# Patient Record
Sex: FEMALE | Race: WHITE | Employment: OTHER | URBAN - METROPOLITAN AREA
[De-identification: names, ages, dates, MRNs, and addresses within clinical notes are randomized per-mention and may not be internally consistent; named-entity substitution may affect disease eponyms.]

---

## 2017-01-30 ENCOUNTER — HOSPITAL ENCOUNTER (OUTPATIENT)
Dept: LAB | Age: 57
Discharge: HOME | End: 2017-01-30
Attending: FAMILY MEDICINE | Admitting: FAMILY MEDICINE

## 2017-01-30 LAB
ALBUMIN SERPL-MCNC: 4.1 GM/DL (ref 3.4–5)
ALP BLD-CCNC: 208 IU/L (ref 40–128)
ALT SERPL-CCNC: 20 U/L (ref 10–40)
ANION GAP SERPL CALCULATED.3IONS-SCNC: 12 MMOL/L (ref 4–16)
AST SERPL-CCNC: 14 IU/L (ref 15–37)
BILIRUB SERPL-MCNC: 0.5 MG/DL (ref 0–1)
BUN BLDV-MCNC: 27 MG/DL (ref 6–23)
CALCIUM SERPL-MCNC: 9.3 MG/DL (ref 8.3–10.6)
CHLORIDE BLD-SCNC: 94 MMOL/L (ref 99–110)
CHOLESTEROL: 156 MG/DL
CO2: 25 MMOL/L (ref 21–32)
CREAT SERPL-MCNC: 1.1 MG/DL (ref 0.6–1.1)
CREATININE URINE: 34.7 MG/DL (ref 28–217)
ERYTHROCYTE SEDIMENTATION RATE: 48 MM/HR (ref 0–30)
ESTIMATED AVERAGE GLUCOSE: 309 MG/DL
GFR AFRICAN AMERICAN: >60 ML/MIN/1.73M2
GFR NON-AFRICAN AMERICAN: 51 ML/MIN/1.73M2
GLUCOSE FASTING: 453 MG/DL (ref 70–99)
HBA1C MFR BLD: 12.4 % (ref 4.2–6.3)
HDLC SERPL-MCNC: 26 MG/DL
LDL CHOLESTEROL CALCULATED: ABNORMAL MG/DL
MICROALBUMIN/CREAT 24H UR: 2.2 MG/DL
MICROALBUMIN/CREAT UR-RTO: 63.4 MG/G CREAT (ref 0–30)
POTASSIUM SERPL-SCNC: 5.4 MMOL/L (ref 3.5–5.1)
SODIUM BLD-SCNC: 131 MMOL/L (ref 135–145)
TOTAL PROTEIN: 5.9 GM/DL (ref 6.4–8.2)
TRIGL SERPL-MCNC: 543 MG/DL

## 2017-05-12 ENCOUNTER — HOSPITAL ENCOUNTER (OUTPATIENT)
Dept: GENERAL RADIOLOGY | Age: 57
Discharge: HOME | End: 2017-05-12
Attending: FAMILY MEDICINE | Admitting: FAMILY MEDICINE

## 2017-05-12 DIAGNOSIS — M25.511 ACUTE PAIN OF RIGHT SHOULDER: ICD-10-CM

## 2017-05-12 DIAGNOSIS — M25.512 ACUTE PAIN OF LEFT SHOULDER: ICD-10-CM

## 2017-05-12 DIAGNOSIS — M79.601 RIGHT ARM PAIN: ICD-10-CM

## 2017-05-12 DIAGNOSIS — M79.602 LEFT ARM PAIN: ICD-10-CM

## 2017-08-28 ENCOUNTER — HOSPITAL ENCOUNTER (OUTPATIENT)
Dept: GENERAL RADIOLOGY | Age: 57
Discharge: HOME | End: 2017-08-28
Attending: FAMILY MEDICINE | Admitting: FAMILY MEDICINE

## 2017-08-28 DIAGNOSIS — M79.671 RIGHT FOOT PAIN: ICD-10-CM

## 2018-01-08 PROBLEM — F41.1 GENERALIZED ANXIETY DISORDER: Chronic | Status: ACTIVE | Noted: 2018-01-08

## 2018-04-05 PROBLEM — R11.2 NAUSEA & VOMITING: Status: ACTIVE | Noted: 2018-04-05

## 2018-04-05 PROBLEM — R50.9 FEVER: Chronic | Status: ACTIVE | Noted: 2018-04-05

## 2018-09-19 ENCOUNTER — HOSPITAL ENCOUNTER (INPATIENT)
Dept: REHABILITATION | Age: 58
LOS: 8 days | Discharge: HOME HEALTH | DRG: 948 | End: 2018-09-27
Attending: PHYSICAL MEDICINE & REHABILITATION | Admitting: PHYSICAL MEDICINE & REHABILITATION
Payer: MEDICARE

## 2018-09-19 DIAGNOSIS — R07.89 MUSCULOSKELETAL CHEST PAIN: Primary | ICD-10-CM

## 2018-09-19 DIAGNOSIS — M25.511 ACUTE PAIN OF RIGHT SHOULDER: ICD-10-CM

## 2018-09-19 PROBLEM — R53.81 DEBILITY: Status: ACTIVE | Noted: 2018-09-19

## 2018-09-19 LAB
GLUCOSE BLD-MCNC: 172 MG/DL (ref 70–99)
GLUCOSE BLD-MCNC: 183 MG/DL (ref 70–99)

## 2018-09-19 PROCEDURE — 99223 1ST HOSP IP/OBS HIGH 75: CPT | Performed by: PHYSICAL MEDICINE & REHABILITATION

## 2018-09-19 PROCEDURE — 9990 CHARGE CONVERSION

## 2018-09-19 RX ORDER — DOCUSATE SODIUM 100 MG/1
100 CAPSULE, LIQUID FILLED ORAL 2 TIMES DAILY
Status: DISCONTINUED | OUTPATIENT
Start: 2018-09-19 | End: 2018-09-27 | Stop reason: HOSPADM

## 2018-09-19 RX ORDER — DEXTROSE MONOHYDRATE 50 MG/ML
100 INJECTION, SOLUTION INTRAVENOUS PRN
Status: DISCONTINUED | OUTPATIENT
Start: 2018-09-19 | End: 2018-09-27 | Stop reason: HOSPADM

## 2018-09-19 RX ORDER — DULOXETIN HYDROCHLORIDE 30 MG/1
60 CAPSULE, DELAYED RELEASE ORAL DAILY
Status: DISCONTINUED | OUTPATIENT
Start: 2018-09-20 | End: 2018-09-27 | Stop reason: HOSPADM

## 2018-09-19 RX ORDER — BUPROPION HYDROCHLORIDE 100 MG/1
100 TABLET ORAL 2 TIMES DAILY
Status: DISCONTINUED | OUTPATIENT
Start: 2018-09-19 | End: 2018-09-27 | Stop reason: HOSPADM

## 2018-09-19 RX ORDER — DEXTROSE MONOHYDRATE 25 G/50ML
12.5 INJECTION, SOLUTION INTRAVENOUS PRN
Status: DISCONTINUED | OUTPATIENT
Start: 2018-09-19 | End: 2018-09-27 | Stop reason: HOSPADM

## 2018-09-19 RX ORDER — TRAMADOL HYDROCHLORIDE 50 MG/1
50 TABLET ORAL EVERY 6 HOURS PRN
Status: DISCONTINUED | OUTPATIENT
Start: 2018-09-19 | End: 2018-09-27 | Stop reason: HOSPADM

## 2018-09-19 RX ORDER — GLIPIZIDE 5 MG/1
5 TABLET ORAL
Status: DISCONTINUED | OUTPATIENT
Start: 2018-09-19 | End: 2018-09-27 | Stop reason: HOSPADM

## 2018-09-19 RX ORDER — INSULIN GLARGINE 100 [IU]/ML
30 INJECTION, SOLUTION SUBCUTANEOUS NIGHTLY
Status: DISCONTINUED | OUTPATIENT
Start: 2018-09-19 | End: 2018-09-27 | Stop reason: HOSPADM

## 2018-09-19 RX ORDER — HYDRALAZINE HYDROCHLORIDE 25 MG/1
25 TABLET, FILM COATED ORAL EVERY 4 HOURS PRN
Status: DISCONTINUED | OUTPATIENT
Start: 2018-09-19 | End: 2018-09-19

## 2018-09-19 RX ORDER — GABAPENTIN 300 MG/1
300 CAPSULE ORAL 3 TIMES DAILY
Status: DISCONTINUED | OUTPATIENT
Start: 2018-09-19 | End: 2018-09-27 | Stop reason: HOSPADM

## 2018-09-19 RX ORDER — TORSEMIDE 20 MG/1
10 TABLET ORAL DAILY
Status: DISCONTINUED | OUTPATIENT
Start: 2018-09-20 | End: 2018-09-27 | Stop reason: HOSPADM

## 2018-09-19 RX ORDER — ACETAMINOPHEN 325 MG/1
650 TABLET ORAL EVERY 4 HOURS PRN
Status: DISCONTINUED | OUTPATIENT
Start: 2018-09-19 | End: 2018-09-27 | Stop reason: HOSPADM

## 2018-09-19 RX ORDER — ALBUTEROL SULFATE 90 UG/1
2 AEROSOL, METERED RESPIRATORY (INHALATION) EVERY 4 HOURS PRN
Status: DISCONTINUED | OUTPATIENT
Start: 2018-09-19 | End: 2018-09-27 | Stop reason: HOSPADM

## 2018-09-19 RX ORDER — CLOPIDOGREL BISULFATE 75 MG/1
75 TABLET ORAL DAILY
Status: DISCONTINUED | OUTPATIENT
Start: 2018-09-20 | End: 2018-09-27 | Stop reason: HOSPADM

## 2018-09-19 RX ORDER — SODIUM CHLORIDE 0.9 % (FLUSH) 0.9 %
10 SYRINGE (ML) INJECTION 2 TIMES DAILY
Status: DISCONTINUED | OUTPATIENT
Start: 2018-09-19 | End: 2018-09-20

## 2018-09-19 RX ORDER — CIPROFLOXACIN 500 MG/1
500 TABLET, FILM COATED ORAL EVERY 12 HOURS SCHEDULED
Status: COMPLETED | OUTPATIENT
Start: 2018-09-19 | End: 2018-09-22

## 2018-09-19 RX ORDER — ATORVASTATIN CALCIUM 40 MG/1
40 TABLET, FILM COATED ORAL NIGHTLY
Status: DISCONTINUED | OUTPATIENT
Start: 2018-09-19 | End: 2018-09-27 | Stop reason: HOSPADM

## 2018-09-19 RX ORDER — NICOTINE POLACRILEX 4 MG
15 LOZENGE BUCCAL PRN
Status: DISCONTINUED | OUTPATIENT
Start: 2018-09-19 | End: 2018-09-19

## 2018-09-19 RX ORDER — NICOTINE POLACRILEX 4 MG
15 LOZENGE BUCCAL PRN
Status: DISCONTINUED | OUTPATIENT
Start: 2018-09-19 | End: 2018-09-27 | Stop reason: HOSPADM

## 2018-09-19 RX ORDER — ASPIRIN 81 MG/1
81 TABLET, CHEWABLE ORAL DAILY
Status: DISCONTINUED | OUTPATIENT
Start: 2018-09-20 | End: 2018-09-27 | Stop reason: HOSPADM

## 2018-09-19 RX ORDER — PANTOPRAZOLE SODIUM 40 MG/1
40 TABLET, DELAYED RELEASE ORAL
Status: DISCONTINUED | OUTPATIENT
Start: 2018-09-20 | End: 2018-09-27 | Stop reason: HOSPADM

## 2018-09-19 RX ADMIN — CIPROFLOXACIN 500 MG: 500 TABLET, FILM COATED ORAL at 20:25

## 2018-09-19 RX ADMIN — DOCUSATE SODIUM 100 MG: 100 CAPSULE, LIQUID FILLED ORAL at 20:25

## 2018-09-19 RX ADMIN — INSULIN LISPRO 30 UNITS: 100 INJECTION, SOLUTION INTRAVENOUS; SUBCUTANEOUS at 17:26

## 2018-09-19 RX ADMIN — TRAMADOL HYDROCHLORIDE 50 MG: 50 TABLET, FILM COATED ORAL at 20:27

## 2018-09-19 RX ADMIN — GLIPIZIDE 5 MG: 5 TABLET ORAL at 16:19

## 2018-09-19 RX ADMIN — INSULIN LISPRO 1 UNITS: 100 INJECTION, SOLUTION INTRAVENOUS; SUBCUTANEOUS at 21:50

## 2018-09-19 RX ADMIN — GABAPENTIN 300 MG: 300 CAPSULE ORAL at 20:26

## 2018-09-19 RX ADMIN — ATORVASTATIN CALCIUM 40 MG: 40 TABLET, FILM COATED ORAL at 20:25

## 2018-09-19 RX ADMIN — METOPROLOL TARTRATE 25 MG: 25 TABLET ORAL at 20:26

## 2018-09-19 RX ADMIN — Medication 10 ML: at 20:27

## 2018-09-19 RX ADMIN — BUPROPION HYDROCHLORIDE 100 MG: 100 TABLET, FILM COATED ORAL at 20:25

## 2018-09-19 RX ADMIN — INSULIN GLARGINE 30 UNITS: 100 INJECTION, SOLUTION SUBCUTANEOUS at 21:49

## 2018-09-19 RX ADMIN — INSULIN LISPRO 1 UNITS: 100 INJECTION, SOLUTION INTRAVENOUS; SUBCUTANEOUS at 17:20

## 2018-09-19 ASSESSMENT — PAIN DESCRIPTION - DESCRIPTORS: DESCRIPTORS: ACHING;SORE;THROBBING

## 2018-09-19 ASSESSMENT — PAIN DESCRIPTION - LOCATION
LOCATION: ARM;BACK;COCCYX
LOCATION: ARM;BACK;COCCYX

## 2018-09-19 ASSESSMENT — PAIN DESCRIPTION - ORIENTATION
ORIENTATION: RIGHT
ORIENTATION: RIGHT

## 2018-09-19 ASSESSMENT — PAIN DESCRIPTION - PAIN TYPE
TYPE: ACUTE PAIN;CHRONIC PAIN
TYPE: ACUTE PAIN;CHRONIC PAIN

## 2018-09-19 ASSESSMENT — PAIN SCALES - GENERAL
PAINLEVEL_OUTOF10: 5
PAINLEVEL_OUTOF10: 9

## 2018-09-19 ASSESSMENT — PAIN DESCRIPTION - PROGRESSION: CLINICAL_PROGRESSION: GRADUALLY IMPROVING

## 2018-09-19 NOTE — PLAN OF CARE
ARU Interdisciplinary Plan of Care (IPOC)  [facility] Dr. Maryjane Boykin [facility] [place], [address]  [phone]  Fax: [phone]        Rhett Barfield    : 1960  Acct #: [de-identified]  MRN: [id number]   PHYSICIAN:  Lissa Warner MD  Primary Active Problems:   Active Hospital Problems    Diagnosis Date Noted    Debility [R53.81] 2018       Rehabilitation Diagnosis:     Debility [R53.81]       ADMIT DATE:2018   CARE PLAN     NURSING:  Sanjuana Mosley while on this unit will: Bowel and Bladder   [x] Be continent of bowel and bladder      [x] Have an adequate number of bowel movements   [] Urinate with no urinary retention >300ml in bladder   [] Bladder Scan: (details)   [] Complete bladder protocol with mixon removal   [] Initiate Bladder Program to toilet every ___ hours   [] Initiate Bowel Program to toilet every ___hours   [] Bladder training    [] Bowel training  Pulmonary   [x] Maintain O2 SATs at 92%  Pain Management   [x] Have pain managed while on ARU        [x] Be pain free by discharge    [x] Medication Management and Education  Maintenance of Skin Integrity/Wound Management   [x] Have no skin breakdown while on ARU   [] Have improved skin integrity via wound measurements   [] Have no signs/symptoms of infection via infection protection and monitoring at the          wound site  Fall Prevention   [x] Be free from injury during hospitalization via fall prevention measures     [x] Disease management and Education  Precautions   [] Weight Bearing Precautions   [] Swallowing Precautions   [x] Monitoring of Risks of Complications   [x] DVT Prophylaxis    [x] Fluid/electrolyte/Nutrition Management    [x] Complete education with patient/family with understanding demonstrated for          in-room safety with transfers to bed, toilet, wheelchair, shower as well as                bathroom activities and hygiene.   [x] Adjustment   [] Other:   Nursing interventions may include bowel/bladder training, education for medical assistive devices, medication education, O2 saturation management, energy conservation, stress management techniques, fall prevention, alarms protocol, seating and positioning, skin/wound care, pressure relief instruction,dressing changes,  infection protection, DVT prophylaxis, and/or assistance with in room safety with transfers to bed, toilet, wheelchair, shower as well as bathroom activities and hygiene. Patient/caregiver education for:   [x] Disease/sustained injury/management      [x] Medication Use   [] Surgical intervention   [x] Safety/Precautions   [x] Body mechanics and or joint protection   [x] Health maintenance         PHYSICAL THERAPY:  Goals:                  Short term goals  Time Frame for Short term goals: 7 tx days or until discharge:   Short term goal 1: Pt will consistently complete bed mobility and sup<->sit Ind and all basic, OOB transfers Mod Ind. Short term goal 2: Pt will ambulate >/=200 ft using RW with Sup. Short term goal 3: Pt will ascend/descend at least 4 steps using bilat rails with Sup. Short term goal 4: Pt will ascend/descend curb step and ambulate over uneven surfaces using RW with Sup. These goals were reviewed with this patient at the time of assessment and Lina Paz is in agreement. Plan of Care: Pt to be seen 5 days per week for a minimum of 60 minutes for 7 days. Current Treatment Recommendations: Strengthening, Transfer Training, Endurance Training, Patient/Caregiver Education & Training, Equipment Evaluation, Education, & procurement, Balance Training, Gait Training, Home Exercise Program, Functional Mobility Training, Stair training, Safety Education & Training community reintegration,and concurrent/group therapy.       OCCUPATIONAL THERAPY:  Goals:             Short term goals  Time Frame for Short term goals: STGs=LTGs :  Long term and Tanya Acuna is in agreement. CASE MANAGEMENT:  Goals:   Assist patient/family with discharge planning, patient/family counseling, assistance in obtaining recommended equipment and other services, and coordination with insurance during ARU stay. Patient Goals:   Return to maximum level of independent function.     Nutrition goal: Patient will consume at least 70% of meals on carb controlled diet       Current  FIMS and Goals:   SELF-CARE  Eatin - Patient feeds self  GOAL: Eatin  Groomin - Requires setup/cues to do all tasks  GOAL: Groomin  Bathin - Able to bathe 8-9 areas  GOAL: Bathin  Dressing-Upper: 5 - Requires setup/supervision/cues and/or requires assist with presthesis/brace only  GOAL: Dressing-Upper: 7  Dressing-Lower: 4 - Requires assist with buttons/zippers/shoelaces and/or assist with shoes only  GOAL: Dressing-Lower: 6  Toiletin - Requires device (grab bar/walker/etc.) (uses grab bar)  Bowel Level of Assistance:  (no bowel activity this shift)  TRANSFERS  Bed, Chair, Wheel Chair: 4 - Requires steadying assistance only <25% assist  and/or requires assist with one leg only  GOAL: Bed, Chair, Wheel Chair: 6  Toilet Transfer: 4 - Requires steadying assistance only < 25% assist  GOAL: Toilet: 6  Primary Mode: Shower  GOAL: Tub, Shower: 6  Shower Transfer: 4 - Minimal contact assistance, pt. expends 75% or more effort  SPHINCTER CONTROL  Bladder Level of Assistance: 7- Urinates in toilet Independently, does not take medication to manager bladder function  Bladder Frequency of Accidents: 7 - Urinates in toilet without need for device or medication, no bladder accidents  Bowel Level of Assistance:  (no bowel activity this shift)  Bowel Frequency of Accidents:  (no bowel activity this shift)  LOCOMOTION  Primary Mode: Walk  Distance Walked: 130 ft  Walk: 2 - Maximal Assistance Requires up to Maximal Assistance AND requires assistance of one person to walk between  feet (Patient performs 25-49% of locomotion effort or goes between  feet) (CGA)  Stairs: 0 - Activity Does not Occur ( 0 only for the admission assessment)  COMMUNICATION  Comprehension: 6 - Complex ideas 90% or device (hearing aid/glasses)  Expression: 7 - Patient expresses complex ideas/needs  SOCIAL COGNITION  Social Interaction: 6 - Patient requires medication for mood and/or effect  Problem Solvin - Patient able to solve simple/routine tasks  Memory: 5 - Patient requires prompting with stress/unfamiliar situations    Activities Prior to Admit:      Active : No                  Intensity of Therapy  Valaria Kanner Peyatt will be seen a minimum of 3 hours of therapy per day/a minimum of 5 out of 7 days per week. [] In this rare instance due to the nature of this patient's medical involvement, this patient will be seen 15 hours per week (900 minutes within a 7 day period). Treatments may include therapeutic exercises, gait training, neuromuscular re-ed, transfer training, community reintegration, bed mobility, w/c mobility and training, self care, home mgmt, cognitive training, energy conservation,dysphagia tx, speech/language/communication therapy, group therapy, and patient/family education. In addition, dietician/nutritionist may monitor calorie count as well as intake and collaboratively work with SLP on dietary upgrades. Neuropsychology/Psychology may evaluate and provide necessary support.     Medical issues being managed closely and that require 24 hour availability of a physician:   [] Swallowing Precautions                                     [] Weight bearing precautions   [] Wound Care                             [x] Infection Prevention   [x] DVT Prophylaxis/assessment              [x] Monitoring for complications    [x] Fall Precautions/Prevention                         [x] Fluid/Electrolyte/Nutrition Balance   [] Voice Protection                           [x] Medication Management   [x] Respiratory                   [x] Pain Mgmt   [x] Bowel/Bladder Fx    Medical Prognosis: [] Good  [x] Fair    [] Guarded   Total expected IRF days 14. Physician anticipated functional outcomes:  FWW and HHC OT/PT with supervision. Rehab Goals:   [] Return to premorbid function of_______________________________.    [] Independent   [] Mod I  [x] Supervision  [] CGA   [] Min A   [] Mod A  Level for ambulation []without assistive device  [x] with assistive device        [] Independent   [] Mod I  [x] Supervision [] CGA   [] Min A   [] Mod A  Level for transfers []without assistive device  [x] with assistive device         [] Independent   [] Mod I  [x] Supervision [] CGA   [] Min A   [] Mod A Level with ADL's []without assistive device   [x] with assistive device     ___________________     Level with cognitive skills requiring [] No assist [x] Supervision  [] Active Assist/Cues     [] Maximize level of mobility and ADL's to decrease burden on caregiver    IPOC brief synthesis of Preadmission Screen, Post-Admission Evaluation, and Therapy Evaluations: Acute inpatient rehabilitation with occupational and  physical therapy 180 minutes, five out of every seven days. We will  address basic and advancing mobility with self-care instruction and  adaptive equipment training. Caregiver education will be offered. Expected length of stay prior to a supervised level of function for  discharge to home is two weeks.     ADDITIONAL RECOMMENDATIONS:  1. Debility due to urinary tract infection with gait disturbance: The  patient requires daily occupational and physical therapy. We will maximize  blood sugar and blood pressure control, encourage oral intake and work on  bowel and bladder retraining. Pulmonary hygiene and DVT prophylaxis and  pain management will be emphasized.     2. DVT prophylaxis:  Lovenox 30 mg subcu daily.   I must monitor her  hemoglobin and platelet count while

## 2018-09-20 LAB
GLUCOSE BLD-MCNC: 108 MG/DL (ref 70–99)
GLUCOSE BLD-MCNC: 157 MG/DL (ref 70–99)
GLUCOSE BLD-MCNC: 174 MG/DL (ref 70–99)
GLUCOSE BLD-MCNC: 175 MG/DL (ref 70–99)
GLUCOSE BLD-MCNC: 208 MG/DL (ref 70–99)
GLUCOSE BLD-MCNC: 80 MG/DL (ref 70–99)

## 2018-09-20 PROCEDURE — 90686 IIV4 VACC NO PRSV 0.5 ML IM: CPT

## 2018-09-20 PROCEDURE — 97163 PT EVAL HIGH COMPLEX 45 MIN: CPT

## 2018-09-20 PROCEDURE — 97116 GAIT TRAINING THERAPY: CPT

## 2018-09-20 PROCEDURE — 97535 SELF CARE MNGMENT TRAINING: CPT

## 2018-09-20 PROCEDURE — G0008 ADMIN INFLUENZA VIRUS VAC: HCPCS

## 2018-09-20 PROCEDURE — 97530 THERAPEUTIC ACTIVITIES: CPT

## 2018-09-20 PROCEDURE — 82962 GLUCOSE BLOOD TEST: CPT

## 2018-09-20 PROCEDURE — 94150 VITAL CAPACITY TEST: CPT

## 2018-09-20 PROCEDURE — 9990 CHARGE CONVERSION

## 2018-09-20 PROCEDURE — 97150 GROUP THERAPEUTIC PROCEDURES: CPT

## 2018-09-20 PROCEDURE — 97167 OT EVAL HIGH COMPLEX 60 MIN: CPT

## 2018-09-20 PROCEDURE — 94761 N-INVAS EAR/PLS OXIMETRY MLT: CPT

## 2018-09-20 RX ADMIN — DOCUSATE SODIUM 100 MG: 100 CAPSULE, LIQUID FILLED ORAL at 08:25

## 2018-09-20 RX ADMIN — ATORVASTATIN CALCIUM 40 MG: 40 TABLET, FILM COATED ORAL at 21:25

## 2018-09-20 RX ADMIN — GLIPIZIDE 5 MG: 5 TABLET ORAL at 08:25

## 2018-09-20 RX ADMIN — INSULIN GLARGINE 30 UNITS: 100 INJECTION, SOLUTION SUBCUTANEOUS at 21:30

## 2018-09-20 RX ADMIN — BUPROPION HYDROCHLORIDE 100 MG: 100 TABLET, FILM COATED ORAL at 21:26

## 2018-09-20 RX ADMIN — INSULIN LISPRO 1 UNITS: 100 INJECTION, SOLUTION INTRAVENOUS; SUBCUTANEOUS at 21:30

## 2018-09-20 RX ADMIN — PANTOPRAZOLE SODIUM 40 MG: 40 TABLET, DELAYED RELEASE ORAL at 05:33

## 2018-09-20 RX ADMIN — INSULIN LISPRO 1 UNITS: 100 INJECTION, SOLUTION INTRAVENOUS; SUBCUTANEOUS at 12:39

## 2018-09-20 RX ADMIN — ASPIRIN 81 MG CHEWABLE TABLET 81 MG: 81 TABLET CHEWABLE at 08:25

## 2018-09-20 RX ADMIN — DULOXETINE HYDROCHLORIDE 60 MG: 30 CAPSULE, DELAYED RELEASE ORAL at 08:24

## 2018-09-20 RX ADMIN — GABAPENTIN 300 MG: 300 CAPSULE ORAL at 21:26

## 2018-09-20 RX ADMIN — TRAMADOL HYDROCHLORIDE 50 MG: 50 TABLET, FILM COATED ORAL at 08:25

## 2018-09-20 RX ADMIN — METOPROLOL TARTRATE 25 MG: 25 TABLET ORAL at 21:25

## 2018-09-20 RX ADMIN — GLIPIZIDE 5 MG: 5 TABLET ORAL at 17:06

## 2018-09-20 RX ADMIN — TRAMADOL HYDROCHLORIDE 50 MG: 50 TABLET, FILM COATED ORAL at 14:47

## 2018-09-20 RX ADMIN — ENOXAPARIN SODIUM 30 MG: 30 INJECTION SUBCUTANEOUS at 08:26

## 2018-09-20 RX ADMIN — TORSEMIDE 10 MG: 20 TABLET ORAL at 08:25

## 2018-09-20 RX ADMIN — TRAMADOL HYDROCHLORIDE 50 MG: 50 TABLET, FILM COATED ORAL at 21:26

## 2018-09-20 RX ADMIN — INSULIN LISPRO 1 UNITS: 100 INJECTION, SOLUTION INTRAVENOUS; SUBCUTANEOUS at 08:26

## 2018-09-20 RX ADMIN — DOCUSATE SODIUM 100 MG: 100 CAPSULE, LIQUID FILLED ORAL at 21:25

## 2018-09-20 RX ADMIN — CLOPIDOGREL BISULFATE 75 MG: 75 TABLET ORAL at 08:25

## 2018-09-20 RX ADMIN — GABAPENTIN 300 MG: 300 CAPSULE ORAL at 12:46

## 2018-09-20 RX ADMIN — INSULIN LISPRO 30 UNITS: 100 INJECTION, SOLUTION INTRAVENOUS; SUBCUTANEOUS at 12:40

## 2018-09-20 RX ADMIN — TRAMADOL HYDROCHLORIDE 50 MG: 50 TABLET, FILM COATED ORAL at 02:24

## 2018-09-20 RX ADMIN — CIPROFLOXACIN 500 MG: 500 TABLET, FILM COATED ORAL at 21:26

## 2018-09-20 RX ADMIN — GABAPENTIN 300 MG: 300 CAPSULE ORAL at 08:24

## 2018-09-20 RX ADMIN — INSULIN LISPRO 30 UNITS: 100 INJECTION, SOLUTION INTRAVENOUS; SUBCUTANEOUS at 08:28

## 2018-09-20 RX ADMIN — LINAGLIPTIN 5 MG: 5 TABLET, FILM COATED ORAL at 08:25

## 2018-09-20 RX ADMIN — METOPROLOL TARTRATE 25 MG: 25 TABLET ORAL at 08:24

## 2018-09-20 RX ADMIN — CIPROFLOXACIN 500 MG: 500 TABLET, FILM COATED ORAL at 08:25

## 2018-09-20 RX ADMIN — BUPROPION HYDROCHLORIDE 100 MG: 100 TABLET, FILM COATED ORAL at 08:26

## 2018-09-20 ASSESSMENT — PAIN DESCRIPTION - ORIENTATION
ORIENTATION: RIGHT
ORIENTATION: RIGHT

## 2018-09-20 ASSESSMENT — PAIN DESCRIPTION - PAIN TYPE
TYPE: ACUTE PAIN;CHRONIC PAIN
TYPE: ACUTE PAIN;CHRONIC PAIN

## 2018-09-20 ASSESSMENT — PAIN SCALES - GENERAL
PAINLEVEL_OUTOF10: 10
PAINLEVEL_OUTOF10: 10
PAINLEVEL_OUTOF10: 7
PAINLEVEL_OUTOF10: 8

## 2018-09-20 ASSESSMENT — PAIN DESCRIPTION - LOCATION
LOCATION: ARM;BACK;COCCYX
LOCATION: GENERALIZED
LOCATION: GENERALIZED
LOCATION: ARM;BACK;COCCYX

## 2018-09-20 ASSESSMENT — PAIN DESCRIPTION - DESCRIPTORS
DESCRIPTORS: ACHING;SORE
DESCRIPTORS: ACHING;SORE;THROBBING

## 2018-09-20 NOTE — H&P
commode. There are grab bars in  the bathroom. She has access to a manual wheelchair, but typically  furniture cruises for ambulation. She has been independent with her own  personal care, light homemaking and meal prep. At times, her significant  other has to help her initiate hygiene and dressing of the lower limbs. She does not drive. She does not use tobacco nor alcohol. ALLERGIES:  AMOXICILLIN. ADMITTING MEDICATIONS:  Baby aspirin daily, Lipitor 40 mg nightly,  Wellbutrin 100 mg b.i.d., Cipro 500 mg b.i.d., Plavix 75 mg daily, Colace  100 mg b.i.d., Cymbalta 60 mg daily, Lovenox 30 mg subcu daily, Neurontin  300 mg t.i.d., Glucotrol 5 mg b.i.d., Lantus 30 units nightly, Humalog  sliding scale with each meal plus 30 units with each meal scheduled,  Tradjenta 5 mg daily, Lopressor 25 mg b.i.d., Protonix 40 mg daily, Demadex  10 mg daily, Ultram and Tylenol p.r.n. severe pain. PAST MEDICAL HISTORY:  Morbid obesity, diabetes, hypertension, coronary  artery disease, chronic kidney disease stage III, diastolic congestive  heart failure and general anxiety disorder. FAMILY HISTORY:  Emphysema, arthritis, obesity and heart disease. PHYSICAL EXAMINATION:  VITAL SIGNS:  The patient is afebrile with a blood pressure of 138/71,  pulse 82, respirations 16. She weighs 321 pounds with a BMI of 55.2. GENERAL:  The patient is seen semi-recumbent in bed. She is alert,  oriented x2, but easily distracted by events in the room. HEENT:  There are no signs of trauma to her head or neck. Mucous membranes  are moist.  Visual fields seem full and her speech is clear. Her attention  is poor though. RESPIRATORY:  Her respirations are labored under her girth without rales or  rhonchi. HEART:  Sounds reveal distant S1 and S2 with a regular rate and rhythm. ABDOMEN:  Her obese abdomen is soft, nondistended, nontender. Bowel sounds  are present.     MUSCULOSKELETAL:  The skin overlying her spine is The  patient requires daily occupational and physical therapy. We will maximize  blood sugar and blood pressure control, encourage oral intake and work on  bowel and bladder retraining. Pulmonary hygiene and DVT prophylaxis and  pain management will be emphasized. 2.  DVT prophylaxis:  Lovenox 30 mg subcu daily. I must monitor her  hemoglobin and platelet count while on this medication. GI prophylaxis  will be offered. Weightbearing activity should become protective as well. 3.  Acute renal failure with chronic kidney disease stage III:  Consistent  oral intake and control of her blood pressure and blood sugar will be  emphasized. We will monitor her chemistries and renal function  periodically. We will avoid nephrotoxic medications. 4.  Uncontrolled diabetes: The patient requires her diet modified for  carbohydrates. Blood sugars are checked at a.c. and h.s. She requires  oral Glucotrol and Tradjenta as well as scheduled Lantus and Humalog and a  Humalog sliding scale. Dr. Caden Krueger will be consulted to follow her from  endocrinology. 5.  Hypertension (uncontrolled): The patient requires Lopressor and  Demadex for blood pressure regulation. We will monitor vital signs at rest  and with activity, encourage consistent oral intake. Target systolic blood  pressure is 120 to 140. Accuracy will be better if we use a large cuff  with her blood pressure checks. I personally performed a history and physical on this patient within 24  hours of admission to the rehab unit. I have reviewed the preadmission  screen and concur with its findings without change. A detailed plan of  care will be established by hospital day #4 and I attest she is appropriate  for inpatient rehabilitation at this time.         Gwenlyn Essex, MD    D: 09/20/2018 13:44:31       T: 09/20/2018 13:50:06     [email or web address]  Job#: [id number]     Doc#: [id number]

## 2018-09-20 NOTE — PROGRESS NOTES
limbs, she struggles with 3/5 strength across the major  joints. She complains of left hip and knee pain with movement. The joints  are stable to palpation and range of motion passively. Her heels are clear  and there are no signs of DVT. She has 1+ edema of both ankles. Sitting balance is fair-.  Standing balance is poor. LABORATORY TESTING:  Electrolytes within normal limits. BUN and creatinine  at 39 and 1.5 (improving). White blood count 12.3, hemoglobin 12.5 and  platelets 340,134. IMPRESSION:  A 51-year-old female with multiple medical comorbidities  including morbid obesity, diabetes and hypertension with kidney disease,  who had suffered a fall, possibly related to her UTI. She is now  demonstrating debility associated with UTI, acute renal failure and  uncontrolled diabetes and hypertension. Limitations include her morbid obesity, medical comorbidities and her learned helplessness. RECOMMENDATIONS:  Acute inpatient rehabilitation with occupational and  physical therapy 180 minutes, five out of every seven days. We will  address basic and advancing mobility with self-care instruction and  adaptive equipment training. Caregiver education will be offered. Expected length of stay prior to a supervised level of function for  discharge to home is two weeks. ADDITIONAL RECOMMENDATIONS:  1. Debility due to urinary tract infection with gait disturbance: The  patient requires daily occupational and physical therapy. We will maximize  blood sugar and blood pressure control, encourage oral intake and work on  bowel and bladder retraining. Pulmonary hygiene and DVT prophylaxis and  pain management will be emphasized. 2.  DVT prophylaxis:  Lovenox 30 mg subcu daily. I must monitor her  hemoglobin and platelet count while on this medication. GI prophylaxis  will be offered. Weightbearing activity should become protective as well.     3.  Acute renal failure with chronic kidney disease

## 2018-09-20 NOTE — FLOWSHEET NOTE
Pt c/o feeling unwell. Pt reports dizziness, mild headache, lips numbing, a little bit of palpitation, and tired. V/s were stable with temp of 98.6, HR 95 apical, RR 18, /65 manual, O2 96% RA. BG was 80 which pt reported as really low for her since she usually run above 150's. 4 oz orange juice was given. Will continue to monitor.

## 2018-09-20 NOTE — PROGRESS NOTES
Nutrition Assessment    Type and Reason for Visit: Initial (rehab admit )    Nutrition Recommendations: Continue current carb controlled diet     Malnutrition Assessment:  · Malnutrition Status: No malnutrition  · Context: Acute illness or injury    Nutrition Diagnosis:   · Problem: Overweight/Obese  · Etiology: related to Endocrine dysfunction, Other (likley imbalance of energy intake and activity )     Signs and symptoms:  as evidenced by BMI    Nutrition Assessment:  · Subjective Assessment: rehab admit with debility, hx fall. Sitting up in chair on visit. Reports good appetite, content with most meals. Familiar with carb controlled diet. · Nutrition-Focused Physical Findings: pain 7/10, alert/verbal, weight in abdomen   · Wound Type: None  · Current Nutrition Therapies:  · Oral Diet Orders: Carb Control 4 Carbs/Meal   · Oral Diet intake: %, Assist  · Oral Nutrition Supplement (ONS) Orders: None  · ONS intake:    · Anthropometric Measures:  · Ht: 5' 4\" (162.6 cm)   · Current Body Wt: 321 lb (145.6 kg)  · % Weight Change: no loss reported ,     · Ideal Body Wt: 120 lb (54.4 kg), % [place] Body 267  · BMI Classification: BMI > or equal to 40.0 Obese Class III (BMI-55. 2)  · Comparative Standards (Estimated Nutrition Needs):  · Estimated Daily Total Kcal: 1641-7590 (for loss 0049-5979 calories)  · Estimated Daily Protein (g): 65-70    Estimated Intake vs Estimated Needs: Intake Meets Needs    Nutrition Risk Level: Low    Nutrition Interventions:   Continue current diet, ONS not indicated  Continued Inpatient Monitoring, Education Initiated, Coordination of Care    Nutrition Evaluation:   · Evaluation: Goals set   · Goals: Patient will consume at least 70% of meals on carb controlled diet     · Monitoring: Meal Intake, Diet Tolerance, Pertinent Labs, Weight, Patient/Family Education    See Adult Nutrition Doc Flowsheet for more detail.      Electronically signed by Anoop Weiss RD, LD on 9/20/18 at 2:16 PM    Contact Number: [phone]

## 2018-09-20 NOTE — PLAN OF CARE
Problem: Falls - Risk of:  Goal: Will remain free from falls  Will remain free from falls   Outcome: Ongoing    Goal: Absence of physical injury  Absence of physical injury   Outcome: Ongoing      Problem: Pain:  Goal: Pain level will decrease  Pain level will decrease   Outcome: Ongoing

## 2018-09-20 NOTE — PROGRESS NOTES
Occupational Therapy                                                  [facility] ARU OCCUPATIONAL THERAPY EVALUATION    Chart Review:  Past Medical History:   Diagnosis Date    CAD (coronary artery disease)     CKD (chronic kidney disease) stage 3, GFR 30-59 ml/min     Diabetes type 2, uncontrolled (HCC)     Diastolic heart failure ([facility] [address]     Essential hypertension     Financial problems 3/22/2013    Generalized anxiety disorder 2018    Herpes genitalia     Obesity, morbid (more than 100 lbs over ideal weight or BMI > 40) ([facility] [address] 2013     Past Surgical History:   Procedure Laterality Date     SECTION      CHOLECYSTECTOMY      OTHER SURGICAL HISTORY Right [id number]    I and D rt big toe    TONSILLECTOMY       Social History:  Social/Functional History  Lives With: Significant other ([place] )  Type of Home: House  Home Layout: One level  Home Access: Ramped entrance (accessed via wheelchair with Total A)  Bathroom Shower/Tub: Tub/Shower unit, Shower chair with back  Bathroom Toilet: Standard  Bathroom Equipment: Grab bars in shower, Grab bars around toilet  Home Equipment: [address], Wheelchair-electric, Rolling walker  ADL Assistance: Needs assistance  Meal Prep Responsibility: No (significant other does )  Laundry Responsibility: No (significant other does )  Cleaning Responsibility: No (significant other does )  Bill Paying/Finance Responsibility: Primary  Shopping Responsibility: Secondary (makes grocery list )   N [facility] [address]: Secondary (cat: Susan )  Health Care Management: Primary  Ambulation Assistance: Needs assistance (ambulated short distances only due to dizziness (required steadying assistance and w/c follow) )  Transfer Assistance: Needs assistance (required assistance at times when pt is dizzy )  Active : No  Additional Comments: 2 falls in the past year without significant injury, sleeps in regular bed     Restrictions: bottom; close SBA provided while pt was in stance to bathe perineal area (pt props 1 foot on shower bench to facilitate reach))  UE Dressing: Stand by assistance, Setup  LE Dressing: Contact guard assistance, Setup, Verbal cueing, Increased time to complete (able to reach feet sitting at angle on EOB + min increased time; CGA while in stance to perform pants management up)  Toileting: Unable to assess(comment) (denied need during eval)  Additional Comments: Pt has a very specific ADL routine, returning to bed after shower to apply powder under skin folds, then got dressed seated EOB. Bed Mobility:    Bed mobility  Supine to Sit: Stand by assistance  Sit to Supine: Stand by assistance      Transfers:    Transfers  Stand Step Transfers: Contact guard assistance  Sit to stand: Contact guard assistance  Stand to sit: Contact guard assistance  Toilet Transfers  Toilet - Technique: Stand pivot  Equipment Used: Grab bars  Toilet Transfer: Contact guard assistance     Shower Transfers  Shower - Transfer Type:  To and From  Shower - Transfer To:  (built in shower bench)  Shower - Technique: Ambulating (c RW)  Shower Transfers: Contact Guard       Functional Mobility:    Balance  Sitting Balance: Supervision  Standing Balance: Contact guard assistance  Standing Balance  Time: Multiple stands 1-2 mins each  Activity: ADLs  Sit to stand: Contact guard assistance  Stand to sit: Contact guard assistance  Comment: Able to maintain balance c 0 UE support, but unsteady  Functional Mobility  Functional Mobility Comments: Used W/C to conserve energy for ADLs     Cognition:  Cognition  Overall Cognitive Status: WFL    Perception:  Perception  Overall Perceptual Status: WFL    Assessment:     Performance deficits / Impairments: Decreased functional mobility , Decreased ADL status, Decreased ROM, Decreased strength, Decreased endurance, Decreased sensation, Decreased balance, Decreased high-level IADLs    The patient is a 62year old term goal 2: Pt will complete UB dressing I; LB dressing mod I using AE PRN. Long term goal 3: Pt will complete total body bathing mod I using AE PRN. Long term goal 4: Pt will complete grooming tasks I. Long term goal 5: Pt will complete functional transfers (bed, chair, shower, toilet) c DME PRN and mod I.  Long term goals 6: Pt will perform therex/therax to facilitate increased strength/endurance/ax tolerance (c emphasis on dynamic standing balance/tolerance >10 mins and RUE ROM/strength) c SBA. Long term goal 7: Pt will complete simple homemaking task c DME PRN and S to promote increased engagement in IADLs. Plan:    Pt will be seen at least 60 minutes per day for a minimum of 5 days per week, plus group therapy as appropriate  Current Treatment Recommendations: Strengthening, ROM, Balance Training, Functional Mobility Training, Endurance Training, Pain Management, Safety Education & Training, Patient/Caregiver Education & Training, Equipment Evaluation, Education, & procurement, Self-Care / ADL, Home Management Training    OT Individual Minutes  Time In: 6618  Time Out: 7131  Minutes: 76                Number of Minutes/Billable Intervention  OT Evaluation 25   Therapeutic Exercise    ADL Self-care 53   Neuro Re-Ed    Therapeutic Activity    Group    Other:    TOTAL 78     Electronically signed by:    Danny Waldron MS, OTR/L  License #[id number]  9/20/2018, 6:21 PM

## 2018-09-20 NOTE — PROGRESS NOTES
Transfers:    Transfers  Sit to Stand: Contact guard assistance  Stand to sit: Contact guard assistance  Bed to Chair: Contact guard assistance  Stand Pivot Transfers: Contact guard assistance  Car Transfer: Contact guard assistance (pt was able to perform SLS with external support to get into training car  )    Ambulation:    Ambulation?: Yes  Ambulation 1  Surface: level tile  Device: Rolling Walker  Assistance: Contact guard assistance  Quality of Gait: step-through  Distance: 130 ft  Comments: ambulation tolerance was limited by fatigue     Curb/Stairs:  Stairs?: No (pt unable at this time (safety concerns) )     Balance:    Balance  Comments: Static standing with 1UE support with CGA ; due to limited ability to reach down below her knees, it is anticipated that pt will benefit from use of reacher     Assessment:   The patient is a 62year old female admitted onto ARU after hospitalization for fall at home due to syncope. Imaging studies did not reveal any acute intracranial abnormality or acute Fx. Pt with Hx of chronic pain on her R shoulder and lumbar area. Pt states sedentary lifestyle at baseline and that her significant other preferred to push her in the w/c rather than to let her walk that limited her ability to walk. Although she also admitted that her ambulation tolerance was limited by dizziness and SOB. Pt also required assistance with transfers at times due to dizziness. Today, no dizziness was noted but she was more limited by fatigue. Pt demonstrated physical ability to complete all transfers and ambulate over level surface using RW today that was better than expected considering her limited PLOF. Pt demonstrates potential to improve towards established PT goals based on her motivation to be able to ambulate more and no major gait deviation noted.      Body structures, Functions, Activity limitations: Decreased functional mobility , Decreased high-level IADLs, Decreased ADL status, Decreased endurance, Decreased sensation, Decreased strength, Decreased balance     Prognosis: Good  Decision Making: High Complexity  Clinical Presentation: unstable/unpredictable characteristics (DM, HTN, dizziness, anxiety)   Patient education:   ARU schedule, ARU expectations for participation, plan of care  Treatment Initiated:  Functional mobility training, gait training, patient education  Barriers to Improvement: fatigue   Discharge Recommendations:  Home with assist   Equipment Recommendations: has RW     Goals:  Patient Goals   Patient goals : to increase ability to walk  Short term goals  Time Frame for Short term goals: 7 tx days or until discharge:   Short term goal 1: Pt will consistently complete bed mobility and sup<->sit Ind and all basic, OOB transfers Mod Ind. Short term goal 2: Pt will ambulate >/=200 ft using RW with Sup. Short term goal 3: Pt will ascend/descend at least 4 steps using bilat rails with Sup. Short term goal 4: Pt will ascend/descend curb step and ambulate over uneven surfaces using RW with Sup.       Plan:    REQUIRES PT FOLLOW UP: Yes  Pt will be seen at least 60 minutes per day for a minimum of 5 days per week, plus group therapy as appropriate  Plan  Times per day: Daily  Current Treatment Recommendations: Strengthening, Transfer Training, Endurance Training, Patient/Caregiver Education & Training, Equipment Evaluation, Education, & procurement, Balance Training, Gait Training, Home Exercise Program, Functional Mobility Training, Stair training, Safety Education & Training    PT Individual Minutes  Time In: 0830  Time Out: 0930  Minutes: 60        Timed Code Treatment Minutes: 45 Minutes    Number of Minutes/Billable Intervention  PT Evaluation 25   Gait Training 20   Therapeutic Exercise    Neuro Re-Ed    Therapeutic Activity 25   Wheelchair Propulsion    Group    Other:    TOTAL 70       Electronically signed by:    Love Astorga PT    9/20/2018, 4:06 PM

## 2018-09-21 PROBLEM — R26.9 GAIT DISTURBANCE: Status: ACTIVE | Noted: 2018-09-21

## 2018-09-21 LAB
GLUCOSE BLD-MCNC: 183 MG/DL (ref 70–99)
GLUCOSE BLD-MCNC: 184 MG/DL (ref 70–99)
GLUCOSE BLD-MCNC: 215 MG/DL (ref 70–99)
GLUCOSE BLD-MCNC: 229 MG/DL (ref 70–99)
GLUCOSE BLD-MCNC: 93 MG/DL (ref 70–99)

## 2018-09-21 PROCEDURE — 97116 GAIT TRAINING THERAPY: CPT

## 2018-09-21 PROCEDURE — 82962 GLUCOSE BLOOD TEST: CPT

## 2018-09-21 PROCEDURE — 97530 THERAPEUTIC ACTIVITIES: CPT

## 2018-09-21 PROCEDURE — 9990 CHARGE CONVERSION

## 2018-09-21 PROCEDURE — 97150 GROUP THERAPEUTIC PROCEDURES: CPT

## 2018-09-21 RX ADMIN — PANTOPRAZOLE SODIUM 40 MG: 40 TABLET, DELAYED RELEASE ORAL at 05:58

## 2018-09-21 RX ADMIN — TORSEMIDE 10 MG: 20 TABLET ORAL at 08:15

## 2018-09-21 RX ADMIN — ATORVASTATIN CALCIUM 40 MG: 40 TABLET, FILM COATED ORAL at 21:55

## 2018-09-21 RX ADMIN — INSULIN LISPRO 30 UNITS: 100 INJECTION, SOLUTION INTRAVENOUS; SUBCUTANEOUS at 08:11

## 2018-09-21 RX ADMIN — GABAPENTIN 300 MG: 300 CAPSULE ORAL at 08:14

## 2018-09-21 RX ADMIN — TRAMADOL HYDROCHLORIDE 50 MG: 50 TABLET, FILM COATED ORAL at 15:11

## 2018-09-21 RX ADMIN — DOCUSATE SODIUM 100 MG: 100 CAPSULE, LIQUID FILLED ORAL at 08:14

## 2018-09-21 RX ADMIN — GABAPENTIN 300 MG: 300 CAPSULE ORAL at 21:55

## 2018-09-21 RX ADMIN — DOCUSATE SODIUM 100 MG: 100 CAPSULE, LIQUID FILLED ORAL at 21:55

## 2018-09-21 RX ADMIN — GLIPIZIDE 5 MG: 5 TABLET ORAL at 08:15

## 2018-09-21 RX ADMIN — INSULIN GLARGINE 30 UNITS: 100 INJECTION, SOLUTION SUBCUTANEOUS at 21:57

## 2018-09-21 RX ADMIN — INSULIN LISPRO 1 UNITS: 100 INJECTION, SOLUTION INTRAVENOUS; SUBCUTANEOUS at 08:10

## 2018-09-21 RX ADMIN — CIPROFLOXACIN 500 MG: 500 TABLET, FILM COATED ORAL at 21:54

## 2018-09-21 RX ADMIN — TRAMADOL HYDROCHLORIDE 50 MG: 50 TABLET, FILM COATED ORAL at 21:55

## 2018-09-21 RX ADMIN — GABAPENTIN 300 MG: 300 CAPSULE ORAL at 15:11

## 2018-09-21 RX ADMIN — INSULIN LISPRO 2 UNITS: 100 INJECTION, SOLUTION INTRAVENOUS; SUBCUTANEOUS at 12:21

## 2018-09-21 RX ADMIN — METOPROLOL TARTRATE 25 MG: 25 TABLET ORAL at 21:55

## 2018-09-21 RX ADMIN — DULOXETINE HYDROCHLORIDE 60 MG: 30 CAPSULE, DELAYED RELEASE ORAL at 08:14

## 2018-09-21 RX ADMIN — INSULIN LISPRO 1 UNITS: 100 INJECTION, SOLUTION INTRAVENOUS; SUBCUTANEOUS at 21:58

## 2018-09-21 RX ADMIN — BUPROPION HYDROCHLORIDE 100 MG: 100 TABLET, FILM COATED ORAL at 08:16

## 2018-09-21 RX ADMIN — TRAMADOL HYDROCHLORIDE 50 MG: 50 TABLET, FILM COATED ORAL at 03:38

## 2018-09-21 RX ADMIN — INSULIN LISPRO 30 UNITS: 100 INJECTION, SOLUTION INTRAVENOUS; SUBCUTANEOUS at 12:25

## 2018-09-21 RX ADMIN — METOPROLOL TARTRATE 25 MG: 25 TABLET ORAL at 08:14

## 2018-09-21 RX ADMIN — CLOPIDOGREL BISULFATE 75 MG: 75 TABLET ORAL at 08:15

## 2018-09-21 RX ADMIN — BUPROPION HYDROCHLORIDE 100 MG: 100 TABLET, FILM COATED ORAL at 21:55

## 2018-09-21 RX ADMIN — CIPROFLOXACIN 500 MG: 500 TABLET, FILM COATED ORAL at 08:15

## 2018-09-21 RX ADMIN — ENOXAPARIN SODIUM 30 MG: 30 INJECTION SUBCUTANEOUS at 08:15

## 2018-09-21 RX ADMIN — LINAGLIPTIN 5 MG: 5 TABLET, FILM COATED ORAL at 08:15

## 2018-09-21 RX ADMIN — ASPIRIN 81 MG CHEWABLE TABLET 81 MG: 81 TABLET CHEWABLE at 08:15

## 2018-09-21 ASSESSMENT — PAIN DESCRIPTION - DESCRIPTORS
DESCRIPTORS: ACHING;SORE
DESCRIPTORS: ACHING;SORE

## 2018-09-21 ASSESSMENT — PAIN SCALES - GENERAL
PAINLEVEL_OUTOF10: 10
PAINLEVEL_OUTOF10: 8
PAINLEVEL_OUTOF10: 7

## 2018-09-21 ASSESSMENT — PAIN DESCRIPTION - LOCATION
LOCATION: GENERALIZED

## 2018-09-21 NOTE — PROGRESS NOTES
: No  Additional Comments: 2 falls in the past year without significant injury, sleeps in regular bed     Objective                                                                                    Goals:  (Update in navigator)  Short term goals  Time Frame for Short term goals: 7 tx days or until discharge:   Short term goal 1: Pt will consistently complete bed mobility and sup<->sit Ind and all basic, OOB transfers Mod Ind. Short term goal 2: Pt will ambulate >/=200 ft using RW with Sup. Short term goal 3: Pt will ascend/descend at least 4 steps using bilat rails with Sup. Short term goal 4: Pt will ascend/descend curb step and ambulate over uneven surfaces using RW with Sup. :   :        Plan of Care                                                                              Times per week: Pt to be seen at least  5x per week for a minimum of 60 minutes as                               appropriate.   Treatment to include Current Treatment Recommendations: Strengthening, Transfer Training, Endurance Training, Patient/Caregiver Education & Training, Equipment Evaluation, Education, & procurement, Balance Training, Gait Training, Home Exercise Program, Functional Mobility Training, Stair training, Safety Education & Training      Electronically signed by   Daron Tavarez,    9/21/2018, 4:53 PM

## 2018-09-21 NOTE — PROGRESS NOTES
Physical Therapy    [x] daily progress note       [] discharge       Patient Name:  Jn King   :  1960 MRN: [id number]  Room:  [address] [place] [address] Date of Admission: 2018  Rehabilitation Diagnosis:   Debility [R53.81]       Date 2018       Day of ARU Week:  3   Time IN/OUT 1000/1100   Individual Tx Minutes 60   Group Tx Minutes    Co-Treat Minutes    Concurrent Tx Minutes    TOTAL Tx Time Mins 60   Variance Time    Variance Time []   Refusal due to:     []   Medical hold/reason:    []   Illness   []   Off Unit for test/procedure  []   Extra time needed to complete task  []   Therapeutic need  []   Other (specify):   Restrictions Restrictions/Precautions  Restrictions/Precautions: General Precautions, Fall Risk (monitor pre-syncopal Sx )      Communication with other providers: [x]   OK to see per nursing:     []   Spoke with team member regarding:      Subjective observations and cognitive status: Pt alert and cooperative. Pain level/location: 8/10       Location: gluteal area, back   Discharge recommendations  Anticipated discharge date:  TBD  Destination: []home alone   []home alone with assist PRN     [x] home w/ significant other      [] Continuous supervision  []SNF    [] Assisted living     [] Other:   Continued therapy: [x]HHC PT  []OUTPATIENT  PT   [] No Further PT  Equipment needs: has RW     Bed Mobility:           [x]   Pt received out of bed     Transfers:    Sit--> Stand:  CGA   Stand --> Sit:   CGA  Stand-Pivot:   [facility]    Assistive device required for transfer:  RW    Gait:    Distance:  117 + 140 feet  Assistance:  CGA  Device:  RW   Gait Quality:  Step-through    Stairs   # Completed:   15  Assistance:    CGA  Supportive Device:  bilat rails   Height:   4/6\"  Pt variably performed step-to and step-through pattern without LOB.      Additional Therapeutic activities/exercises completed this date:     []   Nu-step:  Time:        Level:         #Steps:       []   Rebounder:    [] Seated     []  Standing        []   Balance training         []   Postural training    []   Supine ther ex (reps/sets):     []   Seated ther ex (reps/sets):     []   Standing ther ex (reps/sets):     []   Picked up object from floor     Assist Level:                     []   Reacher used   [x]   Other: Fall Risk Assessment:     TIMED UP AND GO (TUG) TEST    Assistive device used:  RW   Time: 28.5  seconds (average of 2 trials)  >/= 13.5 seconds considered high fall risk    Observations during test relating to patient's postural stability, gait, stride length, and sway:  [x] slow tentative pace  [] loss of balance  [x] short strides  [] little or no arm swing  [] steadying self on wall  [] shuffling  [x] multiple, small steps to complete turning  [] not using assistive device properly  [x] uses hands to complete sit<->stand transfers  [] discontinued because forgot directions  []other:         []   Other:   []   Other:    Comments:      Patient/Caregiver Education and Training:   []   Bed Mobility/Transfer technique/safety  []   Gait technique/sequencing  []   Proper use of assistive device  [x]   Advanced mobility safety and technique  []   Reinforced patient's precautions/mobility while maintaining precautions  []   Postural awareness  []   Family training  []   Progress was updated and reviewed in Rehabtracker with patient and/or family this         date. Treatment Plan for Next Session: increase ambulation distance > 150 ft, curb step and uneven surfaces      Assessment: Pt only had 1 mild episode of dizziness during TUG test and this Sx resolved spontaneously. She made progress in her functional mobility as she was able to participate in stair training. Her ambulation tolerance was limited mainly by fatigue that prevented her from being able to ambulate >/=150 ft today.       Treatment/Activity Tolerance:   [] Tolerated treatment with no adverse effects    [x] Patient limited by fatigue  [x] Patient limited by pain   [] Patient limited by medical complications:    [] Adverse reaction to Tx:   [] Significant change in status    Safety:       []  bed alarm set    []  chair alarm set    []  Pt refused alarms                []  Telesitter activated      [x]  Gait belt used during tx session      [x]other: pt left in w/c under PTA's care for group therapy         Number of Minutes/Billable Intervention  Gait Training 45   Therapeutic Exercise    Neuro Re-Ed    Therapeutic Activity 15   Wheelchair Propulsion    Group    Other:    TOTAL 60         Social History  Social/Functional History  Lives With: Significant other (Pavan )  Type of Home: House  Home Layout: One level  Home Access: Ramped entrance (accessed via wheelchair with Total A)  Bathroom Shower/Tub: Tub/Shower unit, Shower chair with back  Bathroom Toilet: Standard  Bathroom Equipment: Grab bars in shower, Grab bars around toilet  Home Equipment: [employer], Wheelchair-electric, Rolling walker  ADL Assistance: Needs assistance  Meal Prep Responsibility: No (significant other does )  Laundry Responsibility: No (significant other does )  Cleaning Responsibility: No (significant other does )  Bill Paying/Finance Responsibility: Primary  Shopping Responsibility: Secondary (makes grocery list )  [address]: Secondary (cat: Susan )  Health Care Management: Primary  Ambulation Assistance: Needs assistance (ambulated short distances only due to dizziness (required steadying assistance and w/c follow) )  Transfer Assistance: Needs assistance (required assistance at times when pt is dizzy )  Active : No  Additional Comments: 2 falls in the past year without significant injury, sleeps in regular bed     Objective                                                                                    Goals:  (Update in navigator)  Short term goals  Time Frame for Short term goals: 7 tx days or until discharge:   Short term goal 1: Pt will consistently

## 2018-09-21 NOTE — PROGRESS NOTES
Occupational Therapy  Physical Rehabilitation: OCCUPATIONAL THERAPY     [x] daily progress note       [] discharge       Patient Name:  Chuyita Alvarado   :  1960 MRN: [id number]  Room:  [address] [place], [place] [address] Date of Admission: 2018  Rehabilitation Diagnosis:   Debility [R53.81]       Date 2018       Day of ARU Week:  3   Time IN/OUT 1505 / 1605   Individual Tx Minutes 60   Group Tx Minutes    Co-Treat Minutes    Concurrent Tx Minutes    TOTAL Tx Time Mins 60   Variance Time N/A   Variance Time []   Refusal due to:     []   Medical hold/reason:    []   Illness   []   Off Unit for test/procedure  []   Extra time needed to complete task  []   Therapeutic need  []   Other (specify):   Restrictions Restrictions/Precautions: General Precautions, Fall Risk (monitor pre-syncopal Sx )         Communication with other providers: [x]   OK to see per nursing:     [x]   Nurse Izabela present and completing med pass      Subjective observations and cognitive status: Pt seated in wheelchair, initially reporting fatigue and states, \"I've been worked hard today. I don't know how much more I can do. \" Pt educated for self-pacing c rest breaks and starting therapy from wheelchair level for a few minutes to allow pain meds to take effect. Pt actively participated c education for [facility] techniques and RW safety/mgmt and performed functional mobility. Near end of Tx session, pt reported one brief episode for \"dizziness coming on\" which resolved immediately c seated rest break.     Pain level/location:    9/10       Location: \"My pain is all over\"   Discharge recommendations  Anticipated discharge date:  TBD  Destination: []home alone   []home alone w assist prn   [] home w/ family    [] Continuous supervision       []SNF    [] Assisted living     [] Other:   Continued therapy: []HHC OT  []OUTPATIENT  OT   [] No Further OT  Equipment needs: TBD     Toileting:   Declined need       Toilet Transfers:   Declined need  Device Used:    [] safety / mgmt + handout provided   []   Reinforced Patient's Precautions   []   Progress was updated and reviewed in Rehabtracker with patient and/or family this         date.     Treatment Plan for Next Session: Continue per OT POC      Assessment:  Pt appears limited by pain and dec act tolerance, one instance of brief dizziness which was resolved c seated rest break      Treatment/Activity Tolerance:   [x] Tolerated treatment with no adverse effects (see above)  [] Patient limited by fatigue  [] Patient limited by pain   [] Patient limited by medical complications:    [] Adverse reaction to Tx:   [] Significant change in status    Safety:       []  bed alarm set    []  chair alarm set    [x]  Pt refused alarms                []  Telesitter activated      [x]  Gait belt used during tx session      []other:       Number of Minutes/Billable Intervention  Therapeutic Exercise    ADL Self-care    Neuro Re-Ed    Therapeutic Activity 60   Group    Other:    TOTAL 60       Social History  Social/Functional History  Lives With: Significant other (Pavan )  Type of Home: House  Home Layout: One level  Home Access: Ramped entrance (accessed via wheelchair with Total A)  Bathroom Shower/Tub: Tub/Shower unit, Shower chair with back  Bathroom Toilet: Standard  Bathroom Equipment: Grab bars in shower, Grab bars around toilet  Home Equipment: Jetty Vaughn, Wheelchair-electric, Rolling walker  ADL Assistance: Needs assistance  Meal Prep Responsibility: No (significant other does )  Laundry Responsibility: No (significant other does )  Cleaning Responsibility: No (significant other does )  Bill Paying/Finance Responsibility: Primary  Shopping Responsibility: Secondary (makes grocery list )  Dependent Care Responsibility: Secondary (cat: Susan )  Health Care Management: Primary  Ambulation Assistance: Needs assistance (ambulated short distances only due to dizziness (required steadying assistance and w/c follow) )  Transfer

## 2018-09-22 LAB
GLUCOSE BLD-MCNC: 171 MG/DL (ref 70–99)
GLUCOSE BLD-MCNC: 223 MG/DL (ref 70–99)
GLUCOSE BLD-MCNC: 239 MG/DL (ref 70–99)
GLUCOSE BLD-MCNC: 262 MG/DL (ref 70–99)
GLUCOSE BLD-MCNC: 54 MG/DL (ref 70–99)
GLUCOSE BLD-MCNC: 57 MG/DL (ref 70–99)
GLUCOSE BLD-MCNC: 75 MG/DL (ref 70–99)
GLUCOSE BLD-MCNC: 80 MG/DL (ref 70–99)

## 2018-09-22 PROCEDURE — 97150 GROUP THERAPEUTIC PROCEDURES: CPT

## 2018-09-22 PROCEDURE — 97110 THERAPEUTIC EXERCISES: CPT

## 2018-09-22 PROCEDURE — 97116 GAIT TRAINING THERAPY: CPT

## 2018-09-22 PROCEDURE — 82962 GLUCOSE BLOOD TEST: CPT

## 2018-09-22 PROCEDURE — 6370000000 HC RX 637 (ALT 250 FOR IP): Performed by: PHYSICAL MEDICINE & REHABILITATION

## 2018-09-22 PROCEDURE — 9990 CHARGE CONVERSION

## 2018-09-22 PROCEDURE — 6370000000 HC RX 637 (ALT 250 FOR IP): Performed by: INTERNAL MEDICINE

## 2018-09-22 PROCEDURE — 1280000000 HC REHAB R&B

## 2018-09-22 PROCEDURE — 97530 THERAPEUTIC ACTIVITIES: CPT

## 2018-09-22 PROCEDURE — 6360000002 HC RX W HCPCS: Performed by: PHYSICAL MEDICINE & REHABILITATION

## 2018-09-22 PROCEDURE — 94761 N-INVAS EAR/PLS OXIMETRY MLT: CPT

## 2018-09-22 RX ADMIN — DULOXETINE HYDROCHLORIDE 60 MG: 30 CAPSULE, DELAYED RELEASE ORAL at 08:40

## 2018-09-22 RX ADMIN — INSULIN LISPRO 1 UNITS: 100 INJECTION, SOLUTION INTRAVENOUS; SUBCUTANEOUS at 12:40

## 2018-09-22 RX ADMIN — METOPROLOL TARTRATE 25 MG: 25 TABLET ORAL at 21:02

## 2018-09-22 RX ADMIN — INSULIN GLARGINE 30 UNITS: 100 INJECTION, SOLUTION SUBCUTANEOUS at 21:04

## 2018-09-22 RX ADMIN — GABAPENTIN 300 MG: 300 CAPSULE ORAL at 17:19

## 2018-09-22 RX ADMIN — DOCUSATE SODIUM 100 MG: 100 CAPSULE, LIQUID FILLED ORAL at 21:02

## 2018-09-22 RX ADMIN — INSULIN LISPRO 2 UNITS: 100 INJECTION, SOLUTION INTRAVENOUS; SUBCUTANEOUS at 21:05

## 2018-09-22 RX ADMIN — PANTOPRAZOLE SODIUM 40 MG: 40 TABLET, DELAYED RELEASE ORAL at 06:00

## 2018-09-22 RX ADMIN — BUPROPION HYDROCHLORIDE 100 MG: 100 TABLET, FILM COATED ORAL at 08:39

## 2018-09-22 RX ADMIN — ASPIRIN 81 MG CHEWABLE TABLET 81 MG: 81 TABLET CHEWABLE at 08:40

## 2018-09-22 RX ADMIN — TORSEMIDE 10 MG: 20 TABLET ORAL at 08:41

## 2018-09-22 RX ADMIN — INSULIN LISPRO 2 UNITS: 100 INJECTION, SOLUTION INTRAVENOUS; SUBCUTANEOUS at 08:41

## 2018-09-22 RX ADMIN — DOCUSATE SODIUM 100 MG: 100 CAPSULE, LIQUID FILLED ORAL at 08:40

## 2018-09-22 RX ADMIN — TRAMADOL HYDROCHLORIDE 50 MG: 50 TABLET, FILM COATED ORAL at 18:16

## 2018-09-22 RX ADMIN — METOPROLOL TARTRATE 25 MG: 25 TABLET ORAL at 08:41

## 2018-09-22 RX ADMIN — GABAPENTIN 300 MG: 300 CAPSULE ORAL at 21:02

## 2018-09-22 RX ADMIN — ATORVASTATIN CALCIUM 40 MG: 40 TABLET, FILM COATED ORAL at 21:02

## 2018-09-22 RX ADMIN — Medication 15 G: at 15:32

## 2018-09-22 RX ADMIN — CLOPIDOGREL BISULFATE 75 MG: 75 TABLET ORAL at 08:40

## 2018-09-22 RX ADMIN — ENOXAPARIN SODIUM 30 MG: 30 INJECTION SUBCUTANEOUS at 08:42

## 2018-09-22 RX ADMIN — TRAMADOL HYDROCHLORIDE 50 MG: 50 TABLET, FILM COATED ORAL at 06:00

## 2018-09-22 RX ADMIN — INSULIN LISPRO 30 UNITS: 100 INJECTION, SOLUTION INTRAVENOUS; SUBCUTANEOUS at 12:38

## 2018-09-22 RX ADMIN — INSULIN LISPRO 30 UNITS: 100 INJECTION, SOLUTION INTRAVENOUS; SUBCUTANEOUS at 08:48

## 2018-09-22 RX ADMIN — CIPROFLOXACIN 500 MG: 500 TABLET, FILM COATED ORAL at 08:40

## 2018-09-22 RX ADMIN — LINAGLIPTIN 5 MG: 5 TABLET, FILM COATED ORAL at 08:40

## 2018-09-22 RX ADMIN — GABAPENTIN 300 MG: 300 CAPSULE ORAL at 08:41

## 2018-09-22 RX ADMIN — BUPROPION HYDROCHLORIDE 100 MG: 100 TABLET, FILM COATED ORAL at 21:02

## 2018-09-22 RX ADMIN — GLIPIZIDE 5 MG: 5 TABLET ORAL at 08:41

## 2018-09-22 ASSESSMENT — PAIN SCALES - GENERAL
PAINLEVEL_OUTOF10: 9
PAINLEVEL_OUTOF10: 8
PAINLEVEL_OUTOF10: 8

## 2018-09-22 ASSESSMENT — PAIN DESCRIPTION - DESCRIPTORS: DESCRIPTORS: ACHING;SORE

## 2018-09-22 ASSESSMENT — PAIN DESCRIPTION - LOCATION
LOCATION: GENERALIZED
LOCATION: GENERALIZED

## 2018-09-22 NOTE — PROGRESS NOTES
Physical Therapy  Physical Rehabilitation: PHYSICAL THERAPY     [x] daily progress note       [] discharge       Patient Name:  Kaykay Dye   :  1960 MRN: [id number]  Room:  [address] [place] [address] Date of Admission: 2018    Rehabilitation Diagnosis:     Debility [R53.81]    Date  2018   TIMES IN/OUT   1100/1200   Group Tx Minutes 60   TOTAL Tx time seen x   Variance Time 0   Variance Reason    [] Refusal due to   [] Medical hold/reason  [] Illness   [] Off Unit for test/procedure  [] Extra time needed to complete task  [] Other (specify)   Restrictions/Precautions Restrictions/Precautions  Restrictions/Precautions: General Precautions, Fall Risk (monitor pre-syncopal Sx )      Current Diet/Swallowing Issues DIET CARB CONTROL; Communication with other providers: [x] Ok to see per nursing at morning huddle  [] Medical hold and reason  [] Spoke with (team    member) regarding   Subjective observations: Pt agreeable to group session. Pain level/location: 5/10 R shld   Alarm placed/where? Fall Risk  [] Pt taken to DR for lunch with nsg present   [] Pt taken back to room with chair/bed alarm in place         Group Activity:          Total time in group = 60 minutes  General Exercise and Balance Group: Participation in exercise with discussion of benefits and precautions during exercise was targeted during this group. Exercises include UE & LE strengthening, endurance, cardio, and flexibility. This provides the opportunity for pt & group members to have fun, build camaraderie, increase endurance, improve breathing techniques, balance, and strength. Also focused on warm-up, warm-down, endurance monitoring & strategies, problem solving, functional mobility, safety, and general social & communication opportunities with other group members.       Functional areas targeted:   [x] Communication [x] Memory  [x] Coordination    [] Kitchen Safety [x] Problem Solving  [x] Strengthening     [x] Attention  [x] Endurance in the past year without significant injury, sleeps in regular bed     Objective                                                                                    Goals:  (Update in navigator)  Short term goals  Time Frame for Short term goals: 7 tx days or until discharge:   Short term goal 1: Pt will consistently complete bed mobility and sup<->sit Ind and all basic, OOB transfers Mod Ind. Short term goal 2: Pt will ambulate >/=200 ft using RW with Sup. Short term goal 3: Pt will ascend/descend at least 4 steps using bilat rails with Sup. Short term goal 4: Pt will ascend/descend curb step and ambulate over uneven surfaces using RW with Sup. :   :        Plan of Care                                                                              Times per week: Pt to be seen at least  5x per week for a minimum of 60 minutes as                               appropriate.   Treatment to include Current Treatment Recommendations: Strengthening, Transfer Training, Endurance Training, Patient/Caregiver Education & Training, Equipment Evaluation, Education, & procurement, Balance Training, Gait Training, Home Exercise Program, Functional Mobility Training, Stair training, Safety Education & Training      Electronically signed by   Deuce Anton,  PTA [id number]  9/22/2018, 12:55 PM

## 2018-09-22 NOTE — PROGRESS NOTES
Occupational Therapy   Physical Rehabilitation: OCCUPATIONAL THERAPY     [x] daily progress note       [] discharge       Patient Name:  Tanya Acuna   :  1960 MRN: [id number]  Room:  [address] [place], [place] [address] Date of Admission: 2018  Rehabilitation Diagnosis:   Debility [R53.81]       Date            SAT 2018       Day of ARU Week:  4   Time IN/OUT 4410-0608   Individual Tx Minutes 60   Group Tx Minutes    Co-Treat Minutes    Concurrent Tx Minutes    TOTAL Tx Time Mins 60   Variance Time    Variance Time []   Refusal due to:     []   Medical hold/reason:    []   Illness   []   Off Unit for test/procedure  []   Extra time needed to complete task  []   Therapeutic need  []   Other (specify):   Restrictions Restrictions/Precautions: General Precautions, Fall Risk (monitor pre-syncopal Sx )         Communication with other providers: [x]   OK to see per nursing:     []   Spoke with team member regarding:      Subjective observations and cognitive status: Pleasant, cooperative  By end of tx pt presented c  Diaphoresis.   RN notified   Pain level/location:    /10       Location:   No c/o   Discharge recommendations  Anticipated discharge date:  TBD  Destination: []home alone   []home alone w assist prn   [] home w/ family    [] Continuous supervision       []SNF    [] Assisted living     [] Other:   Continued therapy: []HHC OT  []OUTPATIENT  OT   [] No Further OT  Equipment needs: TBD     Toileting:    na    Toilet Transfers:   na  Device Used:    []   Standard Toilet         []   Grab Bars           []  Bedside Commode       []   Elevated Toilet          []   Other:        Bed Mobility:           []   Pt received out of bed   Rolling R/L:    Scooting:    Supine --> Sit:  S  Sit --> Supine:      Transfers:    Sit--> Stand:  CGS, c/o dizziness  V/cs for tech  Stand --> Sit:   CGA  Stand-Pivot:   CGA  Other:    Assistive device required for transfer:         Functional Mobility:  Self propelled wc x 40' c S, fatigue

## 2018-09-22 NOTE — FLOWSHEET NOTE
[x] daily progress note       [] discharge       Patient Name:  Chuyita Alvarado   :  1960 MRN: [id number]  Room:  [address] [place] [address] Date of Admission: 2018  Rehabilitation Diagnosis:   Debility [R53.81]       Date 2018       Day of ARU Week:  4   Time IN/OUT 0959/1100   Individual Tx Minutes 61   Group Tx Minutes    Co-Treat Minutes    Concurrent Tx Minutes    TOTAL Tx Time Mins 61   Variance Time 1 extra minute delivered   Variance Time []   Refusal due to:     []   Medical hold/reason:    []   Illness   []   Off Unit for test/procedure  []   Extra time needed to complete task  []   Therapeutic need  []   Other (specify):   Restrictions Restrictions/Precautions  Restrictions/Precautions: General Precautions, Fall Risk (monitor pre-syncopal Sx )      Communication with other providers: [x]   OK to see per nursing:     []   Spoke with team member regarding:      Subjective observations and cognitive status:   Agreeable to treatment. Feels she has walked more here (with Sherri) than she typically would at home. Ready to continue treatment. Pain level/location:    5/10       Location: \"R arm/shoulder and everywhere else\"   Discharge recommendations  Anticipated discharge date:    Destination: []home alone   []home alone with assist PRN     [] home w/ family      [] Continuous supervision  []SNF    [] Assisted living     [] Other:   Continued therapy: []C PT  []OUTPATIENT  PT   [] No Further PT  Equipment needs:      Bed Mobility:           [x]   Pt received out of bed     Transfers:    Sit--> Stand:  S  Stand --> Sit:   S  Stand-Pivot:   S  Performed repetitive sit-stand xfers today. Tested at 5 reps within 12 seconds and 15 reps within 45 seconds, from blue treatment table (shorter height than her w/c). Gait:    Distance:  100 feet with RW, 100 ft with RW, 30 ft no device  Assistance:  S, s, CGA respectively  Device:    Gait Quality:  Appropriate path, balance, sequence.   Impaired ability to Goals:  (Update in navigator)  Short term goals  Time Frame for Short term goals: 7 tx days or until discharge:   Short term goal 1: Pt will consistently complete bed mobility and sup<->sit Ind and all basic, OOB transfers Mod Ind. Short term goal 2: Pt will ambulate >/=200 ft using RW with Sup. Short term goal 3: Pt will ascend/descend at least 4 steps using bilat rails with Sup. Short term goal 4: Pt will ascend/descend curb step and ambulate over uneven surfaces using RW with Sup. :   :        Plan of Care                                                                              Times per week: 5 days per week for a minimum of 60 minutes/day plus group as appropriate for 60 minutes.   Treatment to include Current Treatment Recommendations: Strengthening, Transfer Training, Endurance Training, Patient/Caregiver Education & Training, Equipment Evaluation, Education, & procurement, Balance Training, Gait Training, Home Exercise Program, Functional Mobility Training, Stair training, Safety Education & Training    Electronically signed by   Zhang Whittington,  9/22/2018, 10:13 AM

## 2018-09-23 LAB
GLUCOSE BLD-MCNC: 118 MG/DL (ref 70–99)
GLUCOSE BLD-MCNC: 209 MG/DL (ref 70–99)
GLUCOSE BLD-MCNC: 222 MG/DL (ref 70–99)
GLUCOSE BLD-MCNC: 272 MG/DL (ref 70–99)
GLUCOSE BLD-MCNC: 294 MG/DL (ref 70–99)

## 2018-09-23 PROCEDURE — 6370000000 HC RX 637 (ALT 250 FOR IP): Performed by: PHYSICAL MEDICINE & REHABILITATION

## 2018-09-23 PROCEDURE — 1280000000 HC REHAB R&B

## 2018-09-23 PROCEDURE — 94761 N-INVAS EAR/PLS OXIMETRY MLT: CPT

## 2018-09-23 PROCEDURE — 6360000002 HC RX W HCPCS: Performed by: PHYSICAL MEDICINE & REHABILITATION

## 2018-09-23 PROCEDURE — 98960 EDU&TRN PT SELF-MGMT NQHP 1: CPT

## 2018-09-23 PROCEDURE — 6370000000 HC RX 637 (ALT 250 FOR IP): Performed by: INTERNAL MEDICINE

## 2018-09-23 RX ADMIN — BUPROPION HYDROCHLORIDE 100 MG: 100 TABLET, FILM COATED ORAL at 20:56

## 2018-09-23 RX ADMIN — GABAPENTIN 300 MG: 300 CAPSULE ORAL at 15:08

## 2018-09-23 RX ADMIN — DOCUSATE SODIUM 100 MG: 100 CAPSULE, LIQUID FILLED ORAL at 20:56

## 2018-09-23 RX ADMIN — TRAMADOL HYDROCHLORIDE 50 MG: 50 TABLET, FILM COATED ORAL at 15:12

## 2018-09-23 RX ADMIN — INSULIN LISPRO 3 UNITS: 100 INJECTION, SOLUTION INTRAVENOUS; SUBCUTANEOUS at 08:42

## 2018-09-23 RX ADMIN — TRAMADOL HYDROCHLORIDE 50 MG: 50 TABLET, FILM COATED ORAL at 00:32

## 2018-09-23 RX ADMIN — TRAMADOL HYDROCHLORIDE 50 MG: 50 TABLET, FILM COATED ORAL at 07:06

## 2018-09-23 RX ADMIN — GABAPENTIN 300 MG: 300 CAPSULE ORAL at 20:56

## 2018-09-23 RX ADMIN — INSULIN LISPRO 1 UNITS: 100 INJECTION, SOLUTION INTRAVENOUS; SUBCUTANEOUS at 21:01

## 2018-09-23 RX ADMIN — INSULIN LISPRO 30 UNITS: 100 INJECTION, SOLUTION INTRAVENOUS; SUBCUTANEOUS at 08:38

## 2018-09-23 RX ADMIN — INSULIN LISPRO 2 UNITS: 100 INJECTION, SOLUTION INTRAVENOUS; SUBCUTANEOUS at 12:21

## 2018-09-23 RX ADMIN — TRAMADOL HYDROCHLORIDE 50 MG: 50 TABLET, FILM COATED ORAL at 21:12

## 2018-09-23 RX ADMIN — INSULIN GLARGINE 30 UNITS: 100 INJECTION, SOLUTION SUBCUTANEOUS at 21:01

## 2018-09-23 RX ADMIN — METOPROLOL TARTRATE 25 MG: 25 TABLET ORAL at 08:39

## 2018-09-23 RX ADMIN — ATORVASTATIN CALCIUM 40 MG: 40 TABLET, FILM COATED ORAL at 20:55

## 2018-09-23 RX ADMIN — GLIPIZIDE 5 MG: 5 TABLET ORAL at 17:40

## 2018-09-23 RX ADMIN — METOPROLOL TARTRATE 25 MG: 25 TABLET ORAL at 20:57

## 2018-09-23 RX ADMIN — TORSEMIDE 10 MG: 20 TABLET ORAL at 08:40

## 2018-09-23 RX ADMIN — PANTOPRAZOLE SODIUM 40 MG: 40 TABLET, DELAYED RELEASE ORAL at 05:01

## 2018-09-23 RX ADMIN — ASPIRIN 81 MG CHEWABLE TABLET 81 MG: 81 TABLET CHEWABLE at 08:39

## 2018-09-23 RX ADMIN — DULOXETINE HYDROCHLORIDE 60 MG: 30 CAPSULE, DELAYED RELEASE ORAL at 08:37

## 2018-09-23 RX ADMIN — INSULIN LISPRO 30 UNITS: 100 INJECTION, SOLUTION INTRAVENOUS; SUBCUTANEOUS at 12:21

## 2018-09-23 RX ADMIN — LINAGLIPTIN 5 MG: 5 TABLET, FILM COATED ORAL at 08:40

## 2018-09-23 RX ADMIN — GABAPENTIN 300 MG: 300 CAPSULE ORAL at 08:38

## 2018-09-23 RX ADMIN — BUPROPION HYDROCHLORIDE 100 MG: 100 TABLET, FILM COATED ORAL at 08:39

## 2018-09-23 RX ADMIN — ENOXAPARIN SODIUM 30 MG: 30 INJECTION SUBCUTANEOUS at 08:38

## 2018-09-23 RX ADMIN — GLIPIZIDE 5 MG: 5 TABLET ORAL at 08:40

## 2018-09-23 RX ADMIN — DOCUSATE SODIUM 100 MG: 100 CAPSULE, LIQUID FILLED ORAL at 08:38

## 2018-09-23 RX ADMIN — CLOPIDOGREL BISULFATE 75 MG: 75 TABLET ORAL at 08:39

## 2018-09-23 ASSESSMENT — PAIN SCALES - GENERAL
PAINLEVEL_OUTOF10: 9
PAINLEVEL_OUTOF10: 7
PAINLEVEL_OUTOF10: 10
PAINLEVEL_OUTOF10: 8

## 2018-09-23 ASSESSMENT — PAIN DESCRIPTION - LOCATION
LOCATION: GENERALIZED

## 2018-09-23 ASSESSMENT — PAIN DESCRIPTION - DESCRIPTORS
DESCRIPTORS: ACHING;SORE
DESCRIPTORS: ACHING;SORE

## 2018-09-24 LAB
GLUCOSE BLD-MCNC: 200 MG/DL (ref 70–99)
GLUCOSE BLD-MCNC: 237 MG/DL (ref 70–99)
GLUCOSE BLD-MCNC: 86 MG/DL (ref 70–99)
GLUCOSE BLD-MCNC: 98 MG/DL (ref 70–99)

## 2018-09-24 PROCEDURE — 82962 GLUCOSE BLOOD TEST: CPT

## 2018-09-24 PROCEDURE — 6370000000 HC RX 637 (ALT 250 FOR IP): Performed by: INTERNAL MEDICINE

## 2018-09-24 PROCEDURE — 1280000000 HC REHAB R&B

## 2018-09-24 PROCEDURE — 97530 THERAPEUTIC ACTIVITIES: CPT

## 2018-09-24 PROCEDURE — 97110 THERAPEUTIC EXERCISES: CPT

## 2018-09-24 PROCEDURE — 94150 VITAL CAPACITY TEST: CPT

## 2018-09-24 PROCEDURE — 6360000002 HC RX W HCPCS: Performed by: PHYSICAL MEDICINE & REHABILITATION

## 2018-09-24 PROCEDURE — 97116 GAIT TRAINING THERAPY: CPT

## 2018-09-24 PROCEDURE — 94761 N-INVAS EAR/PLS OXIMETRY MLT: CPT

## 2018-09-24 PROCEDURE — 6370000000 HC RX 637 (ALT 250 FOR IP): Performed by: PHYSICAL MEDICINE & REHABILITATION

## 2018-09-24 PROCEDURE — 97150 GROUP THERAPEUTIC PROCEDURES: CPT

## 2018-09-24 RX ORDER — LACTULOSE 10 G/15ML
20 SOLUTION ORAL 2 TIMES DAILY
Status: DISCONTINUED | OUTPATIENT
Start: 2018-09-24 | End: 2018-09-26

## 2018-09-24 RX ADMIN — INSULIN LISPRO 2 UNITS: 100 INJECTION, SOLUTION INTRAVENOUS; SUBCUTANEOUS at 09:18

## 2018-09-24 RX ADMIN — GLIPIZIDE 5 MG: 5 TABLET ORAL at 09:22

## 2018-09-24 RX ADMIN — LACTULOSE 20 G: 10 SOLUTION ORAL at 14:33

## 2018-09-24 RX ADMIN — TORSEMIDE 10 MG: 20 TABLET ORAL at 09:23

## 2018-09-24 RX ADMIN — DULOXETINE HYDROCHLORIDE 60 MG: 30 CAPSULE, DELAYED RELEASE ORAL at 09:22

## 2018-09-24 RX ADMIN — ASPIRIN 81 MG CHEWABLE TABLET 81 MG: 81 TABLET CHEWABLE at 09:22

## 2018-09-24 RX ADMIN — LINAGLIPTIN 5 MG: 5 TABLET, FILM COATED ORAL at 09:22

## 2018-09-24 RX ADMIN — METOPROLOL TARTRATE 25 MG: 25 TABLET ORAL at 20:51

## 2018-09-24 RX ADMIN — CLOPIDOGREL BISULFATE 75 MG: 75 TABLET ORAL at 09:22

## 2018-09-24 RX ADMIN — GABAPENTIN 300 MG: 300 CAPSULE ORAL at 20:50

## 2018-09-24 RX ADMIN — BUPROPION HYDROCHLORIDE 100 MG: 100 TABLET, FILM COATED ORAL at 09:21

## 2018-09-24 RX ADMIN — ENOXAPARIN SODIUM 30 MG: 30 INJECTION SUBCUTANEOUS at 09:23

## 2018-09-24 RX ADMIN — METOPROLOL TARTRATE 25 MG: 25 TABLET ORAL at 09:22

## 2018-09-24 RX ADMIN — GABAPENTIN 300 MG: 300 CAPSULE ORAL at 14:33

## 2018-09-24 RX ADMIN — GABAPENTIN 300 MG: 300 CAPSULE ORAL at 09:22

## 2018-09-24 RX ADMIN — TRAMADOL HYDROCHLORIDE 50 MG: 50 TABLET, FILM COATED ORAL at 03:12

## 2018-09-24 RX ADMIN — DOCUSATE SODIUM 100 MG: 100 CAPSULE, LIQUID FILLED ORAL at 09:22

## 2018-09-24 RX ADMIN — LACTULOSE 20 G: 10 SOLUTION ORAL at 20:50

## 2018-09-24 RX ADMIN — INSULIN LISPRO 30 UNITS: 100 INJECTION, SOLUTION INTRAVENOUS; SUBCUTANEOUS at 09:18

## 2018-09-24 RX ADMIN — DOCUSATE SODIUM 100 MG: 100 CAPSULE, LIQUID FILLED ORAL at 20:50

## 2018-09-24 RX ADMIN — TRAMADOL HYDROCHLORIDE 50 MG: 50 TABLET, FILM COATED ORAL at 20:54

## 2018-09-24 RX ADMIN — TRAMADOL HYDROCHLORIDE 50 MG: 50 TABLET, FILM COATED ORAL at 14:30

## 2018-09-24 RX ADMIN — ATORVASTATIN CALCIUM 40 MG: 40 TABLET, FILM COATED ORAL at 20:49

## 2018-09-24 RX ADMIN — BUPROPION HYDROCHLORIDE 100 MG: 100 TABLET, FILM COATED ORAL at 20:49

## 2018-09-24 RX ADMIN — INSULIN LISPRO 30 UNITS: 100 INJECTION, SOLUTION INTRAVENOUS; SUBCUTANEOUS at 12:55

## 2018-09-24 RX ADMIN — INSULIN LISPRO 2 UNITS: 100 INJECTION, SOLUTION INTRAVENOUS; SUBCUTANEOUS at 12:58

## 2018-09-24 RX ADMIN — PANTOPRAZOLE SODIUM 40 MG: 40 TABLET, DELAYED RELEASE ORAL at 06:27

## 2018-09-24 ASSESSMENT — PAIN SCALES - GENERAL
PAINLEVEL_OUTOF10: 8

## 2018-09-24 NOTE — PROGRESS NOTES
(reps/sets):     [x]   Seated ther ex (reps/sets): B LE hip flexion, abduction, LAQ, ankle pumps x25 reps    []   Standing ther ex (reps/sets):     []   Picked up object from floor     Assist Level:                     []   Reacher used   []   Other:   []   Other:   []   Other:    Comments: Pt seated in wc watching TV upon arrival.    Patient/Caregiver Education and Training:   []   Bed Mobility/Transfer technique/safety  []   Gait technique/sequencing  []   Proper use of assistive device  [x]   Advanced mobility safety and technique  []   Reinforced patient's precautions/mobility while maintaining precautions  []   Postural awareness  []   Family training  []   Progress was updated and reviewed in Rehabtracker with patient and/or family this         date.   Treatment Plan for Next Session: gait train, stairs, ther ex's          Treatment/Activity Tolerance:   [x] Tolerated treatment with no adverse effects    [] Patient limited by fatigue  [] Patient limited by pain   [] Patient limited by medical complications:    [] Adverse reaction to Tx:   [] Significant change in status    Safety:       []  bed alarm set    []  chair alarm set    []  Pt refused alarms                []  Telesitter activated      [x]  Gait belt used during tx session      []other:         Number of Minutes/Billable Intervention  Gait Training 25   Therapeutic Exercise 20   Neuro Re-Ed    Therapeutic Activity 15   Wheelchair Propulsion    Group    Other:    TOTAL 60         Social History  Social/Functional History  Lives With: Significant other (Pavan )  Type of Home: House  Home Layout: One level  Home Access: Ramped entrance (accessed via wheelchair with Total A)  Bathroom Shower/Tub: Tub/Shower unit, Shower chair with back  Bathroom Toilet: Standard  Bathroom Equipment: Grab bars in shower, Grab bars around toilet  Home Equipment: [employer], Wheelchair-electric, Rolling walker  ADL Assistance: Needs assistance  Meal Prep Responsibility:

## 2018-09-24 NOTE — PROGRESS NOTES
Occupational Therapy  Physical Rehabilitation: OCCUPATIONAL THERAPY     [x] daily progress note       [] discharge       Patient Name:  Tanya Acuna   :  1960 MRN: [id number]  Room:  [address] [place], [place] [address] Date of Admission: 2018  Rehabilitation Diagnosis:   Debility [R53.81]       Date 2018       Day of ARU Week:  6   Time IN/OUT 0950 / 1050  1100 / 1200   Individual Tx Minutes 60   Group Tx Minutes 60   Co-Treat Minutes    Concurrent Tx Minutes    TOTAL Tx Time Mins 120   Variance Time N/A   Variance Time []   Refusal due to:     []   Medical hold/reason:    []   Illness   []   Off Unit for test/procedure  []   Extra time needed to complete task  []   Therapeutic need  []   Other (specify):   Restrictions Restrictions/Precautions  Restrictions/Precautions: General Precautions, Fall Risk (monitor pre-syncopal Sx )      Communication with other providers: [x]   OK to see per nursing:     []   Spoke with team member regarding:      Subjective observations and cognitive status: Pt seated in wheelchair, fully dressed. \"I already got washed up. The night nurse helped me. \" Pt reports fatigue and pain, \"Oh, that weekend therapy wore me out. \"     Pain level/location:    9/10       Location: RUE   Discharge recommendations  Anticipated discharge date:  TBD  Destination: []home alone   []home alone w assist prn [] home w/ family    [] Continuous supervision       []SNF            [] Assisted living     [] Other:   Continued therapy: []HHC OT  []OUTPATIENT  OT   [] No Further OT  Equipment needs: TBD   (HIT F2 to transition between stars)    Total time in group = 60 min   Sports Group: Activity consisted of playing sporting activities with discussions of hazards, safe use of assistive device(s), and assistance needs. Sports may include bowling, batting/hitting activities, shooting at targets, putt putt, reaching & tossing games, team sports, and racing.  This provides the opportunity for pt & group members to have fun,

## 2018-09-25 LAB
GLUCOSE BLD-MCNC: 129 MG/DL (ref 70–99)
GLUCOSE BLD-MCNC: 141 MG/DL (ref 70–99)
GLUCOSE BLD-MCNC: 184 MG/DL (ref 70–99)
GLUCOSE BLD-MCNC: 246 MG/DL (ref 70–99)

## 2018-09-25 PROCEDURE — 1280000000 HC REHAB R&B

## 2018-09-25 PROCEDURE — 97150 GROUP THERAPEUTIC PROCEDURES: CPT

## 2018-09-25 PROCEDURE — 6370000000 HC RX 637 (ALT 250 FOR IP): Performed by: PHYSICAL MEDICINE & REHABILITATION

## 2018-09-25 PROCEDURE — 97530 THERAPEUTIC ACTIVITIES: CPT

## 2018-09-25 PROCEDURE — 82962 GLUCOSE BLOOD TEST: CPT

## 2018-09-25 PROCEDURE — 94762 N-INVAS EAR/PLS OXIMTRY CONT: CPT

## 2018-09-25 PROCEDURE — 97535 SELF CARE MNGMENT TRAINING: CPT

## 2018-09-25 PROCEDURE — 94761 N-INVAS EAR/PLS OXIMETRY MLT: CPT

## 2018-09-25 PROCEDURE — 97116 GAIT TRAINING THERAPY: CPT

## 2018-09-25 PROCEDURE — 94150 VITAL CAPACITY TEST: CPT

## 2018-09-25 PROCEDURE — 6370000000 HC RX 637 (ALT 250 FOR IP): Performed by: INTERNAL MEDICINE

## 2018-09-25 PROCEDURE — 6360000002 HC RX W HCPCS: Performed by: PHYSICAL MEDICINE & REHABILITATION

## 2018-09-25 RX ADMIN — BUPROPION HYDROCHLORIDE 100 MG: 100 TABLET, FILM COATED ORAL at 20:40

## 2018-09-25 RX ADMIN — INSULIN LISPRO 25 UNITS: 100 INJECTION, SOLUTION INTRAVENOUS; SUBCUTANEOUS at 07:56

## 2018-09-25 RX ADMIN — METOPROLOL TARTRATE 25 MG: 25 TABLET ORAL at 07:57

## 2018-09-25 RX ADMIN — DOCUSATE SODIUM 100 MG: 100 CAPSULE, LIQUID FILLED ORAL at 07:56

## 2018-09-25 RX ADMIN — TORSEMIDE 10 MG: 20 TABLET ORAL at 07:55

## 2018-09-25 RX ADMIN — GABAPENTIN 300 MG: 300 CAPSULE ORAL at 20:40

## 2018-09-25 RX ADMIN — LINAGLIPTIN 5 MG: 5 TABLET, FILM COATED ORAL at 07:57

## 2018-09-25 RX ADMIN — CLOPIDOGREL BISULFATE 75 MG: 75 TABLET ORAL at 07:56

## 2018-09-25 RX ADMIN — INSULIN LISPRO 2 UNITS: 100 INJECTION, SOLUTION INTRAVENOUS; SUBCUTANEOUS at 12:51

## 2018-09-25 RX ADMIN — TRAMADOL HYDROCHLORIDE 50 MG: 50 TABLET, FILM COATED ORAL at 10:57

## 2018-09-25 RX ADMIN — GLIPIZIDE 5 MG: 5 TABLET ORAL at 17:10

## 2018-09-25 RX ADMIN — TRAMADOL HYDROCHLORIDE 50 MG: 50 TABLET, FILM COATED ORAL at 04:31

## 2018-09-25 RX ADMIN — DULOXETINE HYDROCHLORIDE 60 MG: 30 CAPSULE, DELAYED RELEASE ORAL at 07:57

## 2018-09-25 RX ADMIN — ASPIRIN 81 MG CHEWABLE TABLET 81 MG: 81 TABLET CHEWABLE at 07:55

## 2018-09-25 RX ADMIN — ATORVASTATIN CALCIUM 40 MG: 40 TABLET, FILM COATED ORAL at 20:40

## 2018-09-25 RX ADMIN — DOCUSATE SODIUM 100 MG: 100 CAPSULE, LIQUID FILLED ORAL at 20:40

## 2018-09-25 RX ADMIN — INSULIN LISPRO 1 UNITS: 100 INJECTION, SOLUTION INTRAVENOUS; SUBCUTANEOUS at 07:55

## 2018-09-25 RX ADMIN — TRAMADOL HYDROCHLORIDE 50 MG: 50 TABLET, FILM COATED ORAL at 18:47

## 2018-09-25 RX ADMIN — BUPROPION HYDROCHLORIDE 100 MG: 100 TABLET, FILM COATED ORAL at 07:57

## 2018-09-25 RX ADMIN — INSULIN LISPRO 1 UNITS: 100 INJECTION, SOLUTION INTRAVENOUS; SUBCUTANEOUS at 17:11

## 2018-09-25 RX ADMIN — LACTULOSE 20 G: 10 SOLUTION ORAL at 07:57

## 2018-09-25 RX ADMIN — ENOXAPARIN SODIUM 30 MG: 30 INJECTION SUBCUTANEOUS at 07:58

## 2018-09-25 RX ADMIN — INSULIN LISPRO 25 UNITS: 100 INJECTION, SOLUTION INTRAVENOUS; SUBCUTANEOUS at 12:52

## 2018-09-25 RX ADMIN — INSULIN GLARGINE 30 UNITS: 100 INJECTION, SOLUTION SUBCUTANEOUS at 20:39

## 2018-09-25 RX ADMIN — GABAPENTIN 300 MG: 300 CAPSULE ORAL at 07:57

## 2018-09-25 RX ADMIN — GABAPENTIN 300 MG: 300 CAPSULE ORAL at 14:10

## 2018-09-25 RX ADMIN — GLIPIZIDE 5 MG: 5 TABLET ORAL at 07:57

## 2018-09-25 RX ADMIN — LACTULOSE 20 G: 10 SOLUTION ORAL at 20:39

## 2018-09-25 RX ADMIN — PANTOPRAZOLE SODIUM 40 MG: 40 TABLET, DELAYED RELEASE ORAL at 06:27

## 2018-09-25 RX ADMIN — METOPROLOL TARTRATE 25 MG: 25 TABLET ORAL at 20:40

## 2018-09-25 ASSESSMENT — PAIN SCALES - GENERAL
PAINLEVEL_OUTOF10: 9
PAINLEVEL_OUTOF10: 6
PAINLEVEL_OUTOF10: 9

## 2018-09-25 NOTE — FLOWSHEET NOTE
[] Significant change in status      Number of Minutes/Billable Intervention  Gait Training    Therapeutic Exercise    Neuro Re-Ed    Therapeutic Activity    Wheelchair Propulsion    Group 60   Other:    TOTAL 60     Social History  Social/Functional History  Lives With: Significant other (Pavan )  Type of Home: House  Home Layout: One level  Home Access: Ramped entrance (accessed via wheelchair with Total A)  Bathroom Shower/Tub: Tub/Shower unit, Shower chair with back  Bathroom Toilet: Standard  Bathroom Equipment: Grab bars in shower, Grab bars around toilet  Home Equipment: [employer], Wheelchair-electric, Rolling walker  ADL Assistance: Needs assistance  Meal Prep Responsibility: No (significant other does )  Laundry Responsibility: No (significant other does )  Cleaning Responsibility: No (significant other does )  Bill Paying/Finance Responsibility: Primary  Shopping Responsibility: Secondary (makes grocery list )  [address] [facility] [address]: Secondary (cat: Susan )  Health Care Management: Primary  Ambulation Assistance: Needs assistance (ambulated short distances only due to dizziness (required steadying assistance and w/c follow) )  Transfer Assistance: Needs assistance (required assistance at times when pt is dizzy )  Active : No  Additional Comments: 2 falls in the past year without significant injury, sleeps in regular bed     Objective                                                                                    Goals:  (Update in navigator)  Short term goals  Time Frame for Short term goals: 7 tx days or until discharge:   Short term goal 1: Pt will consistently complete bed mobility and sup<->sit Ind and all basic, OOB transfers Mod Ind. Short term goal 2: Pt will ambulate >/=200 ft using RW with Sup. Short term goal 3: Pt will ascend/descend at least 4 steps using bilat rails with Sup.    Short term goal 4: Pt will ascend/descend curb step and ambulate over uneven surfaces using RW with Sup. :   :        Plan of Care                                                                              Times per week: Pt to be seen at least  5x per week for a minimum of 60 minutes as                               appropriate.   Treatment to include Current Treatment Recommendations: Strengthening, Transfer Training, Endurance Training, Patient/Caregiver Education & Training, Equipment Evaluation, Education, & procurement, Balance Training, Gait Training, Home Exercise Program, Functional Mobility Training, Stair training, Safety Education & Training      Electronically signed by   Yesica Bloom [id number]  9/25/2018, 4:42 PM

## 2018-09-25 NOTE — PROGRESS NOTES
Occupational Therapy  Physical Rehabilitation: OCCUPATIONAL THERAPY     [x] daily progress note       [] discharge       Patient Name:  Gladys Jose   :  1960 MRN: [id number]  Room:  [address] [place], [place] [address] Date of Admission: 2018  Rehabilitation Diagnosis:   Debility [R53.81]       Date 2018       Day of ARU Week:  7   Time IN/OUT 1000 / 1100     Individual Tx Minutes 60   Group Tx Minutes    Co-Treat Minutes    Concurrent Tx Minutes    TOTAL Tx Time Mins 60   Variance Time N/A   Variance Time []   Refusal due to:     []   Medical hold/reason:    []   Illness   []   Off Unit for test/procedure  []   Extra time needed to complete task  []   Therapeutic need  []   Other (specify):   Restrictions Restrictions/Precautions  Restrictions/Precautions: General Precautions, Fall Risk (monitor pre-syncopal Sx )      Communication with other providers: [x]   OK to see per nursing:     [x]   Notified Nurse Izabela of pt's pain level      Subjective observations and cognitive status: Pt received in wheelchair, fully dressed. When reminded of plan for bathing / dressing ADLs this date, pt states, \"Oh, I had my man help me again last night. I didn't know what time you would be coming. \"   Pt re-educated for role of OT and for plan to address ADLs sometime later this week. Pt verbalized understanding. During functional reaching task in stance, pt reports dizziness and required mod encouragement  / cues to perform return to chair for rest break. Check of vitals showed /65, HR 87, and O2 94% on room air. Pt reports symptoms resolved c rest.   Pt exhibited one more brief episode of dizziness during standing task, immediately after interview c rehab physician in which pt denied any problems. Symptoms resolved quickly c seated rest break. No LOB during either episode.    Pain level/location:   8 /10       Location: RUE and L hip   Discharge recommendations  Anticipated discharge date:  TBD  Destination: []home alone   []home alone w assist prn [] home w/ family    [] Continuous supervision       []SNF            [] Assisted living     [] Other:   Continued therapy: []HHC OT  []OUTPATIENT  OT   [] No Further OT  Equipment needs: None - pt reports using grab bars around toilet and shower chair + grab bars for bath/shower combo. (HIT F2 to transition between stars)      Toileting: Mod A for hygiene for BM, pt performed clothing mgmt up / down       Toilet Transfers:   SBA c RW + one vc for use of grab bar for steady vs walker handle  Device Used:    [x]   Standard Toilet         [x]   Grab Bars           []  Bedside Commode       []   Elevated Toilet          []   Other:        Bed Mobility:           [x]   Pt received out of bed        Transfers:    Sit--> Stand:  SBA  Stand --> Sit:   SBA to CGA  Stand-Pivot:   SBA  Other:  Required min vc's for safe body positioning / B hand placement  Assistive device required for transfer:   RW      Functional Mobility:    Assistance:  SBA c RW to perform functional mobility in hallway (distance not challenged)  Device:   [x]   Rolling Walker     []   Standard Walker []   Wheelchair        []   [employer]       []   4-Wheeled Dartha Katerin         []   Cardiac Walker       []   Other:        Homemaking Tasks:   With emphasis on dynamic balance / standing tolerance, pt SBA c RW to perform organizing / cleaning tasks for items on counter. Pt reported s/s of dizziness and required mod vc's to return to wheelchair for seated rest break (weaving in stance). CGA provided and chair brought close and pt performed safe stand to sit. Vitals check as noted above.        Additional Therapeutic activities/exercises completed this date:     [x]   ADL Training   [x]   Balance/Postural training     [x]   Transfer Training   [x]   Endurance Training   []   Neuromuscular Re-ed   []   Nu-step:  Time:        Level:         #Steps:       []   Rebounder:    []  Seated     []  Standing        []   Supine Ther Ex (reps/sets):     [] House  Home Layout: One level  Home Access: Ramped entrance (accessed via wheelchair with Total A)  Bathroom Shower/Tub: Tub/Shower unit, Shower chair with back  Bathroom Toilet: Standard  Bathroom Equipment: Grab bars in shower, Grab bars around toilet  Home Equipment: [address], Wheelchair-electric, Rolling walker  ADL Assistance: Needs assistance  Meal Prep Responsibility: No (significant other does )  Laundry Responsibility: No (significant other does )  Cleaning Responsibility: No (significant other does )  Bill Paying/Finance Responsibility: Primary  Shopping Responsibility: Secondary (makes grocery list )  [address]: Secondary (cat: Susan )  Health Care Management: Primary  Ambulation Assistance: Needs assistance (ambulated short distances only due to dizziness (required steadying assistance and w/c follow) )  Transfer Assistance: Needs assistance (required assistance at times when pt is dizzy )  Active : No  Additional Comments: 2 falls in the past year without significant injury, sleeps in regular bed     Objective                                                                                    Goals:  (Update in navigator)  Short term goals  Time Frame for Short term goals: STGs=LTGs:  Long term goals  Time Frame for Long term goals : 7-10 days or until d/c. Long term goal 1: Pt will complete toileting mod I.  Long term goal 2: Pt will complete UB dressing I; LB dressing mod I using AE PRN. Long term goal 3: Pt will complete total body bathing mod I using AE PRN. Long term goal 4: Pt will complete grooming tasks I. Long term goal 5: Pt will complete functional transfers (bed, chair, shower, toilet) c DME PRN and mod I.  Long term goals 6: Pt will perform therex/therax to facilitate increased strength/endurance/ax tolerance (c emphasis on dynamic standing balance/tolerance >10 mins and RUE ROM/strength) c SBA.   Long term goal 7: Pt will complete simple homemaking

## 2018-09-26 LAB
GLUCOSE BLD-MCNC: 118 MG/DL (ref 70–99)
GLUCOSE BLD-MCNC: 191 MG/DL (ref 70–99)
GLUCOSE BLD-MCNC: 204 MG/DL (ref 70–99)
GLUCOSE BLD-MCNC: 206 MG/DL (ref 70–99)

## 2018-09-26 PROCEDURE — 82962 GLUCOSE BLOOD TEST: CPT

## 2018-09-26 PROCEDURE — 6370000000 HC RX 637 (ALT 250 FOR IP): Performed by: PHYSICAL MEDICINE & REHABILITATION

## 2018-09-26 PROCEDURE — 94150 VITAL CAPACITY TEST: CPT

## 2018-09-26 PROCEDURE — 97535 SELF CARE MNGMENT TRAINING: CPT

## 2018-09-26 PROCEDURE — 6370000000 HC RX 637 (ALT 250 FOR IP): Performed by: INTERNAL MEDICINE

## 2018-09-26 PROCEDURE — 97530 THERAPEUTIC ACTIVITIES: CPT

## 2018-09-26 PROCEDURE — 94761 N-INVAS EAR/PLS OXIMETRY MLT: CPT

## 2018-09-26 PROCEDURE — 1280000000 HC REHAB R&B

## 2018-09-26 PROCEDURE — 97116 GAIT TRAINING THERAPY: CPT

## 2018-09-26 PROCEDURE — 97150 GROUP THERAPEUTIC PROCEDURES: CPT

## 2018-09-26 PROCEDURE — 6360000002 HC RX W HCPCS: Performed by: PHYSICAL MEDICINE & REHABILITATION

## 2018-09-26 RX ORDER — TRAMADOL HYDROCHLORIDE 50 MG/1
50 TABLET ORAL EVERY 6 HOURS PRN
Qty: 20 TABLET | Refills: 0 | Status: ON HOLD | OUTPATIENT
Start: 2018-09-26 | End: 2018-10-05 | Stop reason: HOSPADM

## 2018-09-26 RX ORDER — LACTULOSE 10 G/15ML
20 SOLUTION ORAL 3 TIMES DAILY
Status: DISCONTINUED | OUTPATIENT
Start: 2018-09-26 | End: 2018-09-27 | Stop reason: HOSPADM

## 2018-09-26 RX ORDER — LACTULOSE 10 G/15ML
20 SOLUTION ORAL 3 TIMES DAILY PRN
Qty: 1 BOTTLE | Refills: 0 | Status: ON HOLD | OUTPATIENT
Start: 2018-09-26 | End: 2018-11-21 | Stop reason: HOSPADM

## 2018-09-26 RX ADMIN — PANTOPRAZOLE SODIUM 40 MG: 40 TABLET, DELAYED RELEASE ORAL at 06:13

## 2018-09-26 RX ADMIN — GABAPENTIN 300 MG: 300 CAPSULE ORAL at 20:11

## 2018-09-26 RX ADMIN — GLIPIZIDE 5 MG: 5 TABLET ORAL at 08:30

## 2018-09-26 RX ADMIN — METOPROLOL TARTRATE 25 MG: 25 TABLET ORAL at 20:12

## 2018-09-26 RX ADMIN — LINAGLIPTIN 5 MG: 5 TABLET, FILM COATED ORAL at 08:30

## 2018-09-26 RX ADMIN — GLIPIZIDE 5 MG: 5 TABLET ORAL at 17:37

## 2018-09-26 RX ADMIN — TRAMADOL HYDROCHLORIDE 50 MG: 50 TABLET, FILM COATED ORAL at 01:07

## 2018-09-26 RX ADMIN — BUPROPION HYDROCHLORIDE 100 MG: 100 TABLET, FILM COATED ORAL at 20:10

## 2018-09-26 RX ADMIN — ENOXAPARIN SODIUM 30 MG: 30 INJECTION SUBCUTANEOUS at 08:29

## 2018-09-26 RX ADMIN — INSULIN LISPRO 25 UNITS: 100 INJECTION, SOLUTION INTRAVENOUS; SUBCUTANEOUS at 12:17

## 2018-09-26 RX ADMIN — INSULIN LISPRO 1 UNITS: 100 INJECTION, SOLUTION INTRAVENOUS; SUBCUTANEOUS at 20:25

## 2018-09-26 RX ADMIN — INSULIN GLARGINE 30 UNITS: 100 INJECTION, SOLUTION SUBCUTANEOUS at 20:25

## 2018-09-26 RX ADMIN — INSULIN LISPRO 2 UNITS: 100 INJECTION, SOLUTION INTRAVENOUS; SUBCUTANEOUS at 12:16

## 2018-09-26 RX ADMIN — TORSEMIDE 10 MG: 20 TABLET ORAL at 08:30

## 2018-09-26 RX ADMIN — GABAPENTIN 300 MG: 300 CAPSULE ORAL at 14:40

## 2018-09-26 RX ADMIN — DOCUSATE SODIUM 100 MG: 100 CAPSULE, LIQUID FILLED ORAL at 20:11

## 2018-09-26 RX ADMIN — CLOPIDOGREL BISULFATE 75 MG: 75 TABLET ORAL at 08:30

## 2018-09-26 RX ADMIN — BUPROPION HYDROCHLORIDE 100 MG: 100 TABLET, FILM COATED ORAL at 08:30

## 2018-09-26 RX ADMIN — INSULIN LISPRO 25 UNITS: 100 INJECTION, SOLUTION INTRAVENOUS; SUBCUTANEOUS at 08:33

## 2018-09-26 RX ADMIN — TRAMADOL HYDROCHLORIDE 50 MG: 50 TABLET, FILM COATED ORAL at 09:06

## 2018-09-26 RX ADMIN — DULOXETINE HYDROCHLORIDE 60 MG: 30 CAPSULE, DELAYED RELEASE ORAL at 08:29

## 2018-09-26 RX ADMIN — ATORVASTATIN CALCIUM 40 MG: 40 TABLET, FILM COATED ORAL at 20:10

## 2018-09-26 RX ADMIN — DOCUSATE SODIUM 100 MG: 100 CAPSULE, LIQUID FILLED ORAL at 08:30

## 2018-09-26 RX ADMIN — INSULIN LISPRO 2 UNITS: 100 INJECTION, SOLUTION INTRAVENOUS; SUBCUTANEOUS at 08:32

## 2018-09-26 RX ADMIN — ASPIRIN 81 MG CHEWABLE TABLET 81 MG: 81 TABLET CHEWABLE at 08:30

## 2018-09-26 RX ADMIN — TRAMADOL HYDROCHLORIDE 50 MG: 50 TABLET, FILM COATED ORAL at 15:15

## 2018-09-26 RX ADMIN — METOPROLOL TARTRATE 25 MG: 25 TABLET ORAL at 08:30

## 2018-09-26 RX ADMIN — TRAMADOL HYDROCHLORIDE 50 MG: 50 TABLET, FILM COATED ORAL at 22:58

## 2018-09-26 RX ADMIN — GABAPENTIN 300 MG: 300 CAPSULE ORAL at 08:29

## 2018-09-26 ASSESSMENT — PAIN SCALES - GENERAL
PAINLEVEL_OUTOF10: 8

## 2018-09-26 NOTE — PROGRESS NOTES
standing tolerance to inc strength, endurance and act tolerance for inc Indep c ADL / IADL tasks, func transfers / mobility. Patient/Caregiver Education and Training:   [x]   Adaptive Equipment Use  [x]   Bed Mobility/Transfer Technique/Safety  []   Energy Conservation Tips  []   Family training  []   Postural Awareness  [x]   Safety During Functional Activities - Educational Review for use of RW at all times. Pt states, \"I know, I know. It's just the way I have always done it. \" This therapist recommends use of RW for all functional mobility to increase safety and Indep and pt verbalized understanding for this recommendation. []   Reinforced Patient's Precautions   []   Progress was updated and reviewed in Rehabtracker with patient and/or family this         date. Treatment Plan for Next Session: Continue per OT POC until discharge    Assessment:  Pt appears to be limited by decreased compliance for use of RW for functional tasks / transfers / mobility as evidenced by verbalized understanding for recommendation. It appears to have a self-determination / possible behavioral component vs decreased cognition.       Treatment/Activity Tolerance:   [x] Tolerated treatment with no adverse effects    [] Patient limited by fatigue  [] Patient limited by pain   [] Patient limited by medical complications:    [] Adverse reaction to Tx:   [] Significant change in status    Safety:       []  bed alarm set    []  chair alarm set    [x]  Pt refused alarms                []  Telesitter activated      [x]  Gait belt used during tx session      []other:       Number of Minutes/Billable Intervention  Therapeutic Exercise    ADL Self-care 60   Neuro Re-Ed    Therapeutic Activity    Group 60   Other:    TOTAL 120       Social History  Social/Functional History  Lives With: Significant other (Pavan )  Type of Home: House  Home Layout: One level  Home Access: Ramped entrance (accessed via wheelchair with Total A)  Bathroom

## 2018-09-26 NOTE — PROGRESS NOTES
Case mgt met with patient to discuss discharge planning. Patient tearful re: tomorrow's discharge. She doesn't feel ready to return home r/t weakness and shortness of breath with activity. Case mgt and patient spoke at length about discharge with [facility] vs SNF for more therapy. Patient reports she's afraid to return home. Sig is limited by own health concerns. Declined SNF. Understands that she is now too medically stable to stay on ARU. Case mgt will also refer patient to [facility] and start a home care waiver application. Patient educated on and agreeable to waiver sakshi. Selected [address] [place] [address] Rd. Case mgt encouraged patient to consider her discharge plan tonight and advised that changes can be made tomorrow. Patient will need rides + transport at discharge. Educated on 30 day SNF window. Patient not agreeable to case mgt scheduling appt with PCP [facility]. Wants to make the appt herself.

## 2018-09-26 NOTE — PROGRESS NOTES
Physical Therapy    [x] daily progress note       [x] discharge       Patient Name:  Richad Hodgkins   :  1960 MRN: [id number]  Room:  [address] [place] [address] Date of Admission: 2018  Rehabilitation Diagnosis:   Debility [R53.81]       Date 2018       Day of ARU Week:  1   Time IN/OUT 1300/1400   Individual Tx Minutes 60   Group Tx Minutes    Co-Treat Minutes    Concurrent Tx Minutes    TOTAL Tx Time Mins 60   Variance Time    Variance Time []   Refusal due to:     []   Medical hold/reason:    []   Illness   []   Off Unit for test/procedure  []   Extra time needed to complete task  []   Therapeutic need  []   Other (specify):   Restrictions Restrictions/Precautions  Restrictions/Precautions: General Precautions, Fall Risk (monitor pre-syncopal Sx )      Communication with other providers: [x]   OK to see per nursing:     []   Spoke with team member regarding:      Subjective observations and cognitive status: Pt alert and cooperative. Pain level/location: 6 /10       Location: back, L hip, R shoulder    Discharge recommendations  Anticipated discharge date:  18  Destination: []home alone   []home alone with assist PRN     [x] home w/ significant other      [] Continuous supervision  []SNF    [] Assisted living     [] Other:   Continued therapy: [x]HHC PT  []OUTPATIENT  PT   [] No Further PT  Equipment needs: has RW     Bed Mobility:           []   Pt received out of bed   Rolling R/L:  Ind  Scooting:  Ind  Supine --> Sit:  Ind  Sit --> Supine:  Ind    Transfers:    Sit--> Stand: Mod Ind  Stand --> Sit:   Mod Ind   Stand-Pivot: Mod Ind   Car Transfers:  Sup.   Assistive device required for transfer:   RW    Gait:    Distance:  171 feet  Assistance:  Sup  Device:  RW  Gait Quality:  Step-through    Stairs   # Completed:   8 (max potential)  Assistance:    SBA-Sup  Supportive Device:  bilat rails   Height:   4/6\"    Curb       Assistance:  SBA->CGA   Supportive Device:  RW  Height:   4\"    Uneven Surfaces:       Assistance:  CGA (due to impaired sensation on bilat feet affecting her balance and confidence)    Device:    RW  Surfaces Completed:   [x]  Green mat with bean bags beneath      []  Throw rugs             []  Outdoor pavements      []  Grass          []  Loose gravel        []  Other:         Patient/Caregiver Education and Training:   []   Bed Mobility/Transfer technique/safety  []   Gait technique/sequencing  []   Proper use of assistive device  [x]   Advanced mobility safety and technique  []   Reinforced patient's precautions/mobility while maintaining precautions  []   Postural awareness  []   Family training  []   Progress was updated and reviewed in Rehabtracker with patient and/or family this         date. Treatment Plan for Next Session: discharge to home       Assessment:  Pt was able to tolerate all mobility tasks today using strategy of activity pacing. She was hesitant with advanced ambulation activities, however she did complete it with encouragement and reassurance provided. She refused to retrieve object from the floor due to fear of falling even when use of adaptive equipment was provided as an option. Pt preferred to let her significant other do the reaching task from the floor that was not different from her PLOF.      Treatment/Activity Tolerance:   [] Tolerated treatment with no adverse effects    [x] Patient limited by fatigue  [] Patient limited by pain   [] Patient limited by medical complications:    [] Adverse reaction to Tx:   [] Significant change in status    Safety:       []  bed alarm set    []  chair alarm set    []  Pt refused alarms                []  Telesitter activated      [x]  Gait belt used during tx session      []other:         Number of Minutes/Billable Intervention  Gait Training 30   Therapeutic Exercise    Neuro Re-Ed    Therapeutic Activity 30   Wheelchair Propulsion    Group    Other:    TOTAL 60         Social History  Social/Functional History  Lives With: Significant other (Pavan )  Type of Home: House  Home Layout: One level  Home Access: Ramped entrance (accessed via wheelchair with Total A)  Bathroom Shower/Tub: Tub/Shower unit, Shower chair with back  Bathroom Toilet: Standard  Bathroom Equipment: Grab bars in shower, Grab bars around toilet  Home Equipment: [employer], Wheelchair-electric, Rolling walker  ADL Assistance: Needs assistance  Meal Prep Responsibility: No (significant other does )  Laundry Responsibility: No (significant other does )  Cleaning Responsibility: No (significant other does )  Bill Paying/Finance Responsibility: Primary  Shopping Responsibility: Secondary (makes grocery list )  [address]: Secondary (cat: Susan )  Health Care Management: Primary  Ambulation Assistance: Needs assistance (ambulated short distances only due to dizziness (required steadying assistance and w/c follow) )  Transfer Assistance: Needs assistance (required assistance at times when pt is dizzy )  Active : No  Additional Comments: 2 falls in the past year without significant injury, sleeps in regular bed     Objective                                                                                    Goals:  (Update in navigator)  Short term goals  Time Frame for Short term goals: 7 tx days or until discharge:   Short term goal 1: Pt will consistently complete bed mobility and sup<->sit Ind and all basic, OOB transfers Mod Ind. Short term goal 2: Pt will ambulate >/=200 ft using RW with Sup. Short term goal 3: Pt will ascend/descend at least 4 steps using bilat rails with Sup. Short term goal 4: Pt will ascend/descend curb step and ambulate over uneven surfaces using RW with Sup. :   :        Plan of Care                                                                              Times per week: 5 days per week for a minimum of 60 minutes/day plus group as appropriate for 60 minutes.   Treatment to include Current Treatment Recommendations: Strengthening, Transfer Training, Endurance Training, Patient/Caregiver Education & Training, Equipment Evaluation, Education, & procurement, Balance Training, Gait Training, Home Exercise Program, Functional Mobility Training, Stair training, Safety Education & Training    Electronically signed by   Connie Paige PT   9/26/2018, 12:25 PM

## 2018-09-26 NOTE — PROGRESS NOTES
Case t spoke with medicaid office. Patient has QMB, which doesn't include rides plus transport services. Patient will apply to [employer], which has transportation benefit. Patient provided with info for dial a ride.

## 2018-09-27 VITALS
SYSTOLIC BLOOD PRESSURE: 164 MMHG | HEIGHT: 64 IN | OXYGEN SATURATION: 93 % | DIASTOLIC BLOOD PRESSURE: 74 MMHG | RESPIRATION RATE: 16 BRPM | TEMPERATURE: 97.7 F | HEART RATE: 76 BPM | BODY MASS INDEX: 50.02 KG/M2 | WEIGHT: 293 LBS

## 2018-09-27 LAB
GLUCOSE BLD-MCNC: 206 MG/DL (ref 70–99)
GLUCOSE BLD-MCNC: 213 MG/DL (ref 70–99)

## 2018-09-27 PROCEDURE — 6370000000 HC RX 637 (ALT 250 FOR IP): Performed by: PHYSICAL MEDICINE & REHABILITATION

## 2018-09-27 PROCEDURE — 82962 GLUCOSE BLOOD TEST: CPT

## 2018-09-27 PROCEDURE — 6370000000 HC RX 637 (ALT 250 FOR IP): Performed by: INTERNAL MEDICINE

## 2018-09-27 PROCEDURE — 94761 N-INVAS EAR/PLS OXIMETRY MLT: CPT

## 2018-09-27 RX ADMIN — BUPROPION HYDROCHLORIDE 100 MG: 100 TABLET, FILM COATED ORAL at 08:12

## 2018-09-27 RX ADMIN — INSULIN LISPRO 2 UNITS: 100 INJECTION, SOLUTION INTRAVENOUS; SUBCUTANEOUS at 12:31

## 2018-09-27 RX ADMIN — PANTOPRAZOLE SODIUM 40 MG: 40 TABLET, DELAYED RELEASE ORAL at 05:38

## 2018-09-27 RX ADMIN — ASPIRIN 81 MG CHEWABLE TABLET 81 MG: 81 TABLET CHEWABLE at 08:11

## 2018-09-27 RX ADMIN — TRAMADOL HYDROCHLORIDE 50 MG: 50 TABLET, FILM COATED ORAL at 12:30

## 2018-09-27 RX ADMIN — TRAMADOL HYDROCHLORIDE 50 MG: 50 TABLET, FILM COATED ORAL at 05:39

## 2018-09-27 RX ADMIN — LINAGLIPTIN 5 MG: 5 TABLET, FILM COATED ORAL at 08:13

## 2018-09-27 RX ADMIN — DOCUSATE SODIUM 100 MG: 100 CAPSULE, LIQUID FILLED ORAL at 08:10

## 2018-09-27 RX ADMIN — GABAPENTIN 300 MG: 300 CAPSULE ORAL at 12:31

## 2018-09-27 RX ADMIN — INSULIN LISPRO 25 UNITS: 100 INJECTION, SOLUTION INTRAVENOUS; SUBCUTANEOUS at 08:11

## 2018-09-27 RX ADMIN — DULOXETINE HYDROCHLORIDE 60 MG: 30 CAPSULE, DELAYED RELEASE ORAL at 08:07

## 2018-09-27 RX ADMIN — GABAPENTIN 300 MG: 300 CAPSULE ORAL at 08:10

## 2018-09-27 RX ADMIN — GLIPIZIDE 5 MG: 5 TABLET ORAL at 08:13

## 2018-09-27 RX ADMIN — TORSEMIDE 10 MG: 20 TABLET ORAL at 08:12

## 2018-09-27 RX ADMIN — CLOPIDOGREL BISULFATE 75 MG: 75 TABLET ORAL at 08:07

## 2018-09-27 RX ADMIN — METOPROLOL TARTRATE 25 MG: 25 TABLET ORAL at 08:12

## 2018-09-27 RX ADMIN — INSULIN LISPRO 25 UNITS: 100 INJECTION, SOLUTION INTRAVENOUS; SUBCUTANEOUS at 12:31

## 2018-09-27 RX ADMIN — INSULIN LISPRO 2 UNITS: 100 INJECTION, SOLUTION INTRAVENOUS; SUBCUTANEOUS at 08:08

## 2018-09-27 ASSESSMENT — PAIN SCALES - GENERAL
PAINLEVEL_OUTOF10: 7
PAINLEVEL_OUTOF10: 6

## 2018-09-27 NOTE — PROGRESS NOTES
Received referral from [facility] and aware that pt is going home today and will initiate hhc.

## 2018-09-27 NOTE — PROGRESS NOTES
Case mgt met with patient in room to discuss today's discharge. Patient wants to discharge home. Doesn't want SNF at this time. Educated on 30 day SNF window. Patient still hasn't made PCP appt. Patient has phone number in room and is able to use phone. Case mgt suggested hospital schedule appt with Dr George East for her. Patient declined this assistance. States appt will need to be coordinated with transportation and she wants to manage this herself. Patient referred to [facility] via [employer]. Patient reports she's used dial a ride in the past and can use again. Case mgt spoke with [facility] and patient doesn't qualify at this time.

## 2018-10-02 ENCOUNTER — APPOINTMENT (OUTPATIENT)
Dept: CT IMAGING | Age: 58
DRG: 205 | End: 2018-10-02
Payer: MEDICARE

## 2018-10-02 ENCOUNTER — HOSPITAL ENCOUNTER (INPATIENT)
Age: 58
LOS: 3 days | Discharge: HOME HEALTH | DRG: 205 | End: 2018-10-05
Attending: EMERGENCY MEDICINE | Admitting: INTERNAL MEDICINE
Payer: MEDICARE

## 2018-10-02 ENCOUNTER — APPOINTMENT (OUTPATIENT)
Dept: GENERAL RADIOLOGY | Age: 58
DRG: 205 | End: 2018-10-02
Payer: MEDICARE

## 2018-10-02 DIAGNOSIS — R09.02 HYPOXIA: Primary | ICD-10-CM
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
## 2018-10-02 DIAGNOSIS — M25.511 ACUTE PAIN OF RIGHT SHOULDER: ICD-10-CM

## 2018-10-02 DIAGNOSIS — J90 PLEURAL EFFUSION: ICD-10-CM

## 2018-10-02 DIAGNOSIS — J44.9 CHRONIC OBSTRUCTIVE PULMONARY DISEASE, UNSPECIFIED COPD TYPE (HCC): ICD-10-CM

## 2018-10-02 DIAGNOSIS — R07.89 MUSCULOSKELETAL CHEST PAIN: ICD-10-CM

## 2018-10-02 PROBLEM — J96.00 ACUTE RESPIRATORY FAILURE (HCC): Status: ACTIVE | Noted: 2018-10-02

## 2018-10-02 LAB
ALBUMIN SERPL-MCNC: 3.8 GM/DL (ref 3.4–5)
ALP BLD-CCNC: 134 IU/L (ref 40–129)
ALT SERPL-CCNC: 18 U/L (ref 10–40)
ANION GAP SERPL CALCULATED.3IONS-SCNC: 12 MMOL/L (ref 4–16)
AST SERPL-CCNC: 13 IU/L (ref 15–37)
BASE EXCESS MIXED: 2.4 (ref 0–2.3)
BASOPHILS ABSOLUTE: 0.1 K/CU MM
BASOPHILS RELATIVE PERCENT: 0.5 % (ref 0–1)
BILIRUB SERPL-MCNC: 0.4 MG/DL (ref 0–1)
BUN BLDV-MCNC: 23 MG/DL (ref 6–23)
CALCIUM SERPL-MCNC: 8.7 MG/DL (ref 8.3–10.6)
CHLORIDE BLD-SCNC: 98 MMOL/L (ref 99–110)
CO2: 27 MMOL/L (ref 21–32)
CREAT SERPL-MCNC: 1.3 MG/DL (ref 0.6–1.1)
DIFFERENTIAL TYPE: ABNORMAL
EOSINOPHILS ABSOLUTE: 0.2 K/CU MM
EOSINOPHILS RELATIVE PERCENT: 1.3 % (ref 0–3)
GFR AFRICAN AMERICAN: 51 ML/MIN/1.73M2
GFR NON-AFRICAN AMERICAN: 42 ML/MIN/1.73M2
GLUCOSE BLD-MCNC: 207 MG/DL (ref 70–99)
GLUCOSE BLD-MCNC: 266 MG/DL (ref 70–99)
GLUCOSE BLD-MCNC: 314 MG/DL (ref 70–99)
HCO3 VENOUS: 30.5 MMOL/L (ref 19–25)
HCT VFR BLD CALC: 37.8 % (ref 37–47)
HEMOGLOBIN: 11.9 GM/DL (ref 12.5–16)
IMMATURE NEUTROPHIL %: 1 % (ref 0–0.43)
LYMPHOCYTES ABSOLUTE: 1.7 K/CU MM
LYMPHOCYTES RELATIVE PERCENT: 12.8 % (ref 24–44)
MCH RBC QN AUTO: 29 PG (ref 27–31)
MCHC RBC AUTO-ENTMCNC: 31.5 % (ref 32–36)
MCV RBC AUTO: 92 FL (ref 78–100)
MONOCYTES ABSOLUTE: 0.7 K/CU MM
MONOCYTES RELATIVE PERCENT: 5.2 % (ref 0–4)
NUCLEATED RBC %: 0 %
O2 SAT, VEN: 88.4 % (ref 50–70)
PCO2, VEN: 62 MMHG (ref 38–52)
PDW BLD-RTO: 13.5 % (ref 11.7–14.9)
PH VENOUS: 7.3 (ref 7.32–7.42)
PLATELET # BLD: 390 K/CU MM (ref 140–440)
PMV BLD AUTO: 10 FL (ref 7.5–11.1)
PO2, VEN: 59 MMHG (ref 28–48)
POTASSIUM SERPL-SCNC: 4.9 MMOL/L (ref 3.5–5.1)
PRO-BNP: 1758 PG/ML
RBC # BLD: 4.11 M/CU MM (ref 4.2–5.4)
SEGMENTED NEUTROPHILS ABSOLUTE COUNT: 10.6 K/CU MM
SEGMENTED NEUTROPHILS RELATIVE PERCENT: 79.2 % (ref 36–66)
SODIUM BLD-SCNC: 137 MMOL/L (ref 135–145)
TOTAL IMMATURE NEUTOROPHIL: 0.13 K/CU MM
TOTAL NUCLEATED RBC: 0 K/CU MM
TOTAL PROTEIN: 6.7 GM/DL (ref 6.4–8.2)
TROPONIN T: 0.04 NG/ML
TROPONIN T: 0.04 NG/ML
WBC # BLD: 13.4 K/CU MM (ref 4–10.5)

## 2018-10-02 PROCEDURE — 6360000002 HC RX W HCPCS: Performed by: INTERNAL MEDICINE

## 2018-10-02 PROCEDURE — 82805 BLOOD GASES W/O2 SATURATION: CPT

## 2018-10-02 PROCEDURE — 96374 THER/PROPH/DIAG INJ IV PUSH: CPT

## 2018-10-02 PROCEDURE — 4500000027

## 2018-10-02 PROCEDURE — 94640 AIRWAY INHALATION TREATMENT: CPT

## 2018-10-02 PROCEDURE — 85025 COMPLETE CBC W/AUTO DIFF WBC: CPT

## 2018-10-02 PROCEDURE — 1200000000 HC SEMI PRIVATE

## 2018-10-02 PROCEDURE — 70450 CT HEAD/BRAIN W/O DYE: CPT

## 2018-10-02 PROCEDURE — 71275 CT ANGIOGRAPHY CHEST: CPT

## 2018-10-02 PROCEDURE — 6370000000 HC RX 637 (ALT 250 FOR IP): Performed by: INTERNAL MEDICINE

## 2018-10-02 PROCEDURE — 72125 CT NECK SPINE W/O DYE: CPT

## 2018-10-02 PROCEDURE — 80053 COMPREHEN METABOLIC PANEL: CPT

## 2018-10-02 PROCEDURE — 72131 CT LUMBAR SPINE W/O DYE: CPT

## 2018-10-02 PROCEDURE — 36415 COLL VENOUS BLD VENIPUNCTURE: CPT

## 2018-10-02 PROCEDURE — 83880 ASSAY OF NATRIURETIC PEPTIDE: CPT

## 2018-10-02 PROCEDURE — 6370000000 HC RX 637 (ALT 250 FOR IP): Performed by: EMERGENCY MEDICINE

## 2018-10-02 PROCEDURE — 93010 ELECTROCARDIOGRAM REPORT: CPT | Performed by: INTERNAL MEDICINE

## 2018-10-02 PROCEDURE — 6360000004 HC RX CONTRAST MEDICATION: Performed by: EMERGENCY MEDICINE

## 2018-10-02 PROCEDURE — 94761 N-INVAS EAR/PLS OXIMETRY MLT: CPT

## 2018-10-02 PROCEDURE — 99285 EMERGENCY DEPT VISIT HI MDM: CPT

## 2018-10-02 PROCEDURE — 2700000000 HC OXYGEN THERAPY PER DAY

## 2018-10-02 PROCEDURE — 93005 ELECTROCARDIOGRAM TRACING: CPT | Performed by: EMERGENCY MEDICINE

## 2018-10-02 PROCEDURE — 6360000002 HC RX W HCPCS: Performed by: EMERGENCY MEDICINE

## 2018-10-02 PROCEDURE — 2580000003 HC RX 258: Performed by: EMERGENCY MEDICINE

## 2018-10-02 PROCEDURE — 82962 GLUCOSE BLOOD TEST: CPT

## 2018-10-02 PROCEDURE — 84484 ASSAY OF TROPONIN QUANT: CPT

## 2018-10-02 RX ORDER — DEXTROSE MONOHYDRATE 25 G/50ML
12.5 INJECTION, SOLUTION INTRAVENOUS PRN
Status: DISCONTINUED | OUTPATIENT
Start: 2018-10-02 | End: 2018-10-05 | Stop reason: HOSPADM

## 2018-10-02 RX ORDER — METOPROLOL TARTRATE 50 MG/1
50 TABLET, FILM COATED ORAL 2 TIMES DAILY
Status: DISCONTINUED | OUTPATIENT
Start: 2018-10-02 | End: 2018-10-05 | Stop reason: HOSPADM

## 2018-10-02 RX ORDER — ATORVASTATIN CALCIUM 40 MG/1
40 TABLET, FILM COATED ORAL NIGHTLY
Status: DISCONTINUED | OUTPATIENT
Start: 2018-10-02 | End: 2018-10-05 | Stop reason: HOSPADM

## 2018-10-02 RX ORDER — IPRATROPIUM BROMIDE AND ALBUTEROL SULFATE 2.5; .5 MG/3ML; MG/3ML
1 SOLUTION RESPIRATORY (INHALATION) ONCE
Status: COMPLETED | OUTPATIENT
Start: 2018-10-02 | End: 2018-10-02

## 2018-10-02 RX ORDER — GABAPENTIN 400 MG/1
800 CAPSULE ORAL 3 TIMES DAILY
Status: DISCONTINUED | OUTPATIENT
Start: 2018-10-02 | End: 2018-10-05

## 2018-10-02 RX ORDER — 0.9 % SODIUM CHLORIDE 0.9 %
10 VIAL (ML) INJECTION
Status: COMPLETED | OUTPATIENT
Start: 2018-10-02 | End: 2018-10-03

## 2018-10-02 RX ORDER — TRAMADOL HYDROCHLORIDE 50 MG/1
50 TABLET ORAL EVERY 6 HOURS PRN
Status: DISCONTINUED | OUTPATIENT
Start: 2018-10-02 | End: 2018-10-05 | Stop reason: HOSPADM

## 2018-10-02 RX ORDER — AMLODIPINE BESYLATE 5 MG/1
5 TABLET ORAL DAILY
Status: DISCONTINUED | OUTPATIENT
Start: 2018-10-03 | End: 2018-10-05 | Stop reason: HOSPADM

## 2018-10-02 RX ORDER — TORSEMIDE 10 MG/1
10 TABLET ORAL DAILY
Status: DISCONTINUED | OUTPATIENT
Start: 2018-10-03 | End: 2018-10-04

## 2018-10-02 RX ORDER — DULOXETIN HYDROCHLORIDE 30 MG/1
60 CAPSULE, DELAYED RELEASE ORAL DAILY
Status: DISCONTINUED | OUTPATIENT
Start: 2018-10-03 | End: 2018-10-05 | Stop reason: HOSPADM

## 2018-10-02 RX ORDER — NICOTINE POLACRILEX 4 MG
15 LOZENGE BUCCAL PRN
Status: DISCONTINUED | OUTPATIENT
Start: 2018-10-02 | End: 2018-10-05 | Stop reason: HOSPADM

## 2018-10-02 RX ORDER — PANTOPRAZOLE SODIUM 40 MG/1
40 TABLET, DELAYED RELEASE ORAL
Status: DISCONTINUED | OUTPATIENT
Start: 2018-10-03 | End: 2018-10-05 | Stop reason: HOSPADM

## 2018-10-02 RX ORDER — BUPROPION HYDROCHLORIDE 100 MG/1
100 TABLET, EXTENDED RELEASE ORAL 2 TIMES DAILY
COMMUNITY

## 2018-10-02 RX ORDER — BUPROPION HYDROCHLORIDE 100 MG/1
100 TABLET, EXTENDED RELEASE ORAL 2 TIMES DAILY
Status: DISCONTINUED | OUTPATIENT
Start: 2018-10-02 | End: 2018-10-05 | Stop reason: HOSPADM

## 2018-10-02 RX ORDER — DEXTROSE MONOHYDRATE 50 MG/ML
100 INJECTION, SOLUTION INTRAVENOUS PRN
Status: DISCONTINUED | OUTPATIENT
Start: 2018-10-02 | End: 2018-10-05 | Stop reason: HOSPADM

## 2018-10-02 RX ORDER — FUROSEMIDE 10 MG/ML
40 INJECTION INTRAMUSCULAR; INTRAVENOUS ONCE
Status: COMPLETED | OUTPATIENT
Start: 2018-10-02 | End: 2018-10-02

## 2018-10-02 RX ORDER — INSULIN GLARGINE 100 [IU]/ML
30 INJECTION, SOLUTION SUBCUTANEOUS NIGHTLY
Status: DISCONTINUED | OUTPATIENT
Start: 2018-10-02 | End: 2018-10-05 | Stop reason: HOSPADM

## 2018-10-02 RX ORDER — 0.9 % SODIUM CHLORIDE 0.9 %
500 INTRAVENOUS SOLUTION INTRAVENOUS ONCE
Status: COMPLETED | OUTPATIENT
Start: 2018-10-02 | End: 2018-10-02

## 2018-10-02 RX ORDER — LACTULOSE 10 G/15ML
20 SOLUTION ORAL 3 TIMES DAILY PRN
Status: DISCONTINUED | OUTPATIENT
Start: 2018-10-02 | End: 2018-10-05 | Stop reason: HOSPADM

## 2018-10-02 RX ORDER — METOPROLOL TARTRATE 50 MG/1
50 TABLET, FILM COATED ORAL 2 TIMES DAILY
Status: ON HOLD | COMMUNITY
End: 2019-03-04 | Stop reason: SDUPTHER

## 2018-10-02 RX ORDER — IPRATROPIUM BROMIDE AND ALBUTEROL SULFATE 2.5; .5 MG/3ML; MG/3ML
1 SOLUTION RESPIRATORY (INHALATION) EVERY 4 HOURS PRN
Status: DISCONTINUED | OUTPATIENT
Start: 2018-10-02 | End: 2018-10-05 | Stop reason: HOSPADM

## 2018-10-02 RX ORDER — ALBUTEROL SULFATE 90 UG/1
2 AEROSOL, METERED RESPIRATORY (INHALATION) EVERY 4 HOURS PRN
Status: DISCONTINUED | OUTPATIENT
Start: 2018-10-02 | End: 2018-10-05 | Stop reason: HOSPADM

## 2018-10-02 RX ORDER — AMLODIPINE BESYLATE 5 MG/1
5 TABLET ORAL DAILY
Status: ON HOLD | COMMUNITY
End: 2018-11-21 | Stop reason: HOSPADM

## 2018-10-02 RX ORDER — GABAPENTIN 800 MG/1
800 TABLET ORAL 3 TIMES DAILY
Status: ON HOLD | COMMUNITY
End: 2018-10-05

## 2018-10-02 RX ORDER — DOCUSATE SODIUM 100 MG/1
100 CAPSULE, LIQUID FILLED ORAL 2 TIMES DAILY
Status: DISCONTINUED | OUTPATIENT
Start: 2018-10-02 | End: 2018-10-05 | Stop reason: HOSPADM

## 2018-10-02 RX ORDER — ACETAMINOPHEN 325 MG/1
325 TABLET ORAL EVERY 4 HOURS PRN
Status: DISCONTINUED | OUTPATIENT
Start: 2018-10-02 | End: 2018-10-05 | Stop reason: HOSPADM

## 2018-10-02 RX ORDER — ASPIRIN 81 MG/1
81 TABLET, CHEWABLE ORAL DAILY
Status: DISCONTINUED | OUTPATIENT
Start: 2018-10-03 | End: 2018-10-05 | Stop reason: HOSPADM

## 2018-10-02 RX ORDER — CLOPIDOGREL BISULFATE 75 MG/1
75 TABLET ORAL DAILY
Status: DISCONTINUED | OUTPATIENT
Start: 2018-10-03 | End: 2018-10-05 | Stop reason: HOSPADM

## 2018-10-02 RX ADMIN — INSULIN GLARGINE 30 UNITS: 100 INJECTION, SOLUTION SUBCUTANEOUS at 22:32

## 2018-10-02 RX ADMIN — INSULIN LISPRO 2 UNITS: 100 INJECTION, SOLUTION INTRAVENOUS; SUBCUTANEOUS at 20:15

## 2018-10-02 RX ADMIN — FUROSEMIDE 40 MG: 10 INJECTION, SOLUTION INTRAVENOUS at 17:22

## 2018-10-02 RX ADMIN — BUPROPION HYDROCHLORIDE 100 MG: 100 TABLET, FILM COATED, EXTENDED RELEASE ORAL at 20:16

## 2018-10-02 RX ADMIN — GABAPENTIN 800 MG: 400 CAPSULE ORAL at 20:16

## 2018-10-02 RX ADMIN — INSULIN LISPRO 2 UNITS: 100 INJECTION, SOLUTION INTRAVENOUS; SUBCUTANEOUS at 22:32

## 2018-10-02 RX ADMIN — ATORVASTATIN CALCIUM 40 MG: 40 TABLET, FILM COATED ORAL at 20:16

## 2018-10-02 RX ADMIN — METOPROLOL TARTRATE 50 MG: 50 TABLET ORAL at 20:16

## 2018-10-02 RX ADMIN — DOCUSATE SODIUM 100 MG: 100 CAPSULE, LIQUID FILLED ORAL at 20:16

## 2018-10-02 RX ADMIN — TRAMADOL HYDROCHLORIDE 50 MG: 50 TABLET, FILM COATED ORAL at 20:16

## 2018-10-02 RX ADMIN — SODIUM CHLORIDE 500 ML: 9 INJECTION, SOLUTION INTRAVENOUS at 16:07

## 2018-10-02 RX ADMIN — IOPAMIDOL 75 ML: 755 INJECTION, SOLUTION INTRAVENOUS at 15:09

## 2018-10-02 RX ADMIN — IPRATROPIUM BROMIDE AND ALBUTEROL SULFATE 1 AMPULE: .5; 3 SOLUTION RESPIRATORY (INHALATION) at 15:10

## 2018-10-02 RX ADMIN — ENOXAPARIN SODIUM 40 MG: 40 INJECTION SUBCUTANEOUS at 20:16

## 2018-10-02 RX ADMIN — INSULIN LISPRO 25 UNITS: 100 INJECTION, SOLUTION INTRAVENOUS; SUBCUTANEOUS at 20:29

## 2018-10-02 ASSESSMENT — PAIN SCALES - GENERAL
PAINLEVEL_OUTOF10: 3
PAINLEVEL_OUTOF10: 8
PAINLEVEL_OUTOF10: 7

## 2018-10-02 ASSESSMENT — PAIN DESCRIPTION - LOCATION
LOCATION: HEAD;ARM
LOCATION: GENERALIZED

## 2018-10-02 ASSESSMENT — PAIN DESCRIPTION - PAIN TYPE: TYPE: ACUTE PAIN

## 2018-10-02 NOTE — ED NOTES
Bed: ED-26  Expected date:   Expected time:   Means of arrival:   Comments:  Saul [id number], RN  10/02/18 [id number]

## 2018-10-02 NOTE — PROGRESS NOTES
Medication History  [facility]    Patient Name: Anabel Louie 1960     Medication history has been completed by: Oanh Chao CPhT    Source(s) of information: Patient, retail pharmacy, and insurance claims    Primary Care Physician: Mariel Stanford MD     Pharmacy: [address] [place] [address]    Allergies as of 10/02/2018    (No Known Allergies)        Prior to Admission medications    Medication Sig Start Date End Date Taking? Authorizing Provider   gabapentin (NEURONTIN) 800 MG tablet Take 800 mg by mouth 3 times daily. .   Yes Historical Provider, MD   buPROPion (WELLBUTRIN SR) 100 MG extended release tablet Take 100 mg by mouth 2 times daily   Yes Historical Provider, MD   metoprolol tartrate (LOPRESSOR) 50 MG tablet Take 50 mg by mouth 2 times daily   Yes Historical Provider, MD   amLODIPine (NORVASC) 5 MG tablet Take 5 mg by mouth daily   Yes Historical Provider, MD   traMADol (ULTRAM) 50 MG tablet Take 1 tablet by mouth every 6 hours as needed for Pain for up to 7 days. . 9/26/18 10/3/18 Yes KAROL Talley MD   insulin lispro (HUMALOG) 100 UNIT/ML injection vial Inject 25 Units into the skin 3 times daily (with meals) 9/26/18  Yes Orpah Aschoff, MD   insulin lispro (HUMALOG) 100 UNIT/ML injection vial Inject 0-6 Units into the skin 3 times daily (with meals) 4/9/18  Yes Brina Shay Current, DO   insulin lispro (HUMALOG) 100 UNIT/ML injection vial Inject 0-3 Units into the skin nightly 4/9/18  Yes Brina Shay Current, DO   aspirin 81 MG chewable tablet Take 1 tablet by mouth daily 1/8/18  Yes Joesph Marroquin MD   DULoxetine (CYMBALTA) 60 MG extended release capsule Take 1 capsule by mouth daily 1/8/18  Yes Joesph Marroquin MD   atorvastatin (LIPITOR) 40 MG tablet Take 1 tablet by mouth nightly 1/8/18  Yes Joesph Marroquin MD   torsemide (DEMADEX) 10 MG tablet Take 1 tablet by mouth daily Hold if SBP<100 1/8/18  Yes Raeann Ward MD   docusate (COLACE, DULCOLAX) 100 MG CAPS Take 100 mg by mouth 2 times daily

## 2018-10-02 NOTE — ED PROVIDER NOTES
changes of the bilateral hips and spondylosis of the partially imaged lower lumbar spine as well as degenerative changes of the bilateral sacroiliac joints. Surgical clip projects over the right lower abdomen. Left femur: Five views of the left femur demonstrate no acute fracture or dislocation. Alignment appears anatomic. Surrounding soft tissues appear unremarkable. Left knee: Two views of the left knee demonstrate no acute fracture or dislocation. Mild tricompartmental osteoarthritis of the knee and small left knee effusion. Atherosclerotic vascular calcifications. 1. No acute osseous abnormality of the left pelvis or left hip. 2. No acute osseous abnormality of the left femur. 3. No acute osseous abnormality of the left knee. 4. Mild tricompartmental osteoarthritis of the left knee with small left knee effusion. Xr Knee Left (1-2 Views)    Result Date: 9/14/2018  EXAMINATION: 5 XRAY VIEWS OF THE LEFT FEMUR; 2 XRAY VIEWS OF THE LEFT KNEE; SINGLE XRAY VIEW OF THE PELVIS AND 2 XRAY VIEWS LEFT HIP 9/14/2018 8:35 pm; 9/14/2018 8:49 pm; 9/14/2018 8:48 pm COMPARISON: None. HISTORY: ORDERING SYSTEM PROVIDED HISTORY: pain TECHNOLOGIST PROVIDED HISTORY: Ordering Physician Provided Reason for Exam: fall Acuity: Acute Type of Encounter: Initial; ORDERING SYSTEM PROVIDED HISTORY: trauma TECHNOLOGIST PROVIDED HISTORY: Portable- left hip Ordering Physician Provided Reason for Exam: fall Acuity: Acute Type of Encounter: Initial FINDINGS: Pelvis and left hip: Two views of the pelvis and left hip were reviewed. No acute fracture identified. Evaluation of the sacrum and right hemipelvis is somewhat limited due to overlying bowel gas. Note is made of mild degenerative changes of the bilateral hips and spondylosis of the partially imaged lower lumbar spine as well as degenerative changes of the bilateral sacroiliac joints. Surgical clip projects over the right lower abdomen.  Left femur: Five views of the left femur demonstrate no acute fracture or dislocation. Alignment appears anatomic. Surrounding soft tissues appear unremarkable. Left knee: Two views of the left knee demonstrate no acute fracture or dislocation. Mild tricompartmental osteoarthritis of the knee and small left knee effusion. Atherosclerotic vascular calcifications. 1. No acute osseous abnormality of the left pelvis or left hip. 2. No acute osseous abnormality of the left femur. 3. No acute osseous abnormality of the left knee. 4. Mild tricompartmental osteoarthritis of the left knee with small left knee effusion. Ct Head Wo Contrast    Result Date: 10/2/2018  EXAMINATION: CT OF THE HEAD WITHOUT CONTRAST  10/2/2018 3:09 pm TECHNIQUE: CT of the head was performed without the administration of intravenous contrast. Dose modulation, iterative reconstruction, and/or weight based adjustment of the mA/kV was utilized to reduce the radiation dose to as low as reasonably achievable. COMPARISON: 09/14/2018 HISTORY: ORDERING SYSTEM PROVIDED HISTORY: fall TECHNOLOGIST PROVIDED HISTORY: Has a \"code stroke\" or \"stroke alert\" been called? ->No Ordering Physician Provided Reason for Exam: fall, headache Acuity: Acute Type of Exam: Initial Mechanism of Injury: fall Relevant Medical/Surgical History: none FINDINGS: BRAIN/VENTRICLES: There is no acute intracranial hemorrhage, mass effect or midline shift. No abnormal extra-axial fluid collection. The gray-white differentiation is maintained without evidence of an acute infarct. There is no evidence of hydrocephalus. There is a left basal ganglia lacune. Mild low-attenuation changes in the periventricular white matter are compatible with chronic microvascular ischemic related gliosis. ORBITS: The visualized portion of the orbits demonstrate no acute abnormality. SINUSES: The visualized paranasal sinuses and mastoid air cells demonstrate no acute abnormality.  SOFT TISSUES/SKULL:  No acute abnormality of the CT of the lumbar spine was performed without the administration of intravenous contrast. Multiplanar reformatted images are provided for review. Dose modulation, iterative reconstruction, and/or weight based adjustment of the mA/kV was utilized to reduce the radiation dose to as low as reasonably achievable. COMPARISON: None HISTORY: ORDERING SYSTEM PROVIDED HISTORY: fall, low back pain TECHNOLOGIST PROVIDED HISTORY: Reason for exam:->fall, low back pain Ordering Physician Provided Reason for Exam: fall, back pain Acuity: Acute Type of Exam: Initial Mechanism of Injury: fall Relevant Medical/Surgical History: none FINDINGS: BONES/ALIGNMENT: No acute fracture. Minimal L4-L5 anterolisthesis. DEGENERATIVE CHANGES: Multilevel degenerative changes with severe disc space narrowing and endplate degenerative change at L2-L3. SOFT TISSUES/RETROPERITONEUM: Small pleural effusions. No acute traumatic findings within the lumbar spine. Small pleural effusions which in the setting of trauma hemothorax is not excluded. Ct Pelvis Wo Contrast Additional Contrast? None    Result Date: 9/15/2018  EXAMINATION: CT OF THE PELVIS WITHOUT CONTRAST 9/14/2018 11:04 pm TECHNIQUE: CT of the pelvis was performed without the administration of intravenous contrast.  Multiplanar reformatted images are provided for review. Dose modulation, iterative reconstruction, and/or weight based adjustment of the mA/kV was utilized to reduce the radiation dose to as low as reasonably achievable.  COMPARISON: None HISTORY: ORDERING SYSTEM PROVIDED HISTORY: fall, ongoing left hip pain and sacral pain, unable to bear weight or walk TECHNOLOGIST PROVIDED HISTORY: Additional Contrast?->None Ordering Physician Provided Reason for Exam: fall, ongoing left hip pain and sacral pain, unable to bear weight or walk Acuity: Acute Type of Encounter: Initial Mechanism of Injury: fall, ongoing left hip pain and sacral pain, unable to bear weight or walk Relevant

## 2018-10-02 NOTE — H&P
reconstruction,  and/or weight based adjustment of the mA/kV was utilized to reduce the  radiation dose to as low as reasonably achievable. COMPARISON:  None. HISTORY:  ORDERING SYSTEM PROVIDED HISTORY: shortness of breath, hypoxia  TECHNOLOGIST PROVIDED HISTORY:  Ordering Physician Provided Reason for Exam: PE//// CHEST PAIN  Acuity: Acute  Type of Exam: Initial  Relevant Medical/Surgical History: 80ML [id number]    FINDINGS:  Pulmonary Arteries: Pulmonary arteries are adequately opacified for  evaluation.  No evidence of intraluminal filling defect to suggest pulmonary  embolism.  Main pulmonary artery is normal in caliber. Mediastinum: No evidence of mediastinal lymphadenopathy.  The heart and  pericardium demonstrate no acute abnormality.  There is no acute abnormality  of the thoracic aorta. Lungs/Pleura: There is a partially calcified granuloma within the right lower  lobe on image #66.  Patient motion artifact is identified.  Small bilateral  pleural effusions are appreciated.  No pneumothorax is identified. Upper Abdomen: Limited images of the upper abdomen are unremarkable. Soft Tissues/Bones: No acute bone or soft tissue abnormality.     Impression:       No evidence of pulmonary embolism. Small bilateral pleural effusions.     CT LUMBAR SPINE WO CONTRAST [[id number]] Collected: 10/02/18 1514     Order Status: Completed Updated: 10/02/18 1532     Narrative:       EXAMINATION:  CT OF THE LUMBAR SPINE WITHOUT CONTRAST  10/2/2018    TECHNIQUE:  CT of the lumbar spine was performed without the administration of  intravenous contrast. Multiplanar reformatted images are provided for review. Dose modulation, iterative reconstruction, and/or weight based adjustment of  the mA/kV was utilized to reduce the radiation dose to as low as reasonably  achievable.     COMPARISON:  None    HISTORY:  ORDERING SYSTEM PROVIDED HISTORY: fall, low back pain  TECHNOLOGIST PROVIDED HISTORY:  Reason for exam:->fall, low back pain  Ordering Physician Provided Reason for Exam: fall, back pain  Acuity: Acute  Type of Exam: Initial  Mechanism of Injury: fall  Relevant Medical/Surgical History: none    FINDINGS:  BONES/ALIGNMENT: No acute fracture.  Minimal L4-L5 anterolisthesis. DEGENERATIVE CHANGES: Multilevel degenerative changes with severe disc space  narrowing and endplate degenerative change at L2-L3. SOFT TISSUES/RETROPERITONEUM: Small pleural effusions.     Impression:       No acute traumatic findings within the lumbar spine. Small pleural effusions which in the setting of trauma hemothorax is not  excluded.     CT Head WO Contrast [[id number]] Collected: 10/02/18 1513     Order Status: Completed Updated: 10/02/18 1517     Narrative:       EXAMINATION:  CT OF THE HEAD WITHOUT CONTRAST  10/2/2018 3:09 pm    TECHNIQUE:  CT of the head was performed without the administration of intravenous  contrast. Dose modulation, iterative reconstruction, and/or weight based  adjustment of the mA/kV was utilized to reduce the radiation dose to as low  as reasonably achievable. COMPARISON:  09/14/2018    HISTORY:  ORDERING SYSTEM PROVIDED HISTORY: fall  TECHNOLOGIST PROVIDED HISTORY:  Has a \"code stroke\" or \"stroke alert\" been called? ->No  Ordering Physician Provided Reason for Exam: fall, headache  Acuity: Acute  Type of Exam: Initial  Mechanism of Injury: fall  Relevant Medical/Surgical History: none    FINDINGS:  BRAIN/VENTRICLES: There is no acute intracranial hemorrhage, mass effect or  midline shift.  No abnormal extra-axial fluid collection.  The gray-white  differentiation is maintained without evidence of an acute infarct.  There is  no evidence of hydrocephalus. There is a left basal ganglia lacune.  Mild  low-attenuation changes in the periventricular white matter are compatible  with chronic microvascular ischemic related gliosis.     ORBITS: The visualized portion of the orbits demonstrate no acute noted    HTN  CAD  CKD  MASSIEL  Morbid obesity    Lovenox ppx    PT/OT, case management for placement eval due to recurrent falls    Case d/w ED physician    [facility], Internal Medicine  10/2/2018 at 5:41 PM

## 2018-10-02 NOTE — CARE COORDINATION
LSW in to see pt to initiate discharge planning. Pt's S/O Rashi Stroud ([phone]) at bedside. Pt being seen in the ED for SOB. Pt also reports that she fell yesterday. Pt was recently discharged from ARU (9/19-9/27). Pt reports to have a walker, w/c and shower chair. Pt states that she lost her balance d/t being in a rush to use the restroom when she fell. Pt states that she was not using her walker at the time. Pt reports that she is receiving HC PT through [facility] and has her first appt scheduled for tomorrow 10/3. Pt denies any further needs at this time. Discharge plan is to return home with S/O and resume services with [facility].

## 2018-10-03 LAB
ANION GAP SERPL CALCULATED.3IONS-SCNC: 9 MMOL/L (ref 4–16)
BASOPHILS ABSOLUTE: 0 K/CU MM
BASOPHILS RELATIVE PERCENT: 0.3 % (ref 0–1)
BUN BLDV-MCNC: 22 MG/DL (ref 6–23)
CALCIUM SERPL-MCNC: 8.7 MG/DL (ref 8.3–10.6)
CHLORIDE BLD-SCNC: 98 MMOL/L (ref 99–110)
CO2: 32 MMOL/L (ref 21–32)
CREAT SERPL-MCNC: 1.4 MG/DL (ref 0.6–1.1)
DIFFERENTIAL TYPE: ABNORMAL
EOSINOPHILS ABSOLUTE: 0.2 K/CU MM
EOSINOPHILS RELATIVE PERCENT: 1.6 % (ref 0–3)
GFR AFRICAN AMERICAN: 47 ML/MIN/1.73M2
GFR NON-AFRICAN AMERICAN: 39 ML/MIN/1.73M2
GLUCOSE BLD-MCNC: 116 MG/DL (ref 70–99)
GLUCOSE BLD-MCNC: 117 MG/DL (ref 70–99)
GLUCOSE BLD-MCNC: 190 MG/DL (ref 70–99)
GLUCOSE BLD-MCNC: 239 MG/DL (ref 70–99)
GLUCOSE BLD-MCNC: 90 MG/DL (ref 70–99)
GLUCOSE BLD-MCNC: 94 MG/DL (ref 70–99)
HCT VFR BLD CALC: 34.5 % (ref 37–47)
HEMOGLOBIN: 10.6 GM/DL (ref 12.5–16)
IMMATURE NEUTROPHIL %: 0.7 % (ref 0–0.43)
LV EF: 53 %
LVEF MODALITY: NORMAL
LYMPHOCYTES ABSOLUTE: 1.9 K/CU MM
LYMPHOCYTES RELATIVE PERCENT: 16.2 % (ref 24–44)
MCH RBC QN AUTO: 28.4 PG (ref 27–31)
MCHC RBC AUTO-ENTMCNC: 30.7 % (ref 32–36)
MCV RBC AUTO: 92.5 FL (ref 78–100)
MONOCYTES ABSOLUTE: 0.8 K/CU MM
MONOCYTES RELATIVE PERCENT: 6.7 % (ref 0–4)
NUCLEATED RBC %: 0 %
PDW BLD-RTO: 13.6 % (ref 11.7–14.9)
PLATELET # BLD: 347 K/CU MM (ref 140–440)
PMV BLD AUTO: 9.9 FL (ref 7.5–11.1)
POTASSIUM SERPL-SCNC: 4.4 MMOL/L (ref 3.5–5.1)
RBC # BLD: 3.73 M/CU MM (ref 4.2–5.4)
SEGMENTED NEUTROPHILS ABSOLUTE COUNT: 8.6 K/CU MM
SEGMENTED NEUTROPHILS RELATIVE PERCENT: 74.5 % (ref 36–66)
SODIUM BLD-SCNC: 139 MMOL/L (ref 135–145)
TOTAL IMMATURE NEUTOROPHIL: 0.08 K/CU MM
TOTAL NUCLEATED RBC: 0 K/CU MM
TROPONIN T: 0.05 NG/ML
WBC # BLD: 11.6 K/CU MM (ref 4–10.5)

## 2018-10-03 PROCEDURE — 36415 COLL VENOUS BLD VENIPUNCTURE: CPT

## 2018-10-03 PROCEDURE — 6360000002 HC RX W HCPCS: Performed by: INTERNAL MEDICINE

## 2018-10-03 PROCEDURE — 84484 ASSAY OF TROPONIN QUANT: CPT

## 2018-10-03 PROCEDURE — 2700000000 HC OXYGEN THERAPY PER DAY

## 2018-10-03 PROCEDURE — 85025 COMPLETE CBC W/AUTO DIFF WBC: CPT

## 2018-10-03 PROCEDURE — 1200000000 HC SEMI PRIVATE

## 2018-10-03 PROCEDURE — 97162 PT EVAL MOD COMPLEX 30 MIN: CPT

## 2018-10-03 PROCEDURE — 2580000003 HC RX 258: Performed by: EMERGENCY MEDICINE

## 2018-10-03 PROCEDURE — 6370000000 HC RX 637 (ALT 250 FOR IP): Performed by: INTERNAL MEDICINE

## 2018-10-03 PROCEDURE — G8987 SELF CARE CURRENT STATUS: HCPCS

## 2018-10-03 PROCEDURE — 82962 GLUCOSE BLOOD TEST: CPT

## 2018-10-03 PROCEDURE — 97166 OT EVAL MOD COMPLEX 45 MIN: CPT

## 2018-10-03 PROCEDURE — G8978 MOBILITY CURRENT STATUS: HCPCS

## 2018-10-03 PROCEDURE — 6360000004 HC RX CONTRAST MEDICATION: Performed by: INTERNAL MEDICINE

## 2018-10-03 PROCEDURE — C8929 TTE W OR WO FOL WCON,DOPPLER: HCPCS

## 2018-10-03 PROCEDURE — G8988 SELF CARE GOAL STATUS: HCPCS

## 2018-10-03 PROCEDURE — 97530 THERAPEUTIC ACTIVITIES: CPT

## 2018-10-03 PROCEDURE — G8979 MOBILITY GOAL STATUS: HCPCS

## 2018-10-03 PROCEDURE — 97116 GAIT TRAINING THERAPY: CPT

## 2018-10-03 PROCEDURE — 94640 AIRWAY INHALATION TREATMENT: CPT

## 2018-10-03 PROCEDURE — 80048 BASIC METABOLIC PNL TOTAL CA: CPT

## 2018-10-03 RX ORDER — IPRATROPIUM BROMIDE AND ALBUTEROL SULFATE 2.5; .5 MG/3ML; MG/3ML
1 SOLUTION RESPIRATORY (INHALATION)
Status: DISCONTINUED | OUTPATIENT
Start: 2018-10-03 | End: 2018-10-05 | Stop reason: HOSPADM

## 2018-10-03 RX ADMIN — INSULIN LISPRO 2 UNITS: 100 INJECTION, SOLUTION INTRAVENOUS; SUBCUTANEOUS at 12:07

## 2018-10-03 RX ADMIN — PANTOPRAZOLE SODIUM 40 MG: 40 TABLET, DELAYED RELEASE ORAL at 06:37

## 2018-10-03 RX ADMIN — AMLODIPINE BESYLATE 5 MG: 5 TABLET ORAL at 10:14

## 2018-10-03 RX ADMIN — CLOPIDOGREL BISULFATE 75 MG: 75 TABLET ORAL at 10:15

## 2018-10-03 RX ADMIN — METOPROLOL TARTRATE 50 MG: 50 TABLET ORAL at 22:29

## 2018-10-03 RX ADMIN — SODIUM CHLORIDE, PRESERVATIVE FREE 10 ML: 5 INJECTION INTRAVENOUS at 10:13

## 2018-10-03 RX ADMIN — DULOXETINE HYDROCHLORIDE 60 MG: 30 CAPSULE, DELAYED RELEASE ORAL at 10:14

## 2018-10-03 RX ADMIN — IPRATROPIUM BROMIDE AND ALBUTEROL SULFATE 1 AMPULE: .5; 3 SOLUTION RESPIRATORY (INHALATION) at 15:43

## 2018-10-03 RX ADMIN — ENOXAPARIN SODIUM 40 MG: 40 INJECTION SUBCUTANEOUS at 22:29

## 2018-10-03 RX ADMIN — TRAMADOL HYDROCHLORIDE 50 MG: 50 TABLET, FILM COATED ORAL at 10:13

## 2018-10-03 RX ADMIN — TRAMADOL HYDROCHLORIDE 50 MG: 50 TABLET, FILM COATED ORAL at 16:53

## 2018-10-03 RX ADMIN — INSULIN LISPRO 25 UNITS: 100 INJECTION, SOLUTION INTRAVENOUS; SUBCUTANEOUS at 17:19

## 2018-10-03 RX ADMIN — INSULIN LISPRO 1 UNITS: 100 INJECTION, SOLUTION INTRAVENOUS; SUBCUTANEOUS at 17:19

## 2018-10-03 RX ADMIN — BUPROPION HYDROCHLORIDE 100 MG: 100 TABLET, FILM COATED, EXTENDED RELEASE ORAL at 22:29

## 2018-10-03 RX ADMIN — ASPIRIN 81 MG CHEWABLE TABLET 81 MG: 81 TABLET CHEWABLE at 10:15

## 2018-10-03 RX ADMIN — GABAPENTIN 800 MG: 400 CAPSULE ORAL at 22:29

## 2018-10-03 RX ADMIN — INSULIN LISPRO 25 UNITS: 100 INJECTION, SOLUTION INTRAVENOUS; SUBCUTANEOUS at 12:08

## 2018-10-03 RX ADMIN — TRAMADOL HYDROCHLORIDE 50 MG: 50 TABLET, FILM COATED ORAL at 03:01

## 2018-10-03 RX ADMIN — DOCUSATE SODIUM 100 MG: 100 CAPSULE, LIQUID FILLED ORAL at 10:15

## 2018-10-03 RX ADMIN — GABAPENTIN 800 MG: 400 CAPSULE ORAL at 13:58

## 2018-10-03 RX ADMIN — BUPROPION HYDROCHLORIDE 100 MG: 100 TABLET, FILM COATED, EXTENDED RELEASE ORAL at 10:13

## 2018-10-03 RX ADMIN — DOCUSATE SODIUM 100 MG: 100 CAPSULE, LIQUID FILLED ORAL at 22:30

## 2018-10-03 RX ADMIN — GABAPENTIN 800 MG: 400 CAPSULE ORAL at 10:14

## 2018-10-03 RX ADMIN — TORSEMIDE 10 MG: 10 TABLET ORAL at 10:17

## 2018-10-03 RX ADMIN — TRAMADOL HYDROCHLORIDE 50 MG: 50 TABLET, FILM COATED ORAL at 23:07

## 2018-10-03 RX ADMIN — ATORVASTATIN CALCIUM 40 MG: 40 TABLET, FILM COATED ORAL at 22:29

## 2018-10-03 RX ADMIN — METOPROLOL TARTRATE 50 MG: 50 TABLET ORAL at 10:15

## 2018-10-03 RX ADMIN — PERFLUTREN 2.2 MG: 6.52 INJECTION, SUSPENSION INTRAVENOUS at 11:09

## 2018-10-03 ASSESSMENT — PAIN DESCRIPTION - LOCATION
LOCATION: SHOULDER
LOCATION: GENERALIZED
LOCATION: BACK
LOCATION: GENERALIZED

## 2018-10-03 ASSESSMENT — PAIN SCALES - GENERAL
PAINLEVEL_OUTOF10: 8
PAINLEVEL_OUTOF10: 9
PAINLEVEL_OUTOF10: 8
PAINLEVEL_OUTOF10: 8
PAINLEVEL_OUTOF10: 0
PAINLEVEL_OUTOF10: 9
PAINLEVEL_OUTOF10: 9

## 2018-10-03 ASSESSMENT — PAIN DESCRIPTION - FREQUENCY: FREQUENCY: CONTINUOUS

## 2018-10-03 ASSESSMENT — PAIN DESCRIPTION - ONSET: ONSET: ON-GOING

## 2018-10-03 ASSESSMENT — PAIN DESCRIPTION - ORIENTATION
ORIENTATION: RIGHT
ORIENTATION: LOWER

## 2018-10-03 ASSESSMENT — PAIN DESCRIPTION - DESCRIPTORS
DESCRIPTORS: ACHING;SORE
DESCRIPTORS: ACHING

## 2018-10-03 ASSESSMENT — PAIN DESCRIPTION - PAIN TYPE
TYPE: ACUTE PAIN
TYPE: CHRONIC PAIN
TYPE: ACUTE PAIN
TYPE: ACUTE PAIN

## 2018-10-03 NOTE — PROGRESS NOTES
Physical Therapy    Facility/Department: [facility]N  Initial Assessment    NAME: Mor Zuleta  : 1960  MRN: [id number]    Date of Service: 10/3/2018    Discharge Recommendations:   (? return to ARU vs SNF; pt doesnot seem receptive to SNF, if home [facility] S[facility], and hopefully s/o can provide more help/assist)   PT Equipment Recommendations  Other: ? home O2 needs    Patient Diagnosis(es): The primary encounter diagnosis was Hypoxia. Diagnoses of Fall, initial encounter, Chronic obstructive pulmonary disease, unspecified COPD type ([address] and Pleural effusion were also pertinent to this visit. has a past medical history of CAD (coronary artery disease); CKD (chronic kidney disease) stage 3, GFR 30-59 ml/min ([address] Diabetes type 2, uncontrolled ([address] Diastolic heart failure ([address] Essential hypertension; Financial problems; Generalized anxiety disorder; Herpes genitalia; and Obesity, morbid (more than 100 lbs over ideal weight or BMI > 40) ([address] has a past surgical history that includes Cholecystectomy;  section; Tonsillectomy; and other surgical history (Right, [id number]). Restrictions  Restrictions/Precautions  Restrictions/Precautions: General Precautions  Required Braces or Orthoses?: Yes  Position Activity Restriction  Other position/activity restrictions: Cleared by RN, Lydia Bates, for OT/PT evals. O2 at 3L via NC, falls mats, PIV, portable tele.    Vision/Hearing        Subjective  General  Chart Reviewed: Yes  Patient assessed for rehabilitation services?: Yes  Additional Pertinent Hx: recent ARU stay; -18 , [facility] had not started yet-pt reports she scheduled it to begin next Wed  Family / Caregiver Present: No  Diagnosis: admit with acute respiratory failure/hypoxia, (+) fall  Follows Commands: Within Functional Limits  Other (Comment): cleared for therapy per nurse  General Comment  Comments: in bed  Subjective  Subjective: willing to work with therapy, voiced concerns about having enough support/help at home, and maybe was discharged home too soon, but also stated she wanted to return home  Pain Screening  Patient Currently in Pain: Yes          Orientation  Orientation  Overall Orientation Status: Within Functional Limits    Social/Functional History  Social/Functional History  Lives With: Significant other  Type of Home: Apartment (Sr apt complex)  Home Layout: One level  Home Access: Ramped entrance  Bathroom Shower/Tub: Tub/Shower unit, Shower chair without back  Bathroom Toilet: Standard  Bathroom Equipment: Grab bars in shower, Hand-held shower, Grab bars around toilet (grab bars on side of toilet (to R, along w/sink))  Home Equipment: Rolling walker, Reacher (regular bed; SO uses O2)  Receives Help From:  (Jere Leigh was supposed to start following ARU adm 9/19-9/27/18 but had not)  ADL Assistance: Independent  Homemaking Assistance: Independent  Ambulation Assistance: Needs assistance (supervision, occas A to get up/down from surfaces, and for ambulation; uses RW for amb but not consistently)  Transfer Assistance: Needs assistance (sup/occas A)  Active : No  Patient's  Info: SO; pt reports she has difficulty getting transportation (used to have [employer])  Occupation: On disability  Leisure & Hobbies: TV  Additional Comments: SO is w/pt most of the time but pt reports he does not do alot or A her. 2 falls since d/c from ARU on 9/27/18. Objective          AROM RLE (degrees)  RLE AROM: WFL  AROM LLE (degrees)  LLE AROM : WFL  Strength RLE  Comment: generalized weakness throughout; grossly 4- to 4/5 range  Strength LLE  Comment: as above  Tone RLE  RLE Tone: Normotonic  Tone LLE  LLE Tone: Normotonic  Motor Control  Gross Motor? :  (increased time and effort for functional mob, c/o dizziness and some SOB, decreased endurance)     Bed mobility  Supine to Sit: Stand by assistance (increased time and effort to complete)  Sit to Supine:  (remained up in chair at end of session)  Scooting: strength to promote improved functional mob  Patient Goals   Patient goals : pt wants to return home, but also voiced concerns about the same       Therapy Time   Individual Concurrent Group Co-treatment   Time In 1127         Time Out 1205         Minutes 38         Timed Code Treatment Minutes: Gautam Wagner, PT

## 2018-10-03 NOTE — PROGRESS NOTES
Occupational Therapy   Occupational Therapy Initial Assessment  Date: 10/3/2018   Patient Name: Gloria Vela  MRN: [id number]     : 1960    Date of Service: 10/3/2018    Discharge Recommendations:  IP Rehab, [address], 24 hour supervision or assist, Home with Home health OT, S Level 3         Patient Diagnosis(es): The primary encounter diagnosis was Hypoxia. Diagnoses of Fall, initial encounter, Chronic obstructive pulmonary disease, unspecified COPD type ([address] and Pleural effusion were also pertinent to this visit. has a past medical history of CAD (coronary artery disease); CKD (chronic kidney disease) stage 3, GFR 30-59 ml/min ([address] Diabetes type 2, uncontrolled ([address] Diastolic heart failure ([address] Essential hypertension; Financial problems; Generalized anxiety disorder; Herpes genitalia; and Obesity, morbid (more than 100 lbs over ideal weight or BMI > 40) ([facility] has a past surgical history that includes Cholecystectomy;  section; Tonsillectomy; and other surgical history (Right, [id number]). Restrictions  Restrictions/Precautions  Restrictions/Precautions: General Precautions  Required Braces or Orthoses?: Yes  Position Activity Restriction  Other position/activity restrictions: Cleared by RN, Chetan Schuler, for OT/PT evals. O2 at 3L via NC, falls mats, PIV, portable tele. Subjective   General  Chart Reviewed: Yes  Patient assessed for rehabilitation services?: Yes  Family / Caregiver Present: No  Subjective  Subjective: Pt lying in bed, agreable to services. Pleasant and cooperative. States, \"I scheduled HH to start on the  (). I wanted my SO to clean up the house. \"  Pain Assessment  Patient Currently in Pain: Yes  Pain Assessment: 0-10  Pain Level: 9  Pain Type: Chronic pain  Pain Location: Shoulder  Pain Orientation: Right  Pain Descriptors: Aching, Sore  Pain Intervention(s): Medication (see eMar)  Social/Functional History  Social/Functional History  Lives With: Significant other  Type of Home: Apartment (Sr apt complex)  Home Layout: One level  Home Access: Ramped entrance  Bathroom Shower/Tub: Tub/Shower unit, Shower chair without back  Bathroom Toilet: Standard  Bathroom Equipment: Grab bars in shower, Hand-held shower, Grab bars around toilet (grab bars on side of toilet (to R, along w/sink))  Home Equipment: Rolling walker, Reacher (regular bed; SO uses O2)  Receives Help From:  (Jere Leigh was supposed to start following ARU adm 9/19-9/27/18 but had not)  ADL Assistance: [address]: Independent  Ambulation Assistance: Needs assistance (supervision, occas A to get up/down from surfaces, and for ambulation; uses RW for amb but not consistently)  Transfer Assistance: Needs assistance (sup/occas A)  Active : No  Patient's  Info: SO; pt reports she has difficulty getting transportation (used to have [employer])  Occupation: On disability  Leisure & Hobbies: TV  Additional Comments: SO is w/pt most of the time but pt reports he does not do alot or A her. 2 falls since d/c from ARU on 9/27/18. Pt reports pathways in home are clear but that her SO is messy. Objective   Vision: Impaired  Vision Exceptions: Wears glasses for reading  Hearing: Within functional limits    Orientation  Overall Orientation Status: Within Functional Limits  Observation/Palpation  Observation: dizziness upon sitting to EOB and then with standing. Vitals taken. Seated at side of bed: /78, HR 74, O2 sat 95%. S/p ambulation: O2 sats 88% increasing to 92%. Pt on O2 at 3L as noted. Pt reports dizziness is not new and that she has been getting dizzy. Pt states it may be contributing to falls when asked. Pt states she has told the  but that [facility] doesn't care. \"  Balance  Sitting Balance: Stand by assistance (seated at side of bed)  Standing Balance: Contact guard assistance (of 1 to 2 initially upon standing and w/bed

## 2018-10-04 LAB
ALBUMIN SERPL-MCNC: 3.1 GM/DL (ref 3.4–5)
ALP BLD-CCNC: 104 IU/L (ref 40–128)
ALT SERPL-CCNC: 12 U/L (ref 10–40)
ANION GAP SERPL CALCULATED.3IONS-SCNC: 10 MMOL/L (ref 4–16)
AST SERPL-CCNC: 9 IU/L (ref 15–37)
BASOPHILS ABSOLUTE: 0 K/CU MM
BASOPHILS RELATIVE PERCENT: 0.2 % (ref 0–1)
BILIRUB SERPL-MCNC: 0.5 MG/DL (ref 0–1)
BUN BLDV-MCNC: 28 MG/DL (ref 6–23)
CALCIUM SERPL-MCNC: 8.5 MG/DL (ref 8.3–10.6)
CHLORIDE BLD-SCNC: 98 MMOL/L (ref 99–110)
CO2: 30 MMOL/L (ref 21–32)
CREAT SERPL-MCNC: 1.9 MG/DL (ref 0.6–1.1)
DIFFERENTIAL TYPE: ABNORMAL
EOSINOPHILS ABSOLUTE: 0.2 K/CU MM
EOSINOPHILS RELATIVE PERCENT: 2.1 % (ref 0–3)
GFR AFRICAN AMERICAN: 33 ML/MIN/1.73M2
GFR NON-AFRICAN AMERICAN: 27 ML/MIN/1.73M2
GLUCOSE BLD-MCNC: 101 MG/DL (ref 70–99)
GLUCOSE BLD-MCNC: 118 MG/DL (ref 70–99)
GLUCOSE BLD-MCNC: 131 MG/DL (ref 70–99)
GLUCOSE BLD-MCNC: 152 MG/DL (ref 70–99)
GLUCOSE BLD-MCNC: 153 MG/DL (ref 70–99)
GLUCOSE BLD-MCNC: 190 MG/DL (ref 70–99)
HCT VFR BLD CALC: 32.5 % (ref 37–47)
HEMOGLOBIN: 10 GM/DL (ref 12.5–16)
IMMATURE NEUTROPHIL %: 0.9 % (ref 0–0.43)
LYMPHOCYTES ABSOLUTE: 1.9 K/CU MM
LYMPHOCYTES RELATIVE PERCENT: 17.5 % (ref 24–44)
MAGNESIUM: 1.8 MG/DL (ref 1.8–2.4)
MCH RBC QN AUTO: 28.8 PG (ref 27–31)
MCHC RBC AUTO-ENTMCNC: 30.8 % (ref 32–36)
MCV RBC AUTO: 93.7 FL (ref 78–100)
MONOCYTES ABSOLUTE: 0.8 K/CU MM
MONOCYTES RELATIVE PERCENT: 7 % (ref 0–4)
NUCLEATED RBC %: 0 %
PDW BLD-RTO: 13.8 % (ref 11.7–14.9)
PHOSPHORUS: 4.7 MG/DL (ref 2.5–4.9)
PLATELET # BLD: 285 K/CU MM (ref 140–440)
PMV BLD AUTO: 10 FL (ref 7.5–11.1)
POTASSIUM SERPL-SCNC: 4.7 MMOL/L (ref 3.5–5.1)
PRO-BNP: 851.2 PG/ML
RBC # BLD: 3.47 M/CU MM (ref 4.2–5.4)
SEGMENTED NEUTROPHILS ABSOLUTE COUNT: 7.9 K/CU MM
SEGMENTED NEUTROPHILS RELATIVE PERCENT: 72.3 % (ref 36–66)
SODIUM BLD-SCNC: 138 MMOL/L (ref 135–145)
TOTAL IMMATURE NEUTOROPHIL: 0.1 K/CU MM
TOTAL NUCLEATED RBC: 0 K/CU MM
TOTAL PROTEIN: 4.8 GM/DL (ref 6.4–8.2)
TSH HIGH SENSITIVITY: 5.52 UIU/ML (ref 0.27–4.2)
WBC # BLD: 11 K/CU MM (ref 4–10.5)

## 2018-10-04 PROCEDURE — 82962 GLUCOSE BLOOD TEST: CPT

## 2018-10-04 PROCEDURE — 94640 AIRWAY INHALATION TREATMENT: CPT

## 2018-10-04 PROCEDURE — 80050 GENERAL HEALTH PANEL: CPT

## 2018-10-04 PROCEDURE — 94760 N-INVAS EAR/PLS OXIMETRY 1: CPT

## 2018-10-04 PROCEDURE — 6370000000 HC RX 637 (ALT 250 FOR IP): Performed by: INTERNAL MEDICINE

## 2018-10-04 PROCEDURE — 1200000000 HC SEMI PRIVATE

## 2018-10-04 PROCEDURE — 83735 ASSAY OF MAGNESIUM: CPT

## 2018-10-04 PROCEDURE — 2700000000 HC OXYGEN THERAPY PER DAY

## 2018-10-04 PROCEDURE — 36415 COLL VENOUS BLD VENIPUNCTURE: CPT

## 2018-10-04 PROCEDURE — 94150 VITAL CAPACITY TEST: CPT

## 2018-10-04 PROCEDURE — 6360000002 HC RX W HCPCS: Performed by: INTERNAL MEDICINE

## 2018-10-04 PROCEDURE — 94762 N-INVAS EAR/PLS OXIMTRY CONT: CPT

## 2018-10-04 PROCEDURE — 84100 ASSAY OF PHOSPHORUS: CPT

## 2018-10-04 PROCEDURE — 83880 ASSAY OF NATRIURETIC PEPTIDE: CPT

## 2018-10-04 PROCEDURE — 84145 PROCALCITONIN (PCT): CPT

## 2018-10-04 PROCEDURE — 94761 N-INVAS EAR/PLS OXIMETRY MLT: CPT

## 2018-10-04 PROCEDURE — 82803 BLOOD GASES ANY COMBINATION: CPT

## 2018-10-04 RX ORDER — MAGNESIUM SULFATE 1 G/100ML
1 INJECTION INTRAVENOUS ONCE
Status: COMPLETED | OUTPATIENT
Start: 2018-10-04 | End: 2018-10-04

## 2018-10-04 RX ORDER — MAGNESIUM SULFATE IN WATER 40 MG/ML
2 INJECTION, SOLUTION INTRAVENOUS ONCE
Status: COMPLETED | OUTPATIENT
Start: 2018-10-04 | End: 2018-10-04

## 2018-10-04 RX ADMIN — IPRATROPIUM BROMIDE AND ALBUTEROL SULFATE 1 AMPULE: .5; 3 SOLUTION RESPIRATORY (INHALATION) at 19:23

## 2018-10-04 RX ADMIN — DULOXETINE HYDROCHLORIDE 60 MG: 30 CAPSULE, DELAYED RELEASE ORAL at 09:37

## 2018-10-04 RX ADMIN — DOCUSATE SODIUM 100 MG: 100 CAPSULE, LIQUID FILLED ORAL at 09:37

## 2018-10-04 RX ADMIN — INSULIN LISPRO 1 UNITS: 100 INJECTION, SOLUTION INTRAVENOUS; SUBCUTANEOUS at 09:38

## 2018-10-04 RX ADMIN — MAGNESIUM SULFATE HEPTAHYDRATE 1 G: 1 INJECTION, SOLUTION INTRAVENOUS at 12:48

## 2018-10-04 RX ADMIN — GABAPENTIN 800 MG: 400 CAPSULE ORAL at 09:37

## 2018-10-04 RX ADMIN — ENOXAPARIN SODIUM 40 MG: 40 INJECTION SUBCUTANEOUS at 19:49

## 2018-10-04 RX ADMIN — ATORVASTATIN CALCIUM 40 MG: 40 TABLET, FILM COATED ORAL at 19:49

## 2018-10-04 RX ADMIN — INSULIN LISPRO 1 UNITS: 100 INJECTION, SOLUTION INTRAVENOUS; SUBCUTANEOUS at 19:50

## 2018-10-04 RX ADMIN — TRAMADOL HYDROCHLORIDE 50 MG: 50 TABLET, FILM COATED ORAL at 22:24

## 2018-10-04 RX ADMIN — GABAPENTIN 800 MG: 400 CAPSULE ORAL at 14:05

## 2018-10-04 RX ADMIN — TRAMADOL HYDROCHLORIDE 50 MG: 50 TABLET, FILM COATED ORAL at 16:15

## 2018-10-04 RX ADMIN — METOPROLOL TARTRATE 50 MG: 50 TABLET ORAL at 19:51

## 2018-10-04 RX ADMIN — IPRATROPIUM BROMIDE AND ALBUTEROL SULFATE 1 AMPULE: .5; 3 SOLUTION RESPIRATORY (INHALATION) at 11:50

## 2018-10-04 RX ADMIN — IPRATROPIUM BROMIDE AND ALBUTEROL SULFATE 1 AMPULE: .5; 3 SOLUTION RESPIRATORY (INHALATION) at 08:04

## 2018-10-04 RX ADMIN — DOCUSATE SODIUM 100 MG: 100 CAPSULE, LIQUID FILLED ORAL at 19:49

## 2018-10-04 RX ADMIN — METOPROLOL TARTRATE 50 MG: 50 TABLET ORAL at 09:37

## 2018-10-04 RX ADMIN — INSULIN GLARGINE 30 UNITS: 100 INJECTION, SOLUTION SUBCUTANEOUS at 19:50

## 2018-10-04 RX ADMIN — GABAPENTIN 800 MG: 400 CAPSULE ORAL at 19:50

## 2018-10-04 RX ADMIN — ASPIRIN 81 MG CHEWABLE TABLET 81 MG: 81 TABLET CHEWABLE at 09:37

## 2018-10-04 RX ADMIN — PANTOPRAZOLE SODIUM 40 MG: 40 TABLET, DELAYED RELEASE ORAL at 05:39

## 2018-10-04 RX ADMIN — TRAMADOL HYDROCHLORIDE 50 MG: 50 TABLET, FILM COATED ORAL at 09:37

## 2018-10-04 RX ADMIN — INSULIN LISPRO 25 UNITS: 100 INJECTION, SOLUTION INTRAVENOUS; SUBCUTANEOUS at 12:49

## 2018-10-04 RX ADMIN — BUPROPION HYDROCHLORIDE 100 MG: 100 TABLET, FILM COATED, EXTENDED RELEASE ORAL at 19:49

## 2018-10-04 RX ADMIN — INSULIN LISPRO 25 UNITS: 100 INJECTION, SOLUTION INTRAVENOUS; SUBCUTANEOUS at 09:42

## 2018-10-04 RX ADMIN — CLOPIDOGREL BISULFATE 75 MG: 75 TABLET ORAL at 09:37

## 2018-10-04 RX ADMIN — IPRATROPIUM BROMIDE AND ALBUTEROL SULFATE 1 AMPULE: .5; 3 SOLUTION RESPIRATORY (INHALATION) at 15:50

## 2018-10-04 RX ADMIN — BUPROPION HYDROCHLORIDE 100 MG: 100 TABLET, FILM COATED, EXTENDED RELEASE ORAL at 09:37

## 2018-10-04 RX ADMIN — INSULIN LISPRO 1 UNITS: 100 INJECTION, SOLUTION INTRAVENOUS; SUBCUTANEOUS at 12:49

## 2018-10-04 RX ADMIN — AMLODIPINE BESYLATE 5 MG: 5 TABLET ORAL at 09:37

## 2018-10-04 RX ADMIN — MAGNESIUM SULFATE HEPTAHYDRATE 2 G: 40 INJECTION, SOLUTION INTRAVENOUS at 11:19

## 2018-10-04 ASSESSMENT — PAIN DESCRIPTION - LOCATION
LOCATION: GENERALIZED;HEAD
LOCATION: BACK
LOCATION: BACK

## 2018-10-04 ASSESSMENT — PAIN SCALES - WONG BAKER
WONGBAKER_NUMERICALRESPONSE: 0

## 2018-10-04 ASSESSMENT — PAIN DESCRIPTION - FREQUENCY
FREQUENCY: CONTINUOUS
FREQUENCY: CONTINUOUS

## 2018-10-04 ASSESSMENT — PAIN SCALES - GENERAL
PAINLEVEL_OUTOF10: 8
PAINLEVEL_OUTOF10: 8
PAINLEVEL_OUTOF10: 7
PAINLEVEL_OUTOF10: 0
PAINLEVEL_OUTOF10: 3

## 2018-10-04 ASSESSMENT — PAIN DESCRIPTION - PROGRESSION
CLINICAL_PROGRESSION: GRADUALLY IMPROVING

## 2018-10-04 ASSESSMENT — PAIN DESCRIPTION - PAIN TYPE
TYPE: ACUTE PAIN
TYPE: ACUTE PAIN;CHRONIC PAIN
TYPE: ACUTE PAIN

## 2018-10-04 ASSESSMENT — PAIN DESCRIPTION - ONSET
ONSET: ON-GOING
ONSET: ON-GOING

## 2018-10-04 ASSESSMENT — PAIN DESCRIPTION - DESCRIPTORS
DESCRIPTORS: ACHING
DESCRIPTORS: ACHING
DESCRIPTORS: THROBBING;ACHING

## 2018-10-04 ASSESSMENT — PAIN DESCRIPTION - ORIENTATION
ORIENTATION: LOWER
ORIENTATION: LOWER

## 2018-10-04 NOTE — CARE COORDINATION
Spoke with Marlyn Silverio she qualified for home oxygen. She does not have any questions about the oxygen at this time, her boyfriend has it at home also. She has a IMDI to use. She does not have a nebulizer or nebulized medication ordered. Her boyfriend does have one so I reviewed with her how he should care for it. I also gave her the written instructions. Went over when to use and how to use a IMDI. Shake first, how to synchronize the inhalation with each puff, holding breath, waiting time between puffs. Went over nebulizer how the pieces come apart and go back together, where to put the medicine, care of the nebulizer set between each treatment, washing it each day, cold sterilization (distilled white vinegar and water) once or twice a week and air dry each day. Gave a paper hand-out with these instructions. Reviewed using the incentive spirometer and the benefits of using it along with deep breathing and coughing throughout the day. Explained she needed to move more and increase her activity. She did not want to go back to inpatient physical rehab. \"They made me work hard\". Explained that with home physical therapy she still needs to do the therapy every day even when the therapist is not there. Some times she may need to do therapy several times a day. Gave her the COPD stop light, reviewed with her what to look for in the beginning of a flare-up and when to call the doctor. Reviewed COPD, gave a blue booklet and my business card in case she came up with any questions. 10/05/18    Went to see Marlyn Silverio.   She was asleep, will follow 10/04/18    COPD CHECKLIST    Was the COPD Order set used      No  Pneumonia/COPD Stoplight Education   Yes  Blood Cultures drawn before 1st antibiotic given   No  Antibiotic given within 24 hours    No  O2 Saturation on admission     94% RA    Consults:             Smoking Cessation      NA quit 2000  Pulmonary       Yes  Med Assist Consult      No  Home Health Care

## 2018-10-04 NOTE — PROGRESS NOTES
alert, cooperative, no apparent distress, and appears stated age  LUNGS:  decreased breath sounds  CARDIOVASCULAR:  normal S1 and S2 and negative JVD  ABD:Abdomen soft, non-tender. BS normal. No masses,  No organomegaly  NEURO:Alert and oriented x3. Gait normal. Reflexes and motor strength normal and symmetric. Cranial nerves 2-12 and sensation grossly intact. DATA:    CBC:  Recent Labs      10/02/18   1344  10/03/18   0747  10/04/18   0537   WBC  13.4*  11.6*  11.0*   RBC  4.11*  3.73*  3.47*   HGB  11.9*  10.6*  10.0*   HCT  37.8  34.5*  32.5*   PLT  390  347  285   MCV  92.0  92.5  93.7   MCH  29.0  28.4  28.8   MCHC  31.5*  30.7*  30.8*   RDW  13.5  13.6  13.8   SEGSPCT  79.2*  74.5*  72.3*      BMP:  Recent Labs      10/02/18   1344  10/03/18   0747  10/04/18   0537   NA  137  139  138   K  4.9  4.4  4.7   CL  98*  98*  98*   CO2  27  32  30   BUN  23  22  28*   CREATININE  1.3*  1.4*  1.9*   CALCIUM  8.7  8.7  8.5   GLUCOSE  314*  90  131*      ABG:  No results for input(s): PH, PO2ART, GRU6IKD, HCO3, BEART, O2SAT in the last 72 hours. Lab Results   Component Value Date    PROBNP 851.2 (H) 10/04/2018    PROBNP 1,758 (H) 10/02/2018    PROBNP 1,449 (H) 01/04/2018     No results found for: [address] [facility]    Radiology Review:  Pertinent images / reports were reviewed as a part of this visit.     Assessment:     Patient Active Problem List   Diagnosis    CKD (chronic kidney disease) stage 3, GFR 30-59 ml/min    CAD (coronary artery disease)    [facility] diastolic [facility] failure [facility]    Diabetes type 2, uncontrolled ([facility]    Morbid obesity with BMI of 50.0-59.9, adult ([facility]    Elevated lactic acid level, resolved    Musculoskeletal chest pain    Essential hypertension    Diabetes mellitus with hyperglycemia ([facility]    Severe dehydration secondary to significant hyperglycemia    Sepsis secondary to UTI, POA    Generalized weakness    Sepsis associated hypotension, POA    Acute renal failure, POA    E. coli UTI (urinary tract infection)    Fall    Syncope    Acute pain of right shoulder    Hypotension    DM (diabetes mellitus), secondary uncontrolled (HCC)    Generalized anxiety disorder    Nausea & vomiting    Fever    Debility    Gait disturbance    Acute respiratory failure (HCC)       Plan:   1. Overall the patient has slightly improved. 2. Inc. activity.       Maury Kearns MD  10/4/2018  11:00 AM

## 2018-10-04 NOTE — PROGRESS NOTES
hypoxia  TECHNOLOGIST PROVIDED HISTORY:  Ordering Physician Provided Reason for Exam: PE//// CHEST PAIN  Acuity: Acute  Type of Exam: Initial  Relevant Medical/Surgical History: 80ML [id number]    FINDINGS:  Pulmonary Arteries: Pulmonary arteries are adequately opacified for  evaluation.  No evidence of intraluminal filling defect to suggest pulmonary  embolism.  Main pulmonary artery is normal in caliber. Mediastinum: No evidence of mediastinal lymphadenopathy.  The heart and  pericardium demonstrate no acute abnormality.  There is no acute abnormality  of the thoracic aorta. Lungs/Pleura: There is a partially calcified granuloma within the right lower  lobe on image #66.  Patient motion artifact is identified.  Small bilateral  pleural effusions are appreciated.  No pneumothorax is identified. Upper Abdomen: Limited images of the upper abdomen are unremarkable. Soft Tissues/Bones: No acute bone or soft tissue abnormality.     Impression:       No evidence of pulmonary embolism. Small bilateral pleural effusions.     CT Head WO Contrast [[id number]] Collected: 10/02/18 1513     Order Status: Completed Updated: 10/02/18 1517     Narrative:       EXAMINATION:  CT OF THE HEAD WITHOUT CONTRAST  10/2/2018 3:09 pm    TECHNIQUE:  CT of the head was performed without the administration of intravenous  contrast. Dose modulation, iterative reconstruction, and/or weight based  adjustment of the mA/kV was utilized to reduce the radiation dose to as low  as reasonably achievable. COMPARISON:  09/14/2018    HISTORY:  ORDERING SYSTEM PROVIDED HISTORY: fall  TECHNOLOGIST PROVIDED HISTORY:  Has a \"code stroke\" or \"stroke alert\" been called? ->No  Ordering Physician Provided Reason for Exam: fall, headache  Acuity: Acute  Type of Exam: Initial  Mechanism of Injury: fall  Relevant Medical/Surgical History: none    FINDINGS:  BRAIN/VENTRICLES: There is no acute intracranial hemorrhage, mass effect or  midline shift.  No abnormal extra-axial fluid collection.  The gray-white  differentiation is maintained without evidence of an acute infarct.  There is  no evidence of hydrocephalus. There is a left basal ganglia lacune.  Mild  low-attenuation changes in the periventricular white matter are compatible  with chronic microvascular ischemic related gliosis. ORBITS: The visualized portion of the orbits demonstrate no acute abnormality. SINUSES: The visualized paranasal sinuses and mastoid air cells demonstrate  no acute abnormality. SOFT TISSUES/SKULL:  No acute abnormality of the visualized skull or soft  tissues.     Impression:       1. No acute intracranial abnormality. 2. Mild chronic small vessel ischemic changes.     CT Cervical Spine WO Contrast [[id number]] Collected: 10/02/18 1510     Order Status: Completed Updated: 10/02/18 1515     Narrative:       EXAMINATION:  CT OF THE CERVICAL SPINE WITHOUT CONTRAST 10/2/2018 3:08 pm    TECHNIQUE:  CT of the cervical spine was performed without the administration of  intravenous contrast. Multiplanar reformatted images are provided for review. Dose modulation, iterative reconstruction, and/or weight based adjustment of  the mA/kV was utilized to reduce the radiation dose to as low as reasonably  achievable. COMPARISON:  09/14/2018    HISTORY:  ORDERING SYSTEM PROVIDED HISTORY: fall  TECHNOLOGIST PROVIDED HISTORY:  Ordering Physician Provided Reason for Exam: fall, neck pain  Acuity: Acute  Type of Exam: Initial  Mechanism of Injury: fall  Relevant Medical/Surgical History: none    FINDINGS:  BONES/ALIGNMENT: There is no evidence of an acute cervical spine fracture. There is normal alignment of the cervical spine. DEGENERATIVE CHANGES: There is moderate C5/C6 degenerative disc disease.     SOFT TISSUES: There is no prevertebral soft tissue swelling.     Impression:       No acute abnormality of the cervical spine.     XR CHEST PORTABLE [[id number]] Updated: 10/02/18 5076   Order Status: Canceled              Relevant labs and imaging reviewed    ASSESSMENT AND PLAN       Acute hypoxia resp failure needing 4 L NC on admission - suspect related to morbid obesity JAIMIE/OHS  Chronic respiratory acidosis with partial metabolic compensation based on ABG 10/2 - likely 2/2 JAIMIE/OHS  - CTPE no overt PNA or PE. Only scant effusion that is minimal  - TTE with intact LVEF, severe LVH  - pulm assisting with eval  - pulm rec apnea link but does not appear to qualify. Need close pulm f/u outpatient for outpatient sleep study  - nocturnal home oxygen qualification overnight  - tele, pulse ox       DMII with peripheral neuropathy  - continue lantus, ISS     Troponemia  - trended - remains unchanged     Recurrent falls  - trauma screen no acute fracture noted     HTN  CAD  CKD  MASSIEL  Morbid obesity     Lovenox ppx     PT/OT, case management for placement eval due to recurrent falls.  Discussed with patient, she will consider options      [address] [facility], Internal Medicine  10/4/2018 at 3:13 PM

## 2018-10-05 ENCOUNTER — TELEPHONE (OUTPATIENT)
Dept: SURGERY | Age: 58
End: 2018-10-05

## 2018-10-05 VITALS
SYSTOLIC BLOOD PRESSURE: 124 MMHG | RESPIRATION RATE: 18 BRPM | BODY MASS INDEX: 50.02 KG/M2 | DIASTOLIC BLOOD PRESSURE: 59 MMHG | HEART RATE: 74 BPM | TEMPERATURE: 97.7 F | HEIGHT: 64 IN | WEIGHT: 293 LBS | OXYGEN SATURATION: 90 %

## 2018-10-05 LAB
EKG ATRIAL RATE: 71 BPM
EKG DIAGNOSIS: NORMAL
EKG P AXIS: 55 DEGREES
EKG P-R INTERVAL: 178 MS
EKG Q-T INTERVAL: 414 MS
EKG QRS DURATION: 96 MS
EKG QTC CALCULATION (BAZETT): 449 MS
EKG R AXIS: 69 DEGREES
EKG T AXIS: 64 DEGREES
EKG VENTRICULAR RATE: 71 BPM
GLUCOSE BLD-MCNC: 200 MG/DL (ref 70–99)
GLUCOSE BLD-MCNC: 231 MG/DL (ref 70–99)

## 2018-10-05 PROCEDURE — 6370000000 HC RX 637 (ALT 250 FOR IP): Performed by: INTERNAL MEDICINE

## 2018-10-05 PROCEDURE — 94640 AIRWAY INHALATION TREATMENT: CPT

## 2018-10-05 PROCEDURE — 82962 GLUCOSE BLOOD TEST: CPT

## 2018-10-05 PROCEDURE — 2700000000 HC OXYGEN THERAPY PER DAY

## 2018-10-05 RX ORDER — GABAPENTIN 800 MG/1
400 TABLET ORAL 3 TIMES DAILY
Qty: 90 TABLET | Refills: 0 | Status: SHIPPED
Start: 2018-10-05 | End: 2019-02-28 | Stop reason: DRUGHIGH

## 2018-10-05 RX ADMIN — DOCUSATE SODIUM 100 MG: 100 CAPSULE, LIQUID FILLED ORAL at 11:44

## 2018-10-05 RX ADMIN — TRAMADOL HYDROCHLORIDE 50 MG: 50 TABLET, FILM COATED ORAL at 05:11

## 2018-10-05 RX ADMIN — PANTOPRAZOLE SODIUM 40 MG: 40 TABLET, DELAYED RELEASE ORAL at 05:11

## 2018-10-05 RX ADMIN — INSULIN LISPRO 25 UNITS: 100 INJECTION, SOLUTION INTRAVENOUS; SUBCUTANEOUS at 11:46

## 2018-10-05 RX ADMIN — TRAMADOL HYDROCHLORIDE 50 MG: 50 TABLET, FILM COATED ORAL at 11:42

## 2018-10-05 RX ADMIN — ASPIRIN 81 MG CHEWABLE TABLET 81 MG: 81 TABLET CHEWABLE at 11:43

## 2018-10-05 RX ADMIN — INSULIN LISPRO 2 UNITS: 100 INJECTION, SOLUTION INTRAVENOUS; SUBCUTANEOUS at 11:45

## 2018-10-05 RX ADMIN — IPRATROPIUM BROMIDE AND ALBUTEROL SULFATE 1 AMPULE: .5; 3 SOLUTION RESPIRATORY (INHALATION) at 08:44

## 2018-10-05 RX ADMIN — BUPROPION HYDROCHLORIDE 100 MG: 100 TABLET, FILM COATED, EXTENDED RELEASE ORAL at 11:44

## 2018-10-05 RX ADMIN — DULOXETINE HYDROCHLORIDE 60 MG: 30 CAPSULE, DELAYED RELEASE ORAL at 11:43

## 2018-10-05 RX ADMIN — CLOPIDOGREL BISULFATE 75 MG: 75 TABLET ORAL at 11:44

## 2018-10-05 ASSESSMENT — PAIN SCALES - WONG BAKER
WONGBAKER_NUMERICALRESPONSE: 0

## 2018-10-05 ASSESSMENT — PAIN DESCRIPTION - LOCATION: LOCATION: BACK

## 2018-10-05 ASSESSMENT — PAIN DESCRIPTION - PROGRESSION
CLINICAL_PROGRESSION: GRADUALLY IMPROVING

## 2018-10-05 ASSESSMENT — PAIN SCALES - GENERAL
PAINLEVEL_OUTOF10: 9
PAINLEVEL_OUTOF10: 8

## 2018-10-05 ASSESSMENT — PAIN DESCRIPTION - PAIN TYPE: TYPE: ACUTE PAIN

## 2018-10-05 ASSESSMENT — PAIN DESCRIPTION - ORIENTATION: ORIENTATION: LOWER

## 2018-10-05 NOTE — TELEPHONE ENCOUNTER
Phoned patient and explained that I mistakenly scheduled an appointment for her with [facility] on 10/17/18 at 2:30 INSTEAD of scheduling her to attend a [address] [phone] first. I also noted on the phone message that her appointment on the 17th would be cancelled and requested her to call our office and set up a time she would like to attend a seminar.

## 2018-10-05 NOTE — PROGRESS NOTES
[facility] Liaison aware of discharge & will initiate [facility].

## 2018-10-05 NOTE — PROGRESS NOTES
Patient was seen in hospital for CAD, CHF.  I am prescribing oxygen because the diagnosis and testing requires the patient to have oxygen in the home. Conditions will improve or be benefited by oxygen use. The patient is able to perform good mobility and therefore requires the use of a portable oxygen system for ambulation.      [address] [facility], Internal Medicine  10/5/2018 at 7:37 AM

## 2018-10-06 LAB — PROCALCITONIN: 0.11

## 2018-10-28 ENCOUNTER — HOSPITAL ENCOUNTER (EMERGENCY)
Age: 58
Discharge: HOME | End: 2018-10-28
Attending: EMERGENCY MEDICINE
Payer: MEDICARE

## 2018-10-28 ENCOUNTER — APPOINTMENT (OUTPATIENT)
Dept: CT IMAGING | Age: 58
End: 2018-10-28
Payer: MEDICARE

## 2018-10-28 VITALS
DIASTOLIC BLOOD PRESSURE: 74 MMHG | BODY MASS INDEX: 50.02 KG/M2 | TEMPERATURE: 97.5 F | SYSTOLIC BLOOD PRESSURE: 145 MMHG | RESPIRATION RATE: 16 BRPM | WEIGHT: 293 LBS | OXYGEN SATURATION: 99 % | HEIGHT: 64 IN | HEART RATE: 106 BPM

## 2018-10-28 DIAGNOSIS — R00.0 TACHYCARDIA: ICD-10-CM

## 2018-10-28 DIAGNOSIS — R10.9 FLANK PAIN: Primary | ICD-10-CM

## 2018-10-28 LAB
ALBUMIN SERPL-MCNC: 3.6 GM/DL (ref 3.4–5)
ALP BLD-CCNC: 159 IU/L (ref 40–129)
ALT SERPL-CCNC: 15 U/L (ref 10–40)
ANION GAP SERPL CALCULATED.3IONS-SCNC: 13 MMOL/L (ref 4–16)
AST SERPL-CCNC: 14 IU/L (ref 15–37)
BACTERIA: ABNORMAL /HPF
BASOPHILS ABSOLUTE: 0.1 K/CU MM
BASOPHILS RELATIVE PERCENT: 0.5 % (ref 0–1)
BILIRUB SERPL-MCNC: 0.4 MG/DL (ref 0–1)
BILIRUBIN URINE: NEGATIVE MG/DL
BLOOD, URINE: ABNORMAL
BUN BLDV-MCNC: 36 MG/DL (ref 6–23)
CALCIUM SERPL-MCNC: 9.1 MG/DL (ref 8.3–10.6)
CHLORIDE BLD-SCNC: 92 MMOL/L (ref 99–110)
CLARITY: CLEAR
CO2: 26 MMOL/L (ref 21–32)
COLOR: YELLOW
CREAT SERPL-MCNC: 1.4 MG/DL (ref 0.6–1.1)
DIFFERENTIAL TYPE: ABNORMAL
EOSINOPHILS ABSOLUTE: 0.1 K/CU MM
EOSINOPHILS RELATIVE PERCENT: 1.2 % (ref 0–3)
GFR AFRICAN AMERICAN: 47 ML/MIN/1.73M2
GFR NON-AFRICAN AMERICAN: 39 ML/MIN/1.73M2
GLUCOSE BLD-MCNC: 446 MG/DL (ref 70–99)
GLUCOSE, URINE: >500 MG/DL
HCT VFR BLD CALC: 41.9 % (ref 37–47)
HEMOGLOBIN: 13.6 GM/DL (ref 12.5–16)
HYALINE CASTS: 0 /LPF
IMMATURE NEUTROPHIL %: 0.3 % (ref 0–0.43)
KETONES, URINE: NEGATIVE MG/DL
LACTATE: 1.3 MMOL/L (ref 0.4–2)
LEUKOCYTE ESTERASE, URINE: NEGATIVE
LIPASE: 27 IU/L (ref 13–60)
LYMPHOCYTES ABSOLUTE: 2 K/CU MM
LYMPHOCYTES RELATIVE PERCENT: 16.9 % (ref 24–44)
MCH RBC QN AUTO: 28.1 PG (ref 27–31)
MCHC RBC AUTO-ENTMCNC: 32.5 % (ref 32–36)
MCV RBC AUTO: 86.6 FL (ref 78–100)
MONOCYTES ABSOLUTE: 0.6 K/CU MM
MONOCYTES RELATIVE PERCENT: 5.4 % (ref 0–4)
MUCUS: ABNORMAL HPF
NITRITE URINE, QUANTITATIVE: NEGATIVE
NUCLEATED RBC %: 0 %
PDW BLD-RTO: 12.9 % (ref 11.7–14.9)
PH, URINE: 5 (ref 5–8)
PLATELET # BLD: 382 K/CU MM (ref 140–440)
PMV BLD AUTO: 10.1 FL (ref 7.5–11.1)
POTASSIUM SERPL-SCNC: 4.9 MMOL/L (ref 3.5–5.1)
PROTEIN UA: >500 MG/DL
RBC # BLD: 4.84 M/CU MM (ref 4.2–5.4)
RBC URINE: 3 /HPF (ref 0–6)
SEGMENTED NEUTROPHILS ABSOLUTE COUNT: 9 K/CU MM
SEGMENTED NEUTROPHILS RELATIVE PERCENT: 75.7 % (ref 36–66)
SODIUM BLD-SCNC: 131 MMOL/L (ref 135–145)
SPECIFIC GRAVITY UA: 1.02 (ref 1–1.03)
TOTAL IMMATURE NEUTOROPHIL: 0.04 K/CU MM
TOTAL NUCLEATED RBC: 0 K/CU MM
TOTAL PROTEIN: 6.5 GM/DL (ref 6.4–8.2)
TRICHOMONAS: ABNORMAL /HPF
UROBILINOGEN, URINE: NORMAL MG/DL (ref 0.2–1)
WBC # BLD: 11.9 K/CU MM (ref 4–10.5)
WBC UA: 6 /HPF (ref 0–5)
YEAST: ABNORMAL /HPF

## 2018-10-28 PROCEDURE — 83605 ASSAY OF LACTIC ACID: CPT

## 2018-10-28 PROCEDURE — 87040 BLOOD CULTURE FOR BACTERIA: CPT

## 2018-10-28 PROCEDURE — 87186 SC STD MICRODIL/AGAR DIL: CPT

## 2018-10-28 PROCEDURE — 74176 CT ABD & PELVIS W/O CONTRAST: CPT

## 2018-10-28 PROCEDURE — 80053 COMPREHEN METABOLIC PANEL: CPT

## 2018-10-28 PROCEDURE — 87088 URINE BACTERIA CULTURE: CPT

## 2018-10-28 PROCEDURE — 6370000000 HC RX 637 (ALT 250 FOR IP): Performed by: PHYSICIAN ASSISTANT

## 2018-10-28 PROCEDURE — 85025 COMPLETE CBC W/AUTO DIFF WBC: CPT

## 2018-10-28 PROCEDURE — 83690 ASSAY OF LIPASE: CPT

## 2018-10-28 PROCEDURE — 36415 COLL VENOUS BLD VENIPUNCTURE: CPT

## 2018-10-28 PROCEDURE — 96374 THER/PROPH/DIAG INJ IV PUSH: CPT

## 2018-10-28 PROCEDURE — 99284 EMERGENCY DEPT VISIT MOD MDM: CPT

## 2018-10-28 PROCEDURE — 87086 URINE CULTURE/COLONY COUNT: CPT

## 2018-10-28 PROCEDURE — 81001 URINALYSIS AUTO W/SCOPE: CPT

## 2018-10-28 PROCEDURE — 6360000002 HC RX W HCPCS: Performed by: PHYSICIAN ASSISTANT

## 2018-10-28 RX ORDER — IBUPROFEN 400 MG/1
400 TABLET ORAL EVERY 6 HOURS PRN
Qty: 20 TABLET | Refills: 0 | Status: ON HOLD | OUTPATIENT
Start: 2018-10-28 | End: 2018-11-21 | Stop reason: HOSPADM

## 2018-10-28 RX ORDER — KETOROLAC TROMETHAMINE 30 MG/ML
30 INJECTION, SOLUTION INTRAMUSCULAR; INTRAVENOUS ONCE
Status: COMPLETED | OUTPATIENT
Start: 2018-10-28 | End: 2018-10-28

## 2018-10-28 RX ORDER — LIDOCAINE 4 G/G
1 PATCH TOPICAL ONCE
Status: DISCONTINUED | OUTPATIENT
Start: 2018-10-28 | End: 2018-10-28 | Stop reason: HOSPADM

## 2018-10-28 RX ADMIN — KETOROLAC TROMETHAMINE 30 MG: 30 INJECTION, SOLUTION INTRAMUSCULAR at 13:33

## 2018-10-28 ASSESSMENT — PAIN SCALES - GENERAL
PAINLEVEL_OUTOF10: 8
PAINLEVEL_OUTOF10: 10
PAINLEVEL_OUTOF10: 10

## 2018-10-28 ASSESSMENT — PAIN DESCRIPTION - PAIN TYPE: TYPE: ACUTE PAIN

## 2018-10-28 ASSESSMENT — PAIN DESCRIPTION - LOCATION: LOCATION: FLANK

## 2018-10-28 ASSESSMENT — PAIN DESCRIPTION - ORIENTATION: ORIENTATION: LEFT

## 2018-10-28 NOTE — ED PROVIDER NOTES
tablet Take 1 tablet by mouth daily 30 tablet 0    pantoprazole (PROTONIX) 40 MG tablet Take 1 tablet by mouth every morning (before breakfast) 30 tablet 3    albuterol sulfate HFA (VENTOLIN HFA) 108 (90 Base) MCG/ACT inhaler Inhale 2 puffs into the lungs every 4 hours as needed for Wheezing 1 Inhaler 3    insulin glargine (LANTUS SOLOSTAR) 100 UNIT/ML injection pen Inject 30 Units into the skin nightly 5 pen 1       ALLERGIES    No Known Allergies    SOCIAL AND FAMILY HISTORY    Social History     Social History    Marital status: Single     Spouse name: N/A    Number of children: N/A    Years of education: N/A     Social History Main Topics    Smoking status: Former Smoker     Packs/day: 2.00     Years: 34.00     Types: Cigarettes     Quit date: 1/1/2000    Smokeless tobacco: Never Used    Alcohol use No    Drug use: No    Sexual activity: Not Asked     Other Topics Concern    None     Social History Narrative    None     Family History   Problem Relation Age of Onset    Arthritis Mother     Diabetes Mother     Heart Disease Mother     High Blood Pressure Mother     Arthritis Father     Heart Disease Father     High Blood Pressure Father     Stroke Father     Substance Abuse Father     Substance Abuse Brother     Diabetes Maternal Aunt     Diabetes Maternal Uncle     Cancer Maternal Grandmother     Diabetes Maternal Grandmother     Diabetes Maternal Grandfather          PHYSICAL EXAM    VITAL SIGNS: BP (!) 145/74   Pulse 106   Temp 97.5 °F (36.4 °C) (Oral)   Resp 16   Ht 5' 4\" (1.626 m)   Wt (!) 325 lb (147.4 kg)   SpO2 99%   BMI 55.79 kg/m²    Constitutional:  Well developed, Well nourished. Afebrile. Elevated BMI  HENT:  Normocephalic, Atraumatic, PERRL. EOMI. Sclera clear. Conjunctiva normal, No discharge. Neck/Lymphatics: supple, no JVD, no swollen nodes  Cardiovascular:  Normal heart rate, Normal rhythm, No murmurs  Respiratory:  Nonlabored breathing.   Normal breath adjustment of  the mA/kV was utilized to reduce the radiation dose to as low as reasonably  achievable. COMPARISON:  April 5, 2018    HISTORY:  ORDERING SYSTEM PROVIDED HISTORY: left flank pain  TECHNOLOGIST PROVIDED HISTORY:  Ordering Physician Provided Reason for Exam: left flank pain  Acuity: Acute  Type of Exam: Initial    FINDINGS:  Lower Chest: Clear lung bases. Organs: Status post cholecystectomy.  No intra or extrahepatic biliary  dilatation.  The liver, spleen, pancreas, adrenal glands, and kidneys  demonstrate no acute abnormality.  Calcification in the left kidney is likely  vascular in origin.  Bilateral ureters are normal in course and caliber.  No  ureteral calculi. GI/Bowel: The stomach, small bowel, and colon are normal in course and  caliber without evidence of wall thickening or obstruction. Pelvis: Normal bladder.  Uterus is unremarkable.  No adnexal masses. Peritoneum/Retroperitoneum: No free fluid or free air.  No pathologic  lymphadenopathy.  Aorta and its branches are normal in course and caliber. Severe atherosclerosis. Bones/Soft Tissues: No acute or aggressive osseous lesion.  Degenerative  changes throughout the spine are seen. ED COURSE & MEDICAL DECISION MAKING       Vital signs and nursing notes reviewed during ED course. Patient care and presentation staffed with supervising MD.  Dr. Shirlene Malik. Patient seen by supervising MD today. All pertinent Lab data and radiographic results reviewed with patient at bedside. The patient and/or the family were informed of the results of any tests/labs/imaging, the treatment plan, and time was allotted to answer questions. Patient present as above. Afebrile, 99% on her home 2 liters. Labs and imaging ordered pain control provided. Patient is tachycardic on arrival.  Patient given pain control labs and imaging ordered. Urinalysis shows rare bacteria and no leukocytes or nitrites.  Urine culture

## 2018-11-01 LAB
CULTURE: NORMAL
CULTURE: NORMAL
Lab: NORMAL
ORGANISM: NORMAL
REPORT STATUS: NORMAL
SPECIMEN: NORMAL
TOTAL COLONY COUNT: NORMAL

## 2018-11-02 LAB
CULTURE: NORMAL
CULTURE: NORMAL
Lab: NORMAL
Lab: NORMAL
REPORT STATUS: NORMAL
REPORT STATUS: NORMAL
SPECIMEN: NORMAL
SPECIMEN: NORMAL

## 2018-11-08 NOTE — DISCHARGE SUMMARY
distant S1 and S2 with a regular rate and rhythm. ABDOMEN:  Her obese abdomen is soft, nondistended, nontender. Bowel sounds  are present. The patient moves both upper limbs through a functional range of motion. 4/5 strength across the upper limb major joints and coordination is fair. In the lower limbs, 3+/5 strength across the major joints. She complains of left hip and knee pain with movement. The joints are stable to palpation and range of motion passively. Her heels are clear and there are no signs of DVT. She has trace edema of both ankles. Sitting balance is fair+. Standing balance is fair. VC's and supervision with transfers (improved). LABORATORY TESTING:  Electrolytes within normal limits. BUN and creatinine  at 39 and 1.5 (improving). White blood count 12.3, hemoglobin 12.5 and  platelets 753,948. IMPRESSION:  A 24-year-old female with multiple medical comorbidities  including morbid obesity, diabetes and hypertension with kidney disease,  who had suffered a fall, possibly related to her UTI. She is now  demonstrating debility associated with UTI, acute renal failure and  uncontrolled diabetes and hypertension. Limitations include her morbid obesity, medical comorbidities and her learned helplessness. The patient presented to the ARU with the above history requiring a multidisciplinary treatment plan including close medical supervision by the [facility] Director. The patient participated in the prescribed therapy treatment plan with reasonable compliance and progressive tolerance. They avoided significant medical complications. By the time of discharge the patient had become safer with adaptive equipment to transfer and toilet with a FWW with the supervision of family/caregivers. The patient was tolerating an oral diet without choking/coughing and was back to their baseline with regards to bowel and bladder control.     Discharge instructions were reviewed with the

## 2018-11-17 ENCOUNTER — HOSPITAL ENCOUNTER (INPATIENT)
Age: 58
LOS: 4 days | Discharge: HOME | DRG: 638 | End: 2018-11-21
Attending: EMERGENCY MEDICINE | Admitting: FAMILY MEDICINE
Payer: MEDICARE

## 2018-11-17 ENCOUNTER — APPOINTMENT (OUTPATIENT)
Dept: GENERAL RADIOLOGY | Age: 58
DRG: 638 | End: 2018-11-17
Payer: MEDICARE

## 2018-11-17 DIAGNOSIS — M25.531 WRIST PAIN, ACUTE, RIGHT: ICD-10-CM

## 2018-11-17 DIAGNOSIS — R73.9 HYPERGLYCEMIA: Primary | ICD-10-CM

## 2018-11-17 DIAGNOSIS — M25.511 CHRONIC RIGHT SHOULDER PAIN: ICD-10-CM

## 2018-11-17 DIAGNOSIS — G89.29 CHRONIC RIGHT SHOULDER PAIN: ICD-10-CM

## 2018-11-17 DIAGNOSIS — E87.5 HYPERKALEMIA: ICD-10-CM

## 2018-11-17 DIAGNOSIS — R11.2 NON-INTRACTABLE VOMITING WITH NAUSEA, UNSPECIFIED VOMITING TYPE: ICD-10-CM
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
## 2018-11-17 LAB
ALBUMIN SERPL-MCNC: 3.9 GM/DL (ref 3.4–5)
ALP BLD-CCNC: 242 IU/L (ref 40–129)
ALT SERPL-CCNC: 20 U/L (ref 10–40)
ANION GAP SERPL CALCULATED.3IONS-SCNC: 13 MMOL/L (ref 4–16)
AST SERPL-CCNC: 12 IU/L (ref 15–37)
BASE EXCESS: 1 (ref 0–2.4)
BASOPHILS ABSOLUTE: 0.1 K/CU MM
BASOPHILS RELATIVE PERCENT: 0.5 % (ref 0–1)
BETA-HYDROXYBUTYRATE: 3.8 MG/DL (ref 0–3)
BILIRUB SERPL-MCNC: 0.5 MG/DL (ref 0–1)
BUN BLDV-MCNC: 37 MG/DL (ref 6–23)
CALCIUM SERPL-MCNC: 9.2 MG/DL (ref 8.3–10.6)
CHLORIDE BLD-SCNC: 83 MMOL/L (ref 99–110)
CO2: 24 MMOL/L (ref 21–32)
CREAT SERPL-MCNC: 1.9 MG/DL (ref 0.6–1.1)
DIFFERENTIAL TYPE: ABNORMAL
EOSINOPHILS ABSOLUTE: 0.2 K/CU MM
EOSINOPHILS RELATIVE PERCENT: 1.1 % (ref 0–3)
GFR AFRICAN AMERICAN: 33 ML/MIN/1.73M2
GFR NON-AFRICAN AMERICAN: 27 ML/MIN/1.73M2
GLUCOSE BLD-MCNC: 718 MG/DL (ref 70–99)
HCO3 VENOUS: 27.1 MMOL/L (ref 19–25)
HCT VFR BLD CALC: 44.7 % (ref 37–47)
HEMOGLOBIN: 14.7 GM/DL (ref 12.5–16)
IMMATURE NEUTROPHIL %: 0.5 % (ref 0–0.43)
LYMPHOCYTES ABSOLUTE: 2.3 K/CU MM
LYMPHOCYTES RELATIVE PERCENT: 16.7 % (ref 24–44)
MCH RBC QN AUTO: 27.7 PG (ref 27–31)
MCHC RBC AUTO-ENTMCNC: 32.9 % (ref 32–36)
MCV RBC AUTO: 84.3 FL (ref 78–100)
MONOCYTES ABSOLUTE: 0.7 K/CU MM
MONOCYTES RELATIVE PERCENT: 5.2 % (ref 0–4)
NUCLEATED RBC %: 0 %
O2 SAT, VEN: 66.4 % (ref 50–70)
PCO2, VEN: 59 MMHG (ref 38–52)
PDW BLD-RTO: 13.1 % (ref 11.7–14.9)
PH VENOUS: 7.27 (ref 7.32–7.42)
PLATELET # BLD: 479 K/CU MM (ref 140–440)
PMV BLD AUTO: 10.8 FL (ref 7.5–11.1)
PO2, VEN: 33 MMHG (ref 28–48)
POTASSIUM SERPL-SCNC: 6.9 MMOL/L (ref 3.5–5.1)
RBC # BLD: 5.3 M/CU MM (ref 4.2–5.4)
SEGMENTED NEUTROPHILS ABSOLUTE COUNT: 10.3 K/CU MM
SEGMENTED NEUTROPHILS RELATIVE PERCENT: 76 % (ref 36–66)
SODIUM BLD-SCNC: 120 MMOL/L (ref 135–145)
TOTAL IMMATURE NEUTOROPHIL: 0.07 K/CU MM
TOTAL NUCLEATED RBC: 0 K/CU MM
TOTAL PROTEIN: 7 GM/DL (ref 6.4–8.2)
TROPONIN T: 0.03 NG/ML
WBC # BLD: 13.5 K/CU MM (ref 4–10.5)

## 2018-11-17 PROCEDURE — 82805 BLOOD GASES W/O2 SATURATION: CPT

## 2018-11-17 PROCEDURE — 94761 N-INVAS EAR/PLS OXIMETRY MLT: CPT

## 2018-11-17 PROCEDURE — 99291 CRITICAL CARE FIRST HOUR: CPT

## 2018-11-17 PROCEDURE — 2580000003 HC RX 258: Performed by: PHYSICIAN ASSISTANT

## 2018-11-17 PROCEDURE — 2000000000 HC ICU R&B

## 2018-11-17 PROCEDURE — 2580000003 HC RX 258: Performed by: EMERGENCY MEDICINE

## 2018-11-17 PROCEDURE — 71046 X-RAY EXAM CHEST 2 VIEWS: CPT

## 2018-11-17 PROCEDURE — 84484 ASSAY OF TROPONIN QUANT: CPT

## 2018-11-17 PROCEDURE — 6360000002 HC RX W HCPCS: Performed by: PHYSICIAN ASSISTANT

## 2018-11-17 PROCEDURE — 73110 X-RAY EXAM OF WRIST: CPT

## 2018-11-17 PROCEDURE — 6370000000 HC RX 637 (ALT 250 FOR IP): Performed by: EMERGENCY MEDICINE

## 2018-11-17 PROCEDURE — 82010 KETONE BODYS QUAN: CPT

## 2018-11-17 PROCEDURE — 80053 COMPREHEN METABOLIC PANEL: CPT

## 2018-11-17 PROCEDURE — 2700000000 HC OXYGEN THERAPY PER DAY

## 2018-11-17 PROCEDURE — 85025 COMPLETE CBC W/AUTO DIFF WBC: CPT

## 2018-11-17 RX ORDER — 0.9 % SODIUM CHLORIDE 0.9 %
1000 INTRAVENOUS SOLUTION INTRAVENOUS ONCE
Status: COMPLETED | OUTPATIENT
Start: 2018-11-17 | End: 2018-11-18

## 2018-11-17 RX ORDER — 0.9 % SODIUM CHLORIDE 0.9 %
1000 INTRAVENOUS SOLUTION INTRAVENOUS ONCE
Status: COMPLETED | OUTPATIENT
Start: 2018-11-17 | End: 2018-11-17

## 2018-11-17 RX ORDER — SODIUM POLYSTYRENE SULFONATE 15 G/60ML
15 SUSPENSION ORAL; RECTAL ONCE
Status: COMPLETED | OUTPATIENT
Start: 2018-11-17 | End: 2018-11-17

## 2018-11-17 RX ORDER — DEXTROSE MONOHYDRATE 25 G/50ML
12.5 INJECTION, SOLUTION INTRAVENOUS PRN
Status: DISCONTINUED | OUTPATIENT
Start: 2018-11-17 | End: 2018-11-17

## 2018-11-17 RX ORDER — ONDANSETRON 2 MG/ML
4 INJECTION INTRAMUSCULAR; INTRAVENOUS ONCE
Status: COMPLETED | OUTPATIENT
Start: 2018-11-17 | End: 2018-11-17

## 2018-11-17 RX ORDER — DEXTROSE MONOHYDRATE 50 MG/ML
100 INJECTION, SOLUTION INTRAVENOUS PRN
Status: DISCONTINUED | OUTPATIENT
Start: 2018-11-17 | End: 2018-11-17

## 2018-11-17 RX ORDER — NICOTINE POLACRILEX 4 MG
15 LOZENGE BUCCAL PRN
Status: DISCONTINUED | OUTPATIENT
Start: 2018-11-17 | End: 2018-11-17

## 2018-11-17 RX ORDER — DEXTROSE MONOHYDRATE 25 G/50ML
25 INJECTION, SOLUTION INTRAVENOUS ONCE
Status: DISCONTINUED | OUTPATIENT
Start: 2018-11-17 | End: 2018-11-17

## 2018-11-17 RX ADMIN — SODIUM CHLORIDE 1000 ML: 9 INJECTION, SOLUTION INTRAVENOUS at 23:31

## 2018-11-17 RX ADMIN — SODIUM CHLORIDE 1000 ML: 9 INJECTION, SOLUTION INTRAVENOUS at 22:24

## 2018-11-17 RX ADMIN — ONDANSETRON 4 MG: 2 INJECTION, SOLUTION INTRAMUSCULAR; INTRAVENOUS at 22:24

## 2018-11-17 RX ADMIN — SODIUM POLYSTYRENE SULFONATE 15 G: 15 SUSPENSION ORAL; RECTAL at 23:00

## 2018-11-17 RX ADMIN — INSULIN HUMAN 10 UNITS: 100 INJECTION, SOLUTION PARENTERAL at 23:11

## 2018-11-17 ASSESSMENT — PAIN DESCRIPTION - ORIENTATION: ORIENTATION: RIGHT

## 2018-11-17 ASSESSMENT — PAIN DESCRIPTION - PAIN TYPE: TYPE: ACUTE PAIN

## 2018-11-17 ASSESSMENT — PAIN SCALES - GENERAL: PAINLEVEL_OUTOF10: 9

## 2018-11-18 ENCOUNTER — APPOINTMENT (OUTPATIENT)
Dept: CT IMAGING | Age: 58
DRG: 638 | End: 2018-11-18
Payer: MEDICARE

## 2018-11-18 ENCOUNTER — APPOINTMENT (OUTPATIENT)
Dept: GENERAL RADIOLOGY | Age: 58
DRG: 638 | End: 2018-11-18
Payer: MEDICARE

## 2018-11-18 LAB
ANION GAP SERPL CALCULATED.3IONS-SCNC: 13 MMOL/L (ref 4–16)
ANION GAP SERPL CALCULATED.3IONS-SCNC: 13 MMOL/L (ref 4–16)
BACTERIA: ABNORMAL /HPF
BASOPHILS ABSOLUTE: 0 K/CU MM
BASOPHILS RELATIVE PERCENT: 0.4 % (ref 0–1)
BILIRUBIN URINE: NEGATIVE MG/DL
BLOOD, URINE: NEGATIVE
BUN BLDV-MCNC: 39 MG/DL (ref 6–23)
BUN BLDV-MCNC: 39 MG/DL (ref 6–23)
CALCIUM SERPL-MCNC: 8.3 MG/DL (ref 8.3–10.6)
CALCIUM SERPL-MCNC: 8.6 MG/DL (ref 8.3–10.6)
CHLORIDE BLD-SCNC: 91 MMOL/L (ref 99–110)
CHLORIDE BLD-SCNC: 99 MMOL/L (ref 99–110)
CLARITY: ABNORMAL
CO2: 21 MMOL/L (ref 21–32)
CO2: 23 MMOL/L (ref 21–32)
COLOR: YELLOW
CREAT SERPL-MCNC: 1.9 MG/DL (ref 0.6–1.1)
CREAT SERPL-MCNC: 2.1 MG/DL (ref 0.6–1.1)
DIFFERENTIAL TYPE: ABNORMAL
EOSINOPHILS ABSOLUTE: 0.2 K/CU MM
EOSINOPHILS RELATIVE PERCENT: 1.6 % (ref 0–3)
GFR AFRICAN AMERICAN: 29 ML/MIN/1.73M2
GFR AFRICAN AMERICAN: 33 ML/MIN/1.73M2
GFR NON-AFRICAN AMERICAN: 24 ML/MIN/1.73M2
GFR NON-AFRICAN AMERICAN: 27 ML/MIN/1.73M2
GLUCOSE BLD-MCNC: 142 MG/DL (ref 70–99)
GLUCOSE BLD-MCNC: 171 MG/DL (ref 70–99)
GLUCOSE BLD-MCNC: 176 MG/DL (ref 70–99)
GLUCOSE BLD-MCNC: 194 MG/DL (ref 70–99)
GLUCOSE BLD-MCNC: 197 MG/DL (ref 70–99)
GLUCOSE BLD-MCNC: 233 MG/DL (ref 70–99)
GLUCOSE BLD-MCNC: 285 MG/DL (ref 70–99)
GLUCOSE BLD-MCNC: 288 MG/DL (ref 70–99)
GLUCOSE BLD-MCNC: 340 MG/DL (ref 70–99)
GLUCOSE BLD-MCNC: 469 MG/DL (ref 70–99)
GLUCOSE BLD-MCNC: 489 MG/DL (ref 70–99)
GLUCOSE BLD-MCNC: 517 MG/DL (ref 70–99)
GLUCOSE BLD-MCNC: 560 MG/DL (ref 70–99)
GLUCOSE, URINE: >500 MG/DL
HCT VFR BLD CALC: 38.1 % (ref 37–47)
HEMOGLOBIN: 12.5 GM/DL (ref 12.5–16)
IMMATURE NEUTROPHIL %: 0.4 % (ref 0–0.43)
KETONES, URINE: NEGATIVE MG/DL
LACTATE: 1.9 MMOL/L (ref 0.4–2)
LEUKOCYTE ESTERASE, URINE: ABNORMAL
LYMPHOCYTES ABSOLUTE: 3.3 K/CU MM
LYMPHOCYTES RELATIVE PERCENT: 29.5 % (ref 24–44)
MAGNESIUM: 2.1 MG/DL (ref 1.8–2.4)
MAGNESIUM: 2.1 MG/DL (ref 1.8–2.4)
MCH RBC QN AUTO: 27.5 PG (ref 27–31)
MCHC RBC AUTO-ENTMCNC: 32.8 % (ref 32–36)
MCV RBC AUTO: 83.9 FL (ref 78–100)
MONOCYTES ABSOLUTE: 0.9 K/CU MM
MONOCYTES RELATIVE PERCENT: 7.9 % (ref 0–4)
MUCUS: ABNORMAL HPF
NITRITE URINE, QUANTITATIVE: NEGATIVE
NUCLEATED RBC %: 0 %
OSMOLALITY: 307 MOS/L (ref 280–300)
PDW BLD-RTO: 13.4 % (ref 11.7–14.9)
PH, URINE: 5 (ref 5–8)
PHOSPHORUS: 4.6 MG/DL (ref 2.5–4.9)
PHOSPHORUS: 5.1 MG/DL (ref 2.5–4.9)
PLATELET # BLD: 314 K/CU MM (ref 140–440)
PMV BLD AUTO: 11.2 FL (ref 7.5–11.1)
POTASSIUM SERPL-SCNC: 4.9 MMOL/L (ref 3.5–5.1)
POTASSIUM SERPL-SCNC: 5.8 MMOL/L (ref 3.5–5.1)
PROTEIN UA: 100 MG/DL
RBC # BLD: 4.54 M/CU MM (ref 4.2–5.4)
RBC URINE: 4 /HPF (ref 0–6)
SEGMENTED NEUTROPHILS ABSOLUTE COUNT: 6.7 K/CU MM
SEGMENTED NEUTROPHILS RELATIVE PERCENT: 60.2 % (ref 36–66)
SODIUM BLD-SCNC: 125 MMOL/L (ref 135–145)
SODIUM BLD-SCNC: 135 MMOL/L (ref 135–145)
SPECIFIC GRAVITY UA: 1.01 (ref 1–1.03)
TOTAL CK: 162 IU/L (ref 26–140)
TOTAL IMMATURE NEUTOROPHIL: 0.04 K/CU MM
TOTAL NUCLEATED RBC: 0 K/CU MM
TRICHOMONAS: ABNORMAL /HPF
TROPONIN T: 0.03 NG/ML
TROPONIN T: 0.06 NG/ML
UROBILINOGEN, URINE: NORMAL MG/DL (ref 0.2–1)
WBC # BLD: 11.1 K/CU MM (ref 4–10.5)
WBC UA: 1 /HPF (ref 0–5)
YEAST: ABNORMAL /HPF

## 2018-11-18 PROCEDURE — 87040 BLOOD CULTURE FOR BACTERIA: CPT

## 2018-11-18 PROCEDURE — 84484 ASSAY OF TROPONIN QUANT: CPT

## 2018-11-18 PROCEDURE — 84156 ASSAY OF PROTEIN URINE: CPT

## 2018-11-18 PROCEDURE — 6370000000 HC RX 637 (ALT 250 FOR IP): Performed by: FAMILY MEDICINE

## 2018-11-18 PROCEDURE — 6370000000 HC RX 637 (ALT 250 FOR IP): Performed by: HOSPITALIST

## 2018-11-18 PROCEDURE — 2580000003 HC RX 258: Performed by: INTERNAL MEDICINE

## 2018-11-18 PROCEDURE — 82570 ASSAY OF URINE CREATININE: CPT

## 2018-11-18 PROCEDURE — 6370000000 HC RX 637 (ALT 250 FOR IP): Performed by: INTERNAL MEDICINE

## 2018-11-18 PROCEDURE — 2580000003 HC RX 258: Performed by: HOSPITALIST

## 2018-11-18 PROCEDURE — 84100 ASSAY OF PHOSPHORUS: CPT

## 2018-11-18 PROCEDURE — 2700000000 HC OXYGEN THERAPY PER DAY

## 2018-11-18 PROCEDURE — 87081 CULTURE SCREEN ONLY: CPT

## 2018-11-18 PROCEDURE — 83605 ASSAY OF LACTIC ACID: CPT

## 2018-11-18 PROCEDURE — 83735 ASSAY OF MAGNESIUM: CPT

## 2018-11-18 PROCEDURE — 73030 X-RAY EXAM OF SHOULDER: CPT

## 2018-11-18 PROCEDURE — 72125 CT NECK SPINE W/O DYE: CPT

## 2018-11-18 PROCEDURE — 83930 ASSAY OF BLOOD OSMOLALITY: CPT

## 2018-11-18 PROCEDURE — 81050 URINALYSIS VOLUME MEASURE: CPT

## 2018-11-18 PROCEDURE — 80048 BASIC METABOLIC PNL TOTAL CA: CPT

## 2018-11-18 PROCEDURE — 85025 COMPLETE CBC W/AUTO DIFF WBC: CPT

## 2018-11-18 PROCEDURE — 81001 URINALYSIS AUTO W/SCOPE: CPT

## 2018-11-18 PROCEDURE — 94761 N-INVAS EAR/PLS OXIMETRY MLT: CPT

## 2018-11-18 PROCEDURE — 70450 CT HEAD/BRAIN W/O DYE: CPT

## 2018-11-18 PROCEDURE — 80053 COMPREHEN METABOLIC PANEL: CPT

## 2018-11-18 PROCEDURE — 82962 GLUCOSE BLOOD TEST: CPT

## 2018-11-18 PROCEDURE — 1200000000 HC SEMI PRIVATE

## 2018-11-18 PROCEDURE — 82550 ASSAY OF CK (CPK): CPT

## 2018-11-18 PROCEDURE — 6360000002 HC RX W HCPCS: Performed by: HOSPITALIST

## 2018-11-18 PROCEDURE — 83036 HEMOGLOBIN GLYCOSYLATED A1C: CPT

## 2018-11-18 RX ORDER — METOPROLOL TARTRATE 50 MG/1
50 TABLET, FILM COATED ORAL 2 TIMES DAILY
Status: DISCONTINUED | OUTPATIENT
Start: 2018-11-18 | End: 2018-11-21 | Stop reason: HOSPADM

## 2018-11-18 RX ORDER — NICOTINE POLACRILEX 4 MG
15 LOZENGE BUCCAL PRN
Status: DISCONTINUED | OUTPATIENT
Start: 2018-11-18 | End: 2018-11-21 | Stop reason: HOSPADM

## 2018-11-18 RX ORDER — POTASSIUM CHLORIDE 7.45 MG/ML
10 INJECTION INTRAVENOUS PRN
Status: DISCONTINUED | OUTPATIENT
Start: 2018-11-18 | End: 2018-11-21 | Stop reason: HOSPADM

## 2018-11-18 RX ORDER — ASPIRIN 81 MG/1
81 TABLET, CHEWABLE ORAL DAILY
Status: DISCONTINUED | OUTPATIENT
Start: 2018-11-18 | End: 2018-11-21 | Stop reason: HOSPADM

## 2018-11-18 RX ORDER — GABAPENTIN 400 MG/1
400 CAPSULE ORAL 3 TIMES DAILY
Status: DISCONTINUED | OUTPATIENT
Start: 2018-11-18 | End: 2018-11-18

## 2018-11-18 RX ORDER — SODIUM CHLORIDE 9 MG/ML
INJECTION, SOLUTION INTRAVENOUS CONTINUOUS
Status: DISCONTINUED | OUTPATIENT
Start: 2018-11-18 | End: 2018-11-18

## 2018-11-18 RX ORDER — INSULIN GLARGINE 100 [IU]/ML
30 INJECTION, SOLUTION SUBCUTANEOUS NIGHTLY
Status: DISCONTINUED | OUTPATIENT
Start: 2018-11-18 | End: 2018-11-19

## 2018-11-18 RX ORDER — BUPROPION HYDROCHLORIDE 100 MG/1
100 TABLET, EXTENDED RELEASE ORAL 2 TIMES DAILY
Status: DISCONTINUED | OUTPATIENT
Start: 2018-11-18 | End: 2018-11-21 | Stop reason: HOSPADM

## 2018-11-18 RX ORDER — DULOXETIN HYDROCHLORIDE 30 MG/1
60 CAPSULE, DELAYED RELEASE ORAL DAILY
Status: DISCONTINUED | OUTPATIENT
Start: 2018-11-18 | End: 2018-11-21 | Stop reason: HOSPADM

## 2018-11-18 RX ORDER — PANTOPRAZOLE SODIUM 40 MG/1
40 TABLET, DELAYED RELEASE ORAL
Status: DISCONTINUED | OUTPATIENT
Start: 2018-11-18 | End: 2018-11-21 | Stop reason: HOSPADM

## 2018-11-18 RX ORDER — DEXTROSE MONOHYDRATE 25 G/50ML
12.5 INJECTION, SOLUTION INTRAVENOUS PRN
Status: DISCONTINUED | OUTPATIENT
Start: 2018-11-18 | End: 2018-11-21 | Stop reason: HOSPADM

## 2018-11-18 RX ORDER — DEXTROSE AND SODIUM CHLORIDE 5; .45 G/100ML; G/100ML
INJECTION, SOLUTION INTRAVENOUS CONTINUOUS PRN
Status: DISCONTINUED | OUTPATIENT
Start: 2018-11-18 | End: 2018-11-18

## 2018-11-18 RX ORDER — 0.9 % SODIUM CHLORIDE 0.9 %
15 INTRAVENOUS SOLUTION INTRAVENOUS ONCE
Status: COMPLETED | OUTPATIENT
Start: 2018-11-18 | End: 2018-11-18

## 2018-11-18 RX ORDER — CLOPIDOGREL BISULFATE 75 MG/1
75 TABLET ORAL DAILY
Status: DISCONTINUED | OUTPATIENT
Start: 2018-11-18 | End: 2018-11-21 | Stop reason: HOSPADM

## 2018-11-18 RX ORDER — SODIUM CHLORIDE 0.9 % (FLUSH) 0.9 %
10 SYRINGE (ML) INJECTION PRN
Status: DISCONTINUED | OUTPATIENT
Start: 2018-11-18 | End: 2018-11-21 | Stop reason: HOSPADM

## 2018-11-18 RX ORDER — ATORVASTATIN CALCIUM 40 MG/1
40 TABLET, FILM COATED ORAL NIGHTLY
Status: DISCONTINUED | OUTPATIENT
Start: 2018-11-18 | End: 2018-11-21 | Stop reason: HOSPADM

## 2018-11-18 RX ORDER — TRAMADOL HYDROCHLORIDE 50 MG/1
50 TABLET ORAL EVERY 6 HOURS PRN
Status: DISCONTINUED | OUTPATIENT
Start: 2018-11-18 | End: 2018-11-21 | Stop reason: HOSPADM

## 2018-11-18 RX ORDER — SODIUM CHLORIDE 9 MG/ML
INJECTION, SOLUTION INTRAVENOUS CONTINUOUS
Status: DISCONTINUED | OUTPATIENT
Start: 2018-11-18 | End: 2018-11-19

## 2018-11-18 RX ORDER — MAGNESIUM SULFATE 1 G/100ML
1 INJECTION INTRAVENOUS PRN
Status: DISCONTINUED | OUTPATIENT
Start: 2018-11-18 | End: 2018-11-18

## 2018-11-18 RX ORDER — AMLODIPINE BESYLATE 5 MG/1
5 TABLET ORAL DAILY
Status: DISCONTINUED | OUTPATIENT
Start: 2018-11-18 | End: 2018-11-18

## 2018-11-18 RX ORDER — DEXTROSE MONOHYDRATE 50 MG/ML
100 INJECTION, SOLUTION INTRAVENOUS PRN
Status: DISCONTINUED | OUTPATIENT
Start: 2018-11-18 | End: 2018-11-21 | Stop reason: HOSPADM

## 2018-11-18 RX ORDER — ALBUTEROL SULFATE 90 UG/1
2 AEROSOL, METERED RESPIRATORY (INHALATION) EVERY 4 HOURS PRN
Status: DISCONTINUED | OUTPATIENT
Start: 2018-11-18 | End: 2018-11-21 | Stop reason: HOSPADM

## 2018-11-18 RX ORDER — SODIUM CHLORIDE 0.9 % (FLUSH) 0.9 %
10 SYRINGE (ML) INJECTION EVERY 12 HOURS SCHEDULED
Status: DISCONTINUED | OUTPATIENT
Start: 2018-11-18 | End: 2018-11-21 | Stop reason: HOSPADM

## 2018-11-18 RX ORDER — TRAMADOL HYDROCHLORIDE 50 MG/1
100 TABLET ORAL EVERY 6 HOURS PRN
Status: DISCONTINUED | OUTPATIENT
Start: 2018-11-18 | End: 2018-11-21 | Stop reason: HOSPADM

## 2018-11-18 RX ORDER — DOCUSATE SODIUM 100 MG/1
100 CAPSULE, LIQUID FILLED ORAL 2 TIMES DAILY
Status: DISCONTINUED | OUTPATIENT
Start: 2018-11-18 | End: 2018-11-21 | Stop reason: HOSPADM

## 2018-11-18 RX ORDER — ONDANSETRON 2 MG/ML
4 INJECTION INTRAMUSCULAR; INTRAVENOUS EVERY 6 HOURS PRN
Status: DISCONTINUED | OUTPATIENT
Start: 2018-11-18 | End: 2018-11-21 | Stop reason: HOSPADM

## 2018-11-18 RX ORDER — ACETAMINOPHEN 325 MG/1
325 TABLET ORAL EVERY 4 HOURS PRN
Status: DISCONTINUED | OUTPATIENT
Start: 2018-11-18 | End: 2018-11-21 | Stop reason: HOSPADM

## 2018-11-18 RX ADMIN — INSULIN LISPRO 2 UNITS: 100 INJECTION, SOLUTION INTRAVENOUS; SUBCUTANEOUS at 09:20

## 2018-11-18 RX ADMIN — SODIUM CHLORIDE: 9 INJECTION, SOLUTION INTRAVENOUS at 11:16

## 2018-11-18 RX ADMIN — TRAMADOL HYDROCHLORIDE 100 MG: 50 TABLET, FILM COATED ORAL at 11:13

## 2018-11-18 RX ADMIN — ASPIRIN 81 MG 81 MG: 81 TABLET ORAL at 09:15

## 2018-11-18 RX ADMIN — PANTOPRAZOLE SODIUM 40 MG: 40 TABLET, DELAYED RELEASE ORAL at 09:16

## 2018-11-18 RX ADMIN — TRAMADOL HYDROCHLORIDE 100 MG: 50 TABLET, FILM COATED ORAL at 02:20

## 2018-11-18 RX ADMIN — ONDANSETRON HYDROCHLORIDE 4 MG: 2 INJECTION, SOLUTION INTRAMUSCULAR; INTRAVENOUS at 02:21

## 2018-11-18 RX ADMIN — INSULIN LISPRO 20 UNITS: 100 INJECTION, SOLUTION INTRAVENOUS; SUBCUTANEOUS at 09:21

## 2018-11-18 RX ADMIN — DOCUSATE SODIUM 100 MG: 100 CAPSULE, LIQUID FILLED ORAL at 20:45

## 2018-11-18 RX ADMIN — INSULIN LISPRO 6 UNITS: 100 INJECTION, SOLUTION INTRAVENOUS; SUBCUTANEOUS at 16:56

## 2018-11-18 RX ADMIN — DULOXETINE HYDROCHLORIDE 60 MG: 30 CAPSULE, DELAYED RELEASE ORAL at 09:15

## 2018-11-18 RX ADMIN — SODIUM CHLORIDE, PRESERVATIVE FREE 10 ML: 5 INJECTION INTRAVENOUS at 09:17

## 2018-11-18 RX ADMIN — AMLODIPINE BESYLATE 5 MG: 5 TABLET ORAL at 09:15

## 2018-11-18 RX ADMIN — CLOPIDOGREL BISULFATE 75 MG: 75 TABLET ORAL at 09:15

## 2018-11-18 RX ADMIN — SODIUM CHLORIDE: 9 INJECTION, SOLUTION INTRAVENOUS at 16:08

## 2018-11-18 RX ADMIN — SODIUM CHLORIDE 0.1 UNITS/KG/HR: 9 INJECTION, SOLUTION INTRAVENOUS at 03:35

## 2018-11-18 RX ADMIN — SODIUM CHLORIDE, PRESERVATIVE FREE 10 ML: 5 INJECTION INTRAVENOUS at 20:45

## 2018-11-18 RX ADMIN — DOCUSATE SODIUM 100 MG: 100 CAPSULE, LIQUID FILLED ORAL at 09:15

## 2018-11-18 RX ADMIN — INSULIN GLARGINE 30 UNITS: 100 INJECTION, SOLUTION SUBCUTANEOUS at 20:44

## 2018-11-18 RX ADMIN — GABAPENTIN 400 MG: 400 CAPSULE ORAL at 09:15

## 2018-11-18 RX ADMIN — ENOXAPARIN SODIUM 40 MG: 40 INJECTION SUBCUTANEOUS at 09:16

## 2018-11-18 RX ADMIN — INSULIN LISPRO 3 UNITS: 100 INJECTION, SOLUTION INTRAVENOUS; SUBCUTANEOUS at 20:44

## 2018-11-18 RX ADMIN — INSULIN LISPRO 20 UNITS: 100 INJECTION, SOLUTION INTRAVENOUS; SUBCUTANEOUS at 16:59

## 2018-11-18 RX ADMIN — SODIUM CHLORIDE 1986 ML: 9 INJECTION, SOLUTION INTRAVENOUS at 00:45

## 2018-11-18 RX ADMIN — BUPROPION HYDROCHLORIDE 100 MG: 100 TABLET, FILM COATED, EXTENDED RELEASE ORAL at 20:44

## 2018-11-18 RX ADMIN — SODIUM CHLORIDE: 9 INJECTION, SOLUTION INTRAVENOUS at 02:30

## 2018-11-18 RX ADMIN — BUPROPION HYDROCHLORIDE 100 MG: 100 TABLET, FILM COATED, EXTENDED RELEASE ORAL at 09:15

## 2018-11-18 RX ADMIN — DEXTROSE AND SODIUM CHLORIDE: 5; 450 INJECTION, SOLUTION INTRAVENOUS at 06:38

## 2018-11-18 ASSESSMENT — PAIN DESCRIPTION - LOCATION
LOCATION: SHOULDER;WRIST
LOCATION: SHOULDER

## 2018-11-18 ASSESSMENT — PAIN SCALES - GENERAL
PAINLEVEL_OUTOF10: 0
PAINLEVEL_OUTOF10: 7
PAINLEVEL_OUTOF10: 0
PAINLEVEL_OUTOF10: 9
PAINLEVEL_OUTOF10: 0
PAINLEVEL_OUTOF10: 9
PAINLEVEL_OUTOF10: 0

## 2018-11-18 ASSESSMENT — PAIN DESCRIPTION - DESCRIPTORS
DESCRIPTORS: DISCOMFORT;ACHING
DESCRIPTORS: DISCOMFORT;SPASM

## 2018-11-18 ASSESSMENT — PAIN DESCRIPTION - ORIENTATION
ORIENTATION: RIGHT
ORIENTATION: RIGHT

## 2018-11-18 ASSESSMENT — PAIN DESCRIPTION - PAIN TYPE
TYPE: ACUTE PAIN
TYPE: ACUTE PAIN

## 2018-11-18 NOTE — PLAN OF CARE
Problem: Pain:  Goal: Pain level will decrease  Pain level will decrease   Outcome: Ongoing    Goal: Control of acute pain  Control of acute pain   Outcome: Ongoing    Goal: Control of chronic pain  Control of chronic pain   Outcome: Ongoing      Problem: Falls - Risk of:  Goal: Will remain free from falls  Will remain free from falls   Outcome: Ongoing    Goal: Absence of physical injury  Absence of physical injury   Outcome: Ongoing      Problem: Discharge Planning:  Goal: Discharged to appropriate level of care  Discharged to appropriate level of care   Outcome: Ongoing      Problem: Serum Glucose Level - Abnormal:  Goal: Ability to maintain appropriate glucose levels will improve  Ability to maintain appropriate glucose levels will improve   Outcome: Ongoing      Problem: Sensory Perception - Impaired:  Goal: Ability to maintain a stable neurologic state will improve  Ability to maintain a stable neurologic state will improve   Outcome: Ongoing

## 2018-11-18 NOTE — H&P
the skin nightly 4/9/18   Ross Lighter, DO   Blood Glucose Monitoring Suppl [facility] BLOOD GLUCOSE METER) w/Device KIT 1 kit by Does not apply route 4 times daily (after meals and at bedtime) 4/9/18   Ross Lighter, DO   Lancets MISC Check your blood sugar before every meal 4/9/18   Brina Chan, DO   Glucose Blood (BLOOD GLUCOSE TEST STRIPS) STRP Check your blood sugar before every meal 4/9/18   [facility], DO   Misc. Devices (TRANSFER BENCH) MISC 1 Device by Does not apply route 2 times daily 4/9/18   Ross Lighter, DO   acetaminophen (TYLENOL) 325 MG tablet Take 1 tablet by mouth every 4 hours as needed for Pain 1/8/18   Flo Sahu MD   aspirin 81 MG chewable tablet Take 1 tablet by mouth daily 1/8/18   Flo Sahu MD   DULoxetine (CYMBALTA) 60 MG extended release capsule Take 1 capsule by mouth daily 1/8/18   Flo Sahu MD   atorvastatin (LIPITOR) 40 MG tablet Take 1 tablet by mouth nightly 1/8/18   Flo Sahu MD   docusate (COLACE, DULCOLAX) 100 MG CAPS Take 100 mg by mouth 2 times daily 1/8/18   Flo Sahu MD   clopidogrel (PLAVIX) 75 MG tablet Take 1 tablet by mouth daily 1/8/18   Flo Sahu MD   pantoprazole (PROTONIX) 40 MG tablet Take 1 tablet by mouth every morning (before breakfast) 1/8/18   Flo Sahu MD   albuterol sulfate HFA (VENTOLIN HFA) 108 (90 Base) MCG/ACT inhaler Inhale 2 puffs into the lungs every 4 hours as needed for Wheezing 1/8/18   Flo Sahu MD   insulin glargine (LANTUS SOLOSTAR) 100 UNIT/ML injection pen Inject 30 Units into the skin nightly 1/8/18   Flo Sahu MD          OBJECTIVE  Vitals:    11/17/18 2240   BP: (!) 144/57   Pulse: 75   Resp: 20   Temp:    SpO2: 100%     No intake or output data in the 24 hours ending 11/17/18 2318  No intake or output data in the 24 hours ending 11/17/18 2318          No intake/output data recorded. PHYSICAL EXAM   GEN Awake female, sitting upright in bed in no apparent distress. EYES Pupils are equally round.   No scleral

## 2018-11-18 NOTE — ED NOTES
Bed: ED-31  Expected date:   Expected time:   Means of arrival:   Comments:     Renee Norman RN  11/17/18 2044

## 2018-11-18 NOTE — ED PROVIDER NOTES
are unremarkable. ST Segments: no acute change  No significant change from prior EKG dated 10-2-2018     CRITICAL CARE NOTE:  There was a high probability of clinically significant life-threatening deterioration of the patient's condition requiring my urgent intervention due to abnormal electrolytes. IV fluids, IV calcium, IV insulin, Kayexalate, chart review, admit was performed to address this. Total critical care time is at least  30 minutes. This includes vital sign monitoring, pulse oximetry monitoring, telemetry monitoring, clinical response to the IV medications, reviewing the nursing notes, consultation time, dictation/documentation time, and interpretation of the lab work. This time excludes time spent performing procedures and separately billable procedures and family discussion time.             Brina Dillon MD  11/17/18 0066

## 2018-11-18 NOTE — PROGRESS NOTES
tablet 100 mg  100 mg Oral BID Carmen Brady MD   100 mg at 11/18/18 0915    metoprolol tartrate (LOPRESSOR) tablet 50 mg  50 mg Oral BID Carmen Brady MD        sodium chloride flush 0.9 % injection 10 mL  10 mL Intravenous 2 times per day Carmen Brady MD   10 mL at 11/18/18 0917    sodium chloride flush 0.9 % injection 10 mL  10 mL Intravenous PRN Carmen Brady MD        magnesium hydroxide (MILK OF MAGNESIA) 400 MG/5ML suspension 30 mL  30 mL Oral Daily PRN Carmen Brady MD        [facility]dansetron [facility]) injection 4 mg  4 mg Intravenous Q6H PRN Louann Joshi MD   4 mg at 11/18/18 0221    enoxaparin (LOVENOX) injection 40 mg  40 mg Subcutaneous Daily Carmen Brady MD   40 mg at 11/18/18 0916    dextrose 50 % solution 12.5 g  12.5 g Intravenous PRN Carmen Brady MD        potassium chloride 10 mEq/100 mL IVPB (Peripheral Line)  10 mEq Intravenous PRN Carmen Brady MD        magnesium sulfate 1 g in dextrose 5% 100 mL IVPB  1 g Intravenous PRN Carmen Brady MD        sodium phosphate 10 mmol in dextrose 5 % 250 mL IVPB  10 mmol Intravenous PRN Carmen Brady MD        Or    sodium phosphate 15 mmol in dextrose 5 % 250 mL IVPB  15 mmol Intravenous PRN Carmen Brady MD        Or    sodium phosphate 20 mmol in dextrose 5 % 500 mL IVPB  20 mmol Intravenous PRN Louann Joshi MD        traMADol (ULTRAM) tablet 50 mg  50 mg Oral Q6H PRN Xiao Oakes MD        Or    traMADol Adrianne Olegario) tablet 100 mg  100 mg Oral Q6H PRN Xiao Oakes MD   100 mg at 11/18/18 1113    insulin lispro (HUMALOG) injection vial 20 Units  20 Units Subcutaneous TID  Cayetano Thakkar MD   20 Units at 11/18/18 0921    insulin glargine (LANTUS) injection vial 30 Units  30 Units Subcutaneous Nightly Cayetano Thakkar MD        insulin lispro (HUMALOG) injection vial 0-12 Units  0-12 Units Subcutaneous TID  Cayetano Thakkar MD   2 Units at 11/18/18 0920    insulin lispro (HUMALOG) injection 1. 9*  2.1*     LFT'S Recent Labs      11/17/18   2130   AST  12*   ALT  20   BILITOT  0.5   ALKPHOS  242*     COAG No results for input(s): INR in the last 72 hours. POC:   Lab Results   Component Value Date    POCGLU 194 11/18/2018    POCGLU 197 11/18/2018    POCGLU 171 11/18/2018    POCGLU 233 11/18/2018     [id number]:  Lab Results   Component Value Date    LABA1C 13.0 04/05/2018     CARDIAC ENZYMES  Recent Labs      11/18/18   0200   CKTOTAL  162*     Troponin:   Recent Labs      11/17/18   2130  11/18/18   0200  11/18/18   0745   TROPONINT  0.034*  0.034*  0.055*     BNP: No results for input(s): PROBNP in the last 72 hours. U/A:  Lab Results   Component Value Date    COLORU YELLOW 11/18/2018    WBCUA 1 11/18/2018    RBCUA 4 11/18/2018    MUCUS MANY 11/18/2018    BACTERIA MANY 11/18/2018    CLARITYU HAZY 11/18/2018    SPECGRAV 1.015 11/18/2018    LEUKOCYTESUR SMALL 11/18/2018    BLOODU NEGATIVE 11/18/2018       Ct Abdomen Pelvis Wo Contrast    Result Date: 10/28/2018  EXAMINATION: CT OF THE ABDOMEN AND PELVIS WITHOUT CONTRAST 10/28/2018 1:56 pm TECHNIQUE: CT of the abdomen and pelvis was performed without the administration of intravenous contrast. Multiplanar reformatted images are provided for review. Dose modulation, iterative reconstruction, and/or weight based adjustment of the mA/kV was utilized to reduce the radiation dose to as low as reasonably achievable. COMPARISON: April 5, 2018 HISTORY: ORDERING SYSTEM PROVIDED HISTORY: left flank pain TECHNOLOGIST PROVIDED HISTORY: Ordering Physician Provided Reason for Exam: left flank pain Acuity: Acute Type of Exam: Initial FINDINGS: Lower Chest: Clear lung bases. Organs: Status post cholecystectomy. No intra or extrahepatic biliary dilatation. The liver, spleen, pancreas, adrenal glands, and kidneys demonstrate no acute abnormality. Calcification in the left kidney is likely vascular in origin. Bilateral ureters are normal in course and caliber.   No

## 2018-11-18 NOTE — PROGRESS NOTES
Received patient from ED. Performed skin assessment with Tess Rudd RN. Patient has a calloused area on her right great toe planter surface. Pt states that it is not painful. Patient also has moist, redness in abdominal folds.

## 2018-11-19 LAB
ALBUMIN SERPL-MCNC: 3 GM/DL (ref 3.4–5)
ANION GAP SERPL CALCULATED.3IONS-SCNC: 10 MMOL/L (ref 4–16)
BUN BLDV-MCNC: 32 MG/DL (ref 6–23)
CALCIUM SERPL-MCNC: 8.1 MG/DL (ref 8.3–10.6)
CHLORIDE BLD-SCNC: 101 MMOL/L (ref 99–110)
CO2: 24 MMOL/L (ref 21–32)
CREAT SERPL-MCNC: 1.7 MG/DL (ref 0.6–1.1)
CREATININE URINE: 174.9 MG/DL (ref 28–217)
CULTURE: NORMAL
EKG ATRIAL RATE: 71 BPM
EKG DIAGNOSIS: NORMAL
EKG P AXIS: 56 DEGREES
EKG P-R INTERVAL: 174 MS
EKG Q-T INTERVAL: 422 MS
EKG QRS DURATION: 102 MS
EKG QTC CALCULATION (BAZETT): 458 MS
EKG R AXIS: 45 DEGREES
EKG T AXIS: 62 DEGREES
EKG VENTRICULAR RATE: 71 BPM
GFR AFRICAN AMERICAN: 37 ML/MIN/1.73M2
GFR NON-AFRICAN AMERICAN: 31 ML/MIN/1.73M2
GLUCOSE BLD-MCNC: 159 MG/DL (ref 70–99)
GLUCOSE BLD-MCNC: 196 MG/DL (ref 70–99)
GLUCOSE BLD-MCNC: 275 MG/DL (ref 70–99)
GLUCOSE BLD-MCNC: 282 MG/DL (ref 70–99)
GLUCOSE BLD-MCNC: 305 MG/DL (ref 70–99)
GLUCOSE BLD-MCNC: 309 MG/DL (ref 70–99)
GLUCOSE BLD-MCNC: 364 MG/DL (ref 70–99)
Lab: 24 HRS
Lab: NORMAL
MAGNESIUM: 1.9 MG/DL (ref 1.8–2.4)
PHOSPHORUS: 3.6 MG/DL (ref 2.5–4.9)
POTASSIUM SERPL-SCNC: 4.9 MMOL/L (ref 3.5–5.1)
PROT/CREAT RATIO, UR: 0.7
PROTEIN 24 HOUR URINE: 1365 MG/24 HR
REPORT STATUS: NORMAL
SODIUM BLD-SCNC: 135 MMOL/L (ref 135–145)
SPECIMEN: NORMAL
TOTAL CK: 166 IU/L (ref 26–140)
URINE TOTAL PROTEIN: 128.4 MG/DL
VOLUME, (UVOL): 1750 MLS

## 2018-11-19 PROCEDURE — 82962 GLUCOSE BLOOD TEST: CPT

## 2018-11-19 PROCEDURE — 6370000000 HC RX 637 (ALT 250 FOR IP): Performed by: INTERNAL MEDICINE

## 2018-11-19 PROCEDURE — 6370000000 HC RX 637 (ALT 250 FOR IP): Performed by: FAMILY MEDICINE

## 2018-11-19 PROCEDURE — 83735 ASSAY OF MAGNESIUM: CPT

## 2018-11-19 PROCEDURE — 2580000003 HC RX 258: Performed by: INTERNAL MEDICINE

## 2018-11-19 PROCEDURE — 36415 COLL VENOUS BLD VENIPUNCTURE: CPT

## 2018-11-19 PROCEDURE — 6360000002 HC RX W HCPCS: Performed by: HOSPITALIST

## 2018-11-19 PROCEDURE — 6370000000 HC RX 637 (ALT 250 FOR IP): Performed by: HOSPITALIST

## 2018-11-19 PROCEDURE — 99211 OFF/OP EST MAY X REQ PHY/QHP: CPT

## 2018-11-19 PROCEDURE — 82550 ASSAY OF CK (CPK): CPT

## 2018-11-19 PROCEDURE — 1200000000 HC SEMI PRIVATE

## 2018-11-19 PROCEDURE — 80069 RENAL FUNCTION PANEL: CPT

## 2018-11-19 PROCEDURE — 2580000003 HC RX 258: Performed by: HOSPITALIST

## 2018-11-19 RX ORDER — INSULIN GLARGINE 100 [IU]/ML
45 INJECTION, SOLUTION SUBCUTANEOUS NIGHTLY
Status: DISCONTINUED | OUTPATIENT
Start: 2018-11-19 | End: 2018-11-20

## 2018-11-19 RX ADMIN — INSULIN LISPRO 20 UNITS: 100 INJECTION, SOLUTION INTRAVENOUS; SUBCUTANEOUS at 11:31

## 2018-11-19 RX ADMIN — DOCUSATE SODIUM 100 MG: 100 CAPSULE, LIQUID FILLED ORAL at 08:49

## 2018-11-19 RX ADMIN — TRAMADOL HYDROCHLORIDE 100 MG: 50 TABLET, FILM COATED ORAL at 23:06

## 2018-11-19 RX ADMIN — DOCUSATE SODIUM 100 MG: 100 CAPSULE, LIQUID FILLED ORAL at 21:02

## 2018-11-19 RX ADMIN — ATORVASTATIN CALCIUM 40 MG: 40 TABLET, FILM COATED ORAL at 21:02

## 2018-11-19 RX ADMIN — TRAMADOL HYDROCHLORIDE 100 MG: 50 TABLET, FILM COATED ORAL at 15:12

## 2018-11-19 RX ADMIN — METOPROLOL TARTRATE 50 MG: 50 TABLET ORAL at 08:50

## 2018-11-19 RX ADMIN — INSULIN LISPRO 6 UNITS: 100 INJECTION, SOLUTION INTRAVENOUS; SUBCUTANEOUS at 11:33

## 2018-11-19 RX ADMIN — INSULIN LISPRO 4 UNITS: 100 INJECTION, SOLUTION INTRAVENOUS; SUBCUTANEOUS at 02:09

## 2018-11-19 RX ADMIN — TRAMADOL HYDROCHLORIDE 100 MG: 50 TABLET, FILM COATED ORAL at 06:57

## 2018-11-19 RX ADMIN — INSULIN LISPRO 2 UNITS: 100 INJECTION, SOLUTION INTRAVENOUS; SUBCUTANEOUS at 17:14

## 2018-11-19 RX ADMIN — PANTOPRAZOLE SODIUM 40 MG: 40 TABLET, DELAYED RELEASE ORAL at 06:39

## 2018-11-19 RX ADMIN — INSULIN LISPRO 20 UNITS: 100 INJECTION, SOLUTION INTRAVENOUS; SUBCUTANEOUS at 17:13

## 2018-11-19 RX ADMIN — SODIUM CHLORIDE: 9 INJECTION, SOLUTION INTRAVENOUS at 01:52

## 2018-11-19 RX ADMIN — ASPIRIN 81 MG 81 MG: 81 TABLET ORAL at 08:48

## 2018-11-19 RX ADMIN — BUPROPION HYDROCHLORIDE 100 MG: 100 TABLET, FILM COATED, EXTENDED RELEASE ORAL at 08:49

## 2018-11-19 RX ADMIN — SODIUM CHLORIDE, PRESERVATIVE FREE 10 ML: 5 INJECTION INTRAVENOUS at 21:02

## 2018-11-19 RX ADMIN — TRAMADOL HYDROCHLORIDE 100 MG: 50 TABLET, FILM COATED ORAL at 00:38

## 2018-11-19 RX ADMIN — BUPROPION HYDROCHLORIDE 100 MG: 100 TABLET, FILM COATED, EXTENDED RELEASE ORAL at 21:02

## 2018-11-19 RX ADMIN — INSULIN LISPRO 20 UNITS: 100 INJECTION, SOLUTION INTRAVENOUS; SUBCUTANEOUS at 08:46

## 2018-11-19 RX ADMIN — DULOXETINE HYDROCHLORIDE 60 MG: 30 CAPSULE, DELAYED RELEASE ORAL at 08:50

## 2018-11-19 RX ADMIN — INSULIN LISPRO 1 UNITS: 100 INJECTION, SOLUTION INTRAVENOUS; SUBCUTANEOUS at 21:05

## 2018-11-19 RX ADMIN — ENOXAPARIN SODIUM 40 MG: 40 INJECTION SUBCUTANEOUS at 08:50

## 2018-11-19 RX ADMIN — INSULIN LISPRO 8 UNITS: 100 INJECTION, SOLUTION INTRAVENOUS; SUBCUTANEOUS at 08:43

## 2018-11-19 RX ADMIN — METOPROLOL TARTRATE 50 MG: 50 TABLET ORAL at 21:02

## 2018-11-19 RX ADMIN — CLOPIDOGREL BISULFATE 75 MG: 75 TABLET ORAL at 08:49

## 2018-11-19 RX ADMIN — INSULIN GLARGINE 45 UNITS: 100 INJECTION, SOLUTION SUBCUTANEOUS at 21:05

## 2018-11-19 ASSESSMENT — PAIN SCALES - GENERAL
PAINLEVEL_OUTOF10: 5
PAINLEVEL_OUTOF10: 8
PAINLEVEL_OUTOF10: 8
PAINLEVEL_OUTOF10: 9
PAINLEVEL_OUTOF10: 0
PAINLEVEL_OUTOF10: 0
PAINLEVEL_OUTOF10: 7
PAINLEVEL_OUTOF10: 8

## 2018-11-19 ASSESSMENT — PAIN DESCRIPTION - PAIN TYPE: TYPE: CHRONIC PAIN

## 2018-11-19 NOTE — PROGRESS NOTES
X ray results   2. Reviewed Current Medicines   3. On meal/ Correction bolus Humalog/ Basal Lantus Insulin regime  4. Monitor Blood glucose frequently   5. Modified  the dose of Insulin/ other medicines as needed   6. Will follow     .      Moreno Dolan MD

## 2018-11-19 NOTE — PROGRESS NOTES
11/18/2018 12:41 am TECHNIQUE: CT of the head was performed without the administration of intravenous contrast. Dose modulation, iterative reconstruction, and/or weight based adjustment of the mA/kV was utilized to reduce the radiation dose to as low as reasonably achievable.; CT of the cervical spine was performed without the administration of intravenous contrast. Multiplanar reformatted images are provided for review. Dose modulation, iterative reconstruction, and/or weight based adjustment of the mA/kV was utilized to reduce the radiation dose to as low as reasonably achievable. COMPARISON: Brain CT dated 10/02/2018. HISTORY: ORDERING SYSTEM PROVIDED HISTORY: fall TECHNOLOGIST PROVIDED HISTORY: Has a \"code stroke\" or \"stroke alert\" been called? ->No Ordering Physician Provided Reason for Exam: dizzy Acuity: Acute Type of Exam: Initial Additional signs and symptoms: Fidelina Lin is a 62 y.o. female who presents with A constellation of symptoms including white headed episodes, dizzy episodes, nausea and vomiting Relevant Medical/Surgical History: pt alert FINDINGS: BRAIN CT: BRAIN/VENTRICLES: There is no acute intracranial hemorrhage, mass effect or midline shift. No abnormal extra-axial fluid collection. The gray-white differentiation is maintained without evidence of an acute infarct. There is no evidence of hydrocephalus. Old lacunar infarct in the left basal ganglia. Chronic microvascular ischemic changes in bilateral periventricular white matter distribution. Small old ischemic infarct in the left cerebellum. No change from prior recent CT. ORBITS: The visualized portion of the orbits demonstrate no acute abnormality. SINUSES: The visualized paranasal sinuses and mastoid air cells demonstrate no acute abnormality. SOFT TISSUES/SKULL:  No acute abnormality of the visualized skull or soft tissues.  CERVICAL CT: Atlantooccipital alignment is normal. Cervical vertebral bodies, facets, spinous processes are

## 2018-11-19 NOTE — PLAN OF CARE
Problem: Pain:  Goal: Pain level will decrease  Pain level will decrease   Outcome: Ongoing    Goal: Control of acute pain  Control of acute pain   Outcome: Ongoing      Problem: Falls - Risk of:  Goal: Will remain free from falls  Will remain free from falls   Outcome: Met This Shift

## 2018-11-20 LAB
ANION GAP SERPL CALCULATED.3IONS-SCNC: 9 MMOL/L (ref 4–16)
BUN BLDV-MCNC: 31 MG/DL (ref 6–23)
CALCIUM SERPL-MCNC: 8.3 MG/DL (ref 8.3–10.6)
CHLORIDE BLD-SCNC: 103 MMOL/L (ref 99–110)
CO2: 24 MMOL/L (ref 21–32)
CREAT SERPL-MCNC: 2 MG/DL (ref 0.6–1.1)
GFR AFRICAN AMERICAN: 31 ML/MIN/1.73M2
GFR NON-AFRICAN AMERICAN: 26 ML/MIN/1.73M2
GLUCOSE BLD-MCNC: 181 MG/DL (ref 70–99)
GLUCOSE BLD-MCNC: 190 MG/DL (ref 70–99)
GLUCOSE BLD-MCNC: 218 MG/DL (ref 70–99)
GLUCOSE BLD-MCNC: 228 MG/DL (ref 70–99)
GLUCOSE BLD-MCNC: 232 MG/DL (ref 70–99)
GLUCOSE BLD-MCNC: 233 MG/DL (ref 70–99)
GLUCOSE BLD-MCNC: 258 MG/DL (ref 70–99)
POTASSIUM SERPL-SCNC: 5.1 MMOL/L (ref 3.5–5.1)
SODIUM BLD-SCNC: 136 MMOL/L (ref 135–145)

## 2018-11-20 PROCEDURE — 6360000002 HC RX W HCPCS: Performed by: HOSPITALIST

## 2018-11-20 PROCEDURE — 6370000000 HC RX 637 (ALT 250 FOR IP): Performed by: INTERNAL MEDICINE

## 2018-11-20 PROCEDURE — 80048 BASIC METABOLIC PNL TOTAL CA: CPT

## 2018-11-20 PROCEDURE — G8979 MOBILITY GOAL STATUS: HCPCS

## 2018-11-20 PROCEDURE — 2580000003 HC RX 258: Performed by: HOSPITALIST

## 2018-11-20 PROCEDURE — 97162 PT EVAL MOD COMPLEX 30 MIN: CPT

## 2018-11-20 PROCEDURE — 6370000000 HC RX 637 (ALT 250 FOR IP): Performed by: FAMILY MEDICINE

## 2018-11-20 PROCEDURE — 36415 COLL VENOUS BLD VENIPUNCTURE: CPT

## 2018-11-20 PROCEDURE — 97535 SELF CARE MNGMENT TRAINING: CPT

## 2018-11-20 PROCEDURE — G8978 MOBILITY CURRENT STATUS: HCPCS

## 2018-11-20 PROCEDURE — 82962 GLUCOSE BLOOD TEST: CPT

## 2018-11-20 PROCEDURE — 6370000000 HC RX 637 (ALT 250 FOR IP): Performed by: HOSPITALIST

## 2018-11-20 PROCEDURE — 1200000000 HC SEMI PRIVATE

## 2018-11-20 PROCEDURE — G8980 MOBILITY D/C STATUS: HCPCS

## 2018-11-20 RX ORDER — INSULIN GLARGINE 100 [IU]/ML
55 INJECTION, SOLUTION SUBCUTANEOUS NIGHTLY
Status: DISCONTINUED | OUTPATIENT
Start: 2018-11-20 | End: 2018-11-21 | Stop reason: HOSPADM

## 2018-11-20 RX ADMIN — ATORVASTATIN CALCIUM 40 MG: 40 TABLET, FILM COATED ORAL at 20:09

## 2018-11-20 RX ADMIN — ENOXAPARIN SODIUM 40 MG: 40 INJECTION SUBCUTANEOUS at 10:38

## 2018-11-20 RX ADMIN — TRAMADOL HYDROCHLORIDE 100 MG: 50 TABLET, FILM COATED ORAL at 12:47

## 2018-11-20 RX ADMIN — DOCUSATE SODIUM 100 MG: 100 CAPSULE, LIQUID FILLED ORAL at 20:10

## 2018-11-20 RX ADMIN — SODIUM CHLORIDE, PRESERVATIVE FREE 10 ML: 5 INJECTION INTRAVENOUS at 10:38

## 2018-11-20 RX ADMIN — METOPROLOL TARTRATE 50 MG: 50 TABLET ORAL at 20:09

## 2018-11-20 RX ADMIN — INSULIN LISPRO 20 UNITS: 100 INJECTION, SOLUTION INTRAVENOUS; SUBCUTANEOUS at 07:44

## 2018-11-20 RX ADMIN — INSULIN LISPRO 1 UNITS: 100 INJECTION, SOLUTION INTRAVENOUS; SUBCUTANEOUS at 01:48

## 2018-11-20 RX ADMIN — INSULIN LISPRO 20 UNITS: 100 INJECTION, SOLUTION INTRAVENOUS; SUBCUTANEOUS at 11:30

## 2018-11-20 RX ADMIN — INSULIN LISPRO 20 UNITS: 100 INJECTION, SOLUTION INTRAVENOUS; SUBCUTANEOUS at 18:25

## 2018-11-20 RX ADMIN — DULOXETINE HYDROCHLORIDE 60 MG: 30 CAPSULE, DELAYED RELEASE ORAL at 10:38

## 2018-11-20 RX ADMIN — SODIUM CHLORIDE, PRESERVATIVE FREE 10 ML: 5 INJECTION INTRAVENOUS at 20:10

## 2018-11-20 RX ADMIN — DOCUSATE SODIUM 100 MG: 100 CAPSULE, LIQUID FILLED ORAL at 10:37

## 2018-11-20 RX ADMIN — BUPROPION HYDROCHLORIDE 100 MG: 100 TABLET, FILM COATED, EXTENDED RELEASE ORAL at 20:09

## 2018-11-20 RX ADMIN — INSULIN LISPRO 4 UNITS: 100 INJECTION, SOLUTION INTRAVENOUS; SUBCUTANEOUS at 11:31

## 2018-11-20 RX ADMIN — INSULIN LISPRO 4 UNITS: 100 INJECTION, SOLUTION INTRAVENOUS; SUBCUTANEOUS at 18:25

## 2018-11-20 RX ADMIN — INSULIN LISPRO 4 UNITS: 100 INJECTION, SOLUTION INTRAVENOUS; SUBCUTANEOUS at 07:46

## 2018-11-20 RX ADMIN — INSULIN LISPRO 3 UNITS: 100 INJECTION, SOLUTION INTRAVENOUS; SUBCUTANEOUS at 20:19

## 2018-11-20 RX ADMIN — CLOPIDOGREL BISULFATE 75 MG: 75 TABLET ORAL at 10:38

## 2018-11-20 RX ADMIN — INSULIN GLARGINE 55 UNITS: 100 INJECTION, SOLUTION SUBCUTANEOUS at 20:18

## 2018-11-20 RX ADMIN — PANTOPRAZOLE SODIUM 40 MG: 40 TABLET, DELAYED RELEASE ORAL at 06:45

## 2018-11-20 RX ADMIN — BUPROPION HYDROCHLORIDE 100 MG: 100 TABLET, FILM COATED, EXTENDED RELEASE ORAL at 10:37

## 2018-11-20 RX ADMIN — METOPROLOL TARTRATE 50 MG: 50 TABLET ORAL at 10:37

## 2018-11-20 RX ADMIN — ASPIRIN 81 MG 81 MG: 81 TABLET ORAL at 10:38

## 2018-11-20 RX ADMIN — TRAMADOL HYDROCHLORIDE 100 MG: 50 TABLET, FILM COATED ORAL at 06:45

## 2018-11-20 RX ADMIN — TRAMADOL HYDROCHLORIDE 100 MG: 50 TABLET, FILM COATED ORAL at 20:09

## 2018-11-20 ASSESSMENT — PAIN SCALES - GENERAL
PAINLEVEL_OUTOF10: 9
PAINLEVEL_OUTOF10: 9
PAINLEVEL_OUTOF10: 8
PAINLEVEL_OUTOF10: 3
PAINLEVEL_OUTOF10: 9

## 2018-11-20 NOTE — CARE COORDINATION
LSW spoke with pt about discharge plans. Pt lives with her SO in an apt. Pt stated she is active with CM. Pt has a walker, cane,and wc in the apt. Pt stated she has a PCP but plans to change her PCP to Dr. Davina Bocanegra. LSW spoke with pt about PT recommendation for home with Home Care with PT. Pt stated she she would rather not have HC anymore at this time. Pt stated if she goes home and decides to have [facility] [employer] [facility]. again [place] [facility] LSW explained she will have to go through her PCP in order to get [facility] [place]. after discharge. Pt stated understanding. Pt plans home with SO and denies any needs.

## 2018-11-20 NOTE — PROGRESS NOTES
ORDERING SYSTEM PROVIDED HISTORY: fall TECHNOLOGIST PROVIDED HISTORY: Has a \"code stroke\" or \"stroke alert\" been called? ->No Ordering Physician Provided Reason for Exam: dizzy Acuity: Acute Type of Exam: Initial Additional signs and symptoms: Raine Boyce is a 62 y.o. female who presents with A constellation of symptoms including white headed episodes, dizzy episodes, nausea and vomiting Relevant Medical/Surgical History: pt alert FINDINGS: BRAIN CT: BRAIN/VENTRICLES: There is no acute intracranial hemorrhage, mass effect or midline shift. No abnormal extra-axial fluid collection. The gray-white differentiation is maintained without evidence of an acute infarct. There is no evidence of hydrocephalus. Old lacunar infarct in the left basal ganglia. Chronic microvascular ischemic changes in bilateral periventricular white matter distribution. Small old ischemic infarct in the left cerebellum. No change from prior recent CT. ORBITS: The visualized portion of the orbits demonstrate no acute abnormality. SINUSES: The visualized paranasal sinuses and mastoid air cells demonstrate no acute abnormality. SOFT TISSUES/SKULL:  No acute abnormality of the visualized skull or soft tissues. CERVICAL CT: Atlantooccipital alignment is normal. Cervical vertebral bodies, facets, spinous processes are normally aligned. Vertebral body heights and intervertebral disc spaces are maintained at all levels. No acute fractures or dislocations in the cervical spine. Mild degenerative changes in the lower cervical spine. No lytic or blastic lesions present in all visualized osseous structures. There is no evidence for prevertebral soft tissue swelling. Images obtained at the lung apices show no abnormalities. Brain CT: No acute intracranial abnormality. Cervical CT: No fractures. Normal alignment.      Ct Cervical Spine Wo Contrast    Result Date: 11/18/2018  EXAMINATION: CT OF THE HEAD WITHOUT CONTRAST; CT OF THE CERVICAL SPINE processes are normally aligned. Vertebral body heights and intervertebral disc spaces are maintained at all levels. No acute fractures or dislocations in the cervical spine. Mild degenerative changes in the lower cervical spine. No lytic or blastic lesions present in all visualized osseous structures. There is no evidence for prevertebral soft tissue swelling. Images obtained at the lung apices show no abnormalities. Brain CT: No acute intracranial abnormality. Cervical CT: No fractures. Normal alignment.            Chitra Perez  [facility]

## 2018-11-20 NOTE — CONSULTS
File Prior to Encounter   Medication Sig Dispense Refill    ibuprofen (ADVIL;MOTRIN) 400 MG tablet Take 1 tablet by mouth every 6 hours as needed for Pain 20 tablet 0    gabapentin (NEURONTIN) 800 MG tablet Take 0.5 tablets by mouth 3 times daily for 90 doses. . 90 tablet 0    buPROPion (WELLBUTRIN SR) 100 MG extended release tablet Take 100 mg by mouth 2 times daily      metoprolol tartrate (LOPRESSOR) 50 MG tablet Take 50 mg by mouth 2 times daily      amLODIPine (NORVASC) 5 MG tablet Take 5 mg by mouth daily      insulin lispro (HUMALOG) 100 UNIT/ML injection vial Inject 25 Units into the skin 3 times daily (with meals) 1 vial 0    lactulose (CHRONULAC) 10 GM/15ML solution Take 30 mLs by mouth 3 times daily as needed (constipation) 1 Bottle 0    insulin lispro (HUMALOG) 100 UNIT/ML injection vial Inject 0-6 Units into the skin 3 times daily (with meals) 1 vial 3    insulin lispro (HUMALOG) 100 UNIT/ML injection vial Inject 0-3 Units into the skin nightly 1 vial 3    Blood Glucose Monitoring Suppl (ACURA BLOOD GLUCOSE METER) w/Device KIT 1 kit by Does not apply route 4 times daily (after meals and at bedtime) 1 kit 0    Lancets MISC Check your blood sugar before every meal 100 each 3    Glucose Blood (BLOOD GLUCOSE TEST STRIPS) STRP Check your blood sugar before every meal 100 strip 0    Misc.  Devices (TRANSFER BENCH) MISC 1 Device by Does not apply route 2 times daily 1 each 0    acetaminophen (TYLENOL) 325 MG tablet Take 1 tablet by mouth every 4 hours as needed for Pain 120 tablet 3    aspirin 81 MG chewable tablet Take 1 tablet by mouth daily 30 tablet 0    DULoxetine (CYMBALTA) 60 MG extended release capsule Take 1 capsule by mouth daily 30 capsule 3    atorvastatin (LIPITOR) 40 MG tablet Take 1 tablet by mouth nightly 30 tablet 0    docusate (COLACE, DULCOLAX) 100 MG CAPS Take 100 mg by mouth 2 times daily 60 capsule 1    clopidogrel (PLAVIX) 75 MG tablet Take 1 tablet by mouth daily 30
have some proteinuria, has had acute kidney injury  before. PAST MEDICAL HISTORY:  1.  CKD stage _____. 2.  Diabetes mellitus type 2, diagnosed at age 27, does not have any  retinopathy, initially managed on oral pill, but on insulin therapy for  a while now. 3.  Probable TIA in the distant past.  4.  Thirty years' history of hypertension. 5.  Hyperlipidemia. 6.  Gastroesophageal reflux disorder. 7.  Osteoarthritis/COPD. She is on home oxygen. 8.  Coronary artery disease. She underwent stent x2 before. 9.  Anxiety disorder. PAST SURGICAL HISTORY:  1. PTCA with stenting of coronary arteries. 2.  Also had an I and D of the toe for wound. 3.  Gallbladder surgery. 4.  Tonsillectomy. 5.  . FAMILY MEDICAL HISTORY:  Diabetes and coronary artery disease runs in  the family. OBSTETRIC AND GYNECOLOGIC HISTORY:   1, para 1. Apparently had a  history of eclampsia, although she had a full-term baby. No gestational  diabetes or hypertension. HABITS:  She quit smoking about , has about 30-pack-year history. No history of alcohol or illicit drug abuse. HOME MEDICATIONS:  Included: It is reported that she was taking  ibuprofen, although she denied taking it as well as gabapentin,  bupropion, metoprolol, amlodipine, several insulin regimens, duloxetine,  atorvastatin, Plavix, Protonix and bronchodilator. CURRENT MEDICATIONS:  Here in the hospital include:  She is on insulin  drip as well as D5 half-normal saline at 150 mL an hour. She is also on  bupropion, duloxetine, Lovenox, several other insulin regimens, one dose  of Kayexalate was given and aspirin and atorvastatin. REVIEW OF SYSTEMS:  Frequent falls, fatigue, tiredness, high blood  pressure. She also reported some constipation, chills. No dysuria. No  chest pain. Rest of the review of systems is negative other than  previous paragraph.     PHYSICAL EXAMINATION:  VITAL SIGNS:  At the time of examination reveal
prevention and treatment. Verbalized understanding. Discussed outpatient education and undecided if she is willing to attend. Printed information Diabetes \"Living Your Life Well With Diabetes\",  Contact number for Diabetes Educator and resources for ongoing education and support left at bedside. Dacia Green RN, BSN, CDE
acute process. Xr Wrist Right (min 3 Views)    Result Date: 11/17/2018  EXAMINATION: 3 XRAY VIEWS OF THE RIGHT WRIST 11/17/2018 9:26 pm COMPARISON: None. HISTORY: ORDERING SYSTEM PROVIDED HISTORY: Injury TECHNOLOGIST PROVIDED HISTORY: Reason for exam:->Injury Ordering Physician Provided Reason for Exam: R wrist pain from fall 5 days ago Acuity: Acute Type of Exam: Initial FINDINGS: The bony structures, soft tissues and joints are without acute findings throughout. No fracture demonstrated, and no dislocation. There is negative ulnar variance. There is degenerative change of the distal radioulnar joint. No acute abnormality of the right wrist.     Ct Head Wo Contrast    Result Date: 11/18/2018  EXAMINATION: CT OF THE HEAD WITHOUT CONTRAST; CT OF THE CERVICAL SPINE WITHOUT CONTRAST 11/18/2018 12:41 am    Brain CT: No acute intracranial abnormality. Cervical CT: No fractures. Normal alignment. Ct Cervical Spine Wo Contrast    Result Date: 11/18/2018  EXAMINATION: CT OF THE HEAD WITHOUT CONTRAST; CT OF THE CERVICAL SPINE WITHOUT CONTRAST 11/18/2018 12:41 am     Brain CT: No acute intracranial abnormality. Cervical CT: No fractures. Normal alignment.        Scheduled Medicines   Medications:    aspirin  81 mg Oral Daily    DULoxetine  60 mg Oral Daily    atorvastatin  40 mg Oral Nightly    docusate sodium  100 mg Oral BID    clopidogrel  75 mg Oral Daily    pantoprazole  40 mg Oral QAM AC    buPROPion  100 mg Oral BID    metoprolol tartrate  50 mg Oral BID    amLODIPine  5 mg Oral Daily    gabapentin  400 mg Oral TID    sodium chloride flush  10 mL Intravenous 2 times per day    enoxaparin  40 mg Subcutaneous Daily    insulin lispro  20 Units Subcutaneous TID WC    insulin glargine  30 Units Subcutaneous Nightly    insulin lispro  0-12 Units Subcutaneous TID     insulin lispro  0-6 Units Subcutaneous 2 times per day      Infusions:    sodium chloride 125 mL/hr at 11/18/18 0232   
and hold )  There are no significant educational impairments or barriers to learning which would prevent participation with service. Discharge Recommendations:  Recommend return to home with support and home health PT service. Has high-level balance refinement opportunity. Uncertain of patient interest.  S level 1. Encourage enhanced caregiver support. Equipment Recommendations:  RW is ideal.      Plan:  The physical therapy evaluation was completed upon request of staff to help with discharge planning efforts. Patient will be placed on hold for acute care physical therapy at this time. If patient does not discharge from [facility] soon, we shall revisit opportunities for treatment and set formal acute care physical therapy goals. Recommendations for RN mobility:  SBA vs CGA  Mobility homework:  Stand every other hour. All meals OOB. Ambulate 3x/day. Treatment today:    Self Care Training:   Cues were given for safety, sequence, UE/LE placement, visual cues, and balance. Activities performed today included dressing, toileting, hand hygiene, and grooming. Required CGA at times, help with BLE dressing. Time in:  1421  Time out:  1447  Timed treatment minutes:  13  Total treatment time:  26    Electronically signed by:   Lani Salinas, PT  11/20/2018, 2:40 PM

## 2018-11-20 NOTE — PROGRESS NOTES
PO2ART 74 08/04/2015    OZD7LBD 45.0 08/04/2015     INR: No results for input(s): INR in the last 72 hours. Renal Labs  Albumin:    Lab Results   Component Value Date    LABALBU 3.0 11/19/2018     Calcium:    Lab Results   Component Value Date    CALCIUM 8.3 11/20/2018     Phosphorus:    Lab Results   Component Value Date    PHOS 3.6 11/19/2018     U/A:    Lab Results   Component Value Date    NITRU NEGATIVE 11/18/2018    COLORU YELLOW 11/18/2018    WBCUA 1 11/18/2018    RBCUA 4 11/18/2018    MUCUS MANY 11/18/2018    TRICHOMONAS NONE SEEN 11/18/2018    YEAST FEW 11/18/2018    BACTERIA MANY 11/18/2018    CLARITYU HAZY 11/18/2018    SPECGRAV 1.015 11/18/2018    UROBILINOGEN NORMAL 11/18/2018    BILIRUBINUR NEGATIVE 11/18/2018    BLOODU NEGATIVE 11/18/2018    KETUA NEGATIVE 11/18/2018     ABG:    Lab Results   Component Value Date    YWC6YJA 45.0 08/04/2015    PO2ART 74 08/04/2015    TRM8LAW 22.7 08/04/2015     HgBA1c:    Lab Results   Component Value Date    LABA1C 13.0 04/05/2018     Microalbumen/Creatinine ratio:  No components found for: RUCREAT          Objective:   Vitals: /73   Pulse 72   Temp 97.9 °F (36.6 °C) (Oral)   Resp 18   Ht (S) 5' 4\" (1.626 m)   Wt (!) 317 lb 4.8 oz (143.9 kg)   SpO2 99%   BMI 54.46 kg/m²   General appearance: awake weak  HEENT: Head: Normal, normocephalic, atraumatic.   Neck: supple, symmetrical, trachea midline  Lungs: diminished breath sounds bilaterally  Heart: S1, S2 normal  Abdomen: abnormal findings:  soft nt  Extremities: edema trace  Neurologic: Mental status: alertness: alert      Patient Active Problem List:     CKD (chronic kidney disease) stage 3, GFR 30-59 ml/min     CAD (coronary artery disease)     Chronic diastolic heart failure (HCC)     Diabetes type 2, uncontrolled ([facility] [address]     Morbid obesity with BMI of 50.0-59.9, adult (HCC)     Elevated lactic acid level, resolved     Musculoskeletal chest pain     Essential hypertension     Diabetes mellitus with

## 2018-11-21 VITALS
HEART RATE: 72 BPM | SYSTOLIC BLOOD PRESSURE: 140 MMHG | BODY MASS INDEX: 50.02 KG/M2 | OXYGEN SATURATION: 99 % | TEMPERATURE: 97.8 F | HEIGHT: 64 IN | DIASTOLIC BLOOD PRESSURE: 71 MMHG | WEIGHT: 293 LBS | RESPIRATION RATE: 16 BRPM

## 2018-11-21 LAB
ANION GAP SERPL CALCULATED.3IONS-SCNC: 8 MMOL/L (ref 4–16)
BUN BLDV-MCNC: 29 MG/DL (ref 6–23)
CALCIUM SERPL-MCNC: 8.5 MG/DL (ref 8.3–10.6)
CHLORIDE BLD-SCNC: 102 MMOL/L (ref 99–110)
CO2: 25 MMOL/L (ref 21–32)
CREAT SERPL-MCNC: 1.4 MG/DL (ref 0.6–1.1)
ESTIMATED AVERAGE GLUCOSE: 309 MG/DL
GFR AFRICAN AMERICAN: 47 ML/MIN/1.73M2
GFR NON-AFRICAN AMERICAN: 39 ML/MIN/1.73M2
GLUCOSE BLD-MCNC: 206 MG/DL (ref 70–99)
GLUCOSE BLD-MCNC: 228 MG/DL (ref 70–99)
GLUCOSE BLD-MCNC: 234 MG/DL (ref 70–99)
GLUCOSE BLD-MCNC: 259 MG/DL (ref 70–99)
GLUCOSE BLD-MCNC: 265 MG/DL (ref 70–99)
HBA1C MFR BLD: 12.4 % (ref 4.2–6.3)
HCT VFR BLD CALC: 37.8 % (ref 37–47)
HEMOGLOBIN: 11.7 GM/DL (ref 12.5–16)
MCH RBC QN AUTO: 27.7 PG (ref 27–31)
MCHC RBC AUTO-ENTMCNC: 31 % (ref 32–36)
MCV RBC AUTO: 89.4 FL (ref 78–100)
PDW BLD-RTO: 13.9 % (ref 11.7–14.9)
PLATELET # BLD: 288 K/CU MM (ref 140–440)
PMV BLD AUTO: 10.9 FL (ref 7.5–11.1)
POTASSIUM SERPL-SCNC: 5.1 MMOL/L (ref 3.5–5.1)
RBC # BLD: 4.23 M/CU MM (ref 4.2–5.4)
SODIUM BLD-SCNC: 135 MMOL/L (ref 135–145)
WBC # BLD: 8.7 K/CU MM (ref 4–10.5)

## 2018-11-21 PROCEDURE — 6370000000 HC RX 637 (ALT 250 FOR IP): Performed by: HOSPITALIST

## 2018-11-21 PROCEDURE — 2580000003 HC RX 258: Performed by: HOSPITALIST

## 2018-11-21 PROCEDURE — 80048 BASIC METABOLIC PNL TOTAL CA: CPT

## 2018-11-21 PROCEDURE — 6370000000 HC RX 637 (ALT 250 FOR IP): Performed by: FAMILY MEDICINE

## 2018-11-21 PROCEDURE — 82962 GLUCOSE BLOOD TEST: CPT

## 2018-11-21 PROCEDURE — 6360000002 HC RX W HCPCS: Performed by: HOSPITALIST

## 2018-11-21 PROCEDURE — 94761 N-INVAS EAR/PLS OXIMETRY MLT: CPT

## 2018-11-21 PROCEDURE — 36415 COLL VENOUS BLD VENIPUNCTURE: CPT

## 2018-11-21 PROCEDURE — 85027 COMPLETE CBC AUTOMATED: CPT

## 2018-11-21 PROCEDURE — 6370000000 HC RX 637 (ALT 250 FOR IP): Performed by: INTERNAL MEDICINE

## 2018-11-21 RX ORDER — TRAMADOL HYDROCHLORIDE 50 MG/1
50 TABLET ORAL EVERY 6 HOURS PRN
Qty: 20 TABLET | Refills: 0 | Status: SHIPPED | OUTPATIENT
Start: 2018-11-21 | End: 2018-11-26

## 2018-11-21 RX ADMIN — INSULIN LISPRO 6 UNITS: 100 INJECTION, SOLUTION INTRAVENOUS; SUBCUTANEOUS at 08:37

## 2018-11-21 RX ADMIN — ASPIRIN 81 MG 81 MG: 81 TABLET ORAL at 09:21

## 2018-11-21 RX ADMIN — METOPROLOL TARTRATE 50 MG: 50 TABLET ORAL at 09:21

## 2018-11-21 RX ADMIN — INSULIN LISPRO 20 UNITS: 100 INJECTION, SOLUTION INTRAVENOUS; SUBCUTANEOUS at 08:40

## 2018-11-21 RX ADMIN — INSULIN LISPRO 3 UNITS: 100 INJECTION, SOLUTION INTRAVENOUS; SUBCUTANEOUS at 04:28

## 2018-11-21 RX ADMIN — PANTOPRAZOLE SODIUM 40 MG: 40 TABLET, DELAYED RELEASE ORAL at 06:34

## 2018-11-21 RX ADMIN — INSULIN LISPRO 20 UNITS: 100 INJECTION, SOLUTION INTRAVENOUS; SUBCUTANEOUS at 11:48

## 2018-11-21 RX ADMIN — DOCUSATE SODIUM 100 MG: 100 CAPSULE, LIQUID FILLED ORAL at 09:21

## 2018-11-21 RX ADMIN — CLOPIDOGREL BISULFATE 75 MG: 75 TABLET ORAL at 09:20

## 2018-11-21 RX ADMIN — ENOXAPARIN SODIUM 40 MG: 40 INJECTION SUBCUTANEOUS at 09:22

## 2018-11-21 RX ADMIN — INSULIN LISPRO 6 UNITS: 100 INJECTION, SOLUTION INTRAVENOUS; SUBCUTANEOUS at 11:47

## 2018-11-21 RX ADMIN — LINAGLIPTIN 5 MG: 5 TABLET, FILM COATED ORAL at 09:21

## 2018-11-21 RX ADMIN — TRAMADOL HYDROCHLORIDE 100 MG: 50 TABLET, FILM COATED ORAL at 02:14

## 2018-11-21 RX ADMIN — BUPROPION HYDROCHLORIDE 100 MG: 100 TABLET, FILM COATED, EXTENDED RELEASE ORAL at 09:21

## 2018-11-21 RX ADMIN — SODIUM CHLORIDE, PRESERVATIVE FREE 10 ML: 5 INJECTION INTRAVENOUS at 09:25

## 2018-11-21 RX ADMIN — DULOXETINE HYDROCHLORIDE 60 MG: 30 CAPSULE, DELAYED RELEASE ORAL at 09:20

## 2018-11-21 RX ADMIN — ONDANSETRON HYDROCHLORIDE 4 MG: 2 INJECTION, SOLUTION INTRAMUSCULAR; INTRAVENOUS at 02:14

## 2018-11-21 RX ADMIN — TRAMADOL HYDROCHLORIDE 100 MG: 50 TABLET, FILM COATED ORAL at 10:59

## 2018-11-21 ASSESSMENT — PAIN DESCRIPTION - ONSET: ONSET: ON-GOING

## 2018-11-21 ASSESSMENT — PAIN DESCRIPTION - LOCATION: LOCATION: WRIST

## 2018-11-21 ASSESSMENT — PAIN SCALES - GENERAL
PAINLEVEL_OUTOF10: 0
PAINLEVEL_OUTOF10: 10
PAINLEVEL_OUTOF10: 9
PAINLEVEL_OUTOF10: 7
PAINLEVEL_OUTOF10: 0

## 2018-11-21 ASSESSMENT — PAIN DESCRIPTION - ORIENTATION: ORIENTATION: RIGHT

## 2018-11-21 ASSESSMENT — PAIN DESCRIPTION - PROGRESSION: CLINICAL_PROGRESSION: GRADUALLY IMPROVING

## 2018-11-21 ASSESSMENT — PAIN DESCRIPTION - FREQUENCY: FREQUENCY: INTERMITTENT

## 2018-11-21 ASSESSMENT — PAIN DESCRIPTION - DESCRIPTORS: DESCRIPTORS: ACHING

## 2018-11-21 ASSESSMENT — PAIN DESCRIPTION - PAIN TYPE: TYPE: ACUTE PAIN

## 2018-11-21 NOTE — PROGRESS NOTES
Nephrology Progress Note  11/21/2018 1:11 PM  Subjective:   Admit Date: 11/17/2018  PCP: Patrizia Llamas MD  Interval History: Pt doing ok and more awake, she state thought had vaginal bleeding last night and not state any now. I asked nurse to fu with hospitalist and monitor and if recurrent bleed noted then need gyn work up. Diet: DIET CARB CONTROL;  Pain is:Mild      Data:   Scheduled Meds:   insulin lispro  0-18 Units Subcutaneous TID WC    insulin lispro  0-18 Units Subcutaneous 2 times per day    linagliptin  5 mg Oral Daily    insulin glargine  55 Units Subcutaneous Nightly    aspirin  81 mg Oral Daily    DULoxetine  60 mg Oral Daily    atorvastatin  40 mg Oral Nightly    docusate sodium  100 mg Oral BID    clopidogrel  75 mg Oral Daily    pantoprazole  40 mg Oral QAM AC    buPROPion  100 mg Oral BID    metoprolol tartrate  50 mg Oral BID    sodium chloride flush  10 mL Intravenous 2 times per day    enoxaparin  40 mg Subcutaneous Daily    insulin lispro  20 Units Subcutaneous TID WC     Continuous Infusions:   dextrose       PRN Meds:acetaminophen, albuterol sulfate HFA, sodium chloride flush, ondansetron, dextrose, potassium chloride, traMADol **OR** traMADol, glucose, dextrose, glucagon (rDNA), dextrose  No intake/output data recorded. No intake/output data recorded. No intake or output data in the 24 hours ending 11/21/18 1311  CBC:   No results for input(s): WBC, HGB, PLT in the last 72 hours. BMP:    Recent Labs      11/19/18   0934  11/20/18   0323  11/21/18   0330   NA  135  136  135   K  4.9  5.1  5.1   CL  101  103  102   CO2  24  24  25   BUN  32*  31*  29*   CREATININE  1.7*  2.0*  1.4*   GLUCOSE  364*  228*  265*     Hepatic:   No results for input(s): AST, ALT, ALB, BILITOT, ALKPHOS in the last 72 hours. Troponin: No results for input(s): TROPONINI in the last 72 hours. BNP: No results for input(s): BNP in the last 72 hours.   Lipids: No results for input(s): CHOL, level, resolved     Musculoskeletal chest pain     Essential hypertension     Diabetes mellitus with hyperglycemia (HCC)     Severe dehydration secondary to significant hyperglycemia     Sepsis secondary to UTI, POA     Generalized weakness     Sepsis associated hypotension, POA     Acute renal failure, POA     E. coli UTI (urinary tract infection)     Syncope     Acute pain of right shoulder     Hypotension     DM (diabetes mellitus), secondary uncontrolled (HCC)     Generalized anxiety disorder     Nausea & vomiting     Fever     Debility     Gait disturbance     Acute respiratory failure (HCC)     Hyperglycemia    Assessment and Plan:      IMP:  arf on ckd 3  Hyperkalemia  Dm2  HTN  Proteinuria    Plan     Renal improved monitor  K stable high normal  Glucose improving slowly  bp monitor  700mg proteinuria  Will follow  Check hb and fu gyn if vaginal bleed and work up as outpt for now  [id number] Sofya Mayer to [employer] from renal                Chaya Garibay MD

## 2018-11-21 NOTE — PROGRESS NOTES
Progress Note( Dr. Kelley Magallon)  11/21/2018  Subjective:   Admit Date: 11/17/2018  PCP: Eugene Daniels MD    Admitted For :H/O fall and High Blood glucose     Consulted For: Better control of blood glucose    Interval History: Feels somewhat better    Denies any chest pains,   Some SOB . Denies nausea or vomiting. No new bowel or bladder symptoms. No intake or output data in the 24 hours ending 11/21/18 0740    DATA    CBC:   No results for input(s): WBC, HGB, PLT in the last 72 hours. CMP:  Recent Labs      11/19/18   0934  11/20/18   0323  11/21/18   0330   NA  135  136  135   K  4.9  5.1  5.1   CL  101  103  102   CO2  24  24  25   BUN  32*  31*  29*   CREATININE  1.7*  2.0*  1.4*   CALCIUM  8.1*  8.3  8.5   LABALBU  3.0*   --    --      Lipids:   Lab Results   Component Value Date    CHOL 156 01/30/2017    HDL 26 01/30/2017    TRIG 543 01/30/2017     Glucose:  Recent Labs      11/20/18 2017 11/21/18   0428  11/21/18   0635   POCGLU  258*  259*  234*     HyfreesctqS8Y:  Lab Results   Component Value Date    LABA1C 12.4 11/18/2018     High Sensitivity TSH:   Lab Results   Component Value Date    TSHHS 5.520 10/04/2018     Free T3: No results found for: FT3  Free T4:  Lab Results   Component Value Date    T4FREE 0.90 11/10/2016                Ct Head Wo Contrast    Result Date: 11/18/2018  EXAMINATION: CT OF THE HEAD WITHOUT CONTRAST; CT OF THE CERVICAL SPINE WITHOUT CONTRAST 11/18/2018 12:41 am TECHNIQUE. Brain CT: No acute intracranial abnormality. Cervical CT: No fractures. Normal alignment. Ct Cervical Spine Wo Contrast    Result Date: 11/18/2018  EXAMINATION: CT OF THE HEAD WITHOUT CONTRAST; CT OF THE CERVICAL SPINE WITHOUT CONTRAST 11/18/2018 12:41 am T  .     Brain CT: No acute intracranial abnormality. Cervical CT: No fractures. Normal alignment.        Scheduled Medicines   Medications:    insulin lispro  0-18 Units Subcutaneous TID WC    insulin lispro  0-18 Units Subcutaneous 2 times per day    linagliptin  5 mg Oral Daily    insulin glargine  55 Units Subcutaneous Nightly    aspirin  81 mg Oral Daily    DULoxetine  60 mg Oral Daily    atorvastatin  40 mg Oral Nightly    docusate sodium  100 mg Oral BID    clopidogrel  75 mg Oral Daily    pantoprazole  40 mg Oral QAM AC    buPROPion  100 mg Oral BID    metoprolol tartrate  50 mg Oral BID    sodium chloride flush  10 mL Intravenous 2 times per day    enoxaparin  40 mg Subcutaneous Daily    insulin lispro  20 Units Subcutaneous TID WC      Infusions:    dextrose           Objective:   Vitals: BP (!) 158/63   Pulse 66   Temp 98.1 °F (36.7 °C) (Oral)   Resp 16   Ht (S) 5' 4\" (1.626 m)   Wt (!) 304 lb 8 oz (138.1 kg)   SpO2 99%   BMI 52.27 kg/m²   General appearance: alert and cooperative with exam  Neck: no JVD or bruit  Thyroid : Normal lobes   Lungs: Has Vesicular Breath sounds   Heart:  regular rate and rhythm  Abdomen: soft, non-tender; bowel sounds normal; no masses,  no organomegaly  Musculoskeletal: Normal  Extremities: extremities normal, , no edema tender shoulder   Neurologic:  Awake, alert, oriented to name, place and time. Cranial nerves II-XII are grossly intact. Motor weakness  Sensory possible neuropathy. ,  and gait is abnormal and unstable   Assessment:     Patient Active Problem List:        CKD        CAD (coronary artery disease)    Chronic diastolic heart failure ([facility] [address]    Diabetes type 2, uncontrolled ([facility] [address]    Morbid obesity with BMI of 50.0-59.9, adult (HCC)    Essential hypertension    Generalized weakness    Generalized anxiety disorder    Nausea & vomiting    Gait disturbance         Plan:     1. Reviewed POC blood glucose . Labs and X ray results   2. Reviewed Current Medicines   3. On meal/ Correction bolus Humalog/ Basal Lantus Insulin regime  4. Monitor Blood glucose frequently   5. Modified  the dose of Insulin/ other medicines as needed   6. Will follow     .      Sissy Root

## 2018-11-21 NOTE — PLAN OF CARE
Problem: Pain:  Goal: Pain level will decrease  Pain level will decrease   Outcome: Ongoing    Goal: Control of acute pain  Control of acute pain   Outcome: Ongoing    Goal: Control of chronic pain  Control of chronic pain   Outcome: Ongoing      Problem: Falls - Risk of:  Goal: Will remain free from falls  Will remain free from falls   Outcome: Ongoing    Goal: Absence of physical injury  Absence of physical injury   Outcome: Ongoing      Problem: Discharge Planning:  Goal: Discharged to appropriate level of care  Discharged to appropriate level of care   Outcome: Ongoing      Problem: Serum Glucose Level - Abnormal:  Goal: Ability to maintain appropriate glucose levels will improve  Ability to maintain appropriate glucose levels will improve   Outcome: Ongoing      Problem: Sensory Perception - Impaired:  Goal: Ability to maintain a stable neurologic state will improve  Ability to maintain a stable neurologic state will improve   Outcome: Ongoing      Problem: Risk for Impaired Skin Integrity  Goal: Tissue integrity - skin and mucous membranes  Structural intactness and normal physiological function of skin and  mucous membranes.     Outcome: Ongoing

## 2018-11-21 NOTE — DISCHARGE SUMMARY
Rosemarie Mosley 1960 [id number]  PCP:  Marce De Leon MD    Admit date: 11/17/2018  Admitting Physician: Feliciano Ramirez MD    Discharge date: 11/21/2018 Discharge Physician: Nuha Barcenas MD         Discharge Diagnoses. As per below    Hospital Course:  History of present illness at admission: As per H&P  Subsequent Hospital Course:     1-Hyperosmolar hyperglycemia And DKA: Improved with insulin drip. Changes to subcu insulin and started on oral diet. Endo on board  3-Hyperkalemia- improved with insulin drip  4-Fall- ?syncope, likely due to hyperglycemia- normal CT head and CT C spine  #5 morbid obesity: Diet and exercise counseling  #6 Acute kidney injury: Slowly improving. Possibly from dehydration with hyperglycemia. Improved to baseline and advised to follow-up with her PCP. Troponin elevation chronic and no evidence of ACS.    VTE prophylaxis LMWH    On the day of discharge, pt felt better. No new complaints.     Pertinent Exam Findings on Day of Discharge:  General Appearance:    Alert, cooperative, no distress, appears stated age  Head:    Normocephalic, without obvious abnormality, atraumatic  Eyes:    PERRL, conjunctiva/corneas clear, EOM's intact  Lungs:    Clear to auscultation bilaterally, respirations unlabored   Heart:    Regular rate and rhythm, S1 and S2 normal, no murmur,   rub or gallop  Abdomen:     Soft, non-tender, bowel sounds active, no masses, no organomegaly  Extremities:   Extremities normal, atraumatic, no cyanosis or edema    Consults:  IP CONSULT TO HOSPITALIST  IP CONSULT TO ENDOCRINOLOGY  IP CONSULT TO NEPHROLOGY  IP CONSULT TO DIABETES EDUCATOR    Patient Instructions:   Justin Blackwood   Home Medication Instructions RBN:[id number]    Printed on:11/21/18 1221   Medication Information                      acetaminophen (TYLENOL) 325 MG tablet  Take 1 tablet by mouth every 4 hours as needed for Pain             albuterol sulfate HFA (VENTOLIN HFA) 108 (90 Base) MCG/ACT inhaler  Inhale 2 puffs into the lungs every 4 hours as needed for Wheezing             aspirin 81 MG chewable tablet  Take 1 tablet by mouth daily             atorvastatin (LIPITOR) 40 MG tablet  Take 1 tablet by mouth nightly             Blood Glucose Monitoring Suppl (ACURA BLOOD GLUCOSE METER) w/Device KIT  1 kit by Does not apply route 4 times daily (after meals and at bedtime)             buPROPion (WELLBUTRIN SR) 100 MG extended release tablet  Take 100 mg by mouth 2 times daily             clopidogrel (PLAVIX) 75 MG tablet  Take 1 tablet by mouth daily             docusate (COLACE, DULCOLAX) 100 MG CAPS  Take 100 mg by mouth 2 times daily             DULoxetine (CYMBALTA) 60 MG extended release capsule  Take 1 capsule by mouth daily             gabapentin (NEURONTIN) 800 MG tablet  Take 0.5 tablets by mouth 3 times daily for 90 doses. .             Glucose Blood (BLOOD GLUCOSE TEST STRIPS) STRP  Check your blood sugar before every meal             insulin glargine (LANTUS SOLOSTAR) 100 UNIT/ML injection pen  Inject 55 Units into the skin nightly             insulin lispro (HUMALOG) 100 UNIT/ML injection vial  Inject 0-6 Units into the skin 3 times daily (with meals)             insulin lispro (HUMALOG) 100 UNIT/ML injection vial  Inject 0-3 Units into the skin nightly             insulin lispro (HUMALOG) 100 UNIT/ML injection vial  Inject 20 Units into the skin 3 times daily (with meals)             Lancets MISC  Check your blood sugar before every meal             linagliptin (TRADJENTA) 5 MG tablet  Take 1 tablet by mouth daily             metoprolol tartrate (LOPRESSOR) 50 MG tablet  Take 50 mg by mouth 2 times daily             Misc.  Devices (TRANSFER BENCH) MISC  1 Device by Does not apply route 2 times daily             pantoprazole (PROTONIX) 40 MG tablet  Take 1 tablet by mouth every morning (before breakfast)             traMADol (ULTRAM) 50 MG tablet  Take 1 tablet by mouth every 6 hours as needed for Pain for up to 5 days. .                   Diet:  DIET CARB CONTROL;     Activity:   activity as tolerated     Discharge Condition:   good    Disposition:   home    Follow-up    Lizzie Lira MD  Schedule an appointment as soon as possible for a visit  Medical Management and F/U [address]  [place] [phone]   Bebe Bacon MD  Schedule an appointment as soon as possible for a visit  Medical Management of DM  [facility], [address]   [phone]        Discharge Physician Signed: Renetta العلي

## 2018-11-21 NOTE — PROGRESS NOTES
Progress Note( Dr. Andrez Mason)  11/20/2018  Subjective:   Admit Date: 11/17/2018  PCP: Mariano Nagy MD      NOTE: saw yestrday 11/20 but made the note late     Admitted For :H/O fall and High Blood glucose     Consulted For: Better control of blood glucose    Interval History: Feels somewhat better    Denies any chest pains,   Some SOB . Denies nausea or vomiting. No new bowel or bladder symptoms. No intake or output data in the 24 hours ending 11/21/18 0741    DATA    CBC:   No results for input(s): WBC, HGB, PLT in the last 72 hours. CMP:  Recent Labs      11/19/18   0934  11/20/18   0323  11/21/18   0330   NA  135  136  135   K  4.9  5.1  5.1   CL  101  103  102   CO2  24  24  25   BUN  32*  31*  29*   CREATININE  1.7*  2.0*  1.4*   CALCIUM  8.1*  8.3  8.5   LABALBU  3.0*   --    --      Lipids:   Lab Results   Component Value Date    CHOL 156 01/30/2017    HDL 26 01/30/2017    TRIG 543 01/30/2017     Glucose:  Recent Labs      11/20/18   2017  11/21/18   0428  11/21/18   0635   POCGLU  258*  259*  234*     [id number]:  Lab Results   Component Value Date    LABA1C 12.4 11/18/2018     High Sensitivity TSH:   Lab Results   Component Value Date    TSHHS 5.520 10/04/2018     Free T3: No results found for: FT3  Free T4:  Lab Results   Component Value Date    T4FREE 0.90 11/10/2016                Ct Head Wo Contrast    Result Date: 11/18/2018  EXAMINATION: CT OF THE HEAD WITHOUT CONTRAST; CT OF THE CERVICAL SPINE WITHOUT CONTRAST 11/18/2018 12:41 am TECHNIQUE. Brain CT: No acute intracranial abnormality. Cervical CT: No fractures. Normal alignment. Ct Cervical Spine Wo Contrast    Result Date: 11/18/2018  EXAMINATION: CT OF THE HEAD WITHOUT CONTRAST; CT OF THE CERVICAL SPINE WITHOUT CONTRAST 11/18/2018 12:41 am T  .     Brain CT: No acute intracranial abnormality. Cervical CT: No fractures. Normal alignment.        Scheduled Medicines   Medications:    insulin lispro  0-18 Units Subcutaneous TID WC    insulin lispro  0-18 Units Subcutaneous 2 times per day    linagliptin  5 mg Oral Daily    insulin glargine  55 Units Subcutaneous Nightly    aspirin  81 mg Oral Daily    DULoxetine  60 mg Oral Daily    atorvastatin  40 mg Oral Nightly    docusate sodium  100 mg Oral BID    clopidogrel  75 mg Oral Daily    pantoprazole  40 mg Oral QAM AC    buPROPion  100 mg Oral BID    metoprolol tartrate  50 mg Oral BID    sodium chloride flush  10 mL Intravenous 2 times per day    enoxaparin  40 mg Subcutaneous Daily    insulin lispro  20 Units Subcutaneous TID WC      Infusions:    dextrose           Objective:   Vitals: BP (!) 158/63   Pulse 66   Temp 98.1 °F (36.7 °C) (Oral)   Resp 16   Ht (S) 5' 4\" (1.626 m)   Wt (!) 304 lb 8 oz (138.1 kg)   SpO2 99%   BMI 52.27 kg/m²   General appearance: alert and cooperative with exam  Neck: no JVD or bruit  Thyroid : Normal lobes   Lungs: Has Vesicular Breath sounds   Heart:  regular rate and rhythm  Abdomen: soft, non-tender; bowel sounds normal; no masses,  no organomegaly  Musculoskeletal: Normal  Extremities: extremities normal, , no edema tender shoulder   Neurologic:  Awake, alert, oriented to name, place and time. Cranial nerves II-XII are grossly intact. Motor weakness  Sensory possible neuropathy. ,  and gait is abnormal and unstable   Assessment:     Patient Active Problem List:        CKD        CAD (coronary artery disease)    Chronic diastolic [facility] failure ([facility]    Diabetes type 2, uncontrolled ([facility]    Morbid obesity with BMI of 50.0-59.9, adult (HCC)    Essential hypertension    Generalized weakness    Generalized anxiety disorder    Nausea & vomiting    Gait disturbance         Plan:     1. Reviewed POC blood glucose . Labs and X ray results   2. Reviewed Current Medicines   3. On meal/ Correction bolus Humalog/ Basal Lantus Insulin regime  4. Monitor Blood glucose frequently   5.  Modified  the dose of Insulin/ other medicines as

## 2018-11-21 NOTE — PROGRESS NOTES
CLINICAL PHARMACY NOTE: MEDS TO [address] Select Patient?: No  Total # of Prescriptions Filled: 2   The following medications were delivered to the patient:  · tradjenta 5mg  · Huamlog vial  Total # of Interventions Completed: 1  Time Spent (min): 15    Additional Documentation:  Her Lantus was refill too soon as it had been filled 11/1/18 so we placed on her profile

## 2019-01-29 ENCOUNTER — APPOINTMENT (OUTPATIENT)
Dept: GENERAL RADIOLOGY | Age: 59
End: 2019-01-29
Payer: MEDICARE

## 2019-01-29 ENCOUNTER — HOSPITAL ENCOUNTER (EMERGENCY)
Age: 59
Discharge: HOME | End: 2019-01-29
Attending: EMERGENCY MEDICINE
Payer: MEDICARE

## 2019-01-29 VITALS
WEIGHT: 293 LBS | DIASTOLIC BLOOD PRESSURE: 66 MMHG | BODY MASS INDEX: 50.02 KG/M2 | RESPIRATION RATE: 17 BRPM | HEIGHT: 64 IN | HEART RATE: 69 BPM | TEMPERATURE: 98 F | OXYGEN SATURATION: 99 % | SYSTOLIC BLOOD PRESSURE: 149 MMHG

## 2019-01-29 DIAGNOSIS — I50.9 ACUTE ON CHRONIC CONGESTIVE HEART FAILURE, UNSPECIFIED HEART FAILURE TYPE (HCC): Primary | ICD-10-CM

## 2019-01-29 LAB
ALBUMIN SERPL-MCNC: 3.2 GM/DL (ref 3.4–5)
ALP BLD-CCNC: 139 IU/L (ref 40–129)
ALT SERPL-CCNC: 16 U/L (ref 10–40)
ANION GAP SERPL CALCULATED.3IONS-SCNC: 8 MMOL/L (ref 4–16)
AST SERPL-CCNC: 12 IU/L (ref 15–37)
BASOPHILS ABSOLUTE: 0 K/CU MM
BASOPHILS RELATIVE PERCENT: 0.4 % (ref 0–1)
BILIRUB SERPL-MCNC: 0.2 MG/DL (ref 0–1)
BUN BLDV-MCNC: 28 MG/DL (ref 6–23)
CALCIUM SERPL-MCNC: 8.1 MG/DL (ref 8.3–10.6)
CHLORIDE BLD-SCNC: 102 MMOL/L (ref 99–110)
CO2: 24 MMOL/L (ref 21–32)
CREAT SERPL-MCNC: 1.4 MG/DL (ref 0.6–1.1)
DIFFERENTIAL TYPE: ABNORMAL
EOSINOPHILS ABSOLUTE: 0.2 K/CU MM
EOSINOPHILS RELATIVE PERCENT: 1.8 % (ref 0–3)
GFR AFRICAN AMERICAN: 47 ML/MIN/1.73M2
GFR NON-AFRICAN AMERICAN: 39 ML/MIN/1.73M2
GLUCOSE BLD-MCNC: 261 MG/DL (ref 70–99)
HCT VFR BLD CALC: 37.7 % (ref 37–47)
HEMOGLOBIN: 11.3 GM/DL (ref 12.5–16)
IMMATURE NEUTROPHIL %: 1.1 % (ref 0–0.43)
LYMPHOCYTES ABSOLUTE: 1.8 K/CU MM
LYMPHOCYTES RELATIVE PERCENT: 16.8 % (ref 24–44)
MCH RBC QN AUTO: 26.8 PG (ref 27–31)
MCHC RBC AUTO-ENTMCNC: 30 % (ref 32–36)
MCV RBC AUTO: 89.3 FL (ref 78–100)
MONOCYTES ABSOLUTE: 0.6 K/CU MM
MONOCYTES RELATIVE PERCENT: 5.2 % (ref 0–4)
NUCLEATED RBC %: 0 %
PDW BLD-RTO: 14.3 % (ref 11.7–14.9)
PLATELET # BLD: 339 K/CU MM (ref 140–440)
PMV BLD AUTO: 10.4 FL (ref 7.5–11.1)
POTASSIUM SERPL-SCNC: 4.9 MMOL/L (ref 3.5–5.1)
PRO-BNP: 1172 PG/ML
RBC # BLD: 4.22 M/CU MM (ref 4.2–5.4)
SEGMENTED NEUTROPHILS ABSOLUTE COUNT: 8.1 K/CU MM
SEGMENTED NEUTROPHILS RELATIVE PERCENT: 74.7 % (ref 36–66)
SODIUM BLD-SCNC: 134 MMOL/L (ref 135–145)
TOTAL IMMATURE NEUTOROPHIL: 0.12 K/CU MM
TOTAL NUCLEATED RBC: 0 K/CU MM
TOTAL PROTEIN: 5.8 GM/DL (ref 6.4–8.2)
TROPONIN T: <0.01 NG/ML
WBC # BLD: 10.9 K/CU MM (ref 4–10.5)

## 2019-01-29 PROCEDURE — 85025 COMPLETE CBC W/AUTO DIFF WBC: CPT

## 2019-01-29 PROCEDURE — 93010 ELECTROCARDIOGRAM REPORT: CPT | Performed by: INTERNAL MEDICINE

## 2019-01-29 PROCEDURE — 93005 ELECTROCARDIOGRAM TRACING: CPT | Performed by: EMERGENCY MEDICINE

## 2019-01-29 PROCEDURE — 99285 EMERGENCY DEPT VISIT HI MDM: CPT

## 2019-01-29 PROCEDURE — 84484 ASSAY OF TROPONIN QUANT: CPT

## 2019-01-29 PROCEDURE — 80053 COMPREHEN METABOLIC PANEL: CPT

## 2019-01-29 PROCEDURE — 83880 ASSAY OF NATRIURETIC PEPTIDE: CPT

## 2019-01-29 PROCEDURE — 71046 X-RAY EXAM CHEST 2 VIEWS: CPT

## 2019-01-29 PROCEDURE — 36415 COLL VENOUS BLD VENIPUNCTURE: CPT

## 2019-01-29 RX ORDER — TORSEMIDE 10 MG/1
10 TABLET ORAL DAILY
Qty: 30 TABLET | Refills: 0 | Status: ON HOLD | OUTPATIENT
Start: 2019-01-29 | End: 2019-03-04 | Stop reason: HOSPADM

## 2019-01-29 ASSESSMENT — PAIN DESCRIPTION - ONSET: ONSET: ON-GOING

## 2019-01-29 ASSESSMENT — PAIN DESCRIPTION - ORIENTATION: ORIENTATION: RIGHT

## 2019-01-29 ASSESSMENT — PAIN SCALES - GENERAL: PAINLEVEL_OUTOF10: 8

## 2019-01-29 ASSESSMENT — PAIN DESCRIPTION - PAIN TYPE: TYPE: CHRONIC PAIN

## 2019-01-29 ASSESSMENT — PAIN DESCRIPTION - DESCRIPTORS: DESCRIPTORS: ACHING

## 2019-01-29 ASSESSMENT — PAIN DESCRIPTION - FREQUENCY: FREQUENCY: CONTINUOUS

## 2019-01-29 ASSESSMENT — PAIN DESCRIPTION - LOCATION: LOCATION: SHOULDER

## 2019-01-31 LAB
EKG ATRIAL RATE: 67 BPM
EKG DIAGNOSIS: NORMAL
EKG P AXIS: 30 DEGREES
EKG P-R INTERVAL: 162 MS
EKG Q-T INTERVAL: 422 MS
EKG QRS DURATION: 94 MS
EKG QTC CALCULATION (BAZETT): 445 MS
EKG R AXIS: 47 DEGREES
EKG T AXIS: 64 DEGREES
EKG VENTRICULAR RATE: 67 BPM

## 2019-02-28 ENCOUNTER — HOSPITAL ENCOUNTER (INPATIENT)
Age: 59
LOS: 4 days | Discharge: HOME HEALTH | DRG: 291 | End: 2019-03-04
Attending: EMERGENCY MEDICINE | Admitting: INTERNAL MEDICINE
Payer: MEDICARE

## 2019-02-28 ENCOUNTER — APPOINTMENT (OUTPATIENT)
Dept: GENERAL RADIOLOGY | Age: 59
DRG: 291 | End: 2019-02-28
Payer: MEDICARE

## 2019-02-28 DIAGNOSIS — N18.9 CHRONIC KIDNEY DISEASE, UNSPECIFIED CKD STAGE: ICD-10-CM

## 2019-02-28 DIAGNOSIS — R73.9 HYPERGLYCEMIA: ICD-10-CM

## 2019-02-28 DIAGNOSIS — R77.8 TROPONIN LEVEL ELEVATED: ICD-10-CM

## 2019-02-28 DIAGNOSIS — R79.89 ELEVATED BRAIN NATRIURETIC PEPTIDE (BNP) LEVEL: ICD-10-CM

## 2019-02-28 DIAGNOSIS — D72.829 LEUKOCYTOSIS, UNSPECIFIED TYPE: ICD-10-CM

## 2019-02-28 DIAGNOSIS — R07.9 CHEST PAIN, UNSPECIFIED TYPE: ICD-10-CM

## 2019-02-28 DIAGNOSIS — E87.5 HYPERKALEMIA: ICD-10-CM

## 2019-02-28 DIAGNOSIS — R06.00 DYSPNEA, UNSPECIFIED TYPE: Primary | ICD-10-CM

## 2019-02-28 PROBLEM — R06.02 SHORTNESS OF BREATH: Status: ACTIVE | Noted: 2019-02-28

## 2019-02-28 LAB
ALBUMIN SERPL-MCNC: 3.6 GM/DL (ref 3.4–5)
ALP BLD-CCNC: 138 IU/L (ref 40–128)
ALT SERPL-CCNC: 15 U/L (ref 10–40)
ANION GAP SERPL CALCULATED.3IONS-SCNC: 11 MMOL/L (ref 4–16)
ANION GAP SERPL CALCULATED.3IONS-SCNC: 11 MMOL/L (ref 4–16)
AST SERPL-CCNC: 16 IU/L (ref 15–37)
BASE EXCESS: 0 (ref 0–2.4)
BASOPHILS ABSOLUTE: 0.1 K/CU MM
BASOPHILS RELATIVE PERCENT: 0.4 % (ref 0–1)
BILIRUB SERPL-MCNC: 0.3 MG/DL (ref 0–1)
BUN BLDV-MCNC: 37 MG/DL (ref 6–23)
BUN BLDV-MCNC: 46 MG/DL (ref 6–23)
CALCIUM SERPL-MCNC: 9 MG/DL (ref 8.3–10.6)
CALCIUM SERPL-MCNC: 9.4 MG/DL (ref 8.3–10.6)
CHLORIDE BLD-SCNC: 94 MMOL/L (ref 99–110)
CHLORIDE BLD-SCNC: 95 MMOL/L (ref 99–110)
CO2: 24 MMOL/L (ref 21–32)
CO2: 28 MMOL/L (ref 21–32)
CREAT SERPL-MCNC: 1.7 MG/DL (ref 0.6–1.1)
CREAT SERPL-MCNC: 2.4 MG/DL (ref 0.6–1.1)
DIFFERENTIAL TYPE: ABNORMAL
EOSINOPHILS ABSOLUTE: 0.2 K/CU MM
EOSINOPHILS RELATIVE PERCENT: 1.5 % (ref 0–3)
ESTIMATED AVERAGE GLUCOSE: 260 MG/DL
GFR AFRICAN AMERICAN: 25 ML/MIN/1.73M2
GFR AFRICAN AMERICAN: 37 ML/MIN/1.73M2
GFR NON-AFRICAN AMERICAN: 21 ML/MIN/1.73M2
GFR NON-AFRICAN AMERICAN: 31 ML/MIN/1.73M2
GLUCOSE BLD-MCNC: 204 MG/DL (ref 70–99)
GLUCOSE BLD-MCNC: 284 MG/DL (ref 70–99)
GLUCOSE BLD-MCNC: 290 MG/DL (ref 70–99)
GLUCOSE BLD-MCNC: 364 MG/DL (ref 70–99)
GLUCOSE BLD-MCNC: 427 MG/DL (ref 70–99)
HBA1C MFR BLD: 10.7 % (ref 4.2–6.3)
HCO3 VENOUS: 25.9 MMOL/L (ref 19–25)
HCT VFR BLD CALC: 38.9 % (ref 37–47)
HEMOGLOBIN: 12.3 GM/DL (ref 12.5–16)
IMMATURE NEUTROPHIL %: 0.5 % (ref 0–0.43)
LYMPHOCYTES ABSOLUTE: 2.5 K/CU MM
LYMPHOCYTES RELATIVE PERCENT: 20.6 % (ref 24–44)
MCH RBC QN AUTO: 27 PG (ref 27–31)
MCHC RBC AUTO-ENTMCNC: 31.6 % (ref 32–36)
MCV RBC AUTO: 85.3 FL (ref 78–100)
MONOCYTES ABSOLUTE: 0.8 K/CU MM
MONOCYTES RELATIVE PERCENT: 6.3 % (ref 0–4)
NUCLEATED RBC %: 0 %
O2 SAT, VEN: 85.8 % (ref 50–70)
PCO2, VEN: 48 MMHG (ref 38–52)
PDW BLD-RTO: 14.1 % (ref 11.7–14.9)
PH VENOUS: 7.34 (ref 7.32–7.42)
PLATELET # BLD: 386 K/CU MM (ref 140–440)
PMV BLD AUTO: 10.5 FL (ref 7.5–11.1)
PO2, VEN: 54 MMHG (ref 28–48)
POTASSIUM SERPL-SCNC: 5.3 MMOL/L (ref 3.5–5.1)
POTASSIUM SERPL-SCNC: 5.8 MMOL/L (ref 3.5–5.1)
PRO-BNP: 1473 PG/ML
RBC # BLD: 4.56 M/CU MM (ref 4.2–5.4)
SEGMENTED NEUTROPHILS ABSOLUTE COUNT: 8.6 K/CU MM
SEGMENTED NEUTROPHILS RELATIVE PERCENT: 70.7 % (ref 36–66)
SODIUM BLD-SCNC: 129 MMOL/L (ref 135–145)
SODIUM BLD-SCNC: 134 MMOL/L (ref 135–145)
T4 FREE: 1.11 NG/DL (ref 0.9–1.8)
TOTAL IMMATURE NEUTOROPHIL: 0.06 K/CU MM
TOTAL NUCLEATED RBC: 0 K/CU MM
TOTAL PROTEIN: 6.2 GM/DL (ref 6.4–8.2)
TROPONIN T: 0.03 NG/ML
TROPONIN T: 0.05 NG/ML
TSH HIGH SENSITIVITY: 5.13 UIU/ML (ref 0.27–4.2)
WBC # BLD: 12.2 K/CU MM (ref 4–10.5)

## 2019-02-28 PROCEDURE — 80053 COMPREHEN METABOLIC PANEL: CPT

## 2019-02-28 PROCEDURE — 99285 EMERGENCY DEPT VISIT HI MDM: CPT

## 2019-02-28 PROCEDURE — 93005 ELECTROCARDIOGRAM TRACING: CPT | Performed by: PHYSICIAN ASSISTANT

## 2019-02-28 PROCEDURE — 84484 ASSAY OF TROPONIN QUANT: CPT

## 2019-02-28 PROCEDURE — 71045 X-RAY EXAM CHEST 1 VIEW: CPT

## 2019-02-28 PROCEDURE — 83036 HEMOGLOBIN GLYCOSYLATED A1C: CPT

## 2019-02-28 PROCEDURE — 93010 ELECTROCARDIOGRAM REPORT: CPT | Performed by: INTERNAL MEDICINE

## 2019-02-28 PROCEDURE — 1200000000 HC SEMI PRIVATE

## 2019-02-28 PROCEDURE — 83880 ASSAY OF NATRIURETIC PEPTIDE: CPT

## 2019-02-28 PROCEDURE — 82805 BLOOD GASES W/O2 SATURATION: CPT

## 2019-02-28 PROCEDURE — 84439 ASSAY OF FREE THYROXINE: CPT

## 2019-02-28 PROCEDURE — 36415 COLL VENOUS BLD VENIPUNCTURE: CPT

## 2019-02-28 PROCEDURE — 6360000002 HC RX W HCPCS: Performed by: INTERNAL MEDICINE

## 2019-02-28 PROCEDURE — 6370000000 HC RX 637 (ALT 250 FOR IP): Performed by: INTERNAL MEDICINE

## 2019-02-28 PROCEDURE — 80048 BASIC METABOLIC PNL TOTAL CA: CPT

## 2019-02-28 PROCEDURE — 85025 COMPLETE CBC W/AUTO DIFF WBC: CPT

## 2019-02-28 PROCEDURE — 96374 THER/PROPH/DIAG INJ IV PUSH: CPT

## 2019-02-28 PROCEDURE — 2580000003 HC RX 258: Performed by: INTERNAL MEDICINE

## 2019-02-28 PROCEDURE — 96372 THER/PROPH/DIAG INJ SC/IM: CPT

## 2019-02-28 PROCEDURE — 6370000000 HC RX 637 (ALT 250 FOR IP): Performed by: EMERGENCY MEDICINE

## 2019-02-28 PROCEDURE — 84443 ASSAY THYROID STIM HORMONE: CPT

## 2019-02-28 PROCEDURE — 6370000000 HC RX 637 (ALT 250 FOR IP): Performed by: PHYSICIAN ASSISTANT

## 2019-02-28 PROCEDURE — 82962 GLUCOSE BLOOD TEST: CPT

## 2019-02-28 RX ORDER — SODIUM POLYSTYRENE SULFONATE 15 G/60ML
15 SUSPENSION ORAL; RECTAL ONCE
Status: COMPLETED | OUTPATIENT
Start: 2019-02-28 | End: 2019-02-28

## 2019-02-28 RX ORDER — ASPIRIN 81 MG/1
81 TABLET, CHEWABLE ORAL DAILY
Status: DISCONTINUED | OUTPATIENT
Start: 2019-02-28 | End: 2019-03-04 | Stop reason: HOSPADM

## 2019-02-28 RX ORDER — DOCUSATE SODIUM 100 MG/1
100 CAPSULE, LIQUID FILLED ORAL 2 TIMES DAILY
Status: DISCONTINUED | OUTPATIENT
Start: 2019-02-28 | End: 2019-03-04 | Stop reason: HOSPADM

## 2019-02-28 RX ORDER — MAGNESIUM SULFATE 1 G/100ML
1 INJECTION INTRAVENOUS PRN
Status: DISCONTINUED | OUTPATIENT
Start: 2019-02-28 | End: 2019-03-04 | Stop reason: HOSPADM

## 2019-02-28 RX ORDER — ATORVASTATIN CALCIUM 40 MG/1
40 TABLET, FILM COATED ORAL NIGHTLY
Status: DISCONTINUED | OUTPATIENT
Start: 2019-02-28 | End: 2019-03-04 | Stop reason: HOSPADM

## 2019-02-28 RX ORDER — FLUTICASONE FUROATE AND VILANTEROL TRIFENATATE 100; 25 UG/1; UG/1
1 POWDER RESPIRATORY (INHALATION) DAILY
Refills: 0 | COMMUNITY
Start: 2019-02-26

## 2019-02-28 RX ORDER — ONDANSETRON 2 MG/ML
4 INJECTION INTRAMUSCULAR; INTRAVENOUS EVERY 6 HOURS PRN
Status: DISCONTINUED | OUTPATIENT
Start: 2019-02-28 | End: 2019-03-04 | Stop reason: HOSPADM

## 2019-02-28 RX ORDER — DULOXETIN HYDROCHLORIDE 30 MG/1
60 CAPSULE, DELAYED RELEASE ORAL DAILY
Status: DISCONTINUED | OUTPATIENT
Start: 2019-02-28 | End: 2019-03-04 | Stop reason: HOSPADM

## 2019-02-28 RX ORDER — SODIUM CHLORIDE 0.9 % (FLUSH) 0.9 %
10 SYRINGE (ML) INJECTION EVERY 12 HOURS SCHEDULED
Status: DISCONTINUED | OUTPATIENT
Start: 2019-02-28 | End: 2019-03-04 | Stop reason: HOSPADM

## 2019-02-28 RX ORDER — PANTOPRAZOLE SODIUM 40 MG/1
40 TABLET, DELAYED RELEASE ORAL
Status: DISCONTINUED | OUTPATIENT
Start: 2019-03-01 | End: 2019-03-04 | Stop reason: HOSPADM

## 2019-02-28 RX ORDER — SODIUM CHLORIDE 0.9 % (FLUSH) 0.9 %
10 SYRINGE (ML) INJECTION PRN
Status: DISCONTINUED | OUTPATIENT
Start: 2019-02-28 | End: 2019-03-04 | Stop reason: HOSPADM

## 2019-02-28 RX ORDER — TRAMADOL HYDROCHLORIDE 50 MG/1
50 TABLET ORAL
Refills: 0 | Status: ON HOLD | COMMUNITY
Start: 2019-01-03 | End: 2019-03-17 | Stop reason: HOSPADM

## 2019-02-28 RX ORDER — LEVOTHYROXINE SODIUM 0.05 MG/1
50 TABLET ORAL DAILY
Refills: 3 | COMMUNITY
Start: 2019-02-26

## 2019-02-28 RX ORDER — LIRAGLUTIDE 6 MG/ML
1.2 INJECTION SUBCUTANEOUS DAILY
Refills: 3 | Status: ON HOLD | COMMUNITY
Start: 2019-01-28 | End: 2019-12-02 | Stop reason: HOSPADM

## 2019-02-28 RX ORDER — HYDROCODONE BITARTRATE AND ACETAMINOPHEN 5; 325 MG/1; MG/1
1 TABLET ORAL EVERY 4 HOURS PRN
Refills: 0 | Status: ON HOLD | COMMUNITY
Start: 2019-01-28 | End: 2019-04-27 | Stop reason: SDUPTHER

## 2019-02-28 RX ORDER — GABAPENTIN 400 MG/1
400 CAPSULE ORAL 3 TIMES DAILY
Status: DISCONTINUED | OUTPATIENT
Start: 2019-02-28 | End: 2019-03-04 | Stop reason: HOSPADM

## 2019-02-28 RX ORDER — CHOLECALCIFEROL (VITAMIN D3) 125 MCG
2000 CAPSULE ORAL DAILY
Refills: 3 | Status: ON HOLD | COMMUNITY
Start: 2019-02-26 | End: 2019-07-13

## 2019-02-28 RX ORDER — CLOPIDOGREL BISULFATE 75 MG/1
75 TABLET ORAL DAILY
Status: DISCONTINUED | OUTPATIENT
Start: 2019-02-28 | End: 2019-03-04 | Stop reason: HOSPADM

## 2019-02-28 RX ORDER — IPRATROPIUM BROMIDE AND ALBUTEROL SULFATE 2.5; .5 MG/3ML; MG/3ML
1 SOLUTION RESPIRATORY (INHALATION)
Status: DISCONTINUED | OUTPATIENT
Start: 2019-02-28 | End: 2019-03-03

## 2019-02-28 RX ORDER — BUPROPION HYDROCHLORIDE 100 MG/1
100 TABLET, EXTENDED RELEASE ORAL 2 TIMES DAILY
Status: DISCONTINUED | OUTPATIENT
Start: 2019-02-28 | End: 2019-03-04 | Stop reason: HOSPADM

## 2019-02-28 RX ORDER — INSULIN GLARGINE 100 [IU]/ML
55 INJECTION, SOLUTION SUBCUTANEOUS NIGHTLY
Status: DISCONTINUED | OUTPATIENT
Start: 2019-02-28 | End: 2019-03-04 | Stop reason: HOSPADM

## 2019-02-28 RX ORDER — FUROSEMIDE 10 MG/ML
20 INJECTION INTRAMUSCULAR; INTRAVENOUS 2 TIMES DAILY
Status: DISCONTINUED | OUTPATIENT
Start: 2019-02-28 | End: 2019-03-01

## 2019-02-28 RX ADMIN — INSULIN LISPRO 20 UNITS: 100 INJECTION, SOLUTION INTRAVENOUS; SUBCUTANEOUS at 14:34

## 2019-02-28 RX ADMIN — ATORVASTATIN CALCIUM 40 MG: 40 TABLET, FILM COATED ORAL at 22:12

## 2019-02-28 RX ADMIN — SODIUM CHLORIDE, PRESERVATIVE FREE 10 ML: 5 INJECTION INTRAVENOUS at 22:13

## 2019-02-28 RX ADMIN — BUPROPION HYDROCHLORIDE 100 MG: 100 TABLET, FILM COATED, EXTENDED RELEASE ORAL at 22:12

## 2019-02-28 RX ADMIN — LINAGLIPTIN 5 MG: 5 TABLET, FILM COATED ORAL at 14:32

## 2019-02-28 RX ADMIN — INSULIN LISPRO 20 UNITS: 100 INJECTION, SOLUTION INTRAVENOUS; SUBCUTANEOUS at 19:05

## 2019-02-28 RX ADMIN — GABAPENTIN 400 MG: 400 CAPSULE ORAL at 22:12

## 2019-02-28 RX ADMIN — FUROSEMIDE 20 MG: 10 INJECTION, SOLUTION INTRAVENOUS at 18:40

## 2019-02-28 RX ADMIN — CLOPIDOGREL BISULFATE 75 MG: 75 TABLET, FILM COATED ORAL at 18:40

## 2019-02-28 RX ADMIN — ASPIRIN 81 MG 81 MG: 81 TABLET ORAL at 18:40

## 2019-02-28 RX ADMIN — INSULIN LISPRO 1 UNITS: 100 INJECTION, SOLUTION INTRAVENOUS; SUBCUTANEOUS at 22:13

## 2019-02-28 RX ADMIN — GABAPENTIN 400 MG: 400 CAPSULE ORAL at 18:40

## 2019-02-28 RX ADMIN — BUPROPION HYDROCHLORIDE 100 MG: 100 TABLET, FILM COATED, EXTENDED RELEASE ORAL at 14:32

## 2019-02-28 RX ADMIN — INSULIN HUMAN 6 UNITS: 100 INJECTION, SOLUTION PARENTERAL at 09:27

## 2019-02-28 RX ADMIN — INSULIN LISPRO 5 UNITS: 100 INJECTION, SOLUTION INTRAVENOUS; SUBCUTANEOUS at 14:33

## 2019-02-28 RX ADMIN — INSULIN LISPRO 3 UNITS: 100 INJECTION, SOLUTION INTRAVENOUS; SUBCUTANEOUS at 19:04

## 2019-02-28 RX ADMIN — DOCUSATE SODIUM 100 MG: 100 CAPSULE, LIQUID FILLED ORAL at 18:40

## 2019-02-28 RX ADMIN — SODIUM POLYSTYRENE SULFONATE 15 G: 15 SUSPENSION ORAL; RECTAL at 09:02

## 2019-02-28 RX ADMIN — DULOXETINE HYDROCHLORIDE 60 MG: 30 CAPSULE, DELAYED RELEASE ORAL at 14:32

## 2019-02-28 RX ADMIN — INSULIN GLARGINE 55 UNITS: 100 INJECTION, SOLUTION SUBCUTANEOUS at 22:14

## 2019-02-28 ASSESSMENT — PAIN DESCRIPTION - ORIENTATION: ORIENTATION: RIGHT

## 2019-02-28 ASSESSMENT — PAIN DESCRIPTION - LOCATION
LOCATION: BACK
LOCATION: ARM
LOCATION: BACK

## 2019-02-28 ASSESSMENT — PAIN SCALES - GENERAL: PAINLEVEL_OUTOF10: 8

## 2019-02-28 ASSESSMENT — PAIN DESCRIPTION - PAIN TYPE
TYPE: CHRONIC PAIN
TYPE: CHRONIC PAIN

## 2019-03-01 LAB
ANION GAP SERPL CALCULATED.3IONS-SCNC: 11 MMOL/L (ref 4–16)
BACTERIA: ABNORMAL /HPF
BILIRUBIN URINE: NEGATIVE MG/DL
BLOOD, URINE: NEGATIVE
BUN BLDV-MCNC: 46 MG/DL (ref 6–23)
CALCIUM SERPL-MCNC: 8.8 MG/DL (ref 8.3–10.6)
CHLORIDE BLD-SCNC: 101 MMOL/L (ref 99–110)
CLARITY: ABNORMAL
CO2: 26 MMOL/L (ref 21–32)
COLOR: YELLOW
CREAT SERPL-MCNC: 2 MG/DL (ref 0.6–1.1)
GFR AFRICAN AMERICAN: 31 ML/MIN/1.73M2
GFR NON-AFRICAN AMERICAN: 26 ML/MIN/1.73M2
GLUCOSE BLD-MCNC: 179 MG/DL (ref 70–99)
GLUCOSE BLD-MCNC: 190 MG/DL (ref 70–99)
GLUCOSE BLD-MCNC: 198 MG/DL (ref 70–99)
GLUCOSE BLD-MCNC: 369 MG/DL (ref 70–99)
GLUCOSE BLD-MCNC: 392 MG/DL (ref 70–99)
GLUCOSE, URINE: 50 MG/DL
HCT VFR BLD CALC: 39.1 % (ref 37–47)
HEMOGLOBIN: 11.7 GM/DL (ref 12.5–16)
KETONES, URINE: NEGATIVE MG/DL
LEUKOCYTE ESTERASE, URINE: NEGATIVE
MAGNESIUM: 2 MG/DL (ref 1.8–2.4)
MCH RBC QN AUTO: 27.1 PG (ref 27–31)
MCHC RBC AUTO-ENTMCNC: 29.9 % (ref 32–36)
MCV RBC AUTO: 90.7 FL (ref 78–100)
MUCUS: ABNORMAL HPF
NITRITE URINE, QUANTITATIVE: NEGATIVE
PDW BLD-RTO: 14.2 % (ref 11.7–14.9)
PH, URINE: 5 (ref 5–8)
PLATELET # BLD: 288 K/CU MM (ref 140–440)
PMV BLD AUTO: 10.3 FL (ref 7.5–11.1)
POTASSIUM SERPL-SCNC: 4.5 MMOL/L (ref 3.5–5.1)
PROTEIN UA: 100 MG/DL
RBC # BLD: 4.31 M/CU MM (ref 4.2–5.4)
RBC URINE: 1 /HPF (ref 0–6)
SODIUM BLD-SCNC: 138 MMOL/L (ref 135–145)
SPECIFIC GRAVITY UA: 1.01 (ref 1–1.03)
TRICHOMONAS: ABNORMAL /HPF
UROBILINOGEN, URINE: NORMAL MG/DL (ref 0.2–1)
WBC # BLD: 11 K/CU MM (ref 4–10.5)
WBC UA: 2 /HPF (ref 0–5)

## 2019-03-01 PROCEDURE — 2700000000 HC OXYGEN THERAPY PER DAY

## 2019-03-01 PROCEDURE — 6360000002 HC RX W HCPCS: Performed by: INTERNAL MEDICINE

## 2019-03-01 PROCEDURE — 84300 ASSAY OF URINE SODIUM: CPT

## 2019-03-01 PROCEDURE — 80048 BASIC METABOLIC PNL TOTAL CA: CPT

## 2019-03-01 PROCEDURE — 6370000000 HC RX 637 (ALT 250 FOR IP): Performed by: INTERNAL MEDICINE

## 2019-03-01 PROCEDURE — 94664 DEMO&/EVAL PT USE INHALER: CPT

## 2019-03-01 PROCEDURE — 94761 N-INVAS EAR/PLS OXIMETRY MLT: CPT

## 2019-03-01 PROCEDURE — 82570 ASSAY OF URINE CREATININE: CPT

## 2019-03-01 PROCEDURE — 2500000003 HC RX 250 WO HCPCS: Performed by: NURSE PRACTITIONER

## 2019-03-01 PROCEDURE — 81001 URINALYSIS AUTO W/SCOPE: CPT

## 2019-03-01 PROCEDURE — 84156 ASSAY OF PROTEIN URINE: CPT

## 2019-03-01 PROCEDURE — 2580000003 HC RX 258: Performed by: INTERNAL MEDICINE

## 2019-03-01 PROCEDURE — 82962 GLUCOSE BLOOD TEST: CPT

## 2019-03-01 PROCEDURE — 36415 COLL VENOUS BLD VENIPUNCTURE: CPT

## 2019-03-01 PROCEDURE — 85027 COMPLETE CBC AUTOMATED: CPT

## 2019-03-01 PROCEDURE — 83735 ASSAY OF MAGNESIUM: CPT

## 2019-03-01 PROCEDURE — 94640 AIRWAY INHALATION TREATMENT: CPT

## 2019-03-01 PROCEDURE — 1200000000 HC SEMI PRIVATE

## 2019-03-01 RX ORDER — MIDODRINE HYDROCHLORIDE 5 MG/1
10 TABLET ORAL
Status: DISCONTINUED | OUTPATIENT
Start: 2019-03-01 | End: 2019-03-04

## 2019-03-01 RX ORDER — RANOLAZINE 500 MG/1
500 TABLET, EXTENDED RELEASE ORAL 2 TIMES DAILY
Status: DISCONTINUED | OUTPATIENT
Start: 2019-03-01 | End: 2019-03-04 | Stop reason: HOSPADM

## 2019-03-01 RX ORDER — TRAMADOL HYDROCHLORIDE 50 MG/1
50 TABLET ORAL EVERY 6 HOURS PRN
Status: DISCONTINUED | OUTPATIENT
Start: 2019-03-01 | End: 2019-03-04 | Stop reason: HOSPADM

## 2019-03-01 RX ORDER — DEXTROSE MONOHYDRATE 25 G/50ML
12.5 INJECTION, SOLUTION INTRAVENOUS PRN
Status: DISCONTINUED | OUTPATIENT
Start: 2019-03-01 | End: 2019-03-04 | Stop reason: HOSPADM

## 2019-03-01 RX ORDER — DEXTROSE MONOHYDRATE 50 MG/ML
100 INJECTION, SOLUTION INTRAVENOUS PRN
Status: DISCONTINUED | OUTPATIENT
Start: 2019-03-01 | End: 2019-03-04 | Stop reason: HOSPADM

## 2019-03-01 RX ORDER — NICOTINE POLACRILEX 4 MG
15 LOZENGE BUCCAL PRN
Status: DISCONTINUED | OUTPATIENT
Start: 2019-03-01 | End: 2019-03-04 | Stop reason: HOSPADM

## 2019-03-01 RX ADMIN — MIDODRINE HYDROCHLORIDE 10 MG: 5 TABLET ORAL at 20:16

## 2019-03-01 RX ADMIN — INSULIN LISPRO 20 UNITS: 100 INJECTION, SOLUTION INTRAVENOUS; SUBCUTANEOUS at 13:57

## 2019-03-01 RX ADMIN — SODIUM CHLORIDE, PRESERVATIVE FREE 10 ML: 5 INJECTION INTRAVENOUS at 20:21

## 2019-03-01 RX ADMIN — FUROSEMIDE 20 MG: 10 INJECTION, SOLUTION INTRAVENOUS at 17:10

## 2019-03-01 RX ADMIN — INSULIN GLARGINE 55 UNITS: 100 INJECTION, SOLUTION SUBCUTANEOUS at 21:38

## 2019-03-01 RX ADMIN — INSULIN LISPRO 1 UNITS: 100 INJECTION, SOLUTION INTRAVENOUS; SUBCUTANEOUS at 20:15

## 2019-03-01 RX ADMIN — MICONAZOLE NITRATE: 2 POWDER TOPICAL at 20:22

## 2019-03-01 RX ADMIN — METOPROLOL TARTRATE 25 MG: 25 TABLET ORAL at 20:16

## 2019-03-01 RX ADMIN — FUROSEMIDE 20 MG: 10 INJECTION, SOLUTION INTRAVENOUS at 10:32

## 2019-03-01 RX ADMIN — MICONAZOLE NITRATE: 2 POWDER TOPICAL at 10:39

## 2019-03-01 RX ADMIN — TRAMADOL HYDROCHLORIDE 50 MG: 50 TABLET, FILM COATED ORAL at 13:53

## 2019-03-01 RX ADMIN — GABAPENTIN 400 MG: 400 CAPSULE ORAL at 10:31

## 2019-03-01 RX ADMIN — LINAGLIPTIN 5 MG: 5 TABLET, FILM COATED ORAL at 10:31

## 2019-03-01 RX ADMIN — ENOXAPARIN SODIUM 40 MG: 40 INJECTION SUBCUTANEOUS at 10:32

## 2019-03-01 RX ADMIN — DULOXETINE HYDROCHLORIDE 60 MG: 30 CAPSULE, DELAYED RELEASE ORAL at 10:31

## 2019-03-01 RX ADMIN — BUPROPION HYDROCHLORIDE 100 MG: 100 TABLET, FILM COATED, EXTENDED RELEASE ORAL at 20:17

## 2019-03-01 RX ADMIN — RANOLAZINE 500 MG: 500 TABLET, FILM COATED, EXTENDED RELEASE ORAL at 20:16

## 2019-03-01 RX ADMIN — INSULIN LISPRO 5 UNITS: 100 INJECTION, SOLUTION INTRAVENOUS; SUBCUTANEOUS at 13:53

## 2019-03-01 RX ADMIN — GABAPENTIN 400 MG: 400 CAPSULE ORAL at 20:16

## 2019-03-01 RX ADMIN — TRAMADOL HYDROCHLORIDE 50 MG: 50 TABLET, FILM COATED ORAL at 20:16

## 2019-03-01 RX ADMIN — PANTOPRAZOLE SODIUM 40 MG: 40 TABLET, DELAYED RELEASE ORAL at 06:44

## 2019-03-01 RX ADMIN — GABAPENTIN 400 MG: 400 CAPSULE ORAL at 13:53

## 2019-03-01 RX ADMIN — ASPIRIN 81 MG 81 MG: 81 TABLET ORAL at 10:31

## 2019-03-01 RX ADMIN — ATORVASTATIN CALCIUM 40 MG: 40 TABLET, FILM COATED ORAL at 20:16

## 2019-03-01 RX ADMIN — CLOPIDOGREL BISULFATE 75 MG: 75 TABLET, FILM COATED ORAL at 10:31

## 2019-03-01 RX ADMIN — IPRATROPIUM BROMIDE AND ALBUTEROL SULFATE 1 AMPULE: .5; 3 SOLUTION RESPIRATORY (INHALATION) at 08:12

## 2019-03-01 RX ADMIN — INSULIN LISPRO 20 UNITS: 100 INJECTION, SOLUTION INTRAVENOUS; SUBCUTANEOUS at 17:26

## 2019-03-01 RX ADMIN — BUPROPION HYDROCHLORIDE 100 MG: 100 TABLET, FILM COATED, EXTENDED RELEASE ORAL at 10:31

## 2019-03-01 RX ADMIN — INSULIN LISPRO 5 UNITS: 100 INJECTION, SOLUTION INTRAVENOUS; SUBCUTANEOUS at 17:24

## 2019-03-01 RX ADMIN — SODIUM CHLORIDE, PRESERVATIVE FREE 10 ML: 5 INJECTION INTRAVENOUS at 10:36

## 2019-03-01 ASSESSMENT — PAIN SCALES - GENERAL
PAINLEVEL_OUTOF10: 9
PAINLEVEL_OUTOF10: 10
PAINLEVEL_OUTOF10: 9

## 2019-03-01 ASSESSMENT — PAIN DESCRIPTION - LOCATION: LOCATION: ARM;SHOULDER

## 2019-03-01 ASSESSMENT — PAIN DESCRIPTION - DESCRIPTORS: DESCRIPTORS: DISCOMFORT;CONSTANT

## 2019-03-01 ASSESSMENT — PAIN DESCRIPTION - PROGRESSION: CLINICAL_PROGRESSION: NOT CHANGED

## 2019-03-01 ASSESSMENT — PAIN DESCRIPTION - ONSET: ONSET: ON-GOING

## 2019-03-01 ASSESSMENT — PAIN DESCRIPTION - FREQUENCY: FREQUENCY: CONTINUOUS

## 2019-03-01 ASSESSMENT — PAIN DESCRIPTION - ORIENTATION: ORIENTATION: RIGHT

## 2019-03-01 ASSESSMENT — PAIN DESCRIPTION - PAIN TYPE: TYPE: ACUTE PAIN;CHRONIC PAIN

## 2019-03-02 LAB
ALBUMIN SERPL-MCNC: 3.4 GM/DL (ref 3.4–5)
ALP BLD-CCNC: 201 IU/L (ref 40–129)
ALT SERPL-CCNC: 34 U/L (ref 10–40)
ANION GAP SERPL CALCULATED.3IONS-SCNC: 11 MMOL/L (ref 4–16)
AST SERPL-CCNC: 20 IU/L (ref 15–37)
BILIRUB SERPL-MCNC: 0.3 MG/DL (ref 0–1)
BUN BLDV-MCNC: 50 MG/DL (ref 6–23)
CALCIUM SERPL-MCNC: 8.1 MG/DL (ref 8.3–10.6)
CHLORIDE BLD-SCNC: 99 MMOL/L (ref 99–110)
CO2: 26 MMOL/L (ref 21–32)
CREAT SERPL-MCNC: 2.1 MG/DL (ref 0.6–1.1)
CREATININE URINE: 130.8 MG/DL (ref 28–217)
GFR AFRICAN AMERICAN: 29 ML/MIN/1.73M2
GFR NON-AFRICAN AMERICAN: 24 ML/MIN/1.73M2
GLUCOSE BLD-MCNC: 165 MG/DL (ref 70–99)
GLUCOSE BLD-MCNC: 200 MG/DL (ref 70–99)
GLUCOSE BLD-MCNC: 283 MG/DL (ref 70–99)
GLUCOSE BLD-MCNC: 322 MG/DL (ref 70–99)
GLUCOSE BLD-MCNC: 336 MG/DL (ref 70–99)
GLUCOSE BLD-MCNC: 353 MG/DL (ref 70–99)
POTASSIUM SERPL-SCNC: 5 MMOL/L (ref 3.5–5.1)
PRO-BNP: 377.7 PG/ML
PROT/CREAT RATIO, UR: 0.8
SODIUM BLD-SCNC: 136 MMOL/L (ref 135–145)
SODIUM URINE: 35 MMOL/L (ref 35–167)
TOTAL PROTEIN: 5 GM/DL (ref 6.4–8.2)
TROPONIN T: 0.04 NG/ML
URINE TOTAL PROTEIN: 109.8 MG/DL

## 2019-03-02 PROCEDURE — 94761 N-INVAS EAR/PLS OXIMETRY MLT: CPT

## 2019-03-02 PROCEDURE — 94640 AIRWAY INHALATION TREATMENT: CPT

## 2019-03-02 PROCEDURE — 82962 GLUCOSE BLOOD TEST: CPT

## 2019-03-02 PROCEDURE — 2700000000 HC OXYGEN THERAPY PER DAY

## 2019-03-02 PROCEDURE — 1200000000 HC SEMI PRIVATE

## 2019-03-02 PROCEDURE — 80053 COMPREHEN METABOLIC PANEL: CPT

## 2019-03-02 PROCEDURE — 6360000002 HC RX W HCPCS: Performed by: INTERNAL MEDICINE

## 2019-03-02 PROCEDURE — 83880 ASSAY OF NATRIURETIC PEPTIDE: CPT

## 2019-03-02 PROCEDURE — 84484 ASSAY OF TROPONIN QUANT: CPT

## 2019-03-02 PROCEDURE — 2580000003 HC RX 258: Performed by: INTERNAL MEDICINE

## 2019-03-02 PROCEDURE — 36415 COLL VENOUS BLD VENIPUNCTURE: CPT

## 2019-03-02 PROCEDURE — 6370000000 HC RX 637 (ALT 250 FOR IP): Performed by: INTERNAL MEDICINE

## 2019-03-02 RX ADMIN — RANOLAZINE 500 MG: 500 TABLET, FILM COATED, EXTENDED RELEASE ORAL at 20:29

## 2019-03-02 RX ADMIN — PANTOPRAZOLE SODIUM 40 MG: 40 TABLET, DELAYED RELEASE ORAL at 06:17

## 2019-03-02 RX ADMIN — SODIUM CHLORIDE, PRESERVATIVE FREE 10 ML: 5 INJECTION INTRAVENOUS at 20:27

## 2019-03-02 RX ADMIN — MICONAZOLE NITRATE: 2 POWDER TOPICAL at 09:21

## 2019-03-02 RX ADMIN — ASPIRIN 81 MG 81 MG: 81 TABLET ORAL at 09:07

## 2019-03-02 RX ADMIN — METOPROLOL TARTRATE 25 MG: 25 TABLET ORAL at 09:08

## 2019-03-02 RX ADMIN — IPRATROPIUM BROMIDE AND ALBUTEROL SULFATE 1 AMPULE: .5; 3 SOLUTION RESPIRATORY (INHALATION) at 12:06

## 2019-03-02 RX ADMIN — METOPROLOL TARTRATE 25 MG: 25 TABLET ORAL at 20:29

## 2019-03-02 RX ADMIN — BUPROPION HYDROCHLORIDE 100 MG: 100 TABLET, FILM COATED, EXTENDED RELEASE ORAL at 20:28

## 2019-03-02 RX ADMIN — TRAMADOL HYDROCHLORIDE 50 MG: 50 TABLET, FILM COATED ORAL at 12:38

## 2019-03-02 RX ADMIN — GABAPENTIN 400 MG: 400 CAPSULE ORAL at 15:27

## 2019-03-02 RX ADMIN — DOCUSATE SODIUM 100 MG: 100 CAPSULE, LIQUID FILLED ORAL at 09:03

## 2019-03-02 RX ADMIN — TRAMADOL HYDROCHLORIDE 50 MG: 50 TABLET, FILM COATED ORAL at 20:27

## 2019-03-02 RX ADMIN — INSULIN LISPRO 2 UNITS: 100 INJECTION, SOLUTION INTRAVENOUS; SUBCUTANEOUS at 17:34

## 2019-03-02 RX ADMIN — GABAPENTIN 400 MG: 400 CAPSULE ORAL at 20:27

## 2019-03-02 RX ADMIN — INSULIN LISPRO 20 UNITS: 100 INJECTION, SOLUTION INTRAVENOUS; SUBCUTANEOUS at 13:15

## 2019-03-02 RX ADMIN — MICONAZOLE NITRATE: 2 POWDER TOPICAL at 20:27

## 2019-03-02 RX ADMIN — MIDODRINE HYDROCHLORIDE 10 MG: 5 TABLET ORAL at 14:48

## 2019-03-02 RX ADMIN — BUPROPION HYDROCHLORIDE 100 MG: 100 TABLET, FILM COATED, EXTENDED RELEASE ORAL at 09:03

## 2019-03-02 RX ADMIN — LINAGLIPTIN 5 MG: 5 TABLET, FILM COATED ORAL at 09:07

## 2019-03-02 RX ADMIN — INSULIN LISPRO 3 UNITS: 100 INJECTION, SOLUTION INTRAVENOUS; SUBCUTANEOUS at 09:17

## 2019-03-02 RX ADMIN — SODIUM CHLORIDE, PRESERVATIVE FREE 10 ML: 5 INJECTION INTRAVENOUS at 09:09

## 2019-03-02 RX ADMIN — DOCUSATE SODIUM 100 MG: 100 CAPSULE, LIQUID FILLED ORAL at 20:27

## 2019-03-02 RX ADMIN — MIDODRINE HYDROCHLORIDE 10 MG: 5 TABLET ORAL at 09:05

## 2019-03-02 RX ADMIN — INSULIN LISPRO 20 UNITS: 100 INJECTION, SOLUTION INTRAVENOUS; SUBCUTANEOUS at 09:19

## 2019-03-02 RX ADMIN — INSULIN LISPRO 5 UNITS: 100 INJECTION, SOLUTION INTRAVENOUS; SUBCUTANEOUS at 13:13

## 2019-03-02 RX ADMIN — CLOPIDOGREL BISULFATE 75 MG: 75 TABLET, FILM COATED ORAL at 09:03

## 2019-03-02 RX ADMIN — ATORVASTATIN CALCIUM 40 MG: 40 TABLET, FILM COATED ORAL at 20:27

## 2019-03-02 RX ADMIN — INSULIN LISPRO 20 UNITS: 100 INJECTION, SOLUTION INTRAVENOUS; SUBCUTANEOUS at 17:35

## 2019-03-02 RX ADMIN — TRAMADOL HYDROCHLORIDE 50 MG: 50 TABLET, FILM COATED ORAL at 06:20

## 2019-03-02 RX ADMIN — INSULIN LISPRO 1 UNITS: 100 INJECTION, SOLUTION INTRAVENOUS; SUBCUTANEOUS at 20:35

## 2019-03-02 RX ADMIN — RANOLAZINE 500 MG: 500 TABLET, FILM COATED, EXTENDED RELEASE ORAL at 09:03

## 2019-03-02 RX ADMIN — INSULIN GLARGINE 55 UNITS: 100 INJECTION, SOLUTION SUBCUTANEOUS at 20:35

## 2019-03-02 RX ADMIN — MIDODRINE HYDROCHLORIDE 10 MG: 5 TABLET ORAL at 17:35

## 2019-03-02 RX ADMIN — ENOXAPARIN SODIUM 40 MG: 40 INJECTION SUBCUTANEOUS at 09:03

## 2019-03-02 RX ADMIN — DULOXETINE HYDROCHLORIDE 60 MG: 30 CAPSULE, DELAYED RELEASE ORAL at 09:04

## 2019-03-02 RX ADMIN — GABAPENTIN 400 MG: 400 CAPSULE ORAL at 09:22

## 2019-03-02 ASSESSMENT — PAIN DESCRIPTION - PROGRESSION

## 2019-03-02 ASSESSMENT — PAIN SCALES - GENERAL
PAINLEVEL_OUTOF10: 6
PAINLEVEL_OUTOF10: 6
PAINLEVEL_OUTOF10: 9
PAINLEVEL_OUTOF10: 0
PAINLEVEL_OUTOF10: 8
PAINLEVEL_OUTOF10: 8

## 2019-03-02 ASSESSMENT — PAIN DESCRIPTION - DESCRIPTORS: DESCRIPTORS: CONSTANT;DISCOMFORT

## 2019-03-02 ASSESSMENT — PAIN DESCRIPTION - FREQUENCY: FREQUENCY: CONTINUOUS

## 2019-03-02 ASSESSMENT — PAIN DESCRIPTION - ORIENTATION
ORIENTATION: RIGHT;LEFT
ORIENTATION: RIGHT

## 2019-03-02 ASSESSMENT — PAIN DESCRIPTION - PAIN TYPE
TYPE: ACUTE PAIN;CHRONIC PAIN

## 2019-03-02 ASSESSMENT — PAIN DESCRIPTION - ONSET: ONSET: ON-GOING

## 2019-03-02 ASSESSMENT — PAIN DESCRIPTION - LOCATION
LOCATION: GENERALIZED
LOCATION: SHOULDER;ARM

## 2019-03-03 LAB
ANION GAP SERPL CALCULATED.3IONS-SCNC: 11 MMOL/L (ref 4–16)
BUN BLDV-MCNC: 48 MG/DL (ref 6–23)
CALCIUM SERPL-MCNC: 8.6 MG/DL (ref 8.3–10.6)
CHLORIDE BLD-SCNC: 100 MMOL/L (ref 99–110)
CO2: 26 MMOL/L (ref 21–32)
CREAT SERPL-MCNC: 1.9 MG/DL (ref 0.6–1.1)
GFR AFRICAN AMERICAN: 33 ML/MIN/1.73M2
GFR NON-AFRICAN AMERICAN: 27 ML/MIN/1.73M2
GLUCOSE BLD-MCNC: 206 MG/DL (ref 70–99)
GLUCOSE BLD-MCNC: 220 MG/DL (ref 70–99)
GLUCOSE BLD-MCNC: 276 MG/DL (ref 70–99)
GLUCOSE BLD-MCNC: 284 MG/DL (ref 70–99)
GLUCOSE BLD-MCNC: 294 MG/DL (ref 70–99)
POTASSIUM SERPL-SCNC: 5.1 MMOL/L (ref 3.5–5.1)
SODIUM BLD-SCNC: 137 MMOL/L (ref 135–145)

## 2019-03-03 PROCEDURE — 6370000000 HC RX 637 (ALT 250 FOR IP): Performed by: INTERNAL MEDICINE

## 2019-03-03 PROCEDURE — 82962 GLUCOSE BLOOD TEST: CPT

## 2019-03-03 PROCEDURE — 80048 BASIC METABOLIC PNL TOTAL CA: CPT

## 2019-03-03 PROCEDURE — 36415 COLL VENOUS BLD VENIPUNCTURE: CPT

## 2019-03-03 PROCEDURE — 2580000003 HC RX 258: Performed by: INTERNAL MEDICINE

## 2019-03-03 PROCEDURE — 1200000000 HC SEMI PRIVATE

## 2019-03-03 PROCEDURE — 6360000002 HC RX W HCPCS: Performed by: INTERNAL MEDICINE

## 2019-03-03 RX ORDER — IPRATROPIUM BROMIDE AND ALBUTEROL SULFATE 2.5; .5 MG/3ML; MG/3ML
1 SOLUTION RESPIRATORY (INHALATION) EVERY 4 HOURS PRN
Status: DISCONTINUED | OUTPATIENT
Start: 2019-03-03 | End: 2019-03-04 | Stop reason: HOSPADM

## 2019-03-03 RX ADMIN — MIDODRINE HYDROCHLORIDE 10 MG: 5 TABLET ORAL at 14:53

## 2019-03-03 RX ADMIN — SODIUM CHLORIDE, PRESERVATIVE FREE 10 ML: 5 INJECTION INTRAVENOUS at 23:43

## 2019-03-03 RX ADMIN — GABAPENTIN 400 MG: 400 CAPSULE ORAL at 23:41

## 2019-03-03 RX ADMIN — RANOLAZINE 500 MG: 500 TABLET, FILM COATED, EXTENDED RELEASE ORAL at 11:06

## 2019-03-03 RX ADMIN — TRAMADOL HYDROCHLORIDE 50 MG: 50 TABLET, FILM COATED ORAL at 02:18

## 2019-03-03 RX ADMIN — GABAPENTIN 400 MG: 400 CAPSULE ORAL at 11:07

## 2019-03-03 RX ADMIN — MIDODRINE HYDROCHLORIDE 10 MG: 5 TABLET ORAL at 11:07

## 2019-03-03 RX ADMIN — ATORVASTATIN CALCIUM 40 MG: 40 TABLET, FILM COATED ORAL at 23:42

## 2019-03-03 RX ADMIN — GABAPENTIN 400 MG: 400 CAPSULE ORAL at 14:54

## 2019-03-03 RX ADMIN — RANOLAZINE 500 MG: 500 TABLET, FILM COATED, EXTENDED RELEASE ORAL at 23:41

## 2019-03-03 RX ADMIN — INSULIN LISPRO 3 UNITS: 100 INJECTION, SOLUTION INTRAVENOUS; SUBCUTANEOUS at 11:09

## 2019-03-03 RX ADMIN — LINAGLIPTIN 5 MG: 5 TABLET, FILM COATED ORAL at 11:06

## 2019-03-03 RX ADMIN — INSULIN LISPRO 1 UNITS: 100 INJECTION, SOLUTION INTRAVENOUS; SUBCUTANEOUS at 22:05

## 2019-03-03 RX ADMIN — MIDODRINE HYDROCHLORIDE 10 MG: 5 TABLET ORAL at 18:28

## 2019-03-03 RX ADMIN — PANTOPRAZOLE SODIUM 40 MG: 40 TABLET, DELAYED RELEASE ORAL at 06:13

## 2019-03-03 RX ADMIN — TRAMADOL HYDROCHLORIDE 50 MG: 50 TABLET, FILM COATED ORAL at 11:06

## 2019-03-03 RX ADMIN — BUPROPION HYDROCHLORIDE 100 MG: 100 TABLET, FILM COATED, EXTENDED RELEASE ORAL at 11:06

## 2019-03-03 RX ADMIN — MICONAZOLE NITRATE: 2 POWDER TOPICAL at 23:43

## 2019-03-03 RX ADMIN — CLOPIDOGREL BISULFATE 75 MG: 75 TABLET, FILM COATED ORAL at 11:05

## 2019-03-03 RX ADMIN — INSULIN GLARGINE 55 UNITS: 100 INJECTION, SOLUTION SUBCUTANEOUS at 22:06

## 2019-03-03 RX ADMIN — TRAMADOL HYDROCHLORIDE 50 MG: 50 TABLET, FILM COATED ORAL at 16:36

## 2019-03-03 RX ADMIN — ASPIRIN 81 MG 81 MG: 81 TABLET ORAL at 11:06

## 2019-03-03 RX ADMIN — MICONAZOLE NITRATE: 2 POWDER TOPICAL at 11:07

## 2019-03-03 RX ADMIN — DOCUSATE SODIUM 100 MG: 100 CAPSULE, LIQUID FILLED ORAL at 11:06

## 2019-03-03 RX ADMIN — METOPROLOL TARTRATE 25 MG: 25 TABLET ORAL at 23:42

## 2019-03-03 RX ADMIN — METOPROLOL TARTRATE 25 MG: 25 TABLET ORAL at 11:07

## 2019-03-03 RX ADMIN — ENOXAPARIN SODIUM 40 MG: 40 INJECTION SUBCUTANEOUS at 11:05

## 2019-03-03 RX ADMIN — INSULIN LISPRO 2 UNITS: 100 INJECTION, SOLUTION INTRAVENOUS; SUBCUTANEOUS at 18:28

## 2019-03-03 RX ADMIN — INSULIN LISPRO 20 UNITS: 100 INJECTION, SOLUTION INTRAVENOUS; SUBCUTANEOUS at 11:10

## 2019-03-03 RX ADMIN — INSULIN LISPRO 20 UNITS: 100 INJECTION, SOLUTION INTRAVENOUS; SUBCUTANEOUS at 14:49

## 2019-03-03 RX ADMIN — INSULIN LISPRO 20 UNITS: 100 INJECTION, SOLUTION INTRAVENOUS; SUBCUTANEOUS at 18:28

## 2019-03-03 RX ADMIN — INSULIN LISPRO 3 UNITS: 100 INJECTION, SOLUTION INTRAVENOUS; SUBCUTANEOUS at 14:46

## 2019-03-03 RX ADMIN — BUPROPION HYDROCHLORIDE 100 MG: 100 TABLET, FILM COATED, EXTENDED RELEASE ORAL at 23:42

## 2019-03-03 RX ADMIN — DULOXETINE HYDROCHLORIDE 60 MG: 30 CAPSULE, DELAYED RELEASE ORAL at 11:05

## 2019-03-03 RX ADMIN — SODIUM CHLORIDE, PRESERVATIVE FREE 10 ML: 5 INJECTION INTRAVENOUS at 11:08

## 2019-03-03 RX ADMIN — DOCUSATE SODIUM 100 MG: 100 CAPSULE, LIQUID FILLED ORAL at 23:41

## 2019-03-03 RX ADMIN — TRAMADOL HYDROCHLORIDE 50 MG: 50 TABLET, FILM COATED ORAL at 23:41

## 2019-03-03 ASSESSMENT — PAIN DESCRIPTION - LOCATION
LOCATION: GENERALIZED;SHOULDER
LOCATION: SHOULDER

## 2019-03-03 ASSESSMENT — PAIN DESCRIPTION - PROGRESSION

## 2019-03-03 ASSESSMENT — PAIN DESCRIPTION - FREQUENCY
FREQUENCY: CONTINUOUS
FREQUENCY: CONTINUOUS

## 2019-03-03 ASSESSMENT — PAIN SCALES - GENERAL
PAINLEVEL_OUTOF10: 5
PAINLEVEL_OUTOF10: 8
PAINLEVEL_OUTOF10: 9
PAINLEVEL_OUTOF10: 3
PAINLEVEL_OUTOF10: 6
PAINLEVEL_OUTOF10: 5
PAINLEVEL_OUTOF10: 0
PAINLEVEL_OUTOF10: 0
PAINLEVEL_OUTOF10: 8

## 2019-03-03 ASSESSMENT — PAIN DESCRIPTION - ONSET
ONSET: ON-GOING
ONSET: ON-GOING

## 2019-03-03 ASSESSMENT — PAIN DESCRIPTION - PAIN TYPE
TYPE: ACUTE PAIN;CHRONIC PAIN
TYPE: ACUTE PAIN;CHRONIC PAIN

## 2019-03-03 ASSESSMENT — PAIN DESCRIPTION - ORIENTATION
ORIENTATION: RIGHT
ORIENTATION: RIGHT

## 2019-03-03 ASSESSMENT — PAIN DESCRIPTION - DESCRIPTORS: DESCRIPTORS: ACHING;CRUSHING;DISCOMFORT

## 2019-03-04 ENCOUNTER — APPOINTMENT (OUTPATIENT)
Dept: NUCLEAR MEDICINE | Age: 59
DRG: 291 | End: 2019-03-04
Payer: MEDICARE

## 2019-03-04 VITALS
TEMPERATURE: 98.2 F | BODY MASS INDEX: 48.82 KG/M2 | DIASTOLIC BLOOD PRESSURE: 71 MMHG | RESPIRATION RATE: 17 BRPM | OXYGEN SATURATION: 96 % | HEART RATE: 79 BPM | SYSTOLIC BLOOD PRESSURE: 148 MMHG | WEIGHT: 293 LBS | HEIGHT: 65 IN

## 2019-03-04 LAB
ANION GAP SERPL CALCULATED.3IONS-SCNC: 8 MMOL/L (ref 4–16)
BUN BLDV-MCNC: 41 MG/DL (ref 6–23)
CALCIUM SERPL-MCNC: 9.1 MG/DL (ref 8.3–10.6)
CHLORIDE BLD-SCNC: 101 MMOL/L (ref 99–110)
CO2: 28 MMOL/L (ref 21–32)
CREAT SERPL-MCNC: 1.7 MG/DL (ref 0.6–1.1)
GFR AFRICAN AMERICAN: 37 ML/MIN/1.73M2
GFR NON-AFRICAN AMERICAN: 31 ML/MIN/1.73M2
GLUCOSE BLD-MCNC: 216 MG/DL (ref 70–99)
GLUCOSE BLD-MCNC: 238 MG/DL (ref 70–99)
GLUCOSE BLD-MCNC: 245 MG/DL (ref 70–99)
LV EF: 43 %
LVEF MODALITY: NORMAL
POTASSIUM SERPL-SCNC: 4.9 MMOL/L (ref 3.5–5.1)
SODIUM BLD-SCNC: 137 MMOL/L (ref 135–145)

## 2019-03-04 PROCEDURE — 6360000002 HC RX W HCPCS: Performed by: INTERNAL MEDICINE

## 2019-03-04 PROCEDURE — A9500 TC99M SESTAMIBI: HCPCS | Performed by: INTERNAL MEDICINE

## 2019-03-04 PROCEDURE — 82962 GLUCOSE BLOOD TEST: CPT

## 2019-03-04 PROCEDURE — 36415 COLL VENOUS BLD VENIPUNCTURE: CPT

## 2019-03-04 PROCEDURE — 6370000000 HC RX 637 (ALT 250 FOR IP): Performed by: INTERNAL MEDICINE

## 2019-03-04 PROCEDURE — 3430000000 HC RX DIAGNOSTIC RADIOPHARMACEUTICAL: Performed by: INTERNAL MEDICINE

## 2019-03-04 PROCEDURE — 80048 BASIC METABOLIC PNL TOTAL CA: CPT

## 2019-03-04 PROCEDURE — 2580000003 HC RX 258: Performed by: INTERNAL MEDICINE

## 2019-03-04 PROCEDURE — 78452 HT MUSCLE IMAGE SPECT MULT: CPT

## 2019-03-04 PROCEDURE — 93017 CV STRESS TEST TRACING ONLY: CPT

## 2019-03-04 RX ORDER — GABAPENTIN 400 MG/1
400 CAPSULE ORAL 3 TIMES DAILY
Qty: 90 CAPSULE | Refills: 3 | Status: SHIPPED | OUTPATIENT
Start: 2019-03-04 | End: 2019-04-23 | Stop reason: DRUGHIGH

## 2019-03-04 RX ORDER — METOPROLOL TARTRATE 50 MG/1
25 TABLET, FILM COATED ORAL 2 TIMES DAILY
Qty: 60 TABLET | Refills: 3 | Status: SHIPPED | OUTPATIENT
Start: 2019-03-04 | End: 2019-04-23 | Stop reason: DRUGHIGH

## 2019-03-04 RX ORDER — RANOLAZINE 500 MG/1
500 TABLET, EXTENDED RELEASE ORAL 2 TIMES DAILY
Qty: 60 TABLET | Refills: 3 | Status: SHIPPED | OUTPATIENT
Start: 2019-03-04

## 2019-03-04 RX ADMIN — Medication 30 MILLICURIE: at 09:10

## 2019-03-04 RX ADMIN — RANOLAZINE 500 MG: 500 TABLET, FILM COATED, EXTENDED RELEASE ORAL at 12:04

## 2019-03-04 RX ADMIN — SODIUM CHLORIDE, PRESERVATIVE FREE 10 ML: 5 INJECTION INTRAVENOUS at 12:06

## 2019-03-04 RX ADMIN — DULOXETINE HYDROCHLORIDE 60 MG: 30 CAPSULE, DELAYED RELEASE ORAL at 12:04

## 2019-03-04 RX ADMIN — METOPROLOL TARTRATE 25 MG: 25 TABLET ORAL at 12:05

## 2019-03-04 RX ADMIN — GABAPENTIN 400 MG: 400 CAPSULE ORAL at 12:04

## 2019-03-04 RX ADMIN — INSULIN LISPRO 2 UNITS: 100 INJECTION, SOLUTION INTRAVENOUS; SUBCUTANEOUS at 12:08

## 2019-03-04 RX ADMIN — REGADENOSON 0.4 MG: 0.08 INJECTION, SOLUTION INTRAVENOUS at 09:06

## 2019-03-04 RX ADMIN — Medication 10 MILLICURIE: at 07:25

## 2019-03-04 RX ADMIN — ENOXAPARIN SODIUM 40 MG: 40 INJECTION SUBCUTANEOUS at 12:07

## 2019-03-04 RX ADMIN — ASPIRIN 81 MG 81 MG: 81 TABLET ORAL at 12:04

## 2019-03-04 RX ADMIN — BUPROPION HYDROCHLORIDE 100 MG: 100 TABLET, FILM COATED, EXTENDED RELEASE ORAL at 12:05

## 2019-03-04 RX ADMIN — ONDANSETRON 4 MG: 2 INJECTION INTRAMUSCULAR; INTRAVENOUS at 04:52

## 2019-03-04 RX ADMIN — TRAMADOL HYDROCHLORIDE 50 MG: 50 TABLET, FILM COATED ORAL at 06:59

## 2019-03-04 RX ADMIN — CLOPIDOGREL BISULFATE 75 MG: 75 TABLET, FILM COATED ORAL at 12:04

## 2019-03-04 RX ADMIN — DOCUSATE SODIUM 100 MG: 100 CAPSULE, LIQUID FILLED ORAL at 12:05

## 2019-03-04 RX ADMIN — MICONAZOLE NITRATE: 2 POWDER TOPICAL at 12:07

## 2019-03-04 RX ADMIN — LINAGLIPTIN 5 MG: 5 TABLET, FILM COATED ORAL at 12:05

## 2019-03-04 RX ADMIN — INSULIN LISPRO 20 UNITS: 100 INJECTION, SOLUTION INTRAVENOUS; SUBCUTANEOUS at 08:24

## 2019-03-04 RX ADMIN — INSULIN LISPRO 2 UNITS: 100 INJECTION, SOLUTION INTRAVENOUS; SUBCUTANEOUS at 08:24

## 2019-03-04 RX ADMIN — INSULIN LISPRO 20 UNITS: 100 INJECTION, SOLUTION INTRAVENOUS; SUBCUTANEOUS at 12:13

## 2019-03-04 ASSESSMENT — PAIN DESCRIPTION - PROGRESSION
CLINICAL_PROGRESSION: NOT CHANGED

## 2019-03-04 ASSESSMENT — PAIN SCALES - GENERAL: PAINLEVEL_OUTOF10: 9

## 2019-03-15 ENCOUNTER — HOSPITAL ENCOUNTER (INPATIENT)
Age: 59
LOS: 2 days | Discharge: HOME | DRG: 690 | End: 2019-03-17
Attending: EMERGENCY MEDICINE | Admitting: HOSPITALIST
Payer: MEDICARE

## 2019-03-15 ENCOUNTER — APPOINTMENT (OUTPATIENT)
Dept: CT IMAGING | Age: 59
DRG: 690 | End: 2019-03-15
Payer: MEDICARE

## 2019-03-15 DIAGNOSIS — R11.2 NON-INTRACTABLE VOMITING WITH NAUSEA, UNSPECIFIED VOMITING TYPE: ICD-10-CM

## 2019-03-15 DIAGNOSIS — R10.84 GENERALIZED ABDOMINAL PAIN: ICD-10-CM

## 2019-03-15 DIAGNOSIS — N39.0 URINARY TRACT INFECTION WITHOUT HEMATURIA, SITE UNSPECIFIED: Primary | ICD-10-CM

## 2019-03-15 DIAGNOSIS — R00.0 TACHYCARDIA: ICD-10-CM

## 2019-03-15 PROBLEM — E66.01 CLASS 3 SEVERE OBESITY DUE TO EXCESS CALORIES WITH BODY MASS INDEX (BMI) OF 50.0 TO 59.9 IN ADULT (HCC): Status: ACTIVE | Noted: 2019-03-15

## 2019-03-15 PROBLEM — A49.9 UTI (URINARY TRACT INFECTION), BACTERIAL: Status: ACTIVE | Noted: 2019-03-15

## 2019-03-15 PROBLEM — E11.65 UNCONTROLLED TYPE 2 DIABETES MELLITUS WITH HYPERGLYCEMIA (HCC): Status: ACTIVE | Noted: 2019-03-15

## 2019-03-15 LAB
ALBUMIN SERPL-MCNC: 3.5 GM/DL (ref 3.4–5)
ALP BLD-CCNC: 143 IU/L (ref 40–129)
ALT SERPL-CCNC: 14 U/L (ref 10–40)
ANION GAP SERPL CALCULATED.3IONS-SCNC: 14 MMOL/L (ref 4–16)
AST SERPL-CCNC: 12 IU/L (ref 15–37)
BACTERIA: ABNORMAL /HPF
BASOPHILS ABSOLUTE: 0 K/CU MM
BASOPHILS RELATIVE PERCENT: 0.3 % (ref 0–1)
BILIRUB SERPL-MCNC: 0.4 MG/DL (ref 0–1)
BILIRUBIN URINE: NEGATIVE MG/DL
BLOOD, URINE: ABNORMAL
BUN BLDV-MCNC: 29 MG/DL (ref 6–23)
CALCIUM SERPL-MCNC: 8.3 MG/DL (ref 8.3–10.6)
CHLORIDE BLD-SCNC: 99 MMOL/L (ref 99–110)
CLARITY: ABNORMAL
CO2: 24 MMOL/L (ref 21–32)
COLOR: YELLOW
CREAT SERPL-MCNC: 1.4 MG/DL (ref 0.6–1.1)
DIFFERENTIAL TYPE: ABNORMAL
EOSINOPHILS ABSOLUTE: 0.1 K/CU MM
EOSINOPHILS RELATIVE PERCENT: 0.8 % (ref 0–3)
GFR AFRICAN AMERICAN: 47 ML/MIN/1.73M2
GFR NON-AFRICAN AMERICAN: 39 ML/MIN/1.73M2
GLUCOSE BLD-MCNC: 267 MG/DL (ref 70–99)
GLUCOSE BLD-MCNC: 349 MG/DL (ref 70–99)
GLUCOSE BLD-MCNC: 361 MG/DL (ref 70–99)
GLUCOSE, URINE: >500 MG/DL
HCT VFR BLD CALC: 41.1 % (ref 37–47)
HEMOGLOBIN: 13.1 GM/DL (ref 12.5–16)
IMMATURE NEUTROPHIL %: 0.5 % (ref 0–0.43)
KETONES, URINE: ABNORMAL MG/DL
LACTATE: 1.8 MMOL/L (ref 0.4–2)
LEUKOCYTE ESTERASE, URINE: NEGATIVE
LIPASE: 32 IU/L (ref 13–60)
LYMPHOCYTES ABSOLUTE: 1.8 K/CU MM
LYMPHOCYTES RELATIVE PERCENT: 14.1 % (ref 24–44)
MCH RBC QN AUTO: 27.2 PG (ref 27–31)
MCHC RBC AUTO-ENTMCNC: 31.9 % (ref 32–36)
MCV RBC AUTO: 85.4 FL (ref 78–100)
MONOCYTES ABSOLUTE: 0.6 K/CU MM
MONOCYTES RELATIVE PERCENT: 5.1 % (ref 0–4)
MUCUS: ABNORMAL HPF
NITRITE URINE, QUANTITATIVE: NEGATIVE
NUCLEATED RBC %: 0 %
PDW BLD-RTO: 14.2 % (ref 11.7–14.9)
PH, URINE: 5 (ref 5–8)
PLATELET # BLD: 377 K/CU MM (ref 140–440)
PMV BLD AUTO: 10.4 FL (ref 7.5–11.1)
POTASSIUM SERPL-SCNC: 5 MMOL/L (ref 3.5–5.1)
PROTEIN UA: >500 MG/DL
RBC # BLD: 4.81 M/CU MM (ref 4.2–5.4)
RBC URINE: 3 /HPF (ref 0–6)
SEGMENTED NEUTROPHILS ABSOLUTE COUNT: 10 K/CU MM
SEGMENTED NEUTROPHILS RELATIVE PERCENT: 79.2 % (ref 36–66)
SODIUM BLD-SCNC: 137 MMOL/L (ref 135–145)
SPECIFIC GRAVITY UA: 1.01 (ref 1–1.03)
TOTAL IMMATURE NEUTOROPHIL: 0.06 K/CU MM
TOTAL NUCLEATED RBC: 0 K/CU MM
TOTAL PROTEIN: 6.4 GM/DL (ref 6.4–8.2)
TRICHOMONAS: ABNORMAL /HPF
UROBILINOGEN, URINE: NORMAL MG/DL (ref 0.2–1)
WBC # BLD: 12.6 K/CU MM (ref 4–10.5)
WBC UA: ABNORMAL /HPF (ref 0–5)

## 2019-03-15 PROCEDURE — 96365 THER/PROPH/DIAG IV INF INIT: CPT

## 2019-03-15 PROCEDURE — 1200000000 HC SEMI PRIVATE

## 2019-03-15 PROCEDURE — 80053 COMPREHEN METABOLIC PANEL: CPT

## 2019-03-15 PROCEDURE — 96372 THER/PROPH/DIAG INJ SC/IM: CPT

## 2019-03-15 PROCEDURE — 2580000003 HC RX 258: Performed by: EMERGENCY MEDICINE

## 2019-03-15 PROCEDURE — 6370000000 HC RX 637 (ALT 250 FOR IP): Performed by: NURSE PRACTITIONER

## 2019-03-15 PROCEDURE — 93005 ELECTROCARDIOGRAM TRACING: CPT | Performed by: PHYSICIAN ASSISTANT

## 2019-03-15 PROCEDURE — 81001 URINALYSIS AUTO W/SCOPE: CPT

## 2019-03-15 PROCEDURE — 85025 COMPLETE CBC W/AUTO DIFF WBC: CPT

## 2019-03-15 PROCEDURE — 83605 ASSAY OF LACTIC ACID: CPT

## 2019-03-15 PROCEDURE — 2500000003 HC RX 250 WO HCPCS: Performed by: NURSE PRACTITIONER

## 2019-03-15 PROCEDURE — 2500000003 HC RX 250 WO HCPCS: Performed by: PHYSICIAN ASSISTANT

## 2019-03-15 PROCEDURE — 6360000002 HC RX W HCPCS: Performed by: NURSE PRACTITIONER

## 2019-03-15 PROCEDURE — 2580000003 HC RX 258: Performed by: PHYSICIAN ASSISTANT

## 2019-03-15 PROCEDURE — 96375 TX/PRO/DX INJ NEW DRUG ADDON: CPT

## 2019-03-15 PROCEDURE — 83690 ASSAY OF LIPASE: CPT

## 2019-03-15 PROCEDURE — 2580000003 HC RX 258: Performed by: NURSE PRACTITIONER

## 2019-03-15 PROCEDURE — 74176 CT ABD & PELVIS W/O CONTRAST: CPT

## 2019-03-15 PROCEDURE — 6370000000 HC RX 637 (ALT 250 FOR IP): Performed by: PHYSICIAN ASSISTANT

## 2019-03-15 PROCEDURE — 6360000002 HC RX W HCPCS: Performed by: PHYSICIAN ASSISTANT

## 2019-03-15 PROCEDURE — 99285 EMERGENCY DEPT VISIT HI MDM: CPT

## 2019-03-15 PROCEDURE — 82962 GLUCOSE BLOOD TEST: CPT

## 2019-03-15 PROCEDURE — 36415 COLL VENOUS BLD VENIPUNCTURE: CPT

## 2019-03-15 RX ORDER — INSULIN GLARGINE 100 [IU]/ML
55 INJECTION, SOLUTION SUBCUTANEOUS NIGHTLY
Status: DISCONTINUED | OUTPATIENT
Start: 2019-03-15 | End: 2019-03-17 | Stop reason: HOSPADM

## 2019-03-15 RX ORDER — ONDANSETRON 2 MG/ML
4 INJECTION INTRAMUSCULAR; INTRAVENOUS EVERY 6 HOURS PRN
Status: DISCONTINUED | OUTPATIENT
Start: 2019-03-15 | End: 2019-03-15

## 2019-03-15 RX ORDER — GABAPENTIN 400 MG/1
400 CAPSULE ORAL 3 TIMES DAILY
Status: DISCONTINUED | OUTPATIENT
Start: 2019-03-15 | End: 2019-03-17 | Stop reason: HOSPADM

## 2019-03-15 RX ORDER — PROMETHAZINE HYDROCHLORIDE 25 MG/ML
25 INJECTION, SOLUTION INTRAMUSCULAR; INTRAVENOUS ONCE
Status: COMPLETED | OUTPATIENT
Start: 2019-03-15 | End: 2019-03-15

## 2019-03-15 RX ORDER — PROMETHAZINE HYDROCHLORIDE 25 MG/ML
12.5 INJECTION, SOLUTION INTRAMUSCULAR; INTRAVENOUS EVERY 6 HOURS PRN
Status: DISCONTINUED | OUTPATIENT
Start: 2019-03-15 | End: 2019-03-15

## 2019-03-15 RX ORDER — 0.9 % SODIUM CHLORIDE 0.9 %
1000 INTRAVENOUS SOLUTION INTRAVENOUS ONCE
Status: COMPLETED | OUTPATIENT
Start: 2019-03-15 | End: 2019-03-15

## 2019-03-15 RX ORDER — ACETAMINOPHEN 325 MG/1
650 TABLET ORAL EVERY 4 HOURS PRN
Status: DISCONTINUED | OUTPATIENT
Start: 2019-03-15 | End: 2019-03-17 | Stop reason: HOSPADM

## 2019-03-15 RX ORDER — SODIUM CHLORIDE 9 MG/ML
INJECTION, SOLUTION INTRAVENOUS CONTINUOUS
Status: DISCONTINUED | OUTPATIENT
Start: 2019-03-15 | End: 2019-03-16

## 2019-03-15 RX ORDER — ONDANSETRON 2 MG/ML
4 INJECTION INTRAMUSCULAR; INTRAVENOUS EVERY 6 HOURS PRN
Status: DISCONTINUED | OUTPATIENT
Start: 2019-03-15 | End: 2019-03-17 | Stop reason: HOSPADM

## 2019-03-15 RX ORDER — HYDROCODONE BITARTRATE AND ACETAMINOPHEN 5; 325 MG/1; MG/1
1 TABLET ORAL ONCE
Status: COMPLETED | OUTPATIENT
Start: 2019-03-15 | End: 2019-03-15

## 2019-03-15 RX ORDER — RANOLAZINE 500 MG/1
500 TABLET, EXTENDED RELEASE ORAL 2 TIMES DAILY
Status: DISCONTINUED | OUTPATIENT
Start: 2019-03-15 | End: 2019-03-17 | Stop reason: HOSPADM

## 2019-03-15 RX ORDER — ALBUTEROL SULFATE 90 UG/1
2 AEROSOL, METERED RESPIRATORY (INHALATION) EVERY 4 HOURS PRN
Status: DISCONTINUED | OUTPATIENT
Start: 2019-03-15 | End: 2019-03-17 | Stop reason: HOSPADM

## 2019-03-15 RX ORDER — LEVOTHYROXINE SODIUM 0.05 MG/1
50 TABLET ORAL DAILY
Status: DISCONTINUED | OUTPATIENT
Start: 2019-03-15 | End: 2019-03-17 | Stop reason: HOSPADM

## 2019-03-15 RX ORDER — NICOTINE POLACRILEX 4 MG
15 LOZENGE BUCCAL PRN
Status: DISCONTINUED | OUTPATIENT
Start: 2019-03-15 | End: 2019-03-17 | Stop reason: HOSPADM

## 2019-03-15 RX ORDER — ASPIRIN 81 MG/1
81 TABLET, CHEWABLE ORAL DAILY
Status: DISCONTINUED | OUTPATIENT
Start: 2019-03-15 | End: 2019-03-17 | Stop reason: HOSPADM

## 2019-03-15 RX ORDER — METOPROLOL TARTRATE 5 MG/5ML
10 INJECTION INTRAVENOUS ONCE
Status: DISCONTINUED | OUTPATIENT
Start: 2019-03-15 | End: 2019-03-15 | Stop reason: CLARIF

## 2019-03-15 RX ORDER — DOCUSATE SODIUM 100 MG/1
100 CAPSULE, LIQUID FILLED ORAL 2 TIMES DAILY
Status: DISCONTINUED | OUTPATIENT
Start: 2019-03-15 | End: 2019-03-17 | Stop reason: HOSPADM

## 2019-03-15 RX ORDER — BUPROPION HYDROCHLORIDE 100 MG/1
100 TABLET, EXTENDED RELEASE ORAL 2 TIMES DAILY
Status: DISCONTINUED | OUTPATIENT
Start: 2019-03-15 | End: 2019-03-17 | Stop reason: HOSPADM

## 2019-03-15 RX ORDER — ATORVASTATIN CALCIUM 40 MG/1
40 TABLET, FILM COATED ORAL NIGHTLY
Status: DISCONTINUED | OUTPATIENT
Start: 2019-03-15 | End: 2019-03-17 | Stop reason: HOSPADM

## 2019-03-15 RX ORDER — DEXTROSE MONOHYDRATE 25 G/50ML
12.5 INJECTION, SOLUTION INTRAVENOUS PRN
Status: DISCONTINUED | OUTPATIENT
Start: 2019-03-15 | End: 2019-03-17 | Stop reason: HOSPADM

## 2019-03-15 RX ORDER — DICYCLOMINE HYDROCHLORIDE 10 MG/ML
20 INJECTION INTRAMUSCULAR ONCE
Status: COMPLETED | OUTPATIENT
Start: 2019-03-15 | End: 2019-03-15

## 2019-03-15 RX ORDER — SODIUM CHLORIDE 0.9 % (FLUSH) 0.9 %
10 SYRINGE (ML) INJECTION EVERY 12 HOURS SCHEDULED
Status: DISCONTINUED | OUTPATIENT
Start: 2019-03-15 | End: 2019-03-17 | Stop reason: HOSPADM

## 2019-03-15 RX ORDER — SODIUM CHLORIDE 0.9 % (FLUSH) 0.9 %
10 SYRINGE (ML) INJECTION PRN
Status: DISCONTINUED | OUTPATIENT
Start: 2019-03-15 | End: 2019-03-17 | Stop reason: HOSPADM

## 2019-03-15 RX ORDER — HYDROCODONE BITARTRATE AND ACETAMINOPHEN 5; 325 MG/1; MG/1
1 TABLET ORAL EVERY 4 HOURS PRN
Status: DISCONTINUED | OUTPATIENT
Start: 2019-03-15 | End: 2019-03-17

## 2019-03-15 RX ORDER — DULOXETIN HYDROCHLORIDE 30 MG/1
60 CAPSULE, DELAYED RELEASE ORAL DAILY
Status: DISCONTINUED | OUTPATIENT
Start: 2019-03-15 | End: 2019-03-17 | Stop reason: HOSPADM

## 2019-03-15 RX ORDER — CLOPIDOGREL BISULFATE 75 MG/1
75 TABLET ORAL DAILY
Status: DISCONTINUED | OUTPATIENT
Start: 2019-03-15 | End: 2019-03-17 | Stop reason: HOSPADM

## 2019-03-15 RX ORDER — PANTOPRAZOLE SODIUM 40 MG/1
40 TABLET, DELAYED RELEASE ORAL
Status: CANCELLED | OUTPATIENT
Start: 2019-03-16

## 2019-03-15 RX ORDER — DEXTROSE MONOHYDRATE 50 MG/ML
100 INJECTION, SOLUTION INTRAVENOUS PRN
Status: DISCONTINUED | OUTPATIENT
Start: 2019-03-15 | End: 2019-03-17 | Stop reason: HOSPADM

## 2019-03-15 RX ORDER — ONDANSETRON 2 MG/ML
4 INJECTION INTRAMUSCULAR; INTRAVENOUS EVERY 30 MIN PRN
Status: DISCONTINUED | OUTPATIENT
Start: 2019-03-15 | End: 2019-03-17 | Stop reason: HOSPADM

## 2019-03-15 RX ADMIN — RANOLAZINE 500 MG: 500 TABLET, FILM COATED, EXTENDED RELEASE ORAL at 21:50

## 2019-03-15 RX ADMIN — INSULIN GLARGINE 55 UNITS: 100 INJECTION, SOLUTION SUBCUTANEOUS at 21:51

## 2019-03-15 RX ADMIN — SODIUM CHLORIDE 1000 ML: 9 INJECTION, SOLUTION INTRAVENOUS at 17:24

## 2019-03-15 RX ADMIN — SODIUM CHLORIDE 1000 ML: 9 INJECTION, SOLUTION INTRAVENOUS at 12:45

## 2019-03-15 RX ADMIN — SODIUM CHLORIDE: 9 INJECTION, SOLUTION INTRAVENOUS at 22:26

## 2019-03-15 RX ADMIN — DOCUSATE SODIUM 100 MG: 100 CAPSULE, LIQUID FILLED ORAL at 21:50

## 2019-03-15 RX ADMIN — ASPIRIN 81 MG 81 MG: 81 TABLET ORAL at 22:07

## 2019-03-15 RX ADMIN — DICYCLOMINE HYDROCHLORIDE 20 MG: 10 INJECTION INTRAMUSCULAR at 13:30

## 2019-03-15 RX ADMIN — BUPROPION HYDROCHLORIDE 100 MG: 100 TABLET, FILM COATED, EXTENDED RELEASE ORAL at 21:50

## 2019-03-15 RX ADMIN — GABAPENTIN 400 MG: 400 CAPSULE ORAL at 21:49

## 2019-03-15 RX ADMIN — HYDROCODONE BITARTRATE AND ACETAMINOPHEN 1 TABLET: 5; 325 TABLET ORAL at 21:49

## 2019-03-15 RX ADMIN — SODIUM CHLORIDE 1000 ML: 9 INJECTION, SOLUTION INTRAVENOUS at 14:27

## 2019-03-15 RX ADMIN — CLOPIDOGREL BISULFATE 75 MG: 75 TABLET, FILM COATED ORAL at 22:07

## 2019-03-15 RX ADMIN — CEFEPIME 2 G: 2 INJECTION, POWDER, FOR SOLUTION INTRAVENOUS at 18:19

## 2019-03-15 RX ADMIN — METOPROLOL TARTRATE 10 MG: 5 INJECTION INTRAVENOUS at 22:25

## 2019-03-15 RX ADMIN — ONDANSETRON 4 MG: 2 INJECTION INTRAMUSCULAR; INTRAVENOUS at 17:21

## 2019-03-15 RX ADMIN — DULOXETINE 60 MG: 30 CAPSULE, DELAYED RELEASE ORAL at 21:50

## 2019-03-15 RX ADMIN — FAMOTIDINE 20 MG: 10 INJECTION, SOLUTION INTRAVENOUS at 13:31

## 2019-03-15 RX ADMIN — HYDROCODONE BITARTRATE AND ACETAMINOPHEN 1 TABLET: 5; 325 TABLET ORAL at 14:51

## 2019-03-15 RX ADMIN — METOPROLOL TARTRATE 25 MG: 25 TABLET ORAL at 21:48

## 2019-03-15 RX ADMIN — INSULIN LISPRO 5 UNITS: 100 INJECTION, SOLUTION INTRAVENOUS; SUBCUTANEOUS at 21:54

## 2019-03-15 RX ADMIN — ATORVASTATIN CALCIUM 40 MG: 40 TABLET, FILM COATED ORAL at 21:50

## 2019-03-15 RX ADMIN — LEVOTHYROXINE SODIUM 50 MCG: 50 TABLET ORAL at 21:49

## 2019-03-15 RX ADMIN — PROMETHAZINE HYDROCHLORIDE 25 MG: 25 INJECTION INTRAMUSCULAR; INTRAVENOUS at 14:51

## 2019-03-15 RX ADMIN — FAMOTIDINE 20 MG: 10 INJECTION, SOLUTION INTRAVENOUS at 21:51

## 2019-03-15 RX ADMIN — ENOXAPARIN SODIUM 40 MG: 40 INJECTION SUBCUTANEOUS at 21:50

## 2019-03-15 ASSESSMENT — PAIN DESCRIPTION - LOCATION
LOCATION: BACK;ARM
LOCATION: BACK;ABDOMEN
LOCATION: BACK
LOCATION: ABDOMEN

## 2019-03-15 ASSESSMENT — PAIN DESCRIPTION - ONSET
ONSET: ON-GOING
ONSET: ON-GOING

## 2019-03-15 ASSESSMENT — PAIN DESCRIPTION - PAIN TYPE
TYPE: ACUTE PAIN

## 2019-03-15 ASSESSMENT — PAIN SCALES - GENERAL
PAINLEVEL_OUTOF10: 9
PAINLEVEL_OUTOF10: 0
PAINLEVEL_OUTOF10: 8
PAINLEVEL_OUTOF10: 9
PAINLEVEL_OUTOF10: 8
PAINLEVEL_OUTOF10: 9

## 2019-03-15 ASSESSMENT — PAIN DESCRIPTION - DESCRIPTORS
DESCRIPTORS: DISCOMFORT
DESCRIPTORS: DISCOMFORT

## 2019-03-15 ASSESSMENT — PAIN DESCRIPTION - PROGRESSION
CLINICAL_PROGRESSION: NOT CHANGED
CLINICAL_PROGRESSION: NOT CHANGED

## 2019-03-15 ASSESSMENT — PAIN DESCRIPTION - ORIENTATION: ORIENTATION: LOWER

## 2019-03-15 ASSESSMENT — PAIN DESCRIPTION - FREQUENCY
FREQUENCY: CONTINUOUS
FREQUENCY: CONTINUOUS

## 2019-03-16 LAB
ALBUMIN SERPL-MCNC: 3.1 GM/DL (ref 3.4–5)
ALP BLD-CCNC: 107 IU/L (ref 40–128)
ALT SERPL-CCNC: 10 U/L (ref 10–40)
ANION GAP SERPL CALCULATED.3IONS-SCNC: 8 MMOL/L (ref 4–16)
AST SERPL-CCNC: 11 IU/L (ref 15–37)
BASOPHILS ABSOLUTE: 0 K/CU MM
BASOPHILS RELATIVE PERCENT: 0.3 % (ref 0–1)
BILIRUB SERPL-MCNC: 0.3 MG/DL (ref 0–1)
BUN BLDV-MCNC: 27 MG/DL (ref 6–23)
CALCIUM SERPL-MCNC: 7.9 MG/DL (ref 8.3–10.6)
CHLORIDE BLD-SCNC: 101 MMOL/L (ref 99–110)
CO2: 26 MMOL/L (ref 21–32)
CREAT SERPL-MCNC: 1.5 MG/DL (ref 0.6–1.1)
DIFFERENTIAL TYPE: ABNORMAL
EOSINOPHILS ABSOLUTE: 0.1 K/CU MM
EOSINOPHILS RELATIVE PERCENT: 1.5 % (ref 0–3)
GFR AFRICAN AMERICAN: 43 ML/MIN/1.73M2
GFR NON-AFRICAN AMERICAN: 36 ML/MIN/1.73M2
GLUCOSE BLD-MCNC: 142 MG/DL (ref 70–99)
GLUCOSE BLD-MCNC: 250 MG/DL (ref 70–99)
GLUCOSE BLD-MCNC: 268 MG/DL (ref 70–99)
GLUCOSE BLD-MCNC: 313 MG/DL (ref 70–99)
GLUCOSE BLD-MCNC: 327 MG/DL (ref 70–99)
HCT VFR BLD CALC: 36.1 % (ref 37–47)
HEMOGLOBIN: 10.7 GM/DL (ref 12.5–16)
IMMATURE NEUTROPHIL %: 0.6 % (ref 0–0.43)
LYMPHOCYTES ABSOLUTE: 2.4 K/CU MM
LYMPHOCYTES RELATIVE PERCENT: 27.5 % (ref 24–44)
MCH RBC QN AUTO: 26.8 PG (ref 27–31)
MCHC RBC AUTO-ENTMCNC: 29.6 % (ref 32–36)
MCV RBC AUTO: 90.3 FL (ref 78–100)
MONOCYTES ABSOLUTE: 0.6 K/CU MM
MONOCYTES RELATIVE PERCENT: 7 % (ref 0–4)
NUCLEATED RBC %: 0 %
PDW BLD-RTO: 14.4 % (ref 11.7–14.9)
PLATELET # BLD: 299 K/CU MM (ref 140–440)
PMV BLD AUTO: 10.5 FL (ref 7.5–11.1)
POTASSIUM SERPL-SCNC: 5 MMOL/L (ref 3.5–5.1)
RBC # BLD: 4 M/CU MM (ref 4.2–5.4)
SEGMENTED NEUTROPHILS ABSOLUTE COUNT: 5.5 K/CU MM
SEGMENTED NEUTROPHILS RELATIVE PERCENT: 63.1 % (ref 36–66)
SODIUM BLD-SCNC: 135 MMOL/L (ref 135–145)
TOTAL IMMATURE NEUTOROPHIL: 0.05 K/CU MM
TOTAL NUCLEATED RBC: 0 K/CU MM
TOTAL PROTEIN: 4.6 GM/DL (ref 6.4–8.2)
WBC # BLD: 8.6 K/CU MM (ref 4–10.5)

## 2019-03-16 PROCEDURE — 2580000003 HC RX 258: Performed by: NURSE PRACTITIONER

## 2019-03-16 PROCEDURE — 1200000000 HC SEMI PRIVATE

## 2019-03-16 PROCEDURE — 82962 GLUCOSE BLOOD TEST: CPT

## 2019-03-16 PROCEDURE — 93010 ELECTROCARDIOGRAM REPORT: CPT | Performed by: INTERNAL MEDICINE

## 2019-03-16 PROCEDURE — 6360000002 HC RX W HCPCS: Performed by: NURSE PRACTITIONER

## 2019-03-16 PROCEDURE — 94640 AIRWAY INHALATION TREATMENT: CPT

## 2019-03-16 PROCEDURE — 6370000000 HC RX 637 (ALT 250 FOR IP): Performed by: NURSE PRACTITIONER

## 2019-03-16 PROCEDURE — 80053 COMPREHEN METABOLIC PANEL: CPT

## 2019-03-16 PROCEDURE — 87086 URINE CULTURE/COLONY COUNT: CPT

## 2019-03-16 PROCEDURE — 85025 COMPLETE CBC W/AUTO DIFF WBC: CPT

## 2019-03-16 PROCEDURE — 94761 N-INVAS EAR/PLS OXIMETRY MLT: CPT

## 2019-03-16 PROCEDURE — 6370000000 HC RX 637 (ALT 250 FOR IP): Performed by: HOSPITALIST

## 2019-03-16 PROCEDURE — 36415 COLL VENOUS BLD VENIPUNCTURE: CPT

## 2019-03-16 PROCEDURE — 2500000003 HC RX 250 WO HCPCS: Performed by: NURSE PRACTITIONER

## 2019-03-16 RX ADMIN — CLOPIDOGREL BISULFATE 75 MG: 75 TABLET, FILM COATED ORAL at 08:16

## 2019-03-16 RX ADMIN — RANOLAZINE 500 MG: 500 TABLET, FILM COATED, EXTENDED RELEASE ORAL at 08:16

## 2019-03-16 RX ADMIN — HYDROCODONE BITARTRATE AND ACETAMINOPHEN 1 TABLET: 5; 325 TABLET ORAL at 15:36

## 2019-03-16 RX ADMIN — INSULIN LISPRO 12 UNITS: 100 INJECTION, SOLUTION INTRAVENOUS; SUBCUTANEOUS at 08:18

## 2019-03-16 RX ADMIN — GABAPENTIN 400 MG: 400 CAPSULE ORAL at 21:44

## 2019-03-16 RX ADMIN — ASPIRIN 81 MG 81 MG: 81 TABLET ORAL at 08:16

## 2019-03-16 RX ADMIN — INSULIN LISPRO 20 UNITS: 100 INJECTION, SOLUTION INTRAVENOUS; SUBCUTANEOUS at 12:13

## 2019-03-16 RX ADMIN — FAMOTIDINE 20 MG: 10 INJECTION, SOLUTION INTRAVENOUS at 21:43

## 2019-03-16 RX ADMIN — HYDROCODONE BITARTRATE AND ACETAMINOPHEN 1 TABLET: 5; 325 TABLET ORAL at 21:43

## 2019-03-16 RX ADMIN — RANOLAZINE 500 MG: 500 TABLET, FILM COATED, EXTENDED RELEASE ORAL at 21:44

## 2019-03-16 RX ADMIN — CEFTRIAXONE 1 G: 1 INJECTION, POWDER, FOR SOLUTION INTRAMUSCULAR; INTRAVENOUS at 05:05

## 2019-03-16 RX ADMIN — BUPROPION HYDROCHLORIDE 100 MG: 100 TABLET, FILM COATED, EXTENDED RELEASE ORAL at 21:43

## 2019-03-16 RX ADMIN — FAMOTIDINE 20 MG: 10 INJECTION, SOLUTION INTRAVENOUS at 08:16

## 2019-03-16 RX ADMIN — ATORVASTATIN CALCIUM 40 MG: 40 TABLET, FILM COATED ORAL at 21:43

## 2019-03-16 RX ADMIN — GABAPENTIN 400 MG: 400 CAPSULE ORAL at 14:38

## 2019-03-16 RX ADMIN — DOCUSATE SODIUM 100 MG: 100 CAPSULE, LIQUID FILLED ORAL at 21:43

## 2019-03-16 RX ADMIN — BUPROPION HYDROCHLORIDE 100 MG: 100 TABLET, FILM COATED, EXTENDED RELEASE ORAL at 08:16

## 2019-03-16 RX ADMIN — INSULIN LISPRO 9 UNITS: 100 INJECTION, SOLUTION INTRAVENOUS; SUBCUTANEOUS at 12:11

## 2019-03-16 RX ADMIN — Medication 2 PUFF: at 07:37

## 2019-03-16 RX ADMIN — SODIUM CHLORIDE, PRESERVATIVE FREE 10 ML: 5 INJECTION INTRAVENOUS at 23:25

## 2019-03-16 RX ADMIN — DOCUSATE SODIUM 100 MG: 100 CAPSULE, LIQUID FILLED ORAL at 08:16

## 2019-03-16 RX ADMIN — INSULIN LISPRO 3 UNITS: 100 INJECTION, SOLUTION INTRAVENOUS; SUBCUTANEOUS at 17:57

## 2019-03-16 RX ADMIN — ENOXAPARIN SODIUM 40 MG: 40 INJECTION SUBCUTANEOUS at 17:57

## 2019-03-16 RX ADMIN — HYDROCODONE BITARTRATE AND ACETAMINOPHEN 1 TABLET: 5; 325 TABLET ORAL at 03:59

## 2019-03-16 RX ADMIN — INSULIN GLARGINE 55 UNITS: 100 INJECTION, SOLUTION SUBCUTANEOUS at 21:45

## 2019-03-16 RX ADMIN — GABAPENTIN 400 MG: 400 CAPSULE ORAL at 08:16

## 2019-03-16 RX ADMIN — Medication 2000 UNITS: at 08:16

## 2019-03-16 RX ADMIN — METOPROLOL TARTRATE 25 MG: 25 TABLET ORAL at 21:44

## 2019-03-16 RX ADMIN — SODIUM CHLORIDE: 9 INJECTION, SOLUTION INTRAVENOUS at 10:15

## 2019-03-16 RX ADMIN — Medication 2 PUFF: at 21:00

## 2019-03-16 RX ADMIN — DULOXETINE 60 MG: 30 CAPSULE, DELAYED RELEASE ORAL at 08:16

## 2019-03-16 RX ADMIN — METOPROLOL TARTRATE 25 MG: 25 TABLET ORAL at 08:16

## 2019-03-16 RX ADMIN — INSULIN LISPRO 5 UNITS: 100 INJECTION, SOLUTION INTRAVENOUS; SUBCUTANEOUS at 21:44

## 2019-03-16 ASSESSMENT — PAIN SCALES - GENERAL
PAINLEVEL_OUTOF10: 10
PAINLEVEL_OUTOF10: 7
PAINLEVEL_OUTOF10: 8
PAINLEVEL_OUTOF10: 8

## 2019-03-16 ASSESSMENT — PAIN DESCRIPTION - PAIN TYPE
TYPE: CHRONIC PAIN
TYPE: ACUTE PAIN

## 2019-03-16 ASSESSMENT — PAIN DESCRIPTION - FREQUENCY
FREQUENCY: CONTINUOUS
FREQUENCY: CONTINUOUS

## 2019-03-16 ASSESSMENT — PAIN DESCRIPTION - ONSET
ONSET: ON-GOING
ONSET: ON-GOING

## 2019-03-16 ASSESSMENT — PAIN DESCRIPTION - LOCATION
LOCATION: BACK
LOCATION: BACK

## 2019-03-16 ASSESSMENT — PAIN DESCRIPTION - DESCRIPTORS
DESCRIPTORS: ACHING
DESCRIPTORS: DISCOMFORT

## 2019-03-16 ASSESSMENT — PAIN DESCRIPTION - PROGRESSION
CLINICAL_PROGRESSION: NOT CHANGED
CLINICAL_PROGRESSION: NOT CHANGED

## 2019-03-16 ASSESSMENT — PAIN DESCRIPTION - ORIENTATION
ORIENTATION: LOWER
ORIENTATION: LOWER

## 2019-03-17 VITALS
RESPIRATION RATE: 15 BRPM | BODY MASS INDEX: 50.02 KG/M2 | SYSTOLIC BLOOD PRESSURE: 125 MMHG | HEIGHT: 64 IN | WEIGHT: 293 LBS | TEMPERATURE: 98.5 F | OXYGEN SATURATION: 95 % | HEART RATE: 82 BPM | DIASTOLIC BLOOD PRESSURE: 68 MMHG

## 2019-03-17 LAB
GLUCOSE BLD-MCNC: 128 MG/DL (ref 70–99)
GLUCOSE BLD-MCNC: 228 MG/DL (ref 70–99)
GLUCOSE BLD-MCNC: 251 MG/DL (ref 70–99)

## 2019-03-17 PROCEDURE — 6370000000 HC RX 637 (ALT 250 FOR IP): Performed by: NURSE PRACTITIONER

## 2019-03-17 PROCEDURE — 94761 N-INVAS EAR/PLS OXIMETRY MLT: CPT

## 2019-03-17 PROCEDURE — 94640 AIRWAY INHALATION TREATMENT: CPT

## 2019-03-17 PROCEDURE — 6370000000 HC RX 637 (ALT 250 FOR IP): Performed by: HOSPITALIST

## 2019-03-17 PROCEDURE — 2580000003 HC RX 258: Performed by: NURSE PRACTITIONER

## 2019-03-17 PROCEDURE — 51798 US URINE CAPACITY MEASURE: CPT

## 2019-03-17 PROCEDURE — 2500000003 HC RX 250 WO HCPCS: Performed by: NURSE PRACTITIONER

## 2019-03-17 PROCEDURE — 82962 GLUCOSE BLOOD TEST: CPT

## 2019-03-17 RX ORDER — HYDROCODONE BITARTRATE AND ACETAMINOPHEN 5; 325 MG/1; MG/1
2 TABLET ORAL EVERY 4 HOURS PRN
Status: DISCONTINUED | OUTPATIENT
Start: 2019-03-17 | End: 2019-03-17 | Stop reason: HOSPADM

## 2019-03-17 RX ORDER — ONDANSETRON 4 MG/1
4 TABLET, ORALLY DISINTEGRATING ORAL EVERY 8 HOURS PRN
Status: DISCONTINUED | OUTPATIENT
Start: 2019-03-17 | End: 2019-03-17 | Stop reason: HOSPADM

## 2019-03-17 RX ORDER — ONDANSETRON 4 MG/1
4 TABLET, ORALLY DISINTEGRATING ORAL EVERY 8 HOURS PRN
Qty: 20 TABLET | Refills: 0 | Status: ON HOLD | OUTPATIENT
Start: 2019-03-17 | End: 2020-01-11 | Stop reason: HOSPADM

## 2019-03-17 RX ADMIN — DOCUSATE SODIUM 100 MG: 100 CAPSULE, LIQUID FILLED ORAL at 11:12

## 2019-03-17 RX ADMIN — BUPROPION HYDROCHLORIDE 100 MG: 100 TABLET, FILM COATED, EXTENDED RELEASE ORAL at 11:12

## 2019-03-17 RX ADMIN — INSULIN LISPRO 20 UNITS: 100 INJECTION, SOLUTION INTRAVENOUS; SUBCUTANEOUS at 13:32

## 2019-03-17 RX ADMIN — Medication 2000 UNITS: at 11:10

## 2019-03-17 RX ADMIN — HYDROCODONE BITARTRATE AND ACETAMINOPHEN 1 TABLET: 5; 325 TABLET ORAL at 06:01

## 2019-03-17 RX ADMIN — CLOPIDOGREL BISULFATE 75 MG: 75 TABLET, FILM COATED ORAL at 11:11

## 2019-03-17 RX ADMIN — METOPROLOL TARTRATE 25 MG: 25 TABLET ORAL at 11:10

## 2019-03-17 RX ADMIN — Medication 2 PUFF: at 08:10

## 2019-03-17 RX ADMIN — RANOLAZINE 500 MG: 500 TABLET, FILM COATED, EXTENDED RELEASE ORAL at 11:12

## 2019-03-17 RX ADMIN — HYDROCODONE BITARTRATE AND ACETAMINOPHEN 2 TABLET: 5; 325 TABLET ORAL at 15:16

## 2019-03-17 RX ADMIN — GABAPENTIN 400 MG: 400 CAPSULE ORAL at 11:10

## 2019-03-17 RX ADMIN — SODIUM CHLORIDE, PRESERVATIVE FREE 10 ML: 5 INJECTION INTRAVENOUS at 11:12

## 2019-03-17 RX ADMIN — INSULIN LISPRO 20 UNITS: 100 INJECTION, SOLUTION INTRAVENOUS; SUBCUTANEOUS at 09:23

## 2019-03-17 RX ADMIN — ASPIRIN 81 MG 81 MG: 81 TABLET ORAL at 11:10

## 2019-03-17 RX ADMIN — INSULIN LISPRO 6 UNITS: 100 INJECTION, SOLUTION INTRAVENOUS; SUBCUTANEOUS at 13:33

## 2019-03-17 RX ADMIN — LEVOTHYROXINE SODIUM 50 MCG: 50 TABLET ORAL at 11:11

## 2019-03-17 RX ADMIN — FAMOTIDINE 20 MG: 10 INJECTION, SOLUTION INTRAVENOUS at 11:12

## 2019-03-17 RX ADMIN — INSULIN LISPRO 20 UNITS: 100 INJECTION, SOLUTION INTRAVENOUS; SUBCUTANEOUS at 17:56

## 2019-03-17 RX ADMIN — DULOXETINE 60 MG: 30 CAPSULE, DELAYED RELEASE ORAL at 11:11

## 2019-03-17 RX ADMIN — ONDANSETRON 4 MG: 4 TABLET, ORALLY DISINTEGRATING ORAL at 17:56

## 2019-03-17 RX ADMIN — INSULIN LISPRO 9 UNITS: 100 INJECTION, SOLUTION INTRAVENOUS; SUBCUTANEOUS at 09:21

## 2019-03-17 RX ADMIN — GABAPENTIN 400 MG: 400 CAPSULE ORAL at 15:12

## 2019-03-17 ASSESSMENT — PAIN DESCRIPTION - PAIN TYPE
TYPE: CHRONIC PAIN
TYPE: ACUTE PAIN

## 2019-03-17 ASSESSMENT — PAIN DESCRIPTION - ORIENTATION
ORIENTATION: RIGHT;LOWER
ORIENTATION: LOWER

## 2019-03-17 ASSESSMENT — PAIN DESCRIPTION - FREQUENCY
FREQUENCY: CONTINUOUS
FREQUENCY: CONTINUOUS

## 2019-03-17 ASSESSMENT — PAIN SCALES - GENERAL
PAINLEVEL_OUTOF10: 1
PAINLEVEL_OUTOF10: 0
PAINLEVEL_OUTOF10: 9
PAINLEVEL_OUTOF10: 9

## 2019-03-17 ASSESSMENT — PAIN DESCRIPTION - ONSET
ONSET: GRADUAL
ONSET: ON-GOING

## 2019-03-17 ASSESSMENT — PAIN DESCRIPTION - LOCATION
LOCATION: ABDOMEN
LOCATION: BACK

## 2019-03-17 ASSESSMENT — PAIN DESCRIPTION - PROGRESSION
CLINICAL_PROGRESSION: NOT CHANGED
CLINICAL_PROGRESSION: NOT CHANGED

## 2019-03-17 ASSESSMENT — PAIN DESCRIPTION - DESCRIPTORS
DESCRIPTORS: ACHING
DESCRIPTORS: SHARP

## 2019-03-18 LAB
CULTURE: NORMAL
Lab: NORMAL
SPECIMEN: NORMAL

## 2019-03-19 LAB
EKG ATRIAL RATE: 122 BPM
EKG ATRIAL RATE: 94 BPM
EKG DIAGNOSIS: NORMAL
EKG DIAGNOSIS: NORMAL
EKG P AXIS: 15 DEGREES
EKG P AXIS: 31 DEGREES
EKG P-R INTERVAL: 176 MS
EKG P-R INTERVAL: 186 MS
EKG Q-T INTERVAL: 308 MS
EKG Q-T INTERVAL: 358 MS
EKG QRS DURATION: 88 MS
EKG QRS DURATION: 92 MS
EKG QTC CALCULATION (BAZETT): 438 MS
EKG QTC CALCULATION (BAZETT): 447 MS
EKG R AXIS: 15 DEGREES
EKG R AXIS: 23 DEGREES
EKG T AXIS: 51 DEGREES
EKG T AXIS: 70 DEGREES
EKG VENTRICULAR RATE: 122 BPM
EKG VENTRICULAR RATE: 94 BPM

## 2019-04-23 ENCOUNTER — HOSPITAL ENCOUNTER (INPATIENT)
Age: 59
LOS: 4 days | Discharge: HOME | DRG: 683 | End: 2019-04-27
Attending: EMERGENCY MEDICINE | Admitting: INTERNAL MEDICINE
Payer: MEDICARE

## 2019-04-23 ENCOUNTER — APPOINTMENT (OUTPATIENT)
Dept: GENERAL RADIOLOGY | Age: 59
DRG: 683 | End: 2019-04-23
Payer: MEDICARE

## 2019-04-23 DIAGNOSIS — M25.511 ACUTE PAIN OF RIGHT SHOULDER: ICD-10-CM

## 2019-04-23 DIAGNOSIS — I50.9 ACUTE ON CHRONIC CONGESTIVE HEART FAILURE, UNSPECIFIED HEART FAILURE TYPE (HCC): ICD-10-CM

## 2019-04-23 DIAGNOSIS — M54.50 ACUTE MIDLINE LOW BACK PAIN WITHOUT SCIATICA: ICD-10-CM

## 2019-04-23 DIAGNOSIS — M79.3 PANNICULITIS: Primary | ICD-10-CM

## 2019-04-23 PROBLEM — R60.1 ANASARCA: Status: ACTIVE | Noted: 2019-04-23

## 2019-04-23 LAB
ALBUMIN SERPL-MCNC: 3.4 GM/DL (ref 3.4–5)
ALP BLD-CCNC: 137 IU/L (ref 40–129)
ALT SERPL-CCNC: 18 U/L (ref 10–40)
ANION GAP SERPL CALCULATED.3IONS-SCNC: 10 MMOL/L (ref 4–16)
AST SERPL-CCNC: 15 IU/L (ref 15–37)
BACTERIA: ABNORMAL /HPF
BASOPHILS ABSOLUTE: 0 K/CU MM
BASOPHILS RELATIVE PERCENT: 0.3 % (ref 0–1)
BILIRUB SERPL-MCNC: 0.3 MG/DL (ref 0–1)
BILIRUBIN URINE: NEGATIVE MG/DL
BLOOD, URINE: ABNORMAL
BUN BLDV-MCNC: 27 MG/DL (ref 6–23)
CALCIUM SERPL-MCNC: 8.5 MG/DL (ref 8.3–10.6)
CHLORIDE BLD-SCNC: 99 MMOL/L (ref 99–110)
CLARITY: CLEAR
CO2: 27 MMOL/L (ref 21–32)
COLOR: ABNORMAL
CREAT SERPL-MCNC: 1.3 MG/DL (ref 0.6–1.1)
DIFFERENTIAL TYPE: ABNORMAL
EOSINOPHILS ABSOLUTE: 0.2 K/CU MM
EOSINOPHILS RELATIVE PERCENT: 1.7 % (ref 0–3)
GFR AFRICAN AMERICAN: 51 ML/MIN/1.73M2
GFR NON-AFRICAN AMERICAN: 42 ML/MIN/1.73M2
GLUCOSE BLD-MCNC: 192 MG/DL (ref 70–99)
GLUCOSE BLD-MCNC: 206 MG/DL (ref 70–99)
GLUCOSE, URINE: 50 MG/DL
HCT VFR BLD CALC: 35 % (ref 37–47)
HEMOGLOBIN: 10.7 GM/DL (ref 12.5–16)
IMMATURE NEUTROPHIL %: 0.9 % (ref 0–0.43)
KETONES, URINE: NEGATIVE MG/DL
LACTATE: 0.9 MMOL/L (ref 0.4–2)
LEUKOCYTE ESTERASE, URINE: NEGATIVE
LYMPHOCYTES ABSOLUTE: 1.7 K/CU MM
LYMPHOCYTES RELATIVE PERCENT: 16.1 % (ref 24–44)
MCH RBC QN AUTO: 26.9 PG (ref 27–31)
MCHC RBC AUTO-ENTMCNC: 30.6 % (ref 32–36)
MCV RBC AUTO: 87.9 FL (ref 78–100)
MONOCYTES ABSOLUTE: 0.7 K/CU MM
MONOCYTES RELATIVE PERCENT: 6.7 % (ref 0–4)
MUCUS: ABNORMAL HPF
NITRITE URINE, QUANTITATIVE: NEGATIVE
NUCLEATED RBC %: 0 %
PDW BLD-RTO: 14 % (ref 11.7–14.9)
PH, URINE: 5 (ref 5–8)
PLATELET # BLD: 375 K/CU MM (ref 140–440)
PMV BLD AUTO: 10.2 FL (ref 7.5–11.1)
POTASSIUM SERPL-SCNC: 5.1 MMOL/L (ref 3.5–5.1)
PRO-BNP: 1694 PG/ML
PROTEIN UA: 100 MG/DL
RBC # BLD: 3.98 M/CU MM (ref 4.2–5.4)
RBC URINE: 4 /HPF (ref 0–6)
SEGMENTED NEUTROPHILS ABSOLUTE COUNT: 7.6 K/CU MM
SEGMENTED NEUTROPHILS RELATIVE PERCENT: 74.3 % (ref 36–66)
SODIUM BLD-SCNC: 136 MMOL/L (ref 135–145)
SPECIFIC GRAVITY UA: 1.01 (ref 1–1.03)
SQUAMOUS EPITHELIAL: 2 /HPF
TOTAL IMMATURE NEUTOROPHIL: 0.09 K/CU MM
TOTAL NUCLEATED RBC: 0 K/CU MM
TOTAL PROTEIN: 5.5 GM/DL (ref 6.4–8.2)
TRANSITIONAL EPITHELIAL: <1 /HPF
TRICHOMONAS: ABNORMAL /HPF
TROPONIN T: 0.03 NG/ML
UROBILINOGEN, URINE: NORMAL MG/DL (ref 0.2–1)
WBC # BLD: 10.3 K/CU MM (ref 4–10.5)
WBC UA: <1 /HPF (ref 0–5)

## 2019-04-23 PROCEDURE — 2580000003 HC RX 258: Performed by: EMERGENCY MEDICINE

## 2019-04-23 PROCEDURE — 82962 GLUCOSE BLOOD TEST: CPT

## 2019-04-23 PROCEDURE — 6360000002 HC RX W HCPCS: Performed by: EMERGENCY MEDICINE

## 2019-04-23 PROCEDURE — 80053 COMPREHEN METABOLIC PANEL: CPT

## 2019-04-23 PROCEDURE — 83880 ASSAY OF NATRIURETIC PEPTIDE: CPT

## 2019-04-23 PROCEDURE — 84484 ASSAY OF TROPONIN QUANT: CPT

## 2019-04-23 PROCEDURE — 83605 ASSAY OF LACTIC ACID: CPT

## 2019-04-23 PROCEDURE — 87798 DETECT AGENT NOS DNA AMP: CPT

## 2019-04-23 PROCEDURE — 96366 THER/PROPH/DIAG IV INF ADDON: CPT

## 2019-04-23 PROCEDURE — 87186 SC STD MICRODIL/AGAR DIL: CPT

## 2019-04-23 PROCEDURE — 36415 COLL VENOUS BLD VENIPUNCTURE: CPT

## 2019-04-23 PROCEDURE — 99285 EMERGENCY DEPT VISIT HI MDM: CPT

## 2019-04-23 PROCEDURE — 81001 URINALYSIS AUTO W/SCOPE: CPT

## 2019-04-23 PROCEDURE — 85025 COMPLETE CBC W/AUTO DIFF WBC: CPT

## 2019-04-23 PROCEDURE — 6370000000 HC RX 637 (ALT 250 FOR IP): Performed by: INTERNAL MEDICINE

## 2019-04-23 PROCEDURE — 6360000002 HC RX W HCPCS: Performed by: INTERNAL MEDICINE

## 2019-04-23 PROCEDURE — 2500000003 HC RX 250 WO HCPCS: Performed by: INTERNAL MEDICINE

## 2019-04-23 PROCEDURE — 96365 THER/PROPH/DIAG IV INF INIT: CPT

## 2019-04-23 PROCEDURE — 87040 BLOOD CULTURE FOR BACTERIA: CPT

## 2019-04-23 PROCEDURE — 96375 TX/PRO/DX INJ NEW DRUG ADDON: CPT

## 2019-04-23 PROCEDURE — 93005 ELECTROCARDIOGRAM TRACING: CPT | Performed by: EMERGENCY MEDICINE

## 2019-04-23 PROCEDURE — 6370000000 HC RX 637 (ALT 250 FOR IP): Performed by: EMERGENCY MEDICINE

## 2019-04-23 PROCEDURE — 1200000000 HC SEMI PRIVATE

## 2019-04-23 PROCEDURE — 71045 X-RAY EXAM CHEST 1 VIEW: CPT

## 2019-04-23 RX ORDER — METOPROLOL TARTRATE 50 MG/1
50 TABLET, FILM COATED ORAL 2 TIMES DAILY
Status: DISCONTINUED | OUTPATIENT
Start: 2019-04-23 | End: 2019-04-27 | Stop reason: HOSPADM

## 2019-04-23 RX ORDER — NYSTATIN 100000 [USP'U]/G
POWDER TOPICAL 2 TIMES DAILY PRN
Refills: 1 | COMMUNITY
Start: 2019-04-19

## 2019-04-23 RX ORDER — DULOXETIN HYDROCHLORIDE 30 MG/1
60 CAPSULE, DELAYED RELEASE ORAL DAILY
Status: DISCONTINUED | OUTPATIENT
Start: 2019-04-24 | End: 2019-04-27 | Stop reason: HOSPADM

## 2019-04-23 RX ORDER — ONDANSETRON 4 MG/1
4 TABLET, ORALLY DISINTEGRATING ORAL EVERY 8 HOURS PRN
Status: DISCONTINUED | OUTPATIENT
Start: 2019-04-23 | End: 2019-04-27 | Stop reason: HOSPADM

## 2019-04-23 RX ORDER — ASPIRIN 81 MG/1
81 TABLET, CHEWABLE ORAL DAILY
Status: DISCONTINUED | OUTPATIENT
Start: 2019-04-24 | End: 2019-04-27 | Stop reason: HOSPADM

## 2019-04-23 RX ORDER — PANTOPRAZOLE SODIUM 40 MG/1
40 TABLET, DELAYED RELEASE ORAL
Status: DISCONTINUED | OUTPATIENT
Start: 2019-04-24 | End: 2019-04-27 | Stop reason: HOSPADM

## 2019-04-23 RX ORDER — CLONIDINE HYDROCHLORIDE 0.1 MG/1
0.1 TABLET ORAL 4 TIMES DAILY PRN
Status: DISCONTINUED | OUTPATIENT
Start: 2019-04-23 | End: 2019-04-26

## 2019-04-23 RX ORDER — INSULIN GLARGINE 100 [IU]/ML
55 INJECTION, SOLUTION SUBCUTANEOUS NIGHTLY
Status: DISCONTINUED | OUTPATIENT
Start: 2019-04-23 | End: 2019-04-27 | Stop reason: HOSPADM

## 2019-04-23 RX ORDER — NICOTINE POLACRILEX 4 MG
15 LOZENGE BUCCAL PRN
Status: DISCONTINUED | OUTPATIENT
Start: 2019-04-23 | End: 2019-04-27 | Stop reason: HOSPADM

## 2019-04-23 RX ORDER — GABAPENTIN 800 MG/1
800 TABLET ORAL 3 TIMES DAILY
Refills: 3 | Status: ON HOLD | COMMUNITY
Start: 2019-04-19 | End: 2019-04-27 | Stop reason: SDUPTHER

## 2019-04-23 RX ORDER — DOCUSATE SODIUM 100 MG/1
100 CAPSULE, LIQUID FILLED ORAL 2 TIMES DAILY
Status: DISCONTINUED | OUTPATIENT
Start: 2019-04-23 | End: 2019-04-27 | Stop reason: HOSPADM

## 2019-04-23 RX ORDER — HYDROCODONE BITARTRATE AND ACETAMINOPHEN 5; 325 MG/1; MG/1
1 TABLET ORAL EVERY 4 HOURS PRN
Status: DISCONTINUED | OUTPATIENT
Start: 2019-04-23 | End: 2019-04-27 | Stop reason: HOSPADM

## 2019-04-23 RX ORDER — CLOPIDOGREL BISULFATE 75 MG/1
75 TABLET ORAL DAILY
Status: DISCONTINUED | OUTPATIENT
Start: 2019-04-24 | End: 2019-04-27 | Stop reason: HOSPADM

## 2019-04-23 RX ORDER — CEFAZOLIN SODIUM 1 G/50ML
1 INJECTION, SOLUTION INTRAVENOUS EVERY 8 HOURS
Status: DISCONTINUED | OUTPATIENT
Start: 2019-04-23 | End: 2019-04-27 | Stop reason: HOSPADM

## 2019-04-23 RX ORDER — ASPIRIN 81 MG/1
324 TABLET, CHEWABLE ORAL ONCE
Status: COMPLETED | OUTPATIENT
Start: 2019-04-23 | End: 2019-04-23

## 2019-04-23 RX ORDER — METOPROLOL TARTRATE 50 MG/1
50 TABLET, FILM COATED ORAL 2 TIMES DAILY
Status: ON HOLD | COMMUNITY
End: 2019-04-27 | Stop reason: SDUPTHER

## 2019-04-23 RX ORDER — LACTOBACILLUS RHAMNOSUS GG 10B CELL
1 CAPSULE ORAL 2 TIMES DAILY WITH MEALS
Status: DISCONTINUED | OUTPATIENT
Start: 2019-04-24 | End: 2019-04-27 | Stop reason: HOSPADM

## 2019-04-23 RX ORDER — HYDRALAZINE HYDROCHLORIDE 50 MG/1
50 TABLET, FILM COATED ORAL 3 TIMES DAILY
Status: DISCONTINUED | OUTPATIENT
Start: 2019-04-23 | End: 2019-04-24

## 2019-04-23 RX ORDER — FUROSEMIDE 10 MG/ML
40 INJECTION INTRAMUSCULAR; INTRAVENOUS ONCE
Status: COMPLETED | OUTPATIENT
Start: 2019-04-23 | End: 2019-04-23

## 2019-04-23 RX ORDER — DEXTROSE MONOHYDRATE 25 G/50ML
12.5 INJECTION, SOLUTION INTRAVENOUS PRN
Status: DISCONTINUED | OUTPATIENT
Start: 2019-04-23 | End: 2019-04-27 | Stop reason: HOSPADM

## 2019-04-23 RX ORDER — DEXTROSE MONOHYDRATE 50 MG/ML
100 INJECTION, SOLUTION INTRAVENOUS PRN
Status: DISCONTINUED | OUTPATIENT
Start: 2019-04-23 | End: 2019-04-27 | Stop reason: HOSPADM

## 2019-04-23 RX ORDER — ATORVASTATIN CALCIUM 40 MG/1
40 TABLET, FILM COATED ORAL NIGHTLY
Status: DISCONTINUED | OUTPATIENT
Start: 2019-04-23 | End: 2019-04-27 | Stop reason: HOSPADM

## 2019-04-23 RX ORDER — BUPROPION HYDROCHLORIDE 100 MG/1
100 TABLET, EXTENDED RELEASE ORAL 2 TIMES DAILY
Status: DISCONTINUED | OUTPATIENT
Start: 2019-04-23 | End: 2019-04-27 | Stop reason: HOSPADM

## 2019-04-23 RX ORDER — LEVOTHYROXINE SODIUM 0.05 MG/1
50 TABLET ORAL DAILY
Status: DISCONTINUED | OUTPATIENT
Start: 2019-04-24 | End: 2019-04-27 | Stop reason: HOSPADM

## 2019-04-23 RX ORDER — DOXYCYCLINE HYCLATE 100 MG
100 TABLET ORAL EVERY 12 HOURS SCHEDULED
Status: DISCONTINUED | OUTPATIENT
Start: 2019-04-23 | End: 2019-04-27 | Stop reason: HOSPADM

## 2019-04-23 RX ORDER — FUROSEMIDE 10 MG/ML
40 INJECTION INTRAMUSCULAR; INTRAVENOUS 2 TIMES DAILY
Status: DISCONTINUED | OUTPATIENT
Start: 2019-04-24 | End: 2019-04-25

## 2019-04-23 RX ORDER — GABAPENTIN 400 MG/1
800 CAPSULE ORAL 3 TIMES DAILY
Status: DISCONTINUED | OUTPATIENT
Start: 2019-04-23 | End: 2019-04-26

## 2019-04-23 RX ORDER — RANOLAZINE 500 MG/1
500 TABLET, EXTENDED RELEASE ORAL 2 TIMES DAILY
Status: DISCONTINUED | OUTPATIENT
Start: 2019-04-23 | End: 2019-04-27 | Stop reason: HOSPADM

## 2019-04-23 RX ADMIN — DOCUSATE SODIUM 100 MG: 100 CAPSULE, LIQUID FILLED ORAL at 22:59

## 2019-04-23 RX ADMIN — MICONAZOLE NITRATE: 2 POWDER TOPICAL at 22:58

## 2019-04-23 RX ADMIN — INSULIN GLARGINE 55 UNITS: 100 INJECTION, SOLUTION SUBCUTANEOUS at 22:57

## 2019-04-23 RX ADMIN — DOXYCYCLINE HYCLATE 100 MG: 100 TABLET, COATED ORAL at 22:59

## 2019-04-23 RX ADMIN — FUROSEMIDE 40 MG: 10 INJECTION, SOLUTION INTRAVENOUS at 19:24

## 2019-04-23 RX ADMIN — HYDRALAZINE HYDROCHLORIDE 50 MG: 50 TABLET, FILM COATED ORAL at 22:59

## 2019-04-23 RX ADMIN — RANOLAZINE 500 MG: 500 TABLET, FILM COATED, EXTENDED RELEASE ORAL at 22:59

## 2019-04-23 RX ADMIN — METOPROLOL TARTRATE 50 MG: 50 TABLET ORAL at 22:59

## 2019-04-23 RX ADMIN — VANCOMYCIN HYDROCHLORIDE 2000 MG: 1 INJECTION, POWDER, LYOPHILIZED, FOR SOLUTION INTRAVENOUS at 18:54

## 2019-04-23 RX ADMIN — HYDROCODONE BITARTRATE AND ACETAMINOPHEN 1 TABLET: 5; 325 TABLET ORAL at 22:59

## 2019-04-23 RX ADMIN — BUPROPION HYDROCHLORIDE 100 MG: 100 TABLET, FILM COATED, EXTENDED RELEASE ORAL at 22:59

## 2019-04-23 RX ADMIN — CEFAZOLIN SODIUM 1 G: 1 INJECTION, SOLUTION INTRAVENOUS at 22:58

## 2019-04-23 RX ADMIN — ATORVASTATIN CALCIUM 40 MG: 40 TABLET, FILM COATED ORAL at 23:00

## 2019-04-23 RX ADMIN — ASPIRIN 324 MG: 81 TABLET, CHEWABLE ORAL at 18:44

## 2019-04-23 RX ADMIN — INSULIN LISPRO 1 UNITS: 100 INJECTION, SOLUTION INTRAVENOUS; SUBCUTANEOUS at 22:58

## 2019-04-23 RX ADMIN — GABAPENTIN 800 MG: 400 CAPSULE ORAL at 23:00

## 2019-04-23 ASSESSMENT — PAIN SCALES - GENERAL
PAINLEVEL_OUTOF10: 4
PAINLEVEL_OUTOF10: 10
PAINLEVEL_OUTOF10: 9
PAINLEVEL_OUTOF10: 8

## 2019-04-23 ASSESSMENT — PAIN DESCRIPTION - LOCATION
LOCATION: ABDOMEN
LOCATION: ABDOMEN

## 2019-04-23 ASSESSMENT — PAIN DESCRIPTION - PAIN TYPE
TYPE: ACUTE PAIN
TYPE: ACUTE PAIN

## 2019-04-23 ASSESSMENT — PAIN DESCRIPTION - ORIENTATION: ORIENTATION: LEFT

## 2019-04-23 NOTE — ED NOTES
Handoff report given to [facility]. RN will resume care of patient at this time.       Wanda Sam RN  04/23/19 0099

## 2019-04-23 NOTE — ED NOTES
Report received from [facility], care assumed at this time      Vidhya Chaparro, RN  04/23/19 8711

## 2019-04-23 NOTE — ED PROVIDER NOTES
Emergency Department Encounter  Location: [address] [place] [address]    Patient: Marielle Arce  MRN: [id number]  : 1960  Date of evaluation: 2019  ED Provider: Adithya Whitlock DO    Chief Complaint:    Abdominal Pain and Shortness of Breath    [place]:  Marielle Arce is a 62 y.o. female that presents to the emergency department with two complaints. First is of lower abdominal pain and swelling over the past 5 days. Has a history of cellulitis in this area. No measured fevers or chills. Able to eat and drink but reports a poor appetite. Also complains of dyspnea, onset today. Reports mild cough which is nonproductive. Indicates that her legs seem to be increasingly swollen over the past couple of days. She denies dysuria or urgency but reports decreased urination which she attributes to decreased intake. ROS:  At least 10 systems reviewed and otherwise acutely negative except as in the [address]     Past Medical History:   Diagnosis Date    CAD (coronary artery disease)     CKD (chronic kidney disease) stage 3, GFR 30-59 ml/min (HCC)     Diabetes type 2, uncontrolled (HCC)     Diastolic heart failure ([address]     Essential hypertension     Financial problems 3/22/2013    Generalized anxiety disorder 2018    Herpes genitalia     Obesity, morbid (more than 100 lbs over ideal weight or BMI > 40) ([address] 2013     Past Surgical History:   Procedure Laterality Date     SECTION      CHOLECYSTECTOMY      OTHER SURGICAL HISTORY Right [id number]    I and D rt big toe    TONSILLECTOMY       Family History   Problem Relation Age of Onset    Arthritis Mother     Diabetes Mother     Heart Disease Mother     High Blood Pressure Mother     Arthritis Father     Heart Disease Father     High Blood Pressure Father     Stroke Father     Substance Abuse Father     Substance Abuse Brother     Diabetes Maternal Aunt     Diabetes Maternal Uncle     Cancer Maternal Grandmother     Diabetes Maternal Grandmother     Diabetes Maternal Grandfather      Social History     Socioeconomic History    Marital status: Single     Spouse name: Not on file    Number of children: Not on file    Years of education: Not on file    Highest education level: Not on file   Occupational History    Not on file   Social Needs    Financial resource strain: Not on file    Food insecurity:     Worry: Not on file     Inability: Not on file    Transportation needs:     Medical: Not on file     Non-medical: Not on file   Tobacco Use    Smoking status: Former Smoker     Packs/day: 2.00     Years: 34.00     Pack years: 68.00     Types: Cigarettes     Last attempt to quit: 2000     Years since quittin.3    Smokeless tobacco: Never Used   Substance and Sexual Activity    Alcohol use: No     Alcohol/week: 0.0 oz    Drug use: No    Sexual activity: Not on file   Lifestyle    Physical activity:     Days per week: Not on file     Minutes per session: Not on file    Stress: Not on file   Relationships    Social connections:     Talks on phone: Not on file     Gets together: Not on file     Attends [employer] service: Not on file     Active member of club or organization: Not on file     Attends meetings of clubs or organizations: Not on file     Relationship status: Not on file    Intimate partner violence:     Fear of current or ex partner: Not on file     Emotionally abused: Not on file     Physically abused: Not on file     Forced sexual activity: Not on file   Other Topics Concern    Not on file   Social History Narrative    Not on file     Current Facility-Administered Medications   Medication Dose Route Frequency Provider Last Rate Last Dose    vancomycin (VANCOCIN) 2,000 mg in dextrose 5 % 500 mL IVPB  2,000 mg Intravenous Once Alberto Burr,  mL/hr at 19 1854 2,000 mg at 19 1854     Current Outpatient Medications   Medication Sig Dispense Refill    ondansetron (ZOFRAN-ODT) 4 MG disintegrating tablet Take 1 tablet by mouth every 8 hours as needed for Nausea or Vomiting 20 tablet 0    metoprolol tartrate (LOPRESSOR) 50 MG tablet Take 0.5 tablets by mouth 2 times daily 60 tablet 3    ranolazine (RANEXA) 500 MG extended release tablet Take 1 tablet by mouth 2 times daily 60 tablet 3    gabapentin (NEURONTIN) 400 MG capsule Take 1 capsule by mouth 3 times daily for 30 days. 90 capsule 3    Cholecalciferol (VITAMIN D3) 2000 units TABS Take 2,000 Units by mouth daily  3    BREO ELLIPTA 100-25 MCG/INH AEPB inhaler Inhale 1 puff into the lungs daily  0    HYDROcodone-acetaminophen (NORCO) 5-325 MG per tablet Take 1 tablet by mouth every 4 hours as needed. Sara January   0    levothyroxine (SYNTHROID) 50 MCG tablet Take 50 mcg by mouth daily  3    VICTOZA 18 MG/3ML SOPN SC injection Inject 1.2 mg into the skin daily  3    insulin glargine (LANTUS SOLOSTAR) 100 UNIT/ML injection pen Inject 55 Units into the skin nightly 5 pen 1    insulin lispro (HUMALOG) 100 UNIT/ML injection vial Inject 20 Units into the skin 3 times daily (with meals) (Patient taking differently: Inject 25 Units into the skin 3 times daily (with meals) ) 1 vial 0    linagliptin (TRADJENTA) 5 MG tablet Take 1 tablet by mouth daily 30 tablet 3    buPROPion (WELLBUTRIN SR) 100 MG extended release tablet Take 100 mg by mouth 2 times daily      aspirin 81 MG chewable tablet Take 1 tablet by mouth daily 30 tablet 0    DULoxetine (CYMBALTA) 60 MG extended release capsule Take 1 capsule by mouth daily 30 capsule 3    atorvastatin (LIPITOR) 40 MG tablet Take 1 tablet by mouth nightly 30 tablet 0    docusate (COLACE, DULCOLAX) 100 MG CAPS Take 100 mg by mouth 2 times daily 60 capsule 1    clopidogrel (PLAVIX) 75 MG tablet Take 1 tablet by mouth daily 30 tablet 0    pantoprazole (PROTONIX) 40 MG tablet Take 1 tablet by mouth every morning (before breakfast) 30 tablet 3    albuterol sulfate HFA (VENTOLIN HFA) 108 (90 Base) MCG/ACT inhaler Inhale 2 puffs into the lungs every 4 hours as needed for Wheezing 1 Inhaler 3     No Known Allergies    Nursing Notes Reviewed    Physical Exam:  ED Triage Vitals [04/23/19 1646]   Enc Vitals Group      BP (!) 209/89      Pulse 93      Resp 18      Temp 98.1 °F (36.7 °C)      Temp Source Oral      SpO2 94 %      Weight 300 lb (136.1 kg)      Height 5' 4\" (1.626 m)      Head Circumference       Peak Flow       Pain Score       Pain Loc       Pain Edu? Excl. in [address]? GENERAL APPEARANCE: Awake and alert. Cooperative. Appears fatigued. Body mass index is 51.49 kg/m². HEAD: Normocephalic. Atraumatic. EYES: EOM's grossly intact. Sclera anicteric. ENT: Tolerates saliva. No trismus. NECK: Supple. Trachea midline. CARDIO: RRR. Radial pulse 2+. LUNGS: Respirations unlabored. Fine end expiratory wheezing. ABDOMEN: Soft. Non-distended. Lower pannus is edematous, warm and erythematous, primarily on the right side. No focal areas of induration. EXTREMITIES: No acute deformities. No lower extremity asymmetry although there is +1-2 pitting edema bilaterally. SKIN: Warm and dry. NEUROLOGICAL: No gross facial drooping. Moves all 4 extremities spontaneously. PSYCHIATRIC: Normal mood.      Labs:  Results for orders placed or performed during the hospital encounter of 04/23/19   CBC Auto Differential   Result Value Ref Range    WBC 10.3 4.0 - 10.5 K/CU MM    RBC 3.98 (L) 4.2 - 5.4 M/CU MM    Hemoglobin 10.7 (L) 12.5 - 16.0 GM/DL    Hematocrit 35.0 (L) 37 - 47 %    MCV 87.9 78 - 100 FL    MCH 26.9 (L) 27 - 31 PG    MCHC 30.6 (L) 32.0 - 36.0 %    RDW 14.0 11.7 - 14.9 %    Platelets 037 842 - 956 K/CU MM    MPV 10.2 7.5 - 11.1 FL    Differential Type AUTOMATED DIFFERENTIAL     Segs Relative 74.3 (H) 36 - 66 %    Lymphocytes % 16.1 (L) 24 - 44 %    Monocytes % 6.7 (H) 0 - 4 %    Eosinophils % 1.7 0 - 3 %    Basophils % 0.3 0 - 1 %    Segs Absolute 7.6 K/CU MM Lymphocytes # 1.7 K/CU MM    Monocytes # 0.7 K/CU MM    Eosinophils # 0.2 K/CU MM    Basophils # 0.0 K/CU MM    Nucleated RBC % 0.0 %    Total Nucleated RBC 0.0 K/CU MM    Total Immature Neutrophil 0.09 K/CU MM    Immature Neutrophil % 0.9 (H) 0 - 0.43 %   CMP   Result Value Ref Range    Sodium 136 135 - 145 MMOL/L    Potassium 5.1 3.5 - 5.1 MMOL/L    Chloride 99 99 - 110 mMol/L    CO2 27 21 - 32 MMOL/L    BUN 27 (H) 6 - 23 MG/DL    CREATININE 1.3 (H) 0.6 - 1.1 MG/DL    Glucose 192 (H) 70 - 99 MG/DL    Calcium 8.5 8.3 - 10.6 MG/DL    Alb 3.4 3.4 - 5.0 GM/DL    Total Protein 5.5 (L) 6.4 - 8.2 GM/DL    Total Bilirubin 0.3 0.0 - 1.0 MG/DL    ALT 18 10 - 40 U/L    AST 15 15 - 37 IU/L    Alkaline Phosphatase 137 (H) 40 - 129 IU/L    GFR Non- 42 (L) >60 mL/min/1.73m2    GFR  51 (L) >60 mL/min/1.73m2    Anion Gap 10 4 - 16   Lactic Acid, Plasma   Result Value Ref Range    Lactate 0.9 0.4 - 2.0 mMOL/L   Brain Natriuretic Peptide   Result Value Ref Range    Pro-BNP 1,694 (H) <300 PG/ML   Troponin   Result Value Ref Range    Troponin T 0.032 (H) <0.01 NG/ML       EKG (if obtained): (All EKG's are interpreted by myself in the absence of a cardiologist)  Sinus rhythm at 86. Normal axis with good R progression. No ST elevation or depression. No ectopy. EKG from prior tracing. Radiographs (if obtained):  [] The following radiograph was interpreted by myself in the absence of a radiologist:  [x] Radiologist's Report reviewed at time of ED visit:  Xr Chest Portable    Result Date: 4/23/2019  EXAMINATION: SINGLE XRAY VIEW OF THE CHEST 4/23/2019 5:50 pm COMPARISON: February 28, 2019 HISTORY: ORDERING SYSTEM PROVIDED HISTORY: chest pain TECHNOLOGIST PROVIDED HISTORY: Reason for exam:->chest pain Ordering Physician Provided Reason for Exam: sob Acuity: Chronic Type of Exam: Ongoing Relevant Medical/Surgical History: cad    ciabetes FINDINGS: Atelectasis left lower lobe.   Cannot exclude minimal left effusion. Stable heart size and mediastinal contours. Left lower lobe atelectasis plus or minus minimal effusion. ED Course and MDM:  Patient's exam is concerning for cellulitis. She does have some powder in the folds under the pannus, but I don't appreciate any julia candidal infection. She is given vancomycin for this. Labs and imaging reviewed. No significant leukocytosis. Renal function is stable. She does have evidence of a left pleural effusion with elevated BNP, consistent with CHF exacerbation. Is given aspirin as well as Lasix here. She remains without respiratory distress on repeat assessment. Will plan to admit for further care. Final Impression:  1. Panniculitis    2. Acute on chronic congestive heart failure, unspecified heart failure type [facility])      DISPOSITION Decision To Admit 04/23/2019 07:27:21 PM      Patient referred to: No follow-up provider specified.   Discharge medications:  New Prescriptions    No medications on file     (Please note that portions of this note may have been completed with a voice recognition program. Efforts were made to edit the dictations but occasionally words are mis-transcribed.)    Hay Lambert,   04/23/19 212

## 2019-04-23 NOTE — ED NOTES
Bed: ED-33  Expected date:   Expected time:   Means of arrival:   Comments:  EVS- H5 when clean        Melody Monaco RN  04/23/19 6333

## 2019-04-23 NOTE — ED NOTES
Pt resting in a position of comfort at this time. No needs identified at this time. Bed in lowest position and call light within reach. Respirations even, no distress noted. Bedside commode provided at bedside.      Sandie Simpson RN  04/23/19 1955

## 2019-04-24 ENCOUNTER — APPOINTMENT (OUTPATIENT)
Dept: CT IMAGING | Age: 59
DRG: 683 | End: 2019-04-24
Payer: MEDICARE

## 2019-04-24 LAB
ANION GAP SERPL CALCULATED.3IONS-SCNC: 10 MMOL/L (ref 4–16)
BASOPHILS ABSOLUTE: 0 K/CU MM
BASOPHILS RELATIVE PERCENT: 0.4 % (ref 0–1)
BUN BLDV-MCNC: 28 MG/DL (ref 6–23)
CALCIUM SERPL-MCNC: 8.7 MG/DL (ref 8.3–10.6)
CHLORIDE BLD-SCNC: 102 MMOL/L (ref 99–110)
CO2: 27 MMOL/L (ref 21–32)
CREAT SERPL-MCNC: 1.5 MG/DL (ref 0.6–1.1)
DIFFERENTIAL TYPE: ABNORMAL
EOSINOPHILS ABSOLUTE: 0.2 K/CU MM
EOSINOPHILS RELATIVE PERCENT: 2.4 % (ref 0–3)
GFR AFRICAN AMERICAN: 43 ML/MIN/1.73M2
GFR NON-AFRICAN AMERICAN: 36 ML/MIN/1.73M2
GLUCOSE BLD-MCNC: 125 MG/DL (ref 70–99)
GLUCOSE BLD-MCNC: 177 MG/DL (ref 70–99)
GLUCOSE BLD-MCNC: 260 MG/DL (ref 70–99)
GLUCOSE BLD-MCNC: 266 MG/DL (ref 70–99)
GLUCOSE BLD-MCNC: 288 MG/DL (ref 70–99)
GLUCOSE BLD-MCNC: 77 MG/DL (ref 70–99)
HCT VFR BLD CALC: 33.4 % (ref 37–47)
HEMOGLOBIN: 10.1 GM/DL (ref 12.5–16)
IMMATURE NEUTROPHIL %: 0.7 % (ref 0–0.43)
LYMPHOCYTES ABSOLUTE: 2.2 K/CU MM
LYMPHOCYTES RELATIVE PERCENT: 21.9 % (ref 24–44)
MAGNESIUM: 2 MG/DL (ref 1.8–2.4)
MCH RBC QN AUTO: 27.2 PG (ref 27–31)
MCHC RBC AUTO-ENTMCNC: 30.2 % (ref 32–36)
MCV RBC AUTO: 89.8 FL (ref 78–100)
MONOCYTES ABSOLUTE: 0.7 K/CU MM
MONOCYTES RELATIVE PERCENT: 6.5 % (ref 0–4)
NUCLEATED RBC %: 0 %
PDW BLD-RTO: 14 % (ref 11.7–14.9)
PLATELET # BLD: 358 K/CU MM (ref 140–440)
PMV BLD AUTO: 10.3 FL (ref 7.5–11.1)
POTASSIUM SERPL-SCNC: 5.2 MMOL/L (ref 3.5–5.1)
RBC # BLD: 3.72 M/CU MM (ref 4.2–5.4)
SEGMENTED NEUTROPHILS ABSOLUTE COUNT: 6.9 K/CU MM
SEGMENTED NEUTROPHILS RELATIVE PERCENT: 68.1 % (ref 36–66)
SODIUM BLD-SCNC: 139 MMOL/L (ref 135–145)
TOTAL IMMATURE NEUTOROPHIL: 0.07 K/CU MM
TOTAL NUCLEATED RBC: 0 K/CU MM
WBC # BLD: 10.1 K/CU MM (ref 4–10.5)

## 2019-04-24 PROCEDURE — 72131 CT LUMBAR SPINE W/O DYE: CPT

## 2019-04-24 PROCEDURE — 6360000002 HC RX W HCPCS: Performed by: INTERNAL MEDICINE

## 2019-04-24 PROCEDURE — 94640 AIRWAY INHALATION TREATMENT: CPT

## 2019-04-24 PROCEDURE — 70450 CT HEAD/BRAIN W/O DYE: CPT

## 2019-04-24 PROCEDURE — 94761 N-INVAS EAR/PLS OXIMETRY MLT: CPT

## 2019-04-24 PROCEDURE — 82962 GLUCOSE BLOOD TEST: CPT

## 2019-04-24 PROCEDURE — 80048 BASIC METABOLIC PNL TOTAL CA: CPT

## 2019-04-24 PROCEDURE — 6370000000 HC RX 637 (ALT 250 FOR IP): Performed by: INTERNAL MEDICINE

## 2019-04-24 PROCEDURE — 1200000000 HC SEMI PRIVATE

## 2019-04-24 PROCEDURE — 83735 ASSAY OF MAGNESIUM: CPT

## 2019-04-24 PROCEDURE — 36415 COLL VENOUS BLD VENIPUNCTURE: CPT

## 2019-04-24 PROCEDURE — 87040 BLOOD CULTURE FOR BACTERIA: CPT

## 2019-04-24 PROCEDURE — 85025 COMPLETE CBC W/AUTO DIFF WBC: CPT

## 2019-04-24 PROCEDURE — 93010 ELECTROCARDIOGRAM REPORT: CPT | Performed by: INTERNAL MEDICINE

## 2019-04-24 RX ORDER — CHLORTHALIDONE 25 MG/1
25 TABLET ORAL DAILY
Status: DISCONTINUED | OUTPATIENT
Start: 2019-04-24 | End: 2019-04-26

## 2019-04-24 RX ORDER — ACETAMINOPHEN 325 MG/1
650 TABLET ORAL EVERY 4 HOURS PRN
Status: DISCONTINUED | OUTPATIENT
Start: 2019-04-24 | End: 2019-04-27 | Stop reason: HOSPADM

## 2019-04-24 RX ADMIN — Medication 1 CAPSULE: at 15:55

## 2019-04-24 RX ADMIN — RANOLAZINE 500 MG: 500 TABLET, FILM COATED, EXTENDED RELEASE ORAL at 20:32

## 2019-04-24 RX ADMIN — MICONAZOLE NITRATE: 2 POWDER TOPICAL at 21:44

## 2019-04-24 RX ADMIN — CLOPIDOGREL BISULFATE 75 MG: 75 TABLET ORAL at 09:22

## 2019-04-24 RX ADMIN — HYDROCODONE BITARTRATE AND ACETAMINOPHEN 1 TABLET: 5; 325 TABLET ORAL at 09:52

## 2019-04-24 RX ADMIN — FUROSEMIDE 40 MG: 10 INJECTION, SOLUTION INTRAMUSCULAR; INTRAVENOUS at 09:21

## 2019-04-24 RX ADMIN — INSULIN LISPRO 20 UNITS: 100 INJECTION, SOLUTION INTRAVENOUS; SUBCUTANEOUS at 12:17

## 2019-04-24 RX ADMIN — BUPROPION HYDROCHLORIDE 100 MG: 100 TABLET, FILM COATED, EXTENDED RELEASE ORAL at 20:32

## 2019-04-24 RX ADMIN — DOCUSATE SODIUM 100 MG: 100 CAPSULE, LIQUID FILLED ORAL at 20:34

## 2019-04-24 RX ADMIN — CEFAZOLIN SODIUM 1 G: 1 INJECTION, SOLUTION INTRAVENOUS at 05:34

## 2019-04-24 RX ADMIN — LINAGLIPTIN 5 MG: 5 TABLET, FILM COATED ORAL at 09:22

## 2019-04-24 RX ADMIN — Medication 2 PUFF: at 08:40

## 2019-04-24 RX ADMIN — HYDROCODONE BITARTRATE AND ACETAMINOPHEN 1 TABLET: 5; 325 TABLET ORAL at 20:32

## 2019-04-24 RX ADMIN — METOPROLOL TARTRATE 50 MG: 50 TABLET ORAL at 09:22

## 2019-04-24 RX ADMIN — METOPROLOL TARTRATE 50 MG: 50 TABLET ORAL at 20:32

## 2019-04-24 RX ADMIN — ATORVASTATIN CALCIUM 40 MG: 40 TABLET, FILM COATED ORAL at 20:32

## 2019-04-24 RX ADMIN — GABAPENTIN 800 MG: 400 CAPSULE ORAL at 20:32

## 2019-04-24 RX ADMIN — Medication 1 CAPSULE: at 09:22

## 2019-04-24 RX ADMIN — RANOLAZINE 500 MG: 500 TABLET, FILM COATED, EXTENDED RELEASE ORAL at 09:22

## 2019-04-24 RX ADMIN — INSULIN LISPRO 2 UNITS: 100 INJECTION, SOLUTION INTRAVENOUS; SUBCUTANEOUS at 12:16

## 2019-04-24 RX ADMIN — GABAPENTIN 800 MG: 400 CAPSULE ORAL at 12:11

## 2019-04-24 RX ADMIN — DOXYCYCLINE HYCLATE 100 MG: 100 TABLET, COATED ORAL at 20:34

## 2019-04-24 RX ADMIN — CHLORTHALIDONE 25 MG: 25 TABLET ORAL at 12:02

## 2019-04-24 RX ADMIN — INSULIN LISPRO 20 UNITS: 100 INJECTION, SOLUTION INTRAVENOUS; SUBCUTANEOUS at 09:19

## 2019-04-24 RX ADMIN — CEFAZOLIN SODIUM 1 G: 1 INJECTION, SOLUTION INTRAVENOUS at 21:44

## 2019-04-24 RX ADMIN — GABAPENTIN 800 MG: 400 CAPSULE ORAL at 09:21

## 2019-04-24 RX ADMIN — FUROSEMIDE 40 MG: 10 INJECTION, SOLUTION INTRAMUSCULAR; INTRAVENOUS at 18:17

## 2019-04-24 RX ADMIN — BUPROPION HYDROCHLORIDE 100 MG: 100 TABLET, FILM COATED, EXTENDED RELEASE ORAL at 09:21

## 2019-04-24 RX ADMIN — ACETAMINOPHEN 650 MG: 325 TABLET ORAL at 18:17

## 2019-04-24 RX ADMIN — INSULIN LISPRO 3 UNITS: 100 INJECTION, SOLUTION INTRAVENOUS; SUBCUTANEOUS at 09:18

## 2019-04-24 RX ADMIN — Medication 2000 UNITS: at 09:21

## 2019-04-24 RX ADMIN — HYDROCODONE BITARTRATE AND ACETAMINOPHEN 1 TABLET: 5; 325 TABLET ORAL at 15:55

## 2019-04-24 RX ADMIN — ASPIRIN 81 MG 81 MG: 81 TABLET ORAL at 09:21

## 2019-04-24 RX ADMIN — PANTOPRAZOLE SODIUM 40 MG: 40 TABLET, DELAYED RELEASE ORAL at 05:34

## 2019-04-24 RX ADMIN — DULOXETINE HYDROCHLORIDE 60 MG: 30 CAPSULE, DELAYED RELEASE ORAL at 09:21

## 2019-04-24 RX ADMIN — LEVOTHYROXINE SODIUM 50 MCG: 50 TABLET ORAL at 09:52

## 2019-04-24 RX ADMIN — DOCUSATE SODIUM 100 MG: 100 CAPSULE, LIQUID FILLED ORAL at 09:22

## 2019-04-24 RX ADMIN — CEFAZOLIN SODIUM 1 G: 1 INJECTION, SOLUTION INTRAVENOUS at 12:11

## 2019-04-24 RX ADMIN — HYDROCODONE BITARTRATE AND ACETAMINOPHEN 1 TABLET: 5; 325 TABLET ORAL at 05:34

## 2019-04-24 RX ADMIN — ENOXAPARIN SODIUM 40 MG: 40 INJECTION SUBCUTANEOUS at 09:21

## 2019-04-24 RX ADMIN — MICONAZOLE NITRATE: 2 POWDER TOPICAL at 09:28

## 2019-04-24 RX ADMIN — DOXYCYCLINE HYCLATE 100 MG: 100 TABLET, COATED ORAL at 09:22

## 2019-04-24 ASSESSMENT — PAIN DESCRIPTION - ORIENTATION: ORIENTATION: LEFT

## 2019-04-24 ASSESSMENT — PAIN SCALES - GENERAL
PAINLEVEL_OUTOF10: 9
PAINLEVEL_OUTOF10: 8
PAINLEVEL_OUTOF10: 9

## 2019-04-24 ASSESSMENT — PAIN DESCRIPTION - PAIN TYPE: TYPE: ACUTE PAIN

## 2019-04-24 ASSESSMENT — PAIN DESCRIPTION - DESCRIPTORS: DESCRIPTORS: ACHING

## 2019-04-24 ASSESSMENT — PAIN DESCRIPTION - LOCATION: LOCATION: ABDOMEN

## 2019-04-24 NOTE — H&P
HISTORY AND PHYSICAL  (Hospitalist, Internal Medicine)  IDENTIFYING INFORMATION   PATIENT:  Sofía Griffith  MRN:  [id number]  ADMIT DATE: 2019  TIME OF EVALUATION: 2019 9:02 PM    CHIEF COMPLAINT   Swelling,SOB  Erythema and pain along abdominal pannus    HISTORY OF PRESENT ILLNESS   Sofía Griffith is a 62 y.o. female with a past medical history of as noted below who present with 2 issues:    1) Swelling,SOB  - 1 day hx of SOB today associated with several days of progressive swelling of her legs and abdomen. Gets winded, tachypneic ambulation around her home. Associated weight gain several poinds. Stomach swelling    2) Erythema and pain along abdominal pannus  - 5 days hx of spreading erythema and local pain along abdominal pannus w/o signficant improvement.  She uses talc powder around her skin creases but has encountered associated increased inflammation and firmness of the abdominal wall skin    Her PMH listed below:    PAST MEDICAL, SURGICAL, FAMILY, and SOCIAL HISTORY     Past Medical History:   Diagnosis Date    CAD (coronary artery disease)     CKD (chronic kidney disease) stage 3, GFR 30-59 ml/min (HCC)     Diabetes type 2, uncontrolled (HCC)     Diastolic heart failure ([address]     Essential hypertension     Financial problems 3/22/2013    Generalized anxiety disorder 2018    Herpes genitalia     Obesity, morbid (more than 100 lbs over ideal weight or BMI > 40) ([address] 2013     Past Surgical History:   Procedure Laterality Date     SECTION      CHOLECYSTECTOMY      OTHER SURGICAL HISTORY Right [id number]    I and D rt big toe    TONSILLECTOMY       Family History   Problem Relation Age of Onset    Arthritis Mother     Diabetes Mother     Heart Disease Mother     High Blood Pressure Mother     Arthritis Father     Heart Disease Father     High Blood Pressure Father     Stroke Father     Substance Abuse Father     Substance Abuse Brother     Diabetes Maternal Aunt CHEST PORTABLE [[id number]] Collected: 04/23/19 1811     Order Status: Completed Updated: 04/23/19 1813     Narrative:       EXAMINATION:  SINGLE XRAY VIEW OF THE CHEST    4/23/2019 5:50 pm    COMPARISON:  February 28, 2019    HISTORY:  ORDERING SYSTEM PROVIDED HISTORY: chest pain  TECHNOLOGIST PROVIDED HISTORY:  Reason for exam:->chest pain  Ordering Physician Provided Reason for Exam: sob  Acuity: Chronic  Type of Exam: Ongoing  Relevant Medical/Surgical History: cad    ciabetes    FINDINGS:  Atelectasis left lower lobe.  Cannot exclude minimal left effusion.  Stable  heart size and mediastinal contours.     Impression:       Left lower lobe atelectasis plus or minus minimal effusion.         EKG personally reviewed with rate 86, NSR      Relevant labs and imaging reviewed    ASSESSMENT AND PLAN     Anarsarca  - IV lasix BID  - daily weight, I&O, diet mod  - reviewed TTE 10/2018 LVEF 50-55 %  - trend Cr lasix drug monitoring    Panniculitis  - fungal powder for skin creases  - IV cefazolin, PO doxy  - blood was sent in the ED  - monitor for improvement    HTN, uncontrolled - add hydralazine to home regimen  CAD s/p stents  CKD3  DMII  HLD  Morbid obesity    Lovenox ppx    Case d/w ED physician    [facility], Internal Medicine  4/23/2019 at 9:02 PM

## 2019-04-24 NOTE — PROGRESS NOTES
Skin assessment completed by this nurse and Deb REIS, pt has red area under abd fold and to lower abdomen. No other issues to report.

## 2019-04-24 NOTE — PROGRESS NOTES
Medication History  [facility]    Patient Name: Samson Payan 1960     Medication history has been completed by: Maliha Shanks CPhT    Source(s) of information: patient and insurance claims     Primary Care Physician: Alla Lambert MD     [facility]    Allergies as of 04/23/2019    (No Known Allergies)        Prior to Admission medications    Medication Sig Start Date End Date Taking? Authorizing Provider   metFORMIN (GLUCOPHAGE) 500 MG tablet Take 500 mg by mouth 2 times daily (with meals) 4/19/19  Yes Historical Provider, MD   gabapentin (NEURONTIN) 800 MG tablet Take 800 mg by mouth 3 times daily. 4/19/19  Yes Historical Provider, MD   nystatin (MYCOSTATIN) 387179 UNIT/GM powder Apply topically 2 times daily as needed 4/19/19  Yes Historical Provider, MD   metoprolol tartrate (LOPRESSOR) 50 MG tablet Take 50 mg by mouth 2 times daily   Yes Historical Provider, MD   ondansetron (ZOFRAN-ODT) 4 MG disintegrating tablet Take 1 tablet by mouth every 8 hours as needed for Nausea or Vomiting 3/17/19  Yes Charlee Barbour MD   ranolazine (RANEXA) 500 MG extended release tablet Take 1 tablet by mouth 2 times daily 3/4/19  Yes Brenna Jacobs MD   Cholecalciferol (VITAMIN D3) 2000 units TABS Take 2,000 Units by mouth daily 2/26/19  Yes Historical Provider, MD CASTANEDA ELLIPTA 100-25 MCG/INH AEPB inhaler Inhale 1 puff into the lungs daily 2/26/19  Yes Historical Provider, MD   HYDROcodone-acetaminophen (NORCO) 5-325 MG per tablet Take 1 tablet by mouth every 4 hours as needed. . 1/28/19  Yes Historical Provider, MD   levothyroxine (SYNTHROID) 50 MCG tablet Take 50 mcg by mouth daily 2/26/19  Yes Historical Provider, MD   VICTOZA 18 MG/3ML SOPN SC injection Inject 1.2 mg into the skin daily 1/28/19  Yes Historical Provider, MD   insulin glargine (LANTUS SOLOSTAR) 100 UNIT/ML injection pen Inject 55 Units into the skin nightly 11/21/18  Yes Brenna Jacobs MD   insulin lispro (HUMALOG) 100 UNIT/ML injection vial Inject 20 Units into the skin 3 times daily (with meals) 11/21/18  Yes Crissy Todd MD   linagliptin (TRADJENTA) 5 MG tablet Take 1 tablet by mouth daily 11/22/18  Yes Crissy Todd MD   buPROPion [facility] SR) 100 MG extended release tablet Take 100 mg by mouth 2 times daily   Yes Tanisha Bautista MD   aspirin 81 MG chewable tablet Take 1 tablet by mouth daily 1/8/18  Yes Jose Sánchez MD   DULoxetine (CYMBALTA) 60 MG extended release capsule Take 1 capsule by mouth daily 1/8/18  Yes Jose Sánchez MD   atorvastatin (LIPITOR) 40 MG tablet Take 1 tablet by mouth nightly 1/8/18  Yes Jose Sánchez MD   docusate (COLACE, DULCOLAX) 100 MG CAPS Take 100 mg by mouth 2 times daily 1/8/18  Yes Jose Sánchez MD   clopidogrel (PLAVIX) 75 MG tablet Take 1 tablet by mouth daily 1/8/18  Yes Jose Sánchez MD   pantoprazole (PROTONIX) 40 MG tablet Take 1 tablet by mouth every morning (before breakfast) 1/8/18  Yes Jose Sánchez MD                   albuterol sulfate HFA (VENTOLIN HFA) 108 (90 Base) MCG/ACT inhaler Inhale 2 puffs into the lungs every 4 hours as needed for Wheezing 1/8/18   Jose Sánchez MD     Medications added or changed (ex. new medication, dosage change, interval change, formulation change):  Gabapentin dosage change from 400 mg to 800 mg TID  Metoprolol tartrate dosage change from 25 mg to 50 mg BID  Nystatin (added)  Metformin (added)    Other Comments:  Medication list reviewed with patient and insurance claims verified. Patient reports she has taken first doses of medications today. Insulin dosing updated per information received.       To my knowledge the above medication history is accurate as of 4/23/2019 8:12 PM.   Mario Thomas CPhT   4/23/2019 8:12 PM

## 2019-04-24 NOTE — PROGRESS NOTES
Progress Note (Hospitalist, Internal Medicine)  IDENTIFYING INFORMATION   PATIENT:  Sin Fuchs  MRN:  [id number]  ADMIT DATE: 4/23/2019  TIME OF EVALUATION: 4/24/2019 10:52 AM      HISTORY OF PRESENT ILLNESS   Sin Fuchs is a 62 y.o. female with a past medical history of as noted below who present with 2 issues:     1) Swelling,SOB  - 1 day hx of SOB today associated with several days of progressive swelling of her legs and abdomen. Gets winded, tachypneic ambulation around her home. Associated weight gain several poinds. Stomach swelling     2) Erythema and pain along abdominal pannus  - 5 days hx of spreading erythema and local pain along abdominal pannus w/o signficant improvement. She uses talc powder around her skin creases but has encountered associated increased inflammation and firmness of the abdominal wall skin    SUBJECTIVE     Swelling slightly better. She did not like the low salt diet. UOP uncertain - we discussed checking I&Os    MEDICATIONS   Medications Prior to Admission  Medications Prior to Admission: metFORMIN (GLUCOPHAGE) 500 MG tablet, Take 500 mg by mouth 2 times daily (with meals)  gabapentin (NEURONTIN) 800 MG tablet, Take 800 mg by mouth 3 times daily. nystatin (MYCOSTATIN) 291029 UNIT/GM powder, Apply topically 2 times daily as needed  metoprolol tartrate (LOPRESSOR) 50 MG tablet, Take 50 mg by mouth 2 times daily  ondansetron (ZOFRAN-ODT) 4 MG disintegrating tablet, Take 1 tablet by mouth every 8 hours as needed for Nausea or Vomiting  ranolazine (RANEXA) 500 MG extended release tablet, Take 1 tablet by mouth 2 times daily  Cholecalciferol (VITAMIN D3) 2000 units TABS, Take 2,000 Units by mouth daily  BREO ELLIPTA 100-25 MCG/INH AEPB inhaler, Inhale 1 puff into the lungs daily  HYDROcodone-acetaminophen (NORCO) 5-325 MG per tablet, Take 1 tablet by mouth every 4 hours as needed. [place]   levothyroxine (SYNTHROID) 50 MCG tablet, Take 50 mcg by mouth daily  VICTOZA 18 MG/3ML SOPN SC injection, Inject 1.2 mg into the skin daily  insulin glargine (LANTUS SOLOSTAR) 100 UNIT/ML injection pen, Inject 55 Units into the skin nightly  insulin lispro (HUMALOG) 100 UNIT/ML injection vial, Inject 20 Units into the skin 3 times daily (with meals)  linagliptin (TRADJENTA) 5 MG tablet, Take 1 tablet by mouth daily  buPROPion (WELLBUTRIN SR) 100 MG extended release tablet, Take 100 mg by mouth 2 times daily  aspirin 81 MG chewable tablet, Take 1 tablet by mouth daily  DULoxetine (CYMBALTA) 60 MG extended release capsule, Take 1 capsule by mouth daily  atorvastatin (LIPITOR) 40 MG tablet, Take 1 tablet by mouth nightly  docusate (COLACE, DULCOLAX) 100 MG CAPS, Take 100 mg by mouth 2 times daily  clopidogrel (PLAVIX) 75 MG tablet, Take 1 tablet by mouth daily  pantoprazole (PROTONIX) 40 MG tablet, Take 1 tablet by mouth every morning (before breakfast)  albuterol sulfate HFA (VENTOLIN HFA) 108 (90 Base) MCG/ACT inhaler, Inhale 2 puffs into the lungs every 4 hours as needed for Wheezing    Current Medications  Current Facility-Administered Medications   Medication Dose Route Frequency Provider Last Rate Last Dose    chlorthalidone (HYGROTON) tablet 25 mg  25 mg Oral Daily Dipti Harkins MD        insulin lispro (HUMALOG) injection vial 0-12 Units  0-12 Units Subcutaneous TID WC Dipti Harkins MD        insulin lispro (HUMALOG) injection vial 0-6 Units  0-6 Units Subcutaneous Nightly Dipti Harkins MD        aspirin chewable tablet 81 mg  81 mg Oral Daily Dipti Harkins MD   81 mg at 04/24/19 6366    atorvastatin (LIPITOR) tablet 40 mg  40 mg Oral Nightly Dipti Harkins MD   40 mg at 04/23/19 2300    mometasone-formoterol (DULERA) 200-5 MCG/ACT inhaler 2 puff  2 puff Inhalation BID Dipti Harkins MD   2 puff at 04/24/19 0840    [facility]) extended release tablet 100 mg  100 mg Oral BID Dipti Harkins MD   100 mg at 04/24/19 8710    vitamin D CAPS 2,000 Units  2,000 Units Oral Daily Dipti Harkins times per day Judson Gimenez MD   100 mg at 04/24/19 1514    lactobacillus (CULTURELLE) capsule 1 capsule  1 capsule Oral BID WC Judson Gimenez MD   1 capsule at 04/24/19 3227    glucose (GLUTOSE) 40 % oral gel 15 g  15 g Oral PRN Judson Gimenez MD        dextrose 50 % solution 12.5 g  12.5 g Intravenous PRN Judson Gimenez MD        glucagon (rDNA) injection 1 mg  1 mg Intramuscular PRN Judson Gimenez MD        dextrose 5 % solution  100 mL/hr Intravenous PRN Judson Gimenez MD        cloNIDine (CATAPRES) tablet 0.1 mg  0.1 mg Oral 4x Daily PRN Judson Gimenez MD             Allergies  No Known Allergies    REVIEW OF SYSTEMS     Within above limitations. 14 point review of systems reviewed. Pertinent positive or negative as per HPI or otherwise negative per 14 point systems review. Reviewed 4/24/2019 at 10:52 AM    PHYSICAL EXAM       Blood pressure 125/75, pulse 80, temperature 97.7 °F (36.5 °C), temperature source Oral, resp. rate 18, height 5' 4\" (1.626 m), weight (!) 318 lb (144.2 kg), SpO2 96 %, not currently breastfeeding. General - AAO x 3  Psych - Appropriate affect/speech. No agitation  Eyes - Eye lids intact. No scleral icterus  ENT - Lips wnl. External ear clear/dry/intact. No thyromegaly on inspection  Neuro - No gross peripheral or central neuro deficits on inspection  Heart - Sinus. RRR. S1 and S2 present. No elevated JVD appreciated  Lung - Adequate air entry b/l, No crackles/wheezes appreciated  GI - Soft. No guarding/rigidity.  BS+  Skin - b/l LE edema +2, erythema and pain along of abdominal pannus        LABS AND IMAGING   CBC  [unfilled]    Last 3 Hemoglobin  Lab Results   Component Value Date    HGB 10.1 04/24/2019    HGB 10.7 04/23/2019    HGB 10.7 03/16/2019     Last 3 WBC/ANC  Lab Results   Component Value Date    WBC 10.1 04/24/2019    WBC 10.3 04/23/2019    WBC 8.6 03/16/2019     No components found for: GRNLOCTYABS  Last 3 Platelets  No results found for: PLATELET  Chemistry  [unfilled]  [unfilled]  No results found for: LDH  Coagulation Studies  Lab Results   Component Value Date    INR 0.94 07/19/2016     Liver Function Studies  Lab Results   Component Value Date    ALT 18 04/23/2019    AST 15 04/23/2019    ALKPHOS 137 04/23/2019       Recent Imaging    Lima Drafts #[id number] ([id number]) (62 y.o. F) (Adm: 04/23/19)    SR[facility] [id number]         Imaging Results (last 7 days)          Procedure Component Value Ref Range Date/Time     XR CHEST PORTABLE [[id number]] Collected: 04/23/19 1811     Order Status: Completed Updated: 04/23/19 1813     Narrative:       EXAMINATION:  SINGLE XRAY VIEW OF THE CHEST    4/23/2019 5:50 pm    COMPARISON:  February 28, 2019    HISTORY:  ORDERING SYSTEM PROVIDED HISTORY: chest pain  TECHNOLOGIST PROVIDED HISTORY:  Reason for exam:->chest pain  Ordering Physician Provided Reason for Exam: sob  Acuity: Chronic  Type of Exam: Ongoing  Relevant Medical/Surgical History: cad    ciabetes    FINDINGS:  Atelectasis left lower lobe.  Cannot exclude minimal left effusion.  Stable  heart size and mediastinal contours.     Impression:       Left lower lobe atelectasis plus or minus minimal effusion.             Relevant labs and imaging reviewed    ASSESSMENT AND PLAN       Anarsarca  - IV lasix BID  - add thiazide and aldactone  - daily weight, I&O, diet mod but she likes her salt and it is difficult to change her habits  - reviewed TTE 10/2018 LVEF 50-55 %  - trend Cr lasix drug monitoring      Panniculitis  - fungal powder for skin creases  - IV cefazolin, PO doxy  - blood was sent in the ED - 1 out of 2 set serratia - await second set to confirm accuracy .  Will repeat 2 set blood cx 4/24  - improving slowly    HTN, uncontrolled - improved with diuretics, continue to monitor  CAD s/p stents  CKD3  DMII  HLD  Morbid obesity     Lovenox ppx      [address] [facility], Internal Medicine  4/24/2019 at 10:52 AM

## 2019-04-24 NOTE — CARE COORDINATION
CM into see pt to initiate a safe discharge plan. Cm into see, introduced self and explained role of CM. Pt her S/O Pavan in room. Permission obtained to speak in room. Pt is kind, alert and oriented. Pt lives with S/O in the [address]. Pt typically independent but if she does need assistance she shared that S/O provides for all her needs. Pt has home O2 supplied by [facility]. DME includes walker, w/c,shower bench. Pt's S/O provides all transportation. Pt has a PCP. Pt has insurance and is able to obtain her prescriptions. CM discussed home care which was set up prior to when she was discharged, [facility]. Pt shared that she decided she did not home care so did not answer when they called. At this time pt plans to return home with her S/O with no needs at this time. CM updated white board and encouraged to call for any needs or concern. CM is available if any needs arise.  [facility]

## 2019-04-24 NOTE — PROGRESS NOTES
Fall in bathroom  - evaluated patient  - chronic rotator cuff injury  - check L spine CT and head CT per protocol

## 2019-04-24 NOTE — PROGRESS NOTES
PT was found on the floor in the shower. Pt significant other stated that \"I was with her but left for a second to grab something and she slipped on the water on the floor and bumped her head on the wall on the way down. \" Supervisor, Charge Nurse and Dr. Virginia Panchal notified. Orders placed. Pt refused fall matts and Supervisor aware.  PT remained alert and oriented

## 2019-04-25 LAB
ANION GAP SERPL CALCULATED.3IONS-SCNC: 11 MMOL/L (ref 4–16)
BUN BLDV-MCNC: 39 MG/DL (ref 6–23)
CALCIUM SERPL-MCNC: 8.3 MG/DL (ref 8.3–10.6)
CHLORIDE BLD-SCNC: 102 MMOL/L (ref 99–110)
CO2: 22 MMOL/L (ref 21–32)
CREAT SERPL-MCNC: 2 MG/DL (ref 0.6–1.1)
GFR AFRICAN AMERICAN: 31 ML/MIN/1.73M2
GFR NON-AFRICAN AMERICAN: 26 ML/MIN/1.73M2
GLUCOSE BLD-MCNC: 134 MG/DL (ref 70–99)
GLUCOSE BLD-MCNC: 149 MG/DL (ref 70–99)
GLUCOSE BLD-MCNC: 200 MG/DL (ref 70–99)
GLUCOSE BLD-MCNC: 220 MG/DL (ref 70–99)
GLUCOSE BLD-MCNC: 227 MG/DL (ref 70–99)
MAGNESIUM: 1.8 MG/DL (ref 1.8–2.4)
POTASSIUM SERPL-SCNC: 4.9 MMOL/L (ref 3.5–5.1)
SODIUM BLD-SCNC: 135 MMOL/L (ref 135–145)

## 2019-04-25 PROCEDURE — 94761 N-INVAS EAR/PLS OXIMETRY MLT: CPT

## 2019-04-25 PROCEDURE — 94150 VITAL CAPACITY TEST: CPT

## 2019-04-25 PROCEDURE — 94640 AIRWAY INHALATION TREATMENT: CPT

## 2019-04-25 PROCEDURE — 6370000000 HC RX 637 (ALT 250 FOR IP): Performed by: INTERNAL MEDICINE

## 2019-04-25 PROCEDURE — 1200000000 HC SEMI PRIVATE

## 2019-04-25 PROCEDURE — 82962 GLUCOSE BLOOD TEST: CPT

## 2019-04-25 PROCEDURE — 36415 COLL VENOUS BLD VENIPUNCTURE: CPT

## 2019-04-25 PROCEDURE — 6360000002 HC RX W HCPCS: Performed by: INTERNAL MEDICINE

## 2019-04-25 PROCEDURE — 83735 ASSAY OF MAGNESIUM: CPT

## 2019-04-25 PROCEDURE — 80048 BASIC METABOLIC PNL TOTAL CA: CPT

## 2019-04-25 PROCEDURE — 2700000000 HC OXYGEN THERAPY PER DAY

## 2019-04-25 RX ORDER — MAGNESIUM SULFATE IN WATER 40 MG/ML
2 INJECTION, SOLUTION INTRAVENOUS ONCE
Status: COMPLETED | OUTPATIENT
Start: 2019-04-25 | End: 2019-04-25

## 2019-04-25 RX ORDER — OXYCODONE HYDROCHLORIDE 10 MG/1
10 TABLET ORAL EVERY 4 HOURS PRN
Status: DISCONTINUED | OUTPATIENT
Start: 2019-04-25 | End: 2019-04-27 | Stop reason: HOSPADM

## 2019-04-25 RX ORDER — TORSEMIDE 20 MG/1
20 TABLET ORAL DAILY
Status: DISCONTINUED | OUTPATIENT
Start: 2019-04-26 | End: 2019-04-26

## 2019-04-25 RX ORDER — TIZANIDINE 4 MG/1
4 TABLET ORAL EVERY 6 HOURS PRN
Status: DISCONTINUED | OUTPATIENT
Start: 2019-04-25 | End: 2019-04-27 | Stop reason: HOSPADM

## 2019-04-25 RX ADMIN — HYDROCODONE BITARTRATE AND ACETAMINOPHEN 1 TABLET: 5; 325 TABLET ORAL at 12:40

## 2019-04-25 RX ADMIN — DOXYCYCLINE HYCLATE 100 MG: 100 TABLET, COATED ORAL at 08:27

## 2019-04-25 RX ADMIN — LEVOTHYROXINE SODIUM 50 MCG: 50 TABLET ORAL at 08:26

## 2019-04-25 RX ADMIN — MICONAZOLE NITRATE: 2 POWDER TOPICAL at 20:52

## 2019-04-25 RX ADMIN — GABAPENTIN 800 MG: 400 CAPSULE ORAL at 08:27

## 2019-04-25 RX ADMIN — GABAPENTIN 800 MG: 400 CAPSULE ORAL at 20:51

## 2019-04-25 RX ADMIN — Medication 2 PUFF: at 20:21

## 2019-04-25 RX ADMIN — INSULIN LISPRO 2 UNITS: 100 INJECTION, SOLUTION INTRAVENOUS; SUBCUTANEOUS at 20:56

## 2019-04-25 RX ADMIN — CHLORTHALIDONE 25 MG: 25 TABLET ORAL at 08:27

## 2019-04-25 RX ADMIN — RANOLAZINE 500 MG: 500 TABLET, FILM COATED, EXTENDED RELEASE ORAL at 08:27

## 2019-04-25 RX ADMIN — TIZANIDINE 4 MG: 4 TABLET ORAL at 14:38

## 2019-04-25 RX ADMIN — RANOLAZINE 500 MG: 500 TABLET, FILM COATED, EXTENDED RELEASE ORAL at 20:51

## 2019-04-25 RX ADMIN — Medication 2000 UNITS: at 08:27

## 2019-04-25 RX ADMIN — CEFAZOLIN SODIUM 1 G: 1 INJECTION, SOLUTION INTRAVENOUS at 06:18

## 2019-04-25 RX ADMIN — GABAPENTIN 800 MG: 400 CAPSULE ORAL at 12:40

## 2019-04-25 RX ADMIN — Medication 1 CAPSULE: at 16:54

## 2019-04-25 RX ADMIN — DULOXETINE HYDROCHLORIDE 60 MG: 30 CAPSULE, DELAYED RELEASE ORAL at 08:26

## 2019-04-25 RX ADMIN — INSULIN LISPRO 4 UNITS: 100 INJECTION, SOLUTION INTRAVENOUS; SUBCUTANEOUS at 08:35

## 2019-04-25 RX ADMIN — CEFAZOLIN SODIUM 1 G: 1 INJECTION, SOLUTION INTRAVENOUS at 14:30

## 2019-04-25 RX ADMIN — CLOPIDOGREL BISULFATE 75 MG: 75 TABLET ORAL at 08:27

## 2019-04-25 RX ADMIN — HYDROCODONE BITARTRATE AND ACETAMINOPHEN 1 TABLET: 5; 325 TABLET ORAL at 08:24

## 2019-04-25 RX ADMIN — PANTOPRAZOLE SODIUM 40 MG: 40 TABLET, DELAYED RELEASE ORAL at 06:19

## 2019-04-25 RX ADMIN — OXYCODONE HYDROCHLORIDE 10 MG: 10 TABLET ORAL at 22:54

## 2019-04-25 RX ADMIN — METOPROLOL TARTRATE 50 MG: 50 TABLET ORAL at 08:27

## 2019-04-25 RX ADMIN — ASPIRIN 81 MG 81 MG: 81 TABLET ORAL at 08:27

## 2019-04-25 RX ADMIN — ENOXAPARIN SODIUM 40 MG: 40 INJECTION SUBCUTANEOUS at 08:24

## 2019-04-25 RX ADMIN — DOXYCYCLINE HYCLATE 100 MG: 100 TABLET, COATED ORAL at 20:51

## 2019-04-25 RX ADMIN — ATORVASTATIN CALCIUM 40 MG: 40 TABLET, FILM COATED ORAL at 20:51

## 2019-04-25 RX ADMIN — DOCUSATE SODIUM 100 MG: 100 CAPSULE, LIQUID FILLED ORAL at 08:26

## 2019-04-25 RX ADMIN — LINAGLIPTIN 5 MG: 5 TABLET, FILM COATED ORAL at 08:27

## 2019-04-25 RX ADMIN — BUPROPION HYDROCHLORIDE 100 MG: 100 TABLET, FILM COATED, EXTENDED RELEASE ORAL at 20:51

## 2019-04-25 RX ADMIN — Medication 1 CAPSULE: at 08:26

## 2019-04-25 RX ADMIN — METOPROLOL TARTRATE 50 MG: 50 TABLET ORAL at 20:51

## 2019-04-25 RX ADMIN — BUPROPION HYDROCHLORIDE 100 MG: 100 TABLET, FILM COATED, EXTENDED RELEASE ORAL at 08:27

## 2019-04-25 RX ADMIN — INSULIN LISPRO 20 UNITS: 100 INJECTION, SOLUTION INTRAVENOUS; SUBCUTANEOUS at 08:40

## 2019-04-25 RX ADMIN — MAGNESIUM SULFATE HEPTAHYDRATE 2 G: 40 INJECTION, SOLUTION INTRAVENOUS at 10:06

## 2019-04-25 RX ADMIN — OXYCODONE HYDROCHLORIDE 10 MG: 10 TABLET ORAL at 16:58

## 2019-04-25 RX ADMIN — INSULIN LISPRO 2 UNITS: 100 INJECTION, SOLUTION INTRAVENOUS; SUBCUTANEOUS at 12:59

## 2019-04-25 RX ADMIN — HYDROCODONE BITARTRATE AND ACETAMINOPHEN 1 TABLET: 5; 325 TABLET ORAL at 20:52

## 2019-04-25 RX ADMIN — MICONAZOLE NITRATE: 2 POWDER TOPICAL at 08:29

## 2019-04-25 RX ADMIN — DOCUSATE SODIUM 100 MG: 100 CAPSULE, LIQUID FILLED ORAL at 20:51

## 2019-04-25 RX ADMIN — Medication 2 PUFF: at 08:28

## 2019-04-25 RX ADMIN — INSULIN GLARGINE 55 UNITS: 100 INJECTION, SOLUTION SUBCUTANEOUS at 20:55

## 2019-04-25 RX ADMIN — INSULIN LISPRO 20 UNITS: 100 INJECTION, SOLUTION INTRAVENOUS; SUBCUTANEOUS at 12:59

## 2019-04-25 RX ADMIN — CEFAZOLIN SODIUM 1 G: 1 INJECTION, SOLUTION INTRAVENOUS at 22:11

## 2019-04-25 RX ADMIN — HYDROCODONE BITARTRATE AND ACETAMINOPHEN 1 TABLET: 5; 325 TABLET ORAL at 02:30

## 2019-04-25 ASSESSMENT — PAIN DESCRIPTION - FREQUENCY
FREQUENCY: CONTINUOUS

## 2019-04-25 ASSESSMENT — PAIN SCALES - GENERAL
PAINLEVEL_OUTOF10: 6
PAINLEVEL_OUTOF10: 7
PAINLEVEL_OUTOF10: 10
PAINLEVEL_OUTOF10: 7
PAINLEVEL_OUTOF10: 10
PAINLEVEL_OUTOF10: 8
PAINLEVEL_OUTOF10: 8
PAINLEVEL_OUTOF10: 6
PAINLEVEL_OUTOF10: 10
PAINLEVEL_OUTOF10: 10
PAINLEVEL_OUTOF10: 8
PAINLEVEL_OUTOF10: 8
PAINLEVEL_OUTOF10: 10
PAINLEVEL_OUTOF10: 7

## 2019-04-25 ASSESSMENT — PAIN DESCRIPTION - PAIN TYPE
TYPE: ACUTE PAIN

## 2019-04-25 ASSESSMENT — PAIN DESCRIPTION - DESCRIPTORS
DESCRIPTORS: ACHING

## 2019-04-25 ASSESSMENT — PAIN DESCRIPTION - PROGRESSION
CLINICAL_PROGRESSION: GRADUALLY WORSENING
CLINICAL_PROGRESSION: NOT CHANGED
CLINICAL_PROGRESSION: NOT CHANGED
CLINICAL_PROGRESSION: GRADUALLY WORSENING

## 2019-04-25 ASSESSMENT — PAIN DESCRIPTION - LOCATION
LOCATION: GENERALIZED

## 2019-04-25 ASSESSMENT — PAIN DESCRIPTION - ONSET
ONSET: ON-GOING

## 2019-04-25 ASSESSMENT — PAIN DESCRIPTION - ORIENTATION: ORIENTATION: LEFT

## 2019-04-25 NOTE — PROGRESS NOTES
Progress Note (Hospitalist, Internal Medicine)  IDENTIFYING INFORMATION   PATIENT:  Leisa Lou  MRN:  [id number]  ADMIT DATE: 4/23/2019  TIME OF EVALUATION: 4/26/2019 9:39 AM      HISTORY OF PRESENT ILLNESS   Leisa Lou is a 62 y.o. female with a past medical history of as noted below who present with 2 issues:     1) Swelling,SOB  - 1 day hx of SOB today associated with several days of progressive swelling of her legs and abdomen. Gets winded, tachypneic ambulation around her home. Associated weight gain several poinds. Stomach swelling     2) Erythema and pain along abdominal pannus  - 5 days hx of spreading erythema and local pain along abdominal pannus w/o signficant improvement. She uses talc powder around her skin creases but has encountered associated increased inflammation and firmness of the abdominal wall skin    SUBJECTIVE     Had fall in bathroom yesterday. Slid down onto back and head  UOP has decreased    MEDICATIONS   Medications Prior to Admission  Medications Prior to Admission: metFORMIN (GLUCOPHAGE) 500 MG tablet, Take 500 mg by mouth 2 times daily (with meals)  gabapentin (NEURONTIN) 800 MG tablet, Take 800 mg by mouth 3 times daily. nystatin (MYCOSTATIN) 435405 UNIT/GM powder, Apply topically 2 times daily as needed  metoprolol tartrate (LOPRESSOR) 50 MG tablet, Take 50 mg by mouth 2 times daily  ondansetron (ZOFRAN-ODT) 4 MG disintegrating tablet, Take 1 tablet by mouth every 8 hours as needed for Nausea or Vomiting  ranolazine (RANEXA) 500 MG extended release tablet, Take 1 tablet by mouth 2 times daily  Cholecalciferol (VITAMIN D3) 2000 units TABS, Take 2,000 Units by mouth daily  BREO ELLIPTA 100-25 MCG/INH AEPB inhaler, Inhale 1 puff into the lungs daily  HYDROcodone-acetaminophen (NORCO) 5-325 MG per tablet, Take 1 tablet by mouth every 4 hours as needed. [place]ane [place]wn[place]   levothyroxine (SYNTHROID) 50 MCG tablet, Take 50 mcg by mouth daily  VICTOZA 18 MG/3ML SOPN SC injection, Inject 1.2 mg into the skin daily  insulin glargine (LANTUS SOLOSTAR) 100 UNIT/ML injection pen, Inject 55 Units into the skin nightly  insulin lispro (HUMALOG) 100 UNIT/ML injection vial, Inject 20 Units into the skin 3 times daily (with meals)  linagliptin (TRADJENTA) 5 MG tablet, Take 1 tablet by mouth daily  buPROPion (WELLBUTRIN SR) 100 MG extended release tablet, Take 100 mg by mouth 2 times daily  aspirin 81 MG chewable tablet, Take 1 tablet by mouth daily  DULoxetine (CYMBALTA) 60 MG extended release capsule, Take 1 capsule by mouth daily  atorvastatin (LIPITOR) 40 MG tablet, Take 1 tablet by mouth nightly  docusate (COLACE, DULCOLAX) 100 MG CAPS, Take 100 mg by mouth 2 times daily  clopidogrel (PLAVIX) 75 MG tablet, Take 1 tablet by mouth daily  pantoprazole (PROTONIX) 40 MG tablet, Take 1 tablet by mouth every morning (before breakfast)  albuterol sulfate HFA (VENTOLIN HFA) 108 (90 Base) MCG/ACT inhaler, Inhale 2 puffs into the lungs every 4 hours as needed for Wheezing    Current Medications  Current Facility-Administered Medications   Medication Dose Route Frequency Provider Last Rate Last Dose    0.9 % sodium chloride bolus  500 mL Intravenous Once Johnnie Headley  mL/hr at 04/26/19 0812 500 mL at 04/26/19 0812    tiZANidine (ZANAFLEX) tablet 4 mg  4 mg Oral Q6H PRN Johnnie Headley MD   4 mg at 04/26/19 0850    oxyCODONE HCl (OXY-IR) immediate release tablet 10 mg  10 mg Oral Q4H PRN Johnnie Headley MD   10 mg at 04/25/19 2254    insulin lispro (HUMALOG) injection vial 0-12 Units  0-12 Units Subcutaneous TID WC Johnnie Headley MD   8 Units at 04/26/19 0814    insulin lispro (HUMALOG) injection vial 0-6 Units  0-6 Units Subcutaneous Nightly Johnnie Headley MD   2 Units at 04/25/19 2056    acetaminophen (TYLENOL) tablet 650 mg  650 mg Oral Q4H PRN Johnnie Headley MD   650 mg at 04/24/19 1817    aspirin chewable tablet 81 mg  81 mg Oral Daily Johnnie Headley MD   81 mg at 04/26/19 0759    atorvastatin (LIPITOR) tablet 40 mg 40 mg Oral Nightly Nilam Davalos MD   40 mg at 04/25/19 2051    mometasone-formoterol (DULERA) 200-5 MCG/ACT inhaler 2 puff  2 puff Inhalation BID Nilam Davalos MD   2 puff at 04/26/19 0815    buPROPion [facility]) extended release tablet 100 mg  100 mg Oral BID Nilam Davalos MD   100 mg at 04/26/19 0758    vitamin D CAPS 2,000 Units  2,000 Units Oral Daily Nilam Davalos MD   2,000 Units at 04/26/19 0800    clopidogrel (PLAVIX) tablet 75 mg  75 mg Oral Daily Nilam Davalos MD   75 mg at 04/26/19 0758    docusate sodium (COLACE) capsule 100 mg  100 mg Oral BID Nilam Davalos MD   100 mg at 04/26/19 0800    DULoxetine (CYMBALTA) extended release capsule 60 mg  60 mg Oral Daily Nilam Davalos MD   60 mg at 04/26/19 0800    gabapentin (NEURONTIN) capsule 800 mg  800 mg Oral TID Nilam Davalos MD   800 mg at 04/26/19 0800    HYDROcodone-acetaminophen (NORCO) 5-325 MG per tablet 1 tablet  1 tablet Oral Q4H PRN Nilam Davalos MD   1 tablet at 04/26/19 0759    insulin glargine (LANTUS) injection vial 55 Units  55 Units Subcutaneous Nightly Nilam Davalos MD   55 Units at 04/25/19 2055    insulin lispro (HUMALOG) injection vial 20 Units  20 Units Subcutaneous TID WC Nilam Davalos MD   20 Units at 04/26/19 0816    levothyroxine (SYNTHROID) tablet 50 mcg  50 mcg Oral Daily Nilam Davalos MD   50 mcg at 04/26/19 0801    linagliptin (TRADJENTA) tablet 5 mg  5 mg Oral Daily Nilam Davalos MD   5 mg at 04/26/19 0800    metoprolol tartrate (LOPRESSOR) tablet 50 mg  50 mg Oral BID Nilam Davalos MD   50 mg at 04/26/19 0800    ondansetron (ZOFRAN-ODT) disintegrating tablet 4 mg  4 mg Oral Q8H PRN Nilam Davalos MD        pantoprazole (PROTONIX) tablet 40 mg  40 mg Oral QAM AC Nilam Davalos MD   40 mg at 04/26/19 0506    ranolazine (RANEXA) extended release tablet 500 mg  500 mg Oral BID Nilam Davalos MD   500 mg at 04/26/19 0759    enoxaparin (LOVENOX) injection 40 mg  40 mg Subcutaneous Daily Nilam Davalos MD   40 mg at 04/26/19 0758    miconazole (MICOTIN) 2 % powder   Topical BID Johnnie Headley MD        ceFAZolin (ANCEF) 1 g in dextrose 5 % 50 mL IVPB (premix)  1 g Intravenous Q8H Johnnie Headley MD   Stopped at 04/26/19 7591    doxycycline hyclate (VIBRA-TABS) tablet 100 mg  100 mg Oral 2 times per day Johnnie Headley MD   100 mg at 04/26/19 0800    lactobacillus (CULTURELLE) capsule 1 capsule  1 capsule Oral BID WC Johnnie Headley MD   1 capsule at 04/26/19 0759    glucose (GLUTOSE) 40 % oral gel 15 g  15 g Oral PRN Johnnie Headley MD        dextrose 50 % solution 12.5 g  12.5 g Intravenous PRN Johnnie Headley MD        glucagon (rDNA) injection 1 mg  1 mg Intramuscular PRN Johnnie Headley MD        dextrose 5 % solution  100 mL/hr Intravenous PRN Johnnie Headley MD        cloNIDine (CATAPRES) tablet 0.1 mg  0.1 mg Oral 4x Daily PRN Johnnie Headley MD             Allergies  No Known Allergies    REVIEW OF SYSTEMS     Within above limitations. 14 point review of systems reviewed. Pertinent positive or negative as per HPI or otherwise negative per 14 point systems review. Reviewed 4/26/2019 at 9:39 AM    PHYSICAL EXAM       Blood pressure 133/67, pulse 73, temperature 98.1 °F (36.7 °C), temperature source Oral, resp. rate 16, height 5' 4\" (1.626 m), weight (!) 318 lb (144.2 kg), SpO2 97 %, not currently breastfeeding. General - AAO x 3  Psych - Appropriate affect/speech. No agitation  Eyes - Eye lids intact. No scleral icterus  ENT - Lips wnl. External ear clear/dry/intact. No thyromegaly on inspection  Neuro - No gross peripheral or central neuro deficits on inspection  Heart - Sinus. RRR. S1 and S2 present. No elevated JVD appreciated  Lung - Adequate air entry b/l, No crackles/wheezes appreciated  GI - Soft. No guarding/rigidity.  BS+  Skin - b/l LE edema +2, erythema and pain along of abdominal pannus is improving        LABS AND IMAGING   CBC  [unfilled]    Last 3 Hemoglobin  Lab Results   Component Value Date    HGB 10.1 the ED - 1 set was drawn, 2nd set ordered but not drawn - cx with pan sensitive serratia and prelim staph.  Due to absence of sepsis and polymicrobial, highly suspect this to be a contaminant  - Repeat 2 set blood cx 4/24       Mechanical Fall in bathroom 4/24  - chronic rotator cuff injury  - check L spine CT and head CT per protocol that is non acute          HTN, uncontrolled - improved with diuretics, continue to monitor  CAD s/p stents  CKD3  DMII  HLD  Morbid obesity     Lovenox ppx      [address] [facility], Internal Medicine  4/26/2019 at 9:39 AM

## 2019-04-25 NOTE — PLAN OF CARE
Problem: Falls - Risk of:  Goal: Will remain free from falls  Description  Will remain free from falls  4/24/2019 2304 by Rocio Padgett RN  Outcome: Ongoing  4/24/2019 1455 by Birdena Canavan, RN  Outcome: Ongoing  Goal: Absence of physical injury  Description  Absence of physical injury  Outcome: Ongoing

## 2019-04-25 NOTE — PROGRESS NOTES
Patient has made it very clear to this nurse with significant other at bedside that she will not be calling staff for ambulatory help and if I turn the bed alarm on that she knows how to turn off and will turn off. Patient very pleasant but refusing safety assistance. Also states she will not follow the fluid restriction at home and will not here either. Education provided and pt voices she already knows and doesn't care.

## 2019-04-25 NOTE — DISCHARGE INSTR - OTHER ORDERS
Follow up with Dr Yasmin Smart on Monday May 6 at 2:45.     Follow up with Dr Kan Hawthorne on Tuesday April 30 at 12:00

## 2019-04-26 ENCOUNTER — APPOINTMENT (OUTPATIENT)
Dept: ULTRASOUND IMAGING | Age: 59
DRG: 683 | End: 2019-04-26
Payer: MEDICARE

## 2019-04-26 LAB
ANION GAP SERPL CALCULATED.3IONS-SCNC: 11 MMOL/L (ref 4–16)
BUN BLDV-MCNC: 54 MG/DL (ref 6–23)
CALCIUM SERPL-MCNC: 8.6 MG/DL (ref 8.3–10.6)
CHLORIDE BLD-SCNC: 100 MMOL/L (ref 99–110)
CO2: 27 MMOL/L (ref 21–32)
CREAT SERPL-MCNC: 2.7 MG/DL (ref 0.6–1.1)
GFR AFRICAN AMERICAN: 22 ML/MIN/1.73M2
GFR NON-AFRICAN AMERICAN: 18 ML/MIN/1.73M2
GLUCOSE BLD-MCNC: 111 MG/DL (ref 70–99)
GLUCOSE BLD-MCNC: 117 MG/DL (ref 70–99)
GLUCOSE BLD-MCNC: 226 MG/DL (ref 70–99)
GLUCOSE BLD-MCNC: 239 MG/DL (ref 70–99)
GLUCOSE BLD-MCNC: 248 MG/DL (ref 70–99)
GLUCOSE BLD-MCNC: 308 MG/DL (ref 70–99)
MAGNESIUM: 2.3 MG/DL (ref 1.8–2.4)
POTASSIUM SERPL-SCNC: 4.8 MMOL/L (ref 3.5–5.1)
SODIUM BLD-SCNC: 138 MMOL/L (ref 135–145)

## 2019-04-26 PROCEDURE — 1200000000 HC SEMI PRIVATE

## 2019-04-26 PROCEDURE — 2580000003 HC RX 258: Performed by: INTERNAL MEDICINE

## 2019-04-26 PROCEDURE — 6360000002 HC RX W HCPCS: Performed by: INTERNAL MEDICINE

## 2019-04-26 PROCEDURE — 83735 ASSAY OF MAGNESIUM: CPT

## 2019-04-26 PROCEDURE — 80048 BASIC METABOLIC PNL TOTAL CA: CPT

## 2019-04-26 PROCEDURE — 82962 GLUCOSE BLOOD TEST: CPT

## 2019-04-26 PROCEDURE — 76770 US EXAM ABDO BACK WALL COMP: CPT

## 2019-04-26 PROCEDURE — 36415 COLL VENOUS BLD VENIPUNCTURE: CPT

## 2019-04-26 PROCEDURE — 94761 N-INVAS EAR/PLS OXIMETRY MLT: CPT

## 2019-04-26 PROCEDURE — 51798 US URINE CAPACITY MEASURE: CPT

## 2019-04-26 PROCEDURE — 94640 AIRWAY INHALATION TREATMENT: CPT

## 2019-04-26 PROCEDURE — 6370000000 HC RX 637 (ALT 250 FOR IP): Performed by: INTERNAL MEDICINE

## 2019-04-26 PROCEDURE — 76775 US EXAM ABDO BACK WALL LIM: CPT

## 2019-04-26 PROCEDURE — 2700000000 HC OXYGEN THERAPY PER DAY

## 2019-04-26 RX ORDER — 0.9 % SODIUM CHLORIDE 0.9 %
500 INTRAVENOUS SOLUTION INTRAVENOUS ONCE
Status: DISCONTINUED | OUTPATIENT
Start: 2019-04-26 | End: 2019-04-26

## 2019-04-26 RX ADMIN — CEFAZOLIN SODIUM 1 G: 1 INJECTION, SOLUTION INTRAVENOUS at 12:51

## 2019-04-26 RX ADMIN — BUPROPION HYDROCHLORIDE 100 MG: 100 TABLET, FILM COATED, EXTENDED RELEASE ORAL at 07:58

## 2019-04-26 RX ADMIN — INSULIN LISPRO 20 UNITS: 100 INJECTION, SOLUTION INTRAVENOUS; SUBCUTANEOUS at 12:51

## 2019-04-26 RX ADMIN — HYDROCODONE BITARTRATE AND ACETAMINOPHEN 1 TABLET: 5; 325 TABLET ORAL at 12:38

## 2019-04-26 RX ADMIN — INSULIN LISPRO 4 UNITS: 100 INJECTION, SOLUTION INTRAVENOUS; SUBCUTANEOUS at 12:50

## 2019-04-26 RX ADMIN — Medication 1 CAPSULE: at 07:59

## 2019-04-26 RX ADMIN — DOCUSATE SODIUM 100 MG: 100 CAPSULE, LIQUID FILLED ORAL at 08:00

## 2019-04-26 RX ADMIN — DULOXETINE HYDROCHLORIDE 60 MG: 30 CAPSULE, DELAYED RELEASE ORAL at 08:00

## 2019-04-26 RX ADMIN — Medication 2000 UNITS: at 08:00

## 2019-04-26 RX ADMIN — ASPIRIN 81 MG 81 MG: 81 TABLET ORAL at 07:59

## 2019-04-26 RX ADMIN — PANTOPRAZOLE SODIUM 40 MG: 40 TABLET, DELAYED RELEASE ORAL at 05:06

## 2019-04-26 RX ADMIN — SODIUM CHLORIDE 500 ML: 9 INJECTION, SOLUTION INTRAVENOUS at 08:12

## 2019-04-26 RX ADMIN — ENOXAPARIN SODIUM 40 MG: 40 INJECTION SUBCUTANEOUS at 07:58

## 2019-04-26 RX ADMIN — DOCUSATE SODIUM 100 MG: 100 CAPSULE, LIQUID FILLED ORAL at 21:37

## 2019-04-26 RX ADMIN — INSULIN LISPRO 20 UNITS: 100 INJECTION, SOLUTION INTRAVENOUS; SUBCUTANEOUS at 08:16

## 2019-04-26 RX ADMIN — MICONAZOLE NITRATE: 2 POWDER TOPICAL at 21:39

## 2019-04-26 RX ADMIN — GABAPENTIN 800 MG: 400 CAPSULE ORAL at 12:38

## 2019-04-26 RX ADMIN — OXYCODONE HYDROCHLORIDE 10 MG: 10 TABLET ORAL at 14:18

## 2019-04-26 RX ADMIN — ATORVASTATIN CALCIUM 40 MG: 40 TABLET, FILM COATED ORAL at 21:38

## 2019-04-26 RX ADMIN — Medication 1 CAPSULE: at 16:23

## 2019-04-26 RX ADMIN — MICONAZOLE NITRATE: 2 POWDER TOPICAL at 08:02

## 2019-04-26 RX ADMIN — CEFAZOLIN SODIUM 1 G: 1 INJECTION, SOLUTION INTRAVENOUS at 05:06

## 2019-04-26 RX ADMIN — RANOLAZINE 500 MG: 500 TABLET, FILM COATED, EXTENDED RELEASE ORAL at 21:38

## 2019-04-26 RX ADMIN — RANOLAZINE 500 MG: 500 TABLET, FILM COATED, EXTENDED RELEASE ORAL at 07:59

## 2019-04-26 RX ADMIN — LINAGLIPTIN 5 MG: 5 TABLET, FILM COATED ORAL at 08:00

## 2019-04-26 RX ADMIN — CEFAZOLIN SODIUM 1 G: 1 INJECTION, SOLUTION INTRAVENOUS at 21:37

## 2019-04-26 RX ADMIN — Medication 2 PUFF: at 08:15

## 2019-04-26 RX ADMIN — GABAPENTIN 800 MG: 400 CAPSULE ORAL at 08:00

## 2019-04-26 RX ADMIN — CLOPIDOGREL BISULFATE 75 MG: 75 TABLET ORAL at 07:58

## 2019-04-26 RX ADMIN — HYDROCODONE BITARTRATE AND ACETAMINOPHEN 1 TABLET: 5; 325 TABLET ORAL at 07:59

## 2019-04-26 RX ADMIN — DOXYCYCLINE HYCLATE 100 MG: 100 TABLET, COATED ORAL at 21:38

## 2019-04-26 RX ADMIN — Medication 2 PUFF: at 21:58

## 2019-04-26 RX ADMIN — METOPROLOL TARTRATE 50 MG: 50 TABLET ORAL at 21:38

## 2019-04-26 RX ADMIN — METOPROLOL TARTRATE 50 MG: 50 TABLET ORAL at 08:00

## 2019-04-26 RX ADMIN — DOXYCYCLINE HYCLATE 100 MG: 100 TABLET, COATED ORAL at 08:00

## 2019-04-26 RX ADMIN — BUPROPION HYDROCHLORIDE 100 MG: 100 TABLET, FILM COATED, EXTENDED RELEASE ORAL at 21:37

## 2019-04-26 RX ADMIN — TIZANIDINE 4 MG: 4 TABLET ORAL at 08:50

## 2019-04-26 RX ADMIN — INSULIN LISPRO 8 UNITS: 100 INJECTION, SOLUTION INTRAVENOUS; SUBCUTANEOUS at 08:14

## 2019-04-26 RX ADMIN — LEVOTHYROXINE SODIUM 50 MCG: 50 TABLET ORAL at 08:01

## 2019-04-26 ASSESSMENT — PAIN SCALES - GENERAL
PAINLEVEL_OUTOF10: 5
PAINLEVEL_OUTOF10: 8
PAINLEVEL_OUTOF10: 8
PAINLEVEL_OUTOF10: 9
PAINLEVEL_OUTOF10: 8
PAINLEVEL_OUTOF10: 9
PAINLEVEL_OUTOF10: 10
PAINLEVEL_OUTOF10: 6
PAINLEVEL_OUTOF10: 8
PAINLEVEL_OUTOF10: 10
PAINLEVEL_OUTOF10: 10

## 2019-04-26 ASSESSMENT — PAIN DESCRIPTION - ONSET
ONSET: AWAKENED FROM SLEEP
ONSET: ON-GOING
ONSET: ON-GOING

## 2019-04-26 ASSESSMENT — PAIN DESCRIPTION - FREQUENCY
FREQUENCY: CONTINUOUS

## 2019-04-26 ASSESSMENT — PAIN DESCRIPTION - PROGRESSION
CLINICAL_PROGRESSION: NOT CHANGED
CLINICAL_PROGRESSION: RAPIDLY WORSENING
CLINICAL_PROGRESSION: GRADUALLY WORSENING
CLINICAL_PROGRESSION: NOT CHANGED

## 2019-04-26 ASSESSMENT — PAIN DESCRIPTION - PAIN TYPE
TYPE: ACUTE PAIN

## 2019-04-26 ASSESSMENT — PAIN DESCRIPTION - LOCATION
LOCATION: GENERALIZED

## 2019-04-26 ASSESSMENT — PAIN DESCRIPTION - DESCRIPTORS
DESCRIPTORS: ACHING

## 2019-04-26 ASSESSMENT — PAIN DESCRIPTION - ORIENTATION: ORIENTATION: LEFT

## 2019-04-26 NOTE — PLAN OF CARE
Problem: Falls - Risk of:  Goal: Will remain free from falls  Description  Will remain free from falls  4/25/2019 2142 by Virgene Klinefelter, RN  Outcome: Ongoing  4/25/2019 1032 by Rey Livingston LPN  Outcome: Ongoing  Goal: Absence of physical injury  Description  Absence of physical injury  4/25/2019 2142 by Virgene Klinefelter, RN  Outcome: Ongoing  4/25/2019 1032 by Rey Livingston LPN  Outcome: Ongoing     Problem: Pain:  Goal: Pain level will decrease  Description  Pain level will decrease  4/25/2019 2142 by Virgene Klinefelter, RN  Outcome: Ongoing  4/25/2019 1032 by Rey Livingston LPN  Outcome: Ongoing  Goal: Control of acute pain  Description  Control of acute pain  4/25/2019 2142 by Virgene Klinefelter, RN  Outcome: Ongoing  4/25/2019 1032 by Rey Livingston LPN  Outcome: Ongoing  Goal: Control of chronic pain  Description  Control of chronic pain  4/25/2019 2142 by Virgene Klinefelter, RN  Outcome: Ongoing  4/25/2019 1032 by Rey Livingston LPN  Outcome: Ongoing

## 2019-04-26 NOTE — PROGRESS NOTES
Pt reexamined   Her lying /60  Standing after 2 min 100/60   Off all BP meds   She is eating and drinking  Add renal Us and with her body habitus- bladder scan  May not  Be very sensitive   ua     Hold diuretics until BP comes up   Ad cbc with diff in am

## 2019-04-26 NOTE — CARE COORDINATION
CHF Simulation Cart Utilized:    CHF Book provided (Learning to Live with Heart failure):  [x] Yes  [] No     CHF zones/weighing daily:  [x] Yes  [] No     Teachback Done  [x] Yes  [] No     Type of Heart Failure/EF review:  [x] Yes  [] No     Contributing factor review:  [x] Yes  [] No      Medication review:            EF: 50-55%  []  ACEARNI (LV<40%)  [] ARB (If EF <40% and ACE not tolerated)  [] ARNI (LV<40%)  May require prior auth        [] Free coupon provided if newly prescibed  [x] BB (If EF <40% Use only Carvedilol, metoprolol succinate or bisoprolol)  [x] Diuretic  [] Vasodilators (If self identified  and LV <40%)  [] Hydralazine (If self identified  and LV <40%)  [] Aldosterone blockers  (If LV <40%, Cr <2.5 mg/dl in men,<2.0 mg/dl in women)  []  Anticougulant ( AFib/Flutter)         [] No Contraindicated   [] Other:     Vaccines:  []  Influenza N/A  []  PNA 13 N/A  [x]  PNA 23    Low sodium diet:  [x] Why should I limit sodium  [x] Low sodium food list:  [x] Foods to avoid  [x] Hidden sources of sodium  [x] Reading a food label   [x] Low sodium cookbook provided    Fluid Restriction:  [x] Yes (2/L per day)   [] No     Water content of common foods and beverages. [x] Yes  [] No     Proper use of digital scale:  [x] Yes  [] No   I provided the patient with a scale:  [] Yes  [x] No (Patient has a functioning scale at home)     Exercise/Cardiac Rehab review:  [x] Yes  [] No   [] Cardiac rehab referral sent    Smoking Cessation:  [] Yes  [x] No   [] [facility] referral sent    Interactive Notebook review: (www. RiseaboveHF.org)  [x] Yes  [] No     Pacemaker/ Lifevest/ ICD review:  [] Yes  [x] No     Teach back utilized  [x] Yes  [] No     19  3:45  CN visit done. Patient well known to me and understands my role as CN. Will follow. Date: 19 12:15  Time spent educatin minutes  CN visit done. Patient tearful at times d/t her fluid overload.  CN was able to provide education and explain changes needed to help manage at home. Patient was receptive to my education today. All education repeated with this admission. Patient states she also buys foods which are affordable d/t being in a budget. We discussed how she could buy lower sodium options on a budget. CN provided Teaching following the Heart Failure book, the HF Zones, and the Sodium Content pamphlet. We reviewed the HF zones, signs and symptoms to report on day 1 of onset, medication compliance, daily weights, low sodium diet and limiting fluids. The patient's contributing risk factors for heart failure are identified as: CKD,CAD and HTN  Advised patient you can reduce the risk for heart failure exacerbations by modifying/controlling the risk factors they have. Pt informed to take medications as prescribed, follow a cardiac heart healthy / low sodium diet, exercise regularly and limiting fluids. 4/29/19 12:20  Patient discharged over the weekend. Chart review done. Will follow.      Lincoln Meredith RN, CHF Care Navigator

## 2019-04-26 NOTE — CONSULTS
Inpatient consult to Nephrology  Consult performed by: Kristina Serra MD  Consult ordered by: Jonel Strong MD  Assessment/Recommendations: Pt seen ,examined,interviewed and chart reviewed. Please see the dictated consult for details     Imp :   JUAN J/CKD stage 3 A a3- R/O ac bladder obstruction - other potential d/d inadequate \"plasma refill\" / AIN or med's induced ATI -   2. Fluid overload- unsure whether she lost wt or not but has some peripheral edema   3. probable  cellulitis   4.  Multiple morbid dz- including DM/obesity/CAD     Plan:  Start with plain UA and urine chemistry  Bladder scan  Hold IVF- po fluid and food  After urine specimen - will reveal for IV status   If UA has any pyuria - than emp- Pred  Also hold or d/c gabapentin with JUAN J   follow clinically       Thanks for the consult    #[id number]

## 2019-04-26 NOTE — CONSULTS
proteinuria before, but  always less than 1 gm per day. PAST MEDICAL HISTORY:  1.  CKD stage IIIA3 with several acute kidney injuries in the past.  2.  Diabetes mellitus type 2, diagnosed when she was 27, does not have  any known retinopathy, does have some proteinuria. She is on insulin  therapy, of course. 3.  She might have had a TIA in the distant past.  4.  Long history of hypertension about 30 plus years or so. 5.  Coronary artery disease, status post stent x2 apparently. Her last  echocardiogram, which was done in 10/2018 showed left ventricular  ejection fraction about 50% to 55%, but she did have severe concentric  left ventricular hypertrophy, which suggests she might have  long-standing hypertension, mild MR and TR. 6.  Anxiety disorder. PAST SURGICAL HISTORY:  1. PTCA and stenting of the coronary arteries. 2.  She also had I and D of toe wound and infection in the distant past.  3.  Gallbladder surgery. 4.  Tonsillectomy. 5.  . FAMILY MEDICAL HISTORY:  Diabetes and coronary artery disease run in the  family. OBSTETRIC AND GYNECOLOGIC HISTORY:   1, para 1. Apparently had  history of eclampsia in the past, but no gestational diabetes. HABITS:  She quit smoking about in , has roughly 30-pack-year  history. No history of alcohol or illicit drug abuse. ALLERGIES:  She has no known drug allergy. CURRENT MEDICATIONS:  Here in the hospital include:  She is on aspirin  81 mg a day, Lipitor 40 at bedtime, Wellbutrin  mg twice a day,  cefazolin 1 gm q.8 hours, Plavix 75 mg daily, Colace 100 mg twice a day,  she is on doxycycline, Cymbalta, gabapentin, several insulin regimens,  Synthroid, metoprolol, and Ranexa. REVIEW OF SYSTEMS:  She reported low urine output, some leg edema. Her  shortness of breath little bit better, abdominal pain also better. No  fever, chills or rigor.   Rest of the review of systems is negative other  than previous paragraph. PHYSICAL EXAMINATION:  VITAL SIGNS:  At the time of examination reveal temperature, her T-max  is 98.2, blood pressure 120s/60s, pulse 74, respiratory rate 18 and she  is satting about 98%. GENERAL:  The patient is not in any acute distress. HEENT:  Head and neck:  Normocephalic, atraumatic. Eyes:  Positive  conjunctival pallor. Ears, mouth and throat:  Dry oral mucosa. CARDIOVASCULAR:  Irregular rhythm. RESPIRATORY:  Few rhonchi. ABDOMEN:  Obese, soft. EXTREMITIES:  1+ edema bilaterally. LABORATORY VALUES AND ANCILLARY SERVICES:  Her creatinine is 2.7, BUN is  54. CBC which last time was done on 04/24/2019, hemoglobin was 10.1,  _____ eosinophilia at that time. Urinalysis which was also done on  04/23/2019 showed 100 mg/dL of protein, 4 rbc's, but no other active  sediment. She had a CT of the lumbar spine without contrast, no acute lumbar  puncture. She had a CT of the head, chronic ischemic changes. Chest x-ray, left lower lobe atelectasis or minimal effusion, but no  pulmonary edema. IMPRESSION:  A 80-year-old female with acute kidney injury. 1.  Acute kidney injury on top of CKD stage IIIA3. We will rule out  acute bladder obstruction. First of all other potential differential  diagnosis would be acute interstitial nephritis or medication-induced  ATI or inadequate plasma refill. 2.  Fluid overload, mainly peripheral edema, unsure whether she lost  weight or not. She does still have some peripheral edema. 3.  Probable cellulitis. She is on empirical antibiotic therapy with  cefazolin. 4.  Anemia, could be multifactorial.  5.  Multiple comorbidities, which include diabetes, obesity, coronary  artery disease with relatively preserved left ventricular ejection  fraction, but concentric LVH. PLAN:  1. Start with bladder scan and if the postvoid residual is more than  200, insert a Reyes catheter. 2.  Plain UA and urine chemistry. 3.  Hold IV fluid. P.o.  fluid and food now. 4.  After urine specimen and also probably I will check manual  orthostatic to reassess her intravascular volume status. 5.  She is on high dose of gabapentin and with acute kidney injury has  high risk of toxicity. I am just going to hold it unless the patient  insists. In that case, I will lower the dose. Otherwise, we will  follow clinically and also get a CBC with differential tomorrow to see  if there is any peripheral eosinophilia.         Donna Talley MD    D: 04/26/2019 15:27:02       T: 04/26/2019 16:42:04     [email or web address]  Job#: [id number]     Doc#: [id number]    CC:

## 2019-04-27 VITALS
RESPIRATION RATE: 19 BRPM | BODY MASS INDEX: 50.02 KG/M2 | HEART RATE: 81 BPM | HEIGHT: 64 IN | SYSTOLIC BLOOD PRESSURE: 117 MMHG | WEIGHT: 293 LBS | OXYGEN SATURATION: 98 % | TEMPERATURE: 97.9 F | DIASTOLIC BLOOD PRESSURE: 56 MMHG

## 2019-04-27 LAB
ANION GAP SERPL CALCULATED.3IONS-SCNC: 13 MMOL/L (ref 4–16)
BACTERIA: NEGATIVE /HPF
BASOPHILS ABSOLUTE: 0 K/CU MM
BASOPHILS RELATIVE PERCENT: 0.4 % (ref 0–1)
BILIRUBIN URINE: NEGATIVE MG/DL
BLOOD, URINE: ABNORMAL
BUN BLDV-MCNC: 61 MG/DL (ref 6–23)
CALCIUM SERPL-MCNC: 8.4 MG/DL (ref 8.3–10.6)
CHLORIDE BLD-SCNC: 101 MMOL/L (ref 99–110)
CLARITY: ABNORMAL
CO2: 23 MMOL/L (ref 21–32)
COLOR: YELLOW
CREAT SERPL-MCNC: 2.4 MG/DL (ref 0.6–1.1)
CREATININE URINE: 99.1 MG/DL (ref 28–217)
CULTURE: ABNORMAL
DIFFERENTIAL TYPE: ABNORMAL
EOSINOPHILS ABSOLUTE: 0.2 K/CU MM
EOSINOPHILS RELATIVE PERCENT: 2 % (ref 0–3)
GFR AFRICAN AMERICAN: 25 ML/MIN/1.73M2
GFR NON-AFRICAN AMERICAN: 21 ML/MIN/1.73M2
GLUCOSE BLD-MCNC: 166 MG/DL (ref 70–99)
GLUCOSE BLD-MCNC: 272 MG/DL (ref 70–99)
GLUCOSE BLD-MCNC: 278 MG/DL (ref 70–99)
GLUCOSE, URINE: NEGATIVE MG/DL
HCT VFR BLD CALC: 31.2 % (ref 37–47)
HEMOGLOBIN: 9.4 GM/DL (ref 12.5–16)
HYALINE CASTS: 0 /LPF
IMMATURE NEUTROPHIL %: 1.2 % (ref 0–0.43)
KETONES, URINE: NEGATIVE MG/DL
LEUKOCYTE ESTERASE, URINE: NEGATIVE
LYMPHOCYTES ABSOLUTE: 1.6 K/CU MM
LYMPHOCYTES RELATIVE PERCENT: 15.9 % (ref 24–44)
Lab: ABNORMAL
MAGNESIUM: 2.2 MG/DL (ref 1.8–2.4)
MCH RBC QN AUTO: 26.9 PG (ref 27–31)
MCHC RBC AUTO-ENTMCNC: 30.1 % (ref 32–36)
MCV RBC AUTO: 89.4 FL (ref 78–100)
MONOCYTES ABSOLUTE: 0.5 K/CU MM
MONOCYTES RELATIVE PERCENT: 5 % (ref 0–4)
NITRITE URINE, QUANTITATIVE: NEGATIVE
NUCLEATED RBC %: 0 %
PDW BLD-RTO: 14.4 % (ref 11.7–14.9)
PH, URINE: 5 (ref 5–8)
PLATELET # BLD: 330 K/CU MM (ref 140–440)
PMV BLD AUTO: 10.1 FL (ref 7.5–11.1)
POTASSIUM SERPL-SCNC: 4.7 MMOL/L (ref 3.5–5.1)
PROT/CREAT RATIO, UR: ABNORMAL
PROTEIN UA: 100 MG/DL
RBC # BLD: 3.49 M/CU MM (ref 4.2–5.4)
RBC URINE: 4 /HPF (ref 0–6)
SEGMENTED NEUTROPHILS ABSOLUTE COUNT: 7.8 K/CU MM
SEGMENTED NEUTROPHILS RELATIVE PERCENT: 75.5 % (ref 36–66)
SODIUM BLD-SCNC: 137 MMOL/L (ref 135–145)
SODIUM URINE: 33 MMOL/L (ref 35–167)
SPECIFIC GRAVITY UA: 1.01 (ref 1–1.03)
SPECIMEN: ABNORMAL
SQUAMOUS EPITHELIAL: 7 /HPF
TOTAL IMMATURE NEUTOROPHIL: 0.12 K/CU MM
TOTAL NUCLEATED RBC: 0 K/CU MM
TRICHOMONAS: ABNORMAL /HPF
URINE TOTAL PROTEIN: 99.7 MG/DL
UROBILINOGEN, URINE: NORMAL MG/DL (ref 0.2–1)
WBC # BLD: 10.3 K/CU MM (ref 4–10.5)
WBC UA: <1 /HPF (ref 0–5)
YEAST: ABNORMAL /HPF

## 2019-04-27 PROCEDURE — 94761 N-INVAS EAR/PLS OXIMETRY MLT: CPT

## 2019-04-27 PROCEDURE — 6370000000 HC RX 637 (ALT 250 FOR IP): Performed by: INTERNAL MEDICINE

## 2019-04-27 PROCEDURE — 85025 COMPLETE CBC W/AUTO DIFF WBC: CPT

## 2019-04-27 PROCEDURE — 83735 ASSAY OF MAGNESIUM: CPT

## 2019-04-27 PROCEDURE — 84300 ASSAY OF URINE SODIUM: CPT

## 2019-04-27 PROCEDURE — 6360000002 HC RX W HCPCS: Performed by: INTERNAL MEDICINE

## 2019-04-27 PROCEDURE — 84156 ASSAY OF PROTEIN URINE: CPT

## 2019-04-27 PROCEDURE — 82570 ASSAY OF URINE CREATININE: CPT

## 2019-04-27 PROCEDURE — 80048 BASIC METABOLIC PNL TOTAL CA: CPT

## 2019-04-27 PROCEDURE — 82962 GLUCOSE BLOOD TEST: CPT

## 2019-04-27 PROCEDURE — 36415 COLL VENOUS BLD VENIPUNCTURE: CPT

## 2019-04-27 PROCEDURE — 94640 AIRWAY INHALATION TREATMENT: CPT

## 2019-04-27 PROCEDURE — 81001 URINALYSIS AUTO W/SCOPE: CPT

## 2019-04-27 RX ORDER — CEFDINIR 300 MG/1
300 CAPSULE ORAL 2 TIMES DAILY
Qty: 14 CAPSULE | Refills: 0 | Status: SHIPPED | OUTPATIENT
Start: 2019-04-27 | End: 2019-04-27 | Stop reason: HOSPADM

## 2019-04-27 RX ORDER — METOPROLOL TARTRATE 50 MG/1
50 TABLET, FILM COATED ORAL 2 TIMES DAILY
Qty: 60 TABLET | Refills: 3 | Status: ON HOLD
Start: 2019-04-27 | End: 2019-07-17 | Stop reason: HOSPADM

## 2019-04-27 RX ORDER — CEPHALEXIN 250 MG/1
250 CAPSULE ORAL 3 TIMES DAILY
Qty: 12 CAPSULE | Refills: 0 | Status: SHIPPED | OUTPATIENT
Start: 2019-04-27 | End: 2019-05-01

## 2019-04-27 RX ORDER — HYDROCODONE BITARTRATE AND ACETAMINOPHEN 5; 325 MG/1; MG/1
1 TABLET ORAL EVERY 4 HOURS PRN
Qty: 10 TABLET | Refills: 0 | Status: SHIPPED | OUTPATIENT
Start: 2019-04-27 | End: 2019-04-30

## 2019-04-27 RX ORDER — GABAPENTIN 800 MG/1
800 TABLET ORAL 3 TIMES DAILY
Qty: 90 TABLET | Refills: 3 | Status: ON HOLD
Start: 2019-04-27 | End: 2019-12-02 | Stop reason: HOSPADM

## 2019-04-27 RX ORDER — TIZANIDINE 4 MG/1
4 TABLET ORAL EVERY 6 HOURS PRN
Qty: 30 TABLET | Refills: 0 | Status: ON HOLD | OUTPATIENT
Start: 2019-04-27 | End: 2019-12-02 | Stop reason: HOSPADM

## 2019-04-27 RX ORDER — DOXYCYCLINE HYCLATE 100 MG
100 TABLET ORAL EVERY 12 HOURS SCHEDULED
Qty: 8 TABLET | Refills: 0 | Status: SHIPPED | OUTPATIENT
Start: 2019-04-27 | End: 2019-05-01

## 2019-04-27 RX ADMIN — CLOPIDOGREL BISULFATE 75 MG: 75 TABLET ORAL at 08:41

## 2019-04-27 RX ADMIN — HYDROCODONE BITARTRATE AND ACETAMINOPHEN 1 TABLET: 5; 325 TABLET ORAL at 01:42

## 2019-04-27 RX ADMIN — BUPROPION HYDROCHLORIDE 100 MG: 100 TABLET, FILM COATED, EXTENDED RELEASE ORAL at 08:41

## 2019-04-27 RX ADMIN — PANTOPRAZOLE SODIUM 40 MG: 40 TABLET, DELAYED RELEASE ORAL at 05:47

## 2019-04-27 RX ADMIN — DULOXETINE HYDROCHLORIDE 60 MG: 30 CAPSULE, DELAYED RELEASE ORAL at 08:41

## 2019-04-27 RX ADMIN — TIZANIDINE 4 MG: 4 TABLET ORAL at 08:42

## 2019-04-27 RX ADMIN — METOPROLOL TARTRATE 50 MG: 50 TABLET ORAL at 08:41

## 2019-04-27 RX ADMIN — DOXYCYCLINE HYCLATE 100 MG: 100 TABLET, COATED ORAL at 08:42

## 2019-04-27 RX ADMIN — ENOXAPARIN SODIUM 40 MG: 40 INJECTION SUBCUTANEOUS at 08:42

## 2019-04-27 RX ADMIN — RANOLAZINE 500 MG: 500 TABLET, FILM COATED, EXTENDED RELEASE ORAL at 08:41

## 2019-04-27 RX ADMIN — LINAGLIPTIN 5 MG: 5 TABLET, FILM COATED ORAL at 08:42

## 2019-04-27 RX ADMIN — ASPIRIN 81 MG 81 MG: 81 TABLET ORAL at 08:41

## 2019-04-27 RX ADMIN — Medication 2000 UNITS: at 08:41

## 2019-04-27 RX ADMIN — HYDROCODONE BITARTRATE AND ACETAMINOPHEN 1 TABLET: 5; 325 TABLET ORAL at 06:48

## 2019-04-27 RX ADMIN — Medication 1 CAPSULE: at 08:41

## 2019-04-27 RX ADMIN — CEFAZOLIN SODIUM 1 G: 1 INJECTION, SOLUTION INTRAVENOUS at 05:47

## 2019-04-27 RX ADMIN — DOCUSATE SODIUM 100 MG: 100 CAPSULE, LIQUID FILLED ORAL at 08:42

## 2019-04-27 RX ADMIN — LEVOTHYROXINE SODIUM 50 MCG: 50 TABLET ORAL at 08:41

## 2019-04-27 RX ADMIN — MICONAZOLE NITRATE: 2 POWDER TOPICAL at 08:49

## 2019-04-27 RX ADMIN — Medication 2 PUFF: at 08:57

## 2019-04-27 RX ADMIN — INSULIN LISPRO 20 UNITS: 100 INJECTION, SOLUTION INTRAVENOUS; SUBCUTANEOUS at 08:48

## 2019-04-27 RX ADMIN — INSULIN LISPRO 6 UNITS: 100 INJECTION, SOLUTION INTRAVENOUS; SUBCUTANEOUS at 08:44

## 2019-04-27 ASSESSMENT — PAIN DESCRIPTION - PROGRESSION
CLINICAL_PROGRESSION: NOT CHANGED

## 2019-04-27 ASSESSMENT — PAIN SCALES - GENERAL
PAINLEVEL_OUTOF10: 8
PAINLEVEL_OUTOF10: 9

## 2019-04-27 ASSESSMENT — PAIN DESCRIPTION - LOCATION
LOCATION: GENERALIZED
LOCATION: GENERALIZED

## 2019-04-27 ASSESSMENT — PAIN DESCRIPTION - ORIENTATION: ORIENTATION: LEFT

## 2019-04-27 ASSESSMENT — PAIN DESCRIPTION - ONSET: ONSET: ON-GOING

## 2019-04-27 ASSESSMENT — PAIN DESCRIPTION - PAIN TYPE
TYPE: ACUTE PAIN
TYPE: ACUTE PAIN

## 2019-04-27 ASSESSMENT — PAIN DESCRIPTION - DESCRIPTORS: DESCRIPTORS: ACHING

## 2019-04-27 ASSESSMENT — PAIN DESCRIPTION - FREQUENCY: FREQUENCY: CONTINUOUS

## 2019-04-27 NOTE — DISCHARGE SUMMARY
Jerica Mosley 1960 [id number]  PCP:  Luis Miguel Reyes MD    Admit date: 4/23/2019  Admitting Physician: Armen Mae MD    Discharge date: 4/27/2019 Discharge Physician: Amren Mae MD      Reason for admission:   Chief Complaint   Patient presents with    Abdominal Pain    Shortness of Breath     Present on Admission:  [address]       Discharge Diagnoses & Hospital Course[de-identified]        Anarsarca  Now with pre-renal azotemia from diuretics  - daily weight, I&O, diet mod but she likes her salt and it is difficult to change her habits. She declined compression hoisery  - reviewed TTE 10/2018 LVEF 50-55 %  - trend Cr daily with diuretic drug monitoring developed progressive JUAN J from diuretics. Will stop diuretics for now. - given UOP adequate and Cr stable/improve, plan for discharge and outpatient f/u with Dr Fredo Gr with BMP on monday     Panniculitis  - fungal powder for skin creases  - IV cefazolin, PO doxy with improvement  - home to complete doxy and keflex     Blood was sent in the ED - 1 set was drawn, 2nd set ordered but not drawn - cx with pan sensitive serratia and prelim staph. Due to absence of sepsis and polymicrobial, this represents a contaminant.  Repeat 2 set blood cx 4/24 negative.         Mechanical Fall in bathroom 4/24  - chronic rotator cuff injury  - check L spine CT and head CT per protocol that is non acute  - 10 tabs of norco 5 ordered, prn zanaflex  - she is non compliant and declined PT/OT with C     HTN  CAD s/p stents  CKD3  DMII  HLD  Morbid obesity           Discharge instructions    Visit Dr Fredo Gr on Monday 4/29/19 at 1 pm with blood work prior in the office    Hold your lopressor and gabapentin until you have followed up with Dr Fredo Gr on 4/29/19 outpatient to discuss restarting    Low salt diet    Complete 4 days of keflex (renally dosed)/doxy      Exam:   Wt Readings from Last 3 Encounters:   04/27/19 (!) 328 lb 8 oz (149 kg)   03/15/19 (!) 302 lb (137 kg)   03/03/19 (!) PO2ART, BE, SLL6ORX, CO2CT, O2SAT, LABCARB in the last 72 hours. Invalid input(s): METHGBART    Glucose:   Recent Labs     04/26/19  2143 04/27/19  0145 04/27/19  0839   POCGLU 111* 166* 278*     Magnesium:   Recent Labs     04/25/19  0527 04/26/19  0441 04/27/19  0844   MG 1.8 2.3 2.2     Phosphorus:No results for input(s): PHOS in the last 72 hours. INR: No results for input(s): INR in the last 72 hours. Patient Instructions:   Ariadne Cleveland   Home Medication Instructions ECF:[id number]    Printed on:04/27/19 1043   Medication Information                      albuterol sulfate HFA (VENTOLIN HFA) 108 (90 Base) MCG/ACT inhaler  Inhale 2 puffs into the lungs every 4 hours as needed for Wheezing             aspirin 81 MG chewable tablet  Take 1 tablet by mouth daily             atorvastatin (LIPITOR) 40 MG tablet  Take 1 tablet by mouth nightly             BREO ELLIPTA 100-25 MCG/INH AEPB inhaler  Inhale 1 puff into the lungs daily             buPROPion (WELLBUTRIN SR) 100 MG extended release tablet  Take 100 mg by mouth 2 times daily             cephALEXin (KEFLEX) 250 MG capsule  Take 1 capsule by mouth 3 times daily for 4 days             Cholecalciferol (VITAMIN D3) 2000 units TABS  Take 2,000 Units by mouth daily             clopidogrel (PLAVIX) 75 MG tablet  Take 1 tablet by mouth daily             docusate (COLACE, DULCOLAX) 100 MG CAPS  Take 100 mg by mouth 2 times daily             doxycycline hyclate (VIBRA-TABS) 100 MG tablet  Take 1 tablet by mouth every 12 hours for 4 days             DULoxetine (CYMBALTA) 60 MG extended release capsule  Take 1 capsule by mouth daily             gabapentin (NEURONTIN) 800 MG tablet  Take 1 tablet by mouth 3 times daily for 30 days.  Hold your gabapentin until you have followed up with Dr Dileep Laura on 4/29/19 outpatient to discuss restarting             HYDROcodone-acetaminophen (NORCO) 5-325 MG per tablet  Take 1 tablet by mouth every 4 hours as needed for Pain (only for acute back pain) for up to 10 doses. insulin glargine (LANTUS SOLOSTAR) 100 UNIT/ML injection pen  Inject 55 Units into the skin nightly             insulin lispro (HUMALOG) 100 UNIT/ML injection vial  Inject 20 Units into the skin 3 times daily (with meals)             levothyroxine (SYNTHROID) 50 MCG tablet  Take 50 mcg by mouth daily             linagliptin (TRADJENTA) 5 MG tablet  Take 1 tablet by mouth daily             metFORMIN (GLUCOPHAGE) 500 MG tablet  Take 500 mg by mouth 2 times daily (with meals)             metoprolol tartrate (LOPRESSOR) 50 MG tablet  Take 1 tablet by mouth 2 times daily Hold your lopressor until you have followed up with Dr Mau Nunn on 4/29/19 outpatient to discuss restarting             nystatin (MYCOSTATIN) 547309 UNIT/GM powder  Apply topically 2 times daily as needed             ondansetron (ZOFRAN-ODT) 4 MG disintegrating tablet  Take 1 tablet by mouth every 8 hours as needed for Nausea or Vomiting             pantoprazole (PROTONIX) 40 MG tablet  Take 1 tablet by mouth every morning (before breakfast)             ranolazine (RANEXA) 500 MG extended release tablet  Take 1 tablet by mouth 2 times daily             tiZANidine (ZANAFLEX) 4 MG tablet  Take 1 tablet by mouth every 6 hours as needed (back spasms)             VICTOZA 18 MG/3ML SOPN SC injection  Inject 1.2 mg into the skin daily                  Code Status: Full Code     Consults:   PHARMACY TO DOSE VANCOMYCIN  IP CONSULT TO HOSPITALIST  IP CONSULT TO NEPHROLOGY    Diet: cardiac diet and renal diet    Activity: activity as tolerated   Work:    Discharged Condition: fair    Prognosis: Fair    Disposition: home      Follow-up with     Follow-up With  Details  Why  Navin Parham MD  Go on 4/30/2019  Follow up with Dr Thee Mott on Tuesday 4/30 at 12:00  [facility]   Lm Thayer MD  Go on 5/6/2019  Follow up with Dr Bradly Kruse on Mon.  May 6 at 2:45.  [facility], [address] [place] [address]  [phone]   Mica Murphy MD  Schedule an appointment as soon as possible for a visit on 4/29/2019  Please visit Dr Sonya Dai in the office at 1 pm for blood work and Dr Sonya Dai follow up  [address] [place] [address]  [phone]            Discharge Physician Signed:   [facility] Internal medicine  4/27/2019 at 10:43 AM    The patient was seen and examined on day of discharge and this discharge summary is in conjunction with any daily progress note from day of discharge.   Time spent on discharge in the examination, evaluation, counseling and review of medications and discharge plan: <30 minutes    Please forward this discharge summary to patient's PCP

## 2019-04-27 NOTE — PROGRESS NOTES
Nephrology Progress Note  4/27/2019 8:49 AM        Subjective:   Admit Date: 4/23/2019  PCP: Manyor Bernstein MD    Interval History: started feeling little better , better UOP     Diet: better    ROS:  Edema also little better , BP still little lower side but no sx ,     Data:     Current meds:    insulin lispro  0-12 Units Subcutaneous TID WC    insulin lispro  0-6 Units Subcutaneous Nightly    aspirin  81 mg Oral Daily    atorvastatin  40 mg Oral Nightly    mometasone-formoterol  2 puff Inhalation BID    buPROPion  100 mg Oral BID    vitamin D  2,000 Units Oral Daily    clopidogrel  75 mg Oral Daily    docusate sodium  100 mg Oral BID    DULoxetine  60 mg Oral Daily    insulin glargine  55 Units Subcutaneous Nightly    insulin lispro  20 Units Subcutaneous TID WC    levothyroxine  50 mcg Oral Daily    linagliptin  5 mg Oral Daily    metoprolol tartrate  50 mg Oral BID    pantoprazole  40 mg Oral QAM AC    ranolazine  500 mg Oral BID    enoxaparin  40 mg Subcutaneous Daily    miconazole   Topical BID    ceFAZolin  1 g Intravenous Q8H    doxycycline hyclate  100 mg Oral 2 times per day    lactobacillus  1 capsule Oral BID WC      dextrose           I/O last 3 completed shifts: In: 960 [P.O.:960]  Out: 1350 [Urine:1350]    CBC: No results for input(s): WBC, HGB, PLT in the last 72 hours.        Recent Labs     04/25/19  0527 04/26/19  0441    138   K 4.9 4.8    100   CO2 22 27   BUN 39* 54*   CREATININE 2.0* 2.7*   GLUCOSE 220* 239*       Lab Results   Component Value Date    CALCIUM 8.6 04/26/2019    PHOS 3.6 11/19/2018       Objective:     Vitals: BP (!) 135/59   Pulse 75   Temp 97.5 °F (36.4 °C) (Oral)   Resp 18   Ht 5' 4\" (1.626 m)   Wt (!) 328 lb 8 oz (149 kg)   SpO2 97%   BMI 56.39 kg/m²     General appearance:  No ac distress  HEENT:  + mild pallor  Neck:  supple  Lungs:  few rhonchi, no crackles   Heart:  irregular  Abdomen: soft  Extremities:  ++ edema of LE    Problem List :         Impression :     1. JUAN J/ CKD stage 3 A3- his urine is bland and do not suspect any ac intrinsic dz and also has no pyuria- cbc/ bmp pending- bit  Less likely AIN- his urine cr close to 100- and < 40  Na suggestive of reduced renal perfusion  I suspect she has inadequate \" plasma refill \" and should refill slowly- although she has some risk of renal vein congestion from extra cellular fluid   2. peripheral edema   3. probable cellulitis  4. Multiple comorbid dz- BP still < 120/80 LUCHO     Recommendation/Plan  :     1. Review CBC with diff jo eos and BMP- if high suspicion for AIN than po pred- jo with hx of DM and high BS- will be conservative   2. Once BP better will try loop IV   3. Watch UOP  4. I expect her renal Fx to improve   5.  Follow clinically       Portillo Vargas MD

## 2019-04-29 PROBLEM — N17.9 ACUTE KIDNEY INJURY (HCC): Status: ACTIVE | Noted: 2019-04-29

## 2019-04-29 PROBLEM — E11.9 DM (DIABETES MELLITUS) (HCC): Status: ACTIVE | Noted: 2019-04-29

## 2019-04-29 PROBLEM — I27.81 COR PULMONALE (CHRONIC) (HCC): Status: ACTIVE | Noted: 2019-04-29

## 2019-04-29 PROBLEM — E87.70 FLUID OVERLOAD: Status: ACTIVE | Noted: 2019-04-29

## 2019-04-29 LAB
CULTURE: NORMAL
CULTURE: NORMAL
Lab: NORMAL
Lab: NORMAL
SPECIMEN: NORMAL
SPECIMEN: NORMAL

## 2019-04-30 LAB
EKG ATRIAL RATE: 86 BPM
EKG DIAGNOSIS: NORMAL
EKG P AXIS: 49 DEGREES
EKG P-R INTERVAL: 164 MS
EKG Q-T INTERVAL: 390 MS
EKG QRS DURATION: 88 MS
EKG QTC CALCULATION (BAZETT): 466 MS
EKG R AXIS: 41 DEGREES
EKG T AXIS: 70 DEGREES
EKG VENTRICULAR RATE: 86 BPM

## 2019-07-13 ENCOUNTER — HOSPITAL ENCOUNTER (INPATIENT)
Age: 59
LOS: 4 days | Discharge: HOME | DRG: 392 | End: 2019-07-17
Attending: EMERGENCY MEDICINE | Admitting: INTERNAL MEDICINE
Payer: MEDICARE

## 2019-07-13 ENCOUNTER — APPOINTMENT (OUTPATIENT)
Dept: CT IMAGING | Age: 59
DRG: 392 | End: 2019-07-13
Payer: MEDICARE

## 2019-07-13 DIAGNOSIS — R73.9 HYPERGLYCEMIA: ICD-10-CM

## 2019-07-13 DIAGNOSIS — L03.311 CELLULITIS OF ABDOMINAL WALL: Primary | ICD-10-CM

## 2019-07-13 DIAGNOSIS — R10.9 LEFT FLANK PAIN: ICD-10-CM

## 2019-07-13 LAB
ALBUMIN SERPL-MCNC: 3.6 GM/DL (ref 3.4–5)
ALP BLD-CCNC: 148 IU/L (ref 40–129)
ALT SERPL-CCNC: 13 U/L (ref 10–40)
ANION GAP SERPL CALCULATED.3IONS-SCNC: 11 MMOL/L (ref 4–16)
AST SERPL-CCNC: 12 IU/L (ref 15–37)
BACTERIA: NEGATIVE /HPF
BASOPHILS ABSOLUTE: 0 K/CU MM
BASOPHILS RELATIVE PERCENT: 0.3 % (ref 0–1)
BILIRUB SERPL-MCNC: 0.3 MG/DL (ref 0–1)
BILIRUBIN URINE: NEGATIVE MG/DL
BLOOD, URINE: ABNORMAL
BUN BLDV-MCNC: 35 MG/DL (ref 6–23)
CALCIUM SERPL-MCNC: 9.2 MG/DL (ref 8.3–10.6)
CHLORIDE BLD-SCNC: 95 MMOL/L (ref 99–110)
CLARITY: ABNORMAL
CO2: 25 MMOL/L (ref 21–32)
COLOR: ABNORMAL
CREAT SERPL-MCNC: 1.5 MG/DL (ref 0.6–1.1)
DIFFERENTIAL TYPE: ABNORMAL
EOSINOPHILS ABSOLUTE: 0.1 K/CU MM
EOSINOPHILS RELATIVE PERCENT: 1 % (ref 0–3)
ESTIMATED AVERAGE GLUCOSE: 252 MG/DL
GFR AFRICAN AMERICAN: 43 ML/MIN/1.73M2
GFR NON-AFRICAN AMERICAN: 36 ML/MIN/1.73M2
GLUCOSE BLD-MCNC: 289 MG/DL (ref 70–99)
GLUCOSE BLD-MCNC: 408 MG/DL (ref 70–99)
GLUCOSE, URINE: >500 MG/DL
GRANULAR CASTS: 1 /LPF
HBA1C MFR BLD: 10.4 % (ref 4.2–6.3)
HCT VFR BLD CALC: 33.4 % (ref 37–47)
HEMOGLOBIN: 10.7 GM/DL (ref 12.5–16)
HYALINE CASTS: 0 /LPF
IMMATURE NEUTROPHIL %: 0.8 % (ref 0–0.43)
KETONES, URINE: NEGATIVE MG/DL
LEUKOCYTE ESTERASE, URINE: NEGATIVE
LIPASE: 23 IU/L (ref 13–60)
LYMPHOCYTES ABSOLUTE: 2.1 K/CU MM
LYMPHOCYTES RELATIVE PERCENT: 17.1 % (ref 24–44)
MCH RBC QN AUTO: 27.4 PG (ref 27–31)
MCHC RBC AUTO-ENTMCNC: 32 % (ref 32–36)
MCV RBC AUTO: 85.6 FL (ref 78–100)
MONOCYTES ABSOLUTE: 0.6 K/CU MM
MONOCYTES RELATIVE PERCENT: 4.8 % (ref 0–4)
MUCUS: ABNORMAL HPF
NITRITE URINE, QUANTITATIVE: NEGATIVE
NUCLEATED RBC %: 0 %
PDW BLD-RTO: 13.9 % (ref 11.7–14.9)
PH, URINE: 5 (ref 5–8)
PLATELET # BLD: 336 K/CU MM (ref 140–440)
PMV BLD AUTO: 10.7 FL (ref 7.5–11.1)
POTASSIUM SERPL-SCNC: 4.7 MMOL/L (ref 3.5–5.1)
PROTEIN UA: 100 MG/DL
RBC # BLD: 3.9 M/CU MM (ref 4.2–5.4)
RBC URINE: 41 /HPF (ref 0–6)
SEGMENTED NEUTROPHILS ABSOLUTE COUNT: 9.1 K/CU MM
SEGMENTED NEUTROPHILS RELATIVE PERCENT: 76 % (ref 36–66)
SODIUM BLD-SCNC: 131 MMOL/L (ref 135–145)
SPECIFIC GRAVITY UA: 1.01 (ref 1–1.03)
SQUAMOUS EPITHELIAL: 3 /HPF
TOTAL IMMATURE NEUTOROPHIL: 0.1 K/CU MM
TOTAL NUCLEATED RBC: 0 K/CU MM
TOTAL PROTEIN: 6.3 GM/DL (ref 6.4–8.2)
TRICHOMONAS: ABNORMAL /HPF
UROBILINOGEN, URINE: NORMAL MG/DL (ref 0.2–1)
WBC # BLD: 12 K/CU MM (ref 4–10.5)
WBC UA: 4 /HPF (ref 0–5)
YEAST: ABNORMAL /HPF

## 2019-07-13 PROCEDURE — 81001 URINALYSIS AUTO W/SCOPE: CPT

## 2019-07-13 PROCEDURE — 96375 TX/PRO/DX INJ NEW DRUG ADDON: CPT

## 2019-07-13 PROCEDURE — 6370000000 HC RX 637 (ALT 250 FOR IP): Performed by: NURSE PRACTITIONER

## 2019-07-13 PROCEDURE — 74176 CT ABD & PELVIS W/O CONTRAST: CPT

## 2019-07-13 PROCEDURE — 80053 COMPREHEN METABOLIC PANEL: CPT

## 2019-07-13 PROCEDURE — 6360000002 HC RX W HCPCS: Performed by: EMERGENCY MEDICINE

## 2019-07-13 PROCEDURE — 99285 EMERGENCY DEPT VISIT HI MDM: CPT

## 2019-07-13 PROCEDURE — 1200000000 HC SEMI PRIVATE

## 2019-07-13 PROCEDURE — 83690 ASSAY OF LIPASE: CPT

## 2019-07-13 PROCEDURE — 83036 HEMOGLOBIN GLYCOSYLATED A1C: CPT

## 2019-07-13 PROCEDURE — 82962 GLUCOSE BLOOD TEST: CPT

## 2019-07-13 PROCEDURE — 6370000000 HC RX 637 (ALT 250 FOR IP): Performed by: PHYSICIAN ASSISTANT

## 2019-07-13 PROCEDURE — 85025 COMPLETE CBC W/AUTO DIFF WBC: CPT

## 2019-07-13 PROCEDURE — 96374 THER/PROPH/DIAG INJ IV PUSH: CPT

## 2019-07-13 RX ORDER — INSULIN GLARGINE 100 [IU]/ML
55 INJECTION, SOLUTION SUBCUTANEOUS NIGHTLY
Status: DISCONTINUED | OUTPATIENT
Start: 2019-07-13 | End: 2019-07-17 | Stop reason: HOSPADM

## 2019-07-13 RX ORDER — ONDANSETRON 2 MG/ML
4 INJECTION INTRAMUSCULAR; INTRAVENOUS EVERY 6 HOURS PRN
Status: DISCONTINUED | OUTPATIENT
Start: 2019-07-13 | End: 2019-07-17 | Stop reason: HOSPADM

## 2019-07-13 RX ORDER — CLOPIDOGREL BISULFATE 75 MG/1
75 TABLET ORAL DAILY
Status: DISCONTINUED | OUTPATIENT
Start: 2019-07-14 | End: 2019-07-17 | Stop reason: HOSPADM

## 2019-07-13 RX ORDER — SODIUM CHLORIDE 0.9 % (FLUSH) 0.9 %
10 SYRINGE (ML) INJECTION EVERY 12 HOURS SCHEDULED
Status: DISCONTINUED | OUTPATIENT
Start: 2019-07-13 | End: 2019-07-17 | Stop reason: HOSPADM

## 2019-07-13 RX ORDER — ONDANSETRON 4 MG/1
4 TABLET, ORALLY DISINTEGRATING ORAL EVERY 8 HOURS PRN
Status: DISCONTINUED | OUTPATIENT
Start: 2019-07-13 | End: 2019-07-17 | Stop reason: HOSPADM

## 2019-07-13 RX ORDER — ACETAMINOPHEN 325 MG/1
650 TABLET ORAL EVERY 4 HOURS PRN
Status: DISCONTINUED | OUTPATIENT
Start: 2019-07-13 | End: 2019-07-17 | Stop reason: HOSPADM

## 2019-07-13 RX ORDER — METOPROLOL TARTRATE 50 MG/1
50 TABLET, FILM COATED ORAL 2 TIMES DAILY
Status: DISCONTINUED | OUTPATIENT
Start: 2019-07-13 | End: 2019-07-17 | Stop reason: HOSPADM

## 2019-07-13 RX ORDER — RANOLAZINE 500 MG/1
500 TABLET, EXTENDED RELEASE ORAL 2 TIMES DAILY
Status: DISCONTINUED | OUTPATIENT
Start: 2019-07-13 | End: 2019-07-17 | Stop reason: HOSPADM

## 2019-07-13 RX ORDER — LACTOBACILLUS RHAMNOSUS GG 10B CELL
1 CAPSULE ORAL DAILY
Status: DISCONTINUED | OUTPATIENT
Start: 2019-07-14 | End: 2019-07-17 | Stop reason: HOSPADM

## 2019-07-13 RX ORDER — ALBUTEROL SULFATE 90 UG/1
2 AEROSOL, METERED RESPIRATORY (INHALATION) EVERY 4 HOURS PRN
Status: DISCONTINUED | OUTPATIENT
Start: 2019-07-13 | End: 2019-07-17 | Stop reason: HOSPADM

## 2019-07-13 RX ORDER — PANTOPRAZOLE SODIUM 40 MG/1
40 TABLET, DELAYED RELEASE ORAL
Status: DISCONTINUED | OUTPATIENT
Start: 2019-07-14 | End: 2019-07-17 | Stop reason: HOSPADM

## 2019-07-13 RX ORDER — DOCUSATE SODIUM 100 MG/1
100 CAPSULE, LIQUID FILLED ORAL 2 TIMES DAILY
Status: DISCONTINUED | OUTPATIENT
Start: 2019-07-13 | End: 2019-07-17 | Stop reason: HOSPADM

## 2019-07-13 RX ORDER — LEVOTHYROXINE SODIUM 0.05 MG/1
50 TABLET ORAL DAILY
Status: DISCONTINUED | OUTPATIENT
Start: 2019-07-14 | End: 2019-07-17 | Stop reason: HOSPADM

## 2019-07-13 RX ORDER — ONDANSETRON 4 MG/1
4 TABLET, ORALLY DISINTEGRATING ORAL ONCE
Status: COMPLETED | OUTPATIENT
Start: 2019-07-13 | End: 2019-07-13

## 2019-07-13 RX ORDER — TORSEMIDE 20 MG/1
20 TABLET ORAL DAILY
Status: DISCONTINUED | OUTPATIENT
Start: 2019-07-14 | End: 2019-07-17 | Stop reason: HOSPADM

## 2019-07-13 RX ORDER — HYDROCODONE BITARTRATE AND ACETAMINOPHEN 5; 325 MG/1; MG/1
1 TABLET ORAL ONCE
Status: COMPLETED | OUTPATIENT
Start: 2019-07-13 | End: 2019-07-13

## 2019-07-13 RX ORDER — ONDANSETRON 2 MG/ML
4 INJECTION INTRAMUSCULAR; INTRAVENOUS EVERY 6 HOURS PRN
Status: DISCONTINUED | OUTPATIENT
Start: 2019-07-13 | End: 2019-07-13 | Stop reason: SDUPTHER

## 2019-07-13 RX ORDER — BUPROPION HYDROCHLORIDE 100 MG/1
100 TABLET, EXTENDED RELEASE ORAL 2 TIMES DAILY
Status: DISCONTINUED | OUTPATIENT
Start: 2019-07-13 | End: 2019-07-17 | Stop reason: HOSPADM

## 2019-07-13 RX ORDER — MORPHINE SULFATE 4 MG/ML
4 INJECTION, SOLUTION INTRAMUSCULAR; INTRAVENOUS
Status: ACTIVE | OUTPATIENT
Start: 2019-07-13 | End: 2019-07-13

## 2019-07-13 RX ORDER — HYDROCODONE BITARTRATE AND ACETAMINOPHEN 5; 325 MG/1; MG/1
2 TABLET ORAL EVERY 6 HOURS PRN
Status: DISCONTINUED | OUTPATIENT
Start: 2019-07-13 | End: 2019-07-17 | Stop reason: HOSPADM

## 2019-07-13 RX ORDER — GABAPENTIN 400 MG/1
800 CAPSULE ORAL 3 TIMES DAILY
Status: DISCONTINUED | OUTPATIENT
Start: 2019-07-13 | End: 2019-07-15

## 2019-07-13 RX ORDER — SODIUM CHLORIDE 0.9 % (FLUSH) 0.9 %
10 SYRINGE (ML) INJECTION PRN
Status: DISCONTINUED | OUTPATIENT
Start: 2019-07-13 | End: 2019-07-17 | Stop reason: HOSPADM

## 2019-07-13 RX ORDER — ATORVASTATIN CALCIUM 40 MG/1
40 TABLET, FILM COATED ORAL NIGHTLY
Status: DISCONTINUED | OUTPATIENT
Start: 2019-07-13 | End: 2019-07-17 | Stop reason: HOSPADM

## 2019-07-13 RX ORDER — MORPHINE SULFATE 4 MG/ML
4 INJECTION, SOLUTION INTRAMUSCULAR; INTRAVENOUS ONCE
Status: COMPLETED | OUTPATIENT
Start: 2019-07-13 | End: 2019-07-13

## 2019-07-13 RX ORDER — HYDROCODONE BITARTRATE AND ACETAMINOPHEN 5; 325 MG/1; MG/1
1 TABLET ORAL EVERY 6 HOURS PRN
Status: DISCONTINUED | OUTPATIENT
Start: 2019-07-13 | End: 2019-07-17 | Stop reason: HOSPADM

## 2019-07-13 RX ORDER — LOSARTAN POTASSIUM AND HYDROCHLOROTHIAZIDE 12.5; 5 MG/1; MG/1
2 TABLET ORAL EVERY EVENING
Status: DISCONTINUED | OUTPATIENT
Start: 2019-07-13 | End: 2019-07-15

## 2019-07-13 RX ORDER — DULOXETIN HYDROCHLORIDE 30 MG/1
60 CAPSULE, DELAYED RELEASE ORAL DAILY
Status: DISCONTINUED | OUTPATIENT
Start: 2019-07-14 | End: 2019-07-17 | Stop reason: HOSPADM

## 2019-07-13 RX ORDER — ASPIRIN 81 MG/1
81 TABLET, CHEWABLE ORAL DAILY
Status: DISCONTINUED | OUTPATIENT
Start: 2019-07-14 | End: 2019-07-17 | Stop reason: HOSPADM

## 2019-07-13 RX ADMIN — RANOLAZINE 500 MG: 500 TABLET, FILM COATED, EXTENDED RELEASE ORAL at 23:53

## 2019-07-13 RX ADMIN — ONDANSETRON 4 MG: 2 INJECTION INTRAMUSCULAR; INTRAVENOUS at 20:51

## 2019-07-13 RX ADMIN — MORPHINE SULFATE 4 MG: 4 INJECTION, SOLUTION INTRAMUSCULAR; INTRAVENOUS at 20:51

## 2019-07-13 RX ADMIN — INSULIN LISPRO 3 UNITS: 100 INJECTION, SOLUTION INTRAVENOUS; SUBCUTANEOUS at 23:58

## 2019-07-13 RX ADMIN — HYDROCODONE BITARTRATE AND ACETAMINOPHEN 1 TABLET: 5; 325 TABLET ORAL at 16:15

## 2019-07-13 RX ADMIN — METOPROLOL TARTRATE 50 MG: 50 TABLET ORAL at 23:53

## 2019-07-13 RX ADMIN — HYDROCODONE BITARTRATE AND ACETAMINOPHEN 2 TABLET: 5; 325 TABLET ORAL at 23:52

## 2019-07-13 RX ADMIN — INSULIN HUMAN 10 UNITS: 100 INJECTION, SOLUTION PARENTERAL at 20:51

## 2019-07-13 RX ADMIN — ATORVASTATIN CALCIUM 40 MG: 40 TABLET, FILM COATED ORAL at 23:53

## 2019-07-13 RX ADMIN — INSULIN GLARGINE 55 UNITS: 100 INJECTION, SOLUTION SUBCUTANEOUS at 23:57

## 2019-07-13 RX ADMIN — BUPROPION HYDROCHLORIDE 100 MG: 100 TABLET, FILM COATED, EXTENDED RELEASE ORAL at 23:52

## 2019-07-13 RX ADMIN — ONDANSETRON 4 MG: 4 TABLET, ORALLY DISINTEGRATING ORAL at 16:16

## 2019-07-13 RX ADMIN — LOSARTAN POTASSIUM AND HYDROCHLOROTHIAZIDE 2 TABLET: 12.5; 5 TABLET ORAL at 23:52

## 2019-07-13 RX ADMIN — DOCUSATE SODIUM 100 MG: 100 CAPSULE, LIQUID FILLED ORAL at 23:53

## 2019-07-13 RX ADMIN — ONDANSETRON 4 MG: 4 TABLET, ORALLY DISINTEGRATING ORAL at 23:53

## 2019-07-13 ASSESSMENT — PAIN DESCRIPTION - ORIENTATION
ORIENTATION: LEFT
ORIENTATION: LEFT

## 2019-07-13 ASSESSMENT — PAIN SCALES - GENERAL
PAINLEVEL_OUTOF10: 8
PAINLEVEL_OUTOF10: 10

## 2019-07-13 ASSESSMENT — PAIN DESCRIPTION - PAIN TYPE
TYPE: ACUTE PAIN
TYPE: ACUTE PAIN

## 2019-07-13 ASSESSMENT — PAIN - FUNCTIONAL ASSESSMENT: PAIN_FUNCTIONAL_ASSESSMENT: PREVENTS OR INTERFERES SOME ACTIVE ACTIVITIES AND ADLS

## 2019-07-13 ASSESSMENT — PAIN DESCRIPTION - LOCATION
LOCATION: FLANK
LOCATION: FLANK

## 2019-07-13 ASSESSMENT — PAIN DESCRIPTION - PROGRESSION: CLINICAL_PROGRESSION: GRADUALLY WORSENING

## 2019-07-13 ASSESSMENT — PAIN DESCRIPTION - FREQUENCY: FREQUENCY: INTERMITTENT

## 2019-07-13 ASSESSMENT — PAIN DESCRIPTION - ONSET: ONSET: ON-GOING

## 2019-07-13 NOTE — ED PROVIDER NOTES
EMERGENCY DEPARTMENT ENCOUNTER      PCP: Delvis Sinha MD    CHIEF COMPLAINT    Chief Complaint   Patient presents with    Flank Pain     L flank pain x 3 days     Patient staffed with supervising physician Dr. Marybeth Griffin    HPI    Beulah Gallegos is a 62 y.o. female who presents with left flank pain. Onset 3 days ago. Patient describes this is dull, achy. No radiation of pain. No known aggravating or alleviating factors. Patient has been taking Norco without significant improvement in pain. Patient has had associated nausea with one episode of vomiting. Patient denies any diarrhea, urinary symptoms. Patient states she is also noticed some increased redness, swelling to lower abdomen. Patient has history of infection to lower abdomen previously. Patient denies fever, chest pain, shortness of breath. Patient denies any open sores to the lower abdomen, does have history of infections along skin fold of lower abdomen. REVIEW OF SYSTEMS    Constitutional:  Denies fever  HENT:  Denies sore throat or ear pain   Cardiovascular:  Denies chest pain, palpitations or swelling   Respiratory:  Denies cough or shortness of breath   GI:  See HPI above  : No hematuria or dysuria. No vaginal symptoms. Musculoskeletal:  See HPI No pain or swelling of extremities.   Skin:  See HPI  Neurologic:  Denies headache, focal weakness or sensory changes   Endocrine:  Denies polyuria or polydypsia   Lymphatic:  Denies swollen glands     All other review of systems are negative  See HPI and nursing notes for additional information     PAST MEDICAL & SURGICAL HISTORY    Past Medical History:   Diagnosis Date    CAD (coronary artery disease)     CKD (chronic kidney disease) stage 3, GFR 30-59 ml/min ([facility])     Diabetes type 2, uncontrolled ([address]     [facility]     Essential hypertension     Financial problems 3/22/2013    Generalized anxiety disorder 1/8/2018    Herpes genitalia     Obesity, morbid (more patient being a diabetic, not controlled and having abdominal wall cellulitis I believe she will require admission for IV antibiotics and further evaluation. Patient provided IV clindamycin while in emergency department. Consult is made to hospitalist who accepts admission. Clinical  IMPRESSION    1. Cellulitis of abdominal wall    2. Left flank pain    3. Hyperglycemia        Patient admitted    Comment: Please note this report has been produced using speech recognition software and may contain errors related to that system including errors in grammar, punctuation, and spelling, as well as words and phrases that may be inappropriate. If there are any questions or concerns please feel free to contact the dictating provider for clarification.       Salima Serrato PA-C  07/13/19 8445

## 2019-07-13 NOTE — ED PROVIDER NOTES
eMERGENCY dEPARTMENT eNCOUnter    Attending note    I cared for and evaluated the patient in conjunction with the ED Advanced Practice Provider    HPI/Physical Exam/Medical Decision Making  Carolin Kauffman is a 62 y.o. female here for evaluation of left flank pain x 3 days. She also has lower abdominal pain, swelling, and erythema for the past four days. Denies fevers or vomiting. The patient is an insulin dependent diabetic. Please see JUICE note for further details. Vitals:   ED Triage Vitals   Enc Vitals Group      BP 07/13/19 1551 (!) 165/77      Pulse 07/13/19 1551 88      Resp 07/13/19 1551 18      Temp 07/13/19 1551 98.2 °F (36.8 °C)      Temp Source 07/13/19 1551 Oral      SpO2 07/13/19 1551 94 %      Weight 07/13/19 1537 (!) 310 lb (140.6 kg)      Height 07/13/19 1537 5' 4\" (1.626 m)      Head Circumference --       Peak Flow --       Pain Score --       Pain Loc --       Pain Edu? --       Excl. in [address]? --        On exam, the patient is afebrile and nontoxic appearing. She is hypertensive with a known history of hypertension and is asymptomatic. She is otherwise hemodynamically stable and neurologically intact. Airway is patent. Neck is supple. Heart sounds have a regular rate and rhythm. Lungs are clear to auscultation bilaterally. The patient's abdomen is soft, non-tender and non-distended with no peritoneal signs. There is induration, erythema, warmth and tenderness to palpation of the lower abdomen and pannus concerning for cellulitis. There is no obvious abscess, necrosis, bullae or skin sloughing. Labs are obtained and are significant for leukocytosis with a left shift, chronic anemia, hematuria, renal insufficiency and hyperglycemia. CT abdomen and pelvis is negative for acute abnormality. The patient was treated with Zofran, Norco, insulin, niacin and felt better. We recommended admission to the hospital for treatment of her cellulitis and the patient was agreeable.  The patient will be admitted to the hospitalist.      All diagnostic, treatment, and disposition decisions were made by myself in conjunction with the advanced practice provider. For all further details of the patient's emergency department visit, please see the advanced practice provider's documentation. Comment: Please note this report has been produced using speech recognition software and may contain errors related to that system including errors in grammar, punctuation, and spelling, as well as words and phrases that may be inappropriate. If there are any questions or concerns please feel free to contact the dictating provider for clarification.         Cheng Rajput MD  07/14/19 7819

## 2019-07-14 LAB
ANION GAP SERPL CALCULATED.3IONS-SCNC: 8 MMOL/L (ref 4–16)
BASE EXCESS: ABNORMAL (ref 0–2.4)
BUN BLDV-MCNC: 33 MG/DL (ref 6–23)
CALCIUM SERPL-MCNC: 9.1 MG/DL (ref 8.3–10.6)
CARBON MONOXIDE, BLOOD: 2.3 % (ref 0–5)
CHLORIDE BLD-SCNC: 99 MMOL/L (ref 99–110)
CO2 CONTENT: 27.3 MMOL/L (ref 19–24)
CO2: 28 MMOL/L (ref 21–32)
COMMENT: ABNORMAL
CREAT SERPL-MCNC: 1.7 MG/DL (ref 0.6–1.1)
GFR AFRICAN AMERICAN: 37 ML/MIN/1.73M2
GFR NON-AFRICAN AMERICAN: 31 ML/MIN/1.73M2
GLUCOSE BLD-MCNC: 171 MG/DL (ref 70–99)
GLUCOSE BLD-MCNC: 201 MG/DL (ref 70–99)
GLUCOSE BLD-MCNC: 209 MG/DL (ref 70–99)
GLUCOSE BLD-MCNC: 307 MG/DL (ref 70–99)
GLUCOSE BLD-MCNC: 323 MG/DL (ref 70–99)
HCO3 ARTERIAL: 25.9 MMOL/L (ref 18–23)
HCT VFR BLD CALC: 32.3 % (ref 37–47)
HEMOGLOBIN: 10.2 GM/DL (ref 12.5–16)
LACTATE: 1.1 MMOL/L (ref 0.4–2)
MCH RBC QN AUTO: 27.6 PG (ref 27–31)
MCHC RBC AUTO-ENTMCNC: 31.6 % (ref 32–36)
MCV RBC AUTO: 87.3 FL (ref 78–100)
METHEMOGLOBIN ARTERIAL: 2.1 %
O2 SATURATION: 94.7 % (ref 96–97)
PCO2 ARTERIAL: 47 MMHG (ref 32–45)
PDW BLD-RTO: 14.1 % (ref 11.7–14.9)
PH BLOOD: 7.35 (ref 7.34–7.45)
PLATELET # BLD: 290 K/CU MM (ref 140–440)
PMV BLD AUTO: 10.5 FL (ref 7.5–11.1)
PO2 ARTERIAL: 149 MMHG (ref 75–100)
POTASSIUM SERPL-SCNC: 5.3 MMOL/L (ref 3.5–5.1)
RBC # BLD: 3.7 M/CU MM (ref 4.2–5.4)
SODIUM BLD-SCNC: 135 MMOL/L (ref 135–145)
WBC # BLD: 11 K/CU MM (ref 4–10.5)

## 2019-07-14 PROCEDURE — 36600 WITHDRAWAL OF ARTERIAL BLOOD: CPT

## 2019-07-14 PROCEDURE — 80048 BASIC METABOLIC PNL TOTAL CA: CPT

## 2019-07-14 PROCEDURE — 2500000003 HC RX 250 WO HCPCS: Performed by: NURSE PRACTITIONER

## 2019-07-14 PROCEDURE — 6370000000 HC RX 637 (ALT 250 FOR IP): Performed by: NURSE PRACTITIONER

## 2019-07-14 PROCEDURE — 82962 GLUCOSE BLOOD TEST: CPT

## 2019-07-14 PROCEDURE — 6360000002 HC RX W HCPCS: Performed by: NURSE PRACTITIONER

## 2019-07-14 PROCEDURE — 1200000000 HC SEMI PRIVATE

## 2019-07-14 PROCEDURE — 36415 COLL VENOUS BLD VENIPUNCTURE: CPT

## 2019-07-14 PROCEDURE — 2700000000 HC OXYGEN THERAPY PER DAY

## 2019-07-14 PROCEDURE — 6370000000 HC RX 637 (ALT 250 FOR IP): Performed by: HOSPITALIST

## 2019-07-14 PROCEDURE — 85027 COMPLETE CBC AUTOMATED: CPT

## 2019-07-14 PROCEDURE — 2580000003 HC RX 258: Performed by: NURSE PRACTITIONER

## 2019-07-14 PROCEDURE — 94761 N-INVAS EAR/PLS OXIMETRY MLT: CPT

## 2019-07-14 PROCEDURE — 94640 AIRWAY INHALATION TREATMENT: CPT

## 2019-07-14 PROCEDURE — 83605 ASSAY OF LACTIC ACID: CPT

## 2019-07-14 PROCEDURE — 82803 BLOOD GASES ANY COMBINATION: CPT

## 2019-07-14 PROCEDURE — 2580000003 HC RX 258: Performed by: HOSPITALIST

## 2019-07-14 RX ORDER — 0.9 % SODIUM CHLORIDE 0.9 %
500 INTRAVENOUS SOLUTION INTRAVENOUS ONCE
Status: COMPLETED | OUTPATIENT
Start: 2019-07-14 | End: 2019-07-14

## 2019-07-14 RX ORDER — DEXTROSE MONOHYDRATE 50 MG/ML
100 INJECTION, SOLUTION INTRAVENOUS PRN
Status: DISCONTINUED | OUTPATIENT
Start: 2019-07-14 | End: 2019-07-17 | Stop reason: HOSPADM

## 2019-07-14 RX ORDER — NICOTINE POLACRILEX 4 MG
15 LOZENGE BUCCAL PRN
Status: DISCONTINUED | OUTPATIENT
Start: 2019-07-14 | End: 2019-07-17 | Stop reason: HOSPADM

## 2019-07-14 RX ORDER — MIDODRINE HYDROCHLORIDE 5 MG/1
5 TABLET ORAL
Status: DISCONTINUED | OUTPATIENT
Start: 2019-07-14 | End: 2019-07-17 | Stop reason: HOSPADM

## 2019-07-14 RX ORDER — DEXTROSE MONOHYDRATE 25 G/50ML
12.5 INJECTION, SOLUTION INTRAVENOUS PRN
Status: DISCONTINUED | OUTPATIENT
Start: 2019-07-14 | End: 2019-07-17 | Stop reason: HOSPADM

## 2019-07-14 RX ADMIN — MICONAZOLE NITRATE: 20 POWDER TOPICAL at 13:20

## 2019-07-14 RX ADMIN — ATORVASTATIN CALCIUM 40 MG: 40 TABLET, FILM COATED ORAL at 21:04

## 2019-07-14 RX ADMIN — Medication 1 CAPSULE: at 08:01

## 2019-07-14 RX ADMIN — INSULIN LISPRO 20 UNITS: 100 INJECTION, SOLUTION INTRAVENOUS; SUBCUTANEOUS at 13:27

## 2019-07-14 RX ADMIN — INSULIN LISPRO 1 UNITS: 100 INJECTION, SOLUTION INTRAVENOUS; SUBCUTANEOUS at 21:13

## 2019-07-14 RX ADMIN — HYDROCODONE BITARTRATE AND ACETAMINOPHEN 2 TABLET: 5; 325 TABLET ORAL at 06:50

## 2019-07-14 RX ADMIN — Medication 2 PUFF: at 08:22

## 2019-07-14 RX ADMIN — MIDODRINE HYDROCHLORIDE 5 MG: 5 TABLET ORAL at 17:16

## 2019-07-14 RX ADMIN — INSULIN LISPRO 20 UNITS: 100 INJECTION, SOLUTION INTRAVENOUS; SUBCUTANEOUS at 17:18

## 2019-07-14 RX ADMIN — DULOXETINE HYDROCHLORIDE 60 MG: 30 CAPSULE, DELAYED RELEASE ORAL at 08:01

## 2019-07-14 RX ADMIN — SODIUM CHLORIDE, PRESERVATIVE FREE 10 ML: 5 INJECTION INTRAVENOUS at 00:03

## 2019-07-14 RX ADMIN — GABAPENTIN 800 MG: 400 CAPSULE ORAL at 13:20

## 2019-07-14 RX ADMIN — Medication 2000 UNITS: at 08:01

## 2019-07-14 RX ADMIN — TORSEMIDE 20 MG: 20 TABLET ORAL at 08:01

## 2019-07-14 RX ADMIN — CLINDAMYCIN PHOSPHATE 600 MG: 150 INJECTION, SOLUTION INTRAVENOUS at 17:20

## 2019-07-14 RX ADMIN — ACETAMINOPHEN 650 MG: 325 TABLET ORAL at 21:03

## 2019-07-14 RX ADMIN — PANTOPRAZOLE SODIUM 40 MG: 40 TABLET, DELAYED RELEASE ORAL at 06:47

## 2019-07-14 RX ADMIN — LEVOTHYROXINE SODIUM 50 MCG: 50 TABLET ORAL at 08:00

## 2019-07-14 RX ADMIN — INSULIN LISPRO 4 UNITS: 100 INJECTION, SOLUTION INTRAVENOUS; SUBCUTANEOUS at 17:16

## 2019-07-14 RX ADMIN — DOCUSATE SODIUM 100 MG: 100 CAPSULE, LIQUID FILLED ORAL at 08:01

## 2019-07-14 RX ADMIN — BUPROPION HYDROCHLORIDE 100 MG: 100 TABLET, FILM COATED, EXTENDED RELEASE ORAL at 21:03

## 2019-07-14 RX ADMIN — SODIUM CHLORIDE 500 ML: 9 INJECTION, SOLUTION INTRAVENOUS at 09:12

## 2019-07-14 RX ADMIN — SODIUM CHLORIDE 500 ML: 9 INJECTION, SOLUTION INTRAVENOUS at 13:44

## 2019-07-14 RX ADMIN — DOCUSATE SODIUM 100 MG: 100 CAPSULE, LIQUID FILLED ORAL at 21:03

## 2019-07-14 RX ADMIN — ENOXAPARIN SODIUM 40 MG: 40 INJECTION, SOLUTION INTRAVENOUS; SUBCUTANEOUS at 08:02

## 2019-07-14 RX ADMIN — INSULIN LISPRO 20 UNITS: 100 INJECTION, SOLUTION INTRAVENOUS; SUBCUTANEOUS at 09:19

## 2019-07-14 RX ADMIN — BUPROPION HYDROCHLORIDE 100 MG: 100 TABLET, FILM COATED, EXTENDED RELEASE ORAL at 08:01

## 2019-07-14 RX ADMIN — INSULIN GLARGINE 55 UNITS: 100 INJECTION, SOLUTION SUBCUTANEOUS at 21:12

## 2019-07-14 RX ADMIN — CLINDAMYCIN PHOSPHATE 600 MG: 150 INJECTION, SOLUTION INTRAVENOUS at 09:12

## 2019-07-14 RX ADMIN — GABAPENTIN 800 MG: 400 CAPSULE ORAL at 08:00

## 2019-07-14 RX ADMIN — CLINDAMYCIN PHOSPHATE 600 MG: 150 INJECTION, SOLUTION INTRAVENOUS at 03:43

## 2019-07-14 RX ADMIN — Medication 2 PUFF: at 20:05

## 2019-07-14 RX ADMIN — INSULIN LISPRO 4 UNITS: 100 INJECTION, SOLUTION INTRAVENOUS; SUBCUTANEOUS at 13:26

## 2019-07-14 RX ADMIN — SODIUM CHLORIDE, PRESERVATIVE FREE 10 ML: 5 INJECTION INTRAVENOUS at 08:08

## 2019-07-14 RX ADMIN — RANOLAZINE 500 MG: 500 TABLET, FILM COATED, EXTENDED RELEASE ORAL at 08:01

## 2019-07-14 RX ADMIN — ASPIRIN 81 MG 81 MG: 81 TABLET ORAL at 08:01

## 2019-07-14 RX ADMIN — CLOPIDOGREL BISULFATE 75 MG: 75 TABLET ORAL at 08:01

## 2019-07-14 RX ADMIN — RANOLAZINE 500 MG: 500 TABLET, FILM COATED, EXTENDED RELEASE ORAL at 21:04

## 2019-07-14 RX ADMIN — INSULIN LISPRO 8 UNITS: 100 INJECTION, SOLUTION INTRAVENOUS; SUBCUTANEOUS at 09:16

## 2019-07-14 ASSESSMENT — PAIN SCALES - GENERAL
PAINLEVEL_OUTOF10: 8
PAINLEVEL_OUTOF10: 9

## 2019-07-14 ASSESSMENT — PAIN DESCRIPTION - PROGRESSION
CLINICAL_PROGRESSION: GRADUALLY WORSENING

## 2019-07-14 NOTE — H&P
2013     PSHX:  has a past surgical history that includes Cholecystectomy;  section; Tonsillectomy; and other surgical history (Right, [id number]). Allergies: Allergies   Allergen Reactions    Augmentin [Amoxicillin-Pot Clavulanate] Diarrhea       FAM HX: family history includes Arthritis in her father and mother; Cancer in her maternal grandmother; Diabetes in her maternal aunt, maternal grandfather, maternal grandmother, maternal uncle, and mother; Heart Disease in her father and mother; High Blood Pressure in her father and mother; Stroke in her father; Substance Abuse in her brother and father. Family history reviewed and is essentially negative unless as stated above.      Soc HX:   Social History     Socioeconomic History    Marital status: Single     Spouse name: None    Number of children: None    Years of education: None    Highest education level: None   Occupational History    None   Social Needs    Financial resource strain: None    Food insecurity:     Worry: None     Inability: None    Transportation needs:     Medical: None     Non-medical: None   Tobacco Use    Smoking status: Former Smoker     Packs/day: 2.00     Years: 34.00     Pack years: 68.00     Types: Cigarettes     Last attempt to quit: 2000     Years since quittin.5    Smokeless tobacco: Never Used   Substance and Sexual Activity    Alcohol use: No     Alcohol/week: 0.0 standard drinks    Drug use: No    Sexual activity: None   Lifestyle    Physical activity:     Days per week: None     Minutes per session: None    Stress: None   Relationships    Social connections:     Talks on phone: None     Gets together: None     Attends Congregation service: None     Active member of club or organization: None     Attends meetings of clubs or organizations: None     Relationship status: None    Intimate partner violence:     Fear of current or ex partner: None     Emotionally abused: None     Physically abused:

## 2019-07-14 NOTE — PROGRESS NOTES
Hospitalist Progress Note      Name:  Lee Perez /Age/Sex: 1960  (62 y.o. female)   MRN & CSN:  [id number] & [id number] Admission Date/Time: 2019  3:53 PM   Location:  [address] [place] [address] PCP: Hersey Curling, MD         Hospital Day: 2    Assessment and Plan:   Lee Perez is a 62 y.o.  female  who presents with flank pain    Abdominal wall cellulitis  Flank pain  CT abd.: what is described as bilateral nephrolithiasis on prior CT is actually  vascular calcification  -Clindamycin IV  -Probiotic  -Pain control    Microscopic hematuria   UA showing 40 rbcs  Will repeat, if persistent      Hyperglycemia in DMII due to nonadherence  . A1c 12.4 (2018)  -Hold orals  -Check A1c  -Continue SSI and Lantus  -Consult endo     Obesity class 3 with BMI 53.3  -Educated about lifestyle modifications  - discussed that she can be a candidate for weight reduction surgery    Chronic diastolic heart failure, preserved  Last echo with EF 50-55% and severe concentric LVH  -Continue Demadex and Metoprolol     CKD 3  Creat     HTN, uncontrolled  -Continue Hyzaar and Metoprolol     Hypothyroidism  -Continue Levothyroxine    COPD  -Continue Breo, Duoneb, and Albuterol     CAD  -Continue ASA, Plavix, Lipitor, and Ranexa     Generalized anxiety  -Continue Cymbalta and Wellbutrin          Diet DIET CARB CONTROL;   DVT Prophylaxis [x] Lovenox, []  Heparin, [] SCDs, []No VTE prophylaxis, patient ambulating   GI Prophylaxis [] PPI, [] H2 Blocker, [x] No GI prophylaxis, patient is receiving diet/Tube Feeds   Code Status Full Code             History of Present Illness:     Pt S&E. Ten point ROS reviewed negative, unless as noted above    Objective:        Intake/Output Summary (Last 24 hours) at 2019 1315  Last data filed at 2019 1031  Gross per 24 hour   Intake 680 ml   Output --   Net 680 ml      Vitals:   Vitals:    19 1032   BP: (!) 99/50   Pulse: 71   Resp: 17   Temp: 98 °F (36.7 °C)   SpO2: 97%

## 2019-07-15 LAB
ANION GAP SERPL CALCULATED.3IONS-SCNC: 12 MMOL/L (ref 4–16)
BACTERIA: ABNORMAL /HPF
BILIRUBIN URINE: NEGATIVE MG/DL
BLOOD, URINE: ABNORMAL
BUN BLDV-MCNC: 51 MG/DL (ref 6–23)
CALCIUM SERPL-MCNC: 8 MG/DL (ref 8.3–10.6)
CHLORIDE BLD-SCNC: 99 MMOL/L (ref 99–110)
CLARITY: ABNORMAL
CO2: 23 MMOL/L (ref 21–32)
COLOR: YELLOW
CREAT SERPL-MCNC: 3 MG/DL (ref 0.6–1.1)
CREATININE URINE: 110.6 MG/DL (ref 28–217)
GFR AFRICAN AMERICAN: 19 ML/MIN/1.73M2
GFR NON-AFRICAN AMERICAN: 16 ML/MIN/1.73M2
GLUCOSE BLD-MCNC: 178 MG/DL (ref 70–99)
GLUCOSE BLD-MCNC: 185 MG/DL (ref 70–99)
GLUCOSE BLD-MCNC: 188 MG/DL (ref 70–99)
GLUCOSE BLD-MCNC: 202 MG/DL (ref 70–99)
GLUCOSE BLD-MCNC: 295 MG/DL (ref 70–99)
GLUCOSE, URINE: 50 MG/DL
KETONES, URINE: NEGATIVE MG/DL
LEUKOCYTE ESTERASE, URINE: ABNORMAL
NITRITE URINE, QUANTITATIVE: NEGATIVE
PH, URINE: 5 (ref 5–8)
POTASSIUM SERPL-SCNC: 5 MMOL/L (ref 3.5–5.1)
PROT/CREAT RATIO, UR: ABNORMAL
PROTEIN UA: 100 MG/DL
RBC URINE: 4 /HPF (ref 0–6)
SODIUM BLD-SCNC: 134 MMOL/L (ref 135–145)
SODIUM URINE: 53 MMOL/L (ref 35–167)
SPECIFIC GRAVITY UA: 1.01 (ref 1–1.03)
SQUAMOUS EPITHELIAL: 4 /HPF
TRICHOMONAS: ABNORMAL /HPF
URINE TOTAL PROTEIN: 97.6 MG/DL
UROBILINOGEN, URINE: NORMAL MG/DL (ref 0.2–1)
WBC CLUMP: ABNORMAL /HPF
WBC UA: 28 /HPF (ref 0–5)
YEAST: ABNORMAL /HPF

## 2019-07-15 PROCEDURE — 1200000000 HC SEMI PRIVATE

## 2019-07-15 PROCEDURE — 2700000000 HC OXYGEN THERAPY PER DAY

## 2019-07-15 PROCEDURE — 36415 COLL VENOUS BLD VENIPUNCTURE: CPT

## 2019-07-15 PROCEDURE — 81001 URINALYSIS AUTO W/SCOPE: CPT

## 2019-07-15 PROCEDURE — 6360000002 HC RX W HCPCS: Performed by: NURSE PRACTITIONER

## 2019-07-15 PROCEDURE — 6370000000 HC RX 637 (ALT 250 FOR IP): Performed by: INTERNAL MEDICINE

## 2019-07-15 PROCEDURE — 84300 ASSAY OF URINE SODIUM: CPT

## 2019-07-15 PROCEDURE — 80048 BASIC METABOLIC PNL TOTAL CA: CPT

## 2019-07-15 PROCEDURE — 82570 ASSAY OF URINE CREATININE: CPT

## 2019-07-15 PROCEDURE — 87086 URINE CULTURE/COLONY COUNT: CPT

## 2019-07-15 PROCEDURE — 6370000000 HC RX 637 (ALT 250 FOR IP): Performed by: HOSPITALIST

## 2019-07-15 PROCEDURE — 6370000000 HC RX 637 (ALT 250 FOR IP): Performed by: NURSE PRACTITIONER

## 2019-07-15 PROCEDURE — 84156 ASSAY OF PROTEIN URINE: CPT

## 2019-07-15 PROCEDURE — 2500000003 HC RX 250 WO HCPCS: Performed by: NURSE PRACTITIONER

## 2019-07-15 PROCEDURE — 51798 US URINE CAPACITY MEASURE: CPT

## 2019-07-15 PROCEDURE — 2580000003 HC RX 258: Performed by: HOSPITALIST

## 2019-07-15 PROCEDURE — 2580000003 HC RX 258: Performed by: NURSE PRACTITIONER

## 2019-07-15 PROCEDURE — 82962 GLUCOSE BLOOD TEST: CPT

## 2019-07-15 RX ORDER — SODIUM CHLORIDE 9 MG/ML
INJECTION, SOLUTION INTRAVENOUS CONTINUOUS
Status: DISCONTINUED | OUTPATIENT
Start: 2019-07-15 | End: 2019-07-15

## 2019-07-15 RX ADMIN — ATORVASTATIN CALCIUM 40 MG: 40 TABLET, FILM COATED ORAL at 21:22

## 2019-07-15 RX ADMIN — SODIUM CHLORIDE, PRESERVATIVE FREE 10 ML: 5 INJECTION INTRAVENOUS at 08:38

## 2019-07-15 RX ADMIN — SODIUM CHLORIDE: 9 INJECTION, SOLUTION INTRAVENOUS at 10:03

## 2019-07-15 RX ADMIN — DULOXETINE HYDROCHLORIDE 60 MG: 30 CAPSULE, DELAYED RELEASE ORAL at 08:37

## 2019-07-15 RX ADMIN — DOCUSATE SODIUM 100 MG: 100 CAPSULE, LIQUID FILLED ORAL at 08:35

## 2019-07-15 RX ADMIN — CLINDAMYCIN PHOSPHATE 600 MG: 150 INJECTION, SOLUTION INTRAVENOUS at 18:24

## 2019-07-15 RX ADMIN — MIDODRINE HYDROCHLORIDE 5 MG: 5 TABLET ORAL at 17:25

## 2019-07-15 RX ADMIN — Medication 1 CAPSULE: at 08:34

## 2019-07-15 RX ADMIN — BUPROPION HYDROCHLORIDE 100 MG: 100 TABLET, FILM COATED, EXTENDED RELEASE ORAL at 21:22

## 2019-07-15 RX ADMIN — ASPIRIN 81 MG 81 MG: 81 TABLET ORAL at 08:34

## 2019-07-15 RX ADMIN — INSULIN LISPRO 20 UNITS: 100 INJECTION, SOLUTION INTRAVENOUS; SUBCUTANEOUS at 09:17

## 2019-07-15 RX ADMIN — INSULIN LISPRO 20 UNITS: 100 INJECTION, SOLUTION INTRAVENOUS; SUBCUTANEOUS at 13:09

## 2019-07-15 RX ADMIN — MIDODRINE HYDROCHLORIDE 5 MG: 5 TABLET ORAL at 12:16

## 2019-07-15 RX ADMIN — CLINDAMYCIN PHOSPHATE 600 MG: 150 INJECTION, SOLUTION INTRAVENOUS at 10:03

## 2019-07-15 RX ADMIN — MICONAZOLE NITRATE: 20 POWDER TOPICAL at 21:32

## 2019-07-15 RX ADMIN — PANTOPRAZOLE SODIUM 40 MG: 40 TABLET, DELAYED RELEASE ORAL at 08:35

## 2019-07-15 RX ADMIN — LEVOTHYROXINE SODIUM 50 MCG: 50 TABLET ORAL at 08:35

## 2019-07-15 RX ADMIN — RANOLAZINE 500 MG: 500 TABLET, FILM COATED, EXTENDED RELEASE ORAL at 08:35

## 2019-07-15 RX ADMIN — INSULIN GLARGINE 55 UNITS: 100 INJECTION, SOLUTION SUBCUTANEOUS at 21:23

## 2019-07-15 RX ADMIN — GABAPENTIN 800 MG: 400 CAPSULE ORAL at 08:34

## 2019-07-15 RX ADMIN — HYDROCODONE BITARTRATE AND ACETAMINOPHEN 2 TABLET: 5; 325 TABLET ORAL at 12:16

## 2019-07-15 RX ADMIN — DOCUSATE SODIUM 100 MG: 100 CAPSULE, LIQUID FILLED ORAL at 21:31

## 2019-07-15 RX ADMIN — RANOLAZINE 500 MG: 500 TABLET, FILM COATED, EXTENDED RELEASE ORAL at 21:22

## 2019-07-15 RX ADMIN — INSULIN LISPRO 20 UNITS: 100 INJECTION, SOLUTION INTRAVENOUS; SUBCUTANEOUS at 18:15

## 2019-07-15 RX ADMIN — BUPROPION HYDROCHLORIDE 100 MG: 100 TABLET, FILM COATED, EXTENDED RELEASE ORAL at 08:34

## 2019-07-15 RX ADMIN — SODIUM CHLORIDE, PRESERVATIVE FREE 10 ML: 5 INJECTION INTRAVENOUS at 21:33

## 2019-07-15 RX ADMIN — INSULIN LISPRO 2 UNITS: 100 INJECTION, SOLUTION INTRAVENOUS; SUBCUTANEOUS at 18:23

## 2019-07-15 RX ADMIN — HYDROCODONE BITARTRATE AND ACETAMINOPHEN 2 TABLET: 5; 325 TABLET ORAL at 18:25

## 2019-07-15 RX ADMIN — INSULIN LISPRO 6 UNITS: 100 INJECTION, SOLUTION INTRAVENOUS; SUBCUTANEOUS at 13:08

## 2019-07-15 RX ADMIN — CLINDAMYCIN PHOSPHATE 600 MG: 150 INJECTION, SOLUTION INTRAVENOUS at 03:01

## 2019-07-15 RX ADMIN — INSULIN LISPRO 4 UNITS: 100 INJECTION, SOLUTION INTRAVENOUS; SUBCUTANEOUS at 09:17

## 2019-07-15 RX ADMIN — MICONAZOLE NITRATE: 20 POWDER TOPICAL at 09:16

## 2019-07-15 RX ADMIN — ENOXAPARIN SODIUM 40 MG: 40 INJECTION, SOLUTION INTRAVENOUS; SUBCUTANEOUS at 08:35

## 2019-07-15 RX ADMIN — CLOPIDOGREL BISULFATE 75 MG: 75 TABLET ORAL at 08:35

## 2019-07-15 RX ADMIN — MIDODRINE HYDROCHLORIDE 5 MG: 5 TABLET ORAL at 08:35

## 2019-07-15 RX ADMIN — INSULIN LISPRO 1 UNITS: 100 INJECTION, SOLUTION INTRAVENOUS; SUBCUTANEOUS at 21:23

## 2019-07-15 RX ADMIN — Medication 2000 UNITS: at 08:33

## 2019-07-15 ASSESSMENT — PAIN DESCRIPTION - ORIENTATION
ORIENTATION: LEFT
ORIENTATION: LEFT

## 2019-07-15 ASSESSMENT — PAIN DESCRIPTION - PAIN TYPE
TYPE: ACUTE PAIN
TYPE: ACUTE PAIN

## 2019-07-15 ASSESSMENT — PAIN DESCRIPTION - DESCRIPTORS: DESCRIPTORS: DULL;ACHING

## 2019-07-15 ASSESSMENT — PAIN DESCRIPTION - FREQUENCY: FREQUENCY: CONTINUOUS

## 2019-07-15 ASSESSMENT — PAIN SCALES - GENERAL
PAINLEVEL_OUTOF10: 9
PAINLEVEL_OUTOF10: 9

## 2019-07-15 ASSESSMENT — PAIN DESCRIPTION - ONSET: ONSET: ON-GOING

## 2019-07-15 ASSESSMENT — PAIN DESCRIPTION - LOCATION
LOCATION: FLANK
LOCATION: FLANK

## 2019-07-15 NOTE — PLAN OF CARE
Problem: Falls - Risk of:  Goal: Will remain free from falls  Description  Will remain free from falls  7/15/2019 0423 by Mychal Juares RN  Outcome: Ongoing  7/15/2019 0422 by Mychal Juares RN  Outcome: Ongoing  Goal: Absence of physical injury  Description  Absence of physical injury  7/15/2019 0423 by Mychal Juares RN  Outcome: Ongoing  7/15/2019 0422 by Mychal Juares RN  Outcome: Ongoing

## 2019-07-15 NOTE — CONSULTS
Nephrology Service Consultation    Patient:  Megan Mancera  MRN: [id number]  Consulting physician:  Emerson Bowens MD  Reason for Consult:   JUAN J on stage 3 CKD    History Obtained From:  patient  PCP: Gunajn Ramey MD    HISTORY OF PRESENT ILLNESS:   The patient is a 62 y.o. female who presented to the ED on 7/13  With left flank pain which had emerged 3 days preceding her visit to the ED. Patient presented to the ED on her own and was not transported by EMS. Patient reports recent nausea with 1 episode of vomiting. Patient reports  Redness and swelling to the lower abdomen. She has a history of   Skin infections to abdominal wall; usually within the abdominal folds. ED course: Initial BP: 165/77. CT obtained to abdominal and pelvis  And results reviewed below. She was given insulin to treat her  Hyperglycemia (408). She was started on IV clindamycin. Patient reports left flank pain during this visit. She denies any chest pain     REVIEW OF SYSTEMS:  The ten point review of systems   are negative except as mentioned above. Medical History: DM2 (diagnosed in [facility])  Possible TIA in the past / HTN (circa 1990's) / CAD   Mood Disorder     Surgical History:  PCI with coronary stent placement x 2   Cholecystectomy     Renal History:  Stage 3 CKD with baseline creat: 1.5 - 1.7      -no history of RRT. SOCIAL HISTORY:   Tobacco:  Distant history of smoking     Alcohol: denies use     Recreational Drug Use: denies use     Demographic History: resides at home with a \"friend\"    Review of pertinent lab work and diagnostics available thus far:     Review of non-contrast CT of abdomen / pelvis from 7/13/2019:   1.  No CT evidence of an acute intra-abdominal or intrapelvic process. 2. What is described as bilateral nephrolithiasis on prior CT is actually  vascular calcification.  No urinary collecting system demonstrated. 3.  Calcific atherosclerotic disease aorta.   4. Lumbar degenerative

## 2019-07-15 NOTE — PROGRESS NOTES
Hospitalist Progress Note      Name:  Janelle Leon /Age/Sex: 1960  (62 y.o. female)   MRN & CSN:  [id number] & [id number] Admission Date/Time: 2019  3:53 PM   Location:  [address] [place] [address] PCP: Jorge A Casillas MD         Hospital Day: 3    Assessment and Plan:   Janelle Leon is a 62 y.o.  female  who presents with flank pain      JUAN J on CKD  Cr increased to 3.0 from 1.7  Likely pre renal secondary to hypotension  Start IVF  Repeat UA  Consult nephrology    Abdominal wall cellulitis  Flank pain  CT abd.: what is described as bilateral nephrolithiasis on prior CT is actually  vascular calcification  -Clindamycin IV  -Probiotic  -Pain control    Microscopic hematuria   UA showing 40 rbcs  Will repeat, if persistent      Hyperglycemia in DMII due to nonadherence  . A1c 12.4 (2018)  Glucose better controlled  -Hold orals  -Check A1c  -Continue SSI and Lantus  -Consult endo     Obesity class 3 with BMI 53.3  -Educated about lifestyle modifications  - discussed that she can be a candidate for weight reduction surgery    Chronic diastolic heart failure, preserved  Last echo with EF 50-55% and severe concentric LVH  -Continue Demadex and Metoprolol       Hypotension, Hx of HTN on 4 meds at home  Was hypotensive yesterday  All BP meds on hold     Hypothyroidism  -Continue Levothyroxine    COPD  -Continue Breo, Duoneb, and Albuterol     CAD  -Continue ASA, Plavix, Lipitor, and Ranexa     Generalized anxiety  -Continue Cymbalta and Wellbutrin          Diet DIET CARB CONTROL;   DVT Prophylaxis [x] Lovenox, []  Heparin, [] SCDs, []No VTE prophylaxis, patient ambulating   GI Prophylaxis [] PPI, [] H2 Blocker, [x] No GI prophylaxis, patient is receiving diet/Tube Feeds   Code Status Full Code             History of Present Illness:     Pt S&E. She was hypotensive yesterday, this am her Cr trended up to 3.0 from 1.7. Ten point ROS reviewed negative, unless as noted above    Objective:        Intake/Output Oral Daily      Infusions:    sodium chloride 100 mL/hr at 07/15/19 1003    dextrose       PRN Meds:     glucose 15 g PRN   dextrose 12.5 g PRN   glucagon (rDNA) 1 mg PRN   dextrose 100 mL/hr PRN   albuterol sulfate HFA 2 puff Q4H PRN   ondansetron 4 mg Q8H PRN   sodium chloride flush 10 mL PRN   magnesium hydroxide 30 mL Daily PRN   ondansetron 4 mg Q6H PRN   acetaminophen 650 mg Q4H PRN   HYDROcodone-acetaminophen 1 tablet Q6H PRN   Or     HYDROcodone-acetaminophen 2 tablet Q6H PRN         Electronically signed by Sherif Henry MD on 7/15/2019 at 3:51 PM

## 2019-07-16 LAB
ANION GAP SERPL CALCULATED.3IONS-SCNC: 12 MMOL/L (ref 4–16)
BUN BLDV-MCNC: 52 MG/DL (ref 6–23)
CALCIUM SERPL-MCNC: 8.1 MG/DL (ref 8.3–10.6)
CHLORIDE BLD-SCNC: 102 MMOL/L (ref 99–110)
CO2: 23 MMOL/L (ref 21–32)
CREAT SERPL-MCNC: 2.5 MG/DL (ref 0.6–1.1)
GFR AFRICAN AMERICAN: 24 ML/MIN/1.73M2
GFR NON-AFRICAN AMERICAN: 20 ML/MIN/1.73M2
GLUCOSE BLD-MCNC: 133 MG/DL (ref 70–99)
GLUCOSE BLD-MCNC: 154 MG/DL (ref 70–99)
GLUCOSE BLD-MCNC: 154 MG/DL (ref 70–99)
GLUCOSE BLD-MCNC: 158 MG/DL (ref 70–99)
GLUCOSE BLD-MCNC: 178 MG/DL (ref 70–99)
GLUCOSE BLD-MCNC: 238 MG/DL (ref 70–99)
POTASSIUM SERPL-SCNC: 4.8 MMOL/L (ref 3.5–5.1)
SODIUM BLD-SCNC: 137 MMOL/L (ref 135–145)

## 2019-07-16 PROCEDURE — 2500000003 HC RX 250 WO HCPCS: Performed by: NURSE PRACTITIONER

## 2019-07-16 PROCEDURE — 6360000002 HC RX W HCPCS: Performed by: NURSE PRACTITIONER

## 2019-07-16 PROCEDURE — 36415 COLL VENOUS BLD VENIPUNCTURE: CPT

## 2019-07-16 PROCEDURE — 6370000000 HC RX 637 (ALT 250 FOR IP): Performed by: HOSPITALIST

## 2019-07-16 PROCEDURE — 94761 N-INVAS EAR/PLS OXIMETRY MLT: CPT

## 2019-07-16 PROCEDURE — 82962 GLUCOSE BLOOD TEST: CPT

## 2019-07-16 PROCEDURE — 2580000003 HC RX 258: Performed by: NURSE PRACTITIONER

## 2019-07-16 PROCEDURE — 94640 AIRWAY INHALATION TREATMENT: CPT

## 2019-07-16 PROCEDURE — 1200000000 HC SEMI PRIVATE

## 2019-07-16 PROCEDURE — 6370000000 HC RX 637 (ALT 250 FOR IP): Performed by: NURSE PRACTITIONER

## 2019-07-16 PROCEDURE — 6370000000 HC RX 637 (ALT 250 FOR IP): Performed by: INTERNAL MEDICINE

## 2019-07-16 PROCEDURE — 80048 BASIC METABOLIC PNL TOTAL CA: CPT

## 2019-07-16 RX ADMIN — CLINDAMYCIN PHOSPHATE 600 MG: 150 INJECTION, SOLUTION INTRAVENOUS at 10:02

## 2019-07-16 RX ADMIN — BUPROPION HYDROCHLORIDE 100 MG: 100 TABLET, FILM COATED, EXTENDED RELEASE ORAL at 08:29

## 2019-07-16 RX ADMIN — INSULIN LISPRO 2 UNITS: 100 INJECTION, SOLUTION INTRAVENOUS; SUBCUTANEOUS at 10:02

## 2019-07-16 RX ADMIN — SODIUM CHLORIDE, PRESERVATIVE FREE 10 ML: 5 INJECTION INTRAVENOUS at 21:49

## 2019-07-16 RX ADMIN — INSULIN LISPRO 20 UNITS: 100 INJECTION, SOLUTION INTRAVENOUS; SUBCUTANEOUS at 18:16

## 2019-07-16 RX ADMIN — ASPIRIN 81 MG 81 MG: 81 TABLET ORAL at 08:30

## 2019-07-16 RX ADMIN — ENOXAPARIN SODIUM 40 MG: 40 INJECTION, SOLUTION INTRAVENOUS; SUBCUTANEOUS at 08:29

## 2019-07-16 RX ADMIN — Medication 1 CAPSULE: at 08:30

## 2019-07-16 RX ADMIN — RANOLAZINE 500 MG: 500 TABLET, FILM COATED, EXTENDED RELEASE ORAL at 21:44

## 2019-07-16 RX ADMIN — MICONAZOLE NITRATE: 20 POWDER TOPICAL at 13:05

## 2019-07-16 RX ADMIN — INSULIN LISPRO 1 UNITS: 100 INJECTION, SOLUTION INTRAVENOUS; SUBCUTANEOUS at 02:51

## 2019-07-16 RX ADMIN — INSULIN LISPRO 20 UNITS: 100 INJECTION, SOLUTION INTRAVENOUS; SUBCUTANEOUS at 10:02

## 2019-07-16 RX ADMIN — MIDODRINE HYDROCHLORIDE 5 MG: 5 TABLET ORAL at 08:29

## 2019-07-16 RX ADMIN — INSULIN LISPRO 20 UNITS: 100 INJECTION, SOLUTION INTRAVENOUS; SUBCUTANEOUS at 14:05

## 2019-07-16 RX ADMIN — SODIUM CHLORIDE, PRESERVATIVE FREE 10 ML: 5 INJECTION INTRAVENOUS at 08:32

## 2019-07-16 RX ADMIN — RANOLAZINE 500 MG: 500 TABLET, FILM COATED, EXTENDED RELEASE ORAL at 08:29

## 2019-07-16 RX ADMIN — ATORVASTATIN CALCIUM 40 MG: 40 TABLET, FILM COATED ORAL at 21:44

## 2019-07-16 RX ADMIN — DOCUSATE SODIUM 100 MG: 100 CAPSULE, LIQUID FILLED ORAL at 21:45

## 2019-07-16 RX ADMIN — HYDROCODONE BITARTRATE AND ACETAMINOPHEN 2 TABLET: 5; 325 TABLET ORAL at 00:25

## 2019-07-16 RX ADMIN — Medication 6000 UNITS: at 08:30

## 2019-07-16 RX ADMIN — LEVOTHYROXINE SODIUM 50 MCG: 50 TABLET ORAL at 08:29

## 2019-07-16 RX ADMIN — HYDROCODONE BITARTRATE AND ACETAMINOPHEN 2 TABLET: 5; 325 TABLET ORAL at 18:22

## 2019-07-16 RX ADMIN — BUPROPION HYDROCHLORIDE 100 MG: 100 TABLET, FILM COATED, EXTENDED RELEASE ORAL at 21:44

## 2019-07-16 RX ADMIN — DULOXETINE HYDROCHLORIDE 60 MG: 30 CAPSULE, DELAYED RELEASE ORAL at 08:29

## 2019-07-16 RX ADMIN — CLINDAMYCIN PHOSPHATE 600 MG: 150 INJECTION, SOLUTION INTRAVENOUS at 02:51

## 2019-07-16 RX ADMIN — PANTOPRAZOLE SODIUM 40 MG: 40 TABLET, DELAYED RELEASE ORAL at 06:12

## 2019-07-16 RX ADMIN — INSULIN LISPRO 2 UNITS: 100 INJECTION, SOLUTION INTRAVENOUS; SUBCUTANEOUS at 18:17

## 2019-07-16 RX ADMIN — MIDODRINE HYDROCHLORIDE 5 MG: 5 TABLET ORAL at 13:04

## 2019-07-16 RX ADMIN — Medication 2 PUFF: at 22:10

## 2019-07-16 RX ADMIN — CLOPIDOGREL BISULFATE 75 MG: 75 TABLET ORAL at 08:29

## 2019-07-16 RX ADMIN — HYDROCODONE BITARTRATE AND ACETAMINOPHEN 2 TABLET: 5; 325 TABLET ORAL at 08:29

## 2019-07-16 RX ADMIN — DOCUSATE SODIUM 100 MG: 100 CAPSULE, LIQUID FILLED ORAL at 08:30

## 2019-07-16 RX ADMIN — INSULIN LISPRO 4 UNITS: 100 INJECTION, SOLUTION INTRAVENOUS; SUBCUTANEOUS at 14:05

## 2019-07-16 RX ADMIN — MICONAZOLE NITRATE: 20 POWDER TOPICAL at 21:49

## 2019-07-16 ASSESSMENT — PAIN DESCRIPTION - PROGRESSION
CLINICAL_PROGRESSION: RESOLVED
CLINICAL_PROGRESSION: RESOLVED
CLINICAL_PROGRESSION: GRADUALLY IMPROVING
CLINICAL_PROGRESSION: NOT CHANGED
CLINICAL_PROGRESSION: RESOLVED
CLINICAL_PROGRESSION: NOT CHANGED
CLINICAL_PROGRESSION: RESOLVED
CLINICAL_PROGRESSION: RESOLVED
CLINICAL_PROGRESSION: NOT CHANGED
CLINICAL_PROGRESSION: NOT CHANGED
CLINICAL_PROGRESSION: RESOLVED
CLINICAL_PROGRESSION: RESOLVED

## 2019-07-16 ASSESSMENT — PAIN DESCRIPTION - LOCATION
LOCATION: FLANK
LOCATION: FLANK

## 2019-07-16 ASSESSMENT — PAIN SCALES - GENERAL
PAINLEVEL_OUTOF10: 7
PAINLEVEL_OUTOF10: 10
PAINLEVEL_OUTOF10: 9

## 2019-07-16 ASSESSMENT — PAIN DESCRIPTION - PAIN TYPE
TYPE: ACUTE PAIN
TYPE: ACUTE PAIN

## 2019-07-16 ASSESSMENT — PAIN - FUNCTIONAL ASSESSMENT
PAIN_FUNCTIONAL_ASSESSMENT: ACTIVITIES ARE NOT PREVENTED
PAIN_FUNCTIONAL_ASSESSMENT: ACTIVITIES ARE NOT PREVENTED

## 2019-07-16 ASSESSMENT — PAIN DESCRIPTION - DESCRIPTORS
DESCRIPTORS: ACHING
DESCRIPTORS: ACHING

## 2019-07-16 ASSESSMENT — PAIN DESCRIPTION - ONSET
ONSET: ON-GOING
ONSET: ON-GOING

## 2019-07-16 ASSESSMENT — PAIN DESCRIPTION - ORIENTATION
ORIENTATION: LEFT
ORIENTATION: LEFT

## 2019-07-16 ASSESSMENT — PAIN DESCRIPTION - FREQUENCY
FREQUENCY: CONTINUOUS
FREQUENCY: CONTINUOUS

## 2019-07-16 NOTE — CARE COORDINATION
[address] Transitions Interview     2019    Patient: Cesar Mosley Patient : 1960   MRN: [id number]  Reason for Admission: JUAN J; CHF  RARS: Readmission Risk Score: 52    Spoke with: Patient    Readmission Risk:  CHF  DM- nonadherent  COPD  CKD  Morbid Obesity    Inpatient Assessment  Care Transitions Summary    Care Transitions Inpatient Review  Medication Review  Are you able to afford your medications?:  Yes  How often do you have difficulty taking your medications?:  I always take them as prescribed. Housing Review  Who do you live with?:  Roommate/Housemate  Are you an active caregiver in your home?:  No  Social Support  Do you have a ?:  No  Do you have a [facility]?:  No  Durable Medical Equipment  Patient DME:  Mariusz Elvin chair, Wheelchair  Patient Home Equipment:  Oxygen  Functional Review  Ability to seek help/take action for Emergent/Urgent situations i.e. fire, crime, inclement weather or health crisis.:  Needs Assistance  Ability handle personal hygiene needs (bathing/dressing/grooming): Independent  Ability to manage medications: Independent  Ability to prepare food:  Independent  Ability to maintain home (clean home, laundry):  Needs Assistance  Ability to drive and/or has transportation:  Dependent  Ability to do shopping:  Needs Assistance  Ability to manage finances:  Needs Assistance  Is patient able to live independently?:  Yes  Hearing and Vision  Visual Impairment:  None  Hearing Impairment:  None  Care Transitions Interventions  No Identified Needs         Follow Up: Oriented to [facility] program, given CTN business card and BPCI letter. Noted to have [employer] [address] working  as of 19. Lives w/ friend in an apartment w/ elevator access. Usually independent w/ most adls; S/O assists prn, provides transportation and groceries. DME- walker, shower bench, w/c. Has been approved for [facility] thru [facility]. Follows w/ Dr Anderson Farr.  Uses

## 2019-07-17 VITALS
SYSTOLIC BLOOD PRESSURE: 137 MMHG | OXYGEN SATURATION: 94 % | TEMPERATURE: 97.8 F | HEART RATE: 94 BPM | DIASTOLIC BLOOD PRESSURE: 63 MMHG | BODY MASS INDEX: 50.02 KG/M2 | RESPIRATION RATE: 17 BRPM | WEIGHT: 293 LBS | HEIGHT: 64 IN

## 2019-07-17 LAB
ANION GAP SERPL CALCULATED.3IONS-SCNC: 9 MMOL/L (ref 4–16)
BUN BLDV-MCNC: 48 MG/DL (ref 6–23)
CALCIUM SERPL-MCNC: 8.6 MG/DL (ref 8.3–10.6)
CHLORIDE BLD-SCNC: 106 MMOL/L (ref 99–110)
CO2: 25 MMOL/L (ref 21–32)
CREAT SERPL-MCNC: 1.9 MG/DL (ref 0.6–1.1)
CULTURE: ABNORMAL
GFR AFRICAN AMERICAN: 33 ML/MIN/1.73M2
GFR NON-AFRICAN AMERICAN: 27 ML/MIN/1.73M2
GLUCOSE BLD-MCNC: 171 MG/DL (ref 70–99)
GLUCOSE BLD-MCNC: 189 MG/DL (ref 70–99)
GLUCOSE BLD-MCNC: 199 MG/DL (ref 70–99)
GLUCOSE BLD-MCNC: 253 MG/DL (ref 70–99)
Lab: ABNORMAL
POTASSIUM SERPL-SCNC: 4.8 MMOL/L (ref 3.5–5.1)
SODIUM BLD-SCNC: 140 MMOL/L (ref 135–145)
SPECIMEN: ABNORMAL
TOTAL COLONY COUNT: ABNORMAL

## 2019-07-17 PROCEDURE — 6370000000 HC RX 637 (ALT 250 FOR IP): Performed by: NURSE PRACTITIONER

## 2019-07-17 PROCEDURE — 94761 N-INVAS EAR/PLS OXIMETRY MLT: CPT

## 2019-07-17 PROCEDURE — 2580000003 HC RX 258: Performed by: NURSE PRACTITIONER

## 2019-07-17 PROCEDURE — 6370000000 HC RX 637 (ALT 250 FOR IP): Performed by: INTERNAL MEDICINE

## 2019-07-17 PROCEDURE — 82962 GLUCOSE BLOOD TEST: CPT

## 2019-07-17 PROCEDURE — 36415 COLL VENOUS BLD VENIPUNCTURE: CPT

## 2019-07-17 PROCEDURE — 80048 BASIC METABOLIC PNL TOTAL CA: CPT

## 2019-07-17 PROCEDURE — 6360000002 HC RX W HCPCS: Performed by: NURSE PRACTITIONER

## 2019-07-17 PROCEDURE — 6370000000 HC RX 637 (ALT 250 FOR IP): Performed by: HOSPITALIST

## 2019-07-17 RX ORDER — MORPHINE SULFATE 4 MG/ML
2 INJECTION, SOLUTION INTRAMUSCULAR; INTRAVENOUS ONCE
Status: DISCONTINUED | OUTPATIENT
Start: 2019-07-17 | End: 2019-07-17 | Stop reason: HOSPADM

## 2019-07-17 RX ADMIN — PANTOPRAZOLE SODIUM 40 MG: 40 TABLET, DELAYED RELEASE ORAL at 05:55

## 2019-07-17 RX ADMIN — DOCUSATE SODIUM 100 MG: 100 CAPSULE, LIQUID FILLED ORAL at 09:05

## 2019-07-17 RX ADMIN — Medication 6000 UNITS: at 09:05

## 2019-07-17 RX ADMIN — RANOLAZINE 500 MG: 500 TABLET, FILM COATED, EXTENDED RELEASE ORAL at 09:33

## 2019-07-17 RX ADMIN — MIDODRINE HYDROCHLORIDE 5 MG: 5 TABLET ORAL at 09:06

## 2019-07-17 RX ADMIN — Medication 1 CAPSULE: at 09:05

## 2019-07-17 RX ADMIN — INSULIN LISPRO 20 UNITS: 100 INJECTION, SOLUTION INTRAVENOUS; SUBCUTANEOUS at 09:33

## 2019-07-17 RX ADMIN — SODIUM CHLORIDE, PRESERVATIVE FREE 10 ML: 5 INJECTION INTRAVENOUS at 09:06

## 2019-07-17 RX ADMIN — HYDROCODONE BITARTRATE AND ACETAMINOPHEN 2 TABLET: 5; 325 TABLET ORAL at 00:39

## 2019-07-17 RX ADMIN — INSULIN LISPRO 2 UNITS: 100 INJECTION, SOLUTION INTRAVENOUS; SUBCUTANEOUS at 09:37

## 2019-07-17 RX ADMIN — BUPROPION HYDROCHLORIDE 100 MG: 100 TABLET, FILM COATED, EXTENDED RELEASE ORAL at 09:05

## 2019-07-17 RX ADMIN — ASPIRIN 81 MG 81 MG: 81 TABLET ORAL at 09:05

## 2019-07-17 RX ADMIN — LEVOTHYROXINE SODIUM 50 MCG: 50 TABLET ORAL at 09:05

## 2019-07-17 RX ADMIN — DULOXETINE HYDROCHLORIDE 60 MG: 30 CAPSULE, DELAYED RELEASE ORAL at 09:05

## 2019-07-17 RX ADMIN — CLOPIDOGREL BISULFATE 75 MG: 75 TABLET ORAL at 09:05

## 2019-07-17 RX ADMIN — ENOXAPARIN SODIUM 40 MG: 40 INJECTION, SOLUTION INTRAVENOUS; SUBCUTANEOUS at 09:06

## 2019-07-17 RX ADMIN — HYDROCODONE BITARTRATE AND ACETAMINOPHEN 2 TABLET: 5; 325 TABLET ORAL at 06:45

## 2019-07-17 RX ADMIN — MICONAZOLE NITRATE: 20 POWDER TOPICAL at 09:43

## 2019-07-17 RX ADMIN — INSULIN LISPRO 1 UNITS: 100 INJECTION, SOLUTION INTRAVENOUS; SUBCUTANEOUS at 02:59

## 2019-07-17 ASSESSMENT — PAIN DESCRIPTION - PROGRESSION
CLINICAL_PROGRESSION: RESOLVED
CLINICAL_PROGRESSION: NOT CHANGED
CLINICAL_PROGRESSION: RESOLVED
CLINICAL_PROGRESSION: RESOLVED
CLINICAL_PROGRESSION: NOT CHANGED
CLINICAL_PROGRESSION: RESOLVED
CLINICAL_PROGRESSION: NOT CHANGED
CLINICAL_PROGRESSION: RESOLVED
CLINICAL_PROGRESSION: RESOLVED
CLINICAL_PROGRESSION: NOT CHANGED
CLINICAL_PROGRESSION: NOT CHANGED

## 2019-07-17 ASSESSMENT — PAIN SCALES - GENERAL
PAINLEVEL_OUTOF10: 10
PAINLEVEL_OUTOF10: 9
PAINLEVEL_OUTOF10: 10

## 2019-07-17 ASSESSMENT — PAIN - FUNCTIONAL ASSESSMENT: PAIN_FUNCTIONAL_ASSESSMENT: ACTIVITIES ARE NOT PREVENTED

## 2019-07-17 ASSESSMENT — PAIN DESCRIPTION - ONSET: ONSET: ON-GOING

## 2019-07-17 ASSESSMENT — PAIN DESCRIPTION - PAIN TYPE: TYPE: ACUTE PAIN

## 2019-07-17 ASSESSMENT — PAIN DESCRIPTION - LOCATION: LOCATION: FLANK

## 2019-07-17 ASSESSMENT — PAIN DESCRIPTION - FREQUENCY: FREQUENCY: CONTINUOUS

## 2019-07-17 ASSESSMENT — PAIN DESCRIPTION - DESCRIPTORS: DESCRIPTORS: ACHING

## 2019-07-17 ASSESSMENT — PAIN DESCRIPTION - ORIENTATION: ORIENTATION: LEFT

## 2019-07-17 NOTE — PROGRESS NOTES
Called and made pt f/u appt with PCP. Called and notified home health care of patient's d/c and faxed needed paperwork over to home health agency.   SUMANTH MccollumN, RN

## 2019-07-17 NOTE — PROGRESS NOTES
Nephrology Progress Note  7/17/2019 9:11 AM        Subjective:   Admit Date: 7/13/2019  PCP: Karan Becerra MD    Interval History: continues to C/O Lt flank pain     Diet: poor     ROS:  No sob, Lt flank pain , does not feel good     Data:     Current med's:    morphine  2 mg Intravenous Once    insulin lispro  0-6 Units Subcutaneous 2 times per day    vitamin D  6,000 Units Oral Daily    midodrine  5 mg Oral TID WC    clindamycin (CLEOCIN) IV  600 mg Intravenous Once    aspirin  81 mg Oral Daily    atorvastatin  40 mg Oral Nightly    [Held by provider] torsemide  20 mg Oral Daily    pantoprazole  40 mg Oral QAM AC    ranolazine  500 mg Oral BID    miconazole   Topical BID    [Held by provider] metoprolol tartrate  50 mg Oral BID    insulin lispro  0-12 Units Subcutaneous TID WC    levothyroxine  50 mcg Oral Daily    insulin lispro  20 Units Subcutaneous TID WC    insulin glargine  55 Units Subcutaneous Nightly    DULoxetine  60 mg Oral Daily    docusate sodium  100 mg Oral BID    clopidogrel  75 mg Oral Daily    buPROPion  100 mg Oral BID    mometasone-formoterol  2 puff Inhalation BID    sodium chloride flush  10 mL Intravenous 2 times per day    enoxaparin  40 mg Subcutaneous Daily    lactobacillus  1 capsule Oral Daily      dextrose           I/O last 3 completed shifts: In: 240 [P.O.:240]  Out: -     CBC: No results for input(s): WBC, HGB, PLT in the last 72 hours.        Recent Labs     07/15/19  0613 07/16/19  0429 07/17/19  0604   * 137 140   K 5.0 4.8 4.8   CL 99 102 106   CO2 23 23 25   BUN 51* 52* 48*   CREATININE 3.0* 2.5* 1.9*   GLUCOSE 188* 154* 171*       Lab Results   Component Value Date    CALCIUM 8.6 07/17/2019    PHOS 3.6 11/19/2018       Objective:     Vitals: BP (!) 170/74   Pulse 98   Temp 98.1 °F (36.7 °C)   Resp 16   Ht 5' 4\" (1.626 m)   Wt (!) 314 lb 11.2 oz (142.7 kg)   SpO2 98%   BMI 54.02 kg/m²     General appearance:  No ac distress   HEENT:  No

## 2019-07-17 NOTE — DISCHARGE SUMMARY
nystatin (MYCOSTATIN) 013584 UNIT/GM powder  Apply topically 2 times daily as needed             ondansetron (ZOFRAN-ODT) 4 MG disintegrating tablet  Take 1 tablet by mouth every 8 hours as needed for Nausea or Vomiting             pantoprazole (PROTONIX) 40 MG tablet  Take 1 tablet by mouth every morning (before breakfast)             ranolazine (RANEXA) 500 MG extended release tablet  Take 1 tablet by mouth 2 times daily             tiZANidine (ZANAFLEX) 4 MG tablet  Take 1 tablet by mouth every 6 hours as needed (back spasms)             VICTOZA 18 MG/3ML SOPN SC injection  Inject 1.2 mg into the skin daily             vitamin D 1000 units CAPS  Take 3 capsules by mouth daily                 Subjective _ patient is resting at the edge of her bed with no distress- complains of back pain which is chronic     Objective Findings at Discharge:   /63   Pulse 94   Temp 97.8 °F (36.6 °C) (Oral)   Resp 17   Ht 5' 4\" (1.626 m)   Wt (!) 314 lb 11.2 oz (142.7 kg)   SpO2 94%   BMI 54.02 kg/m²            PHYSICAL EXAM   GEN Awake female, resting in bed in no apparent distress. Appears given age. HEENT Mucous membranes are moist.  RESP Clear to auscultation, no wheezes, rales or rhonchi. CARDIO/VAS - S1/S2 auscultated. Regular rate without appreciable murmurs, rubs, or gallops. Peripheral pulses equal bilaterally and palpable. No peripheral edema. GI Abdomen is soft without significant tenderness, masses, or guarding. Bowel sounds are normoactive  MSK No gross joint deformities. Spontaneous movement of all extremities  SKIN Normal coloration, warm, dry. NEURO Cranial nerves appear grossly intact, normal speech, no lateralizing weakness.     BMP/CBC  Recent Labs     07/15/19  0613 07/16/19  0429 07/17/19  0604   * 137 140   K 5.0 4.8 4.8   CL 99 102 106   CO2 23 23 25   BUN 51* 52* 48*   CREATININE 3.0* 2.5* 1.9*         Discharge Time of 36 minutes    Electronically signed by Mary Castano MD on

## 2019-07-18 ENCOUNTER — CARE COORDINATION (OUTPATIENT)
Dept: CASE MANAGEMENT | Age: 59
End: 2019-07-18

## 2019-07-19 NOTE — PROGRESS NOTES
Progress Note( Dr. Lior Jose)    Subjective:   Admit Date: 7/13/2019  PCP: Baldev Lantigua MD    Admitted For :abdominal Wall cellulitis //high blood glucose    Consulted For: Better control of blood glucose    Interval History: Feels better    Denies any chest pains,   Denies SOB . Denies nausea or vomiting. No new bowel or bladder symptoms. No intake or output data in the 24 hours ending 07/18/19 2244    DATA    CBC:   No results for input(s): WBC, HGB, PLT in the last 72 hours. CMP:  Recent Labs     07/16/19  0429 07/17/19  0604    140   K 4.8 4.8    106   CO2 23 25   BUN 52* 48*   CREATININE 2.5* 1.9*   CALCIUM 8.1* 8.6     Lipids:   Lab Results   Component Value Date    CHOL 156 01/30/2017    HDL 26 01/30/2017    TRIG 543 01/30/2017     Glucose:  Recent Labs     07/17/19  0258 07/17/19  0900 07/17/19  1317   POCGLU 189* 199* 253*     [id number]:  Lab Results   Component Value Date    LABA1C 10.4 07/13/2019     High Sensitivity TSH:   Lab Results   Component Value Date    TSHHS 5.130 02/28/2019     Free T3: No results found for: FT3  Free T4:  Lab Results   Component Value Date    T4FREE 1.11 02/28/2019       Ct Abdomen Pelvis Wo Contrast    Result Date: 7/13/2019  EXAMINATION: CT OF THE ABDOMEN AND PELVIS WITHOUT CONTRAST 7/13/2019 4:59 pm TECHNIQUE: CT of the abdomen and pelvis was performed without the administration of intravenous contrast. Multiplanar reformatted images are provided for review. Dose modulation, iterative reconstruction, and/or weight based adjustment of the mA/kV was utilized to reduce the radiation dose to as low as reasonably achievable. COMPARISON: CT abdomen and pelvis 03/15/2019 and 10/20/2018 HISTORY: ORDERING SYSTEM PROVIDED HISTORY: left flank pain LLQ abd pain lower abdominal cellulitis       1. No CT evidence of an acute intra-abdominal or intrapelvic process. 2. What is described as bilateral nephrolithiasis on prior CT is actually vascular calcification. No urinary collecting system demonstrated. 3.  Calcific atherosclerotic disease aorta. 4. Lumbar degenerative changes with grade 1 anterolisthesis L4 on L5. Scheduled Medicines   Medications:      Infusions:         Objective:   Vitals: /63   Pulse 94   Temp 97.8 °F (36.6 °C) (Oral)   Resp 17   Ht 5' 4\" (1.626 m)   Wt (!) 314 lb 11.2 oz (142.7 kg)   SpO2 94%   BMI 54.02 kg/m²   General appearance: alert and cooperative with exam  Neck: no JVD or bruit  Thyroid : Normal lobes   Lungs: Has Vesicular Breath sounds   Heart:  regular rate and rhythm  Abdomen: soft, non-tender; bowel sounds normal; no masses,  no organomegaly  Abdominal wall cellulitis   Musculoskeletal: Normal  Extremities: extremities normal, , no edema  Neurologic:  Awake, alert, oriented to name, place and time. Cranial nerves II-XII are grossly intact. Motor is  intact. Sensory i neuropathy,  and gait is abnormal l. Assessment:     Patient Active Problem List:     CKD (chronic kidney disease) stage 3, GFR 30-59 ml/min ([facility])     CAD (coronary artery disease)     Chronic diastolic heart failure (HCC)     Diabetes type 2, uncontrolled ([facility] [address]     Morbid obesity with BMI of 50.0-59.9, adult [facility])     Musculoskeletal chest pain     Essential hypertension     Diabetes mellitus with hyperglycemia (HCC)     Generalized anxiety disorder     Gait disturbance     Hyperglycemia     Cor pulmonale (chronic) ([facility])     Cellulitis of abdominal wall      Plan:     1. Reviewed POC blood glucose . Labs and X ray results   2. Reviewed Current Medicines   3. On meal/ Correction bolus Humalog/ Basal Lantus Insulin regime   4. Monitor Blood glucose frequently   5. Modified  the dose of Insulin/ other medicines as needed   6. Will follow   7. Possible home later in the day    .      Lety Mejia MD

## 2019-08-06 ENCOUNTER — CARE COORDINATION (OUTPATIENT)
Dept: CASE MANAGEMENT | Age: 59
End: 2019-08-06

## 2019-08-09 ENCOUNTER — TELEPHONE (OUTPATIENT)
Dept: BARIATRICS/WEIGHT MGMT | Age: 59
End: 2019-08-09

## 2019-08-09 NOTE — TELEPHONE ENCOUNTER
Called patient left message, patient has coverage for bariatric surgery.  Can schedule consult with either doctor

## 2019-10-17 ENCOUNTER — HOSPITAL ENCOUNTER (EMERGENCY)
Age: 59
Discharge: HOME | End: 2019-10-17
Payer: MEDICARE

## 2019-10-17 ENCOUNTER — APPOINTMENT (OUTPATIENT)
Dept: GENERAL RADIOLOGY | Age: 59
End: 2019-10-17
Payer: MEDICARE

## 2019-10-17 VITALS
RESPIRATION RATE: 20 BRPM | BODY MASS INDEX: 50.02 KG/M2 | WEIGHT: 293 LBS | HEART RATE: 94 BPM | TEMPERATURE: 98.5 F | OXYGEN SATURATION: 97 % | HEIGHT: 64 IN | SYSTOLIC BLOOD PRESSURE: 160 MMHG | DIASTOLIC BLOOD PRESSURE: 67 MMHG

## 2019-10-17 DIAGNOSIS — M25.561 ACUTE PAIN OF BOTH KNEES: Primary | ICD-10-CM

## 2019-10-17 DIAGNOSIS — M25.562 ACUTE PAIN OF BOTH KNEES: Primary | ICD-10-CM

## 2019-10-17 PROCEDURE — 73560 X-RAY EXAM OF KNEE 1 OR 2: CPT

## 2019-10-17 PROCEDURE — 6370000000 HC RX 637 (ALT 250 FOR IP): Performed by: PHYSICIAN ASSISTANT

## 2019-10-17 PROCEDURE — 99283 EMERGENCY DEPT VISIT LOW MDM: CPT

## 2019-10-17 RX ORDER — TRAMADOL HYDROCHLORIDE 50 MG/1
50 TABLET ORAL EVERY 6 HOURS PRN
Qty: 10 TABLET | Refills: 0 | Status: SHIPPED | OUTPATIENT
Start: 2019-10-17 | End: 2019-10-20

## 2019-10-17 RX ORDER — IBUPROFEN 400 MG/1
400 TABLET ORAL ONCE
Status: DISCONTINUED | OUTPATIENT
Start: 2019-10-17 | End: 2019-10-17 | Stop reason: HOSPADM

## 2019-10-17 RX ORDER — OXYCODONE HYDROCHLORIDE AND ACETAMINOPHEN 5; 325 MG/1; MG/1
1 TABLET ORAL ONCE
Status: COMPLETED | OUTPATIENT
Start: 2019-10-17 | End: 2019-10-17

## 2019-10-17 RX ADMIN — OXYCODONE HYDROCHLORIDE AND ACETAMINOPHEN 1 TABLET: 5; 325 TABLET ORAL at 03:42

## 2019-10-17 ASSESSMENT — PAIN DESCRIPTION - ORIENTATION: ORIENTATION: LEFT;RIGHT

## 2019-10-17 ASSESSMENT — PAIN SCALES - GENERAL
PAINLEVEL_OUTOF10: 10
PAINLEVEL_OUTOF10: 10

## 2019-10-17 ASSESSMENT — PAIN DESCRIPTION - FREQUENCY: FREQUENCY: CONTINUOUS

## 2019-10-17 ASSESSMENT — PAIN DESCRIPTION - PAIN TYPE: TYPE: ACUTE PAIN

## 2019-10-17 ASSESSMENT — PAIN DESCRIPTION - LOCATION: LOCATION: LEG;ABDOMEN

## 2019-11-23 ENCOUNTER — APPOINTMENT (OUTPATIENT)
Dept: ULTRASOUND IMAGING | Age: 59
DRG: 246 | End: 2019-11-23
Payer: MEDICARE

## 2019-11-23 ENCOUNTER — HOSPITAL ENCOUNTER (INPATIENT)
Age: 59
LOS: 11 days | Discharge: NURSING, REHAB OR LONG-TERM CARE | DRG: 246 | End: 2019-12-04
Attending: FAMILY MEDICINE | Admitting: INTERNAL MEDICINE
Payer: MEDICARE

## 2019-11-23 ENCOUNTER — APPOINTMENT (OUTPATIENT)
Dept: CT IMAGING | Age: 59
DRG: 246 | End: 2019-11-23
Payer: MEDICARE

## 2019-11-23 ENCOUNTER — APPOINTMENT (OUTPATIENT)
Dept: GENERAL RADIOLOGY | Age: 59
DRG: 246 | End: 2019-11-23
Payer: MEDICARE
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
## 2019-11-23 DIAGNOSIS — I21.3 ACUTE ST ELEVATION MYOCARDIAL INFARCTION (STEMI), UNSPECIFIED ARTERY (HCC): Primary | ICD-10-CM

## 2019-11-23 DIAGNOSIS — R06.89 DYSPNEA AND RESPIRATORY ABNORMALITIES: ICD-10-CM

## 2019-11-23 DIAGNOSIS — I50.1 PULMONARY EDEMA CARDIAC CAUSE (HCC): ICD-10-CM

## 2019-11-23 DIAGNOSIS — R06.00 DYSPNEA AND RESPIRATORY ABNORMALITIES: ICD-10-CM

## 2019-11-23 LAB
ACTIVATED CLOTTING TIME, LOW RANGE: 207 SEC
ACTIVATED CLOTTING TIME, LOW RANGE: 215 SEC
ACTIVATED CLOTTING TIME, LOW RANGE: 281 SEC
ACTIVATED CLOTTING TIME, LOW RANGE: 339 SEC
ALBUMIN SERPL-MCNC: 3.5 GM/DL (ref 3.4–5)
ALP BLD-CCNC: 149 IU/L (ref 40–128)
ALT SERPL-CCNC: 13 U/L (ref 10–40)
ANION GAP SERPL CALCULATED.3IONS-SCNC: 15 MMOL/L (ref 4–16)
APTT: 30.2 SECONDS (ref 25.1–37.1)
APTT: 33.2 SECONDS (ref 25.1–37.1)
AST SERPL-CCNC: 13 IU/L (ref 15–37)
BASOPHILS ABSOLUTE: 0 K/CU MM
BASOPHILS RELATIVE PERCENT: 0.3 % (ref 0–1)
BILIRUB SERPL-MCNC: 0.3 MG/DL (ref 0–1)
BUN BLDV-MCNC: 40 MG/DL (ref 6–23)
CALCIUM SERPL-MCNC: 8.6 MG/DL (ref 8.3–10.6)
CHLORIDE BLD-SCNC: 93 MMOL/L (ref 99–110)
CO2: 22 MMOL/L (ref 21–32)
CREAT SERPL-MCNC: 1.7 MG/DL (ref 0.6–1.1)
DIFFERENTIAL TYPE: ABNORMAL
EKG ATRIAL RATE: 104 BPM
EKG ATRIAL RATE: 112 BPM
EKG DIAGNOSIS: NORMAL
EKG DIAGNOSIS: NORMAL
EKG P AXIS: 44 DEGREES
EKG P AXIS: 67 DEGREES
EKG P-R INTERVAL: 180 MS
EKG Q-T INTERVAL: 356 MS
EKG Q-T INTERVAL: 360 MS
EKG QRS DURATION: 106 MS
EKG QRS DURATION: 106 MS
EKG QTC CALCULATION (BAZETT): 473 MS
EKG QTC CALCULATION (BAZETT): 485 MS
EKG R AXIS: 38 DEGREES
EKG R AXIS: 53 DEGREES
EKG T AXIS: -19 DEGREES
EKG T AXIS: -48 DEGREES
EKG VENTRICULAR RATE: 104 BPM
EKG VENTRICULAR RATE: 112 BPM
EOSINOPHILS ABSOLUTE: 0.2 K/CU MM
EOSINOPHILS RELATIVE PERCENT: 1.2 % (ref 0–3)
GFR AFRICAN AMERICAN: 37 ML/MIN/1.73M2
GFR NON-AFRICAN AMERICAN: 31 ML/MIN/1.73M2
GLUCOSE BLD-MCNC: 160 MG/DL (ref 70–99)
GLUCOSE BLD-MCNC: 240 MG/DL (ref 70–99)
GLUCOSE BLD-MCNC: 394 MG/DL (ref 70–99)
GLUCOSE BLD-MCNC: 402 MG/DL (ref 70–99)
GLUCOSE BLD-MCNC: 406 MG/DL (ref 70–99)
GLUCOSE BLD-MCNC: 422 MG/DL (ref 70–99)
GLUCOSE BLD-MCNC: 497 MG/DL (ref 70–99)
GLUCOSE BLD-MCNC: 541 MG/DL (ref 70–99)
HCT VFR BLD CALC: 36.6 % (ref 37–47)
HEMOGLOBIN: 11.4 GM/DL (ref 12.5–16)
IMMATURE NEUTROPHIL %: 1.2 % (ref 0–0.43)
INR BLD: 1.07 INDEX
LV EF: 30 %
LVEF MODALITY: NORMAL
LYMPHOCYTES ABSOLUTE: 1.6 K/CU MM
LYMPHOCYTES RELATIVE PERCENT: 13.1 % (ref 24–44)
MCH RBC QN AUTO: 26.6 PG (ref 27–31)
MCHC RBC AUTO-ENTMCNC: 31.1 % (ref 32–36)
MCV RBC AUTO: 85.5 FL (ref 78–100)
MONOCYTES ABSOLUTE: 0.7 K/CU MM
MONOCYTES RELATIVE PERCENT: 5.5 % (ref 0–4)
NUCLEATED RBC %: 0 %
PDW BLD-RTO: 13.9 % (ref 11.7–14.9)
PLATELET # BLD: 407 K/CU MM (ref 140–440)
PMV BLD AUTO: 10.5 FL (ref 7.5–11.1)
POTASSIUM SERPL-SCNC: 5.1 MMOL/L (ref 3.5–5.1)
PRO-BNP: ABNORMAL PG/ML
PROTHROMBIN TIME: 12.2 SECONDS (ref 11.7–14.5)
RBC # BLD: 4.28 M/CU MM (ref 4.2–5.4)
SEGMENTED NEUTROPHILS ABSOLUTE COUNT: 9.5 K/CU MM
SEGMENTED NEUTROPHILS RELATIVE PERCENT: 78.7 % (ref 36–66)
SODIUM BLD-SCNC: 130 MMOL/L (ref 135–145)
TOTAL IMMATURE NEUTOROPHIL: 0.14 K/CU MM
TOTAL NUCLEATED RBC: 0 K/CU MM
TOTAL PROTEIN: 5.6 GM/DL (ref 6.4–8.2)
TROPONIN T: 0.95 NG/ML
WBC # BLD: 12 K/CU MM (ref 4–10.5)

## 2019-11-23 PROCEDURE — 93458 L HRT ARTERY/VENTRICLE ANGIO: CPT

## 2019-11-23 PROCEDURE — 83880 ASSAY OF NATRIURETIC PEPTIDE: CPT

## 2019-11-23 PROCEDURE — 99285 EMERGENCY DEPT VISIT HI MDM: CPT

## 2019-11-23 PROCEDURE — 2100000000 HC CCU R&B

## 2019-11-23 PROCEDURE — 6370000000 HC RX 637 (ALT 250 FOR IP): Performed by: PHYSICIAN ASSISTANT

## 2019-11-23 PROCEDURE — 6370000000 HC RX 637 (ALT 250 FOR IP): Performed by: INTERNAL MEDICINE

## 2019-11-23 PROCEDURE — 80053 COMPREHEN METABOLIC PANEL: CPT

## 2019-11-23 PROCEDURE — 6360000004 HC RX CONTRAST MEDICATION

## 2019-11-23 PROCEDURE — 94660 CPAP INITIATION&MGMT: CPT

## 2019-11-23 PROCEDURE — 92941 PRQ TRLML REVSC TOT OCCL AMI: CPT

## 2019-11-23 PROCEDURE — 36415 COLL VENOUS BLD VENIPUNCTURE: CPT

## 2019-11-23 PROCEDURE — 85025 COMPLETE CBC W/AUTO DIFF WBC: CPT

## 2019-11-23 PROCEDURE — 2709999900 HC NON-CHARGEABLE SUPPLY

## 2019-11-23 PROCEDURE — 93010 ELECTROCARDIOGRAM REPORT: CPT | Performed by: INTERNAL MEDICINE

## 2019-11-23 PROCEDURE — 84484 ASSAY OF TROPONIN QUANT: CPT

## 2019-11-23 PROCEDURE — 2500000003 HC RX 250 WO HCPCS

## 2019-11-23 PROCEDURE — B2151ZZ FLUOROSCOPY OF LEFT HEART USING LOW OSMOLAR CONTRAST: ICD-10-PCS | Performed by: INTERNAL MEDICINE

## 2019-11-23 PROCEDURE — C1887 CATHETER, GUIDING: HCPCS

## 2019-11-23 PROCEDURE — 6370000000 HC RX 637 (ALT 250 FOR IP): Performed by: FAMILY MEDICINE

## 2019-11-23 PROCEDURE — 6360000002 HC RX W HCPCS

## 2019-11-23 PROCEDURE — 4A023N7 MEASUREMENT OF CARDIAC SAMPLING AND PRESSURE, LEFT HEART, PERCUTANEOUS APPROACH: ICD-10-PCS | Performed by: INTERNAL MEDICINE

## 2019-11-23 PROCEDURE — 82962 GLUCOSE BLOOD TEST: CPT

## 2019-11-23 PROCEDURE — 93306 TTE W/DOPPLER COMPLETE: CPT

## 2019-11-23 PROCEDURE — 93005 ELECTROCARDIOGRAM TRACING: CPT | Performed by: FAMILY MEDICINE

## 2019-11-23 PROCEDURE — 2500000003 HC RX 250 WO HCPCS: Performed by: FAMILY MEDICINE

## 2019-11-23 PROCEDURE — 72125 CT NECK SPINE W/O DYE: CPT

## 2019-11-23 PROCEDURE — 94640 AIRWAY INHALATION TREATMENT: CPT

## 2019-11-23 PROCEDURE — 85610 PROTHROMBIN TIME: CPT

## 2019-11-23 PROCEDURE — 2580000003 HC RX 258: Performed by: INTERNAL MEDICINE

## 2019-11-23 PROCEDURE — 6360000002 HC RX W HCPCS: Performed by: INTERNAL MEDICINE

## 2019-11-23 PROCEDURE — 2580000003 HC RX 258

## 2019-11-23 PROCEDURE — 70450 CT HEAD/BRAIN W/O DYE: CPT

## 2019-11-23 PROCEDURE — 93880 EXTRACRANIAL BILAT STUDY: CPT

## 2019-11-23 PROCEDURE — C1769 GUIDE WIRE: HCPCS

## 2019-11-23 PROCEDURE — C1725 CATH, TRANSLUMIN NON-LASER: HCPCS

## 2019-11-23 PROCEDURE — C1894 INTRO/SHEATH, NON-LASER: HCPCS

## 2019-11-23 PROCEDURE — 71045 X-RAY EXAM CHEST 1 VIEW: CPT

## 2019-11-23 PROCEDURE — 93005 ELECTROCARDIOGRAM TRACING: CPT | Performed by: PHYSICIAN ASSISTANT

## 2019-11-23 PROCEDURE — 93971 EXTREMITY STUDY: CPT

## 2019-11-23 PROCEDURE — 85347 COAGULATION TIME ACTIVATED: CPT

## 2019-11-23 PROCEDURE — 6360000002 HC RX W HCPCS: Performed by: FAMILY MEDICINE

## 2019-11-23 PROCEDURE — 96374 THER/PROPH/DIAG INJ IV PUSH: CPT

## 2019-11-23 PROCEDURE — B2111ZZ FLUOROSCOPY OF MULTIPLE CORONARY ARTERIES USING LOW OSMOLAR CONTRAST: ICD-10-PCS | Performed by: INTERNAL MEDICINE

## 2019-11-23 PROCEDURE — 85730 THROMBOPLASTIN TIME PARTIAL: CPT

## 2019-11-23 RX ORDER — ONDANSETRON 2 MG/ML
4 INJECTION INTRAMUSCULAR; INTRAVENOUS EVERY 30 MIN PRN
Status: DISCONTINUED | OUTPATIENT
Start: 2019-11-23 | End: 2019-12-04 | Stop reason: HOSPADM

## 2019-11-23 RX ORDER — ASPIRIN 81 MG/1
324 TABLET, CHEWABLE ORAL ONCE
Status: COMPLETED | OUTPATIENT
Start: 2019-11-23 | End: 2019-11-23

## 2019-11-23 RX ORDER — DEXTROSE MONOHYDRATE 25 G/50ML
12.5 INJECTION, SOLUTION INTRAVENOUS PRN
Status: DISCONTINUED | OUTPATIENT
Start: 2019-11-23 | End: 2019-12-04 | Stop reason: HOSPADM

## 2019-11-23 RX ORDER — CARVEDILOL 6.25 MG/1
6.25 TABLET ORAL 2 TIMES DAILY WITH MEALS
Status: DISCONTINUED | OUTPATIENT
Start: 2019-11-23 | End: 2019-11-24

## 2019-11-23 RX ORDER — LOSARTAN POTASSIUM AND HYDROCHLOROTHIAZIDE 25; 100 MG/1; MG/1
1 TABLET ORAL EVERY EVENING
Status: DISCONTINUED | OUTPATIENT
Start: 2019-11-23 | End: 2019-11-23 | Stop reason: ALTCHOICE

## 2019-11-23 RX ORDER — HYDROCODONE BITARTRATE AND ACETAMINOPHEN 5; 325 MG/1; MG/1
1 TABLET ORAL ONCE
Status: COMPLETED | OUTPATIENT
Start: 2019-11-23 | End: 2019-11-23

## 2019-11-23 RX ORDER — HEPARIN SODIUM 1000 [USP'U]/ML
2000 INJECTION, SOLUTION INTRAVENOUS; SUBCUTANEOUS PRN
Status: DISCONTINUED | OUTPATIENT
Start: 2019-11-23 | End: 2019-11-24

## 2019-11-23 RX ORDER — PANTOPRAZOLE SODIUM 40 MG/1
40 TABLET, DELAYED RELEASE ORAL
Status: DISCONTINUED | OUTPATIENT
Start: 2019-11-24 | End: 2019-12-02

## 2019-11-23 RX ORDER — ASPIRIN 81 MG/1
81 TABLET, CHEWABLE ORAL DAILY
Status: DISCONTINUED | OUTPATIENT
Start: 2019-11-23 | End: 2019-11-23

## 2019-11-23 RX ORDER — ATORVASTATIN CALCIUM 40 MG/1
80 TABLET, FILM COATED ORAL NIGHTLY
Status: DISCONTINUED | OUTPATIENT
Start: 2019-11-23 | End: 2019-11-23 | Stop reason: ALTCHOICE

## 2019-11-23 RX ORDER — HEPARIN SODIUM 1000 [USP'U]/ML
4000 INJECTION, SOLUTION INTRAVENOUS; SUBCUTANEOUS PRN
Status: DISCONTINUED | OUTPATIENT
Start: 2019-11-23 | End: 2019-11-24

## 2019-11-23 RX ORDER — DEXTROSE MONOHYDRATE 50 MG/ML
100 INJECTION, SOLUTION INTRAVENOUS PRN
Status: DISCONTINUED | OUTPATIENT
Start: 2019-11-23 | End: 2019-12-04 | Stop reason: HOSPADM

## 2019-11-23 RX ORDER — INSULIN GLARGINE 100 [IU]/ML
55 INJECTION, SOLUTION SUBCUTANEOUS NIGHTLY
Status: DISCONTINUED | OUTPATIENT
Start: 2019-11-23 | End: 2019-11-23 | Stop reason: SDUPTHER

## 2019-11-23 RX ORDER — HYDROCODONE BITARTRATE AND ACETAMINOPHEN 5; 325 MG/1; MG/1
1 TABLET ORAL EVERY 6 HOURS PRN
Status: DISCONTINUED | OUTPATIENT
Start: 2019-11-23 | End: 2019-12-04 | Stop reason: HOSPADM

## 2019-11-23 RX ORDER — HEPARIN SODIUM 1000 [USP'U]/ML
4000 INJECTION, SOLUTION INTRAVENOUS; SUBCUTANEOUS ONCE
Status: COMPLETED | OUTPATIENT
Start: 2019-11-23 | End: 2019-11-23

## 2019-11-23 RX ORDER — AMLODIPINE BESYLATE 5 MG/1
5 TABLET ORAL DAILY
Status: DISCONTINUED | OUTPATIENT
Start: 2019-11-23 | End: 2019-11-23

## 2019-11-23 RX ORDER — ATORVASTATIN CALCIUM 40 MG/1
80 TABLET, FILM COATED ORAL NIGHTLY
Status: DISCONTINUED | OUTPATIENT
Start: 2019-11-24 | End: 2019-12-04 | Stop reason: HOSPADM

## 2019-11-23 RX ORDER — NITROGLYCERIN 20 MG/100ML
20 INJECTION INTRAVENOUS CONTINUOUS
Status: DISCONTINUED | OUTPATIENT
Start: 2019-11-23 | End: 2019-11-23

## 2019-11-23 RX ORDER — INSULIN GLARGINE 100 [IU]/ML
50 INJECTION, SOLUTION SUBCUTANEOUS NIGHTLY
Status: DISCONTINUED | OUTPATIENT
Start: 2019-11-23 | End: 2019-11-24

## 2019-11-23 RX ORDER — LOSARTAN POTASSIUM 100 MG/1
100 TABLET ORAL EVERY EVENING
Status: DISCONTINUED | OUTPATIENT
Start: 2019-11-23 | End: 2019-11-23

## 2019-11-23 RX ORDER — RANOLAZINE 500 MG/1
500 TABLET, EXTENDED RELEASE ORAL 2 TIMES DAILY
Status: DISCONTINUED | OUTPATIENT
Start: 2019-11-23 | End: 2019-11-23

## 2019-11-23 RX ORDER — HYDROCHLOROTHIAZIDE 25 MG/1
25 TABLET ORAL DAILY
Status: DISCONTINUED | OUTPATIENT
Start: 2019-11-23 | End: 2019-11-23

## 2019-11-23 RX ORDER — LEVOTHYROXINE SODIUM 0.05 MG/1
50 TABLET ORAL DAILY
Status: DISCONTINUED | OUTPATIENT
Start: 2019-11-23 | End: 2019-12-04 | Stop reason: HOSPADM

## 2019-11-23 RX ORDER — ATORVASTATIN CALCIUM 40 MG/1
80 TABLET, FILM COATED ORAL ONCE
Status: COMPLETED | OUTPATIENT
Start: 2019-11-23 | End: 2019-11-23

## 2019-11-23 RX ORDER — RANOLAZINE 500 MG/1
500 TABLET, EXTENDED RELEASE ORAL 2 TIMES DAILY
Status: DISCONTINUED | OUTPATIENT
Start: 2019-11-23 | End: 2019-12-04 | Stop reason: HOSPADM

## 2019-11-23 RX ORDER — HEPARIN SODIUM 10000 [USP'U]/100ML
7 INJECTION, SOLUTION INTRAVENOUS CONTINUOUS
Status: DISCONTINUED | OUTPATIENT
Start: 2019-11-23 | End: 2019-11-24

## 2019-11-23 RX ORDER — ALBUTEROL SULFATE 90 UG/1
2 AEROSOL, METERED RESPIRATORY (INHALATION) EVERY 4 HOURS PRN
Status: DISCONTINUED | OUTPATIENT
Start: 2019-11-23 | End: 2019-12-04 | Stop reason: HOSPADM

## 2019-11-23 RX ORDER — DOCUSATE SODIUM 100 MG/1
100 CAPSULE, LIQUID FILLED ORAL 2 TIMES DAILY
Status: DISCONTINUED | OUTPATIENT
Start: 2019-11-23 | End: 2019-12-04 | Stop reason: HOSPADM

## 2019-11-23 RX ORDER — ONDANSETRON 4 MG/1
4 TABLET, ORALLY DISINTEGRATING ORAL EVERY 8 HOURS PRN
Status: DISCONTINUED | OUTPATIENT
Start: 2019-11-23 | End: 2019-12-04 | Stop reason: HOSPADM

## 2019-11-23 RX ORDER — NICOTINE POLACRILEX 4 MG
15 LOZENGE BUCCAL PRN
Status: DISCONTINUED | OUTPATIENT
Start: 2019-11-23 | End: 2019-12-04 | Stop reason: HOSPADM

## 2019-11-23 RX ORDER — ASPIRIN 81 MG/1
81 TABLET, CHEWABLE ORAL DAILY
Status: DISCONTINUED | OUTPATIENT
Start: 2019-11-23 | End: 2019-12-04 | Stop reason: HOSPADM

## 2019-11-23 RX ORDER — BUPROPION HYDROCHLORIDE 100 MG/1
100 TABLET, EXTENDED RELEASE ORAL 2 TIMES DAILY
Status: DISCONTINUED | OUTPATIENT
Start: 2019-11-23 | End: 2019-12-04 | Stop reason: HOSPADM

## 2019-11-23 RX ORDER — DULOXETIN HYDROCHLORIDE 30 MG/1
60 CAPSULE, DELAYED RELEASE ORAL DAILY
Status: DISCONTINUED | OUTPATIENT
Start: 2019-11-23 | End: 2019-12-04 | Stop reason: HOSPADM

## 2019-11-23 RX ORDER — CLOPIDOGREL BISULFATE 75 MG/1
75 TABLET ORAL DAILY
Status: CANCELLED | OUTPATIENT
Start: 2019-11-23

## 2019-11-23 RX ORDER — FUROSEMIDE 10 MG/ML
80 INJECTION INTRAMUSCULAR; INTRAVENOUS ONCE
Status: COMPLETED | OUTPATIENT
Start: 2019-11-23 | End: 2019-11-23

## 2019-11-23 RX ADMIN — LEVOTHYROXINE SODIUM 50 MCG: 50 TABLET ORAL at 13:23

## 2019-11-23 RX ADMIN — ASPIRIN 81 MG 81 MG: 81 TABLET ORAL at 13:15

## 2019-11-23 RX ADMIN — DOCUSATE SODIUM 100 MG: 100 CAPSULE, LIQUID FILLED ORAL at 13:13

## 2019-11-23 RX ADMIN — INSULIN HUMAN 10 UNITS: 100 INJECTION, SOLUTION PARENTERAL at 06:15

## 2019-11-23 RX ADMIN — NITROGLYCERIN 20 MCG/MIN: 20 INJECTION INTRAVENOUS at 05:21

## 2019-11-23 RX ADMIN — HYDROCODONE BITARTRATE AND ACETAMINOPHEN 1 TABLET: 5; 325 TABLET ORAL at 03:44

## 2019-11-23 RX ADMIN — FUROSEMIDE 5 MG/HR: 10 INJECTION, SOLUTION INTRAVENOUS at 13:02

## 2019-11-23 RX ADMIN — Medication 1000 UNITS: at 13:22

## 2019-11-23 RX ADMIN — HEPARIN SODIUM 4000 UNITS: 1000 INJECTION, SOLUTION INTRAVENOUS; SUBCUTANEOUS at 05:25

## 2019-11-23 RX ADMIN — Medication 2 PUFF: at 19:41

## 2019-11-23 RX ADMIN — ASPIRIN 81 MG 324 MG: 81 TABLET ORAL at 05:21

## 2019-11-23 RX ADMIN — TIROFIBAN 0.15 MCG/KG/MIN: 5 INJECTION, SOLUTION INTRAVENOUS at 15:05

## 2019-11-23 RX ADMIN — BUPROPION HYDROCHLORIDE 100 MG: 100 TABLET, FILM COATED, EXTENDED RELEASE ORAL at 22:36

## 2019-11-23 RX ADMIN — FUROSEMIDE 80 MG: 10 INJECTION, SOLUTION INTRAVENOUS at 04:56

## 2019-11-23 RX ADMIN — HEPARIN SODIUM AND DEXTROSE 7 UNITS/KG/HR: 10000; 5 INJECTION INTRAVENOUS at 05:21

## 2019-11-23 RX ADMIN — NITROGLYCERIN 1 INCH: 20 OINTMENT TOPICAL at 04:57

## 2019-11-23 RX ADMIN — RANOLAZINE 500 MG: 500 TABLET, FILM COATED, EXTENDED RELEASE ORAL at 13:13

## 2019-11-23 RX ADMIN — INSULIN GLARGINE 50 UNITS: 100 INJECTION, SOLUTION SUBCUTANEOUS at 21:19

## 2019-11-23 RX ADMIN — RANOLAZINE 500 MG: 500 TABLET, FILM COATED, EXTENDED RELEASE ORAL at 21:19

## 2019-11-23 RX ADMIN — DULOXETINE HYDROCHLORIDE 60 MG: 30 CAPSULE, DELAYED RELEASE ORAL at 13:12

## 2019-11-23 RX ADMIN — CARVEDILOL 6.25 MG: 6.25 TABLET, FILM COATED ORAL at 13:22

## 2019-11-23 RX ADMIN — HEPARIN SODIUM AND DEXTROSE 12 UNITS/KG/HR: 10000; 5 INJECTION INTRAVENOUS at 16:53

## 2019-11-23 RX ADMIN — INSULIN HUMAN 20 UNITS: 100 INJECTION, SUSPENSION SUBCUTANEOUS at 15:16

## 2019-11-23 RX ADMIN — BUPROPION HYDROCHLORIDE 100 MG: 100 TABLET, FILM COATED, EXTENDED RELEASE ORAL at 13:12

## 2019-11-23 RX ADMIN — LINAGLIPTIN 5 MG: 5 TABLET, FILM COATED ORAL at 13:13

## 2019-11-23 RX ADMIN — ATORVASTATIN CALCIUM 80 MG: 40 TABLET, FILM COATED ORAL at 05:26

## 2019-11-23 RX ADMIN — INSULIN LISPRO 2 UNITS: 100 INJECTION, SOLUTION INTRAVENOUS; SUBCUTANEOUS at 21:19

## 2019-11-23 RX ADMIN — DOCUSATE SODIUM 100 MG: 100 CAPSULE, LIQUID FILLED ORAL at 21:19

## 2019-11-23 ASSESSMENT — PAIN DESCRIPTION - FREQUENCY
FREQUENCY: CONTINUOUS

## 2019-11-23 ASSESSMENT — PAIN DESCRIPTION - ONSET
ONSET: ON-GOING
ONSET: ON-GOING

## 2019-11-23 ASSESSMENT — PAIN DESCRIPTION - PAIN TYPE
TYPE: CHRONIC PAIN
TYPE: ACUTE PAIN
TYPE: ACUTE PAIN
TYPE: CHRONIC PAIN

## 2019-11-23 ASSESSMENT — PAIN DESCRIPTION - DESCRIPTORS
DESCRIPTORS: ACHING
DESCRIPTORS: SORE
DESCRIPTORS: ACHING
DESCRIPTORS: SORE

## 2019-11-23 ASSESSMENT — PAIN - FUNCTIONAL ASSESSMENT: PAIN_FUNCTIONAL_ASSESSMENT: ACTIVITIES ARE NOT PREVENTED

## 2019-11-23 ASSESSMENT — PAIN SCALES - GENERAL
PAINLEVEL_OUTOF10: 8
PAINLEVEL_OUTOF10: 7
PAINLEVEL_OUTOF10: 10
PAINLEVEL_OUTOF10: 9
PAINLEVEL_OUTOF10: 0
PAINLEVEL_OUTOF10: 9

## 2019-11-23 ASSESSMENT — PAIN DESCRIPTION - LOCATION
LOCATION: BACK
LOCATION: HEAD
LOCATION: BACK
LOCATION: HEAD
LOCATION: HEAD

## 2019-11-23 ASSESSMENT — PAIN DESCRIPTION - PROGRESSION
CLINICAL_PROGRESSION: GRADUALLY WORSENING
CLINICAL_PROGRESSION: NOT CHANGED

## 2019-11-23 ASSESSMENT — PAIN DESCRIPTION - ORIENTATION
ORIENTATION: MID;LOWER
ORIENTATION: LOWER

## 2019-11-24 ENCOUNTER — APPOINTMENT (OUTPATIENT)
Dept: CT IMAGING | Age: 59
DRG: 246 | End: 2019-11-24
Payer: MEDICARE

## 2019-11-24 LAB
ANION GAP SERPL CALCULATED.3IONS-SCNC: 13 MMOL/L (ref 4–16)
APTT: 72 SECONDS (ref 25.1–37.1)
APTT: 91.4 SECONDS (ref 25.1–37.1)
BACTERIA: NEGATIVE /HPF
BASOPHILS ABSOLUTE: 0 K/CU MM
BASOPHILS RELATIVE PERCENT: 0.3 % (ref 0–1)
BILIRUBIN URINE: NEGATIVE MG/DL
BLOOD, URINE: ABNORMAL
BUN BLDV-MCNC: 41 MG/DL (ref 6–23)
CALCIUM SERPL-MCNC: 8.4 MG/DL (ref 8.3–10.6)
CHLORIDE BLD-SCNC: 98 MMOL/L (ref 99–110)
CHOLESTEROL: 153 MG/DL
CLARITY: ABNORMAL
CO2: 25 MMOL/L (ref 21–32)
COLOR: YELLOW
CREAT SERPL-MCNC: 2.1 MG/DL (ref 0.6–1.1)
DIFFERENTIAL TYPE: ABNORMAL
EOSINOPHILS ABSOLUTE: 0.2 K/CU MM
EOSINOPHILS RELATIVE PERCENT: 1.3 % (ref 0–3)
ESTIMATED AVERAGE GLUCOSE: 321 MG/DL
GFR AFRICAN AMERICAN: 29 ML/MIN/1.73M2
GFR NON-AFRICAN AMERICAN: 24 ML/MIN/1.73M2
GLUCOSE BLD-MCNC: 104 MG/DL (ref 70–99)
GLUCOSE BLD-MCNC: 109 MG/DL (ref 70–99)
GLUCOSE BLD-MCNC: 118 MG/DL (ref 70–99)
GLUCOSE BLD-MCNC: 172 MG/DL (ref 70–99)
GLUCOSE BLD-MCNC: 174 MG/DL (ref 70–99)
GLUCOSE BLD-MCNC: 198 MG/DL (ref 70–99)
GLUCOSE, URINE: NEGATIVE MG/DL
HBA1C MFR BLD: 12.8 % (ref 4.2–6.3)
HCT VFR BLD CALC: 30.8 % (ref 37–47)
HDLC SERPL-MCNC: 29 MG/DL
HEMOGLOBIN: 9.5 GM/DL (ref 12.5–16)
IMMATURE NEUTROPHIL %: 0.8 % (ref 0–0.43)
KETONES, URINE: NEGATIVE MG/DL
LDL CHOLESTEROL DIRECT: 109 MG/DL
LEUKOCYTE ESTERASE, URINE: ABNORMAL
LYMPHOCYTES ABSOLUTE: 1.7 K/CU MM
LYMPHOCYTES RELATIVE PERCENT: 14.5 % (ref 24–44)
MAGNESIUM: 1.8 MG/DL (ref 1.8–2.4)
MCH RBC QN AUTO: 26.4 PG (ref 27–31)
MCHC RBC AUTO-ENTMCNC: 30.8 % (ref 32–36)
MCV RBC AUTO: 85.6 FL (ref 78–100)
MONOCYTES ABSOLUTE: 0.6 K/CU MM
MONOCYTES RELATIVE PERCENT: 5.3 % (ref 0–4)
MUCUS: ABNORMAL HPF
NITRITE URINE, QUANTITATIVE: NEGATIVE
NUCLEATED RBC %: 0 %
PDW BLD-RTO: 13.8 % (ref 11.7–14.9)
PH, URINE: 5 (ref 5–8)
PLATELET # BLD: 337 K/CU MM (ref 140–440)
PMV BLD AUTO: 10.5 FL (ref 7.5–11.1)
POTASSIUM SERPL-SCNC: 4.2 MMOL/L (ref 3.5–5.1)
PROTEIN UA: 100 MG/DL
RBC # BLD: 3.6 M/CU MM (ref 4.2–5.4)
RBC URINE: 62 /HPF (ref 0–6)
SEGMENTED NEUTROPHILS ABSOLUTE COUNT: 9.3 K/CU MM
SEGMENTED NEUTROPHILS RELATIVE PERCENT: 77.8 % (ref 36–66)
SODIUM BLD-SCNC: 136 MMOL/L (ref 135–145)
SPECIFIC GRAVITY UA: 1.02 (ref 1–1.03)
SQUAMOUS EPITHELIAL: 1 /HPF
TOTAL IMMATURE NEUTOROPHIL: 0.09 K/CU MM
TOTAL NUCLEATED RBC: 0 K/CU MM
TRICHOMONAS: ABNORMAL /HPF
TRIGL SERPL-MCNC: 149 MG/DL
TROPONIN T: 1.44 NG/ML
UROBILINOGEN, URINE: NORMAL MG/DL (ref 0.2–1)
WBC # BLD: 12 K/CU MM (ref 4–10.5)
WBC CLUMP: ABNORMAL /HPF
WBC UA: 48 /HPF (ref 0–5)
YEAST: ABNORMAL /HPF

## 2019-11-24 PROCEDURE — 6370000000 HC RX 637 (ALT 250 FOR IP): Performed by: INTERNAL MEDICINE

## 2019-11-24 PROCEDURE — 2100000000 HC CCU R&B

## 2019-11-24 PROCEDURE — 83735 ASSAY OF MAGNESIUM: CPT

## 2019-11-24 PROCEDURE — 6360000002 HC RX W HCPCS: Performed by: INTERNAL MEDICINE

## 2019-11-24 PROCEDURE — 87086 URINE CULTURE/COLONY COUNT: CPT

## 2019-11-24 PROCEDURE — 82962 GLUCOSE BLOOD TEST: CPT

## 2019-11-24 PROCEDURE — 80061 LIPID PANEL: CPT

## 2019-11-24 PROCEDURE — 83721 ASSAY OF BLOOD LIPOPROTEIN: CPT

## 2019-11-24 PROCEDURE — 71250 CT THORAX DX C-: CPT

## 2019-11-24 PROCEDURE — 85730 THROMBOPLASTIN TIME PARTIAL: CPT

## 2019-11-24 PROCEDURE — 80048 BASIC METABOLIC PNL TOTAL CA: CPT

## 2019-11-24 PROCEDURE — 81001 URINALYSIS AUTO W/SCOPE: CPT

## 2019-11-24 PROCEDURE — 2580000003 HC RX 258: Performed by: INTERNAL MEDICINE

## 2019-11-24 PROCEDURE — 85025 COMPLETE CBC W/AUTO DIFF WBC: CPT

## 2019-11-24 PROCEDURE — 83036 HEMOGLOBIN GLYCOSYLATED A1C: CPT

## 2019-11-24 PROCEDURE — 94640 AIRWAY INHALATION TREATMENT: CPT

## 2019-11-24 PROCEDURE — 84484 ASSAY OF TROPONIN QUANT: CPT

## 2019-11-24 RX ORDER — CARVEDILOL 12.5 MG/1
12.5 TABLET ORAL 2 TIMES DAILY WITH MEALS
Status: DISCONTINUED | OUTPATIENT
Start: 2019-11-24 | End: 2019-12-04 | Stop reason: HOSPADM

## 2019-11-24 RX ORDER — HEPARIN SODIUM 1000 [USP'U]/ML
2000 INJECTION, SOLUTION INTRAVENOUS; SUBCUTANEOUS PRN
Status: DISCONTINUED | OUTPATIENT
Start: 2019-11-24 | End: 2019-12-02

## 2019-11-24 RX ORDER — AMLODIPINE BESYLATE 5 MG/1
5 TABLET ORAL DAILY
Status: DISCONTINUED | OUTPATIENT
Start: 2019-11-24 | End: 2019-11-24

## 2019-11-24 RX ORDER — INSULIN GLARGINE 100 [IU]/ML
40 INJECTION, SOLUTION SUBCUTANEOUS NIGHTLY
Status: DISCONTINUED | OUTPATIENT
Start: 2019-11-24 | End: 2019-11-26

## 2019-11-24 RX ORDER — AMLODIPINE BESYLATE 10 MG/1
10 TABLET ORAL DAILY
Status: DISCONTINUED | OUTPATIENT
Start: 2019-11-25 | End: 2019-11-25

## 2019-11-24 RX ORDER — HEPARIN SODIUM 1000 [USP'U]/ML
4000 INJECTION, SOLUTION INTRAVENOUS; SUBCUTANEOUS PRN
Status: DISCONTINUED | OUTPATIENT
Start: 2019-11-24 | End: 2019-12-02

## 2019-11-24 RX ORDER — ISOSORBIDE MONONITRATE 30 MG/1
30 TABLET, EXTENDED RELEASE ORAL DAILY
Status: DISCONTINUED | OUTPATIENT
Start: 2019-11-24 | End: 2019-11-27

## 2019-11-24 RX ORDER — HEPARIN SODIUM 10000 [USP'U]/100ML
12 INJECTION, SOLUTION INTRAVENOUS CONTINUOUS
Status: DISCONTINUED | OUTPATIENT
Start: 2019-11-24 | End: 2019-11-26

## 2019-11-24 RX ADMIN — INSULIN GLARGINE 40 UNITS: 100 INJECTION, SOLUTION SUBCUTANEOUS at 21:15

## 2019-11-24 RX ADMIN — TICAGRELOR 180 MG: 90 TABLET ORAL at 12:24

## 2019-11-24 RX ADMIN — HYDROCODONE BITARTRATE AND ACETAMINOPHEN 1 TABLET: 5; 325 TABLET ORAL at 10:33

## 2019-11-24 RX ADMIN — Medication 1000 UNITS: at 09:22

## 2019-11-24 RX ADMIN — INSULIN LISPRO 2 UNITS: 100 INJECTION, SOLUTION INTRAVENOUS; SUBCUTANEOUS at 21:15

## 2019-11-24 RX ADMIN — AMLODIPINE BESYLATE 5 MG: 5 TABLET ORAL at 09:22

## 2019-11-24 RX ADMIN — HEPARIN SODIUM AND DEXTROSE 12 UNITS/KG/HR: 10000; 5 INJECTION INTRAVENOUS at 06:54

## 2019-11-24 RX ADMIN — Medication 2 PUFF: at 19:57

## 2019-11-24 RX ADMIN — DOCUSATE SODIUM 100 MG: 100 CAPSULE, LIQUID FILLED ORAL at 09:23

## 2019-11-24 RX ADMIN — FUROSEMIDE 5 MG/HR: 10 INJECTION, SOLUTION INTRAVENOUS at 06:54

## 2019-11-24 RX ADMIN — RANOLAZINE 500 MG: 500 TABLET, FILM COATED, EXTENDED RELEASE ORAL at 09:22

## 2019-11-24 RX ADMIN — CARVEDILOL 12.5 MG: 12.5 TABLET, FILM COATED ORAL at 18:18

## 2019-11-24 RX ADMIN — CARVEDILOL 6.25 MG: 6.25 TABLET, FILM COATED ORAL at 09:23

## 2019-11-24 RX ADMIN — ASPIRIN 81 MG 81 MG: 81 TABLET ORAL at 09:23

## 2019-11-24 RX ADMIN — TICAGRELOR 90 MG: 90 TABLET ORAL at 21:09

## 2019-11-24 RX ADMIN — PANTOPRAZOLE SODIUM 40 MG: 40 TABLET, DELAYED RELEASE ORAL at 05:31

## 2019-11-24 RX ADMIN — HYDROCODONE BITARTRATE AND ACETAMINOPHEN 1 TABLET: 5; 325 TABLET ORAL at 03:51

## 2019-11-24 RX ADMIN — LEVOTHYROXINE SODIUM 50 MCG: 50 TABLET ORAL at 09:24

## 2019-11-24 RX ADMIN — BUPROPION HYDROCHLORIDE 100 MG: 100 TABLET, FILM COATED, EXTENDED RELEASE ORAL at 09:39

## 2019-11-24 RX ADMIN — BUPROPION HYDROCHLORIDE 100 MG: 100 TABLET, FILM COATED, EXTENDED RELEASE ORAL at 21:10

## 2019-11-24 RX ADMIN — HYDROCODONE BITARTRATE AND ACETAMINOPHEN 1 TABLET: 5; 325 TABLET ORAL at 21:09

## 2019-11-24 RX ADMIN — ATORVASTATIN CALCIUM 80 MG: 40 TABLET, FILM COATED ORAL at 21:09

## 2019-11-24 RX ADMIN — DOCUSATE SODIUM 100 MG: 100 CAPSULE, LIQUID FILLED ORAL at 21:09

## 2019-11-24 RX ADMIN — ISOSORBIDE MONONITRATE 30 MG: 30 TABLET, EXTENDED RELEASE ORAL at 09:23

## 2019-11-24 RX ADMIN — HEPARIN SODIUM 12 UNITS/KG/HR: 10000 INJECTION, SOLUTION INTRAVENOUS at 10:27

## 2019-11-24 RX ADMIN — DULOXETINE HYDROCHLORIDE 60 MG: 30 CAPSULE, DELAYED RELEASE ORAL at 09:23

## 2019-11-24 RX ADMIN — RANOLAZINE 500 MG: 500 TABLET, FILM COATED, EXTENDED RELEASE ORAL at 21:09

## 2019-11-24 ASSESSMENT — PAIN DESCRIPTION - LOCATION
LOCATION: BACK
LOCATION: BACK;HEAD;NECK
LOCATION: BACK
LOCATION: BACK
LOCATION: BACK;HEAD;NECK
LOCATION: BACK

## 2019-11-24 ASSESSMENT — PAIN SCALES - GENERAL
PAINLEVEL_OUTOF10: 8
PAINLEVEL_OUTOF10: 0
PAINLEVEL_OUTOF10: 10
PAINLEVEL_OUTOF10: 10
PAINLEVEL_OUTOF10: 7
PAINLEVEL_OUTOF10: 4
PAINLEVEL_OUTOF10: 8
PAINLEVEL_OUTOF10: 4
PAINLEVEL_OUTOF10: 0

## 2019-11-24 ASSESSMENT — PAIN DESCRIPTION - ONSET
ONSET: ON-GOING
ONSET: AWAKENED FROM SLEEP

## 2019-11-24 ASSESSMENT — PAIN - FUNCTIONAL ASSESSMENT
PAIN_FUNCTIONAL_ASSESSMENT: ACTIVITIES ARE NOT PREVENTED
PAIN_FUNCTIONAL_ASSESSMENT: PREVENTS OR INTERFERES SOME ACTIVE ACTIVITIES AND ADLS
PAIN_FUNCTIONAL_ASSESSMENT: ACTIVITIES ARE NOT PREVENTED
PAIN_FUNCTIONAL_ASSESSMENT: PREVENTS OR INTERFERES SOME ACTIVE ACTIVITIES AND ADLS
PAIN_FUNCTIONAL_ASSESSMENT: ACTIVITIES ARE NOT PREVENTED
PAIN_FUNCTIONAL_ASSESSMENT: PREVENTS OR INTERFERES SOME ACTIVE ACTIVITIES AND ADLS

## 2019-11-24 ASSESSMENT — PAIN DESCRIPTION - FREQUENCY
FREQUENCY: CONTINUOUS

## 2019-11-24 ASSESSMENT — PAIN DESCRIPTION - PROGRESSION
CLINICAL_PROGRESSION: GRADUALLY WORSENING
CLINICAL_PROGRESSION: GRADUALLY IMPROVING
CLINICAL_PROGRESSION: GRADUALLY WORSENING

## 2019-11-24 ASSESSMENT — PAIN DESCRIPTION - ORIENTATION
ORIENTATION: MID;LOWER;RIGHT;LEFT
ORIENTATION: MID;LOWER
ORIENTATION: LOWER
ORIENTATION: LOWER;MID;UPPER
ORIENTATION: MID;LOWER;UPPER
ORIENTATION: LOWER;MID

## 2019-11-24 ASSESSMENT — PAIN DESCRIPTION - DESCRIPTORS
DESCRIPTORS: ACHING
DESCRIPTORS: SORE;ACHING
DESCRIPTORS: DISCOMFORT;HEADACHE
DESCRIPTORS: ACHING
DESCRIPTORS: DISCOMFORT;HEADACHE
DESCRIPTORS: ACHING

## 2019-11-24 ASSESSMENT — PAIN DESCRIPTION - PAIN TYPE
TYPE: CHRONIC PAIN
TYPE: CHRONIC PAIN
TYPE: ACUTE PAIN;CHRONIC PAIN
TYPE: ACUTE PAIN;CHRONIC PAIN
TYPE: CHRONIC PAIN
TYPE: CHRONIC PAIN

## 2019-11-25 LAB
ANION GAP SERPL CALCULATED.3IONS-SCNC: 13 MMOL/L (ref 4–16)
APTT: 92.7 SECONDS (ref 25.1–37.1)
BASOPHILS ABSOLUTE: 0 K/CU MM
BASOPHILS RELATIVE PERCENT: 0.2 % (ref 0–1)
BUN BLDV-MCNC: 40 MG/DL (ref 6–23)
CALCIUM SERPL-MCNC: 8.3 MG/DL (ref 8.3–10.6)
CHLORIDE BLD-SCNC: 97 MMOL/L (ref 99–110)
CO2: 25 MMOL/L (ref 21–32)
CREAT SERPL-MCNC: 2.1 MG/DL (ref 0.6–1.1)
DIFFERENTIAL TYPE: ABNORMAL
EOSINOPHILS ABSOLUTE: 0.1 K/CU MM
EOSINOPHILS RELATIVE PERCENT: 1.1 % (ref 0–3)
GFR AFRICAN AMERICAN: 29 ML/MIN/1.73M2
GFR NON-AFRICAN AMERICAN: 24 ML/MIN/1.73M2
GLUCOSE BLD-MCNC: 152 MG/DL (ref 70–99)
GLUCOSE BLD-MCNC: 154 MG/DL (ref 70–99)
GLUCOSE BLD-MCNC: 159 MG/DL (ref 70–99)
GLUCOSE BLD-MCNC: 187 MG/DL (ref 70–99)
GLUCOSE BLD-MCNC: 239 MG/DL (ref 70–99)
GLUCOSE BLD-MCNC: 279 MG/DL (ref 70–99)
HCT VFR BLD CALC: 30.9 % (ref 37–47)
HEMOGLOBIN: 9.4 GM/DL (ref 12.5–16)
IMMATURE NEUTROPHIL %: 0.5 % (ref 0–0.43)
LYMPHOCYTES ABSOLUTE: 1.4 K/CU MM
LYMPHOCYTES RELATIVE PERCENT: 12.8 % (ref 24–44)
MAGNESIUM: 1.9 MG/DL (ref 1.8–2.4)
MCH RBC QN AUTO: 26 PG (ref 27–31)
MCHC RBC AUTO-ENTMCNC: 30.4 % (ref 32–36)
MCV RBC AUTO: 85.6 FL (ref 78–100)
MONOCYTES ABSOLUTE: 0.7 K/CU MM
MONOCYTES RELATIVE PERCENT: 6.5 % (ref 0–4)
NUCLEATED RBC %: 0 %
PDW BLD-RTO: 14.1 % (ref 11.7–14.9)
PLATELET # BLD: 305 K/CU MM (ref 140–440)
PMV BLD AUTO: 10.3 FL (ref 7.5–11.1)
POTASSIUM SERPL-SCNC: 4 MMOL/L (ref 3.5–5.1)
RBC # BLD: 3.61 M/CU MM (ref 4.2–5.4)
SEGMENTED NEUTROPHILS ABSOLUTE COUNT: 8.8 K/CU MM
SEGMENTED NEUTROPHILS RELATIVE PERCENT: 78.9 % (ref 36–66)
SODIUM BLD-SCNC: 135 MMOL/L (ref 135–145)
TOTAL IMMATURE NEUTOROPHIL: 0.05 K/CU MM
TOTAL NUCLEATED RBC: 0 K/CU MM
TROPONIN T: 1.93 NG/ML
WBC # BLD: 11.1 K/CU MM (ref 4–10.5)

## 2019-11-25 PROCEDURE — 97162 PT EVAL MOD COMPLEX 30 MIN: CPT

## 2019-11-25 PROCEDURE — 2700000000 HC OXYGEN THERAPY PER DAY

## 2019-11-25 PROCEDURE — 6370000000 HC RX 637 (ALT 250 FOR IP): Performed by: INTERNAL MEDICINE

## 2019-11-25 PROCEDURE — 84484 ASSAY OF TROPONIN QUANT: CPT

## 2019-11-25 PROCEDURE — 82962 GLUCOSE BLOOD TEST: CPT

## 2019-11-25 PROCEDURE — 97530 THERAPEUTIC ACTIVITIES: CPT

## 2019-11-25 PROCEDURE — 85049 AUTOMATED PLATELET COUNT: CPT

## 2019-11-25 PROCEDURE — 83735 ASSAY OF MAGNESIUM: CPT

## 2019-11-25 PROCEDURE — 85730 THROMBOPLASTIN TIME PARTIAL: CPT

## 2019-11-25 PROCEDURE — 94761 N-INVAS EAR/PLS OXIMETRY MLT: CPT

## 2019-11-25 PROCEDURE — 6360000002 HC RX W HCPCS: Performed by: INTERNAL MEDICINE

## 2019-11-25 PROCEDURE — 97166 OT EVAL MOD COMPLEX 45 MIN: CPT

## 2019-11-25 PROCEDURE — 94010 BREATHING CAPACITY TEST: CPT

## 2019-11-25 PROCEDURE — 85025 COMPLETE CBC W/AUTO DIFF WBC: CPT

## 2019-11-25 PROCEDURE — 80048 BASIC METABOLIC PNL TOTAL CA: CPT

## 2019-11-25 PROCEDURE — 2100000000 HC CCU R&B

## 2019-11-25 PROCEDURE — 94640 AIRWAY INHALATION TREATMENT: CPT

## 2019-11-25 PROCEDURE — 97116 GAIT TRAINING THERAPY: CPT

## 2019-11-25 RX ORDER — TORSEMIDE 20 MG/1
20 TABLET ORAL 2 TIMES DAILY
Status: DISCONTINUED | OUTPATIENT
Start: 2019-11-25 | End: 2019-11-26

## 2019-11-25 RX ADMIN — HYDROCODONE BITARTRATE AND ACETAMINOPHEN 1 TABLET: 5; 325 TABLET ORAL at 05:01

## 2019-11-25 RX ADMIN — ISOSORBIDE MONONITRATE 30 MG: 30 TABLET, EXTENDED RELEASE ORAL at 08:48

## 2019-11-25 RX ADMIN — INSULIN LISPRO 2 UNITS: 100 INJECTION, SOLUTION INTRAVENOUS; SUBCUTANEOUS at 01:59

## 2019-11-25 RX ADMIN — DULOXETINE HYDROCHLORIDE 60 MG: 30 CAPSULE, DELAYED RELEASE ORAL at 08:47

## 2019-11-25 RX ADMIN — Medication 1000 UNITS: at 08:47

## 2019-11-25 RX ADMIN — CARVEDILOL 12.5 MG: 12.5 TABLET, FILM COATED ORAL at 08:48

## 2019-11-25 RX ADMIN — DOCUSATE SODIUM 100 MG: 100 CAPSULE, LIQUID FILLED ORAL at 08:48

## 2019-11-25 RX ADMIN — HYDROCODONE BITARTRATE AND ACETAMINOPHEN 1 TABLET: 5; 325 TABLET ORAL at 14:57

## 2019-11-25 RX ADMIN — Medication 2 PUFF: at 07:46

## 2019-11-25 RX ADMIN — LINAGLIPTIN 5 MG: 5 TABLET, FILM COATED ORAL at 08:47

## 2019-11-25 RX ADMIN — LEVOTHYROXINE SODIUM 50 MCG: 50 TABLET ORAL at 08:48

## 2019-11-25 RX ADMIN — RANOLAZINE 500 MG: 500 TABLET, FILM COATED, EXTENDED RELEASE ORAL at 21:40

## 2019-11-25 RX ADMIN — TORSEMIDE 20 MG: 20 TABLET ORAL at 08:56

## 2019-11-25 RX ADMIN — HEPARIN SODIUM 12 UNITS/KG/HR: 10000 INJECTION, SOLUTION INTRAVENOUS at 17:38

## 2019-11-25 RX ADMIN — CARVEDILOL 12.5 MG: 12.5 TABLET, FILM COATED ORAL at 17:31

## 2019-11-25 RX ADMIN — ATORVASTATIN CALCIUM 80 MG: 40 TABLET, FILM COATED ORAL at 21:40

## 2019-11-25 RX ADMIN — PANTOPRAZOLE SODIUM 40 MG: 40 TABLET, DELAYED RELEASE ORAL at 06:34

## 2019-11-25 RX ADMIN — TICAGRELOR 90 MG: 90 TABLET ORAL at 08:48

## 2019-11-25 RX ADMIN — TICAGRELOR 90 MG: 90 TABLET ORAL at 21:41

## 2019-11-25 RX ADMIN — HYDROCODONE BITARTRATE AND ACETAMINOPHEN 1 TABLET: 5; 325 TABLET ORAL at 21:40

## 2019-11-25 RX ADMIN — INSULIN GLARGINE 40 UNITS: 100 INJECTION, SOLUTION SUBCUTANEOUS at 21:41

## 2019-11-25 RX ADMIN — INSULIN LISPRO 4 UNITS: 100 INJECTION, SOLUTION INTRAVENOUS; SUBCUTANEOUS at 21:41

## 2019-11-25 RX ADMIN — Medication 2 PUFF: at 19:17

## 2019-11-25 RX ADMIN — HEPARIN SODIUM 12 UNITS/KG/HR: 10000 INJECTION, SOLUTION INTRAVENOUS at 00:24

## 2019-11-25 RX ADMIN — RANOLAZINE 500 MG: 500 TABLET, FILM COATED, EXTENDED RELEASE ORAL at 08:48

## 2019-11-25 RX ADMIN — TORSEMIDE 20 MG: 20 TABLET ORAL at 21:40

## 2019-11-25 RX ADMIN — BUPROPION HYDROCHLORIDE 100 MG: 100 TABLET, FILM COATED, EXTENDED RELEASE ORAL at 21:40

## 2019-11-25 RX ADMIN — ASPIRIN 81 MG 81 MG: 81 TABLET ORAL at 08:48

## 2019-11-25 RX ADMIN — DOCUSATE SODIUM 100 MG: 100 CAPSULE, LIQUID FILLED ORAL at 21:40

## 2019-11-25 RX ADMIN — BUPROPION HYDROCHLORIDE 100 MG: 100 TABLET, FILM COATED, EXTENDED RELEASE ORAL at 08:56

## 2019-11-25 ASSESSMENT — PAIN DESCRIPTION - ONSET
ONSET: ON-GOING

## 2019-11-25 ASSESSMENT — PAIN SCALES - GENERAL
PAINLEVEL_OUTOF10: 9
PAINLEVEL_OUTOF10: 5
PAINLEVEL_OUTOF10: 0
PAINLEVEL_OUTOF10: 0
PAINLEVEL_OUTOF10: 3
PAINLEVEL_OUTOF10: 8
PAINLEVEL_OUTOF10: 0
PAINLEVEL_OUTOF10: 8
PAINLEVEL_OUTOF10: 0

## 2019-11-25 ASSESSMENT — PAIN DESCRIPTION - LOCATION
LOCATION: BACK

## 2019-11-25 ASSESSMENT — PAIN DESCRIPTION - ORIENTATION
ORIENTATION: LOWER;MID
ORIENTATION: MID;UPPER;LOWER
ORIENTATION: MID;LOWER
ORIENTATION: MID;UPPER;LOWER

## 2019-11-25 ASSESSMENT — PAIN DESCRIPTION - FREQUENCY
FREQUENCY: INTERMITTENT
FREQUENCY: CONTINUOUS

## 2019-11-25 ASSESSMENT — PAIN DESCRIPTION - PAIN TYPE
TYPE: CHRONIC PAIN

## 2019-11-25 ASSESSMENT — PAIN DESCRIPTION - DESCRIPTORS
DESCRIPTORS: CONSTANT;SORE
DESCRIPTORS: CONSTANT
DESCRIPTORS: ACHING
DESCRIPTORS: ACHING

## 2019-11-25 ASSESSMENT — PAIN DESCRIPTION - PROGRESSION
CLINICAL_PROGRESSION: GRADUALLY WORSENING
CLINICAL_PROGRESSION: NOT CHANGED
CLINICAL_PROGRESSION: NOT CHANGED
CLINICAL_PROGRESSION: GRADUALLY IMPROVING
CLINICAL_PROGRESSION: NOT CHANGED

## 2019-11-25 ASSESSMENT — PAIN - FUNCTIONAL ASSESSMENT
PAIN_FUNCTIONAL_ASSESSMENT: ACTIVITIES ARE NOT PREVENTED
PAIN_FUNCTIONAL_ASSESSMENT: PREVENTS OR INTERFERES SOME ACTIVE ACTIVITIES AND ADLS
PAIN_FUNCTIONAL_ASSESSMENT: ACTIVITIES ARE NOT PREVENTED
PAIN_FUNCTIONAL_ASSESSMENT: ACTIVITIES ARE NOT PREVENTED

## 2019-11-26 LAB
ACTIVATED CLOTTING TIME, LOW RANGE: 252 SEC
ACTIVATED CLOTTING TIME, LOW RANGE: 261 SEC
ACTIVATED CLOTTING TIME, LOW RANGE: 288 SEC
ANION GAP SERPL CALCULATED.3IONS-SCNC: 14 MMOL/L (ref 4–16)
APTT: >240 SECONDS (ref 25.1–37.1)
BASOPHILS ABSOLUTE: 0 K/CU MM
BASOPHILS RELATIVE PERCENT: 0.3 % (ref 0–1)
BUN BLDV-MCNC: 47 MG/DL (ref 6–23)
CALCIUM SERPL-MCNC: 8.3 MG/DL (ref 8.3–10.6)
CHLORIDE BLD-SCNC: 100 MMOL/L (ref 99–110)
CO2: 23 MMOL/L (ref 21–32)
CREAT SERPL-MCNC: 2.3 MG/DL (ref 0.6–1.1)
CULTURE: NORMAL
DIFFERENTIAL TYPE: ABNORMAL
EOSINOPHILS ABSOLUTE: 0.1 K/CU MM
EOSINOPHILS RELATIVE PERCENT: 1.4 % (ref 0–3)
GFR AFRICAN AMERICAN: 26 ML/MIN/1.73M2
GFR NON-AFRICAN AMERICAN: 22 ML/MIN/1.73M2
GLUCOSE BLD-MCNC: 158 MG/DL (ref 70–99)
GLUCOSE BLD-MCNC: 158 MG/DL (ref 70–99)
GLUCOSE BLD-MCNC: 178 MG/DL (ref 70–99)
GLUCOSE BLD-MCNC: 207 MG/DL (ref 70–99)
GLUCOSE BLD-MCNC: 98 MG/DL (ref 70–99)
HCT VFR BLD CALC: 27.6 % (ref 37–47)
HEMOGLOBIN: 8.4 GM/DL (ref 12.5–16)
IMMATURE NEUTROPHIL %: 0.9 % (ref 0–0.43)
LYMPHOCYTES ABSOLUTE: 1.6 K/CU MM
LYMPHOCYTES RELATIVE PERCENT: 15.9 % (ref 24–44)
Lab: NORMAL
MAGNESIUM: 1.8 MG/DL (ref 1.8–2.4)
MCH RBC QN AUTO: 26.8 PG (ref 27–31)
MCHC RBC AUTO-ENTMCNC: 30.4 % (ref 32–36)
MCV RBC AUTO: 87.9 FL (ref 78–100)
MONOCYTES ABSOLUTE: 0.6 K/CU MM
MONOCYTES RELATIVE PERCENT: 6.2 % (ref 0–4)
NUCLEATED RBC %: 0 %
PDW BLD-RTO: 14.5 % (ref 11.7–14.9)
PLATELET # BLD: 280 K/CU MM (ref 140–440)
PMV BLD AUTO: 10.7 FL (ref 7.5–11.1)
POTASSIUM SERPL-SCNC: 4.4 MMOL/L (ref 3.5–5.1)
RBC # BLD: 3.14 M/CU MM (ref 4.2–5.4)
SEGMENTED NEUTROPHILS ABSOLUTE COUNT: 7.6 K/CU MM
SEGMENTED NEUTROPHILS RELATIVE PERCENT: 75.3 % (ref 36–66)
SODIUM BLD-SCNC: 137 MMOL/L (ref 135–145)
SPECIMEN: NORMAL
TOTAL IMMATURE NEUTOROPHIL: 0.09 K/CU MM
TOTAL NUCLEATED RBC: 0 K/CU MM
TROPONIN T: 2.43 NG/ML
TSH HIGH SENSITIVITY: 3.37 UIU/ML (ref 0.27–4.2)
WBC # BLD: 10.1 K/CU MM (ref 4–10.5)

## 2019-11-26 PROCEDURE — 6370000000 HC RX 637 (ALT 250 FOR IP): Performed by: INTERNAL MEDICINE

## 2019-11-26 PROCEDURE — 2500000003 HC RX 250 WO HCPCS

## 2019-11-26 PROCEDURE — 93458 L HRT ARTERY/VENTRICLE ANGIO: CPT

## 2019-11-26 PROCEDURE — 6360000004 HC RX CONTRAST MEDICATION

## 2019-11-26 PROCEDURE — 6360000002 HC RX W HCPCS

## 2019-11-26 PROCEDURE — C1887 CATHETER, GUIDING: HCPCS

## 2019-11-26 PROCEDURE — 6370000000 HC RX 637 (ALT 250 FOR IP)

## 2019-11-26 PROCEDURE — 51701 INSERT BLADDER CATHETER: CPT

## 2019-11-26 PROCEDURE — 84484 ASSAY OF TROPONIN QUANT: CPT

## 2019-11-26 PROCEDURE — C1894 INTRO/SHEATH, NON-LASER: HCPCS

## 2019-11-26 PROCEDURE — 84443 ASSAY THYROID STIM HORMONE: CPT

## 2019-11-26 PROCEDURE — B2111ZZ FLUOROSCOPY OF MULTIPLE CORONARY ARTERIES USING LOW OSMOLAR CONTRAST: ICD-10-PCS | Performed by: INTERNAL MEDICINE

## 2019-11-26 PROCEDURE — 94640 AIRWAY INHALATION TREATMENT: CPT

## 2019-11-26 PROCEDURE — C1874 STENT, COATED/COV W/DEL SYS: HCPCS

## 2019-11-26 PROCEDURE — 2709999900 HC NON-CHARGEABLE SUPPLY

## 2019-11-26 PROCEDURE — 97110 THERAPEUTIC EXERCISES: CPT

## 2019-11-26 PROCEDURE — 85025 COMPLETE CBC W/AUTO DIFF WBC: CPT

## 2019-11-26 PROCEDURE — 51798 US URINE CAPACITY MEASURE: CPT

## 2019-11-26 PROCEDURE — 2140000000 HC CCU INTERMEDIATE R&B

## 2019-11-26 PROCEDURE — 027034Z DILATION OF CORONARY ARTERY, ONE ARTERY WITH DRUG-ELUTING INTRALUMINAL DEVICE, PERCUTANEOUS APPROACH: ICD-10-PCS | Performed by: INTERNAL MEDICINE

## 2019-11-26 PROCEDURE — 83735 ASSAY OF MAGNESIUM: CPT

## 2019-11-26 PROCEDURE — C1769 GUIDE WIRE: HCPCS

## 2019-11-26 PROCEDURE — 2580000003 HC RX 258: Performed by: INTERNAL MEDICINE

## 2019-11-26 PROCEDURE — 6360000002 HC RX W HCPCS: Performed by: INTERNAL MEDICINE

## 2019-11-26 PROCEDURE — 85730 THROMBOPLASTIN TIME PARTIAL: CPT

## 2019-11-26 PROCEDURE — 97116 GAIT TRAINING THERAPY: CPT

## 2019-11-26 PROCEDURE — 80048 BASIC METABOLIC PNL TOTAL CA: CPT

## 2019-11-26 PROCEDURE — 92928 PRQ TCAT PLMT NTRAC ST 1 LES: CPT

## 2019-11-26 PROCEDURE — 85347 COAGULATION TIME ACTIVATED: CPT

## 2019-11-26 PROCEDURE — 4A023N7 MEASUREMENT OF CARDIAC SAMPLING AND PRESSURE, LEFT HEART, PERCUTANEOUS APPROACH: ICD-10-PCS | Performed by: INTERNAL MEDICINE

## 2019-11-26 PROCEDURE — 82962 GLUCOSE BLOOD TEST: CPT

## 2019-11-26 PROCEDURE — 2700000000 HC OXYGEN THERAPY PER DAY

## 2019-11-26 PROCEDURE — C1725 CATH, TRANSLUMIN NON-LASER: HCPCS

## 2019-11-26 PROCEDURE — 94761 N-INVAS EAR/PLS OXIMETRY MLT: CPT

## 2019-11-26 PROCEDURE — 97530 THERAPEUTIC ACTIVITIES: CPT

## 2019-11-26 RX ORDER — SODIUM CHLORIDE 0.9 % (FLUSH) 0.9 %
10 SYRINGE (ML) INJECTION EVERY 12 HOURS SCHEDULED
Status: DISCONTINUED | OUTPATIENT
Start: 2019-11-26 | End: 2019-12-04 | Stop reason: HOSPADM

## 2019-11-26 RX ORDER — INSULIN GLARGINE 100 [IU]/ML
50 INJECTION, SOLUTION SUBCUTANEOUS NIGHTLY
Status: DISCONTINUED | OUTPATIENT
Start: 2019-11-26 | End: 2019-11-27

## 2019-11-26 RX ORDER — SODIUM CHLORIDE 9 MG/ML
INJECTION, SOLUTION INTRAVENOUS CONTINUOUS
Status: DISCONTINUED | OUTPATIENT
Start: 2019-11-26 | End: 2019-11-26

## 2019-11-26 RX ORDER — SODIUM CHLORIDE 9 MG/ML
INJECTION, SOLUTION INTRAVENOUS CONTINUOUS
Status: DISCONTINUED | OUTPATIENT
Start: 2019-11-26 | End: 2019-11-27

## 2019-11-26 RX ORDER — MORPHINE SULFATE 2 MG/ML
1 INJECTION, SOLUTION INTRAMUSCULAR; INTRAVENOUS
Status: ACTIVE | OUTPATIENT
Start: 2019-11-26 | End: 2019-11-26

## 2019-11-26 RX ORDER — SODIUM CHLORIDE 0.9 % (FLUSH) 0.9 %
10 SYRINGE (ML) INJECTION PRN
Status: DISCONTINUED | OUTPATIENT
Start: 2019-11-26 | End: 2019-12-04 | Stop reason: HOSPADM

## 2019-11-26 RX ORDER — 0.9 % SODIUM CHLORIDE 0.9 %
500 INTRAVENOUS SOLUTION INTRAVENOUS ONCE
Status: DISCONTINUED | OUTPATIENT
Start: 2019-11-26 | End: 2019-11-26

## 2019-11-26 RX ORDER — DOBUTAMINE HYDROCHLORIDE 200 MG/100ML
2.5 INJECTION INTRAVENOUS CONTINUOUS
Status: DISCONTINUED | OUTPATIENT
Start: 2019-11-26 | End: 2019-12-01

## 2019-11-26 RX ORDER — ACETAMINOPHEN 325 MG/1
650 TABLET ORAL EVERY 4 HOURS PRN
Status: DISCONTINUED | OUTPATIENT
Start: 2019-11-26 | End: 2019-12-04 | Stop reason: HOSPADM

## 2019-11-26 RX ORDER — ATROPINE SULFATE 0.4 MG/ML
0.5 AMPUL (ML) INJECTION
Status: ACTIVE | OUTPATIENT
Start: 2019-11-26 | End: 2019-11-26

## 2019-11-26 RX ADMIN — DOCUSATE SODIUM 100 MG: 100 CAPSULE, LIQUID FILLED ORAL at 10:14

## 2019-11-26 RX ADMIN — LEVOTHYROXINE SODIUM 50 MCG: 50 TABLET ORAL at 10:15

## 2019-11-26 RX ADMIN — HYDROCODONE BITARTRATE AND ACETAMINOPHEN 1 TABLET: 5; 325 TABLET ORAL at 20:43

## 2019-11-26 RX ADMIN — PANTOPRAZOLE SODIUM 40 MG: 40 TABLET, DELAYED RELEASE ORAL at 05:34

## 2019-11-26 RX ADMIN — INSULIN LISPRO 4 UNITS: 100 INJECTION, SOLUTION INTRAVENOUS; SUBCUTANEOUS at 01:32

## 2019-11-26 RX ADMIN — Medication 1000 UNITS: at 10:15

## 2019-11-26 RX ADMIN — TICAGRELOR 90 MG: 90 TABLET ORAL at 20:43

## 2019-11-26 RX ADMIN — DOCUSATE SODIUM 100 MG: 100 CAPSULE, LIQUID FILLED ORAL at 20:44

## 2019-11-26 RX ADMIN — BUPROPION HYDROCHLORIDE 100 MG: 100 TABLET, FILM COATED, EXTENDED RELEASE ORAL at 20:43

## 2019-11-26 RX ADMIN — INSULIN GLARGINE 50 UNITS: 100 INJECTION, SOLUTION SUBCUTANEOUS at 23:50

## 2019-11-26 RX ADMIN — RANOLAZINE 500 MG: 500 TABLET, FILM COATED, EXTENDED RELEASE ORAL at 10:15

## 2019-11-26 RX ADMIN — Medication 2 PUFF: at 19:42

## 2019-11-26 RX ADMIN — LINAGLIPTIN 5 MG: 5 TABLET, FILM COATED ORAL at 10:15

## 2019-11-26 RX ADMIN — TICAGRELOR 90 MG: 90 TABLET ORAL at 10:14

## 2019-11-26 RX ADMIN — Medication 2 PUFF: at 11:32

## 2019-11-26 RX ADMIN — SODIUM CHLORIDE: 9 INJECTION, SOLUTION INTRAVENOUS at 20:43

## 2019-11-26 RX ADMIN — INSULIN LISPRO 2 UNITS: 100 INJECTION, SOLUTION INTRAVENOUS; SUBCUTANEOUS at 20:44

## 2019-11-26 RX ADMIN — DULOXETINE HYDROCHLORIDE 60 MG: 30 CAPSULE, DELAYED RELEASE ORAL at 10:15

## 2019-11-26 RX ADMIN — CARVEDILOL 12.5 MG: 12.5 TABLET, FILM COATED ORAL at 10:15

## 2019-11-26 RX ADMIN — ASPIRIN 81 MG 81 MG: 81 TABLET ORAL at 10:16

## 2019-11-26 RX ADMIN — ISOSORBIDE MONONITRATE 30 MG: 30 TABLET, EXTENDED RELEASE ORAL at 10:15

## 2019-11-26 RX ADMIN — BUPROPION HYDROCHLORIDE 100 MG: 100 TABLET, FILM COATED, EXTENDED RELEASE ORAL at 10:14

## 2019-11-26 RX ADMIN — SODIUM CHLORIDE: 9 INJECTION, SOLUTION INTRAVENOUS at 11:47

## 2019-11-26 RX ADMIN — DOBUTAMINE IN DEXTROSE 2.5 MCG/KG/MIN: 200 INJECTION, SOLUTION INTRAVENOUS at 18:35

## 2019-11-26 RX ADMIN — ATORVASTATIN CALCIUM 80 MG: 40 TABLET, FILM COATED ORAL at 20:43

## 2019-11-26 RX ADMIN — RANOLAZINE 500 MG: 500 TABLET, FILM COATED, EXTENDED RELEASE ORAL at 20:43

## 2019-11-26 ASSESSMENT — PAIN SCALES - GENERAL
PAINLEVEL_OUTOF10: 5
PAINLEVEL_OUTOF10: 0
PAINLEVEL_OUTOF10: 9
PAINLEVEL_OUTOF10: 8
PAINLEVEL_OUTOF10: 9

## 2019-11-26 ASSESSMENT — PAIN DESCRIPTION - ORIENTATION
ORIENTATION: MID

## 2019-11-26 ASSESSMENT — PAIN DESCRIPTION - PAIN TYPE
TYPE: CHRONIC PAIN

## 2019-11-26 ASSESSMENT — PAIN DESCRIPTION - FREQUENCY: FREQUENCY: CONTINUOUS

## 2019-11-26 ASSESSMENT — PAIN DESCRIPTION - LOCATION
LOCATION: BACK

## 2019-11-26 ASSESSMENT — PAIN DESCRIPTION - ONSET: ONSET: ON-GOING

## 2019-11-26 ASSESSMENT — PAIN DESCRIPTION - DESCRIPTORS: DESCRIPTORS: ACHING;CONSTANT

## 2019-11-27 LAB
ANION GAP SERPL CALCULATED.3IONS-SCNC: 12 MMOL/L (ref 4–16)
BUN BLDV-MCNC: 45 MG/DL (ref 6–23)
CALCIUM SERPL-MCNC: 8.3 MG/DL (ref 8.3–10.6)
CHLORIDE BLD-SCNC: 100 MMOL/L (ref 99–110)
CO2: 25 MMOL/L (ref 21–32)
CREAT SERPL-MCNC: 2.1 MG/DL (ref 0.6–1.1)
GFR AFRICAN AMERICAN: 29 ML/MIN/1.73M2
GFR NON-AFRICAN AMERICAN: 24 ML/MIN/1.73M2
GLUCOSE BLD-MCNC: 103 MG/DL (ref 70–99)
GLUCOSE BLD-MCNC: 119 MG/DL (ref 70–99)
GLUCOSE BLD-MCNC: 120 MG/DL (ref 70–99)
GLUCOSE BLD-MCNC: 124 MG/DL (ref 70–99)
GLUCOSE BLD-MCNC: 136 MG/DL (ref 70–99)
GLUCOSE BLD-MCNC: 140 MG/DL (ref 70–99)
GLUCOSE BLD-MCNC: 96 MG/DL (ref 70–99)
POTASSIUM SERPL-SCNC: 4.6 MMOL/L (ref 3.5–5.1)
SODIUM BLD-SCNC: 137 MMOL/L (ref 135–145)

## 2019-11-27 PROCEDURE — 83880 ASSAY OF NATRIURETIC PEPTIDE: CPT

## 2019-11-27 PROCEDURE — 6370000000 HC RX 637 (ALT 250 FOR IP): Performed by: INTERNAL MEDICINE

## 2019-11-27 PROCEDURE — 94640 AIRWAY INHALATION TREATMENT: CPT

## 2019-11-27 PROCEDURE — 85027 COMPLETE CBC AUTOMATED: CPT

## 2019-11-27 PROCEDURE — 6360000002 HC RX W HCPCS: Performed by: INTERNAL MEDICINE

## 2019-11-27 PROCEDURE — 94761 N-INVAS EAR/PLS OXIMETRY MLT: CPT

## 2019-11-27 PROCEDURE — 83735 ASSAY OF MAGNESIUM: CPT

## 2019-11-27 PROCEDURE — 2140000000 HC CCU INTERMEDIATE R&B

## 2019-11-27 PROCEDURE — 2700000000 HC OXYGEN THERAPY PER DAY

## 2019-11-27 PROCEDURE — 36415 COLL VENOUS BLD VENIPUNCTURE: CPT

## 2019-11-27 PROCEDURE — 80053 COMPREHEN METABOLIC PANEL: CPT

## 2019-11-27 PROCEDURE — 80048 BASIC METABOLIC PNL TOTAL CA: CPT

## 2019-11-27 PROCEDURE — 82962 GLUCOSE BLOOD TEST: CPT

## 2019-11-27 RX ORDER — ISOSORBIDE MONONITRATE 30 MG/1
60 TABLET, EXTENDED RELEASE ORAL DAILY
Status: DISCONTINUED | OUTPATIENT
Start: 2019-11-28 | End: 2019-12-04 | Stop reason: HOSPADM

## 2019-11-27 RX ORDER — ISOSORBIDE MONONITRATE 30 MG/1
30 TABLET, EXTENDED RELEASE ORAL ONCE
Status: COMPLETED | OUTPATIENT
Start: 2019-11-27 | End: 2019-11-27

## 2019-11-27 RX ORDER — INSULIN GLARGINE 100 [IU]/ML
45 INJECTION, SOLUTION SUBCUTANEOUS NIGHTLY
Status: DISCONTINUED | OUTPATIENT
Start: 2019-11-27 | End: 2019-12-02

## 2019-11-27 RX ORDER — FUROSEMIDE 10 MG/ML
40 INJECTION INTRAMUSCULAR; INTRAVENOUS ONCE
Status: COMPLETED | OUTPATIENT
Start: 2019-11-27 | End: 2019-11-27

## 2019-11-27 RX ADMIN — DULOXETINE HYDROCHLORIDE 60 MG: 30 CAPSULE, DELAYED RELEASE ORAL at 08:35

## 2019-11-27 RX ADMIN — INSULIN GLARGINE 45 UNITS: 100 INJECTION, SOLUTION SUBCUTANEOUS at 20:39

## 2019-11-27 RX ADMIN — ISOSORBIDE MONONITRATE 30 MG: 30 TABLET, EXTENDED RELEASE ORAL at 11:29

## 2019-11-27 RX ADMIN — FUROSEMIDE 40 MG: 10 INJECTION, SOLUTION INTRAMUSCULAR; INTRAVENOUS at 08:34

## 2019-11-27 RX ADMIN — LEVOTHYROXINE SODIUM 50 MCG: 50 TABLET ORAL at 08:35

## 2019-11-27 RX ADMIN — Medication 2 PUFF: at 08:20

## 2019-11-27 RX ADMIN — ATORVASTATIN CALCIUM 80 MG: 40 TABLET, FILM COATED ORAL at 20:37

## 2019-11-27 RX ADMIN — CARVEDILOL 12.5 MG: 12.5 TABLET, FILM COATED ORAL at 16:18

## 2019-11-27 RX ADMIN — DOCUSATE SODIUM 100 MG: 100 CAPSULE, LIQUID FILLED ORAL at 20:37

## 2019-11-27 RX ADMIN — DOCUSATE SODIUM 100 MG: 100 CAPSULE, LIQUID FILLED ORAL at 08:35

## 2019-11-27 RX ADMIN — RANOLAZINE 500 MG: 500 TABLET, FILM COATED, EXTENDED RELEASE ORAL at 08:35

## 2019-11-27 RX ADMIN — TICAGRELOR 90 MG: 90 TABLET ORAL at 20:37

## 2019-11-27 RX ADMIN — CARVEDILOL 12.5 MG: 12.5 TABLET, FILM COATED ORAL at 08:35

## 2019-11-27 RX ADMIN — RANOLAZINE 500 MG: 500 TABLET, FILM COATED, EXTENDED RELEASE ORAL at 20:37

## 2019-11-27 RX ADMIN — BUPROPION HYDROCHLORIDE 100 MG: 100 TABLET, FILM COATED, EXTENDED RELEASE ORAL at 20:36

## 2019-11-27 RX ADMIN — INSULIN LISPRO 2 UNITS: 100 INJECTION, SOLUTION INTRAVENOUS; SUBCUTANEOUS at 20:39

## 2019-11-27 RX ADMIN — BUPROPION HYDROCHLORIDE 100 MG: 100 TABLET, FILM COATED, EXTENDED RELEASE ORAL at 08:35

## 2019-11-27 RX ADMIN — TICAGRELOR 90 MG: 90 TABLET ORAL at 08:35

## 2019-11-27 RX ADMIN — PANTOPRAZOLE SODIUM 40 MG: 40 TABLET, DELAYED RELEASE ORAL at 06:21

## 2019-11-27 RX ADMIN — DOBUTAMINE IN DEXTROSE 2.5 MCG/KG/MIN: 200 INJECTION, SOLUTION INTRAVENOUS at 16:18

## 2019-11-27 RX ADMIN — HYDROCODONE BITARTRATE AND ACETAMINOPHEN 1 TABLET: 5; 325 TABLET ORAL at 20:37

## 2019-11-27 RX ADMIN — ISOSORBIDE MONONITRATE 30 MG: 30 TABLET, EXTENDED RELEASE ORAL at 08:35

## 2019-11-27 RX ADMIN — Medication 1000 UNITS: at 08:35

## 2019-11-27 RX ADMIN — ASPIRIN 81 MG 81 MG: 81 TABLET ORAL at 08:35

## 2019-11-27 RX ADMIN — HYDROCODONE BITARTRATE AND ACETAMINOPHEN 1 TABLET: 5; 325 TABLET ORAL at 08:35

## 2019-11-27 ASSESSMENT — PAIN SCALES - GENERAL
PAINLEVEL_OUTOF10: 6
PAINLEVEL_OUTOF10: 9
PAINLEVEL_OUTOF10: 9
PAINLEVEL_OUTOF10: 8

## 2019-11-27 ASSESSMENT — PAIN DESCRIPTION - LOCATION
LOCATION: BACK

## 2019-11-27 ASSESSMENT — PAIN DESCRIPTION - PAIN TYPE
TYPE: CHRONIC PAIN

## 2019-11-27 ASSESSMENT — PAIN DESCRIPTION - ORIENTATION: ORIENTATION: MID

## 2019-11-28 ENCOUNTER — APPOINTMENT (OUTPATIENT)
Dept: GENERAL RADIOLOGY | Age: 59
DRG: 246 | End: 2019-11-28
Payer: MEDICARE

## 2019-11-28 LAB
ALBUMIN SERPL-MCNC: 3.1 GM/DL (ref 3.4–5)
ALP BLD-CCNC: 97 IU/L (ref 40–128)
ALT SERPL-CCNC: 14 U/L (ref 10–40)
ANION GAP SERPL CALCULATED.3IONS-SCNC: 12 MMOL/L (ref 4–16)
AST SERPL-CCNC: 29 IU/L (ref 15–37)
BASOPHILS ABSOLUTE: 0 K/CU MM
BASOPHILS RELATIVE PERCENT: 0.2 % (ref 0–1)
BILIRUB SERPL-MCNC: 0.4 MG/DL (ref 0–1)
BUN BLDV-MCNC: 42 MG/DL (ref 6–23)
CALCIUM SERPL-MCNC: 8.6 MG/DL (ref 8.3–10.6)
CHLORIDE BLD-SCNC: 98 MMOL/L (ref 99–110)
CO2: 24 MMOL/L (ref 21–32)
CREAT SERPL-MCNC: 2 MG/DL (ref 0.6–1.1)
DIFFERENTIAL TYPE: ABNORMAL
EOSINOPHILS ABSOLUTE: 0.2 K/CU MM
EOSINOPHILS RELATIVE PERCENT: 1.7 % (ref 0–3)
GFR AFRICAN AMERICAN: 31 ML/MIN/1.73M2
GFR NON-AFRICAN AMERICAN: 26 ML/MIN/1.73M2
GLUCOSE BLD-MCNC: 101 MG/DL (ref 70–99)
GLUCOSE BLD-MCNC: 103 MG/DL (ref 70–99)
GLUCOSE BLD-MCNC: 104 MG/DL (ref 70–99)
GLUCOSE BLD-MCNC: 117 MG/DL (ref 70–99)
GLUCOSE BLD-MCNC: 137 MG/DL (ref 70–99)
GLUCOSE BLD-MCNC: 163 MG/DL (ref 70–99)
HCT VFR BLD CALC: 29.1 % (ref 37–47)
HEMOGLOBIN: 8.6 GM/DL (ref 12.5–16)
IMMATURE NEUTROPHIL %: 1.3 % (ref 0–0.43)
LYMPHOCYTES ABSOLUTE: 0.9 K/CU MM
LYMPHOCYTES RELATIVE PERCENT: 8.4 % (ref 24–44)
MAGNESIUM: 1.9 MG/DL (ref 1.8–2.4)
MCH RBC QN AUTO: 26.5 PG (ref 27–31)
MCHC RBC AUTO-ENTMCNC: 29.6 % (ref 32–36)
MCV RBC AUTO: 89.8 FL (ref 78–100)
MONOCYTES ABSOLUTE: 0.8 K/CU MM
MONOCYTES RELATIVE PERCENT: 7.9 % (ref 0–4)
NUCLEATED RBC %: 0 %
PDW BLD-RTO: 14.7 % (ref 11.7–14.9)
PLATELET # BLD: 343 K/CU MM (ref 140–440)
PMV BLD AUTO: 10.5 FL (ref 7.5–11.1)
POTASSIUM SERPL-SCNC: 4.5 MMOL/L (ref 3.5–5.1)
PRO-BNP: 8188 PG/ML
RBC # BLD: 3.24 M/CU MM (ref 4.2–5.4)
SEGMENTED NEUTROPHILS ABSOLUTE COUNT: 8.3 K/CU MM
SEGMENTED NEUTROPHILS RELATIVE PERCENT: 80.5 % (ref 36–66)
SODIUM BLD-SCNC: 134 MMOL/L (ref 135–145)
TOTAL IMMATURE NEUTOROPHIL: 0.13 K/CU MM
TOTAL NUCLEATED RBC: 0 K/CU MM
TOTAL PROTEIN: 5 GM/DL (ref 6.4–8.2)
TOTAL RETICULOCYTE COUNT: 0.09 K/CU MM
TROPONIN T: 2.47 NG/ML
WBC # BLD: 10.2 K/CU MM (ref 4–10.5)

## 2019-11-28 PROCEDURE — 6370000000 HC RX 637 (ALT 250 FOR IP): Performed by: INTERNAL MEDICINE

## 2019-11-28 PROCEDURE — 80048 BASIC METABOLIC PNL TOTAL CA: CPT

## 2019-11-28 PROCEDURE — 82962 GLUCOSE BLOOD TEST: CPT

## 2019-11-28 PROCEDURE — 6360000002 HC RX W HCPCS: Performed by: INTERNAL MEDICINE

## 2019-11-28 PROCEDURE — 94761 N-INVAS EAR/PLS OXIMETRY MLT: CPT

## 2019-11-28 PROCEDURE — 84484 ASSAY OF TROPONIN QUANT: CPT

## 2019-11-28 PROCEDURE — 71046 X-RAY EXAM CHEST 2 VIEWS: CPT

## 2019-11-28 PROCEDURE — 83735 ASSAY OF MAGNESIUM: CPT

## 2019-11-28 PROCEDURE — 2700000000 HC OXYGEN THERAPY PER DAY

## 2019-11-28 PROCEDURE — 85025 COMPLETE CBC W/AUTO DIFF WBC: CPT

## 2019-11-28 PROCEDURE — 94640 AIRWAY INHALATION TREATMENT: CPT

## 2019-11-28 PROCEDURE — 2140000000 HC CCU INTERMEDIATE R&B

## 2019-11-28 PROCEDURE — 36415 COLL VENOUS BLD VENIPUNCTURE: CPT

## 2019-11-28 PROCEDURE — 83880 ASSAY OF NATRIURETIC PEPTIDE: CPT

## 2019-11-28 PROCEDURE — 80053 COMPREHEN METABOLIC PANEL: CPT

## 2019-11-28 RX ORDER — FUROSEMIDE 10 MG/ML
40 INJECTION INTRAMUSCULAR; INTRAVENOUS ONCE
Status: COMPLETED | OUTPATIENT
Start: 2019-11-28 | End: 2019-11-28

## 2019-11-28 RX ORDER — FUROSEMIDE 10 MG/ML
20 INJECTION INTRAMUSCULAR; INTRAVENOUS ONCE
Status: COMPLETED | OUTPATIENT
Start: 2019-11-28 | End: 2019-11-28

## 2019-11-28 RX ORDER — AMLODIPINE BESYLATE 5 MG/1
5 TABLET ORAL DAILY
Status: DISCONTINUED | OUTPATIENT
Start: 2019-11-28 | End: 2019-12-02

## 2019-11-28 RX ADMIN — Medication 2 PUFF: at 08:46

## 2019-11-28 RX ADMIN — CARVEDILOL 12.5 MG: 12.5 TABLET, FILM COATED ORAL at 17:18

## 2019-11-28 RX ADMIN — AMLODIPINE BESYLATE 5 MG: 5 TABLET ORAL at 10:29

## 2019-11-28 RX ADMIN — FUROSEMIDE 20 MG: 10 INJECTION, SOLUTION INTRAVENOUS at 17:18

## 2019-11-28 RX ADMIN — DOCUSATE SODIUM 100 MG: 100 CAPSULE, LIQUID FILLED ORAL at 20:48

## 2019-11-28 RX ADMIN — DOBUTAMINE IN DEXTROSE 2.5 MCG/KG/MIN: 200 INJECTION, SOLUTION INTRAVENOUS at 17:25

## 2019-11-28 RX ADMIN — DOCUSATE SODIUM 100 MG: 100 CAPSULE, LIQUID FILLED ORAL at 08:16

## 2019-11-28 RX ADMIN — ASPIRIN 81 MG 81 MG: 81 TABLET ORAL at 08:16

## 2019-11-28 RX ADMIN — BUPROPION HYDROCHLORIDE 100 MG: 100 TABLET, FILM COATED, EXTENDED RELEASE ORAL at 08:16

## 2019-11-28 RX ADMIN — INSULIN GLARGINE 30 UNITS: 100 INJECTION, SOLUTION SUBCUTANEOUS at 20:49

## 2019-11-28 RX ADMIN — HYDROCODONE BITARTRATE AND ACETAMINOPHEN 1 TABLET: 5; 325 TABLET ORAL at 02:54

## 2019-11-28 RX ADMIN — PANTOPRAZOLE SODIUM 40 MG: 40 TABLET, DELAYED RELEASE ORAL at 06:39

## 2019-11-28 RX ADMIN — RANOLAZINE 500 MG: 500 TABLET, FILM COATED, EXTENDED RELEASE ORAL at 20:48

## 2019-11-28 RX ADMIN — HYDROCODONE BITARTRATE AND ACETAMINOPHEN 1 TABLET: 5; 325 TABLET ORAL at 13:57

## 2019-11-28 RX ADMIN — ATORVASTATIN CALCIUM 80 MG: 40 TABLET, FILM COATED ORAL at 20:48

## 2019-11-28 RX ADMIN — RANOLAZINE 500 MG: 500 TABLET, FILM COATED, EXTENDED RELEASE ORAL at 08:16

## 2019-11-28 RX ADMIN — TICAGRELOR 90 MG: 90 TABLET ORAL at 08:16

## 2019-11-28 RX ADMIN — CARVEDILOL 12.5 MG: 12.5 TABLET, FILM COATED ORAL at 08:16

## 2019-11-28 RX ADMIN — ISOSORBIDE MONONITRATE 60 MG: 30 TABLET, EXTENDED RELEASE ORAL at 08:16

## 2019-11-28 RX ADMIN — Medication 1000 UNITS: at 08:16

## 2019-11-28 RX ADMIN — Medication 400 MG: at 10:29

## 2019-11-28 RX ADMIN — TICAGRELOR 90 MG: 90 TABLET ORAL at 20:48

## 2019-11-28 RX ADMIN — HYDROCODONE BITARTRATE AND ACETAMINOPHEN 1 TABLET: 5; 325 TABLET ORAL at 08:16

## 2019-11-28 RX ADMIN — FUROSEMIDE 40 MG: 10 INJECTION, SOLUTION INTRAMUSCULAR; INTRAVENOUS at 10:29

## 2019-11-28 RX ADMIN — BUPROPION HYDROCHLORIDE 100 MG: 100 TABLET, FILM COATED, EXTENDED RELEASE ORAL at 20:50

## 2019-11-28 RX ADMIN — HYDROCODONE BITARTRATE AND ACETAMINOPHEN 1 TABLET: 5; 325 TABLET ORAL at 21:05

## 2019-11-28 RX ADMIN — LEVOTHYROXINE SODIUM 50 MCG: 50 TABLET ORAL at 08:16

## 2019-11-28 RX ADMIN — DULOXETINE HYDROCHLORIDE 60 MG: 30 CAPSULE, DELAYED RELEASE ORAL at 08:16

## 2019-11-28 ASSESSMENT — PAIN DESCRIPTION - PROGRESSION
CLINICAL_PROGRESSION: GRADUALLY IMPROVING

## 2019-11-28 ASSESSMENT — PAIN DESCRIPTION - LOCATION
LOCATION: BACK
LOCATION: BACK

## 2019-11-28 ASSESSMENT — PAIN SCALES - GENERAL
PAINLEVEL_OUTOF10: 8
PAINLEVEL_OUTOF10: 8
PAINLEVEL_OUTOF10: 9

## 2019-11-28 ASSESSMENT — PAIN DESCRIPTION - ORIENTATION: ORIENTATION: MID

## 2019-11-28 ASSESSMENT — PAIN DESCRIPTION - PAIN TYPE
TYPE: CHRONIC PAIN
TYPE: CHRONIC PAIN

## 2019-11-29 ENCOUNTER — APPOINTMENT (OUTPATIENT)
Dept: NUCLEAR MEDICINE | Age: 59
DRG: 246 | End: 2019-11-29
Payer: MEDICARE

## 2019-11-29 LAB
ANION GAP SERPL CALCULATED.3IONS-SCNC: 11 MMOL/L (ref 4–16)
BACTERIA: ABNORMAL /HPF
BASOPHILS ABSOLUTE: 0 K/CU MM
BASOPHILS RELATIVE PERCENT: 0.2 % (ref 0–1)
BILIRUBIN URINE: NEGATIVE MG/DL
BLOOD, URINE: ABNORMAL
BUN BLDV-MCNC: 41 MG/DL (ref 6–23)
CALCIUM SERPL-MCNC: 8.5 MG/DL (ref 8.3–10.6)
CHLORIDE BLD-SCNC: 100 MMOL/L (ref 99–110)
CLARITY: ABNORMAL
CO2: 26 MMOL/L (ref 21–32)
COLOR: ABNORMAL
CREAT SERPL-MCNC: 1.9 MG/DL (ref 0.6–1.1)
D DIMER: 863 NG/ML(DDU)
DIFFERENTIAL TYPE: ABNORMAL
EOSINOPHILS ABSOLUTE: 0.2 K/CU MM
EOSINOPHILS RELATIVE PERCENT: 1.7 % (ref 0–3)
GFR AFRICAN AMERICAN: 33 ML/MIN/1.73M2
GFR NON-AFRICAN AMERICAN: 27 ML/MIN/1.73M2
GLUCOSE BLD-MCNC: 124 MG/DL (ref 70–99)
GLUCOSE BLD-MCNC: 127 MG/DL (ref 70–99)
GLUCOSE BLD-MCNC: 133 MG/DL (ref 70–99)
GLUCOSE BLD-MCNC: 147 MG/DL (ref 70–99)
GLUCOSE BLD-MCNC: 225 MG/DL (ref 70–99)
GLUCOSE BLD-MCNC: 240 MG/DL (ref 70–99)
GLUCOSE BLD-MCNC: 96 MG/DL (ref 70–99)
GLUCOSE, URINE: NEGATIVE MG/DL
HCT VFR BLD CALC: 27.6 % (ref 37–47)
HEMOGLOBIN: 8.1 GM/DL (ref 12.5–16)
HYALINE CASTS: >20 /LPF
IMMATURE NEUTROPHIL %: 1.1 % (ref 0–0.43)
KETONES, URINE: NEGATIVE MG/DL
LEUKOCYTE ESTERASE, URINE: ABNORMAL
LYMPHOCYTES ABSOLUTE: 0.9 K/CU MM
LYMPHOCYTES RELATIVE PERCENT: 8.6 % (ref 24–44)
MAGNESIUM: 1.9 MG/DL (ref 1.8–2.4)
MCH RBC QN AUTO: 26.2 PG (ref 27–31)
MCHC RBC AUTO-ENTMCNC: 29.3 % (ref 32–36)
MCV RBC AUTO: 89.3 FL (ref 78–100)
MONOCYTES ABSOLUTE: 0.8 K/CU MM
MONOCYTES RELATIVE PERCENT: 7.6 % (ref 0–4)
MUCUS: ABNORMAL HPF
NITRITE URINE, QUANTITATIVE: NEGATIVE
NUCLEATED RBC %: 0 %
PDW BLD-RTO: 14.6 % (ref 11.7–14.9)
PH, URINE: 5 (ref 5–8)
PLATELET # BLD: 300 K/CU MM (ref 140–440)
PMV BLD AUTO: 10.3 FL (ref 7.5–11.1)
POTASSIUM SERPL-SCNC: 4.5 MMOL/L (ref 3.5–5.1)
PRO-BNP: 8202 PG/ML
PROTEIN UA: 100 MG/DL
RBC # BLD: 3.09 M/CU MM (ref 4.2–5.4)
RBC URINE: 267 /HPF (ref 0–6)
SEGMENTED NEUTROPHILS ABSOLUTE COUNT: 8 K/CU MM
SEGMENTED NEUTROPHILS RELATIVE PERCENT: 80.8 % (ref 36–66)
SODIUM BLD-SCNC: 137 MMOL/L (ref 135–145)
SPECIFIC GRAVITY UA: 1.01 (ref 1–1.03)
SQUAMOUS EPITHELIAL: 3 /HPF
TOTAL IMMATURE NEUTOROPHIL: 0.11 K/CU MM
TOTAL NUCLEATED RBC: 0 K/CU MM
TRICHOMONAS: ABNORMAL /HPF
TROPONIN T: 2.48 NG/ML
UROBILINOGEN, URINE: NORMAL MG/DL (ref 0.2–1)
WBC # BLD: 10 K/CU MM (ref 4–10.5)
WBC CLUMP: ABNORMAL /HPF
WBC UA: 179 /HPF (ref 0–5)

## 2019-11-29 PROCEDURE — 87086 URINE CULTURE/COLONY COUNT: CPT

## 2019-11-29 PROCEDURE — 80048 BASIC METABOLIC PNL TOTAL CA: CPT

## 2019-11-29 PROCEDURE — A9539 TC99M PENTETATE: HCPCS | Performed by: HOSPITALIST

## 2019-11-29 PROCEDURE — 2140000000 HC CCU INTERMEDIATE R&B

## 2019-11-29 PROCEDURE — 6370000000 HC RX 637 (ALT 250 FOR IP): Performed by: INTERNAL MEDICINE

## 2019-11-29 PROCEDURE — 81001 URINALYSIS AUTO W/SCOPE: CPT

## 2019-11-29 PROCEDURE — 94761 N-INVAS EAR/PLS OXIMETRY MLT: CPT

## 2019-11-29 PROCEDURE — 85379 FIBRIN DEGRADATION QUANT: CPT

## 2019-11-29 PROCEDURE — 97110 THERAPEUTIC EXERCISES: CPT

## 2019-11-29 PROCEDURE — 97530 THERAPEUTIC ACTIVITIES: CPT

## 2019-11-29 PROCEDURE — 3430000000 HC RX DIAGNOSTIC RADIOPHARMACEUTICAL: Performed by: HOSPITALIST

## 2019-11-29 PROCEDURE — 94640 AIRWAY INHALATION TREATMENT: CPT

## 2019-11-29 PROCEDURE — 2700000000 HC OXYGEN THERAPY PER DAY

## 2019-11-29 PROCEDURE — 84484 ASSAY OF TROPONIN QUANT: CPT

## 2019-11-29 PROCEDURE — 83880 ASSAY OF NATRIURETIC PEPTIDE: CPT

## 2019-11-29 PROCEDURE — A9540 TC99M MAA: HCPCS | Performed by: HOSPITALIST

## 2019-11-29 PROCEDURE — 82962 GLUCOSE BLOOD TEST: CPT

## 2019-11-29 PROCEDURE — 87077 CULTURE AEROBIC IDENTIFY: CPT

## 2019-11-29 PROCEDURE — 36415 COLL VENOUS BLD VENIPUNCTURE: CPT

## 2019-11-29 PROCEDURE — 87186 SC STD MICRODIL/AGAR DIL: CPT

## 2019-11-29 PROCEDURE — 85025 COMPLETE CBC W/AUTO DIFF WBC: CPT

## 2019-11-29 PROCEDURE — 78582 LUNG VENTILAT&PERFUS IMAGING: CPT

## 2019-11-29 PROCEDURE — 83735 ASSAY OF MAGNESIUM: CPT

## 2019-11-29 RX ORDER — KIT FOR THE PREPARATION OF TECHNETIUM TC 99M PENTETATE 20 MG/1
3 INJECTION, POWDER, LYOPHILIZED, FOR SOLUTION INTRAVENOUS; RESPIRATORY (INHALATION)
Status: COMPLETED | OUTPATIENT
Start: 2019-11-29 | End: 2019-11-29

## 2019-11-29 RX ORDER — FUROSEMIDE 40 MG/1
40 TABLET ORAL DAILY
Status: DISCONTINUED | OUTPATIENT
Start: 2019-11-29 | End: 2019-12-02

## 2019-11-29 RX ADMIN — Medication 2 PUFF: at 20:42

## 2019-11-29 RX ADMIN — Medication 5.3 MILLICURIE: at 14:30

## 2019-11-29 RX ADMIN — CARVEDILOL 12.5 MG: 12.5 TABLET, FILM COATED ORAL at 08:51

## 2019-11-29 RX ADMIN — BUPROPION HYDROCHLORIDE 100 MG: 100 TABLET, FILM COATED, EXTENDED RELEASE ORAL at 08:51

## 2019-11-29 RX ADMIN — FUROSEMIDE 40 MG: 40 TABLET ORAL at 08:50

## 2019-11-29 RX ADMIN — HYDROCODONE BITARTRATE AND ACETAMINOPHEN 1 TABLET: 5; 325 TABLET ORAL at 06:27

## 2019-11-29 RX ADMIN — DOCUSATE SODIUM 100 MG: 100 CAPSULE, LIQUID FILLED ORAL at 22:24

## 2019-11-29 RX ADMIN — RANOLAZINE 500 MG: 500 TABLET, FILM COATED, EXTENDED RELEASE ORAL at 08:50

## 2019-11-29 RX ADMIN — TICAGRELOR 90 MG: 90 TABLET ORAL at 08:51

## 2019-11-29 RX ADMIN — INSULIN LISPRO 4 UNITS: 100 INJECTION, SOLUTION INTRAVENOUS; SUBCUTANEOUS at 03:07

## 2019-11-29 RX ADMIN — RANOLAZINE 500 MG: 500 TABLET, FILM COATED, EXTENDED RELEASE ORAL at 22:24

## 2019-11-29 RX ADMIN — AMLODIPINE BESYLATE 5 MG: 5 TABLET ORAL at 08:51

## 2019-11-29 RX ADMIN — Medication 2 PUFF: at 07:51

## 2019-11-29 RX ADMIN — LINAGLIPTIN 5 MG: 5 TABLET, FILM COATED ORAL at 08:51

## 2019-11-29 RX ADMIN — Medication 400 MG: at 08:51

## 2019-11-29 RX ADMIN — KIT FOR THE PREPARATION OF TECHNETIUM TC 99M PENTETATE 3 MILLICURIE: 20 INJECTION, POWDER, LYOPHILIZED, FOR SOLUTION INTRAVENOUS; RESPIRATORY (INHALATION) at 14:05

## 2019-11-29 RX ADMIN — TICAGRELOR 90 MG: 90 TABLET ORAL at 22:24

## 2019-11-29 RX ADMIN — INSULIN GLARGINE 20 UNITS: 100 INJECTION, SOLUTION SUBCUTANEOUS at 22:49

## 2019-11-29 RX ADMIN — LEVOTHYROXINE SODIUM 50 MCG: 50 TABLET ORAL at 08:51

## 2019-11-29 RX ADMIN — CARVEDILOL 12.5 MG: 12.5 TABLET, FILM COATED ORAL at 16:59

## 2019-11-29 RX ADMIN — ATORVASTATIN CALCIUM 80 MG: 40 TABLET, FILM COATED ORAL at 22:24

## 2019-11-29 RX ADMIN — ISOSORBIDE MONONITRATE 60 MG: 30 TABLET, EXTENDED RELEASE ORAL at 08:51

## 2019-11-29 RX ADMIN — DOCUSATE SODIUM 100 MG: 100 CAPSULE, LIQUID FILLED ORAL at 08:50

## 2019-11-29 RX ADMIN — BUPROPION HYDROCHLORIDE 100 MG: 100 TABLET, FILM COATED, EXTENDED RELEASE ORAL at 22:24

## 2019-11-29 RX ADMIN — HYDROCODONE BITARTRATE AND ACETAMINOPHEN 1 TABLET: 5; 325 TABLET ORAL at 17:26

## 2019-11-29 RX ADMIN — ASPIRIN 81 MG 81 MG: 81 TABLET ORAL at 08:51

## 2019-11-29 RX ADMIN — DULOXETINE HYDROCHLORIDE 60 MG: 30 CAPSULE, DELAYED RELEASE ORAL at 08:50

## 2019-11-29 RX ADMIN — Medication 1000 UNITS: at 08:50

## 2019-11-29 RX ADMIN — PANTOPRAZOLE SODIUM 40 MG: 40 TABLET, DELAYED RELEASE ORAL at 06:21

## 2019-11-29 ASSESSMENT — PAIN DESCRIPTION - DESCRIPTORS: DESCRIPTORS: ACHING;DISCOMFORT

## 2019-11-29 ASSESSMENT — PAIN DESCRIPTION - PROGRESSION

## 2019-11-29 ASSESSMENT — PAIN DESCRIPTION - ONSET: ONSET: ON-GOING

## 2019-11-29 ASSESSMENT — PAIN DESCRIPTION - PAIN TYPE: TYPE: CHRONIC PAIN

## 2019-11-29 ASSESSMENT — PAIN DESCRIPTION - LOCATION: LOCATION: BACK

## 2019-11-29 ASSESSMENT — PAIN SCALES - GENERAL
PAINLEVEL_OUTOF10: 9
PAINLEVEL_OUTOF10: 6
PAINLEVEL_OUTOF10: 8

## 2019-11-29 ASSESSMENT — PAIN DESCRIPTION - ORIENTATION: ORIENTATION: LOWER

## 2019-11-30 LAB
ANION GAP SERPL CALCULATED.3IONS-SCNC: 12 MMOL/L (ref 4–16)
BASOPHILS ABSOLUTE: 0 K/CU MM
BASOPHILS RELATIVE PERCENT: 0.3 % (ref 0–1)
BUN BLDV-MCNC: 41 MG/DL (ref 6–23)
CALCIUM SERPL-MCNC: 8.3 MG/DL (ref 8.3–10.6)
CHLORIDE BLD-SCNC: 95 MMOL/L (ref 99–110)
CO2: 24 MMOL/L (ref 21–32)
CREAT SERPL-MCNC: 1.1 MG/DL (ref 0.6–1.1)
DIFFERENTIAL TYPE: ABNORMAL
EOSINOPHILS ABSOLUTE: 0.2 K/CU MM
EOSINOPHILS RELATIVE PERCENT: 2.3 % (ref 0–3)
GFR AFRICAN AMERICAN: >60 ML/MIN/1.73M2
GFR NON-AFRICAN AMERICAN: 51 ML/MIN/1.73M2
GLUCOSE BLD-MCNC: 114 MG/DL (ref 70–99)
GLUCOSE BLD-MCNC: 115 MG/DL (ref 70–99)
GLUCOSE BLD-MCNC: 178 MG/DL (ref 70–99)
GLUCOSE BLD-MCNC: 182 MG/DL (ref 70–99)
GLUCOSE BLD-MCNC: 195 MG/DL (ref 70–99)
GLUCOSE BLD-MCNC: 220 MG/DL (ref 70–99)
HCT VFR BLD CALC: 28 % (ref 37–47)
HEMOGLOBIN: 8.1 GM/DL (ref 12.5–16)
IMMATURE NEUTROPHIL %: 0.9 % (ref 0–0.43)
LYMPHOCYTES ABSOLUTE: 1.1 K/CU MM
LYMPHOCYTES RELATIVE PERCENT: 11.8 % (ref 24–44)
MAGNESIUM: 1.8 MG/DL (ref 1.8–2.4)
MCH RBC QN AUTO: 26.6 PG (ref 27–31)
MCHC RBC AUTO-ENTMCNC: 28.9 % (ref 32–36)
MCV RBC AUTO: 91.8 FL (ref 78–100)
MONOCYTES ABSOLUTE: 0.8 K/CU MM
MONOCYTES RELATIVE PERCENT: 9 % (ref 0–4)
NUCLEATED RBC %: 0 %
PDW BLD-RTO: 14.6 % (ref 11.7–14.9)
PLATELET # BLD: 293 K/CU MM (ref 140–440)
PMV BLD AUTO: 10 FL (ref 7.5–11.1)
POTASSIUM SERPL-SCNC: 4.3 MMOL/L (ref 3.5–5.1)
RBC # BLD: 3.05 M/CU MM (ref 4.2–5.4)
SEGMENTED NEUTROPHILS ABSOLUTE COUNT: 7 K/CU MM
SEGMENTED NEUTROPHILS RELATIVE PERCENT: 75.7 % (ref 36–66)
SODIUM BLD-SCNC: 131 MMOL/L (ref 135–145)
TOTAL IMMATURE NEUTOROPHIL: 0.08 K/CU MM
TOTAL NUCLEATED RBC: 0 K/CU MM
WBC # BLD: 9.2 K/CU MM (ref 4–10.5)

## 2019-11-30 PROCEDURE — 6370000000 HC RX 637 (ALT 250 FOR IP): Performed by: INTERNAL MEDICINE

## 2019-11-30 PROCEDURE — 80048 BASIC METABOLIC PNL TOTAL CA: CPT

## 2019-11-30 PROCEDURE — 94640 AIRWAY INHALATION TREATMENT: CPT

## 2019-11-30 PROCEDURE — 2700000000 HC OXYGEN THERAPY PER DAY

## 2019-11-30 PROCEDURE — 36415 COLL VENOUS BLD VENIPUNCTURE: CPT

## 2019-11-30 PROCEDURE — 2580000003 HC RX 258: Performed by: INTERNAL MEDICINE

## 2019-11-30 PROCEDURE — 2140000000 HC CCU INTERMEDIATE R&B

## 2019-11-30 PROCEDURE — 82962 GLUCOSE BLOOD TEST: CPT

## 2019-11-30 PROCEDURE — 85025 COMPLETE CBC W/AUTO DIFF WBC: CPT

## 2019-11-30 PROCEDURE — 94761 N-INVAS EAR/PLS OXIMETRY MLT: CPT

## 2019-11-30 PROCEDURE — 83735 ASSAY OF MAGNESIUM: CPT

## 2019-11-30 PROCEDURE — 6360000002 HC RX W HCPCS: Performed by: INTERNAL MEDICINE

## 2019-11-30 RX ORDER — CIPROFLOXACIN 500 MG/1
500 TABLET, FILM COATED ORAL EVERY 12 HOURS SCHEDULED
Status: DISCONTINUED | OUTPATIENT
Start: 2019-11-30 | End: 2019-12-02

## 2019-11-30 RX ADMIN — INSULIN GLARGINE 45 UNITS: 100 INJECTION, SOLUTION SUBCUTANEOUS at 23:52

## 2019-11-30 RX ADMIN — HYDROCODONE BITARTRATE AND ACETAMINOPHEN 1 TABLET: 5; 325 TABLET ORAL at 00:00

## 2019-11-30 RX ADMIN — AMLODIPINE BESYLATE 5 MG: 5 TABLET ORAL at 11:35

## 2019-11-30 RX ADMIN — FUROSEMIDE 40 MG: 40 TABLET ORAL at 11:36

## 2019-11-30 RX ADMIN — LINAGLIPTIN 5 MG: 5 TABLET, FILM COATED ORAL at 11:36

## 2019-11-30 RX ADMIN — RANOLAZINE 500 MG: 500 TABLET, FILM COATED, EXTENDED RELEASE ORAL at 21:24

## 2019-11-30 RX ADMIN — BUPROPION HYDROCHLORIDE 100 MG: 100 TABLET, FILM COATED, EXTENDED RELEASE ORAL at 11:35

## 2019-11-30 RX ADMIN — ATORVASTATIN CALCIUM 80 MG: 40 TABLET, FILM COATED ORAL at 21:23

## 2019-11-30 RX ADMIN — TICAGRELOR 90 MG: 90 TABLET ORAL at 21:24

## 2019-11-30 RX ADMIN — Medication 400 MG: at 13:48

## 2019-11-30 RX ADMIN — PANTOPRAZOLE SODIUM 40 MG: 40 TABLET, DELAYED RELEASE ORAL at 06:04

## 2019-11-30 RX ADMIN — Medication 1000 UNITS: at 11:36

## 2019-11-30 RX ADMIN — HYDROCODONE BITARTRATE AND ACETAMINOPHEN 1 TABLET: 5; 325 TABLET ORAL at 13:48

## 2019-11-30 RX ADMIN — DOCUSATE SODIUM 100 MG: 100 CAPSULE, LIQUID FILLED ORAL at 11:37

## 2019-11-30 RX ADMIN — BUPROPION HYDROCHLORIDE 100 MG: 100 TABLET, FILM COATED, EXTENDED RELEASE ORAL at 21:23

## 2019-11-30 RX ADMIN — ASPIRIN 81 MG 81 MG: 81 TABLET ORAL at 11:36

## 2019-11-30 RX ADMIN — HYDROCODONE BITARTRATE AND ACETAMINOPHEN 1 TABLET: 5; 325 TABLET ORAL at 06:04

## 2019-11-30 RX ADMIN — DOCUSATE SODIUM 100 MG: 100 CAPSULE, LIQUID FILLED ORAL at 21:23

## 2019-11-30 RX ADMIN — DULOXETINE HYDROCHLORIDE 60 MG: 30 CAPSULE, DELAYED RELEASE ORAL at 11:36

## 2019-11-30 RX ADMIN — LEVOTHYROXINE SODIUM 50 MCG: 50 TABLET ORAL at 11:36

## 2019-11-30 RX ADMIN — CARVEDILOL 12.5 MG: 12.5 TABLET, FILM COATED ORAL at 11:36

## 2019-11-30 RX ADMIN — CIPROFLOXACIN HYDROCHLORIDE 500 MG: 500 TABLET, FILM COATED ORAL at 18:55

## 2019-11-30 RX ADMIN — RANOLAZINE 500 MG: 500 TABLET, FILM COATED, EXTENDED RELEASE ORAL at 11:35

## 2019-11-30 RX ADMIN — DOBUTAMINE IN DEXTROSE 2.5 MCG/KG/MIN: 200 INJECTION, SOLUTION INTRAVENOUS at 21:22

## 2019-11-30 RX ADMIN — CARVEDILOL 12.5 MG: 12.5 TABLET, FILM COATED ORAL at 17:55

## 2019-11-30 RX ADMIN — ISOSORBIDE MONONITRATE 60 MG: 30 TABLET, EXTENDED RELEASE ORAL at 11:35

## 2019-11-30 RX ADMIN — TICAGRELOR 90 MG: 90 TABLET ORAL at 11:37

## 2019-11-30 RX ADMIN — Medication 2 PUFF: at 07:47

## 2019-11-30 RX ADMIN — SODIUM CHLORIDE, PRESERVATIVE FREE 10 ML: 5 INJECTION INTRAVENOUS at 11:38

## 2019-11-30 ASSESSMENT — PAIN DESCRIPTION - PROGRESSION
CLINICAL_PROGRESSION: GRADUALLY IMPROVING

## 2019-11-30 ASSESSMENT — PAIN SCALES - GENERAL
PAINLEVEL_OUTOF10: 8
PAINLEVEL_OUTOF10: 9
PAINLEVEL_OUTOF10: 2
PAINLEVEL_OUTOF10: 7

## 2019-11-30 ASSESSMENT — PAIN DESCRIPTION - FREQUENCY: FREQUENCY: INTERMITTENT

## 2019-11-30 ASSESSMENT — PAIN DESCRIPTION - PAIN TYPE: TYPE: CHRONIC PAIN

## 2019-11-30 ASSESSMENT — PAIN DESCRIPTION - LOCATION: LOCATION: BACK

## 2019-11-30 ASSESSMENT — PAIN DESCRIPTION - DESCRIPTORS: DESCRIPTORS: ACHING

## 2019-12-01 LAB
BASOPHILS ABSOLUTE: 0 K/CU MM
BASOPHILS RELATIVE PERCENT: 0.2 % (ref 0–1)
DIFFERENTIAL TYPE: ABNORMAL
EOSINOPHILS ABSOLUTE: 0.2 K/CU MM
EOSINOPHILS RELATIVE PERCENT: 1.8 % (ref 0–3)
GLUCOSE BLD-MCNC: 137 MG/DL (ref 70–99)
GLUCOSE BLD-MCNC: 172 MG/DL (ref 70–99)
GLUCOSE BLD-MCNC: 182 MG/DL (ref 70–99)
GLUCOSE BLD-MCNC: 209 MG/DL (ref 70–99)
GLUCOSE BLD-MCNC: 215 MG/DL (ref 70–99)
HCT VFR BLD CALC: 28.8 % (ref 37–47)
HEMOGLOBIN: 8.5 GM/DL (ref 12.5–16)
IMMATURE NEUTROPHIL %: 0.5 % (ref 0–0.43)
LYMPHOCYTES ABSOLUTE: 0.9 K/CU MM
LYMPHOCYTES RELATIVE PERCENT: 10.2 % (ref 24–44)
MAGNESIUM: 1.9 MG/DL (ref 1.8–2.4)
MCH RBC QN AUTO: 26.3 PG (ref 27–31)
MCHC RBC AUTO-ENTMCNC: 29.5 % (ref 32–36)
MCV RBC AUTO: 89.2 FL (ref 78–100)
MONOCYTES ABSOLUTE: 0.6 K/CU MM
MONOCYTES RELATIVE PERCENT: 6.7 % (ref 0–4)
NUCLEATED RBC %: 0 %
PDW BLD-RTO: 14.6 % (ref 11.7–14.9)
PLATELET # BLD: 391 K/CU MM (ref 140–440)
PMV BLD AUTO: 10.4 FL (ref 7.5–11.1)
RBC # BLD: 3.23 M/CU MM (ref 4.2–5.4)
SEGMENTED NEUTROPHILS ABSOLUTE COUNT: 7.5 K/CU MM
SEGMENTED NEUTROPHILS RELATIVE PERCENT: 80.6 % (ref 36–66)
TOTAL IMMATURE NEUTOROPHIL: 0.05 K/CU MM
TOTAL NUCLEATED RBC: 0 K/CU MM
TROPONIN T: 2.92 NG/ML
WBC # BLD: 9.3 K/CU MM (ref 4–10.5)

## 2019-12-01 PROCEDURE — 2700000000 HC OXYGEN THERAPY PER DAY

## 2019-12-01 PROCEDURE — 6370000000 HC RX 637 (ALT 250 FOR IP): Performed by: INTERNAL MEDICINE

## 2019-12-01 PROCEDURE — 85025 COMPLETE CBC W/AUTO DIFF WBC: CPT

## 2019-12-01 PROCEDURE — 94640 AIRWAY INHALATION TREATMENT: CPT

## 2019-12-01 PROCEDURE — 94761 N-INVAS EAR/PLS OXIMETRY MLT: CPT

## 2019-12-01 PROCEDURE — 36415 COLL VENOUS BLD VENIPUNCTURE: CPT

## 2019-12-01 PROCEDURE — 84484 ASSAY OF TROPONIN QUANT: CPT

## 2019-12-01 PROCEDURE — 82962 GLUCOSE BLOOD TEST: CPT

## 2019-12-01 PROCEDURE — 83735 ASSAY OF MAGNESIUM: CPT

## 2019-12-01 PROCEDURE — 6360000002 HC RX W HCPCS: Performed by: HOSPITALIST

## 2019-12-01 PROCEDURE — 2140000000 HC CCU INTERMEDIATE R&B

## 2019-12-01 RX ORDER — HEPARIN SODIUM 5000 [USP'U]/ML
5000 INJECTION, SOLUTION INTRAVENOUS; SUBCUTANEOUS EVERY 8 HOURS SCHEDULED
Status: DISCONTINUED | OUTPATIENT
Start: 2019-12-01 | End: 2019-12-04 | Stop reason: HOSPADM

## 2019-12-01 RX ADMIN — LEVOTHYROXINE SODIUM 50 MCG: 50 TABLET ORAL at 09:22

## 2019-12-01 RX ADMIN — TICAGRELOR 90 MG: 90 TABLET ORAL at 22:09

## 2019-12-01 RX ADMIN — BUPROPION HYDROCHLORIDE 100 MG: 100 TABLET, FILM COATED, EXTENDED RELEASE ORAL at 09:21

## 2019-12-01 RX ADMIN — CARVEDILOL 12.5 MG: 12.5 TABLET, FILM COATED ORAL at 09:21

## 2019-12-01 RX ADMIN — ISOSORBIDE MONONITRATE 60 MG: 30 TABLET, EXTENDED RELEASE ORAL at 09:22

## 2019-12-01 RX ADMIN — DULOXETINE HYDROCHLORIDE 60 MG: 30 CAPSULE, DELAYED RELEASE ORAL at 09:22

## 2019-12-01 RX ADMIN — DOCUSATE SODIUM 100 MG: 100 CAPSULE, LIQUID FILLED ORAL at 22:09

## 2019-12-01 RX ADMIN — HYDROCODONE BITARTRATE AND ACETAMINOPHEN 1 TABLET: 5; 325 TABLET ORAL at 05:57

## 2019-12-01 RX ADMIN — RANOLAZINE 500 MG: 500 TABLET, FILM COATED, EXTENDED RELEASE ORAL at 09:22

## 2019-12-01 RX ADMIN — TICAGRELOR 90 MG: 90 TABLET ORAL at 09:22

## 2019-12-01 RX ADMIN — FUROSEMIDE 40 MG: 40 TABLET ORAL at 09:22

## 2019-12-01 RX ADMIN — INSULIN GLARGINE 45 UNITS: 100 INJECTION, SOLUTION SUBCUTANEOUS at 22:11

## 2019-12-01 RX ADMIN — RANOLAZINE 500 MG: 500 TABLET, FILM COATED, EXTENDED RELEASE ORAL at 22:08

## 2019-12-01 RX ADMIN — LINAGLIPTIN 5 MG: 5 TABLET, FILM COATED ORAL at 09:22

## 2019-12-01 RX ADMIN — Medication 2 PUFF: at 20:05

## 2019-12-01 RX ADMIN — INSULIN LISPRO 4 UNITS: 100 INJECTION, SOLUTION INTRAVENOUS; SUBCUTANEOUS at 22:10

## 2019-12-01 RX ADMIN — AMLODIPINE BESYLATE 5 MG: 5 TABLET ORAL at 09:22

## 2019-12-01 RX ADMIN — Medication 2 PUFF: at 08:14

## 2019-12-01 RX ADMIN — ATORVASTATIN CALCIUM 80 MG: 40 TABLET, FILM COATED ORAL at 22:09

## 2019-12-01 RX ADMIN — BUPROPION HYDROCHLORIDE 100 MG: 100 TABLET, FILM COATED, EXTENDED RELEASE ORAL at 22:09

## 2019-12-01 RX ADMIN — PANTOPRAZOLE SODIUM 40 MG: 40 TABLET, DELAYED RELEASE ORAL at 05:57

## 2019-12-01 RX ADMIN — DOCUSATE SODIUM 100 MG: 100 CAPSULE, LIQUID FILLED ORAL at 09:21

## 2019-12-01 RX ADMIN — Medication 1000 UNITS: at 09:22

## 2019-12-01 RX ADMIN — CIPROFLOXACIN HYDROCHLORIDE 500 MG: 500 TABLET, FILM COATED ORAL at 05:56

## 2019-12-01 RX ADMIN — HEPARIN SODIUM 5000 UNITS: 5000 INJECTION, SOLUTION INTRAVENOUS; SUBCUTANEOUS at 22:10

## 2019-12-01 RX ADMIN — ASPIRIN 81 MG 81 MG: 81 TABLET ORAL at 09:23

## 2019-12-01 RX ADMIN — HYDROCODONE BITARTRATE AND ACETAMINOPHEN 1 TABLET: 5; 325 TABLET ORAL at 22:09

## 2019-12-01 RX ADMIN — HYDROCODONE BITARTRATE AND ACETAMINOPHEN 1 TABLET: 5; 325 TABLET ORAL at 10:11

## 2019-12-01 ASSESSMENT — PAIN DESCRIPTION - ONSET: ONSET: GRADUAL

## 2019-12-01 ASSESSMENT — PAIN DESCRIPTION - PAIN TYPE
TYPE: CHRONIC PAIN
TYPE: ACUTE PAIN

## 2019-12-01 ASSESSMENT — PAIN DESCRIPTION - LOCATION
LOCATION: BACK
LOCATION: BACK

## 2019-12-01 ASSESSMENT — PAIN SCALES - GENERAL
PAINLEVEL_OUTOF10: 8
PAINLEVEL_OUTOF10: 7
PAINLEVEL_OUTOF10: 9
PAINLEVEL_OUTOF10: 0
PAINLEVEL_OUTOF10: 9
PAINLEVEL_OUTOF10: 8

## 2019-12-01 ASSESSMENT — PAIN DESCRIPTION - FREQUENCY: FREQUENCY: CONTINUOUS

## 2019-12-01 ASSESSMENT — PAIN DESCRIPTION - ORIENTATION
ORIENTATION: LOWER
ORIENTATION: LOWER

## 2019-12-01 ASSESSMENT — PAIN DESCRIPTION - PROGRESSION: CLINICAL_PROGRESSION: NOT CHANGED

## 2019-12-01 ASSESSMENT — PAIN DESCRIPTION - DESCRIPTORS: DESCRIPTORS: ACHING

## 2019-12-02 LAB
ALBUMIN SERPL-MCNC: 3.3 GM/DL (ref 3.4–5)
ALP BLD-CCNC: 94 IU/L (ref 40–128)
ALT SERPL-CCNC: 11 U/L (ref 10–40)
ANION GAP SERPL CALCULATED.3IONS-SCNC: 10 MMOL/L (ref 4–16)
AST SERPL-CCNC: 12 IU/L (ref 15–37)
BILIRUB SERPL-MCNC: 0.4 MG/DL (ref 0–1)
BUN BLDV-MCNC: 55 MG/DL (ref 6–23)
CALCIUM SERPL-MCNC: 8.6 MG/DL (ref 8.3–10.6)
CHLORIDE BLD-SCNC: 96 MMOL/L (ref 99–110)
CO2: 28 MMOL/L (ref 21–32)
CREAT SERPL-MCNC: 2.2 MG/DL (ref 0.6–1.1)
CULTURE: ABNORMAL
GFR AFRICAN AMERICAN: 28 ML/MIN/1.73M2
GFR NON-AFRICAN AMERICAN: 23 ML/MIN/1.73M2
GLUCOSE BLD-MCNC: 140 MG/DL (ref 70–99)
GLUCOSE BLD-MCNC: 159 MG/DL (ref 70–99)
GLUCOSE BLD-MCNC: 180 MG/DL (ref 70–99)
GLUCOSE BLD-MCNC: 186 MG/DL (ref 70–99)
GLUCOSE BLD-MCNC: 237 MG/DL (ref 70–99)
GLUCOSE BLD-MCNC: 67 MG/DL (ref 70–99)
Lab: ABNORMAL
MAGNESIUM: 1.9 MG/DL (ref 1.8–2.4)
POTASSIUM SERPL-SCNC: 4.8 MMOL/L (ref 3.5–5.1)
SODIUM BLD-SCNC: 134 MMOL/L (ref 135–145)
SPECIMEN: ABNORMAL
TOTAL COLONY COUNT: ABNORMAL
TOTAL PROTEIN: 5.3 GM/DL (ref 6.4–8.2)

## 2019-12-02 PROCEDURE — 83735 ASSAY OF MAGNESIUM: CPT

## 2019-12-02 PROCEDURE — 2700000000 HC OXYGEN THERAPY PER DAY

## 2019-12-02 PROCEDURE — 2580000003 HC RX 258: Performed by: INTERNAL MEDICINE

## 2019-12-02 PROCEDURE — 6370000000 HC RX 637 (ALT 250 FOR IP): Performed by: INTERNAL MEDICINE

## 2019-12-02 PROCEDURE — 36415 COLL VENOUS BLD VENIPUNCTURE: CPT

## 2019-12-02 PROCEDURE — 97530 THERAPEUTIC ACTIVITIES: CPT

## 2019-12-02 PROCEDURE — 6360000002 HC RX W HCPCS: Performed by: HOSPITALIST

## 2019-12-02 PROCEDURE — 84450 TRANSFERASE (AST) (SGOT): CPT

## 2019-12-02 PROCEDURE — 94640 AIRWAY INHALATION TREATMENT: CPT

## 2019-12-02 PROCEDURE — 97535 SELF CARE MNGMENT TRAINING: CPT

## 2019-12-02 PROCEDURE — 51798 US URINE CAPACITY MEASURE: CPT

## 2019-12-02 PROCEDURE — 2140000000 HC CCU INTERMEDIATE R&B

## 2019-12-02 PROCEDURE — 94761 N-INVAS EAR/PLS OXIMETRY MLT: CPT

## 2019-12-02 PROCEDURE — 84460 ALANINE AMINO (ALT) (SGPT): CPT

## 2019-12-02 PROCEDURE — 80048 BASIC METABOLIC PNL TOTAL CA: CPT

## 2019-12-02 PROCEDURE — 82040 ASSAY OF SERUM ALBUMIN: CPT

## 2019-12-02 PROCEDURE — 84075 ASSAY ALKALINE PHOSPHATASE: CPT

## 2019-12-02 PROCEDURE — 82247 BILIRUBIN TOTAL: CPT

## 2019-12-02 PROCEDURE — 82962 GLUCOSE BLOOD TEST: CPT

## 2019-12-02 PROCEDURE — 84155 ASSAY OF PROTEIN SERUM: CPT

## 2019-12-02 PROCEDURE — 97110 THERAPEUTIC EXERCISES: CPT

## 2019-12-02 RX ORDER — AMLODIPINE BESYLATE 5 MG/1
5 TABLET ORAL DAILY
Qty: 30 TABLET | Refills: 3 | Status: SHIPPED | OUTPATIENT
Start: 2019-12-03 | End: 2019-12-04 | Stop reason: HOSPADM

## 2019-12-02 RX ORDER — METOLAZONE 2.5 MG/1
2.5 TABLET ORAL DAILY
Qty: 30 TABLET | Refills: 3 | Status: SHIPPED | OUTPATIENT
Start: 2019-12-03 | End: 2019-12-04 | Stop reason: HOSPADM

## 2019-12-02 RX ORDER — ISOSORBIDE MONONITRATE 60 MG/1
60 TABLET, EXTENDED RELEASE ORAL DAILY
Qty: 30 TABLET | Refills: 3 | Status: ON HOLD | OUTPATIENT
Start: 2019-12-03 | End: 2020-10-09 | Stop reason: HOSPADM

## 2019-12-02 RX ORDER — FUROSEMIDE 40 MG/1
40 TABLET ORAL DAILY
Qty: 60 TABLET | Refills: 3 | Status: SHIPPED | OUTPATIENT
Start: 2019-12-03 | End: 2019-12-04 | Stop reason: HOSPADM

## 2019-12-02 RX ORDER — CIPROFLOXACIN 500 MG/1
500 TABLET, FILM COATED ORAL EVERY 12 HOURS SCHEDULED
Qty: 10 TABLET | Refills: 0 | Status: SHIPPED | OUTPATIENT
Start: 2019-12-02 | End: 2019-12-04 | Stop reason: HOSPADM

## 2019-12-02 RX ORDER — INSULIN GLARGINE 100 [IU]/ML
55 INJECTION, SOLUTION SUBCUTANEOUS NIGHTLY
Status: DISCONTINUED | OUTPATIENT
Start: 2019-12-02 | End: 2019-12-04

## 2019-12-02 RX ORDER — METOLAZONE 2.5 MG/1
2.5 TABLET ORAL DAILY
Status: DISCONTINUED | OUTPATIENT
Start: 2019-12-02 | End: 2019-12-02

## 2019-12-02 RX ORDER — CARVEDILOL 12.5 MG/1
12.5 TABLET ORAL 2 TIMES DAILY WITH MEALS
Qty: 60 TABLET | Refills: 3 | Status: ON HOLD | OUTPATIENT
Start: 2019-12-02 | End: 2020-05-06 | Stop reason: HOSPADM

## 2019-12-02 RX ADMIN — BUPROPION HYDROCHLORIDE 100 MG: 100 TABLET, FILM COATED, EXTENDED RELEASE ORAL at 08:18

## 2019-12-02 RX ADMIN — Medication 1000 UNITS: at 08:18

## 2019-12-02 RX ADMIN — HEPARIN SODIUM 5000 UNITS: 5000 INJECTION, SOLUTION INTRAVENOUS; SUBCUTANEOUS at 06:52

## 2019-12-02 RX ADMIN — ATORVASTATIN CALCIUM 80 MG: 40 TABLET, FILM COATED ORAL at 21:10

## 2019-12-02 RX ADMIN — ASPIRIN 81 MG 81 MG: 81 TABLET ORAL at 08:18

## 2019-12-02 RX ADMIN — METOLAZONE 2.5 MG: 2.5 TABLET ORAL at 09:58

## 2019-12-02 RX ADMIN — SODIUM CHLORIDE, PRESERVATIVE FREE 10 ML: 5 INJECTION INTRAVENOUS at 21:12

## 2019-12-02 RX ADMIN — INSULIN LISPRO 4 UNITS: 100 INJECTION, SOLUTION INTRAVENOUS; SUBCUTANEOUS at 02:47

## 2019-12-02 RX ADMIN — TICAGRELOR 90 MG: 90 TABLET ORAL at 08:18

## 2019-12-02 RX ADMIN — DOCUSATE SODIUM 100 MG: 100 CAPSULE, LIQUID FILLED ORAL at 21:10

## 2019-12-02 RX ADMIN — BUPROPION HYDROCHLORIDE 100 MG: 100 TABLET, FILM COATED, EXTENDED RELEASE ORAL at 21:10

## 2019-12-02 RX ADMIN — CARVEDILOL 12.5 MG: 12.5 TABLET, FILM COATED ORAL at 17:01

## 2019-12-02 RX ADMIN — HEPARIN SODIUM 5000 UNITS: 5000 INJECTION, SOLUTION INTRAVENOUS; SUBCUTANEOUS at 21:12

## 2019-12-02 RX ADMIN — Medication 2 PUFF: at 09:03

## 2019-12-02 RX ADMIN — DULOXETINE HYDROCHLORIDE 60 MG: 30 CAPSULE, DELAYED RELEASE ORAL at 08:18

## 2019-12-02 RX ADMIN — FUROSEMIDE 40 MG: 40 TABLET ORAL at 08:18

## 2019-12-02 RX ADMIN — INSULIN LISPRO 2 UNITS: 100 INJECTION, SOLUTION INTRAVENOUS; SUBCUTANEOUS at 21:13

## 2019-12-02 RX ADMIN — HYDROCODONE BITARTRATE AND ACETAMINOPHEN 1 TABLET: 5; 325 TABLET ORAL at 10:01

## 2019-12-02 RX ADMIN — ISOSORBIDE MONONITRATE 60 MG: 30 TABLET, EXTENDED RELEASE ORAL at 08:18

## 2019-12-02 RX ADMIN — LINAGLIPTIN 5 MG: 5 TABLET, FILM COATED ORAL at 08:18

## 2019-12-02 RX ADMIN — CARVEDILOL 12.5 MG: 12.5 TABLET, FILM COATED ORAL at 08:18

## 2019-12-02 RX ADMIN — DOCUSATE SODIUM 100 MG: 100 CAPSULE, LIQUID FILLED ORAL at 08:17

## 2019-12-02 RX ADMIN — INSULIN GLARGINE 55 UNITS: 100 INJECTION, SOLUTION SUBCUTANEOUS at 21:12

## 2019-12-02 RX ADMIN — TICAGRELOR 90 MG: 90 TABLET ORAL at 21:11

## 2019-12-02 RX ADMIN — Medication 400 MG: at 08:18

## 2019-12-02 RX ADMIN — LEVOTHYROXINE SODIUM 50 MCG: 50 TABLET ORAL at 08:18

## 2019-12-02 RX ADMIN — RANOLAZINE 500 MG: 500 TABLET, FILM COATED, EXTENDED RELEASE ORAL at 08:18

## 2019-12-02 RX ADMIN — HYDROCODONE BITARTRATE AND ACETAMINOPHEN 1 TABLET: 5; 325 TABLET ORAL at 17:00

## 2019-12-02 RX ADMIN — AMLODIPINE BESYLATE 5 MG: 5 TABLET ORAL at 08:18

## 2019-12-02 RX ADMIN — CIPROFLOXACIN HYDROCHLORIDE 500 MG: 500 TABLET, FILM COATED ORAL at 06:52

## 2019-12-02 RX ADMIN — PANTOPRAZOLE SODIUM 40 MG: 40 TABLET, DELAYED RELEASE ORAL at 06:52

## 2019-12-02 RX ADMIN — RANOLAZINE 500 MG: 500 TABLET, FILM COATED, EXTENDED RELEASE ORAL at 21:10

## 2019-12-02 ASSESSMENT — PAIN SCALES - GENERAL
PAINLEVEL_OUTOF10: 8
PAINLEVEL_OUTOF10: 8
PAINLEVEL_OUTOF10: 4
PAINLEVEL_OUTOF10: 5
PAINLEVEL_OUTOF10: 1

## 2019-12-02 ASSESSMENT — PAIN DESCRIPTION - LOCATION: LOCATION: BACK

## 2019-12-02 ASSESSMENT — PAIN DESCRIPTION - PAIN TYPE: TYPE: CHRONIC PAIN

## 2019-12-03 LAB
ALBUMIN SERPL-MCNC: 3.4 GM/DL (ref 3.4–5)
ALP BLD-CCNC: 95 IU/L (ref 40–128)
ALT SERPL-CCNC: 13 U/L (ref 10–40)
ANION GAP SERPL CALCULATED.3IONS-SCNC: 10 MMOL/L (ref 4–16)
AST SERPL-CCNC: 12 IU/L (ref 15–37)
BASOPHILS ABSOLUTE: 0 K/CU MM
BASOPHILS RELATIVE PERCENT: 0.3 % (ref 0–1)
BILIRUB SERPL-MCNC: 0.3 MG/DL (ref 0–1)
BUN BLDV-MCNC: 61 MG/DL (ref 6–23)
CALCIUM SERPL-MCNC: 9 MG/DL (ref 8.3–10.6)
CHLORIDE BLD-SCNC: 99 MMOL/L (ref 99–110)
CO2: 30 MMOL/L (ref 21–32)
CREAT SERPL-MCNC: 2.4 MG/DL (ref 0.6–1.1)
DIFFERENTIAL TYPE: ABNORMAL
EOSINOPHILS ABSOLUTE: 0.2 K/CU MM
EOSINOPHILS RELATIVE PERCENT: 1.9 % (ref 0–3)
GFR AFRICAN AMERICAN: 25 ML/MIN/1.73M2
GFR NON-AFRICAN AMERICAN: 21 ML/MIN/1.73M2
GLUCOSE BLD-MCNC: 115 MG/DL (ref 70–99)
GLUCOSE BLD-MCNC: 122 MG/DL (ref 70–99)
GLUCOSE BLD-MCNC: 129 MG/DL (ref 70–99)
GLUCOSE BLD-MCNC: 178 MG/DL (ref 70–99)
GLUCOSE BLD-MCNC: 188 MG/DL (ref 70–99)
GLUCOSE BLD-MCNC: 199 MG/DL (ref 70–99)
HCT VFR BLD CALC: 30.2 % (ref 37–47)
HEMOGLOBIN: 8.8 GM/DL (ref 12.5–16)
IMMATURE NEUTROPHIL %: 1.1 % (ref 0–0.43)
LYMPHOCYTES ABSOLUTE: 1.2 K/CU MM
LYMPHOCYTES RELATIVE PERCENT: 13.2 % (ref 24–44)
MCH RBC QN AUTO: 26 PG (ref 27–31)
MCHC RBC AUTO-ENTMCNC: 29.1 % (ref 32–36)
MCV RBC AUTO: 89.1 FL (ref 78–100)
MONOCYTES ABSOLUTE: 0.6 K/CU MM
MONOCYTES RELATIVE PERCENT: 7.1 % (ref 0–4)
NUCLEATED RBC %: 0 %
PDW BLD-RTO: 14.6 % (ref 11.7–14.9)
PLATELET # BLD: 369 K/CU MM (ref 140–440)
PMV BLD AUTO: 10.1 FL (ref 7.5–11.1)
POTASSIUM SERPL-SCNC: 4.7 MMOL/L (ref 3.5–5.1)
RBC # BLD: 3.39 M/CU MM (ref 4.2–5.4)
SEGMENTED NEUTROPHILS ABSOLUTE COUNT: 6.7 K/CU MM
SEGMENTED NEUTROPHILS RELATIVE PERCENT: 76.4 % (ref 36–66)
SODIUM BLD-SCNC: 139 MMOL/L (ref 135–145)
TOTAL IMMATURE NEUTOROPHIL: 0.1 K/CU MM
TOTAL NUCLEATED RBC: 0 K/CU MM
TOTAL PROTEIN: 5.4 GM/DL (ref 6.4–8.2)
WBC # BLD: 8.8 K/CU MM (ref 4–10.5)

## 2019-12-03 PROCEDURE — 6360000002 HC RX W HCPCS: Performed by: HOSPITALIST

## 2019-12-03 PROCEDURE — 6370000000 HC RX 637 (ALT 250 FOR IP): Performed by: INTERNAL MEDICINE

## 2019-12-03 PROCEDURE — 2580000003 HC RX 258: Performed by: INTERNAL MEDICINE

## 2019-12-03 PROCEDURE — 36415 COLL VENOUS BLD VENIPUNCTURE: CPT

## 2019-12-03 PROCEDURE — 94761 N-INVAS EAR/PLS OXIMETRY MLT: CPT

## 2019-12-03 PROCEDURE — 97110 THERAPEUTIC EXERCISES: CPT

## 2019-12-03 PROCEDURE — 80053 COMPREHEN METABOLIC PANEL: CPT

## 2019-12-03 PROCEDURE — 2140000000 HC CCU INTERMEDIATE R&B

## 2019-12-03 PROCEDURE — 97530 THERAPEUTIC ACTIVITIES: CPT

## 2019-12-03 PROCEDURE — 85025 COMPLETE CBC W/AUTO DIFF WBC: CPT

## 2019-12-03 RX ADMIN — BUPROPION HYDROCHLORIDE 100 MG: 100 TABLET, FILM COATED, EXTENDED RELEASE ORAL at 10:02

## 2019-12-03 RX ADMIN — HEPARIN SODIUM 5000 UNITS: 5000 INJECTION, SOLUTION INTRAVENOUS; SUBCUTANEOUS at 06:10

## 2019-12-03 RX ADMIN — TICAGRELOR 90 MG: 90 TABLET ORAL at 21:27

## 2019-12-03 RX ADMIN — Medication 400 MG: at 10:01

## 2019-12-03 RX ADMIN — DOCUSATE SODIUM 100 MG: 100 CAPSULE, LIQUID FILLED ORAL at 10:01

## 2019-12-03 RX ADMIN — CARVEDILOL 12.5 MG: 12.5 TABLET, FILM COATED ORAL at 10:01

## 2019-12-03 RX ADMIN — ATORVASTATIN CALCIUM 80 MG: 40 TABLET, FILM COATED ORAL at 21:27

## 2019-12-03 RX ADMIN — HYDROCODONE BITARTRATE AND ACETAMINOPHEN 1 TABLET: 5; 325 TABLET ORAL at 21:27

## 2019-12-03 RX ADMIN — LEVOTHYROXINE SODIUM 50 MCG: 50 TABLET ORAL at 10:02

## 2019-12-03 RX ADMIN — RANOLAZINE 500 MG: 500 TABLET, FILM COATED, EXTENDED RELEASE ORAL at 10:01

## 2019-12-03 RX ADMIN — SODIUM CHLORIDE, PRESERVATIVE FREE 10 ML: 5 INJECTION INTRAVENOUS at 21:28

## 2019-12-03 RX ADMIN — SODIUM CHLORIDE, PRESERVATIVE FREE 10 ML: 5 INJECTION INTRAVENOUS at 10:02

## 2019-12-03 RX ADMIN — BUPROPION HYDROCHLORIDE 100 MG: 100 TABLET, FILM COATED, EXTENDED RELEASE ORAL at 21:27

## 2019-12-03 RX ADMIN — HYDROCODONE BITARTRATE AND ACETAMINOPHEN 1 TABLET: 5; 325 TABLET ORAL at 10:02

## 2019-12-03 RX ADMIN — Medication 1000 UNITS: at 10:01

## 2019-12-03 RX ADMIN — HEPARIN SODIUM 5000 UNITS: 5000 INJECTION, SOLUTION INTRAVENOUS; SUBCUTANEOUS at 17:17

## 2019-12-03 RX ADMIN — DULOXETINE HYDROCHLORIDE 60 MG: 30 CAPSULE, DELAYED RELEASE ORAL at 10:01

## 2019-12-03 RX ADMIN — ISOSORBIDE MONONITRATE 60 MG: 30 TABLET, EXTENDED RELEASE ORAL at 10:02

## 2019-12-03 RX ADMIN — ASPIRIN 81 MG 81 MG: 81 TABLET ORAL at 10:02

## 2019-12-03 RX ADMIN — CARVEDILOL 12.5 MG: 12.5 TABLET, FILM COATED ORAL at 17:18

## 2019-12-03 RX ADMIN — RANOLAZINE 500 MG: 500 TABLET, FILM COATED, EXTENDED RELEASE ORAL at 21:27

## 2019-12-03 RX ADMIN — DOCUSATE SODIUM 100 MG: 100 CAPSULE, LIQUID FILLED ORAL at 21:27

## 2019-12-03 RX ADMIN — INSULIN GLARGINE 55 UNITS: 100 INJECTION, SOLUTION SUBCUTANEOUS at 21:27

## 2019-12-03 RX ADMIN — TICAGRELOR 90 MG: 90 TABLET ORAL at 10:01

## 2019-12-03 RX ADMIN — INSULIN LISPRO 2 UNITS: 100 INJECTION, SOLUTION INTRAVENOUS; SUBCUTANEOUS at 21:28

## 2019-12-03 ASSESSMENT — PAIN DESCRIPTION - PAIN TYPE: TYPE: CHRONIC PAIN

## 2019-12-03 ASSESSMENT — PAIN DESCRIPTION - ORIENTATION: ORIENTATION: LOWER;MID;RIGHT

## 2019-12-03 ASSESSMENT — PAIN DESCRIPTION - ONSET: ONSET: ON-GOING

## 2019-12-03 ASSESSMENT — PAIN SCALES - GENERAL
PAINLEVEL_OUTOF10: 8
PAINLEVEL_OUTOF10: 9

## 2019-12-03 ASSESSMENT — PAIN DESCRIPTION - FREQUENCY: FREQUENCY: CONTINUOUS

## 2019-12-03 ASSESSMENT — PAIN - FUNCTIONAL ASSESSMENT: PAIN_FUNCTIONAL_ASSESSMENT: ACTIVITIES ARE NOT PREVENTED

## 2019-12-03 ASSESSMENT — PAIN DESCRIPTION - LOCATION: LOCATION: BACK;ARM

## 2019-12-03 ASSESSMENT — PAIN DESCRIPTION - DESCRIPTORS: DESCRIPTORS: ACHING;BURNING;CONSTANT

## 2019-12-03 ASSESSMENT — PAIN DESCRIPTION - PROGRESSION: CLINICAL_PROGRESSION: NOT CHANGED

## 2019-12-04 VITALS
DIASTOLIC BLOOD PRESSURE: 56 MMHG | WEIGHT: 293 LBS | SYSTOLIC BLOOD PRESSURE: 118 MMHG | TEMPERATURE: 98.4 F | HEIGHT: 64 IN | OXYGEN SATURATION: 93 % | BODY MASS INDEX: 50.02 KG/M2 | RESPIRATION RATE: 9 BRPM | HEART RATE: 75 BPM

## 2019-12-04 LAB
ANION GAP SERPL CALCULATED.3IONS-SCNC: 7 MMOL/L (ref 4–16)
BUN BLDV-MCNC: 58 MG/DL (ref 6–23)
CALCIUM SERPL-MCNC: 8.8 MG/DL (ref 8.3–10.6)
CHLORIDE BLD-SCNC: 100 MMOL/L (ref 99–110)
CO2: 31 MMOL/L (ref 21–32)
CREAT SERPL-MCNC: 2.2 MG/DL (ref 0.6–1.1)
GFR AFRICAN AMERICAN: 28 ML/MIN/1.73M2
GFR NON-AFRICAN AMERICAN: 23 ML/MIN/1.73M2
GLUCOSE BLD-MCNC: 102 MG/DL (ref 70–99)
GLUCOSE BLD-MCNC: 141 MG/DL (ref 70–99)
GLUCOSE BLD-MCNC: 72 MG/DL (ref 70–99)
GLUCOSE BLD-MCNC: 82 MG/DL (ref 70–99)
POTASSIUM SERPL-SCNC: 5.2 MMOL/L (ref 3.5–5.1)
SODIUM BLD-SCNC: 138 MMOL/L (ref 135–145)

## 2019-12-04 PROCEDURE — 80048 BASIC METABOLIC PNL TOTAL CA: CPT

## 2019-12-04 PROCEDURE — 90670 PCV13 VACCINE IM: CPT | Performed by: STUDENT IN AN ORGANIZED HEALTH CARE EDUCATION/TRAINING PROGRAM

## 2019-12-04 PROCEDURE — 6370000000 HC RX 637 (ALT 250 FOR IP): Performed by: INTERNAL MEDICINE

## 2019-12-04 PROCEDURE — 6360000002 HC RX W HCPCS: Performed by: STUDENT IN AN ORGANIZED HEALTH CARE EDUCATION/TRAINING PROGRAM

## 2019-12-04 PROCEDURE — 2580000003 HC RX 258: Performed by: INTERNAL MEDICINE

## 2019-12-04 PROCEDURE — 94761 N-INVAS EAR/PLS OXIMETRY MLT: CPT

## 2019-12-04 PROCEDURE — 36415 COLL VENOUS BLD VENIPUNCTURE: CPT

## 2019-12-04 PROCEDURE — G0009 ADMIN PNEUMOCOCCAL VACCINE: HCPCS | Performed by: STUDENT IN AN ORGANIZED HEALTH CARE EDUCATION/TRAINING PROGRAM

## 2019-12-04 PROCEDURE — 6360000002 HC RX W HCPCS: Performed by: HOSPITALIST

## 2019-12-04 PROCEDURE — 90686 IIV4 VACC NO PRSV 0.5 ML IM: CPT | Performed by: STUDENT IN AN ORGANIZED HEALTH CARE EDUCATION/TRAINING PROGRAM

## 2019-12-04 PROCEDURE — 94640 AIRWAY INHALATION TREATMENT: CPT

## 2019-12-04 PROCEDURE — G0008 ADMIN INFLUENZA VIRUS VAC: HCPCS | Performed by: STUDENT IN AN ORGANIZED HEALTH CARE EDUCATION/TRAINING PROGRAM

## 2019-12-04 PROCEDURE — 82962 GLUCOSE BLOOD TEST: CPT

## 2019-12-04 PROCEDURE — 97535 SELF CARE MNGMENT TRAINING: CPT

## 2019-12-04 PROCEDURE — 2700000000 HC OXYGEN THERAPY PER DAY

## 2019-12-04 RX ORDER — INSULIN GLARGINE 100 [IU]/ML
45 INJECTION, SOLUTION SUBCUTANEOUS NIGHTLY
Status: DISCONTINUED | OUTPATIENT
Start: 2019-12-04 | End: 2019-12-04 | Stop reason: HOSPADM

## 2019-12-04 RX ORDER — CHLORTHALIDONE 25 MG/1
25 TABLET ORAL DAILY
Qty: 30 TABLET | Refills: 0 | Status: ON HOLD | OUTPATIENT
Start: 2019-12-05 | End: 2020-01-11 | Stop reason: HOSPADM

## 2019-12-04 RX ORDER — ATORVASTATIN CALCIUM 80 MG/1
80 TABLET, FILM COATED ORAL NIGHTLY
Qty: 30 TABLET | Refills: 0 | Status: SHIPPED | OUTPATIENT
Start: 2019-12-04

## 2019-12-04 RX ORDER — CHLORTHALIDONE 25 MG/1
25 TABLET ORAL DAILY
Status: DISCONTINUED | OUTPATIENT
Start: 2019-12-04 | End: 2019-12-04 | Stop reason: HOSPADM

## 2019-12-04 RX ADMIN — SODIUM CHLORIDE, PRESERVATIVE FREE 10 ML: 5 INJECTION INTRAVENOUS at 09:46

## 2019-12-04 RX ADMIN — RANOLAZINE 500 MG: 500 TABLET, FILM COATED, EXTENDED RELEASE ORAL at 09:44

## 2019-12-04 RX ADMIN — BUPROPION HYDROCHLORIDE 100 MG: 100 TABLET, FILM COATED, EXTENDED RELEASE ORAL at 09:45

## 2019-12-04 RX ADMIN — CHLORTHALIDONE 25 MG: 25 TABLET ORAL at 12:07

## 2019-12-04 RX ADMIN — ASPIRIN 81 MG 81 MG: 81 TABLET ORAL at 09:44

## 2019-12-04 RX ADMIN — DOCUSATE SODIUM 100 MG: 100 CAPSULE, LIQUID FILLED ORAL at 09:44

## 2019-12-04 RX ADMIN — HEPARIN SODIUM 5000 UNITS: 5000 INJECTION, SOLUTION INTRAVENOUS; SUBCUTANEOUS at 07:05

## 2019-12-04 RX ADMIN — DULOXETINE HYDROCHLORIDE 60 MG: 30 CAPSULE, DELAYED RELEASE ORAL at 09:45

## 2019-12-04 RX ADMIN — PNEUMOCOCCAL 13-VALENT CONJUGATE VACCINE 0.5 ML: 2.2; 2.2; 2.2; 2.2; 2.2; 4.4; 2.2; 2.2; 2.2; 2.2; 2.2; 2.2; 2.2 INJECTION, SUSPENSION INTRAMUSCULAR at 16:31

## 2019-12-04 RX ADMIN — Medication 400 MG: at 09:45

## 2019-12-04 RX ADMIN — TICAGRELOR 90 MG: 90 TABLET ORAL at 09:45

## 2019-12-04 RX ADMIN — HYDROCODONE BITARTRATE AND ACETAMINOPHEN 1 TABLET: 5; 325 TABLET ORAL at 07:04

## 2019-12-04 RX ADMIN — Medication 2 PUFF: at 08:31

## 2019-12-04 RX ADMIN — ISOSORBIDE MONONITRATE 60 MG: 30 TABLET, EXTENDED RELEASE ORAL at 09:44

## 2019-12-04 RX ADMIN — INFLUENZA A VIRUS A/BRISBANE/02/2018 IVR-190 (H1N1) ANTIGEN (PROPIOLACTONE INACTIVATED), INFLUENZA A VIRUS A/KANSAS/14/2017 X-327 (H3N2) ANTIGEN (PROPIOLACTONE INACTIVATED), INFLUENZA B VIRUS B/MARYLAND/15/2016 ANTIGEN (PROPIOLACTONE INACTIVATED), INFLUENZA B VIRUS B/PHUKET/3073/2013 BVR-1B ANTIGEN (PROPIOLACTONE INACTIVATED) 0.5 ML: 15; 15; 15; 15 INJECTION, SUSPENSION INTRAMUSCULAR at 16:32

## 2019-12-04 RX ADMIN — LEVOTHYROXINE SODIUM 50 MCG: 50 TABLET ORAL at 09:45

## 2019-12-04 RX ADMIN — Medication 1000 UNITS: at 09:45

## 2019-12-04 RX ADMIN — CARVEDILOL 12.5 MG: 12.5 TABLET, FILM COATED ORAL at 09:45

## 2019-12-04 ASSESSMENT — PAIN SCALES - GENERAL: PAINLEVEL_OUTOF10: 9

## 2019-12-06 ENCOUNTER — HOSPITAL ENCOUNTER (OUTPATIENT)
Age: 59
Discharge: HOME | End: 2019-12-06

## 2019-12-06 LAB
ANION GAP SERPL CALCULATED.3IONS-SCNC: 10 MMOL/L (ref 4–16)
BUN BLDV-MCNC: 42 MG/DL (ref 6–23)
CALCIUM SERPL-MCNC: 8.6 MG/DL (ref 8.3–10.6)
CHLORIDE BLD-SCNC: 101 MMOL/L (ref 99–110)
CO2: 31 MMOL/L (ref 21–32)
CREAT SERPL-MCNC: 1.9 MG/DL (ref 0.6–1.1)
GFR AFRICAN AMERICAN: 33 ML/MIN/1.73M2
GFR NON-AFRICAN AMERICAN: 27 ML/MIN/1.73M2
GLUCOSE BLD-MCNC: 67 MG/DL (ref 70–99)
POTASSIUM SERPL-SCNC: 4.7 MMOL/L (ref 3.5–5.1)
SODIUM BLD-SCNC: 142 MMOL/L (ref 135–145)

## 2019-12-06 PROCEDURE — 80048 BASIC METABOLIC PNL TOTAL CA: CPT

## 2019-12-06 PROCEDURE — 36415 COLL VENOUS BLD VENIPUNCTURE: CPT

## 2019-12-13 ENCOUNTER — HOSPITAL ENCOUNTER (OUTPATIENT)
Age: 59
Setting detail: SPECIMEN
Discharge: HOME | End: 2019-12-13
Payer: MEDICARE

## 2019-12-13 LAB
ANION GAP SERPL CALCULATED.3IONS-SCNC: 13 MMOL/L (ref 4–16)
BUN BLDV-MCNC: 36 MG/DL (ref 6–23)
CALCIUM SERPL-MCNC: 8.3 MG/DL (ref 8.3–10.6)
CHLORIDE BLD-SCNC: 100 MMOL/L (ref 99–110)
CO2: 27 MMOL/L (ref 21–32)
CREAT SERPL-MCNC: 2.2 MG/DL (ref 0.6–1.1)
GFR AFRICAN AMERICAN: 28 ML/MIN/1.73M2
GFR NON-AFRICAN AMERICAN: 23 ML/MIN/1.73M2
GLUCOSE BLD-MCNC: 127 MG/DL (ref 70–99)
POTASSIUM SERPL-SCNC: 5.3 MMOL/L (ref 3.5–5.1)
SODIUM BLD-SCNC: 140 MMOL/L (ref 135–145)

## 2019-12-13 PROCEDURE — 36415 COLL VENOUS BLD VENIPUNCTURE: CPT

## 2019-12-13 PROCEDURE — 80048 BASIC METABOLIC PNL TOTAL CA: CPT

## 2019-12-20 ENCOUNTER — HOSPITAL ENCOUNTER (OUTPATIENT)
Age: 59
Setting detail: SPECIMEN
Discharge: HOME | End: 2019-12-20
Payer: MEDICARE

## 2019-12-20 LAB
ANION GAP SERPL CALCULATED.3IONS-SCNC: 9 MMOL/L (ref 4–16)
BUN BLDV-MCNC: 33 MG/DL (ref 6–23)
CALCIUM SERPL-MCNC: 8.5 MG/DL (ref 8.3–10.6)
CHLORIDE BLD-SCNC: 103 MMOL/L (ref 99–110)
CO2: 27 MMOL/L (ref 21–32)
CREAT SERPL-MCNC: 2.1 MG/DL (ref 0.6–1.1)
GFR AFRICAN AMERICAN: 29 ML/MIN/1.73M2
GFR NON-AFRICAN AMERICAN: 24 ML/MIN/1.73M2
GLUCOSE BLD-MCNC: 171 MG/DL (ref 70–99)
POTASSIUM SERPL-SCNC: 5.2 MMOL/L (ref 3.5–5.1)
SODIUM BLD-SCNC: 139 MMOL/L (ref 135–145)

## 2019-12-20 PROCEDURE — 36415 COLL VENOUS BLD VENIPUNCTURE: CPT

## 2019-12-20 PROCEDURE — 80048 BASIC METABOLIC PNL TOTAL CA: CPT

## 2019-12-27 ENCOUNTER — HOSPITAL ENCOUNTER (OUTPATIENT)
Age: 59
Setting detail: SPECIMEN
Discharge: HOME | End: 2019-12-27
Payer: MEDICARE

## 2019-12-27 LAB
ANION GAP SERPL CALCULATED.3IONS-SCNC: 10 MMOL/L (ref 4–16)
BUN BLDV-MCNC: 35 MG/DL (ref 6–23)
CALCIUM SERPL-MCNC: 8.9 MG/DL (ref 8.3–10.6)
CHLORIDE BLD-SCNC: 101 MMOL/L (ref 99–110)
CO2: 26 MMOL/L (ref 21–32)
CREAT SERPL-MCNC: 2 MG/DL (ref 0.6–1.1)
GFR AFRICAN AMERICAN: 31 ML/MIN/1.73M2
GFR NON-AFRICAN AMERICAN: 26 ML/MIN/1.73M2
GLUCOSE BLD-MCNC: 174 MG/DL (ref 70–99)
POTASSIUM SERPL-SCNC: 4.9 MMOL/L (ref 3.5–5.1)
SODIUM BLD-SCNC: 137 MMOL/L (ref 135–145)

## 2019-12-27 PROCEDURE — 80048 BASIC METABOLIC PNL TOTAL CA: CPT

## 2019-12-27 PROCEDURE — 36415 COLL VENOUS BLD VENIPUNCTURE: CPT

## 2020-01-06 ENCOUNTER — APPOINTMENT (OUTPATIENT)
Dept: CT IMAGING | Age: 60
DRG: 246 | End: 2020-01-06
Payer: MEDICARE

## 2020-01-06 ENCOUNTER — HOSPITAL ENCOUNTER (INPATIENT)
Age: 60
LOS: 5 days | Discharge: HOME HEALTH | DRG: 246 | End: 2020-01-11
Attending: EMERGENCY MEDICINE | Admitting: INTERNAL MEDICINE
Payer: MEDICARE

## 2020-01-06 ENCOUNTER — APPOINTMENT (OUTPATIENT)
Dept: GENERAL RADIOLOGY | Age: 60
DRG: 246 | End: 2020-01-06
Payer: MEDICARE

## 2020-01-06 PROBLEM — J90 PLEURAL EFFUSION ON LEFT: Status: ACTIVE | Noted: 2019-02-28

## 2020-01-06 LAB
ALBUMIN SERPL-MCNC: 3.6 GM/DL (ref 3.4–5)
ALP BLD-CCNC: 77 IU/L (ref 40–129)
ALT SERPL-CCNC: 9 U/L (ref 10–40)
ANION GAP SERPL CALCULATED.3IONS-SCNC: 9 MMOL/L (ref 4–16)
APTT: 36.6 SECONDS (ref 25.1–37.1)
AST SERPL-CCNC: 11 IU/L (ref 15–37)
BACTERIA: ABNORMAL /HPF
BASOPHILS ABSOLUTE: 0 K/CU MM
BASOPHILS RELATIVE PERCENT: 0.3 % (ref 0–1)
BILIRUB SERPL-MCNC: 0.5 MG/DL (ref 0–1)
BILIRUBIN URINE: NEGATIVE MG/DL
BLOOD, URINE: ABNORMAL
BUN BLDV-MCNC: 30 MG/DL (ref 6–23)
CALCIUM SERPL-MCNC: 8.8 MG/DL (ref 8.3–10.6)
CHLORIDE BLD-SCNC: 99 MMOL/L (ref 99–110)
CLARITY: ABNORMAL
CO2: 27 MMOL/L (ref 21–32)
COLOR: YELLOW
CREAT SERPL-MCNC: 1.7 MG/DL (ref 0.6–1.1)
DIFFERENTIAL TYPE: ABNORMAL
EKG ATRIAL RATE: 92 BPM
EKG DIAGNOSIS: NORMAL
EKG P AXIS: 50 DEGREES
EKG P-R INTERVAL: 192 MS
EKG Q-T INTERVAL: 380 MS
EKG QRS DURATION: 104 MS
EKG QTC CALCULATION (BAZETT): 469 MS
EKG R AXIS: 110 DEGREES
EKG T AXIS: 10 DEGREES
EKG VENTRICULAR RATE: 92 BPM
EOSINOPHILS ABSOLUTE: 0.1 K/CU MM
EOSINOPHILS RELATIVE PERCENT: 1.6 % (ref 0–3)
GFR AFRICAN AMERICAN: 37 ML/MIN/1.73M2
GFR NON-AFRICAN AMERICAN: 31 ML/MIN/1.73M2
GLUCOSE BLD-MCNC: 196 MG/DL (ref 70–99)
GLUCOSE BLD-MCNC: 219 MG/DL (ref 70–99)
GLUCOSE, URINE: NEGATIVE MG/DL
HCT VFR BLD CALC: 31.4 % (ref 37–47)
HEMOGLOBIN: 9.3 GM/DL (ref 12.5–16)
IMMATURE NEUTROPHIL %: 0.4 % (ref 0–0.43)
INR BLD: 1.07 INDEX
KETONES, URINE: NEGATIVE MG/DL
LEUKOCYTE ESTERASE, URINE: ABNORMAL
LIPASE: 12 IU/L (ref 13–60)
LYMPHOCYTES ABSOLUTE: 1 K/CU MM
LYMPHOCYTES RELATIVE PERCENT: 14.1 % (ref 24–44)
MAGNESIUM: 1.9 MG/DL (ref 1.8–2.4)
MCH RBC QN AUTO: 26.1 PG (ref 27–31)
MCHC RBC AUTO-ENTMCNC: 29.6 % (ref 32–36)
MCV RBC AUTO: 88.2 FL (ref 78–100)
MONOCYTES ABSOLUTE: 0.5 K/CU MM
MONOCYTES RELATIVE PERCENT: 7.4 % (ref 0–4)
MUCUS: ABNORMAL HPF
NITRITE URINE, QUANTITATIVE: NEGATIVE
NUCLEATED RBC %: 0 %
PDW BLD-RTO: 17.1 % (ref 11.7–14.9)
PH, URINE: 5 (ref 5–8)
PLATELET # BLD: 252 K/CU MM (ref 140–440)
PMV BLD AUTO: 10.5 FL (ref 7.5–11.1)
POTASSIUM SERPL-SCNC: 4.4 MMOL/L (ref 3.5–5.1)
PRO-BNP: ABNORMAL PG/ML
PROTEIN UA: 100 MG/DL
PROTHROMBIN TIME: 12.9 SECONDS (ref 11.7–14.5)
RBC # BLD: 3.56 M/CU MM (ref 4.2–5.4)
RBC URINE: 2 /HPF (ref 0–6)
SEGMENTED NEUTROPHILS ABSOLUTE COUNT: 5.2 K/CU MM
SEGMENTED NEUTROPHILS RELATIVE PERCENT: 76.2 % (ref 36–66)
SODIUM BLD-SCNC: 135 MMOL/L (ref 135–145)
SPECIFIC GRAVITY UA: 1.02 (ref 1–1.03)
SQUAMOUS EPITHELIAL: 1 /HPF
TOTAL IMMATURE NEUTOROPHIL: 0.03 K/CU MM
TOTAL NUCLEATED RBC: 0 K/CU MM
TOTAL PROTEIN: 5.9 GM/DL (ref 6.4–8.2)
TRICHOMONAS: ABNORMAL /HPF
TROPONIN T: 0.16 NG/ML
UROBILINOGEN, URINE: NORMAL MG/DL (ref 0.2–1)
WBC # BLD: 6.8 K/CU MM (ref 4–10.5)
WBC UA: 15 /HPF (ref 0–5)

## 2020-01-06 PROCEDURE — 2580000003 HC RX 258: Performed by: NURSE PRACTITIONER

## 2020-01-06 PROCEDURE — 82962 GLUCOSE BLOOD TEST: CPT

## 2020-01-06 PROCEDURE — 6370000000 HC RX 637 (ALT 250 FOR IP): Performed by: INTERNAL MEDICINE

## 2020-01-06 PROCEDURE — 87077 CULTURE AEROBIC IDENTIFY: CPT

## 2020-01-06 PROCEDURE — 85025 COMPLETE CBC W/AUTO DIFF WBC: CPT

## 2020-01-06 PROCEDURE — 6370000000 HC RX 637 (ALT 250 FOR IP): Performed by: EMERGENCY MEDICINE

## 2020-01-06 PROCEDURE — 6360000002 HC RX W HCPCS: Performed by: NURSE PRACTITIONER

## 2020-01-06 PROCEDURE — 80053 COMPREHEN METABOLIC PANEL: CPT

## 2020-01-06 PROCEDURE — 71045 X-RAY EXAM CHEST 1 VIEW: CPT

## 2020-01-06 PROCEDURE — 85610 PROTHROMBIN TIME: CPT

## 2020-01-06 PROCEDURE — 36415 COLL VENOUS BLD VENIPUNCTURE: CPT

## 2020-01-06 PROCEDURE — 83690 ASSAY OF LIPASE: CPT

## 2020-01-06 PROCEDURE — 93005 ELECTROCARDIOGRAM TRACING: CPT | Performed by: EMERGENCY MEDICINE

## 2020-01-06 PROCEDURE — 1200000000 HC SEMI PRIVATE

## 2020-01-06 PROCEDURE — 83735 ASSAY OF MAGNESIUM: CPT

## 2020-01-06 PROCEDURE — 85730 THROMBOPLASTIN TIME PARTIAL: CPT

## 2020-01-06 PROCEDURE — 96374 THER/PROPH/DIAG INJ IV PUSH: CPT

## 2020-01-06 PROCEDURE — 93010 ELECTROCARDIOGRAM REPORT: CPT | Performed by: INTERNAL MEDICINE

## 2020-01-06 PROCEDURE — 84484 ASSAY OF TROPONIN QUANT: CPT

## 2020-01-06 PROCEDURE — 6370000000 HC RX 637 (ALT 250 FOR IP): Performed by: NURSE PRACTITIONER

## 2020-01-06 PROCEDURE — 6360000002 HC RX W HCPCS: Performed by: EMERGENCY MEDICINE

## 2020-01-06 PROCEDURE — 74176 CT ABD & PELVIS W/O CONTRAST: CPT

## 2020-01-06 PROCEDURE — 87205 SMEAR GRAM STAIN: CPT

## 2020-01-06 PROCEDURE — 84703 CHORIONIC GONADOTROPIN ASSAY: CPT

## 2020-01-06 PROCEDURE — 87086 URINE CULTURE/COLONY COUNT: CPT

## 2020-01-06 PROCEDURE — 99285 EMERGENCY DEPT VISIT HI MDM: CPT

## 2020-01-06 PROCEDURE — 87186 SC STD MICRODIL/AGAR DIL: CPT

## 2020-01-06 PROCEDURE — 81001 URINALYSIS AUTO W/SCOPE: CPT

## 2020-01-06 PROCEDURE — 83880 ASSAY OF NATRIURETIC PEPTIDE: CPT

## 2020-01-06 RX ORDER — HYDROCODONE BITARTRATE AND ACETAMINOPHEN 5; 325 MG/1; MG/1
1 TABLET ORAL EVERY 4 HOURS PRN
COMMUNITY

## 2020-01-06 RX ORDER — HYDROCODONE BITARTRATE AND ACETAMINOPHEN 5; 325 MG/1; MG/1
1 TABLET ORAL ONCE
Status: COMPLETED | OUTPATIENT
Start: 2020-01-06 | End: 2020-01-06

## 2020-01-06 RX ORDER — DEXTROSE MONOHYDRATE 25 G/50ML
12.5 INJECTION, SOLUTION INTRAVENOUS PRN
Status: DISCONTINUED | OUTPATIENT
Start: 2020-01-06 | End: 2020-01-11 | Stop reason: HOSPADM

## 2020-01-06 RX ORDER — LORAZEPAM 1 MG/1
1 TABLET ORAL 2 TIMES DAILY
Status: ON HOLD | COMMUNITY
End: 2020-02-21 | Stop reason: HOSPADM

## 2020-01-06 RX ORDER — DOCUSATE SODIUM 100 MG/1
100 CAPSULE, LIQUID FILLED ORAL 2 TIMES DAILY
Status: DISCONTINUED | OUTPATIENT
Start: 2020-01-06 | End: 2020-01-11 | Stop reason: HOSPADM

## 2020-01-06 RX ORDER — DULOXETIN HYDROCHLORIDE 30 MG/1
60 CAPSULE, DELAYED RELEASE ORAL DAILY
Status: DISCONTINUED | OUTPATIENT
Start: 2020-01-06 | End: 2020-01-11 | Stop reason: HOSPADM

## 2020-01-06 RX ORDER — ALBUTEROL SULFATE 90 UG/1
2 AEROSOL, METERED RESPIRATORY (INHALATION) EVERY 4 HOURS PRN
Status: DISCONTINUED | OUTPATIENT
Start: 2020-01-06 | End: 2020-01-11 | Stop reason: HOSPADM

## 2020-01-06 RX ORDER — POLYETHYLENE GLYCOL 3350 17 G/17G
17 POWDER, FOR SOLUTION ORAL 2 TIMES DAILY
Status: ON HOLD | COMMUNITY
End: 2020-05-14

## 2020-01-06 RX ORDER — BISACODYL 10 MG
10 SUPPOSITORY, RECTAL RECTAL ONCE
Status: COMPLETED | OUTPATIENT
Start: 2020-01-06 | End: 2020-01-06

## 2020-01-06 RX ORDER — INSULIN GLARGINE 100 [IU]/ML
55 INJECTION, SOLUTION SUBCUTANEOUS NIGHTLY
Status: DISCONTINUED | OUTPATIENT
Start: 2020-01-06 | End: 2020-01-11 | Stop reason: HOSPADM

## 2020-01-06 RX ORDER — SODIUM CHLORIDE 0.9 % (FLUSH) 0.9 %
10 SYRINGE (ML) INJECTION EVERY 12 HOURS SCHEDULED
Status: DISCONTINUED | OUTPATIENT
Start: 2020-01-06 | End: 2020-01-08 | Stop reason: SDUPTHER

## 2020-01-06 RX ORDER — SODIUM CHLORIDE 0.9 % (FLUSH) 0.9 %
10 SYRINGE (ML) INJECTION PRN
Status: DISCONTINUED | OUTPATIENT
Start: 2020-01-06 | End: 2020-01-08 | Stop reason: SDUPTHER

## 2020-01-06 RX ORDER — LANOLIN ALCOHOL/MO/W.PET/CERES
400 CREAM (GRAM) TOPICAL DAILY
Status: DISCONTINUED | OUTPATIENT
Start: 2020-01-06 | End: 2020-01-11 | Stop reason: HOSPADM

## 2020-01-06 RX ORDER — CARVEDILOL 12.5 MG/1
12.5 TABLET ORAL 2 TIMES DAILY WITH MEALS
Status: DISCONTINUED | OUTPATIENT
Start: 2020-01-06 | End: 2020-01-11 | Stop reason: HOSPADM

## 2020-01-06 RX ORDER — DEXTROSE MONOHYDRATE 50 MG/ML
100 INJECTION, SOLUTION INTRAVENOUS PRN
Status: DISCONTINUED | OUTPATIENT
Start: 2020-01-06 | End: 2020-01-11 | Stop reason: HOSPADM

## 2020-01-06 RX ORDER — ASPIRIN 81 MG/1
81 TABLET, CHEWABLE ORAL DAILY
Status: DISCONTINUED | OUTPATIENT
Start: 2020-01-08 | End: 2020-01-08 | Stop reason: SDUPTHER

## 2020-01-06 RX ORDER — ATORVASTATIN CALCIUM 40 MG/1
80 TABLET, FILM COATED ORAL NIGHTLY
Status: DISCONTINUED | OUTPATIENT
Start: 2020-01-06 | End: 2020-01-11 | Stop reason: HOSPADM

## 2020-01-06 RX ORDER — ONDANSETRON 2 MG/ML
4 INJECTION INTRAMUSCULAR; INTRAVENOUS EVERY 6 HOURS PRN
Status: DISCONTINUED | OUTPATIENT
Start: 2020-01-06 | End: 2020-01-11 | Stop reason: HOSPADM

## 2020-01-06 RX ORDER — LEVOTHYROXINE SODIUM 0.05 MG/1
50 TABLET ORAL DAILY
Status: DISCONTINUED | OUTPATIENT
Start: 2020-01-06 | End: 2020-01-11 | Stop reason: HOSPADM

## 2020-01-06 RX ORDER — HEPARIN SODIUM 5000 [USP'U]/ML
5000 INJECTION, SOLUTION INTRAVENOUS; SUBCUTANEOUS EVERY 8 HOURS SCHEDULED
Status: DISCONTINUED | OUTPATIENT
Start: 2020-01-06 | End: 2020-01-11 | Stop reason: HOSPADM

## 2020-01-06 RX ORDER — NICOTINE POLACRILEX 4 MG
15 LOZENGE BUCCAL PRN
Status: DISCONTINUED | OUTPATIENT
Start: 2020-01-06 | End: 2020-01-11 | Stop reason: HOSPADM

## 2020-01-06 RX ORDER — HEPARIN SODIUM 5000 [USP'U]/ML
5000 INJECTION, SOLUTION INTRAVENOUS; SUBCUTANEOUS EVERY 8 HOURS SCHEDULED
Status: DISCONTINUED | OUTPATIENT
Start: 2020-01-06 | End: 2020-01-06

## 2020-01-06 RX ORDER — POLYETHYLENE GLYCOL 3350 17 G/17G
17 POWDER, FOR SOLUTION ORAL DAILY PRN
Status: DISCONTINUED | OUTPATIENT
Start: 2020-01-06 | End: 2020-01-11 | Stop reason: HOSPADM

## 2020-01-06 RX ORDER — FUROSEMIDE 10 MG/ML
40 INJECTION INTRAMUSCULAR; INTRAVENOUS ONCE
Status: COMPLETED | OUTPATIENT
Start: 2020-01-07 | End: 2020-01-07

## 2020-01-06 RX ORDER — PANTOPRAZOLE SODIUM 40 MG/1
40 TABLET, DELAYED RELEASE ORAL
Status: DISCONTINUED | OUTPATIENT
Start: 2020-01-07 | End: 2020-01-11 | Stop reason: HOSPADM

## 2020-01-06 RX ORDER — HYDROCODONE BITARTRATE AND ACETAMINOPHEN 5; 325 MG/1; MG/1
2 TABLET ORAL EVERY 4 HOURS PRN
Status: DISCONTINUED | OUTPATIENT
Start: 2020-01-06 | End: 2020-01-11 | Stop reason: HOSPADM

## 2020-01-06 RX ORDER — BUPROPION HYDROCHLORIDE 100 MG/1
100 TABLET, EXTENDED RELEASE ORAL 2 TIMES DAILY
Status: DISCONTINUED | OUTPATIENT
Start: 2020-01-06 | End: 2020-01-11 | Stop reason: HOSPADM

## 2020-01-06 RX ORDER — RANOLAZINE 500 MG/1
500 TABLET, EXTENDED RELEASE ORAL 2 TIMES DAILY
Status: DISCONTINUED | OUTPATIENT
Start: 2020-01-06 | End: 2020-01-11 | Stop reason: HOSPADM

## 2020-01-06 RX ORDER — POLYETHYLENE GLYCOL 3350 17 G/17G
17 POWDER, FOR SOLUTION ORAL 3 TIMES DAILY
Status: DISCONTINUED | OUTPATIENT
Start: 2020-01-06 | End: 2020-01-07

## 2020-01-06 RX ORDER — CHLORTHALIDONE 25 MG/1
25 TABLET ORAL DAILY
Status: DISCONTINUED | OUTPATIENT
Start: 2020-01-07 | End: 2020-01-07

## 2020-01-06 RX ORDER — FUROSEMIDE 10 MG/ML
40 INJECTION INTRAMUSCULAR; INTRAVENOUS ONCE
Status: COMPLETED | OUTPATIENT
Start: 2020-01-06 | End: 2020-01-06

## 2020-01-06 RX ORDER — ISOSORBIDE MONONITRATE 60 MG/1
60 TABLET, EXTENDED RELEASE ORAL DAILY
Status: DISCONTINUED | OUTPATIENT
Start: 2020-01-06 | End: 2020-01-11 | Stop reason: HOSPADM

## 2020-01-06 RX ADMIN — BUPROPION HYDROCHLORIDE 100 MG: 100 TABLET, FILM COATED, EXTENDED RELEASE ORAL at 20:29

## 2020-01-06 RX ADMIN — HYDROCODONE BITARTRATE AND ACETAMINOPHEN 2 TABLET: 5; 325 TABLET ORAL at 22:20

## 2020-01-06 RX ADMIN — HYDROCODONE BITARTRATE AND ACETAMINOPHEN 1 TABLET: 5; 325 TABLET ORAL at 17:22

## 2020-01-06 RX ADMIN — SODIUM CHLORIDE, PRESERVATIVE FREE 10 ML: 5 INJECTION INTRAVENOUS at 20:34

## 2020-01-06 RX ADMIN — BISACODYL 10 MG: 10 SUPPOSITORY RECTAL at 22:20

## 2020-01-06 RX ADMIN — ATORVASTATIN CALCIUM 80 MG: 20 TABLET, FILM COATED ORAL at 20:29

## 2020-01-06 RX ADMIN — INSULIN LISPRO 2 UNITS: 100 INJECTION, SOLUTION INTRAVENOUS; SUBCUTANEOUS at 20:30

## 2020-01-06 RX ADMIN — HEPARIN SODIUM 5000 UNITS: 5000 INJECTION, SOLUTION INTRAVENOUS; SUBCUTANEOUS at 22:20

## 2020-01-06 RX ADMIN — DOCUSATE SODIUM 100 MG: 100 CAPSULE, LIQUID FILLED ORAL at 20:29

## 2020-01-06 RX ADMIN — INSULIN GLARGINE 55 UNITS: 100 INJECTION, SOLUTION SUBCUTANEOUS at 22:20

## 2020-01-06 RX ADMIN — FUROSEMIDE 40 MG: 10 INJECTION, SOLUTION INTRAVENOUS at 15:36

## 2020-01-06 RX ADMIN — RANOLAZINE 500 MG: 500 TABLET, FILM COATED, EXTENDED RELEASE ORAL at 20:29

## 2020-01-06 RX ADMIN — CARVEDILOL 12.5 MG: 12.5 TABLET, FILM COATED ORAL at 20:29

## 2020-01-06 ASSESSMENT — PAIN SCALES - GENERAL
PAINLEVEL_OUTOF10: 10
PAINLEVEL_OUTOF10: 0
PAINLEVEL_OUTOF10: 5
PAINLEVEL_OUTOF10: 9

## 2020-01-06 NOTE — ED PROVIDER NOTES
I independently examined and evaluated Lucius Simon. In brief their history revealed constipation, decreased bowel movement. Concerned she might have a bowel blockage. . Their exam revealed male who is awake, alert, oriented x4. Speaks in full sentences. Soft abdomen. Patient states she has had some shortness of breath and some pain on her left chest with breathing. States she does wear 2 L of oxygen all the time. States she is in a rehab to get stronger so she can have a CABG approximately 2 weeks from today. States her abdomen feels full. . All diagnostic, treatment, and disposition decisions were made by myself in conjunction with the mid-level provider. Before disposition, questions were sought and answered with the patient. They have voiced understanding and agree with the plan: Patient CBC shows a normal white count of 6.8. Hemoglobin 9.3. Electrolytes show a glucose of 196. Creatinine of 1.7. Lipase decreased to 12. .  Hemoglobin and chronic elevation in creatinine are not new for patient. These are her baseline. CT abdomen shows no evidence of obstructive uropathy. New left-sided pleural effusion. Nonobstructing kidney stones. Status post cholecystectomy. No bowel obstruction. No large amount of stool noted to be in colon. CXR, EKG, trop, BNP added. EKG does not show any acute findings at this time. Chest x-ray shows pulmonary congestion. BNP is elevated 14,000. Troponin is 0.160. No EKG changes. Did give her 40 of Lasix IV. Spoke to her cardiologist, Dr. Benito Koenig, recommends admit. Will admit to hospitalist for further evaluation and treatment. T scan does  a large left-sided pleural effusion that may need to be drained. For all further details of the patient's emergency department visit, please see the mid-level practitioner's documentation.      The Ekg interpreted by me shows  normal sinus rhythm with a rate of 92  Axis is   Normal  QTc is  normal  Left posterior fasicular block    ST Segments: T wave inversion in III  No significant change from prior EKG dated 4-       XR CHEST PORTABLE (Final result)   Result time 01/06/20 14:40:08   Final result by Jamshid Kay MD (01/06/20 14:40:08)                Impression:    1. Cardiomegaly with mild vascular congestion. Narrative:    EXAMINATION:  ONE XRAY VIEW OF THE CHEST    1/6/2020 2:10 pm    COMPARISON:  11/28/2019. HISTORY:  ORDERING SYSTEM PROVIDED HISTORY: chest pain  TECHNOLOGIST PROVIDED HISTORY:  Reason for exam:->chest pain  Reason for Exam: chest pain  Acuity: Acute  Type of Exam: Initial    FINDINGS:  There are low lung volumes.  The heart size is enlarged but unchanged.  The  pulmonary vasculature is mildly congested.  No acute infiltrates are seen. No pneumothoraces are noted.                    CT ABDOMEN PELVIS WO CONTRAST Additional Contrast? None (Final result)   Result time 01/06/20 13:33:47   Procedure changed from [facility]   Final result by Loretta Driver MD (01/06/20 13:33:47)                Impression:    1. No evidence of obstructive uropathy. 2. There is a small amount of free fluid in the left pericolic gutter of  unclear etiology. 3. There is also a new moderate subpulmonic left-sided pleural effusion. There is associated left basilar atelectasis.  Again, this is of unclear  etiology. 4. Solitary nonobstructing punctate bilateral kidney stones noted. 5. Status post cholecystectomy. Narrative:    EXAMINATION:  CT OF THE ABDOMEN AND PELVIS WITHOUT CONTRAST 1/6/2020 1:21 pm    TECHNIQUE:  CT of the abdomen and pelvis was performed without the administration of  intravenous contrast. Multiplanar reformatted images are provided for review. Dose modulation, iterative reconstruction, and/or weight based adjustment of  the mA/kV was utilized to reduce the radiation dose to as low as reasonably  achievable.     COMPARISON:  Abdominal/pelvic CT, 07/13/2019. HISTORY:  ORDERING SYSTEM PROVIDED HISTORY: abdominal pain, constipation; rule out  obstruction  TECHNOLOGIST PROVIDED HISTORY:  Reason for exam:->abdominal pain, constipation; rule out obstruction  Additional Contrast?->None  Reason for Exam: abdominal pain, constipation; rule out obstruction  Acuity: Acute  Type of Exam: Initial  Relevant Medical/Surgical History: hx CKD, diabetic, cuhy    FINDINGS:  Lower Chest: A new moderate left-sided pleural effusion has developed since  the previous exam with left basilar atelectasis.  The right lung base remains  clear.  The visualized cardiac and posterior mediastinal structures are  unchanged. Organs:  Within the abdomen, the unenhanced liver, spleen, pancreas and  adrenal glands are within normal limits.  Cholecystectomy has been performed. Punctate 1 mm nonobstructing mid right kidney stone noted.  There is also a  solitary 1 mm nonobstructing mid left kidney stone on image 54.  No  hydronephrosis or ureterolithiasis identified. GI/Bowel:  The unopacified small bowel is grossly unremarkable.  No free air  is identified. Isai Hoose is a small amount of free fluid in the left pericolic  gutter. Retroperitoneum/Peritoneum:  No obvious lymphadenopathy is seen although  evaluation is limited by the lack of IV contrast.  The abdominal aorta  appears normal in caliber with atherosclerotic calcifications. Pelvis:  Within the pelvis, the urinary bladder is grossly unremarkable.  No  obvious abnormality of the uterus or adnexal structures identified. Bones/Soft Tissues: No acute osseous abnormalities are identified.                        Richi Valverde MD  01/06/20 1526

## 2020-01-06 NOTE — ED NOTES
Patient crying reporting back pain states the bed is uncomfortable MD made aware awaiting order.       Juanita Cardenas RN  01/06/20 [id number]

## 2020-01-06 NOTE — PROGRESS NOTES
vitamin D 1000 units CAPS Take 3 capsules by mouth daily 7/18/19  Yes Renee Rodriguez MD   ondansetron (ZOFRAN-ODT) 4 MG disintegrating tablet Take 1 tablet by mouth every 8 hours as needed for Nausea or Vomiting 3/17/19  Yes Lorna Stevens MD   ranolazine (RANEXA) 500 MG extended release tablet Take 1 tablet by mouth 2 times daily 3/4/19  Yes MD Rosangela   BREO ELLIPTA 100-25 MCG/INH AEPB inhaler Inhale 1 puff into the lungs daily 2/26/19  Yes Historical Provider, MD   levothyroxine (SYNTHROID) 50 MCG tablet Take 50 mcg by mouth daily 2/26/19  Yes Historical Provider, MD   insulin glargine (LANTUS SOLOSTAR) 100 UNIT/ML injection pen Inject 55 Units into the skin nightly 11/21/18  Yes MD Rosangela   linagliptin (TRADJENTA) 5 MG tablet Take 1 tablet by mouth daily 11/22/18  Yes MD Rosangela   buPROPion [facility]) 100 MG extended release tablet Take 100 mg by mouth 2 times daily   Yes Historical Provider, MD   aspirin 81 MG chewable tablet Take 1 tablet by mouth daily 1/8/18  Yes Susen Eisenmenger, MD   DULoxetine (CYMBALTA) 60 MG extended release capsule Take 1 capsule by mouth daily 1/8/18  Yes Susen Eisenmenger, MD   docusate (COLACE, DULCOLAX) 100 MG CAPS Take 100 mg by mouth 2 times daily 1/8/18  Yes Susen Eisenmenger, MD   pantoprazole (PROTONIX) 40 MG tablet Take 1 tablet by mouth every morning (before breakfast) 1/8/18  Yes Susen Eisenmenger, MD   albuterol sulfate HFA (VENTOLIN HFA) 108 (90 Base) MCG/ACT inhaler Inhale 2 puffs into the lungs every 4 hours as needed for Wheezing 1/8/18  Yes Susen Eisenmenger, MD   nystatin (MYCOSTATIN) 561750 UNIT/GM powder Apply topically 2 times daily as needed 4/19/19   Historical Provider, MD       Medications added or changed (ex.  new medication, dosage change, interval change, formulation change):  Lorazepam 1 mg BID (added)  Magnesium oxide frequency change from daily to BID ( daily ordered)  Polyethylene 17 g BID (added)  Norco 5/325 2 tablets every 4 hours prn (added)    Medications requiring reconciliation with provider:    Lorazepam 1 mg BID (added)  Magnesium oxide frequency change from daily to BID ( daily ordered)  Polyethylene 17 g BID (added)  Norco 5/325 2 tablets every 4 hours prn (added)    Comments:  MAR provided by TON SEGAL   Last doses marked.     To my knowledge the above medication history is accurate as of 1/6/2020 5:13 PM.   Faith Esteves CPhT   1/6/2020 5:13 PM

## 2020-01-06 NOTE — H&P
education: None    Highest education level: None   Occupational History    None   Social Needs    Financial resource strain: None    Food insecurity:     Worry: None     Inability: None    Transportation needs:     Medical: None     Non-medical: None   Tobacco Use    Smoking status: Current Every Day Smoker     Packs/day: 0.20     Years: 34.00     Pack years: 6.80     Types: Cigarettes     Last attempt to quit: 2000     Years since quittin.0    Smokeless tobacco: Never Used   Substance and Sexual Activity    Alcohol use: No     Alcohol/week: 0.0 standard drinks    Drug use: No    Sexual activity: None   Lifestyle    Physical activity:     Days per week: None     Minutes per session: None    Stress: None   Relationships    Social connections:     Talks on phone: None     Gets together: None     Attends Scientology service: None     Active member of club or organization: None     Attends meetings of clubs or organizations: None     Relationship status: None    Intimate partner violence:     Fear of current or ex partner: None     Emotionally abused: None     Physically abused: None     Forced sexual activity: None   Other Topics Concern    None   Social History Narrative    None       Medications:   Medications:      chlorthalidone (HYGROTON) 25 MG tablet Take 1 tablet by mouth daily Shana Walters DO Needs Review   atorvastatin (LIPITOR) 80 MG tablet Take 1 tablet by mouth nightly Brina Posada DO Needs Review   insulin lispro (HUMALOG) 100 UNIT/ML injection vial Check blood sugar three times daily before meals and at bedtime   For blood sugar less than 150= no insulin, 151-200=2, 201-250=4, 251-300=6, 301-350=6, 351-400=8, >400=10 units and call MD Zach Meade MD Needs Review   carvedilol (COREG) 12.5 MG tablet Take 1 tablet by mouth 2 times daily (with meals) Zach Meade MD Needs Review   isosorbide mononitrate (IMDUR) 60 MG extended release tablet Take 1 tablet by mouth are  unchanged. Organs:  Within the abdomen, the unenhanced liver, spleen, pancreas and  adrenal glands are within normal limits.  Cholecystectomy has been performed. Punctate 1 mm nonobstructing mid right kidney stone noted.  There is also a  solitary 1 mm nonobstructing mid left kidney stone on image 54.  No  hydronephrosis or ureterolithiasis identified. GI/Bowel:  The unopacified small bowel is grossly unremarkable.  No free air  is identified. Isai Hoose is a small amount of free fluid in the left pericolic  gutter. Retroperitoneum/Peritoneum:  No obvious lymphadenopathy is seen although  evaluation is limited by the lack of IV contrast.  The abdominal aorta  appears normal in caliber with atherosclerotic calcifications. Pelvis:  Within the pelvis, the urinary bladder is grossly unremarkable.  No  obvious abnormality of the uterus or adnexal structures identified. Bones/Soft Tissues: No acute osseous abnormalities are identified.     Impression:       1. No evidence of obstructive uropathy. 2. There is a small amount of free fluid in the left pericolic gutter of  unclear etiology. 3. There is also a new moderate subpulmonic left-sided pleural effusion. There is associated left basilar atelectasis.  Again, this is of unclear  etiology. 4. Solitary nonobstructing punctate bilateral kidney stones noted.   5. Status post cholecystectomy.     CT ABDOMEN PELVIS W IV CONTRAST [[id number]]      Order Status: Canceled          Normal sinus rhythm   Low voltage QRS   Left posterior fascicular block   Nonspecific ST abnormality   Abnormal ECG   When compared with ECG of 23-NOV-2019 05:08,   Significant changes have occurred     Electronically signed by THAO Beckwith NP on 1/6/2020 at 3:52 PM

## 2020-01-06 NOTE — ED PROVIDER NOTES
(RANEXA) 500 MG extended release tablet Take 1 tablet by mouth 2 times daily 60 tablet 3    BREO ELLIPTA 100-25 MCG/INH AEPB inhaler Inhale 1 puff into the lungs daily  0    levothyroxine (SYNTHROID) 50 MCG tablet Take 50 mcg by mouth daily  3    insulin glargine (LANTUS SOLOSTAR) 100 UNIT/ML injection pen Inject 55 Units into the skin nightly 5 pen 1    linagliptin (TRADJENTA) 5 MG tablet Take 1 tablet by mouth daily 30 tablet 3    buPROPion (WELLBUTRIN SR) 100 MG extended release tablet Take 100 mg by mouth 2 times daily      aspirin 81 MG chewable tablet Take 1 tablet by mouth daily 30 tablet 0    DULoxetine (CYMBALTA) 60 MG extended release capsule Take 1 capsule by mouth daily 30 capsule 3    docusate (COLACE, DULCOLAX) 100 MG CAPS Take 100 mg by mouth 2 times daily 60 capsule 1    pantoprazole (PROTONIX) 40 MG tablet Take 1 tablet by mouth every morning (before breakfast) 30 tablet 3    albuterol sulfate HFA (VENTOLIN HFA) 108 (90 Base) MCG/ACT inhaler Inhale 2 puffs into the lungs every 4 hours as needed for Wheezing 1 Inhaler 3       ALLERGIES    Allergies   Allergen Reactions    Augmentin [Amoxicillin-Pot Clavulanate] Diarrhea       SOCIAL AND FAMILY HISTORY    Social History     Socioeconomic History    Marital status: Single     Spouse name: None    Number of children: None    Years of education: None    Highest education level: None   Occupational History    None   Social Needs    Financial resource strain: None    Food insecurity:     Worry: None     Inability: None    Transportation needs:     Medical: None     Non-medical: None   Tobacco Use    Smoking status: Current Every Day Smoker     Packs/day: 0.20     Years: 34.00     Pack years: 6.80     Types: Cigarettes     Last attempt to quit: 2000     Years since quittin.0    Smokeless tobacco: Never Used   Substance and Sexual Activity    Alcohol use: No     Alcohol/week: 0.0 standard drinks    Drug use: No    Sexual activity: None   Lifestyle    Physical activity:     Days per week: None     Minutes per session: None    Stress: None   Relationships    Social connections:     Talks on phone: None     Gets together: None     Attends [employer]ist service: None     Active member of club or organization: None     Attends meetings of clubs or organizations: None     Relationship status: None    Intimate partner violence:     Fear of current or ex partner: None     Emotionally abused: None     Physically abused: None     Forced sexual activity: None   Other Topics Concern    None   Social History Narrative    None     Family History   Problem Relation Age of Onset    Arthritis Mother     Diabetes Mother     Heart Disease Mother     High Blood Pressure Mother     Arthritis Father     Heart Disease Father     High Blood Pressure Father     Stroke Father     Substance Abuse Father     Substance Abuse Brother     Diabetes Maternal Aunt     Diabetes Maternal Uncle     Cancer Maternal Grandmother     Diabetes Maternal Grandmother     Diabetes Maternal Grandfather        PHYSICAL EXAM    VITAL SIGNS: BP (!) 162/78   Pulse 92   Temp 98.5 °F (36.9 °C) (Oral)   Resp 19   Ht 5' 4\" (1.626 m)   Wt (!) 308 lb (139.7 kg)   SpO2 99%   BMI 52.87 kg/m²   Constitutional:  Well developed, well nourished. No distress  Eyes:  Sclera nonicteric, conjunctiva moist  HENT:  Atraumatic. PERRL. EOMI. Moist mucus membranes. Neck/Lymphatics: supple, no JVD, no swollen nodes  Respiratory:  No retractions, no accessory muscle use, normal breath sounds   Cardiovascular:  normal rate, normal rhythm, no murmurs    GI:    No gross discoloration.       -no Munden's sign (periumbilical ecchymosis)       -no Grey-Padilla's sign (flank ecchymosis)    Bowel sounds present, no audible bruits. Soft, no distention, no guarding, no rigidity  + mild abdominal tenderness to palpation in all quadrants subjectively per patient's report.   No objective  37 (L) >60 mL/min/1.73m2    Anion Gap 9 4 - 16   Lipase   Result Value Ref Range    Lipase 12 (L) 13 - 60 IU/L   Urinalysis Reflex to Culture   Result Value Ref Range    Color, UA YELLOW YELLOW    Clarity, UA HAZY (A) CLEAR    Glucose, Urine NEGATIVE NEGATIVE MG/DL    Bilirubin Urine NEGATIVE NEGATIVE MG/DL    Ketones, Urine NEGATIVE NEGATIVE MG/DL    Specific Gravity, UA 1.017 1.001 - 1.035    Blood, Urine SMALL (A) NEGATIVE    pH, Urine 5.0 5.0 - 8.0    Protein,  (A) NEGATIVE MG/DL    Urobilinogen, Urine NORMAL 0.2 - 1.0 MG/DL    Nitrite Urine, Quantitative NEGATIVE NEGATIVE    Leukocyte Esterase, Urine TRACE (A) NEGATIVE    RBC, UA 2 0 - 6 /HPF    WBC, UA 15 (H) 0 - 5 /HPF    Bacteria, UA OCCASIONAL (A) NEGATIVE /HPF    Squam Epithel, UA 1 /HPF    Mucus, UA RARE (A) NEGATIVE HPF    Trichomonas, UA NONE SEEN NONE SEEN /HPF   Brain Natriuretic Peptide   Result Value Ref Range    Pro-BNP 13,667 (H) <300 PG/ML   Troponin   Result Value Ref Range    Troponin T 0.160 (HH) <0.01 NG/ML   Protime/INR & PTT   Result Value Ref Range    Protime 12.9 11.7 - 14.5 SECONDS    INR 1.07 INDEX    aPTT 36.6 25.1 - 37.1 SECONDS   Magnesium   Result Value Ref Range    Magnesium 1.9 1.8 - 2.4 mg/dl   EKG 12 Lead   Result Value Ref Range    Ventricular Rate 92 BPM    Atrial Rate 92 BPM    P-R Interval 192 ms    QRS Duration 104 ms    Q-T Interval 380 ms    QTc Calculation (Bazett) 469 ms    P Axis 50 degrees    R Axis 110 degrees    T Axis 10 degrees    Diagnosis       Normal sinus rhythm  Low voltage QRS  Left posterior fascicular block  Nonspecific ST abnormality  Abnormal ECG  When compared with ECG of 23-NOV-2019 05:08,  Significant changes have occurred  Confirmed by [facility] Claudia BOUDREAUX ([id number]) on 1/6/2020 4:02:19 PM         EKG Interpretation  Please see ED physician's note for EKG interpretation - Dr. Debbie Nassar        RADIOLOGY/PROCEDURES    XR CHEST PORTABLE   Final Result   1.  Cardiomegaly with mild vascular congestion. CT ABDOMEN PELVIS WO CONTRAST Additional Contrast? None   Final Result   1. No evidence of obstructive uropathy. 2. There is a small amount of free fluid in the left pericolic gutter of   unclear etiology. 3. There is also a new moderate subpulmonic left-sided pleural effusion. There is associated left basilar atelectasis. Again, this is of unclear   etiology. 4. Solitary nonobstructing punctate bilateral kidney stones noted. 5. Status post cholecystectomy. ED COURSE & MEDICAL DECISION MAKING       Vital signs and nursing notes reviewed during ED course. Patient care and presentation staffed with and seen in conjunction with supervising physician, Dr. Phu Edwards, today. Patient presents as above which prompted workup. While in the ED today, labs, EKG, and imaging were obtained. For EKG interpretation-see ED physician's note. Labs reveal improving anemia with hemoglobin of 9.3, chronic kidney disease at baseline with creatinine of 1.7, [place] [address], elevated troponin of 0.160, and urine does show 15 WBCs but patient is asymptomatic and therefore will defer treatment to urine culture. Patient treated with 40 mg IV Lasix given elevated BNP and slight shortness of breath with concern for CHF exacerbation. Chest x-ray reveals cardiomegaly with mild pulmonary vascular congestion. CT of abdomen pelvis reveals a small amount of free fluid in the left pericolic gutter of unclear etiology as well as this new moderate subpulmonic left sided pleural effusion with left basilar atelectasis of unclear etiology. No evidence of obstruction is present. Abdominal exam without peritoneal signs. Patient and I discussed she will benefit from a bowel regimen to help with her constipation. Supervising physician spoke with patient's cardiologist, Dr. Jovi Hamilton, who recommended admission.   Supervising physician then consulted with hospitalist.  Hospitalist agrees to admit the

## 2020-01-06 NOTE — ED NOTES
Patient reports constipation for three days states she has not taken anything for constipation \"because last time she took a stool softer it gave me diarrhea \"      Arelis Engel, JUNI  01/06/20 [id number]

## 2020-01-06 NOTE — ED TRIAGE NOTES
Pt brought to the ED by nahomy from [place] for Abd Pain and 3 Days Constipation. Pt states that she has had no laxatives.

## 2020-01-07 ENCOUNTER — APPOINTMENT (OUTPATIENT)
Dept: GENERAL RADIOLOGY | Age: 60
DRG: 246 | End: 2020-01-07
Payer: MEDICARE

## 2020-01-07 ENCOUNTER — APPOINTMENT (OUTPATIENT)
Dept: INTERVENTIONAL RADIOLOGY/VASCULAR | Age: 60
DRG: 246 | End: 2020-01-07
Payer: MEDICARE

## 2020-01-07 LAB
ANION GAP SERPL CALCULATED.3IONS-SCNC: 11 MMOL/L (ref 4–16)
BASOPHILS ABSOLUTE: 0 K/CU MM
BASOPHILS RELATIVE PERCENT: 0.3 % (ref 0–1)
BUN BLDV-MCNC: 32 MG/DL (ref 6–23)
CALCIUM SERPL-MCNC: 8.8 MG/DL (ref 8.3–10.6)
CHLORIDE BLD-SCNC: 103 MMOL/L (ref 99–110)
CO2: 27 MMOL/L (ref 21–32)
CREAT SERPL-MCNC: 1.7 MG/DL (ref 0.6–1.1)
DIFFERENTIAL TYPE: ABNORMAL
EOSINOPHILS ABSOLUTE: 0.1 K/CU MM
EOSINOPHILS RELATIVE PERCENT: 1.1 % (ref 0–3)
FLUID TYPE: NORMAL INDEX
GFR AFRICAN AMERICAN: 37 ML/MIN/1.73M2
GFR NON-AFRICAN AMERICAN: 31 ML/MIN/1.73M2
GLUCOSE BLD-MCNC: 137 MG/DL (ref 70–99)
GLUCOSE BLD-MCNC: 139 MG/DL (ref 70–99)
GLUCOSE BLD-MCNC: 145 MG/DL (ref 70–99)
GLUCOSE BLD-MCNC: 145 MG/DL (ref 70–99)
GLUCOSE BLD-MCNC: 157 MG/DL (ref 70–99)
GLUCOSE, FLUID: 154 MG/DL
HCT VFR BLD CALC: 30.5 % (ref 37–47)
HEMOGLOBIN: 9 GM/DL (ref 12.5–16)
IMMATURE NEUTROPHIL %: 0.6 % (ref 0–0.43)
LACTATE DEHYDROGENASE, FLUID: 72 IU/L
LYMPHOCYTES ABSOLUTE: 0.9 K/CU MM
LYMPHOCYTES RELATIVE PERCENT: 12.8 % (ref 24–44)
LYMPHOCYTES, BODY FLUID: 66 %
MCH RBC QN AUTO: 26.1 PG (ref 27–31)
MCHC RBC AUTO-ENTMCNC: 29.5 % (ref 32–36)
MCV RBC AUTO: 88.4 FL (ref 78–100)
MESOTHELIAL FLUID: 7 /100 WBC
MONOCYTE, FLUID: 31 %
MONOCYTES ABSOLUTE: 0.6 K/CU MM
MONOCYTES RELATIVE PERCENT: 8 % (ref 0–4)
NEUTROPHIL, FLUID: 3 %
NUCLEATED RBC %: 0 %
OTHER CELLS FLUID: 0
PDW BLD-RTO: 16.7 % (ref 11.7–14.9)
PH FLUID: 8
PLATELET # BLD: 247 K/CU MM (ref 140–440)
PMV BLD AUTO: 10.7 FL (ref 7.5–11.1)
POTASSIUM SERPL-SCNC: 4.5 MMOL/L (ref 3.5–5.1)
PROTEIN FLUID: 1.7 GM/DL
RBC # BLD: 3.45 M/CU MM (ref 4.2–5.4)
RBC FLUID: 5000 /CU MM
SEGMENTED NEUTROPHILS ABSOLUTE COUNT: 5.4 K/CU MM
SEGMENTED NEUTROPHILS RELATIVE PERCENT: 77.2 % (ref 36–66)
SODIUM BLD-SCNC: 141 MMOL/L (ref 135–145)
TOTAL IMMATURE NEUTOROPHIL: 0.04 K/CU MM
TOTAL NUCLEATED RBC: 0 K/CU MM
TROPONIN T: 0.13 NG/ML
TROPONIN T: 0.14 NG/ML
WBC # BLD: 7 K/CU MM (ref 4–10.5)
WBC FLUID: 369 /CU MM

## 2020-01-07 PROCEDURE — 88108 CYTOPATH CONCENTRATE TECH: CPT

## 2020-01-07 PROCEDURE — 36415 COLL VENOUS BLD VENIPUNCTURE: CPT

## 2020-01-07 PROCEDURE — 2580000003 HC RX 258: Performed by: NURSE PRACTITIONER

## 2020-01-07 PROCEDURE — 87073 CULTURE BACTERIA ANAEROBIC: CPT

## 2020-01-07 PROCEDURE — 83986 ASSAY PH BODY FLUID NOS: CPT

## 2020-01-07 PROCEDURE — 2709999900 HC NON-CHARGEABLE SUPPLY

## 2020-01-07 PROCEDURE — C1729 CATH, DRAINAGE: HCPCS

## 2020-01-07 PROCEDURE — 6370000000 HC RX 637 (ALT 250 FOR IP): Performed by: INTERNAL MEDICINE

## 2020-01-07 PROCEDURE — 6360000002 HC RX W HCPCS: Performed by: NURSE PRACTITIONER

## 2020-01-07 PROCEDURE — 88305 TISSUE EXAM BY PATHOLOGIST: CPT

## 2020-01-07 PROCEDURE — 84157 ASSAY OF PROTEIN OTHER: CPT

## 2020-01-07 PROCEDURE — 6370000000 HC RX 637 (ALT 250 FOR IP): Performed by: NURSE PRACTITIONER

## 2020-01-07 PROCEDURE — 0W9B3ZX DRAINAGE OF LEFT PLEURAL CAVITY, PERCUTANEOUS APPROACH, DIAGNOSTIC: ICD-10-PCS | Performed by: RADIOLOGY

## 2020-01-07 PROCEDURE — 84484 ASSAY OF TROPONIN QUANT: CPT

## 2020-01-07 PROCEDURE — 80048 BASIC METABOLIC PNL TOTAL CA: CPT

## 2020-01-07 PROCEDURE — 93005 ELECTROCARDIOGRAM TRACING: CPT | Performed by: INTERNAL MEDICINE

## 2020-01-07 PROCEDURE — 82962 GLUCOSE BLOOD TEST: CPT

## 2020-01-07 PROCEDURE — 87205 SMEAR GRAM STAIN: CPT

## 2020-01-07 PROCEDURE — 6360000002 HC RX W HCPCS: Performed by: INTERNAL MEDICINE

## 2020-01-07 PROCEDURE — 83615 LACTATE (LD) (LDH) ENZYME: CPT

## 2020-01-07 PROCEDURE — 71045 X-RAY EXAM CHEST 1 VIEW: CPT

## 2020-01-07 PROCEDURE — 82945 GLUCOSE OTHER FLUID: CPT

## 2020-01-07 PROCEDURE — 85025 COMPLETE CBC W/AUTO DIFF WBC: CPT

## 2020-01-07 PROCEDURE — 87071 CULTURE AEROBIC QUANT OTHER: CPT

## 2020-01-07 PROCEDURE — 1200000000 HC SEMI PRIVATE

## 2020-01-07 PROCEDURE — 32555 ASPIRATE PLEURA W/ IMAGING: CPT

## 2020-01-07 PROCEDURE — 89051 BODY FLUID CELL COUNT: CPT

## 2020-01-07 RX ORDER — SODIUM CHLORIDE 9 MG/ML
INJECTION, SOLUTION INTRAVENOUS CONTINUOUS
Status: DISCONTINUED | OUTPATIENT
Start: 2020-01-07 | End: 2020-01-07

## 2020-01-07 RX ORDER — LACTULOSE 10 G/15ML
20 SOLUTION ORAL 3 TIMES DAILY
Status: DISCONTINUED | OUTPATIENT
Start: 2020-01-07 | End: 2020-01-11 | Stop reason: HOSPADM

## 2020-01-07 RX ORDER — METOLAZONE 5 MG/1
5 TABLET ORAL 2 TIMES DAILY
Status: DISCONTINUED | OUTPATIENT
Start: 2020-01-07 | End: 2020-01-10

## 2020-01-07 RX ORDER — SODIUM CHLORIDE 9 MG/ML
INJECTION, SOLUTION INTRAVENOUS CONTINUOUS
Status: DISCONTINUED | OUTPATIENT
Start: 2020-01-08 | End: 2020-01-08 | Stop reason: SDUPTHER

## 2020-01-07 RX ORDER — FUROSEMIDE 10 MG/ML
40 INJECTION INTRAMUSCULAR; INTRAVENOUS 3 TIMES DAILY
Status: DISCONTINUED | OUTPATIENT
Start: 2020-01-07 | End: 2020-01-08

## 2020-01-07 RX ADMIN — HYDROCODONE BITARTRATE AND ACETAMINOPHEN 2 TABLET: 5; 325 TABLET ORAL at 18:50

## 2020-01-07 RX ADMIN — CARVEDILOL 12.5 MG: 12.5 TABLET, FILM COATED ORAL at 18:50

## 2020-01-07 RX ADMIN — SODIUM CHLORIDE, PRESERVATIVE FREE 10 ML: 5 INJECTION INTRAVENOUS at 08:13

## 2020-01-07 RX ADMIN — Medication 400 MG: at 08:12

## 2020-01-07 RX ADMIN — BUPROPION HYDROCHLORIDE 100 MG: 100 TABLET, FILM COATED, EXTENDED RELEASE ORAL at 08:12

## 2020-01-07 RX ADMIN — FUROSEMIDE 40 MG: 10 INJECTION, SOLUTION INTRAMUSCULAR; INTRAVENOUS at 11:00

## 2020-01-07 RX ADMIN — CARVEDILOL 12.5 MG: 12.5 TABLET, FILM COATED ORAL at 08:12

## 2020-01-07 RX ADMIN — ONDANSETRON 4 MG: 2 INJECTION INTRAMUSCULAR; INTRAVENOUS at 11:05

## 2020-01-07 RX ADMIN — HEPARIN SODIUM 5000 UNITS: 5000 INJECTION, SOLUTION INTRAVENOUS; SUBCUTANEOUS at 20:24

## 2020-01-07 RX ADMIN — ATORVASTATIN CALCIUM 80 MG: 20 TABLET, FILM COATED ORAL at 20:24

## 2020-01-07 RX ADMIN — INSULIN LISPRO 2 UNITS: 100 INJECTION, SOLUTION INTRAVENOUS; SUBCUTANEOUS at 08:12

## 2020-01-07 RX ADMIN — HYDROCODONE BITARTRATE AND ACETAMINOPHEN 2 TABLET: 5; 325 TABLET ORAL at 11:04

## 2020-01-07 RX ADMIN — DOCUSATE SODIUM 100 MG: 100 CAPSULE, LIQUID FILLED ORAL at 20:24

## 2020-01-07 RX ADMIN — FUROSEMIDE 40 MG: 10 INJECTION, SOLUTION INTRAMUSCULAR; INTRAVENOUS at 15:24

## 2020-01-07 RX ADMIN — METOLAZONE 5 MG: 5 TABLET ORAL at 10:16

## 2020-01-07 RX ADMIN — CHLORTHALIDONE 25 MG: 25 TABLET ORAL at 08:12

## 2020-01-07 RX ADMIN — HEPARIN SODIUM 5000 UNITS: 5000 INJECTION, SOLUTION INTRAVENOUS; SUBCUTANEOUS at 06:02

## 2020-01-07 RX ADMIN — RANOLAZINE 500 MG: 500 TABLET, FILM COATED, EXTENDED RELEASE ORAL at 20:24

## 2020-01-07 RX ADMIN — DOCUSATE SODIUM 100 MG: 100 CAPSULE, LIQUID FILLED ORAL at 08:11

## 2020-01-07 RX ADMIN — METOLAZONE 5 MG: 5 TABLET ORAL at 18:50

## 2020-01-07 RX ADMIN — ONDANSETRON 4 MG: 2 INJECTION INTRAMUSCULAR; INTRAVENOUS at 03:51

## 2020-01-07 RX ADMIN — ISOSORBIDE MONONITRATE 60 MG: 60 TABLET, EXTENDED RELEASE ORAL at 08:12

## 2020-01-07 RX ADMIN — PANTOPRAZOLE SODIUM 40 MG: 40 TABLET, DELAYED RELEASE ORAL at 06:03

## 2020-01-07 RX ADMIN — FUROSEMIDE 40 MG: 10 INJECTION, SOLUTION INTRAMUSCULAR; INTRAVENOUS at 20:24

## 2020-01-07 RX ADMIN — DULOXETINE HYDROCHLORIDE 60 MG: 30 CAPSULE, DELAYED RELEASE ORAL at 08:11

## 2020-01-07 RX ADMIN — LEVOTHYROXINE SODIUM 50 MCG: 50 TABLET ORAL at 08:11

## 2020-01-07 RX ADMIN — BUPROPION HYDROCHLORIDE 100 MG: 100 TABLET, FILM COATED, EXTENDED RELEASE ORAL at 20:24

## 2020-01-07 RX ADMIN — FUROSEMIDE 40 MG: 10 INJECTION, SOLUTION INTRAMUSCULAR; INTRAVENOUS at 06:03

## 2020-01-07 RX ADMIN — HYDROCODONE BITARTRATE AND ACETAMINOPHEN 2 TABLET: 5; 325 TABLET ORAL at 03:51

## 2020-01-07 RX ADMIN — LACTULOSE 20 G: 10 SOLUTION ORAL at 10:16

## 2020-01-07 RX ADMIN — RANOLAZINE 500 MG: 500 TABLET, FILM COATED, EXTENDED RELEASE ORAL at 08:12

## 2020-01-07 ASSESSMENT — PAIN SCALES - GENERAL
PAINLEVEL_OUTOF10: 8
PAINLEVEL_OUTOF10: 6
PAINLEVEL_OUTOF10: 0
PAINLEVEL_OUTOF10: 9

## 2020-01-07 NOTE — CONSULTS
Subjective:   CHIEF COMPLAINT / HPI:  61year old female admitted with abdominal pain and distension. Also has underlying copd and sob with exertion. She denies any significant cough or sputum production.       Past Medical History:  Past Medical History:   Diagnosis Date    CAD (coronary artery disease)     CKD (chronic kidney disease) stage 3, GFR 30-59 ml/min ([facility])     Diabetes type 2, uncontrolled ([facility]     Diastolic heart failure ([facility])     Essential hypertension     Financial problems 3/22/2013    Generalized anxiety disorder 2018    Herpes genitalia     Obesity, morbid (more than 100 lbs over ideal weight or BMI > 40) [facility] 2013       Past Surgical History:        Procedure Laterality Date     SECTION      CHOLECYSTECTOMY      OTHER SURGICAL HISTORY Right [id number]    I and D rt big toe    TONSILLECTOMY         Current Medications:    Current Facility-Administered Medications: lactulose (CHRONULAC) 10 GM/15ML solution 20 g, 20 g, Oral, TID  furosemide (LASIX) injection 40 mg, 40 mg, Intravenous, TID  metOLazone (ZAROXOLYN) tablet 5 mg, 5 mg, Oral, BID  sodium chloride flush 0.9 % injection 10 mL, 10 mL, Intravenous, 2 times per day  sodium chloride flush 0.9 % injection 10 mL, 10 mL, Intravenous, PRN  ondansetron (ZOFRAN) injection 4 mg, 4 mg, Intravenous, Q6H PRN  pantoprazole (PROTONIX) tablet 40 mg, 40 mg, Oral, QAM AC  insulin lispro (HUMALOG) injection vial 0-12 Units, 0-12 Units, Subcutaneous, TID WC  insulin lispro (HUMALOG) injection vial 0-6 Units, 0-6 Units, Subcutaneous, Nightly  glucose (GLUTOSE) 40 % oral gel 15 g, 15 g, Oral, PRN  dextrose 50 % IV solution, 12.5 g, Intravenous, PRN  glucagon (rDNA) injection 1 mg, 1 mg, Intramuscular, PRN  dextrose 5 % solution, 100 mL/hr, Intravenous, PRN  polyethylene glycol (GLYCOLAX) packet 17 g, 17 g, Oral, Daily PRN  magnesium hydroxide (MILK OF MAGNESIA) 400 MG/5ML suspension 30 mL, 30 mL, Oral, Daily PRN  albuterol sulfate  (90 Base) MCG/ACT inhaler 2 puff, 2 puff, Inhalation, Q4H PRN  [START ON 1/8/2020] aspirin chewable tablet 81 mg, 81 mg, Oral, Daily  atorvastatin (LIPITOR) tablet 80 mg, 80 mg, Oral, Nightly  buPROPion (WELLBUTRIN SR) extended release tablet 100 mg, 100 mg, Oral, BID  mometasone-formoterol (DULERA) 200-5 MCG/ACT inhaler 2 puff, 2 puff, Inhalation, BID  carvedilol (COREG) tablet 12.5 mg, 12.5 mg, Oral, BID WC  docusate sodium (COLACE) capsule 100 mg, 100 mg, Oral, BID  DULoxetine (CYMBALTA) extended release capsule 60 mg, 60 mg, Oral, Daily  insulin glargine (LANTUS) injection vial 55 Units, 55 Units, Subcutaneous, Nightly  isosorbide mononitrate (IMDUR) extended release tablet 60 mg, 60 mg, Oral, Daily  levothyroxine (SYNTHROID) tablet 50 mcg, 50 mcg, Oral, Daily  magnesium oxide (MAG-OX) tablet 400 mg, 400 mg, Oral, Daily  ranolazine (RANEXA) extended release tablet 500 mg, 500 mg, Oral, BID  [START ON 1/8/2020] ticagrelor (BRILINTA) tablet 90 mg, 90 mg, Oral, BID  heparin (porcine) injection 5,000 Units, 5,000 Units, Subcutaneous, 3 times per day  HYDROcodone-acetaminophen (NORCO) 5-325 MG per tablet 2 tablet, 2 tablet, Oral, Q4H PRN    Allergies   Allergen Reactions    Augmentin [Amoxicillin-Pot Clavulanate] Diarrhea       Social History:    Social History     Socioeconomic History    Marital status: Single     Spouse name: None    Number of children: None    Years of education: None    Highest education level: None   Occupational History    None   Social Needs    Financial resource strain: None    Food insecurity:     Worry: None     Inability: None    Transportation needs:     Medical: None     Non-medical: None   Tobacco Use    Smoking status: Current Every Day Smoker     Packs/day: 0.20     Years: 34.00     Pack years: 6.80     Types: Cigarettes    Smokeless tobacco: Never Used   Substance and Sexual Activity    Alcohol use: No     Alcohol/week: 0.0 standard drinks    Drug use: No    Sexual activity: None   Lifestyle    Physical activity:     Days per week: None     Minutes per session: None    Stress: None   Relationships    Social connections:     Talks on phone: None     Gets together: None     Attends Baptism service: None     Active member of club or organization: None     Attends meetings of clubs or organizations: None     Relationship status: None    Intimate partner violence:     Fear of current or ex partner: None     Emotionally abused: None     Physically abused: None     Forced sexual activity: None   Other Topics Concern    None   Social History Narrative    None       Family History:   Family History   Problem Relation Age of Onset    Arthritis Mother     Diabetes Mother     Heart Disease Mother     High Blood Pressure Mother     Arthritis Father     Heart Disease Father     High Blood Pressure Father     Stroke Father     Substance Abuse Father     Substance Abuse Brother     Diabetes Maternal Aunt     Diabetes Maternal Uncle     Cancer Maternal Grandmother     Diabetes Maternal Grandmother     Diabetes Maternal Grandfather        Immunization:  Immunization History   Administered Date(s) Administered    Influenza, Quadv, 6 mo and older, IM, PF (Flulaval, Fluarix) 09/20/2018    Influenza, Quadv, IM, PF (6 mo and older Fluzone, Flulaval, Fluarix, and 3 yrs and older Afluria) 01/08/2018, 12/04/2019    Pneumococcal Conjugate 13-valent ([id number]) 12/04/2019    Pneumococcal Polysaccharide ([id number]) 01/08/2018         REVIEW OF SYSTEMS:    CONSTITUTIONAL:  negative for fevers, chills, diaphoresis, activity change, appetite change, fatigue, night sweats and unexpected weight change.    EYES:  negative for blurred vision, eye discharge, visual disturbance and icterus  HEENT:  negative for hearing loss, tinnitus, ear drainage, sinus pressure, nasal congestion, epistaxis and snoring  RESPIRATORY:  See HPI  CARDIOVASCULAR:  negative for chest pain, AND LEFT LOWER EXTREMITIES: No edema, no inflammation, no tenderness. SKIN:  normal skin color, texture, turgor and no redness, warmth, or swelling. No palpable nodules    DATA:         EXAMINATION:   ONE XRAY VIEW OF THE CHEST       1/6/2020 2:10 pm       COMPARISON:   11/28/2019.       HISTORY:   ORDERING SYSTEM PROVIDED HISTORY: chest pain   TECHNOLOGIST PROVIDED HISTORY:   Reason for exam:->chest pain   Reason for Exam: chest pain   Acuity: Acute   Type of Exam: Initial       FINDINGS:   There are low lung volumes.  The heart size is enlarged but unchanged.  The   pulmonary vasculature is mildly congested.  No acute infiltrates are seen. No pneumothoraces are noted.           Impression   1. Cardiomegaly with mild vascular congestion.                               Assessment:     1. Mod left pleural effusion as seen on ct abdomen  2. Copd  3. chf  4. Plan:     1. D/w pt  2. Continue bd rx  3. Full pft as outpt  4. Pt scheduled for pleural tap today  5.  Thanks will follow

## 2020-01-07 NOTE — CONSULTS
[address] [place] [address], [address] [facility]                                  CONSULTATION    PATIENT NAME: LIONEL CLEVELAND                      :        1960  MED REC NO:   [id number]                          ROOM:       3883  ACCOUNT NO:   [de-identified]                           ADMIT DATE: 2020  PROVIDER:     Enrico Devi MD    CONSULT DATE:  2020    PRIMARY CARE PROVIDER:  Megan Bhakta M.D.    [facility]  The patient is in room [phone]. CHIEF COMPLAINT:  History of constipation. HISTORY OF PRESENT ILLNESS:  The patient is a 70-year-old white female,  patient of Dr. Megan Bhakta, with past medical history significant for  morbid obesity, hypertension, diabetes mellitus, coronary artery  disease, status post PTCA with coronary stents in place, chronic kidney  disease, COPD, history of herpes, who presented to the emergency room on  2020 with left-sided abdominal pain, distention along with  shortness of breath and also complains of constipation. The patient did  have blood workup done upon admission, which comprised of chem profile,  is remarkable for BUN of 32, creatinine 1.7. CBC shows a WBC count of  7, hemoglobin 9 and platelet count of 386,[phone]. The patient's INR is  1.07. CAT scan of the abdomen and pelvis was done and no acute findings  were noted in the abdomen; however, left-sided pleural effusion was  noted and thoracentesis is in order, also small amount of free fluid was  noted in the left paracolic gutter of unknown etiology as well. The patient denies vomiting, hematemesis, melena, hematochezia or  anorexia. According to the patient, she has lost about 40 pounds in the  last 3 months or so. The patient has never had an EGD or a colonoscopy  done in the past.  The patient is hemodynamically stable.   The patient  is on stool softeners at present and also Pulmonary and Cardiology  consult are in DATA:  As above mentioned. IMPRESSION:  A 51-year-old white female with multiple comorbidities,  admitted with left lower quadrant abdominal pain and shortness of  breath, and complains of constipation as well. RECOMMENDATIONS:  1. Agree with present management with stool softeners. 2.  No evidence of acute abdomen at present. 3.  The patient has been instructed to call the office after discharge  for outpatient screening colonoscopy. 4.  Cardiac and pulmonary workup per consultants. 5.  The case and plan have been discussed in detail with the patient.         Chris Gallego MD    D: 01/07/2020 10:37:33       T: 01/07/2020 12:22:25     [email or web address]  Job#: [id number]     Doc#: [id number]    CC:  Cloretta Eisenmenger, MD

## 2020-01-07 NOTE — CONSULTS
artery  and stented. Significant disease in the circ and LAD was noted. History of hypertension, hyperlipidemia, heart failure, EF was 30% range  present, COPD, sleep apnea present and seizure disorder. PAST SURGICAL HISTORY:  Gallbladder surgery, tonsillectomy, and  angioplasty. SOCIAL HISTORY:  She is at nursing home right now. ALLERGIES:  AUGMENTIN. MEDICATIONS AT HOME:  She is on hydrochlorothiazide, Lipitor, Coreg,  insulin, Imdur, Brilinta, Zofran, Ranexa, and Synthroid. PHYSICAL EXAMINATION:  GENERAL:  The patient is awake, alert, and answering questions, not in  acute distress. VITAL SIGNS:  Temperature is afebrile, pulse is 90; sinus rhythm, blood  pressure 167/98. HEENT:  Head is normocephalic and atraumatic. Pupils are equal and  reactive to light. CHEST:  Equal expansion. LUNGS:  Clear to auscultation. No wheezing or rhonchi. HEART:  Regular rate and rhythm. ABDOMEN:  Soft and nontender. EXTREMITIES:  A +2 to 3 edema present. NEUROLOGIC:  Cranial nerves II through XII are grossly intact. EKG shows sinus rhythm present. LABORATORY DATA:  Creatinine is 1.7. I think her creatinine at baseline  is around 2 range. Troponin is elevated at 0.16 and it was up to 2.4 on  the last admission. BNP is 14,000. LFTs are normal.  CBC is within  normal range. Chest x-ray shows cardiomegaly with vascular congestion. No _____  noted. IMPRESSION:  This is a 49-year-old female patient who looks much older  than her stated age. Has a history of coronary artery disease, history  of heart failure, and she comes with multiple nonspecific complaints  present. From a cardiac stand, she is known to have coronary artery disease. She  does have heart failure. She was considered to be a high risk for  surgery; therefore, she was treated medically at that time. She comes  back again with multiple nonspecific complaints. At this time, I will review her cath again.   I will make further  recommendations. Angioplasty versus bypass. I believe she will be high  risk for surgery. We will discuss with the patient's family if the  patient family wants angioplasty, but we will discuss with entire team.    She also has pleural effusion noted. She probably needs thoracentesis.         Siria Dickens MD    D: 01/06/2020 15:33:38       T: 01/06/2020 18:06:57     [email or web address]  Job#: [id number]     Doc#: [id number]    CC:

## 2020-01-07 NOTE — PROGRESS NOTES
tonsillectomy, and  angioplasty.     SOCIAL HISTORY:  She is at nursing home right now.     ALLERGIES:  AUGMENTIN.     MEDICATIONS AT HOME:  She is on hydrochlorothiazide, Lipitor, Coreg,  insulin, Imdur, Brilinta, Zofran, Ranexa, and Synthroid.     Objective:   BP (!) 151/83   Pulse 88   Temp 98 °F (36.7 °C) (Oral)   Resp 13   Ht 5' 4\" (1.626 m)   Wt (!) 314 lb 4.8 oz (142.6 kg)   SpO2 100%   BMI 53.95 kg/m²       Intake/Output Summary (Last 24 hours) at 1/7/2020 1031  Last data filed at 1/7/2020 0811  Gross per 24 hour   Intake 10 ml   Output 2000 ml   Net -1990 ml       Medications:   Scheduled Meds:   lactulose  20 g Oral TID    furosemide  40 mg Intravenous TID    metOLazone  5 mg Oral BID    sodium chloride flush  10 mL Intravenous 2 times per day    pantoprazole  40 mg Oral QAM AC    insulin lispro  0-12 Units Subcutaneous TID WC    insulin lispro  0-6 Units Subcutaneous Nightly    [START ON 1/8/2020] aspirin  81 mg Oral Daily    atorvastatin  80 mg Oral Nightly    buPROPion  100 mg Oral BID    mometasone-formoterol  2 puff Inhalation BID    carvedilol  12.5 mg Oral BID WC    docusate sodium  100 mg Oral BID    DULoxetine  60 mg Oral Daily    insulin glargine  55 Units Subcutaneous Nightly    isosorbide mononitrate  60 mg Oral Daily    levothyroxine  50 mcg Oral Daily    magnesium oxide  400 mg Oral Daily    ranolazine  500 mg Oral BID    [START ON 1/8/2020] ticagrelor  90 mg Oral BID    heparin (porcine)  5,000 Units Subcutaneous 3 times per day      Infusions:   dextrose        PRN Meds:  sodium chloride flush, ondansetron, glucose, dextrose, glucagon (rDNA), dextrose, polyethylene glycol, magnesium hydroxide, albuterol sulfate HFA, HYDROcodone-acetaminophen       Physical Exam:  Vitals:    01/07/20 0809   BP: (!) 151/83   Pulse: 88   Resp: 13   Temp: 98 °F (36.7 °C)   SpO2: 100%        General: AAO, NAD  Chest: Nontender  Cardiac: First and Second Heart Sounds are Normal, No

## 2020-01-07 NOTE — PROGRESS NOTES
Hospitalist Progress Note      Name:  Cam Dawson /Age/Sex: 1960  (61 y.o. female)   MRN & CSN:  [id number] & [id number] Admission Date/Time: 2020 11:26 AM   Location:  [facility]- PCP: Sima Lizama MD         Hospital Day: 2    ASSESSMENT & PLAN:   Cam Dawson is a 61 y.o.  female who was admitted to the hospital for constipation and shortness of breath likely secondary to CHF exacerbation. 1.  Constipation  -Lactulose 10 3 times daily  -Docusate 100 twice daily  -Dulcolax daily    2. Acute diastolic CHF exacerbation-- on 2 L of oxygen continuously at baseline. More short of breath with exertion. No significant lower extremity edema. Reporting swelling of the abdomen.  -Lasix 40 IV 3 times daily  -Metolazone 5 twice daily  -Daily chemistry panel  -Fluid restriction less than 2 L    3. CAD-- s/p PCI on  with placement of MEGAN x2 to RCA. No chest pain. Troponin elevated. CKD 3 contributing to elevated troponin.  -Continue aspirin/Brilinta/Coreg/Lipitor/Ranexa  -Trend troponin and EKG    4. DM2-- on Lantus 55 units at home. BG controlled here in the hospital  -Lantus 55 units at bedtime  -Moderate dose sliding scale insulin  -Hypoglycemic protocol    5. COPD--no acute exacerbation  -Continue Dulera  -Albuterol ipratropium as needed    6. Hypothyroidism-- on levothyroxine 50 MCG    7. Depression--Cymbalta/Wellbutrin    8. CKD 3-- CR at baseline  -Avoid NSAIDs/contrast  -Daily chemistry panel    9.   Morbid obesity--weighs 314 pounds, BMI 54.1                MEDICAL DECISION MAKING:  Labs reviewed  Imaging reviewed  Personally visualized EKG--NSR  Level of risk moderate      Diet DIET LOW SODIUM 2 GM; Carb Control: 4 carb choices (60 gms)/meal; 1500 ml   DVT Prophylaxis [x] Lovenox, []  Heparin, [] SCDs, [] Ambulation   GI Prophylaxis [] PPI,  [] H2 Blocker,  [] Carafate,  [] Diet/Tube Feeds   Code Status Full Code   Disposition  Home   MDM [] Low, [x] Moderate,[]  High carvedilol  12.5 mg Oral BID WC    docusate sodium  100 mg Oral BID    DULoxetine  60 mg Oral Daily    insulin glargine  55 Units Subcutaneous Nightly    isosorbide mononitrate  60 mg Oral Daily    levothyroxine  50 mcg Oral Daily    magnesium oxide  400 mg Oral Daily    ranolazine  500 mg Oral BID    [START ON 1/8/2020] ticagrelor  90 mg Oral BID    heparin (porcine)  5,000 Units Subcutaneous 3 times per day      Infusions:    dextrose       PRN Meds: sodium chloride flush, 10 mL, PRN  ondansetron, 4 mg, Q6H PRN  glucose, 15 g, PRN  dextrose, 12.5 g, PRN  glucagon (rDNA), 1 mg, PRN  dextrose, 100 mL/hr, PRN  polyethylene glycol, 17 g, Daily PRN  magnesium hydroxide, 30 mL, Daily PRN  albuterol sulfate HFA, 2 puff, Q4H PRN  HYDROcodone-acetaminophen, 2 tablet, Q4H PRN          Electronically signed by Karlie Yao MD on 1/7/2020 at 4:07 PM

## 2020-01-08 LAB
ACTIVATED CLOTTING TIME, LOW RANGE: 165 SEC
ACTIVATED CLOTTING TIME, LOW RANGE: 210 SEC
ACTIVATED CLOTTING TIME, LOW RANGE: 228 SEC
ACTIVATED CLOTTING TIME, LOW RANGE: 248 SEC
ACTIVATED CLOTTING TIME, LOW RANGE: 262 SEC
ALBUMIN SERPL-MCNC: 3.7 GM/DL (ref 3.4–5)
ALP BLD-CCNC: 75 IU/L (ref 40–128)
ALT SERPL-CCNC: 9 U/L (ref 10–40)
ANION GAP SERPL CALCULATED.3IONS-SCNC: 11 MMOL/L (ref 4–16)
APTT: 37.7 SECONDS (ref 25.1–37.1)
AST SERPL-CCNC: 12 IU/L (ref 15–37)
BILIRUB SERPL-MCNC: 0.6 MG/DL (ref 0–1)
BUN BLDV-MCNC: 32 MG/DL (ref 6–23)
CALCIUM SERPL-MCNC: 9.3 MG/DL (ref 8.3–10.6)
CHLORIDE BLD-SCNC: 100 MMOL/L (ref 99–110)
CO2: 31 MMOL/L (ref 21–32)
CREAT SERPL-MCNC: 2.1 MG/DL (ref 0.6–1.1)
EKG ATRIAL RATE: 91 BPM
EKG DIAGNOSIS: NORMAL
EKG P AXIS: 51 DEGREES
EKG P-R INTERVAL: 186 MS
EKG Q-T INTERVAL: 410 MS
EKG QRS DURATION: 118 MS
EKG QTC CALCULATION (BAZETT): 504 MS
EKG R AXIS: 87 DEGREES
EKG T AXIS: -18 DEGREES
EKG VENTRICULAR RATE: 91 BPM
GFR AFRICAN AMERICAN: 29 ML/MIN/1.73M2
GFR NON-AFRICAN AMERICAN: 24 ML/MIN/1.73M2
GLUCOSE BLD-MCNC: 110 MG/DL (ref 70–99)
GLUCOSE BLD-MCNC: 138 MG/DL (ref 70–99)
GLUCOSE BLD-MCNC: 168 MG/DL (ref 70–99)
GLUCOSE BLD-MCNC: 89 MG/DL (ref 70–99)
GLUCOSE BLD-MCNC: 91 MG/DL (ref 70–99)
HCT VFR BLD CALC: 31 % (ref 37–47)
HEMOGLOBIN: 9.1 GM/DL (ref 12.5–16)
INR BLD: 1.12 INDEX
MAGNESIUM: 1.6 MG/DL (ref 1.8–2.4)
MCH RBC QN AUTO: 25.4 PG (ref 27–31)
MCHC RBC AUTO-ENTMCNC: 29.4 % (ref 32–36)
MCV RBC AUTO: 86.6 FL (ref 78–100)
PDW BLD-RTO: 16.9 % (ref 11.7–14.9)
PLATELET # BLD: 255 K/CU MM (ref 140–440)
PMV BLD AUTO: 10.8 FL (ref 7.5–11.1)
POTASSIUM SERPL-SCNC: 4.5 MMOL/L (ref 3.5–5.1)
PROTHROMBIN TIME: 13.6 SECONDS (ref 11.7–14.5)
RBC # BLD: 3.58 M/CU MM (ref 4.2–5.4)
SODIUM BLD-SCNC: 142 MMOL/L (ref 135–145)
TOTAL PROTEIN: 5.5 GM/DL (ref 6.4–8.2)
WBC # BLD: 6.2 K/CU MM (ref 4–10.5)

## 2020-01-08 PROCEDURE — 92928 PRQ TCAT PLMT NTRAC ST 1 LES: CPT

## 2020-01-08 PROCEDURE — 6370000000 HC RX 637 (ALT 250 FOR IP): Performed by: INTERNAL MEDICINE

## 2020-01-08 PROCEDURE — 2709999900 HC NON-CHARGEABLE SUPPLY

## 2020-01-08 PROCEDURE — 36415 COLL VENOUS BLD VENIPUNCTURE: CPT

## 2020-01-08 PROCEDURE — C1887 CATHETER, GUIDING: HCPCS

## 2020-01-08 PROCEDURE — 85610 PROTHROMBIN TIME: CPT

## 2020-01-08 PROCEDURE — 6360000002 HC RX W HCPCS: Performed by: INTERNAL MEDICINE

## 2020-01-08 PROCEDURE — 80053 COMPREHEN METABOLIC PANEL: CPT

## 2020-01-08 PROCEDURE — C1769 GUIDE WIRE: HCPCS

## 2020-01-08 PROCEDURE — 6370000000 HC RX 637 (ALT 250 FOR IP)

## 2020-01-08 PROCEDURE — 6360000002 HC RX W HCPCS

## 2020-01-08 PROCEDURE — 85027 COMPLETE CBC AUTOMATED: CPT

## 2020-01-08 PROCEDURE — 93458 L HRT ARTERY/VENTRICLE ANGIO: CPT

## 2020-01-08 PROCEDURE — 2500000003 HC RX 250 WO HCPCS

## 2020-01-08 PROCEDURE — C1894 INTRO/SHEATH, NON-LASER: HCPCS

## 2020-01-08 PROCEDURE — 51702 INSERT TEMP BLADDER CATH: CPT

## 2020-01-08 PROCEDURE — 2580000003 HC RX 258: Performed by: INTERNAL MEDICINE

## 2020-01-08 PROCEDURE — 93308 TTE F-UP OR LMTD: CPT

## 2020-01-08 PROCEDURE — 6360000004 HC RX CONTRAST MEDICATION

## 2020-01-08 PROCEDURE — 2580000003 HC RX 258

## 2020-01-08 PROCEDURE — B2111ZZ FLUOROSCOPY OF MULTIPLE CORONARY ARTERIES USING LOW OSMOLAR CONTRAST: ICD-10-PCS | Performed by: INTERNAL MEDICINE

## 2020-01-08 PROCEDURE — 6360000002 HC RX W HCPCS: Performed by: NURSE PRACTITIONER

## 2020-01-08 PROCEDURE — 027135Z DILATION OF CORONARY ARTERY, TWO ARTERIES WITH TWO DRUG-ELUTING INTRALUMINAL DEVICES, PERCUTANEOUS APPROACH: ICD-10-PCS | Performed by: INTERNAL MEDICINE

## 2020-01-08 PROCEDURE — 85730 THROMBOPLASTIN TIME PARTIAL: CPT

## 2020-01-08 PROCEDURE — 4A023N7 MEASUREMENT OF CARDIAC SAMPLING AND PRESSURE, LEFT HEART, PERCUTANEOUS APPROACH: ICD-10-PCS | Performed by: INTERNAL MEDICINE

## 2020-01-08 PROCEDURE — 82962 GLUCOSE BLOOD TEST: CPT

## 2020-01-08 PROCEDURE — C1874 STENT, COATED/COV W/DEL SYS: HCPCS

## 2020-01-08 PROCEDURE — 2140000000 HC CCU INTERMEDIATE R&B

## 2020-01-08 PROCEDURE — 83735 ASSAY OF MAGNESIUM: CPT

## 2020-01-08 PROCEDURE — 93010 ELECTROCARDIOGRAM REPORT: CPT | Performed by: INTERNAL MEDICINE

## 2020-01-08 PROCEDURE — 85347 COAGULATION TIME ACTIVATED: CPT

## 2020-01-08 PROCEDURE — C1725 CATH, TRANSLUMIN NON-LASER: HCPCS

## 2020-01-08 RX ORDER — FUROSEMIDE 10 MG/ML
40 INJECTION INTRAMUSCULAR; INTRAVENOUS 2 TIMES DAILY
Status: DISCONTINUED | OUTPATIENT
Start: 2020-01-09 | End: 2020-01-09

## 2020-01-08 RX ORDER — MAGNESIUM SULFATE IN WATER 40 MG/ML
2 INJECTION, SOLUTION INTRAVENOUS ONCE
Status: COMPLETED | OUTPATIENT
Start: 2020-01-08 | End: 2020-01-08

## 2020-01-08 RX ORDER — ATROPINE SULFATE 0.4 MG/ML
0.5 AMPUL (ML) INJECTION
Status: ACTIVE | OUTPATIENT
Start: 2020-01-08 | End: 2020-01-08

## 2020-01-08 RX ORDER — DOBUTAMINE HYDROCHLORIDE 200 MG/100ML
2.5 INJECTION INTRAVENOUS CONTINUOUS
Status: DISCONTINUED | OUTPATIENT
Start: 2020-01-08 | End: 2020-01-10

## 2020-01-08 RX ORDER — MORPHINE SULFATE 2 MG/ML
1 INJECTION, SOLUTION INTRAMUSCULAR; INTRAVENOUS
Status: ACTIVE | OUTPATIENT
Start: 2020-01-08 | End: 2020-01-08

## 2020-01-08 RX ORDER — SODIUM CHLORIDE 9 MG/ML
INJECTION, SOLUTION INTRAVENOUS CONTINUOUS
Status: DISCONTINUED | OUTPATIENT
Start: 2020-01-08 | End: 2020-01-09

## 2020-01-08 RX ORDER — FUROSEMIDE 10 MG/ML
40 INJECTION INTRAMUSCULAR; INTRAVENOUS 3 TIMES DAILY
Status: DISCONTINUED | OUTPATIENT
Start: 2020-01-08 | End: 2020-01-08

## 2020-01-08 RX ORDER — SODIUM CHLORIDE 0.9 % (FLUSH) 0.9 %
10 SYRINGE (ML) INJECTION PRN
Status: DISCONTINUED | OUTPATIENT
Start: 2020-01-08 | End: 2020-01-11 | Stop reason: HOSPADM

## 2020-01-08 RX ORDER — SODIUM CHLORIDE 0.9 % (FLUSH) 0.9 %
10 SYRINGE (ML) INJECTION EVERY 12 HOURS SCHEDULED
Status: DISCONTINUED | OUTPATIENT
Start: 2020-01-08 | End: 2020-01-11 | Stop reason: HOSPADM

## 2020-01-08 RX ORDER — ASPIRIN 81 MG/1
81 TABLET, CHEWABLE ORAL DAILY
Status: DISCONTINUED | OUTPATIENT
Start: 2020-01-09 | End: 2020-01-11 | Stop reason: HOSPADM

## 2020-01-08 RX ORDER — ACETAMINOPHEN 325 MG/1
650 TABLET ORAL EVERY 4 HOURS PRN
Status: DISCONTINUED | OUTPATIENT
Start: 2020-01-08 | End: 2020-01-11 | Stop reason: HOSPADM

## 2020-01-08 RX ADMIN — INSULIN LISPRO 2 UNITS: 100 INJECTION, SOLUTION INTRAVENOUS; SUBCUTANEOUS at 18:18

## 2020-01-08 RX ADMIN — RANOLAZINE 500 MG: 500 TABLET, FILM COATED, EXTENDED RELEASE ORAL at 21:16

## 2020-01-08 RX ADMIN — HYDROCODONE BITARTRATE AND ACETAMINOPHEN 2 TABLET: 5; 325 TABLET ORAL at 14:52

## 2020-01-08 RX ADMIN — FUROSEMIDE 40 MG: 10 INJECTION, SOLUTION INTRAMUSCULAR; INTRAVENOUS at 14:07

## 2020-01-08 RX ADMIN — MAGNESIUM SULFATE HEPTAHYDRATE 2 G: 40 INJECTION, SOLUTION INTRAVENOUS at 08:04

## 2020-01-08 RX ADMIN — CARVEDILOL 12.5 MG: 12.5 TABLET, FILM COATED ORAL at 14:50

## 2020-01-08 RX ADMIN — LACTULOSE 20 G: 10 SOLUTION ORAL at 21:17

## 2020-01-08 RX ADMIN — BUPROPION HYDROCHLORIDE 100 MG: 100 TABLET, FILM COATED, EXTENDED RELEASE ORAL at 21:16

## 2020-01-08 RX ADMIN — ONDANSETRON 4 MG: 2 INJECTION INTRAMUSCULAR; INTRAVENOUS at 21:16

## 2020-01-08 RX ADMIN — DOBUTAMINE IN DEXTROSE 2.5 MCG/KG/MIN: 200 INJECTION, SOLUTION INTRAVENOUS at 14:12

## 2020-01-08 RX ADMIN — ONDANSETRON 4 MG: 2 INJECTION INTRAMUSCULAR; INTRAVENOUS at 01:11

## 2020-01-08 RX ADMIN — HYDROCODONE BITARTRATE AND ACETAMINOPHEN 2 TABLET: 5; 325 TABLET ORAL at 21:16

## 2020-01-08 RX ADMIN — SODIUM CHLORIDE: 9 INJECTION, SOLUTION INTRAVENOUS at 14:07

## 2020-01-08 RX ADMIN — DOCUSATE SODIUM 100 MG: 100 CAPSULE, LIQUID FILLED ORAL at 21:17

## 2020-01-08 RX ADMIN — TICAGRELOR 90 MG: 90 TABLET ORAL at 21:17

## 2020-01-08 RX ADMIN — ATORVASTATIN CALCIUM 80 MG: 20 TABLET, FILM COATED ORAL at 21:16

## 2020-01-08 RX ADMIN — SODIUM CHLORIDE: 9 INJECTION, SOLUTION INTRAVENOUS at 00:07

## 2020-01-08 RX ADMIN — ISOSORBIDE MONONITRATE 60 MG: 60 TABLET, EXTENDED RELEASE ORAL at 14:49

## 2020-01-08 RX ADMIN — LACTULOSE 20 G: 10 SOLUTION ORAL at 14:07

## 2020-01-08 RX ADMIN — HYDROCODONE BITARTRATE AND ACETAMINOPHEN 2 TABLET: 5; 325 TABLET ORAL at 01:11

## 2020-01-08 RX ADMIN — HEPARIN SODIUM 5000 UNITS: 5000 INJECTION, SOLUTION INTRAVENOUS; SUBCUTANEOUS at 21:16

## 2020-01-08 ASSESSMENT — PAIN SCALES - GENERAL
PAINLEVEL_OUTOF10: 4
PAINLEVEL_OUTOF10: 4
PAINLEVEL_OUTOF10: 0
PAINLEVEL_OUTOF10: 9
PAINLEVEL_OUTOF10: 8
PAINLEVEL_OUTOF10: 9
PAINLEVEL_OUTOF10: 0

## 2020-01-08 NOTE — OP NOTE
[facility] -[id number]  LEFT MAIIN PATENT  LAD MID 80% TO 0% MEGAN XIENCE STENT 2.5X18MM STENT  LCX MILD DX  OM 90% TO 0% MEGAN XIENCE 2.25X33 MM STENT  LVEDP 35  RCA %  ASA AND BRIANTA AND HEPARIN  RIGHT BRACHIAL  NO COMPLICATIONS

## 2020-01-08 NOTE — PLAN OF CARE
Report given to [facility] nurse for patients transfer from cath lab holding area. Patients 6 [place] brachial arterial sheath remains in place and new ACT is 248. Reported that patient will need an additional ACT in an hour and a team member from the cath lab will be pulling the sheath when ACT reaches 170 or below. Reported also that patient did receive 180mg of Brilinta and 325mg of aspirin post procedure as well as the medications given during procedure were reported. Patients vitals remain stable and she denies needs at this time.  Amara Jenkins RN 1/8/2020

## 2020-01-08 NOTE — PROGRESS NOTES
AUGMENTIN.     MEDICATIONS AT HOME:  She is on hydrochlorothiazide, Lipitor, Coreg,  insulin, Imdur, Brilinta, Zofran, Ranexa, and Synthroid.       Objective:   /67   Pulse 79   Temp 98 °F (36.7 °C) (Oral)   Resp 13   Ht 5' 4\" (1.626 m)   Wt (!) 308 lb (139.7 kg)   SpO2 100%   BMI 52.87 kg/m²       Intake/Output Summary (Last 24 hours) at 1/8/2020 0827  Last data filed at 1/8/2020 0820  Gross per 24 hour   Intake 240 ml   Output 4300 ml   Net -4060 ml       Medications:   Scheduled Meds:   magnesium sulfate  2 g Intravenous Once    lactulose  20 g Oral TID    [Held by provider] furosemide  40 mg Intravenous TID    [Held by provider] metOLazone  5 mg Oral BID    sodium chloride flush  10 mL Intravenous 2 times per day    pantoprazole  40 mg Oral QAM AC    insulin lispro  0-12 Units Subcutaneous TID WC    insulin lispro  0-6 Units Subcutaneous Nightly    aspirin  81 mg Oral Daily    atorvastatin  80 mg Oral Nightly    buPROPion  100 mg Oral BID    mometasone-formoterol  2 puff Inhalation BID    carvedilol  12.5 mg Oral BID WC    docusate sodium  100 mg Oral BID    DULoxetine  60 mg Oral Daily    insulin glargine  55 Units Subcutaneous Nightly    isosorbide mononitrate  60 mg Oral Daily    levothyroxine  50 mcg Oral Daily    magnesium oxide  400 mg Oral Daily    ranolazine  500 mg Oral BID    ticagrelor  90 mg Oral BID    heparin (porcine)  5,000 Units Subcutaneous 3 times per day      Infusions:   sodium chloride 50 mL/hr at 01/08/20 0007    dextrose        PRN Meds:  sodium chloride flush, ondansetron, glucose, dextrose, glucagon (rDNA), dextrose, polyethylene glycol, magnesium hydroxide, albuterol sulfate HFA, HYDROcodone-acetaminophen       Physical Exam:  Vitals:    01/08/20 0801   BP:    Pulse: 79   Resp:    Temp:    SpO2:         General: AAO, NAD  Chest: Nontender  Cardiac: First and Second Heart Sounds are Normal, No Murmurs or Gallops noted  Lungs:Clear to auscultation and 03/15/2019    Class 3 severe obesity due to excess calories with body mass index (BMI) of 50.0 to 59.9 in adult [facility]) 03/15/2019    Pleural effusion on left 02/28/2019    Hyperglycemia 11/17/2018    Acute respiratory failure ([address] 10/02/2018    Gait disturbance 09/21/2018    Debility 09/19/2018    Nausea & vomiting 04/05/2018    Fever 04/05/2018    Generalized anxiety disorder 01/08/2018    DM (diabetes mellitus), secondary uncontrolled ([address] 01/04/2018    Syncope 08/26/2016    Acute pain of right shoulder 08/26/2016    Hypotension 08/26/2016    Acute renal failure, POA 07/21/2016    Diabetes type 2, uncontrolled ([address] 07/20/2016    Morbid obesity with BMI of 50.0-59.9, adult ([address] 07/20/2016    Essential hypertension     CAD (coronary artery disease)     Chronic diastolic heart failure (HCC)     CKD (chronic kidney disease) stage 3, GFR 30-59 ml/min ([address]        Electronically signed by Liam Jackson PA-C on 1/8/2020 at 8:27 AM

## 2020-01-08 NOTE — PROGRESS NOTES
Patient transported to [address] [place], [place] [id number] 05 65 per RN and cardiac monitor. Procedure site assessment completed per Jan RN and Javy Galvin on [address] [facility]. No hematoma or bleeding noted.

## 2020-01-08 NOTE — PROCEDURES
the procedure well. No complications noted. Due to her elevated EDP and also elevated creatinine, right coronary  artery was not intervened at this time.     Blood loss Melchor Sue MD    D: 01/08/2020 11:12:39       T: 01/08/2020 12:08:19     [email or web address]  Job#: [id number]     Doc#: [id number]    CC:

## 2020-01-08 NOTE — PROGRESS NOTES
Nephrology Progress Note  1/8/2020 5:00 PM        Subjective:   Admit Date: 1/6/2020  PCP: Donzell Sicard, MD     This is a late entry. Pt seen in early am     Interval History: going for heart cath     Diet: NPO for heart cath    ROS:  bno ac CP- good UOP    Data:     Current meds:    sodium chloride flush  10 mL Intravenous 2 times per day    [START ON 1/9/2020] aspirin  81 mg Oral Daily    ticagrelor  90 mg Oral BID    [START ON 1/9/2020] furosemide  40 mg Intravenous BID    lactulose  20 g Oral TID    [Held by provider] metOLazone  5 mg Oral BID    pantoprazole  40 mg Oral QAM AC    insulin lispro  0-12 Units Subcutaneous TID WC    insulin lispro  0-6 Units Subcutaneous Nightly    atorvastatin  80 mg Oral Nightly    buPROPion  100 mg Oral BID    mometasone-formoterol  2 puff Inhalation BID    carvedilol  12.5 mg Oral BID WC    docusate sodium  100 mg Oral BID    DULoxetine  60 mg Oral Daily    insulin glargine  55 Units Subcutaneous Nightly    isosorbide mononitrate  60 mg Oral Daily    levothyroxine  50 mcg Oral Daily    magnesium oxide  400 mg Oral Daily    ranolazine  500 mg Oral BID    heparin (porcine)  5,000 Units Subcutaneous 3 times per day      sodium chloride 50 mL/hr at 01/08/20 1407    DOBUTamine 2.5 mcg/kg/min (01/08/20 1412)    dextrose           I/O last 3 completed shifts:   In: 240 [P.O.:240]  Out: 3775 [IGAOF:9283]    CBC:   Recent Labs     01/06/20  1142 01/07/20  0342 01/08/20  0336   WBC 6.8 7.0 6.2   HGB 9.3* 9.0* 9.1*    247 255          Recent Labs     01/06/20  1142 01/07/20  0342 01/08/20  0336    141 142   K 4.4 4.5 4.5   CL 99 103 100   CO2 27 27 31   BUN 30* 32* 32*   CREATININE 1.7* 1.7* 2.1*   GLUCOSE 196* 157* 91       Lab Results   Component Value Date    CALCIUM 9.3 01/08/2020    PHOS 3.6 11/19/2018       Objective:     Vitals: BP (!) 104/93   Pulse 100   Temp 98.6 °F (37 °C) (Oral)   Resp 20   Ht 5' 4\" (1.626 m)   Wt (!) 308 lb (139.7 kg)   SpO2 98%   BMI 52.87 kg/m²     General appearance:  No ac distress  HEENT:  Mild conj pallor  Neck:  supple  Lungs:  No  gross crackles  Heart:  Seems irregular  Abdomen: soft  Extremities:  + edema of LE and abd wall       Problem List :         Impression :     1. JUAN J/ CKD stage 3 b- good UOP - cr up but expected - \" permissive hypercreatinemia  \"  2. ADHF/ fluid overload obesity/ ASCVD  3. DM    Recommendation/Plan  :     1. Pt going  for PCI as she deem high risk for CABG  2. Dr Norris Patel med wanted to hold diuretics - ok with me   3. Will watch for CI-JUAN J- I had d/w her and  - given her situation- ok to do PCi and accept the risk for CI-JUAN J  4. Labs in am   5.  Good exam  6. follow clinically       Jolene Schirmer MD

## 2020-01-08 NOTE — CONSULTS
did  not show much except nonobstructing punctate bilateral kidney stone  which is new. She obviously had cholecystectomy. She was subsequently  admitted for further evaluation. I am of course very familiar with her. I have been knowing her for a  long time. Basically, her creatinine was about 1.54 or so a couple of  years ago, but slowly it increased. She has had several acute kidney  injuries and recently had cardiac cath, stenting, etc.  Creatinine  lately running about 2'esther or so. Now on presentation this time,  creatinine is 1.7. PAST MEDICAL HISTORY:  1.  CKD stage IIIB with recurrent acute kidney injury by creatinine  criteria. 2.  Does have some proteinuria. 3.  Diabetes mellitus type 2, diagnosed at age 27. Does not have  retinopathy. Does not look like very well controlled. 4.  Coronary atherosclerotic cardiovascular disease. She had  multivessel disease deemed inappropriate for surgical therapy. Had some  stenting done. 5.  Hyperlipidemia. 6.  Gastroesophageal reflux disorder. 7.  Osteoarthritis and COPD. 8.  Anxiety disorder. PAST SURGICAL HISTORY:  1. PTCA with stenting several times of coronary arteries. Most recent  one not too long ago. 2.  I and D of her wound in the toe. 3.  Gallbladder surgery. 4.  Cholecystectomy. 5.  . FAMILY MEDICAL HISTORY:  Diabetes and coronary artery disease runs in  the family. OB/GYN HISTORY:   1, para 1. She had a history of eclampsia  apparently, but does not have any gestational diabetes or hypertension. HABITS:  She quit smoking probably in  or so, has roughly 30  pack-year history. No history of alcohol or illicit drug abuse. SOCIAL HISTORY:  She is of course . Lives with her . She  does not work at this time.     CURRENT MEDICATIONS:  Here in the hospital include:  She is on aspirin,  atorvastatin, BuSpar, Coreg, one dose of furosemide was given, she is on  Imdur, lactulose, Synthroid,

## 2020-01-08 NOTE — PROGRESS NOTES
pulmonary      SUBJECTIVE: seen early am and no sob     OBJECTIVE    VITALS:  /67   Pulse 79   Temp 98 °F (36.7 °C) (Oral)   Resp 13   Ht 5' 4\" (1.626 m)   Wt (!) 308 lb (139.7 kg)   SpO2 100%   BMI 52.87 kg/m²   HEAD AND FACE EXAM:  No throat injection, no active exudate,no thrush  NECK EXAM;No JVD, no masses, symmetrical  CHEST EXAM; Expansion equal and symmetrical, no masses  LUNG EXAM; Good breath sounds bilaterally. There are expiratory wheezes both lungs, there are crackles at both lung bases  CARDIOVASCULAR EXAM: Positive S1 and S2, no S3 or S4, no clicks ,no murmurs  RIGHT AND LEFT LOWER EXTRIMITY EXAM: No edema, no swelling, no inflamation  CNS EXAM: Alert and oriented X3          LABS   Lab Results   Component Value Date    WBC 6.2 01/08/2020    HGB 9.1 (L) 01/08/2020    HCT 31.0 (L) 01/08/2020    MCV 86.6 01/08/2020     01/08/2020     Lab Results   Component Value Date    CREATININE 2.1 (H) 01/08/2020    BUN 32 (H) 01/08/2020     01/08/2020    K 4.5 01/08/2020     01/08/2020    CO2 31 01/08/2020     Lab Results   Component Value Date    INR 1.12 01/08/2020    PROTIME 13.6 01/08/2020          Lab Results   Component Value Date    PHOS 3.6 11/19/2018    PHOS 4.6 11/18/2018    PHOS 5.1 11/18/2018      No results for input(s): PH, PO2ART, GQM6FYS, HCO3, BEART, O2SAT in the last 72 hours. Wt Readings from Last 3 Encounters:   01/08/20 (!) 308 lb (139.7 kg)   12/04/19 (!) 311 lb (141.1 kg)   10/17/19 (!) 315 lb (142.9 kg)               ASSESMENT  Left pl effusion  Copd  chf        PLAN  1. cpm  2.  Pl tap today    1/8/2020  Ezio Emery M.D.

## 2020-01-08 NOTE — PROGRESS NOTES
1500 ml   DVT Prophylaxis [x] Lovenox, []  Heparin, [] SCDs, [] Ambulation   GI Prophylaxis [] PPI,  [] H2 Blocker,  [] Carafate,  [] Diet/Tube Feeds   Code Status Full Code   Disposition  Home   MDM [] Low, [x] Moderate,[]  High     Chief complaint/Interval History/ROS     Chief Complaint: Constipation, shortness of breath, CHF exacerbation      INTERVAL HISTORY: Underwent thoracentesis yesterday. Patient underwent left heart cath today. ROS:  Shortness of breath has improved. Remains constipated. Has not had bowel movement. Denies chest pain. Denies abdominal pain. Objective: Intake/Output Summary (Last 24 hours) at 1/8/2020 1627  Last data filed at 1/8/2020 1455  Gross per 24 hour   Intake 240 ml   Output 3775 ml   Net -3535 ml      Vitals:   Vitals:    01/08/20 1600   BP: (!) 104/93   Pulse: 100   Resp: 20   Temp: 98.6 °F (37 °C)   SpO2: 98%     Physical Exam:   GEN: Awake female, laying flat in bed. Anxious about the left heart cath. EYES: Sclera anicteric, no conjunctival erythema  HENT: Mucous membranes are moist.   NECK: Supple,  RESP: Metric breath sounds. No wheezing. No accessory muscle use  CV: Regular rate and rhythm. No murmur heard. No pitting peripheral edema. .  GI: Obese abdomen. Soft. Nontender. Normoactive bowel sounds. :  Reyes catheter present  MSK: No bony fractures. No gross deformities. No knee joint effusions. SKIN: warm, dry, no rashes, no pressure ulcer  NEURO: Focal deficit. No dysarthria. Normal speech. No lateralizing weakness.   PSYCH: Oriented, normal mood, normal affect, alert, awake,    Medications:   Medications:    furosemide  40 mg Intravenous TID    sodium chloride flush  10 mL Intravenous 2 times per day    [START ON 1/9/2020] aspirin  81 mg Oral Daily    ticagrelor  90 mg Oral BID    lactulose  20 g Oral TID    [Held by provider] metOLazone  5 mg Oral BID    pantoprazole  40 mg Oral QAM AC    insulin lispro  0-12 Units Subcutaneous TID WC    insulin lispro  0-6 Units Subcutaneous Nightly    atorvastatin  80 mg Oral Nightly    buPROPion  100 mg Oral BID    mometasone-formoterol  2 puff Inhalation BID    carvedilol  12.5 mg Oral BID WC    docusate sodium  100 mg Oral BID    DULoxetine  60 mg Oral Daily    insulin glargine  55 Units Subcutaneous Nightly    isosorbide mononitrate  60 mg Oral Daily    levothyroxine  50 mcg Oral Daily    magnesium oxide  400 mg Oral Daily    ranolazine  500 mg Oral BID    heparin (porcine)  5,000 Units Subcutaneous 3 times per day      Infusions:    sodium chloride 50 mL/hr at 01/08/20 1407    DOBUTamine 2.5 mcg/kg/min (01/08/20 1412)    dextrose       PRN Meds: sodium chloride flush, 10 mL, PRN  acetaminophen, 650 mg, Q4H PRN  atropine, 0.5 mg, Once PRN  morphine, 1 mg, Once PRN  ondansetron, 4 mg, Q6H PRN  glucose, 15 g, PRN  dextrose, 12.5 g, PRN  glucagon (rDNA), 1 mg, PRN  dextrose, 100 mL/hr, PRN  polyethylene glycol, 17 g, Daily PRN  magnesium hydroxide, 30 mL, Daily PRN  albuterol sulfate HFA, 2 puff, Q4H PRN  HYDROcodone-acetaminophen, 2 tablet, Q4H PRN          Electronically signed by Cortes Roa MD on 1/8/2020 at 4:27 PM

## 2020-01-09 LAB
ALBUMIN SERPL-MCNC: 3.5 GM/DL (ref 3.4–5)
ALP BLD-CCNC: 75 IU/L (ref 40–128)
ALT SERPL-CCNC: 9 U/L (ref 10–40)
ANION GAP SERPL CALCULATED.3IONS-SCNC: 14 MMOL/L (ref 4–16)
AST SERPL-CCNC: 18 IU/L (ref 15–37)
BILIRUB SERPL-MCNC: 0.5 MG/DL (ref 0–1)
BUN BLDV-MCNC: 30 MG/DL (ref 6–23)
CALCIUM SERPL-MCNC: 8.7 MG/DL (ref 8.3–10.6)
CHLORIDE BLD-SCNC: 98 MMOL/L (ref 99–110)
CO2: 29 MMOL/L (ref 21–32)
CREAT SERPL-MCNC: 2.1 MG/DL (ref 0.6–1.1)
CULTURE: ABNORMAL
CULTURE: ABNORMAL
GFR AFRICAN AMERICAN: 29 ML/MIN/1.73M2
GFR NON-AFRICAN AMERICAN: 24 ML/MIN/1.73M2
GLUCOSE BLD-MCNC: 111 MG/DL (ref 70–99)
GLUCOSE BLD-MCNC: 114 MG/DL (ref 70–99)
GLUCOSE BLD-MCNC: 161 MG/DL (ref 70–99)
GLUCOSE BLD-MCNC: 164 MG/DL (ref 70–99)
GLUCOSE BLD-MCNC: 169 MG/DL (ref 70–99)
HCT VFR BLD CALC: 28.1 % (ref 37–47)
HEMOGLOBIN: 8.3 GM/DL (ref 12.5–16)
Lab: ABNORMAL
MCH RBC QN AUTO: 25.8 PG (ref 27–31)
MCHC RBC AUTO-ENTMCNC: 29.5 % (ref 32–36)
MCV RBC AUTO: 87.3 FL (ref 78–100)
PDW BLD-RTO: 17 % (ref 11.7–14.9)
PLATELET # BLD: 256 K/CU MM (ref 140–440)
PMV BLD AUTO: 10.9 FL (ref 7.5–11.1)
POTASSIUM SERPL-SCNC: 3.6 MMOL/L (ref 3.5–5.1)
RBC # BLD: 3.22 M/CU MM (ref 4.2–5.4)
SODIUM BLD-SCNC: 141 MMOL/L (ref 135–145)
SPECIMEN: ABNORMAL
TOTAL COLONY COUNT: ABNORMAL
TOTAL PROTEIN: 5.1 GM/DL (ref 6.4–8.2)
WBC # BLD: 7.4 K/CU MM (ref 4–10.5)

## 2020-01-09 PROCEDURE — 97116 GAIT TRAINING THERAPY: CPT

## 2020-01-09 PROCEDURE — 97530 THERAPEUTIC ACTIVITIES: CPT

## 2020-01-09 PROCEDURE — 36415 COLL VENOUS BLD VENIPUNCTURE: CPT

## 2020-01-09 PROCEDURE — 80053 COMPREHEN METABOLIC PANEL: CPT

## 2020-01-09 PROCEDURE — 97166 OT EVAL MOD COMPLEX 45 MIN: CPT

## 2020-01-09 PROCEDURE — 6370000000 HC RX 637 (ALT 250 FOR IP): Performed by: INTERNAL MEDICINE

## 2020-01-09 PROCEDURE — 2140000000 HC CCU INTERMEDIATE R&B

## 2020-01-09 PROCEDURE — 97162 PT EVAL MOD COMPLEX 30 MIN: CPT

## 2020-01-09 PROCEDURE — 6360000002 HC RX W HCPCS: Performed by: INTERNAL MEDICINE

## 2020-01-09 PROCEDURE — 85027 COMPLETE CBC AUTOMATED: CPT

## 2020-01-09 PROCEDURE — 82962 GLUCOSE BLOOD TEST: CPT

## 2020-01-09 PROCEDURE — 2580000003 HC RX 258: Performed by: INTERNAL MEDICINE

## 2020-01-09 RX ORDER — FUROSEMIDE 40 MG/1
40 TABLET ORAL 2 TIMES DAILY
Status: DISCONTINUED | OUTPATIENT
Start: 2020-01-09 | End: 2020-01-09

## 2020-01-09 RX ORDER — FUROSEMIDE 10 MG/ML
40 INJECTION INTRAMUSCULAR; INTRAVENOUS 3 TIMES DAILY
Status: DISCONTINUED | OUTPATIENT
Start: 2020-01-09 | End: 2020-01-10

## 2020-01-09 RX ADMIN — DOBUTAMINE IN DEXTROSE 2.5 MCG/KG/MIN: 200 INJECTION, SOLUTION INTRAVENOUS at 10:52

## 2020-01-09 RX ADMIN — CARVEDILOL 12.5 MG: 12.5 TABLET, FILM COATED ORAL at 10:38

## 2020-01-09 RX ADMIN — METOLAZONE 5 MG: 5 TABLET ORAL at 17:27

## 2020-01-09 RX ADMIN — DULOXETINE HYDROCHLORIDE 60 MG: 30 CAPSULE, DELAYED RELEASE ORAL at 10:38

## 2020-01-09 RX ADMIN — SODIUM CHLORIDE, PRESERVATIVE FREE 10 ML: 5 INJECTION INTRAVENOUS at 21:55

## 2020-01-09 RX ADMIN — INSULIN LISPRO 2 UNITS: 100 INJECTION, SOLUTION INTRAVENOUS; SUBCUTANEOUS at 21:50

## 2020-01-09 RX ADMIN — FUROSEMIDE 40 MG: 10 INJECTION, SOLUTION INTRAMUSCULAR; INTRAVENOUS at 14:13

## 2020-01-09 RX ADMIN — INSULIN GLARGINE 55 UNITS: 100 INJECTION, SOLUTION SUBCUTANEOUS at 21:51

## 2020-01-09 RX ADMIN — ATORVASTATIN CALCIUM 80 MG: 20 TABLET, FILM COATED ORAL at 21:55

## 2020-01-09 RX ADMIN — BUPROPION HYDROCHLORIDE 100 MG: 100 TABLET, FILM COATED, EXTENDED RELEASE ORAL at 21:55

## 2020-01-09 RX ADMIN — HEPARIN SODIUM 5000 UNITS: 5000 INJECTION, SOLUTION INTRAVENOUS; SUBCUTANEOUS at 21:51

## 2020-01-09 RX ADMIN — HYDROCODONE BITARTRATE AND ACETAMINOPHEN 2 TABLET: 5; 325 TABLET ORAL at 10:52

## 2020-01-09 RX ADMIN — BUPROPION HYDROCHLORIDE 100 MG: 100 TABLET, FILM COATED, EXTENDED RELEASE ORAL at 10:37

## 2020-01-09 RX ADMIN — Medication 400 MG: at 10:37

## 2020-01-09 RX ADMIN — INSULIN LISPRO 2 UNITS: 100 INJECTION, SOLUTION INTRAVENOUS; SUBCUTANEOUS at 12:57

## 2020-01-09 RX ADMIN — ASPIRIN 81 MG 81 MG: 81 TABLET ORAL at 10:38

## 2020-01-09 RX ADMIN — LEVOTHYROXINE SODIUM 50 MCG: 50 TABLET ORAL at 10:37

## 2020-01-09 RX ADMIN — ISOSORBIDE MONONITRATE 60 MG: 60 TABLET, EXTENDED RELEASE ORAL at 10:38

## 2020-01-09 RX ADMIN — FUROSEMIDE 40 MG: 10 INJECTION, SOLUTION INTRAMUSCULAR; INTRAVENOUS at 21:56

## 2020-01-09 RX ADMIN — FUROSEMIDE 40 MG: 10 INJECTION, SOLUTION INTRAMUSCULAR; INTRAVENOUS at 10:37

## 2020-01-09 RX ADMIN — HYDROCODONE BITARTRATE AND ACETAMINOPHEN 2 TABLET: 5; 325 TABLET ORAL at 21:56

## 2020-01-09 RX ADMIN — ONDANSETRON 4 MG: 2 INJECTION INTRAMUSCULAR; INTRAVENOUS at 10:52

## 2020-01-09 RX ADMIN — TICAGRELOR 90 MG: 90 TABLET ORAL at 10:38

## 2020-01-09 RX ADMIN — HYDROCODONE BITARTRATE AND ACETAMINOPHEN 2 TABLET: 5; 325 TABLET ORAL at 02:58

## 2020-01-09 RX ADMIN — CARVEDILOL 12.5 MG: 12.5 TABLET, FILM COATED ORAL at 17:27

## 2020-01-09 RX ADMIN — DOCUSATE SODIUM 100 MG: 100 CAPSULE, LIQUID FILLED ORAL at 21:55

## 2020-01-09 RX ADMIN — RANOLAZINE 500 MG: 500 TABLET, FILM COATED, EXTENDED RELEASE ORAL at 10:38

## 2020-01-09 RX ADMIN — RANOLAZINE 500 MG: 500 TABLET, FILM COATED, EXTENDED RELEASE ORAL at 21:55

## 2020-01-09 RX ADMIN — PANTOPRAZOLE SODIUM 40 MG: 40 TABLET, DELAYED RELEASE ORAL at 10:37

## 2020-01-09 RX ADMIN — HEPARIN SODIUM 5000 UNITS: 5000 INJECTION, SOLUTION INTRAVENOUS; SUBCUTANEOUS at 10:39

## 2020-01-09 RX ADMIN — HYDROCODONE BITARTRATE AND ACETAMINOPHEN 2 TABLET: 5; 325 TABLET ORAL at 17:35

## 2020-01-09 RX ADMIN — DOCUSATE SODIUM 100 MG: 100 CAPSULE, LIQUID FILLED ORAL at 10:37

## 2020-01-09 RX ADMIN — INSULIN LISPRO 2 UNITS: 100 INJECTION, SOLUTION INTRAVENOUS; SUBCUTANEOUS at 17:36

## 2020-01-09 RX ADMIN — TICAGRELOR 90 MG: 90 TABLET ORAL at 21:55

## 2020-01-09 ASSESSMENT — PAIN SCALES - GENERAL
PAINLEVEL_OUTOF10: 8
PAINLEVEL_OUTOF10: 9
PAINLEVEL_OUTOF10: 5
PAINLEVEL_OUTOF10: 9
PAINLEVEL_OUTOF10: 9
PAINLEVEL_OUTOF10: 8
PAINLEVEL_OUTOF10: 9

## 2020-01-09 ASSESSMENT — PAIN DESCRIPTION - ORIENTATION
ORIENTATION: LOWER
ORIENTATION: LOWER

## 2020-01-09 ASSESSMENT — PAIN DESCRIPTION - DESCRIPTORS
DESCRIPTORS: ACHING
DESCRIPTORS: ACHING

## 2020-01-09 ASSESSMENT — PAIN DESCRIPTION - PAIN TYPE
TYPE: CHRONIC PAIN

## 2020-01-09 ASSESSMENT — PAIN DESCRIPTION - LOCATION
LOCATION: BACK
LOCATION: BACK

## 2020-01-09 NOTE — PROGRESS NOTES
supple  Lungs:  + adv BS  Heart:  Seems irregular  Abdomen: soft  Extremities:  ++ edema - abd wall edema       Problem List :         Impression :     1. JUAN J / CKD stage 3 - cr up- acceptable sec to CRS type  1 - also had PCI   2. Fluid  overload- better with good diuresis   3. ASCVD S/p PCI - DM     Recommendation/Plan  :     1. Ok with loop/ thiazide  2. daily accurate wt  3. Watch for inadequate \"plasma refill;  4. IVF had deanna stopped  5. Bmp in am   6.  Good BS control       Ruben Sherman MD

## 2020-01-09 NOTE — PROGRESS NOTES
Seen by cardiac rehab status post PTCA  Patient has had heart disease and has had a stent placed in the pastPatient is agreeable to a teaching session   Teaching done on A&P of coronary arteries   On location and severity of lesions   On formation and progression of CAD   On symptoms of heart disease  On meaning and importance of ejection fraction   And on procedure performed   Stressed compliance with antiplatelet therapy   Explained the need for Brilinta  Given coupons and literature on Brilinta     Discussed risk factor identification and modification   Risk factors include hypertension hyperlipidemia diabetes obesity CKD family history and tobacco abuse  States she has cut down to only a small amount  Stressed smoking cessation Teaching done on cardiac diet low fat low cholesterol and low sodium    States she does follow a diabetic diet and does read labels to limit her sodium intake    Explained the benefits of regular exercise program      Stressed long term commitment to heart healthy practices in controlling this chronic disease     Offered benefits of out patient cardiac rehab    patient is agreeable  Referral made  stressed regular follow up with cardiologist  patient has an appointment with Dr Lorrie Cowden on January 22 at 10:00 am   Voiced understanding to all information  Literature provided  Support given

## 2020-01-09 NOTE — PROGRESS NOTES
Occupational Therapy    [facility] ACUTE CARE OCCUPATIONAL THERAPY EVALUATION  Philip Mosley, 1960, 3120/3120-A, 2020    History  [place]:  The primary encounter diagnosis was Elevated troponin. Diagnoses of Elevated brain natriuretic peptide (BNP) level, Pleural effusion, and Chest pain, unspecified type were also pertinent to this visit. Patient  has a past medical history of CAD (coronary artery disease), CKD (chronic kidney disease) stage 3, GFR 30-59 ml/min ([address] Diabetes type 2, uncontrolled ([address] Diastolic heart failure ([address] Essential hypertension, Financial problems, Generalized anxiety disorder, Herpes genitalia, and Obesity, morbid (more than 100 lbs over ideal weight or BMI > 40) ([address] Patient  has a past surgical history that includes Cholecystectomy;  section; Tonsillectomy; and other surgical history (Right, [id number]). Subjective:  Patient states: \"This is just the first day of getting up\". Pain:  No.    Communication with other providers:  Handoff to RN, co-eval with PT. Restrictions: General Precautions, Fall Risk    Home Setup/Prior level of function       Examination of body systems (includes body structures/functions, activity/participation limitations):  · Observation:  Supine in bed upon arrival, agreeable to therapy, boyfriend in room  · [facility] [place]  · [facility]  · Cardiopulmonary:  2L of 02      Body Systems and functions:  · ROM R/L:  WFL.     · Strength R/L:  4/5,   · Sensation: WFL  · Tone: Normal  · Coordination: WFL  · Perception: WNL    Activities of Daily Living (ADLs):  · Feeding: Maritza  · Grooming: CGA   · UB bathing: CGA  · LB bathing: maxA  · UB dressing: CGA  · LB dressing: maxA  · Toileting: maxA    Cognitive and Psychosocial Functioning:  · Overall cognitive status: WFL  · Affect: Normal     Mobility:  · Supine to sit:  SBA  · Transfers: Terry from EOB up to RW  · Sitting balance:  SBA  · Standing balance:  CGA  · Functional Mobility: Terry w/ RW (See PT notes for gait details)  · Toilet/Shower Transfers: DNT             AM-PAC Daily Activity Inpatient   How much help for putting on and taking off regular lower body clothing?: Total  How much help for Bathing?: A Lot  How much help for Toileting?: Total  How much help for putting on and taking off regular upper body clothing?: A Little  How much help for taking care of personal grooming?: A Little  How much help for eating meals?: None  AM-PAC Inpatient Daily Activity Raw Score: 14  AM-PAC Inpatient ADL T-Scale Score : 33.39  ADL Inpatient CMS 0-100% Score: 59.67  ADL Inpatient CMS G-Code Modifier : CK    Treatment:  Therapeutic Activity Training:   Therapeutic activity training was instructed today. Cues were given for safety, sequence, UE/LE placement, awareness, and balance. Activities performed today included bed mobility training, sup-sit, sit-stand, functional mobility, stand to sit        Safety: patient left in chair with chair alarm, call light within reach, RN notified, gait belt used. Assessment:  Pt is a 62 yo female admitted from home for pleural effusion on left. . Pt currently presents w/ deficits in ADL and high level IADL independence, functional activity tolerance, dynamic sitting and standing balance and tolerance and functional transfers and BUE strength. Pt would benefit from continued acute care OT services w/ discharge to SNF  Complexity: Moderate  Prognosis: Good, no significant barriers to participation at this time.    Plan  Times per week: 2x+  Times per day: Daily  Current Treatment Recommendations: Endurance Training, Strengthening, Patient/Caregiver Education & Training, Equipment Evaluation, Education, & procurement, Balance Training, Pain Management, Self-Care / ADL, Functional Mobility Training, Safety Education & Training, Home Management Training     Equipment: defer    Goals:  Pt goal: go home  Time Frame for STGs: discharge  Goal 1: Pt will perform UE

## 2020-01-09 NOTE — PROGRESS NOTES
Hospitalist Progress Note      Name:  Estella Gillis /Age/Sex: 1960  (61 y.o. female)   MRN & CSN:  [id number] & [id number] Admission Date/Time: 2020 11:26 AM   Location:  [address] [place] [address]-A PCP: Rashid Beck MD         Hospital Day: 4    ASSESSMENT & PLAN:   Estella Gillis is a 61 y.o.  female who was admitted to the hospital for constipation and shortness of breath likely secondary to CHF exacerbation. Underwent left heart cath on .  2 stents placed. Patient to remain in the hospital for further optimization of CHF exacerbation. She is currently on dobutamine drip low-dose. Lasix 40 IV 3 times daily along with Valisone 5 twice daily. Reyes to remain in place. PT/OT ordered. SNF discharge a possibility. However, patient desires home discharge. 1.  Constipation--- had bowel movement on , described as small  -Lactulose 10 3 times daily  -Docusate 100 twice daily  -Dulcolax daily    2. Acute diastolic CHF exacerbation-- on 2 L of oxygen continuously at baseline. SOB has  improved.  -Lasix 40 IV 3 times daily  -Resume metolazone 5 twice daily on   -Daily chemistry panel  -Fluid restriction less than 2 L    3. CAD-- s/p PCI on  with placement of MEGAN x2 to RCA. No chest pain. Troponin elevated. S/P PCI again on 2020. MEGAN x1 to mid LAD. DA x1 to OM branch. % occluded and not intervened at this point.  -Continue aspirin/Brilinta/Coreg/Lipitor/Ranexa    4. DM2-- on Lantus 55 units at home. BG controlled here in the hospital  -Lantus 55 units at bedtime  -Moderate dose sliding scale insulin  -Hypoglycemic protocol    5. COPD--no acute exacerbation  -Continue Dulera  -Albuterol ipratropium as needed    6. Hypothyroidism-- on levothyroxine 50 MCG    7. Depression--Cymbalta/Wellbutrin    8. CKD 4-- CR at baseline  -Avoid NSAIDs/contrast  -Daily chemistry panel    9. Morbid obesity--weighs 314 pounds, BMI 54.1    10.   Left pleural effusion---s/p thoracentesis on 1/8/2020, 325 cc removed. Cytology negative.  with normal differential.  Culture no growth. MEDICAL DECISION MAKING:  Labs reviewed  No new imaging  Level of risk moderate  Diet DIET CARB CONTROL; Daily Fluid Restriction: 1500 ml   DVT Prophylaxis [x] Lovenox, []  Heparin, [] SCDs, [] Ambulation   GI Prophylaxis [] PPI,  [] H2 Blocker,  [] Carafate,  [] Diet/Tube Feeds   Code Status Full Code   Disposition  Home/SNF   MDM [] Low, [x] Moderate,[]  High     Chief complaint/Interval History/ROS     Chief Complaint: Constipation, shortness of breath, CHF exacerbation      INTERVAL HISTORY: Shortness of breath much improved. Had a bowel movement on 1/8. Does not think she is 100% back to her baseline. ROS:  Bowel movement yesterday. Shortness of breath improved no abdominal pain. No nausea or vomiting. No fevers. No chills. Objective: Intake/Output Summary (Last 24 hours) at 1/9/2020 1216  Last data filed at 1/9/2020 0817  Gross per 24 hour   Intake 726 ml   Output 3250 ml   Net -2524 ml      Vitals:   Vitals:    01/09/20 1037   BP: (!) 139/92   Pulse: 93   Resp:    Temp:    SpO2:      Physical Exam:   GEN: Awake female, sitting upright in bed. Eating breakfast.  EYES: No eye discharge  HENT: Mucous membranes are moist.  No nasal discharge  NECK: Supple,  RESP: Good air movement. No wheezing. No crackles. CV: Regular rate and rhythm. No murmur heard. No pitting edema  GI: Obese abdomen. Soft. Nontender. Normoactive bowel sounds. :  Reyes catheter present  MSK: No bony fractures. No gross deformities. No knee joint effusions. SKIN: warm, dry, no rashes, no pressure ulcer  NEURO: no Focal deficit. No dysarthria. Normal speech. No lateralizing weakness.   PSYCH: Oriented, normal mood, normal affect, alert, awake,    Medications:   Medications:    furosemide  40 mg Intravenous TID    sodium chloride flush  10 mL Intravenous 2 times per day    aspirin  81 mg

## 2020-01-09 NOTE — PROGRESS NOTES
pulmonary      SUBJECTIVE:  Doing fair     OBJECTIVE    VITALS:  BP (!) 139/92   Pulse 93   Temp 97.8 °F (36.6 °C)   Resp 23   Ht 5' 4\" (1.626 m)   Wt 289 lb 9.6 oz (131.4 kg)   SpO2 100%   BMI 49.71 kg/m²   HEAD AND FACE EXAM:  No throat injection, no active exudate,no thrush  NECK EXAM;No JVD, no masses, symmetrical  CHEST EXAM; Expansion equal and symmetrical, no masses  LUNG EXAM; Good breath sounds bilaterally. There are expiratory wheezes both lungs, there are crackles at both lung bases  CARDIOVASCULAR EXAM: Positive S1 and S2, no S3 or S4, no clicks ,no murmurs  RIGHT AND LEFT LOWER EXTRIMITY EXAM: No edema, no swelling, no inflamation            LABS   Lab Results   Component Value Date    WBC 7.4 01/09/2020    HGB 8.3 (L) 01/09/2020    HCT 28.1 (L) 01/09/2020    MCV 87.3 01/09/2020     01/09/2020     Lab Results   Component Value Date    CREATININE 2.1 (H) 01/09/2020    BUN 30 (H) 01/09/2020     01/09/2020    K 3.6 01/09/2020    CL 98 (L) 01/09/2020    CO2 29 01/09/2020     Lab Results   Component Value Date    INR 1.12 01/08/2020    PROTIME 13.6 01/08/2020          Lab Results   Component Value Date    PHOS 3.6 11/19/2018    PHOS 4.6 11/18/2018    PHOS 5.1 11/18/2018      No results for input(s): PH, PO2ART, VQM5NVY, HCO3, BEART, O2SAT in the last 72 hours. Wt Readings from Last 3 Encounters:   01/09/20 289 lb 9.6 oz (131.4 kg)   12/04/19 (!) 311 lb (141.1 kg)   10/17/19 (!) 315 lb (142.9 kg)               ASSESMENT  Left pl effusion  Copd  chf        PLAN  1.  Bd rx  2. o2 adm    1/9/2020  Keira Elizondo M.D.

## 2020-01-09 NOTE — PROGRESS NOTES
Physical Therapy    Facility/Department: [address] [place] [address]  Initial Assessment    NAME: Alejandro Griffin  : 1960  MRN: [id number]    Date of Service: 2020    Discharge Recommendations:  Subacute/Skilled Nursing Facility        Assessment   Body structures, Functions, Activity limitations: Decreased functional mobility ; Decreased endurance;Decreased posture;Decreased ADL status; Decreased balance  Assessment: Pt is a 61 y.o. female presenting with decreased functional mobilit, decreased endurance, decreased posture, and decreased balance. Pt would benefit from continued skilled PT while in acute care and would benefit from SNF placement upon discharge to continue to address impairments and decrease pt's fall risk in the home and community. Decision Making: Medium Complexity  PT Education: Goals;Plan of Care;PT Role;General Safety  REQUIRES PT FOLLOW UP: Yes  Activity Tolerance  Activity Tolerance: Patient Tolerated treatment well;Patient limited by endurance       Patient Diagnosis(es): The primary encounter diagnosis was Elevated troponin. Diagnoses of Elevated brain natriuretic peptide (BNP) level, Pleural effusion, and Chest pain, unspecified type were also pertinent to this visit. has a past medical history of CAD (coronary artery disease), CKD (chronic kidney disease) stage 3, GFR 30-59 ml/min ([facility]), Diabetes type 2, uncontrolled ([address] Diastolic heart failure ([address] Essential hypertension, Financial problems, Generalized anxiety disorder, Herpes genitalia, and Obesity, morbid (more than 100 lbs over ideal weight or BMI > 40) ([address] has a past surgical history that includes Cholecystectomy;  section; Tonsillectomy; and other surgical history (Right, [id number]).     Restrictions   General precautions, O2, Telemetry  Vision/Hearing      WFL  Subjective  Pain Screening  Patient Currently in Pain: Yes  \"I used to do better than this\"    Orientation  Orientation  Overall Orientation Status: Within Normal Training, Safety Education & Training, Balance Training, Endurance Training, Stair training, Patient/Caregiver Education & Training  Safety Devices  Type of devices:  All fall risk precautions in place, Gait belt, Call light within reach, Chair alarm in place, Left in chair    AM-PAC Score  AM-PAC Inpatient Mobility Raw Score : 17 (01/09/20 1625)  AM-PAC Inpatient T-Scale Score : 42.13 (01/09/20 1625)  Mobility Inpatient CMS 0-100% Score: 50.57 (01/09/20 1625)  Mobility Inpatient CMS G-Code Modifier : CK (01/09/20 1625)          Goals  Short term goals  Time Frame for Short term goals: Pt will complete all bed mobility mod I  Short term goal 1: Pt will complete all STS transfers to/from bed, commode, and chair mod I  Short term goal 2: Pt will ambulate 200' supervision with LRAD       Therapy Time   Individual Concurrent Group Co-treatment   Time In 1344         Time Out 1402         Minutes 18         Timed Code Treatment Minutes: 8 Minutes       [place], PT

## 2020-01-09 NOTE — PROGRESS NOTES
Hyperglycemia 11/17/2018    Acute respiratory failure ([facility] [address] 10/02/2018    Gait disturbance 09/21/2018    Debility 09/19/2018    Nausea & vomiting 04/05/2018    Fever 04/05/2018    Generalized anxiety disorder 01/08/2018    DM (diabetes mellitus), secondary uncontrolled ([address] 01/04/2018    Syncope 08/26/2016    Acute pain of right shoulder 08/26/2016    Hypotension 08/26/2016    Acute renal failure, POA 07/21/2016    Diabetes type 2, uncontrolled ([address] 07/20/2016    Morbid obesity with BMI of 50.0-59.9, adult ([facility] [address] 07/20/2016    Essential hypertension     CAD (coronary artery disease)     Chronic diastolic heart failure (HCC)     CKD (chronic kidney disease) stage 3, GFR 30-59 ml/min ([facility] [address]        Electronically signed by Jose Reardon PA-C on 1/9/2020 at 10:33 AM

## 2020-01-10 LAB
ANION GAP SERPL CALCULATED.3IONS-SCNC: 14 MMOL/L (ref 4–16)
BUN BLDV-MCNC: 33 MG/DL (ref 6–23)
CALCIUM SERPL-MCNC: 8.7 MG/DL (ref 8.3–10.6)
CHLORIDE BLD-SCNC: 96 MMOL/L (ref 99–110)
CO2: 32 MMOL/L (ref 21–32)
CREAT SERPL-MCNC: 2.2 MG/DL (ref 0.6–1.1)
GFR AFRICAN AMERICAN: 28 ML/MIN/1.73M2
GFR NON-AFRICAN AMERICAN: 23 ML/MIN/1.73M2
GLUCOSE BLD-MCNC: 124 MG/DL (ref 70–99)
GLUCOSE BLD-MCNC: 133 MG/DL (ref 70–99)
GLUCOSE BLD-MCNC: 145 MG/DL (ref 70–99)
GLUCOSE BLD-MCNC: 158 MG/DL (ref 70–99)
GLUCOSE BLD-MCNC: 170 MG/DL (ref 70–99)
POTASSIUM SERPL-SCNC: 3.8 MMOL/L (ref 3.5–5.1)
PRO-BNP: ABNORMAL PG/ML
SODIUM BLD-SCNC: 142 MMOL/L (ref 135–145)

## 2020-01-10 PROCEDURE — 97530 THERAPEUTIC ACTIVITIES: CPT

## 2020-01-10 PROCEDURE — 6360000002 HC RX W HCPCS: Performed by: INTERNAL MEDICINE

## 2020-01-10 PROCEDURE — 6370000000 HC RX 637 (ALT 250 FOR IP): Performed by: INTERNAL MEDICINE

## 2020-01-10 PROCEDURE — 97535 SELF CARE MNGMENT TRAINING: CPT

## 2020-01-10 PROCEDURE — 83880 ASSAY OF NATRIURETIC PEPTIDE: CPT

## 2020-01-10 PROCEDURE — 2140000000 HC CCU INTERMEDIATE R&B

## 2020-01-10 PROCEDURE — 94761 N-INVAS EAR/PLS OXIMETRY MLT: CPT

## 2020-01-10 PROCEDURE — 36415 COLL VENOUS BLD VENIPUNCTURE: CPT

## 2020-01-10 PROCEDURE — 2700000000 HC OXYGEN THERAPY PER DAY

## 2020-01-10 PROCEDURE — 80048 BASIC METABOLIC PNL TOTAL CA: CPT

## 2020-01-10 PROCEDURE — 97116 GAIT TRAINING THERAPY: CPT

## 2020-01-10 PROCEDURE — 82962 GLUCOSE BLOOD TEST: CPT

## 2020-01-10 PROCEDURE — 2580000003 HC RX 258: Performed by: INTERNAL MEDICINE

## 2020-01-10 RX ORDER — FUROSEMIDE 10 MG/ML
40 INJECTION INTRAMUSCULAR; INTRAVENOUS 2 TIMES DAILY
Status: DISCONTINUED | OUTPATIENT
Start: 2020-01-10 | End: 2020-01-11

## 2020-01-10 RX ORDER — METOLAZONE 2.5 MG/1
5 TABLET ORAL DAILY
Status: DISCONTINUED | OUTPATIENT
Start: 2020-01-11 | End: 2020-01-11 | Stop reason: HOSPADM

## 2020-01-10 RX ADMIN — ISOSORBIDE MONONITRATE 60 MG: 60 TABLET, EXTENDED RELEASE ORAL at 08:28

## 2020-01-10 RX ADMIN — CARVEDILOL 12.5 MG: 12.5 TABLET, FILM COATED ORAL at 08:28

## 2020-01-10 RX ADMIN — TICAGRELOR 90 MG: 90 TABLET ORAL at 21:51

## 2020-01-10 RX ADMIN — HYDROCODONE BITARTRATE AND ACETAMINOPHEN 2 TABLET: 5; 325 TABLET ORAL at 06:04

## 2020-01-10 RX ADMIN — TICAGRELOR 90 MG: 90 TABLET ORAL at 08:28

## 2020-01-10 RX ADMIN — INSULIN LISPRO 2 UNITS: 100 INJECTION, SOLUTION INTRAVENOUS; SUBCUTANEOUS at 17:20

## 2020-01-10 RX ADMIN — RANOLAZINE 500 MG: 500 TABLET, FILM COATED, EXTENDED RELEASE ORAL at 08:28

## 2020-01-10 RX ADMIN — DULOXETINE HYDROCHLORIDE 60 MG: 30 CAPSULE, DELAYED RELEASE ORAL at 08:28

## 2020-01-10 RX ADMIN — ASPIRIN 81 MG 81 MG: 81 TABLET ORAL at 08:28

## 2020-01-10 RX ADMIN — HEPARIN SODIUM 5000 UNITS: 5000 INJECTION, SOLUTION INTRAVENOUS; SUBCUTANEOUS at 06:04

## 2020-01-10 RX ADMIN — ATORVASTATIN CALCIUM 80 MG: 20 TABLET, FILM COATED ORAL at 21:51

## 2020-01-10 RX ADMIN — CARVEDILOL 12.5 MG: 12.5 TABLET, FILM COATED ORAL at 17:26

## 2020-01-10 RX ADMIN — HEPARIN SODIUM 5000 UNITS: 5000 INJECTION, SOLUTION INTRAVENOUS; SUBCUTANEOUS at 21:53

## 2020-01-10 RX ADMIN — BUPROPION HYDROCHLORIDE 100 MG: 100 TABLET, FILM COATED, EXTENDED RELEASE ORAL at 08:28

## 2020-01-10 RX ADMIN — PANTOPRAZOLE SODIUM 40 MG: 40 TABLET, DELAYED RELEASE ORAL at 06:04

## 2020-01-10 RX ADMIN — DOCUSATE SODIUM 100 MG: 100 CAPSULE, LIQUID FILLED ORAL at 08:28

## 2020-01-10 RX ADMIN — FUROSEMIDE 40 MG: 10 INJECTION, SOLUTION INTRAMUSCULAR; INTRAVENOUS at 08:28

## 2020-01-10 RX ADMIN — INSULIN LISPRO 4 UNITS: 100 INJECTION, SOLUTION INTRAVENOUS; SUBCUTANEOUS at 12:08

## 2020-01-10 RX ADMIN — METOLAZONE 5 MG: 5 TABLET ORAL at 08:31

## 2020-01-10 RX ADMIN — FUROSEMIDE 40 MG: 10 INJECTION, SOLUTION INTRAMUSCULAR; INTRAVENOUS at 17:20

## 2020-01-10 RX ADMIN — HEPARIN SODIUM 5000 UNITS: 5000 INJECTION, SOLUTION INTRAVENOUS; SUBCUTANEOUS at 14:30

## 2020-01-10 RX ADMIN — INSULIN GLARGINE 55 UNITS: 100 INJECTION, SOLUTION SUBCUTANEOUS at 21:53

## 2020-01-10 RX ADMIN — RANOLAZINE 500 MG: 500 TABLET, FILM COATED, EXTENDED RELEASE ORAL at 21:51

## 2020-01-10 RX ADMIN — DOCUSATE SODIUM 100 MG: 100 CAPSULE, LIQUID FILLED ORAL at 21:51

## 2020-01-10 RX ADMIN — BUPROPION HYDROCHLORIDE 100 MG: 100 TABLET, FILM COATED, EXTENDED RELEASE ORAL at 21:51

## 2020-01-10 RX ADMIN — SODIUM CHLORIDE, PRESERVATIVE FREE 10 ML: 5 INJECTION INTRAVENOUS at 22:06

## 2020-01-10 RX ADMIN — HYDROCODONE BITARTRATE AND ACETAMINOPHEN 2 TABLET: 5; 325 TABLET ORAL at 15:33

## 2020-01-10 RX ADMIN — INSULIN LISPRO 1 UNITS: 100 INJECTION, SOLUTION INTRAVENOUS; SUBCUTANEOUS at 21:53

## 2020-01-10 RX ADMIN — Medication 400 MG: at 08:28

## 2020-01-10 RX ADMIN — LEVOTHYROXINE SODIUM 50 MCG: 50 TABLET ORAL at 08:28

## 2020-01-10 ASSESSMENT — PAIN SCALES - GENERAL
PAINLEVEL_OUTOF10: 8
PAINLEVEL_OUTOF10: 7
PAINLEVEL_OUTOF10: 3

## 2020-01-10 NOTE — PROGRESS NOTES
Hospitalist Progress Note      Name:  Chuy Wills /Age/Sex: 1960  (61 y.o. female)   MRN & CSN:  [id number] & [id number] Admission Date/Time: 2020 11:26 AM   Location:  [facility]-A PCP: Nevaeh Stroud MD         Hospital Day: 5    ASSESSMENT & PLAN:   Chuy Wills is a 61 y.o.  female who was admitted to the hospital for constipation and shortness of breath likely secondary to CHF exacerbation. Underwent left heart cath on .  2 stents placed. PT OT done. Recommending SNF placement. Patient adamantly refuses SNF placement. Patient diuresing well. Off dobutamine drip. Patient to remain in the hospital 1 more day for cardiac/CHF optimization. Reyes to be removed on 1/10. 1.  Constipation--- had bowel movement on , described as small. No further bowel movement since   -Lactulose 10 3 times daily  -Docusate 100 twice daily  -Dulcolax daily    2. Acute diastolic CHF exacerbation-- on 2 L of oxygen continuously at baseline. SOB has  improved.  -Lasix 40 IV twice daily  -metolazone 5 twice daily on   -Daily chemistry panel  -Fluid restriction less than 2 L    3. CAD-- s/p PCI on  with placement of MEGAN x2 to RCA. No chest pain. Troponin elevated. S/P PCI again on 2020. MEGAN x1 to mid LAD. DA x1 to OM branch. % occluded and not intervened at this point.  -Continue aspirin/Brilinta/Coreg/Lipitor/Ranexa    4. DM2-- on Lantus 55 units at home. BG controlled here in the hospital  -Lantus 55 units at bedtime  -Moderate dose sliding scale insulin  -Hypoglycemic protocol    5. COPD--no acute exacerbation  -Continue Dulera  -Albuterol ipratropium as needed    6. Hypothyroidism-- on levothyroxine 50 MCG    7. Depression--Cymbalta/Wellbutrin    8. CKD 4-- CR at baseline  -Avoid NSAIDs/contrast  -Daily chemistry panel    9. Morbid obesity--weighs 314 pounds, BMI 54.1    10. Left pleural effusion---s/p thoracentesis on 2020, 325 cc removed.   Cytology flush  10 mL Intravenous 2 times per day    aspirin  81 mg Oral Daily    ticagrelor  90 mg Oral BID    lactulose  20 g Oral TID    pantoprazole  40 mg Oral QAM AC    insulin lispro  0-12 Units Subcutaneous TID WC    insulin lispro  0-6 Units Subcutaneous Nightly    atorvastatin  80 mg Oral Nightly    buPROPion  100 mg Oral BID    mometasone-formoterol  2 puff Inhalation BID    carvedilol  12.5 mg Oral BID WC    docusate sodium  100 mg Oral BID    DULoxetine  60 mg Oral Daily    insulin glargine  55 Units Subcutaneous Nightly    isosorbide mononitrate  60 mg Oral Daily    levothyroxine  50 mcg Oral Daily    magnesium oxide  400 mg Oral Daily    ranolazine  500 mg Oral BID    heparin (porcine)  5,000 Units Subcutaneous 3 times per day      Infusions:    dextrose       PRN Meds: sodium chloride flush, 10 mL, PRN  acetaminophen, 650 mg, Q4H PRN  ondansetron, 4 mg, Q6H PRN  glucose, 15 g, PRN  dextrose, 12.5 g, PRN  glucagon (rDNA), 1 mg, PRN  dextrose, 100 mL/hr, PRN  polyethylene glycol, 17 g, Daily PRN  magnesium hydroxide, 30 mL, Daily PRN  albuterol sulfate HFA, 2 puff, Q4H PRN  HYDROcodone-acetaminophen, 2 tablet, Q4H PRN          Electronically signed by Ariel Dye MD on 1/10/2020 at 2:35 PM

## 2020-01-10 NOTE — PROGRESS NOTES
Note that pt has been awake all shift.  gave pt bed bath around 0200. Pt and  have been verbally arguing throughout the shift.  This RN suggested at 0300 that  take a walk and pt try to rest. Both parties became upset but agreed to rest

## 2020-01-10 NOTE — PROGRESS NOTES
Daily Progress Note      S/p PCI of LAD and LCX  HFrEf change to PO meds  Renal insuffiencey stable  CAD and HFrEF  Pleural effusion tap-transudate and lymphocytic   Supportive care     Pt. Awake, alert and doing ok  HR stable, NSR, BP  Has been stable  No CP today, Sob improved     Acute on Chronic HFrEF    EF 20% on last check    S/p thoracentesis     On diuretics- renal adjusting today    Stopping dobutamine drip today    Will OMT     CAD s/p PCI    Pt. With STEMI last admit    S/p PCI to RCA    CABG was being planned in 4-6 weeks and reevaluate her risk for surgery    High risk for surgery    S/p PCI to LAD and OM- on DAPT now    Cont. Med. tx.     CKD    Renal following    Cr stable today 2.2     Will cont. To follow     [facility]-1/8/20  LEFT MAIIN PATENT  LAD MID 80% TO 0% MEGAN XIENCE STENT 2.5X18MM STENT  LCX MILD DX  OM 90% TO 0% MEGAN XIENCE 2.25X33 MM STENT  LVEDP 35  RCA %  ASA AND BRIANTA AND HEPARIN  RIGHT BRACHIAL  NO COMPLICATIONS     Echo   Summary   Technically difficult examination due to body habitus.   Left ventricular systolic function is abnormal.   Ejection fraction is visually estimated at 30%.   Moderate left ventricular hypertrophy.   No significant valvular disease noted.   No pericardial effusion present.        PAST MEDICAL HISTORY:  History of coronary artery disease status post  heart catheterization done with angioplasty of the right coronary artery  and stented.  Significant disease in the circ and LAD was noted. History of hypertension, hyperlipidemia, heart failure, EF was 30% range  present, COPD, sleep apnea present and seizure disorder.     PAST SURGICAL HISTORY:  Gallbladder surgery, tonsillectomy, and  angioplasty.     SOCIAL HISTORY:  She is at nursing home right now.     ALLERGIES:  AUGMENTIN.     MEDICATIONS AT HOME:  She is on hydrochlorothiazide, Lipitor, Coreg,  insulin, Imdur, Brilinta, Zofran, Ranexa, and Synthroid.       Objective:   /62   Pulse 95   Temp HCT 31.0* 28.1*   MCV 86.6 87.3    256     BMP:   Recent Labs     01/08/20  0336 01/09/20  0521 01/10/20  0647    141 142   K 4.5 3.6 3.8    98* 96*   CO2 31 29 32   BUN 32* 30* 33*   CREATININE 2.1* 2.1* 2.2*     LIVER PROFILE:   Recent Labs     01/08/20  0336 01/09/20  0521   AST 12* 18   ALT 9* 9*   BILITOT 0.6 0.5   ALKPHOS 75 75     PT/INR:   Recent Labs     01/08/20  0336   PROTIME 13.6   INR 1.12     APTT:   Recent Labs     01/08/20  0336   APTT 37.7*     BNP:  No results for input(s): BNP in the last 72 hours.       Assessment:  Patient Active Problem List    Diagnosis Date Noted    E. coli UTI (urinary tract infection) 07/22/2016     Priority: High    Sepsis associated hypotension, POA 07/21/2016     Priority: High    Sepsis secondary to UTI, POA 07/20/2016     Priority: High    Musculoskeletal chest pain 07/20/2016     Priority: Medium    Diabetes mellitus with hyperglycemia ([facility] [address] 07/20/2016     Priority: Medium    Severe dehydration secondary to significant hyperglycemia 07/20/2016     Priority: Medium    Generalized weakness 07/20/2016     Priority: Medium    Elevated lactic acid level, resolved 07/20/2016     Priority: Low    STEMI (ST elevation myocardial infarction) ([facility] [address] 11/23/2019    Cellulitis of abdominal wall 07/13/2019    Acute kidney injury ([facility] [address] 04/29/2019    DM (diabetes mellitus) ([facility] [address] 04/29/2019    Fluid overload 04/29/2019    Cor pulmonale (chronic) ([facility] [address] 04/29/2019    Anasarca 04/23/2019    UTI (urinary tract infection), bacterial 03/15/2019    Sinus tachycardia 03/15/2019    Uncontrolled type 2 diabetes mellitus with hyperglycemia ([facility] [address] 03/15/2019    Class 3 severe obesity due to excess calories with body mass index (BMI) of 50.0 to 59.9 in adult ([facility] [address] 03/15/2019    Pleural effusion on left 02/28/2019    Hyperglycemia 11/17/2018    Acute respiratory failure ([facility] [address] 10/02/2018    Gait disturbance 09/21/2018    Debility 09/19/2018    Nausea & vomiting 04/05/2018    Fever 04/05/2018    Generalized anxiety disorder 01/08/2018    DM (diabetes mellitus), secondary uncontrolled ([facility] 01/04/2018    Syncope 08/26/2016    Acute pain of right shoulder 08/26/2016    Hypotension 08/26/2016    Acute renal failure, POA 07/21/2016    Diabetes type 2, uncontrolled ([address] 07/20/2016    Morbid obesity with BMI of 50.0-59.9, adult ([facility] [address] 07/20/2016    Essential hypertension     CAD (coronary artery disease)     Chronic diastolic heart failure (HCC)     CKD (chronic kidney disease) stage 3, GFR 30-59 ml/min ([facility] [address]        Electronically signed by Roberto Ornelas PA-C on 1/10/2020 at 11:14 AM

## 2020-01-10 NOTE — PROGRESS NOTES
Occupational Therapy  . Occupational Therapy Treatment Note  Name: Claudette Harden MRN: [id number] :   1960   Date:  1/10/2020   Admission Date: 2020 Room:  [facility]-A     Restrictions/Precautions:    General precautions; Fall Risk    Communication with other providers:  JUNI De Dios cleared pt for Tx session. Co-Tx c PTA Jody. Notified JUNI De Dios of pt's apparent confusion c word-finding difficulties, dizziness. Subjective:  Patient states:  Pt agreeable to Tx session. Pt exhibited nonsensical speech during functional mobility portion of Tx session, including statement. \"There should be one pill they can give me. Not just a porkchop. \" Pain:   Location, Type, Intensity (0/10 to 10/10):  6/10, back pain, reports nursing is following. Objective:    Observation:  Pt received in bathroom c spouse assisting w/o gait belt or assistive device. A&O x3 (does not know current date). Pt initially presented c rational and complete statements, then required re-direction for nonsensical statements (word-finding difficulties) to try for clarification (no success) and became tearful when directed to not attempt further functional mobility after pt had reported dizziness and exhibited confusion. Objective Measures:  Telemetry, HR 94, 92 97% on 2L O2 NC, RR 19. BP check s/p functional mobility and episode of consfusion was /91. Treatment, including education:  Self Care Training:   Cues were given for safety, sequence, UE/LE placement, visual cues, and balance. Activities performed today included dressing, toileting, hand hygiene, and grooming. Toilet Transfer: Min A c RW + mod cues for safe body positioning  Toileting: Dep for clothing mgmt and for hygiene. Grooming: Sup seated to perform face washing and hand hygiene. Dressing: Max A for gown    Therapeutic Activity Training:   Therapeutic activity training was instructed today.   Spouse and pt educated that if spouse is assisting pt, recommend use

## 2020-01-11 VITALS
DIASTOLIC BLOOD PRESSURE: 67 MMHG | SYSTOLIC BLOOD PRESSURE: 136 MMHG | TEMPERATURE: 98 F | BODY MASS INDEX: 48.26 KG/M2 | WEIGHT: 282.7 LBS | HEIGHT: 64 IN | HEART RATE: 90 BPM | RESPIRATION RATE: 23 BRPM | OXYGEN SATURATION: 100 %

## 2020-01-11 LAB
ANION GAP SERPL CALCULATED.3IONS-SCNC: 13 MMOL/L (ref 4–16)
BUN BLDV-MCNC: 35 MG/DL (ref 6–23)
CALCIUM SERPL-MCNC: 9.1 MG/DL (ref 8.3–10.6)
CHLORIDE BLD-SCNC: 93 MMOL/L (ref 99–110)
CO2: 34 MMOL/L (ref 21–32)
CREAT SERPL-MCNC: 2.4 MG/DL (ref 0.6–1.1)
CULTURE: ABNORMAL
GFR AFRICAN AMERICAN: 25 ML/MIN/1.73M2
GFR NON-AFRICAN AMERICAN: 21 ML/MIN/1.73M2
GLUCOSE BLD-MCNC: 100 MG/DL (ref 70–99)
GLUCOSE BLD-MCNC: 108 MG/DL (ref 70–99)
GRAM SMEAR: ABNORMAL
Lab: ABNORMAL
MAGNESIUM: 1.9 MG/DL (ref 1.8–2.4)
PHOSPHORUS: 5 MG/DL (ref 2.5–4.9)
POTASSIUM SERPL-SCNC: 4.1 MMOL/L (ref 3.5–5.1)
SODIUM BLD-SCNC: 140 MMOL/L (ref 135–145)
SPECIMEN: ABNORMAL

## 2020-01-11 PROCEDURE — 83735 ASSAY OF MAGNESIUM: CPT

## 2020-01-11 PROCEDURE — 6370000000 HC RX 637 (ALT 250 FOR IP): Performed by: INTERNAL MEDICINE

## 2020-01-11 PROCEDURE — 82962 GLUCOSE BLOOD TEST: CPT

## 2020-01-11 PROCEDURE — 80048 BASIC METABOLIC PNL TOTAL CA: CPT

## 2020-01-11 PROCEDURE — 6360000002 HC RX W HCPCS: Performed by: INTERNAL MEDICINE

## 2020-01-11 PROCEDURE — 84100 ASSAY OF PHOSPHORUS: CPT

## 2020-01-11 PROCEDURE — 36415 COLL VENOUS BLD VENIPUNCTURE: CPT

## 2020-01-11 RX ORDER — FUROSEMIDE 40 MG/1
40 TABLET ORAL 2 TIMES DAILY
Status: DISCONTINUED | OUTPATIENT
Start: 2020-01-11 | End: 2020-01-11 | Stop reason: HOSPADM

## 2020-01-11 RX ORDER — FUROSEMIDE 20 MG/1
20 TABLET ORAL 2 TIMES DAILY
Qty: 60 TABLET | Refills: 1 | Status: ON HOLD | OUTPATIENT
Start: 2020-01-11 | End: 2020-02-21 | Stop reason: HOSPADM

## 2020-01-11 RX ORDER — LISINOPRIL 5 MG/1
5 TABLET ORAL DAILY
Qty: 30 TABLET | Refills: 1 | Status: SHIPPED | OUTPATIENT
Start: 2020-01-11 | End: 2020-01-11 | Stop reason: DRUGHIGH

## 2020-01-11 RX ORDER — LACTULOSE 10 G/15ML
10 SOLUTION ORAL 2 TIMES DAILY
Qty: 2 BOTTLE | Refills: 1 | Status: SHIPPED | OUTPATIENT
Start: 2020-01-11

## 2020-01-11 RX ORDER — LISINOPRIL 2.5 MG/1
2.5 TABLET ORAL DAILY
Qty: 30 TABLET | Refills: 1 | Status: ON HOLD | OUTPATIENT
Start: 2020-01-11 | End: 2020-05-06 | Stop reason: HOSPADM

## 2020-01-11 RX ORDER — FUROSEMIDE 40 MG/1
40 TABLET ORAL 2 TIMES DAILY
Qty: 60 TABLET | Refills: 1 | Status: SHIPPED | OUTPATIENT
Start: 2020-01-11 | End: 2020-01-11 | Stop reason: DRUGHIGH

## 2020-01-11 RX ADMIN — DOCUSATE SODIUM 100 MG: 100 CAPSULE, LIQUID FILLED ORAL at 09:07

## 2020-01-11 RX ADMIN — LACTULOSE 20 G: 10 SOLUTION ORAL at 09:07

## 2020-01-11 RX ADMIN — BUPROPION HYDROCHLORIDE 100 MG: 100 TABLET, FILM COATED, EXTENDED RELEASE ORAL at 09:07

## 2020-01-11 RX ADMIN — ASPIRIN 81 MG 81 MG: 81 TABLET ORAL at 09:07

## 2020-01-11 RX ADMIN — METOLAZONE 5 MG: 2.5 TABLET ORAL at 09:07

## 2020-01-11 RX ADMIN — FUROSEMIDE 40 MG: 40 TABLET ORAL at 09:07

## 2020-01-11 RX ADMIN — RANOLAZINE 500 MG: 500 TABLET, FILM COATED, EXTENDED RELEASE ORAL at 09:07

## 2020-01-11 RX ADMIN — DULOXETINE HYDROCHLORIDE 60 MG: 30 CAPSULE, DELAYED RELEASE ORAL at 09:07

## 2020-01-11 RX ADMIN — HYDROCODONE BITARTRATE AND ACETAMINOPHEN 2 TABLET: 5; 325 TABLET ORAL at 01:30

## 2020-01-11 RX ADMIN — PANTOPRAZOLE SODIUM 40 MG: 40 TABLET, DELAYED RELEASE ORAL at 06:55

## 2020-01-11 RX ADMIN — ISOSORBIDE MONONITRATE 60 MG: 60 TABLET, EXTENDED RELEASE ORAL at 09:07

## 2020-01-11 RX ADMIN — CARVEDILOL 12.5 MG: 12.5 TABLET, FILM COATED ORAL at 09:07

## 2020-01-11 RX ADMIN — Medication 400 MG: at 09:07

## 2020-01-11 RX ADMIN — LEVOTHYROXINE SODIUM 50 MCG: 50 TABLET ORAL at 09:07

## 2020-01-11 RX ADMIN — TICAGRELOR 90 MG: 90 TABLET ORAL at 09:07

## 2020-01-11 RX ADMIN — HEPARIN SODIUM 5000 UNITS: 5000 INJECTION, SOLUTION INTRAVENOUS; SUBCUTANEOUS at 06:54

## 2020-01-11 ASSESSMENT — PAIN SCALES - GENERAL: PAINLEVEL_OUTOF10: 6

## 2020-01-11 NOTE — PROGRESS NOTES
Nephrology Progress Note  1/11/2020 12:05 PM  Subjective:   Admit Date: 1/6/2020  PCP: Luis Felipe Piper MD  Interval History: pt weak and decrease energy    Diet: DIET CARB CONTROL; Daily Fluid Restriction: 1500 ml  Pain is:Mild      Data:   Scheduled Meds:   furosemide  40 mg Oral BID    metOLazone  5 mg Oral Daily    sodium chloride flush  10 mL Intravenous 2 times per day    aspirin  81 mg Oral Daily    ticagrelor  90 mg Oral BID    lactulose  20 g Oral TID    pantoprazole  40 mg Oral QAM AC    insulin lispro  0-12 Units Subcutaneous TID WC    insulin lispro  0-6 Units Subcutaneous Nightly    atorvastatin  80 mg Oral Nightly    buPROPion  100 mg Oral BID    mometasone-formoterol  2 puff Inhalation BID    carvedilol  12.5 mg Oral BID WC    docusate sodium  100 mg Oral BID    DULoxetine  60 mg Oral Daily    insulin glargine  55 Units Subcutaneous Nightly    isosorbide mononitrate  60 mg Oral Daily    levothyroxine  50 mcg Oral Daily    magnesium oxide  400 mg Oral Daily    ranolazine  500 mg Oral BID    heparin (porcine)  5,000 Units Subcutaneous 3 times per day     Continuous Infusions:   dextrose       PRN Meds:sodium chloride flush, acetaminophen, ondansetron, glucose, dextrose, glucagon (rDNA), dextrose, polyethylene glycol, magnesium hydroxide, albuterol sulfate HFA, HYDROcodone-acetaminophen  I/O last 3 completed shifts:  In: -   Out: 1000 [Urine:1000]  No intake/output data recorded.     Intake/Output Summary (Last 24 hours) at 1/11/2020 1205  Last data filed at 1/10/2020 1447  Gross per 24 hour   Intake --   Output 1000 ml   Net -1000 ml     CBC:   Recent Labs     01/09/20  0521   WBC 7.4   HGB 8.3*        BMP:    Recent Labs     01/09/20  0521 01/10/20  0647 01/11/20  0841    142 140   K 3.6 3.8 4.1   CL 98* 96* 93*   CO2 29 32 34*   BUN 30* 33* 35*   CREATININE 2.1* 2.2* 2.4*   GLUCOSE 111* 133* 100*     Hepatic:   Recent Labs     01/09/20  0521   AST 18   ALT 9* BILITOT 0.5   ALKPHOS 75     Troponin: No results for input(s): TROPONINI in the last 72 hours. BNP: No results for input(s): BNP in the last 72 hours. Lipids: No results for input(s): CHOL, HDL in the last 72 hours. Invalid input(s): LDLCALCU  ABGs:   Lab Results   Component Value Date    PO2ART 149 07/14/2019    MYE4YLD 47.0 07/14/2019     INR: No results for input(s): INR in the last 72 hours. Renal Labs  Albumin:    Lab Results   Component Value Date    LABALBU 3.5 01/09/2020     Calcium:    Lab Results   Component Value Date    CALCIUM 9.1 01/11/2020     Phosphorus:    Lab Results   Component Value Date    PHOS 5.0 01/11/2020     U/A:    Lab Results   Component Value Date    NITRU NEGATIVE 01/06/2020    COLORU YELLOW 01/06/2020    WBCUA 15 01/06/2020    RBCUA 2 01/06/2020    MUCUS RARE 01/06/2020    TRICHOMONAS NONE SEEN 01/06/2020    YEAST OCCASIONAL 11/24/2019    BACTERIA OCCASIONAL 01/06/2020    CLARITYU HAZY 01/06/2020    SPECGRAV 1.017 01/06/2020    UROBILINOGEN NORMAL 01/06/2020    BILIRUBINUR NEGATIVE 01/06/2020    BLOODU SMALL 01/06/2020    KETUA NEGATIVE 01/06/2020     ABG:    Lab Results   Component Value Date    OPR4TNB 47.0 07/14/2019    PO2ART 149 07/14/2019    AOO3BUF 25.9 07/14/2019     HgBA1c:    Lab Results   Component Value Date    LABA1C 12.8 11/24/2019     Microalbumen/Creatinine ratio:  No components found for: RUCREAT          Objective:   Vitals: /67   Pulse 90   Temp 98 °F (36.7 °C) (Oral)   Resp 23   Ht 5' 4\" (1.626 m)   Wt 282 lb 11.2 oz (128.2 kg)   SpO2 100%   BMI 48.53 kg/m²   General appearance: awake weak  HEENT: Head: Normal, normocephalic, atraumatic.   Neck: supple, symmetrical, trachea midline  Lungs: diminished breath sounds bilaterally  Heart: S1, S2 normal  Abdomen: abnormal findings:  soft nt obese  Extremities: edema trace  Neurologic: Mental status: alertness: awake      Patient Active Problem List:     CKD (chronic kidney disease) stage 3, GFR 30-59

## 2020-01-11 NOTE — PROGRESS NOTES
Daily Progress Note     patient is awake alert  Going home today  Hx of CAD and HfrEF   Plan for echo in 6 week, and a stress test--in 6 week  If she has ischemia inferior wall--will consider PCI of RCA   Stable from cardiac stand  Need fluid and salt restriction     [facility]-1/8/20  LEFT MAIIN PATENT  LAD MID 80% TO 0% EMGAN XIENCE STENT 2.5X18MM STENT  LCX MILD DX  OM 90% TO 0% MEGAN XIENCE 2.25X33 MM STENT  LVEDP 35  RCA %  ASA AND BRIANTA AND HEPARIN  RIGHT BRACHIAL  NO COMPLICATIONS     Echo   Summary   Technically difficult examination due to body habitus.   Left ventricular systolic function is abnormal.   Ejection fraction is visually estimated at 30%.   Moderate left ventricular hypertrophy.   No significant valvular disease noted.   No pericardial effusion present.        PAST MEDICAL HISTORY:  History of coronary artery disease status post  heart catheterization done with angioplasty of the right coronary artery  and stented.  Significant disease in the circ and LAD was noted. History of hypertension, hyperlipidemia, heart failure, EF was 30% range  present, COPD, sleep apnea present and seizure disorder.     PAST SURGICAL HISTORY:  Gallbladder surgery, tonsillectomy, and  angioplasty.     SOCIAL HISTORY:  She is at nursing home right now.     ALLERGIES:  AUGMENTIN.     MEDICATIONS AT HOME:  She is on hydrochlorothiazide, Lipitor, Coreg,  insulin, Imdur, Brilinta, Zofran, Ranexa, and Synthroid.   Objective:   /67   Pulse 90   Temp 98 °F (36.7 °C) (Oral)   Resp 23   Ht 5' 4\" (1.626 m)   Wt 282 lb 11.2 oz (128.2 kg)   SpO2 100%   BMI 48.53 kg/m²       Intake/Output Summary (Last 24 hours) at 1/11/2020 1238  Last data filed at 1/10/2020 1447  Gross per 24 hour   Intake --   Output 1000 ml   Net -1000 ml       Medications:   Scheduled Meds:   furosemide  40 mg Oral BID    metOLazone  5 mg Oral Daily    sodium chloride flush  10 mL Intravenous 2 times per day    aspirin  81 mg Oral Daily    03/15/2019    Pleural effusion on left 02/28/2019    Hyperglycemia 11/17/2018    Acute respiratory failure ([facility] [address] 10/02/2018    Gait disturbance 09/21/2018    Debility 09/19/2018       Ryan Conti MD 1/11/2020 12:38 PM

## 2020-02-16 ENCOUNTER — APPOINTMENT (OUTPATIENT)
Dept: GENERAL RADIOLOGY | Age: 60
DRG: 291 | End: 2020-02-16
Payer: MEDICARE

## 2020-02-16 ENCOUNTER — HOSPITAL ENCOUNTER (INPATIENT)
Age: 60
LOS: 5 days | Discharge: HOME HEALTH | DRG: 291 | End: 2020-02-21
Attending: EMERGENCY MEDICINE | Admitting: INTERNAL MEDICINE
Payer: MEDICARE

## 2020-02-16 PROBLEM — R06.03 ACUTE RESPIRATORY DISTRESS: Status: ACTIVE | Noted: 2020-02-16

## 2020-02-16 LAB
ADENOVIRUS DETECTION BY PCR: NOT DETECTED
ALBUMIN SERPL-MCNC: 4 GM/DL (ref 3.4–5)
ALP BLD-CCNC: 136 IU/L (ref 40–129)
ALT SERPL-CCNC: 14 U/L (ref 10–40)
ANION GAP SERPL CALCULATED.3IONS-SCNC: 15 MMOL/L (ref 4–16)
AST SERPL-CCNC: 13 IU/L (ref 15–37)
BASE EXCESS: ABNORMAL (ref 0–2.4)
BASOPHILS ABSOLUTE: 0 K/CU MM
BASOPHILS RELATIVE PERCENT: 0.3 % (ref 0–1)
BILIRUB SERPL-MCNC: 0.2 MG/DL (ref 0–1)
BORDETELLA PERTUSSIS PCR: NOT DETECTED
BUN BLDV-MCNC: 46 MG/DL (ref 6–23)
CALCIUM SERPL-MCNC: 9.1 MG/DL (ref 8.3–10.6)
CHLAMYDOPHILA PNEUMONIA PCR: NOT DETECTED
CHLORIDE BLD-SCNC: 99 MMOL/L (ref 99–110)
CO2: 23 MMOL/L (ref 21–32)
COMMENT: ABNORMAL
CORONAVIRUS 229E PCR: NOT DETECTED
CORONAVIRUS HKU1 PCR: NOT DETECTED
CORONAVIRUS NL63 PCR: NOT DETECTED
CORONAVIRUS OC43 PCR: NOT DETECTED
CREAT SERPL-MCNC: 1.8 MG/DL (ref 0.6–1.1)
DIFFERENTIAL TYPE: ABNORMAL
EOSINOPHILS ABSOLUTE: 0.2 K/CU MM
EOSINOPHILS RELATIVE PERCENT: 1.8 % (ref 0–3)
GFR AFRICAN AMERICAN: 35 ML/MIN/1.73M2
GFR NON-AFRICAN AMERICAN: 29 ML/MIN/1.73M2
GLUCOSE BLD-MCNC: 148 MG/DL (ref 70–99)
GLUCOSE BLD-MCNC: 185 MG/DL (ref 70–99)
GLUCOSE BLD-MCNC: 218 MG/DL (ref 70–99)
HCO3 VENOUS: 25 MMOL/L (ref 19–25)
HCT VFR BLD CALC: 35.6 % (ref 37–47)
HEMOGLOBIN: 11.1 GM/DL (ref 12.5–16)
HUMAN METAPNEUMOVIRUS PCR: NOT DETECTED
IMMATURE NEUTROPHIL %: 0.6 % (ref 0–0.43)
INFLUENZA A BY PCR: NOT DETECTED
INFLUENZA A H1 (2009) PCR: NOT DETECTED
INFLUENZA A H1 PANDEMIC PCR: NOT DETECTED
INFLUENZA A H3 PCR: NOT DETECTED
INFLUENZA B BY PCR: NOT DETECTED
LACTATE: 0.8 MMOL/L (ref 0.4–2)
LYMPHOCYTES ABSOLUTE: 1.7 K/CU MM
LYMPHOCYTES RELATIVE PERCENT: 15.7 % (ref 24–44)
MCH RBC QN AUTO: 26.2 PG (ref 27–31)
MCHC RBC AUTO-ENTMCNC: 31.2 % (ref 32–36)
MCV RBC AUTO: 84 FL (ref 78–100)
MONOCYTES ABSOLUTE: 0.6 K/CU MM
MONOCYTES RELATIVE PERCENT: 5 % (ref 0–4)
MYCOPLASMA PNEUMONIAE PCR: NOT DETECTED
NUCLEATED RBC %: 0 %
O2 SAT, VEN: 86.2 % (ref 50–70)
PARAINFLUENZA 1 PCR: NOT DETECTED
PARAINFLUENZA 2 PCR: NOT DETECTED
PARAINFLUENZA 3 PCR: NOT DETECTED
PARAINFLUENZA 4 PCR: NOT DETECTED
PCO2, VEN: 52 MMHG (ref 38–52)
PDW BLD-RTO: 16.3 % (ref 11.7–14.9)
PH VENOUS: 7.29 (ref 7.32–7.42)
PLATELET # BLD: 319 K/CU MM (ref 140–440)
PMV BLD AUTO: 10.6 FL (ref 7.5–11.1)
PO2, VEN: 58 MMHG (ref 28–48)
POTASSIUM SERPL-SCNC: 5 MMOL/L (ref 3.5–5.1)
PRO-BNP: 7643 PG/ML
RBC # BLD: 4.24 M/CU MM (ref 4.2–5.4)
RHINOVIRUS ENTEROVIRUS PCR: NOT DETECTED
RSV PCR: NOT DETECTED
SEGMENTED NEUTROPHILS ABSOLUTE COUNT: 8.5 K/CU MM
SEGMENTED NEUTROPHILS RELATIVE PERCENT: 76.6 % (ref 36–66)
SODIUM BLD-SCNC: 137 MMOL/L (ref 135–145)
TOTAL IMMATURE NEUTOROPHIL: 0.07 K/CU MM
TOTAL NUCLEATED RBC: 0 K/CU MM
TOTAL PROTEIN: 6.9 GM/DL (ref 6.4–8.2)
TROPONIN T: 0.05 NG/ML
TROPONIN T: 0.06 NG/ML
TROPONIN T: 0.07 NG/ML
TSH HIGH SENSITIVITY: 2.76 UIU/ML (ref 0.27–4.2)
WBC # BLD: 11.1 K/CU MM (ref 4–10.5)

## 2020-02-16 PROCEDURE — 99285 EMERGENCY DEPT VISIT HI MDM: CPT

## 2020-02-16 PROCEDURE — 80053 COMPREHEN METABOLIC PANEL: CPT

## 2020-02-16 PROCEDURE — 84443 ASSAY THYROID STIM HORMONE: CPT

## 2020-02-16 PROCEDURE — 6360000002 HC RX W HCPCS: Performed by: EMERGENCY MEDICINE

## 2020-02-16 PROCEDURE — 6370000000 HC RX 637 (ALT 250 FOR IP): Performed by: NURSE PRACTITIONER

## 2020-02-16 PROCEDURE — 82962 GLUCOSE BLOOD TEST: CPT

## 2020-02-16 PROCEDURE — 36415 COLL VENOUS BLD VENIPUNCTURE: CPT

## 2020-02-16 PROCEDURE — 2500000003 HC RX 250 WO HCPCS: Performed by: NURSE PRACTITIONER

## 2020-02-16 PROCEDURE — 6370000000 HC RX 637 (ALT 250 FOR IP): Performed by: INTERNAL MEDICINE

## 2020-02-16 PROCEDURE — 93010 ELECTROCARDIOGRAM REPORT: CPT | Performed by: INTERNAL MEDICINE

## 2020-02-16 PROCEDURE — 87040 BLOOD CULTURE FOR BACTERIA: CPT

## 2020-02-16 PROCEDURE — 2700000000 HC OXYGEN THERAPY PER DAY

## 2020-02-16 PROCEDURE — 2060000000 HC ICU INTERMEDIATE R&B

## 2020-02-16 PROCEDURE — 87581 M.PNEUMON DNA AMP PROBE: CPT

## 2020-02-16 PROCEDURE — 2580000003 HC RX 258: Performed by: NURSE PRACTITIONER

## 2020-02-16 PROCEDURE — 84484 ASSAY OF TROPONIN QUANT: CPT

## 2020-02-16 PROCEDURE — 94761 N-INVAS EAR/PLS OXIMETRY MLT: CPT

## 2020-02-16 PROCEDURE — 85025 COMPLETE CBC W/AUTO DIFF WBC: CPT

## 2020-02-16 PROCEDURE — 6360000002 HC RX W HCPCS: Performed by: NURSE PRACTITIONER

## 2020-02-16 PROCEDURE — 94640 AIRWAY INHALATION TREATMENT: CPT

## 2020-02-16 PROCEDURE — 6370000000 HC RX 637 (ALT 250 FOR IP): Performed by: EMERGENCY MEDICINE

## 2020-02-16 PROCEDURE — 93005 ELECTROCARDIOGRAM TRACING: CPT | Performed by: EMERGENCY MEDICINE

## 2020-02-16 PROCEDURE — 83605 ASSAY OF LACTIC ACID: CPT

## 2020-02-16 PROCEDURE — 87798 DETECT AGENT NOS DNA AMP: CPT

## 2020-02-16 PROCEDURE — 87633 RESP VIRUS 12-25 TARGETS: CPT

## 2020-02-16 PROCEDURE — 96365 THER/PROPH/DIAG IV INF INIT: CPT

## 2020-02-16 PROCEDURE — 82805 BLOOD GASES W/O2 SATURATION: CPT

## 2020-02-16 PROCEDURE — 87486 CHLMYD PNEUM DNA AMP PROBE: CPT

## 2020-02-16 PROCEDURE — 6360000002 HC RX W HCPCS: Performed by: INTERNAL MEDICINE

## 2020-02-16 PROCEDURE — 96375 TX/PRO/DX INJ NEW DRUG ADDON: CPT

## 2020-02-16 PROCEDURE — 71045 X-RAY EXAM CHEST 1 VIEW: CPT

## 2020-02-16 PROCEDURE — 4500000027

## 2020-02-16 PROCEDURE — 83880 ASSAY OF NATRIURETIC PEPTIDE: CPT

## 2020-02-16 RX ORDER — FUROSEMIDE 10 MG/ML
40 INJECTION INTRAMUSCULAR; INTRAVENOUS ONCE
Status: COMPLETED | OUTPATIENT
Start: 2020-02-16 | End: 2020-02-16

## 2020-02-16 RX ORDER — IPRATROPIUM BROMIDE AND ALBUTEROL SULFATE 2.5; .5 MG/3ML; MG/3ML
1 SOLUTION RESPIRATORY (INHALATION) ONCE
Status: COMPLETED | OUTPATIENT
Start: 2020-02-16 | End: 2020-02-16

## 2020-02-16 RX ORDER — IPRATROPIUM BROMIDE AND ALBUTEROL SULFATE 2.5; .5 MG/3ML; MG/3ML
1 SOLUTION RESPIRATORY (INHALATION)
Status: DISCONTINUED | OUTPATIENT
Start: 2020-02-16 | End: 2020-02-21 | Stop reason: HOSPADM

## 2020-02-16 RX ORDER — HYDROCODONE BITARTRATE AND ACETAMINOPHEN 5; 325 MG/1; MG/1
2 TABLET ORAL EVERY 4 HOURS PRN
Status: DISCONTINUED | OUTPATIENT
Start: 2020-02-16 | End: 2020-02-21 | Stop reason: HOSPADM

## 2020-02-16 RX ORDER — PREDNISONE 20 MG/1
40 TABLET ORAL DAILY
Status: CANCELLED | OUTPATIENT
Start: 2020-02-18

## 2020-02-16 RX ORDER — POLYETHYLENE GLYCOL 3350 17 G/17G
17 POWDER, FOR SOLUTION ORAL 2 TIMES DAILY
Status: DISCONTINUED | OUTPATIENT
Start: 2020-02-16 | End: 2020-02-16 | Stop reason: ALTCHOICE

## 2020-02-16 RX ORDER — NICOTINE POLACRILEX 4 MG
15 LOZENGE BUCCAL PRN
Status: DISCONTINUED | OUTPATIENT
Start: 2020-02-16 | End: 2020-02-21 | Stop reason: HOSPADM

## 2020-02-16 RX ORDER — FUROSEMIDE 10 MG/ML
20 INJECTION INTRAMUSCULAR; INTRAVENOUS ONCE
Status: DISCONTINUED | OUTPATIENT
Start: 2020-02-16 | End: 2020-02-16

## 2020-02-16 RX ORDER — LEVOTHYROXINE SODIUM 0.05 MG/1
50 TABLET ORAL DAILY
Status: DISCONTINUED | OUTPATIENT
Start: 2020-02-16 | End: 2020-02-21 | Stop reason: HOSPADM

## 2020-02-16 RX ORDER — RANOLAZINE 500 MG/1
500 TABLET, EXTENDED RELEASE ORAL 2 TIMES DAILY
Status: DISCONTINUED | OUTPATIENT
Start: 2020-02-16 | End: 2020-02-21 | Stop reason: HOSPADM

## 2020-02-16 RX ORDER — SODIUM CHLORIDE 0.9 % (FLUSH) 0.9 %
10 SYRINGE (ML) INJECTION EVERY 12 HOURS SCHEDULED
Status: DISCONTINUED | OUTPATIENT
Start: 2020-02-16 | End: 2020-02-21 | Stop reason: HOSPADM

## 2020-02-16 RX ORDER — CARVEDILOL 12.5 MG/1
12.5 TABLET ORAL 2 TIMES DAILY WITH MEALS
Status: DISCONTINUED | OUTPATIENT
Start: 2020-02-16 | End: 2020-02-21 | Stop reason: HOSPADM

## 2020-02-16 RX ORDER — FUROSEMIDE 10 MG/ML
20 INJECTION INTRAMUSCULAR; INTRAVENOUS 2 TIMES DAILY
Status: DISCONTINUED | OUTPATIENT
Start: 2020-02-16 | End: 2020-02-18

## 2020-02-16 RX ORDER — METHYLPREDNISOLONE SODIUM SUCCINATE 125 MG/2ML
40 INJECTION, POWDER, LYOPHILIZED, FOR SOLUTION INTRAMUSCULAR; INTRAVENOUS EVERY 6 HOURS
Status: CANCELLED | OUTPATIENT
Start: 2020-02-16 | End: 2020-02-18

## 2020-02-16 RX ORDER — ASPIRIN 81 MG/1
81 TABLET, CHEWABLE ORAL DAILY
Status: DISCONTINUED | OUTPATIENT
Start: 2020-02-16 | End: 2020-02-21 | Stop reason: HOSPADM

## 2020-02-16 RX ORDER — LACTULOSE 10 G/15ML
10 SOLUTION ORAL 2 TIMES DAILY
Status: DISCONTINUED | OUTPATIENT
Start: 2020-02-16 | End: 2020-02-21 | Stop reason: HOSPADM

## 2020-02-16 RX ORDER — ACETAMINOPHEN 325 MG/1
650 TABLET ORAL EVERY 4 HOURS PRN
Status: DISCONTINUED | OUTPATIENT
Start: 2020-02-16 | End: 2020-02-21 | Stop reason: HOSPADM

## 2020-02-16 RX ORDER — ISOSORBIDE MONONITRATE 60 MG/1
60 TABLET, EXTENDED RELEASE ORAL DAILY
Status: DISCONTINUED | OUTPATIENT
Start: 2020-02-16 | End: 2020-02-21 | Stop reason: HOSPADM

## 2020-02-16 RX ORDER — DOCUSATE SODIUM 100 MG/1
100 CAPSULE, LIQUID FILLED ORAL 2 TIMES DAILY
Status: DISCONTINUED | OUTPATIENT
Start: 2020-02-16 | End: 2020-02-21 | Stop reason: HOSPADM

## 2020-02-16 RX ORDER — LORAZEPAM 2 MG/ML
1 INJECTION INTRAMUSCULAR ONCE
Status: COMPLETED | OUTPATIENT
Start: 2020-02-16 | End: 2020-02-16

## 2020-02-16 RX ORDER — DULOXETIN HYDROCHLORIDE 30 MG/1
60 CAPSULE, DELAYED RELEASE ORAL DAILY
Status: DISCONTINUED | OUTPATIENT
Start: 2020-02-16 | End: 2020-02-21 | Stop reason: HOSPADM

## 2020-02-16 RX ORDER — LORAZEPAM 1 MG/1
1 TABLET ORAL 2 TIMES DAILY
Status: DISCONTINUED | OUTPATIENT
Start: 2020-02-16 | End: 2020-02-16

## 2020-02-16 RX ORDER — BUPROPION HYDROCHLORIDE 100 MG/1
100 TABLET, EXTENDED RELEASE ORAL 2 TIMES DAILY
Status: DISCONTINUED | OUTPATIENT
Start: 2020-02-16 | End: 2020-02-21 | Stop reason: HOSPADM

## 2020-02-16 RX ORDER — PANTOPRAZOLE SODIUM 40 MG/1
40 TABLET, DELAYED RELEASE ORAL
Status: DISCONTINUED | OUTPATIENT
Start: 2020-02-17 | End: 2020-02-21 | Stop reason: HOSPADM

## 2020-02-16 RX ORDER — SODIUM CHLORIDE 0.9 % (FLUSH) 0.9 %
10 SYRINGE (ML) INJECTION PRN
Status: DISCONTINUED | OUTPATIENT
Start: 2020-02-16 | End: 2020-02-21 | Stop reason: HOSPADM

## 2020-02-16 RX ORDER — INSULIN GLARGINE 100 [IU]/ML
55 INJECTION, SOLUTION SUBCUTANEOUS NIGHTLY
Status: DISCONTINUED | OUTPATIENT
Start: 2020-02-16 | End: 2020-02-21 | Stop reason: HOSPADM

## 2020-02-16 RX ORDER — DOBUTAMINE HYDROCHLORIDE 200 MG/100ML
2.5 INJECTION INTRAVENOUS CONTINUOUS
Status: DISCONTINUED | OUTPATIENT
Start: 2020-02-16 | End: 2020-02-21

## 2020-02-16 RX ORDER — DEXTROSE MONOHYDRATE 25 G/50ML
12.5 INJECTION, SOLUTION INTRAVENOUS PRN
Status: DISCONTINUED | OUTPATIENT
Start: 2020-02-16 | End: 2020-02-21 | Stop reason: HOSPADM

## 2020-02-16 RX ORDER — LISINOPRIL 2.5 MG/1
2.5 TABLET ORAL DAILY
Status: DISCONTINUED | OUTPATIENT
Start: 2020-02-16 | End: 2020-02-21 | Stop reason: HOSPADM

## 2020-02-16 RX ORDER — DEXTROSE MONOHYDRATE 50 MG/ML
100 INJECTION, SOLUTION INTRAVENOUS PRN
Status: DISCONTINUED | OUTPATIENT
Start: 2020-02-16 | End: 2020-02-21 | Stop reason: HOSPADM

## 2020-02-16 RX ORDER — LORAZEPAM 1 MG/1
1 TABLET ORAL 2 TIMES DAILY
Status: DISCONTINUED | OUTPATIENT
Start: 2020-02-17 | End: 2020-02-21 | Stop reason: HOSPADM

## 2020-02-16 RX ORDER — NICOTINE 21 MG/24HR
1 PATCH, TRANSDERMAL 24 HOURS TRANSDERMAL DAILY
Status: DISCONTINUED | OUTPATIENT
Start: 2020-02-16 | End: 2020-02-21 | Stop reason: HOSPADM

## 2020-02-16 RX ORDER — ONDANSETRON 2 MG/ML
4 INJECTION INTRAMUSCULAR; INTRAVENOUS EVERY 6 HOURS PRN
Status: DISCONTINUED | OUTPATIENT
Start: 2020-02-16 | End: 2020-02-21 | Stop reason: HOSPADM

## 2020-02-16 RX ORDER — ATORVASTATIN CALCIUM 40 MG/1
80 TABLET, FILM COATED ORAL NIGHTLY
Status: DISCONTINUED | OUTPATIENT
Start: 2020-02-16 | End: 2020-02-21 | Stop reason: HOSPADM

## 2020-02-16 RX ORDER — POLYETHYLENE GLYCOL 3350 17 G/17G
17 POWDER, FOR SOLUTION ORAL 2 TIMES DAILY
Status: DISCONTINUED | OUTPATIENT
Start: 2020-02-16 | End: 2020-02-21 | Stop reason: HOSPADM

## 2020-02-16 RX ADMIN — DOBUTAMINE IN DEXTROSE 2.5 MCG/KG/MIN: 200 INJECTION, SOLUTION INTRAVENOUS at 13:15

## 2020-02-16 RX ADMIN — ATORVASTATIN CALCIUM 80 MG: 40 TABLET, FILM COATED ORAL at 21:51

## 2020-02-16 RX ADMIN — SODIUM CHLORIDE, PRESERVATIVE FREE 10 ML: 5 INJECTION INTRAVENOUS at 21:52

## 2020-02-16 RX ADMIN — FUROSEMIDE 20 MG: 10 INJECTION, SOLUTION INTRAVENOUS at 18:18

## 2020-02-16 RX ADMIN — IPRATROPIUM BROMIDE AND ALBUTEROL SULFATE 1 AMPULE: .5; 3 SOLUTION RESPIRATORY (INHALATION) at 10:44

## 2020-02-16 RX ADMIN — CARVEDILOL 12.5 MG: 12.5 TABLET, FILM COATED ORAL at 18:18

## 2020-02-16 RX ADMIN — BUPROPION HYDROCHLORIDE 100 MG: 100 TABLET, FILM COATED, EXTENDED RELEASE ORAL at 21:51

## 2020-02-16 RX ADMIN — IPRATROPIUM BROMIDE AND ALBUTEROL SULFATE 1 AMPULE: .5; 3 SOLUTION RESPIRATORY (INHALATION) at 10:45

## 2020-02-16 RX ADMIN — INSULIN LISPRO 3 UNITS: 100 INJECTION, SOLUTION INTRAVENOUS; SUBCUTANEOUS at 18:07

## 2020-02-16 RX ADMIN — LORAZEPAM 1 MG: 2 INJECTION, SOLUTION INTRAMUSCULAR; INTRAVENOUS at 10:53

## 2020-02-16 RX ADMIN — RANOLAZINE 500 MG: 500 TABLET, FILM COATED, EXTENDED RELEASE ORAL at 21:51

## 2020-02-16 RX ADMIN — FUROSEMIDE 40 MG: 10 INJECTION, SOLUTION INTRAVENOUS at 13:15

## 2020-02-16 RX ADMIN — MICONAZOLE NITRATE: 20 POWDER TOPICAL at 18:20

## 2020-02-16 RX ADMIN — IPRATROPIUM BROMIDE AND ALBUTEROL SULFATE 1 AMPULE: .5; 3 SOLUTION RESPIRATORY (INHALATION) at 20:38

## 2020-02-16 RX ADMIN — IPRATROPIUM BROMIDE AND ALBUTEROL SULFATE 1 AMPULE: .5; 3 SOLUTION RESPIRATORY (INHALATION) at 15:29

## 2020-02-16 RX ADMIN — HYDROCODONE BITARTRATE AND ACETAMINOPHEN 2 TABLET: 5; 325 TABLET ORAL at 22:10

## 2020-02-16 RX ADMIN — TICAGRELOR 90 MG: 90 TABLET ORAL at 21:51

## 2020-02-16 RX ADMIN — LORAZEPAM 1 MG: 1 TABLET ORAL at 18:18

## 2020-02-16 RX ADMIN — ENOXAPARIN SODIUM 40 MG: 40 INJECTION SUBCUTANEOUS at 21:51

## 2020-02-16 RX ADMIN — INSULIN GLARGINE 55 UNITS: 100 INJECTION, SOLUTION SUBCUTANEOUS at 23:02

## 2020-02-16 ASSESSMENT — PAIN - FUNCTIONAL ASSESSMENT: PAIN_FUNCTIONAL_ASSESSMENT: PREVENTS OR INTERFERES SOME ACTIVE ACTIVITIES AND ADLS

## 2020-02-16 ASSESSMENT — PAIN DESCRIPTION - LOCATION: LOCATION: GENERALIZED

## 2020-02-16 ASSESSMENT — PAIN SCALES - GENERAL: PAINLEVEL_OUTOF10: 6

## 2020-02-16 ASSESSMENT — PAIN DESCRIPTION - PROGRESSION: CLINICAL_PROGRESSION: NOT CHANGED

## 2020-02-16 ASSESSMENT — PAIN DESCRIPTION - PAIN TYPE: TYPE: CHRONIC PAIN

## 2020-02-16 ASSESSMENT — PAIN DESCRIPTION - FREQUENCY: FREQUENCY: CONTINUOUS

## 2020-02-16 ASSESSMENT — PAIN DESCRIPTION - DESCRIPTORS: DESCRIPTORS: ACHING;CONSTANT;DISCOMFORT

## 2020-02-16 ASSESSMENT — PAIN DESCRIPTION - ONSET: ONSET: ON-GOING

## 2020-02-16 NOTE — ED NOTES
Pt presents to ED via EMS from home for c/o SOB ongoing since yesterday. Reports cough. Denies CP. Beatriz fevers. Reports cough is nonproductive. Also c/o n/v, but reports this is chronic. Pt reports feeling like she is retaining fluid, pitting edema to BLE noted. Pt arrives on 3L NC and reports she has been wearing this at home. EMS reports pt was tachypneic with labored respirations and tripoding on their arrival. Pt was given one breathing tx in route and refused CPAP in route. Pt arrives a&ox4. Pt speaks shortened sentences and is tachypneic. Dr. Alex Campos at bedside on arrival. RT at bedside and pt placed on high flow cannula. Pt tolerating well.       Rupinder Moran RN  02/16/20 [id number]

## 2020-02-16 NOTE — CONSULTS
[address] [place] [address], [address] [facility]                                  CONSULTATION    PATIENT NAME: LIONEL CLEVELAND                      :        1960  MED REC NO:   [id number]                          ROOM:       TR03  ACCOUNT NO:   [de-identified]                           ADMIT DATE: 2020  PROVIDER:     Jax Deluna MD    CONSULT DATE:  2020    INDICATION:  Heart failure. HISTORY OF PRESENT ILLNESS:  This is a 63-year-old female patient who  comes in with having shortness of breath present. No chest pain, just  shortness of breath and swelling present. An EKG has sinus tachycardia  present with nonspecific change present. She is on Vapotherm. She is  feeling better right now. She did receive Lasix 20 mg in the ER. The patient has a history of heart failure and coronary artery disease  present. The patient's last echo was done back on 2020. EF was  around 20% range present with pulmonary hypertension present. The  patient also has severe 3-vessel coronary artery disease present. She  is referred for bypass and is high risk for surgery. Therefore it was  declined and underwent angioplasty of the LAD and circumflex artery. RCA is 100% occluded. The patient has ischemia in the inferior wall and  the plan was to do angioplasty of the right coronary artery. PAST MEDICAL HISTORY:  Coronary artery disease, history of heart  failure, EF is around 30% range present. She did have angioplasty of  the right coronary artery in the past also. Hypertension,  hyperlipidemia, COPD, renal insufficiency present, sees Dr. Toñito Johnson for  that. History of sleep apnea, COPD, and seizure disorder. PAST SURGICAL HISTORY:  Gallbladder surgery, tonsillectomy, and  angioplasty done. SOCIAL HISTORY:  She is living at home right now. ALLERGIES:  AUGMENTIN.     MEDICATIONS AT HOME:  She is on Lasix 20 mg b.i.d.,

## 2020-02-16 NOTE — ED PROVIDER NOTES
eMERGENCY dEPARTMENT eNCOUnter      PCP: Malini Roman MD    CHIEF COMPLAINT    Chief Complaint   Patient presents with    Shortness of Breath       HPI    Lucius Simon is a 61 y.o. female who presents with shortness of breath. States she started feeling more short of breath yesterday. Reports cough, reports that is nonproductive. She feels like her lungs are filling up with fluid. Does report increase in lower extremity edema. Reports compliance with lasix. Denies chest pain. Denies fever or chills. She states she wears oxygen at home. Recently admitted and had heart cath with stent placement. REVIEW OF SYSTEMS    Constitutional:  Denies fever, chills.    HENT:  Denies sore throat or ear pain   Cardiovascular:  Denies chest pain, palpitations   Respiratory:  + cough and shortness of breath    GI:  Denies abdominal pain, nausea, vomiting, or diarrhea  :  Denies any urinary symptoms, flank pain  Musculoskeletal:  Denies back pain, extremity pain  Skin:  Denies rash, color change  Neurologic:  Denies headache, focal weakness or sensory changes   Lymphatic:  Denies swollen glands, + edema    All other review of systems are negative  See HPI and nursing notes for additional information     PAST MEDICAL AND SURGICAL HISTORY    Past Medical History:   Diagnosis Date    CAD (coronary artery disease)     CKD (chronic kidney disease) stage 3, GFR 30-59 ml/min ([facility])     Diabetes type 2, uncontrolled ([address]     Diastolic heart failure ([address]     Essential hypertension     Financial problems 3/22/2013    Generalized anxiety disorder 2018    Herpes genitalia     Obesity, morbid (more than 100 lbs over ideal weight or BMI > 40) ([address] 2013    STEMI (ST elevation myocardial infarction) ([address]      Past Surgical History:   Procedure Laterality Date    CARDIAC SURGERY       SECTION      CHOLECYSTECTOMY      CORONARY ANGIOPLASTY WITH STENT PLACEMENT      OTHER SURGICAL HISTORY Right [id number]    I and D rt big toe    TONSILLECTOMY         CURRENT MEDICATIONS    Current Outpatient Rx   Medication Sig Dispense Refill    lactulose (CHRONULAC) 10 GM/15ML solution Take 15 mLs by mouth 2 times daily 2 Bottle 1    furosemide (LASIX) 20 MG tablet Take 1 tablet by mouth 2 times daily Ignore 40 mg BID prescribtion. 60 tablet 1    lisinopril (PRINIVIL;ZESTRIL) 2.5 MG tablet Take 1 tablet by mouth daily Ignore 5 mg prescription. Correct prescription 2.5 mg. 30 tablet 1    LORazepam (ATIVAN) 1 MG tablet Take 1 mg by mouth 2 times daily.  polyethylene glycol (GLYCOLAX) powder Take 17 g by mouth 2 times daily      HYDROcodone-acetaminophen (NORCO) 5-325 MG per tablet Take 2 tablets by mouth every 4 hours as needed for Pain.       atorvastatin (LIPITOR) 80 MG tablet Take 1 tablet by mouth nightly 30 tablet 0    insulin lispro (HUMALOG) 100 UNIT/ML injection vial Check blood sugar three times daily before meals and at bedtime  For blood sugar less than 150= no insulin, 151-200=2, 201-250=4, 251-300=6, 301-350=6, 351-400=8, >400=10 units and call MD 1 vial 3    carvedilol (COREG) 12.5 MG tablet Take 1 tablet by mouth 2 times daily (with meals) 60 tablet 3    isosorbide mononitrate (IMDUR) 60 MG extended release tablet Take 1 tablet by mouth daily 30 tablet 3    ticagrelor (BRILINTA) 90 MG TABS tablet Take 1 tablet by mouth 2 times daily 60 tablet 1    vitamin D 1000 units CAPS Take 3 capsules by mouth daily 30 capsule 0    nystatin (MYCOSTATIN) 395672 UNIT/GM powder Apply topically 2 times daily as needed  1    ranolazine (RANEXA) 500 MG extended release tablet Take 1 tablet by mouth 2 times daily 60 tablet 3    BREO ELLIPTA 100-25 MCG/INH AEPB inhaler Inhale 1 puff into the lungs daily  0    levothyroxine (SYNTHROID) 50 MCG tablet Take 50 mcg by mouth daily  3    insulin glargine (LANTUS SOLOSTAR) 100 UNIT/ML injection pen Inject 55 Units into the skin nightly 5 pen 1    linagliptin (TRADJENTA) 5 MG tablet Take 1 tablet by mouth daily 30 tablet 3    buPROPion (WELLBUTRIN SR) 100 MG extended release tablet Take 100 mg by mouth 2 times daily      aspirin 81 MG chewable tablet Take 1 tablet by mouth daily 30 tablet 0    DULoxetine (CYMBALTA) 60 MG extended release capsule Take 1 capsule by mouth daily 30 capsule 3    docusate (COLACE, DULCOLAX) 100 MG CAPS Take 100 mg by mouth 2 times daily 60 capsule 1    pantoprazole (PROTONIX) 40 MG tablet Take 1 tablet by mouth every morning (before breakfast) 30 tablet 3    albuterol sulfate HFA (VENTOLIN HFA) 108 (90 Base) MCG/ACT inhaler Inhale 2 puffs into the lungs every 4 hours as needed for Wheezing 1 Inhaler 3       ALLERGIES    Allergies   Allergen Reactions    Augmentin [Amoxicillin-Pot Clavulanate] Diarrhea       SOCIAL AND FAMILY HISTORY    Social History     Socioeconomic History    Marital status: Single     Spouse name: None    Number of children: None    Years of education: None    Highest education level: None   Occupational History    None   Social Needs    Financial resource strain: None    Food insecurity:     Worry: None     Inability: None    Transportation needs:     Medical: None     Non-medical: None   Tobacco Use    Smoking status: Current Every Day Smoker     Packs/day: 0.20     Years: 34.00     Pack years: 6.80     Types: Cigarettes    Smokeless tobacco: Never Used   Substance and Sexual Activity    Alcohol use: No     Alcohol/week: 0.0 standard drinks    Drug use: No    Sexual activity: None   Lifestyle    Physical activity:     Days per week: None     Minutes per session: None    Stress: None   Relationships    Social connections:     Talks on phone: None     Gets together: None     Attends Judaism service: None     Active member of club or organization: None     Attends meetings of clubs or organizations: None     Relationship status: None    Intimate partner violence:     Fear of current or ex partner: None     Emotionally abused: None     Physically abused: None     Forced sexual activity: None   Other Topics Concern    None   Social History Narrative    None     Family History   Problem Relation Age of Onset    Arthritis Mother     Diabetes Mother     Heart Disease Mother     High Blood Pressure Mother     Arthritis Father     Heart Disease Father     High Blood Pressure Father     Stroke Father     Substance Abuse Father     Substance Abuse Brother     Diabetes Maternal Aunt     Diabetes Maternal Uncle     Cancer Maternal Grandmother     Diabetes Maternal Grandmother     Diabetes Maternal Grandfather          PHYSICAL EXAM    VITAL SIGNS: BP (!) 171/77   Pulse 111   Temp 97.7 °F (36.5 °C) (Oral)   Resp 18   Ht 5' 4\" (1.626 m)   Wt 280 lb (127 kg)   SpO2 100%   BMI 48.06 kg/m²    Constitutional:  Well developed, appears anxious, groaning   HENT:  Normocephalic, Atraumatic, PERRL. EOMI. Sclera clear. Conjunctiva normal.  Neck: supple without ridigity  Cardiovascular: Tachycardic rate, regular rhythm  Respiratory:  labored breathing, or air entry,  Abdomen: Soft, Non tender, bowel sounds present. Musculoskeletal: trace edema bilateral lower extremities, No tenderness  Integument:  Warm, Dry, no rash  Neurologic:  Alert & oriented , No focal deficits noted.   Speech normal.  Psychiatric:  Anxious       Labs:  Labs Reviewed   CBC WITH AUTO DIFFERENTIAL - Abnormal; Notable for the following components:       Result Value    WBC 11.1 (*)     Hemoglobin 11.1 (*)     Hematocrit 35.6 (*)     MCH 26.2 (*)     MCHC 31.2 (*)     RDW 16.3 (*)     Segs Relative 76.6 (*)     Lymphocytes % 15.7 (*)     Monocytes % 5.0 (*)     Immature Neutrophil % 0.6 (*)     All other components within normal limits   COMPREHENSIVE METABOLIC PANEL - Abnormal; Notable for the following components:    BUN 46 (*)     CREATININE 1.8 (*)     Glucose 218 (*)     AST 13 (*)     Alkaline Phosphatase 136 (*)     GFR Non-African

## 2020-02-16 NOTE — H&P
History and Physical  THAO Beard-BC   Internal Medicine Hospitalist      Name:  Deuce Kennedy /Age/Sex: 1960  (61 y.o. female)   MRN & CSN:  [id number] & [id number] Admission Date/Time: 2020 10:23 AM   Location:  -A PCP: Luis Felipe Piper MD       Hospital Day: 1      Supervising Physician: Dr. Mali Shields     Chief Complaint: Shortness of Breath     Assessment and Plan:   Deuce Kennedy is a 61 y.o. morbidly obese female who presents with Acute respiratory distress     Acute respiratory distress, possibly 2/2 CHF - placed on Vapoterm upon arrival in ED with improvement.  Chronic diastolic HF - c/o SOB but BNP level 7,643 (BL 14,943), Lt leg only trace edema, initial trop 0.060 (BL 0.132) could be r/t CKD, denied cp, CXR no acute findings, Last Echo EF 20%. - admit inpatient, telemetry monitoring         - Cardiology, Dr. Arsh Doe consulted in ED         - switch oral Lasix to IV          - c/w Dobutamine-started in ED         - c/w  Imdur, Ranexa, Lisinopril         - trend trop         - monitor BNP level         - daily weights, monitor I/O         - Lab works in AM         - pending TSH, resp PCR     COPD - not in exacerbation, no wheezing, no fever. - continue home breathing treatment         - pulse ox monitoring     CKD stage 3 - stable, presenting Crea level 1.8 (BS 2.4).  DM type 2, uncontrolled - Last A1C 12.0         - c/w home dose Lantus + Sliding scale         - monitor accucheck         - diabetic diet     Tobacco abuse - on Nicotine patch, tobacco cessation education.  Class 3 severe obesity due to excess calories with BMI of 45.0 to 49.9 in adult - current BMI 48.2         - Health maintenance: exerise and diet     Chronic Illnesses: will continue current home medications unless contraindicated by above plan and assessment.           - CAD - c/w ASA, Brilinta, Coreg, Lipitor.         - hyperlipidemia          - hypertension diaphoresis. Denies any other symptoms including headache, paresthesias, abdominal pain, nausea, vomiting, changes in bowels, dysuria, hematuria, frequency or urgency, and B/L weakness. Upon interview, the patient provided the history as above. ED Course: Discussed case with ED physician prior to admission. ROS: Ten point ROS reviewed and negative, unless as noted above per HPI. Objective: Intake/Output Summary (Last 24 hours) at 2/16/2020 1419  Last data filed at 2/16/2020 1315  Gross per 24 hour   Intake --   Output 100 ml   Net -100 ml        Vitals: Temp/Oral 97.7  Vitals:    02/16/20 1048 02/16/20 1202 02/16/20 1211 02/16/20 1333   BP:  134/83  135/73   Pulse:  94 97 90   Resp: 18 14 16 16   Temp:       TempSrc:       SpO2: 100% 100% 100% 100%   Weight:       Height:         Physical Exam: 02/16/20    GEN  -Awake, alert, appearing morbidly obese female, sitting upright in bed, cooperative, able to give adequate history. Appears given age. EYES -Pupils are equally round. No vision changes. No scleral erythema, discharge, or conjunctivitis. HENT -MM are moist. Oral pharynx without exudates, no evidence of thrush. NECK -Supple, no apparent thyromegaly or masses. RESP -LS CTA equal bilat, no wheezes, rales or rhonchi. Symmetric chest movement. No respiratory distress noted. C/V  -S1/S2 auscultated. RRR without appreciable M/R/G. No JVD or carotid bruits. Peripheral pulses equal bilaterally and palpable. Peripheral pulses equal bilaterally and palpable. Left leg trace pitting edema. No reproducible chest wall tenderness. GI  -central obesity, Abdomen is soft, round, non-distended, no significant tenderness. No masses or guarding. + BS in all quadrants. Rectal exam deferred.   -Reyes catheter is not present. LYMPH  -No palpable cervical lymphadenopathy and no hepatosplenomegaly. No petechiae or ecchymoses. MS  -B/L extremities strong muscles strength. Full movements.  No gross joint deformities. No swelling, intact sensation symmetrical.   SKIN  -Normal coloration, warm, dry. No open wounds or ulcers. NEURO  -CN 2-12 appear grossly intact, normal speech, no lateralizing weakness. PSYC  -Awake, alert, oriented x 4. Appropriate affect. Past Medical History:      Past Medical History:   Diagnosis Date    CAD (coronary artery disease)     CKD (chronic kidney disease) stage 3, GFR 30-59 ml/min ([facility])     Diabetes type 2, uncontrolled [facility]     Di[facility] failure [facility]     Essential hypertension     Financial problems 3/22/2013    Generalized anxiety disorder 2018    Herpes genitalia     Obesity, morbid (more than 100 lbs over ideal weight or BMI > 40) ([facility] 2013    STEMI (ST elevation myocardial infarction) [facility])      Past Surgery History:  Patient  has a past surgical history that includes Cholecystectomy;  section; Tonsillectomy; other surgical history (Right, [id number]); Cardiac surgery; and Coronary angioplasty with stent. Social History:    FAM HX: Assessed: family history includes Arthritis in her father and mother; Cancer in her maternal grandmother; Diabetes in her maternal aunt, maternal grandfather, maternal grandmother, maternal uncle, and mother; Heart Disease in her father and mother; High Blood Pressure in her father and mother; Stroke in her father; Substance Abuse in her brother and father.   Soc HX:   Social History     Socioeconomic History    Marital status: Single     Spouse name: None    Number of children: None    Years of education: None    Highest education level: None   Occupational History    None   Social Needs    Financial resource strain: None    Food insecurity:     Worry: None     Inability: None    Transportation needs:     Medical: None     Non-medical: None   Tobacco Use    Smoking status: Current Every Day Smoker     Packs/day: 0.20     Years: 34.00     Pack years: 6.80     Types: Cigarettes    Smokeless tobacco: Never Used   Substance and Sexual Activity    Alcohol use: No     Alcohol/week: 0.0 standard drinks    Drug use: No    Sexual activity: None   Lifestyle    Physical activity:     Days per week: None     Minutes per session: None    Stress: None   Relationships    Social connections:     Talks on phone: None     Gets together: None     Attends Confucianist service: None     Active member of club or organization: None     Attends meetings of clubs or organizations: None     Relationship status: None    Intimate partner violence:     Fear of current or ex partner: None     Emotionally abused: None     Physically abused: None     Forced sexual activity: None   Other Topics Concern    None   Social History Narrative    None     TOBACCO:   reports that she has been smoking cigarettes. She has a 6.80 pack-year smoking history. She has never used smokeless tobacco.  ETOH:   reports no history of alcohol use. Drugs:  reports no history of drug use. Allergies:    Allergies   Allergen Reactions    Augmentin [Amoxicillin-Pot Clavulanate] Diarrhea     Medications:   Medications:    sodium chloride flush  10 mL Intravenous 2 times per day    enoxaparin  40 mg Subcutaneous Daily    ipratropium-albuterol  1 ampule Inhalation Q4H WA    aspirin  81 mg Oral Daily    atorvastatin  80 mg Oral Nightly    buPROPion  100 mg Oral BID    carvedilol  12.5 mg Oral BID WC    docusate  100 mg Oral BID    DULoxetine  60 mg Oral Daily    furosemide  20 mg Intravenous BID    insulin glargine  55 Units Subcutaneous Nightly    isosorbide mononitrate  60 mg Oral Daily    lactulose  10 g Oral BID    levothyroxine  50 mcg Oral Daily    lisinopril  2.5 mg Oral Daily    LORazepam  1 mg Oral BID    miconazole   Topical BID    [START ON 2/17/2020] pantoprazole  40 mg Oral QAM AC    polyethylene glycol  17 g Oral BID    ranolazine  500 mg Oral BID    ticagrelor  90 mg Oral BID    vitamin D  3,000 Units Oral Daily Infusions:    DOBUTamine 2.5 mcg/kg/min (02/16/20 1315)     PRN Meds: sodium chloride flush, 10 mL, PRN  magnesium hydroxide, 30 mL, Daily PRN  ondansetron, 4 mg, Q6H PRN  acetaminophen, 650 mg, Q4H PRN  HYDROcodone-acetaminophen, 2 tablet, Q4H PRN      Prior to Admission Meds:  Prior to Admission medications    Medication Sig Start Date End Date Taking? Authorizing Provider   furosemide (LASIX) 20 MG tablet Take 1 tablet by mouth 2 times daily Ignore 40 mg BID prescribtion. 1/11/20  Yes Emilee Willams MD   lisinopril (PRINIVIL;ZESTRIL) 2.5 MG tablet Take 1 tablet by mouth daily Ignore 5 mg prescription.  Correct prescription 2.5 mg. 1/11/20  Yes Emilee Willams MD   atorvastatin (LIPITOR) 80 MG tablet Take 1 tablet by mouth nightly 12/4/19  Yes Brina Roger DO   carvedilol (COREG) 12.5 MG tablet Take 1 tablet by mouth 2 times daily (with meals) 12/2/19  Yes Maria Luisa Olea MD   isosorbide mononitrate (IMDUR) 60 MG extended release tablet Take 1 tablet by mouth daily 12/3/19  Yes Maria Luisa Olea MD   ticagrelor (BRILINTA) 90 MG TABS tablet Take 1 tablet by mouth 2 times daily 12/2/19  Yes Maria Luisa Olea MD   vitamin D 1000 units CAPS Take 3 capsules by mouth daily 7/18/19  Yes Gaylen Snellen, MD   ranolazine (RANEXA) 500 MG extended release tablet Take 1 tablet by mouth 2 times daily 3/4/19  Yes Richy John MD   levothyroxine (SYNTHROID) 50 MCG tablet Take 50 mcg by mouth daily 2/26/19  Yes Historical Provider, MD   linagliptin (TRADJENTA) 5 MG tablet Take 1 tablet by mouth daily 11/22/18  Yes Richy John MD   buPROPion [facility]) 100 MG extended release tablet Take 100 mg by mouth 2 times daily   Yes Historical Provider, MD   aspirin 81 MG chewable tablet Take 1 tablet by mouth daily 1/8/18  Yes Luis Antonio Birch MD   DULoxetine (CYMBALTA) 60 MG extended release capsule Take 1 capsule by mouth daily 1/8/18  Yes Luis Antonio Birch MD   docusate (COLACE, DULCOLAX) 100 MG CAPS Take 100 mg by mouth 2 times daily 1/8/18  Yes Maria D Abernathy MD   pantoprazole (PROTONIX) 40 MG tablet Take 1 tablet by mouth every morning (before breakfast) 1/8/18  Yes Maria D Abernathy MD   lactulose (CHRONULAC) 10 GM/15ML solution Take 15 mLs by mouth 2 times daily 1/11/20   Blanca Leger MD   LORazepam (ATIVAN) 1 MG tablet Take 1 mg by mouth 2 times daily. Historical Provider, MD   polyethylene glycol (GLYCOLAX) powder Take 17 g by mouth 2 times daily    Historical Provider, MD   HYDROcodone-acetaminophen (NORCO) 5-325 MG per tablet Take 2 tablets by mouth every 4 hours as needed for Pain. Historical Provider, MD   insulin lispro (HUMALOG) 100 UNIT/ML injection vial Check blood sugar three times daily before meals and at bedtime  For blood sugar less than 150= no insulin, 151-200=2, 201-250=4, 251-300=6, 301-350=6, 351-400=8, >400=10 units and call MD 12/2/19   Jah Ambrose MD   nystatin (MYCOSTATIN) 727497 UNIT/GM powder Apply topically 2 times daily as needed 4/19/19   Historical Provider, MD CASTANEDA ELLIPTA 100-25 MCG/INH AEPB inhaler Inhale 1 puff into the lungs daily 2/26/19   Historical Provider, MD   insulin glargine (LANTUS SOLOSTAR) 100 UNIT/ML injection pen Inject 55 Units into the skin nightly 11/21/18   MD Rosangela   albuterol sulfate HFA (VENTOLIN HFA) 108 (90 Base) MCG/ACT inhaler Inhale 2 puffs into the lungs every 4 hours as needed for Wheezing 1/8/18   Maria D Abernathy MD     Data:     Laboratory this visit:  Reviewed  Recent Labs     02/16/20  1030   WBC 11.1*   HGB 11.1*   HCT 35.6*         Recent Labs     02/16/20  1030      K 5.0   CL 99   CO2 23   BUN 46*   CREATININE 1.8*     Recent Labs     02/16/20  1030   AST 13*   ALT 14   BILITOT 0.2   ALKPHOS 136*     No results for input(s): INR in the last 72 hours. No results for input(s): CKTOTAL, CKMB, CKMBINDEX in the last 72 hours. Invalid input(s): Kalin Luke input(s): PRO-BNP    Radiology this visit:  Reviewed.     Xr Chest

## 2020-02-16 NOTE — ED NOTES
Pt reports significant relief in SOB since placement of high flow cannula.       Nasir Cleveland RN  02/16/20 1037

## 2020-02-17 LAB
ALBUMIN SERPL-MCNC: 3.5 GM/DL (ref 3.4–5)
ALP BLD-CCNC: 88 IU/L (ref 40–128)
ALT SERPL-CCNC: 12 U/L (ref 10–40)
ANION GAP SERPL CALCULATED.3IONS-SCNC: 11 MMOL/L (ref 4–16)
AST SERPL-CCNC: 12 IU/L (ref 15–37)
BASOPHILS ABSOLUTE: 0 K/CU MM
BASOPHILS RELATIVE PERCENT: 0.2 % (ref 0–1)
BILIRUB SERPL-MCNC: 0.3 MG/DL (ref 0–1)
BUN BLDV-MCNC: 52 MG/DL (ref 6–23)
CALCIUM SERPL-MCNC: 9 MG/DL (ref 8.3–10.6)
CHLORIDE BLD-SCNC: 103 MMOL/L (ref 99–110)
CO2: 28 MMOL/L (ref 21–32)
CREAT SERPL-MCNC: 1.9 MG/DL (ref 0.6–1.1)
DIFFERENTIAL TYPE: ABNORMAL
EOSINOPHILS ABSOLUTE: 0.2 K/CU MM
EOSINOPHILS RELATIVE PERCENT: 2.1 % (ref 0–3)
GFR AFRICAN AMERICAN: 33 ML/MIN/1.73M2
GFR NON-AFRICAN AMERICAN: 27 ML/MIN/1.73M2
GLUCOSE BLD-MCNC: 117 MG/DL (ref 70–99)
GLUCOSE BLD-MCNC: 133 MG/DL (ref 70–99)
GLUCOSE BLD-MCNC: 134 MG/DL (ref 70–99)
GLUCOSE BLD-MCNC: 182 MG/DL (ref 70–99)
GLUCOSE BLD-MCNC: 213 MG/DL (ref 70–99)
GLUCOSE BLD-MCNC: 218 MG/DL (ref 70–99)
HCT VFR BLD CALC: 32.5 % (ref 37–47)
HEMOGLOBIN: 9.9 GM/DL (ref 12.5–16)
IMMATURE NEUTROPHIL %: 0.6 % (ref 0–0.43)
LYMPHOCYTES ABSOLUTE: 2 K/CU MM
LYMPHOCYTES RELATIVE PERCENT: 18.1 % (ref 24–44)
MCH RBC QN AUTO: 25.8 PG (ref 27–31)
MCHC RBC AUTO-ENTMCNC: 30.5 % (ref 32–36)
MCV RBC AUTO: 84.6 FL (ref 78–100)
MONOCYTES ABSOLUTE: 0.8 K/CU MM
MONOCYTES RELATIVE PERCENT: 7 % (ref 0–4)
NUCLEATED RBC %: 0 %
PDW BLD-RTO: 16.6 % (ref 11.7–14.9)
PLATELET # BLD: 293 K/CU MM (ref 140–440)
PMV BLD AUTO: 10.4 FL (ref 7.5–11.1)
POTASSIUM SERPL-SCNC: 4.4 MMOL/L (ref 3.5–5.1)
PRO-BNP: ABNORMAL PG/ML
RBC # BLD: 3.84 M/CU MM (ref 4.2–5.4)
SEGMENTED NEUTROPHILS ABSOLUTE COUNT: 8.1 K/CU MM
SEGMENTED NEUTROPHILS RELATIVE PERCENT: 72 % (ref 36–66)
SODIUM BLD-SCNC: 142 MMOL/L (ref 135–145)
TOTAL IMMATURE NEUTOROPHIL: 0.07 K/CU MM
TOTAL NUCLEATED RBC: 0 K/CU MM
TOTAL PROTEIN: 5.3 GM/DL (ref 6.4–8.2)
WBC # BLD: 11.3 K/CU MM (ref 4–10.5)

## 2020-02-17 PROCEDURE — 6360000002 HC RX W HCPCS: Performed by: NURSE PRACTITIONER

## 2020-02-17 PROCEDURE — 6370000000 HC RX 637 (ALT 250 FOR IP): Performed by: INTERNAL MEDICINE

## 2020-02-17 PROCEDURE — 2580000003 HC RX 258: Performed by: NURSE PRACTITIONER

## 2020-02-17 PROCEDURE — 82962 GLUCOSE BLOOD TEST: CPT

## 2020-02-17 PROCEDURE — 2060000000 HC ICU INTERMEDIATE R&B

## 2020-02-17 PROCEDURE — 80053 COMPREHEN METABOLIC PANEL: CPT

## 2020-02-17 PROCEDURE — 2700000000 HC OXYGEN THERAPY PER DAY

## 2020-02-17 PROCEDURE — 36415 COLL VENOUS BLD VENIPUNCTURE: CPT

## 2020-02-17 PROCEDURE — 83880 ASSAY OF NATRIURETIC PEPTIDE: CPT

## 2020-02-17 PROCEDURE — 94761 N-INVAS EAR/PLS OXIMETRY MLT: CPT

## 2020-02-17 PROCEDURE — 6370000000 HC RX 637 (ALT 250 FOR IP): Performed by: NURSE PRACTITIONER

## 2020-02-17 PROCEDURE — 93308 TTE F-UP OR LMTD: CPT

## 2020-02-17 PROCEDURE — 85025 COMPLETE CBC W/AUTO DIFF WBC: CPT

## 2020-02-17 PROCEDURE — 94640 AIRWAY INHALATION TREATMENT: CPT

## 2020-02-17 PROCEDURE — 6360000002 HC RX W HCPCS: Performed by: INTERNAL MEDICINE

## 2020-02-17 RX ADMIN — LEVOTHYROXINE SODIUM 50 MCG: 50 TABLET ORAL at 08:45

## 2020-02-17 RX ADMIN — DULOXETINE HYDROCHLORIDE 60 MG: 30 CAPSULE, DELAYED RELEASE ORAL at 08:45

## 2020-02-17 RX ADMIN — DOBUTAMINE IN DEXTROSE 2.5 MCG/KG/MIN: 200 INJECTION, SOLUTION INTRAVENOUS at 13:40

## 2020-02-17 RX ADMIN — TICAGRELOR 90 MG: 90 TABLET ORAL at 21:18

## 2020-02-17 RX ADMIN — DOCUSATE SODIUM 100 MG: 100 CAPSULE, LIQUID FILLED ORAL at 21:18

## 2020-02-17 RX ADMIN — POLYETHYLENE GLYCOL (3350) 17 G: 17 POWDER, FOR SOLUTION ORAL at 21:17

## 2020-02-17 RX ADMIN — POLYETHYLENE GLYCOL (3350) 17 G: 17 POWDER, FOR SOLUTION ORAL at 09:47

## 2020-02-17 RX ADMIN — HYDROCODONE BITARTRATE AND ACETAMINOPHEN 2 TABLET: 5; 325 TABLET ORAL at 08:24

## 2020-02-17 RX ADMIN — FUROSEMIDE 20 MG: 10 INJECTION, SOLUTION INTRAVENOUS at 08:44

## 2020-02-17 RX ADMIN — BUPROPION HYDROCHLORIDE 100 MG: 100 TABLET, FILM COATED, EXTENDED RELEASE ORAL at 23:10

## 2020-02-17 RX ADMIN — CARVEDILOL 12.5 MG: 12.5 TABLET, FILM COATED ORAL at 08:44

## 2020-02-17 RX ADMIN — DOCUSATE SODIUM 100 MG: 100 CAPSULE, LIQUID FILLED ORAL at 08:45

## 2020-02-17 RX ADMIN — IPRATROPIUM BROMIDE AND ALBUTEROL SULFATE 1 AMPULE: .5; 3 SOLUTION RESPIRATORY (INHALATION) at 16:15

## 2020-02-17 RX ADMIN — RANOLAZINE 500 MG: 500 TABLET, FILM COATED, EXTENDED RELEASE ORAL at 21:17

## 2020-02-17 RX ADMIN — CARVEDILOL 12.5 MG: 12.5 TABLET, FILM COATED ORAL at 17:33

## 2020-02-17 RX ADMIN — LORAZEPAM 1 MG: 1 TABLET ORAL at 18:01

## 2020-02-17 RX ADMIN — Medication 3000 UNITS: at 08:44

## 2020-02-17 RX ADMIN — TICAGRELOR 90 MG: 90 TABLET ORAL at 08:45

## 2020-02-17 RX ADMIN — IPRATROPIUM BROMIDE AND ALBUTEROL SULFATE 1 AMPULE: .5; 3 SOLUTION RESPIRATORY (INHALATION) at 07:58

## 2020-02-17 RX ADMIN — RANOLAZINE 500 MG: 500 TABLET, FILM COATED, EXTENDED RELEASE ORAL at 08:44

## 2020-02-17 RX ADMIN — IPRATROPIUM BROMIDE AND ALBUTEROL SULFATE 1 AMPULE: .5; 3 SOLUTION RESPIRATORY (INHALATION) at 19:00

## 2020-02-17 RX ADMIN — SODIUM CHLORIDE, PRESERVATIVE FREE 10 ML: 5 INJECTION INTRAVENOUS at 21:18

## 2020-02-17 RX ADMIN — BUPROPION HYDROCHLORIDE 100 MG: 100 TABLET, FILM COATED, EXTENDED RELEASE ORAL at 09:47

## 2020-02-17 RX ADMIN — INSULIN LISPRO 6 UNITS: 100 INJECTION, SOLUTION INTRAVENOUS; SUBCUTANEOUS at 17:33

## 2020-02-17 RX ADMIN — MICONAZOLE NITRATE: 20 POWDER TOPICAL at 11:42

## 2020-02-17 RX ADMIN — INSULIN GLARGINE 55 UNITS: 100 INJECTION, SOLUTION SUBCUTANEOUS at 21:24

## 2020-02-17 RX ADMIN — IPRATROPIUM BROMIDE AND ALBUTEROL SULFATE 1 AMPULE: .5; 3 SOLUTION RESPIRATORY (INHALATION) at 11:27

## 2020-02-17 RX ADMIN — LORAZEPAM 1 MG: 1 TABLET ORAL at 08:14

## 2020-02-17 RX ADMIN — HYDROCODONE BITARTRATE AND ACETAMINOPHEN 2 TABLET: 5; 325 TABLET ORAL at 18:01

## 2020-02-17 RX ADMIN — FUROSEMIDE 20 MG: 10 INJECTION, SOLUTION INTRAVENOUS at 17:33

## 2020-02-17 RX ADMIN — ATORVASTATIN CALCIUM 80 MG: 40 TABLET, FILM COATED ORAL at 21:18

## 2020-02-17 RX ADMIN — ISOSORBIDE MONONITRATE 60 MG: 60 TABLET, EXTENDED RELEASE ORAL at 08:44

## 2020-02-17 RX ADMIN — ASPIRIN 81 MG 81 MG: 81 TABLET ORAL at 08:45

## 2020-02-17 RX ADMIN — INSULIN LISPRO 3 UNITS: 100 INJECTION, SOLUTION INTRAVENOUS; SUBCUTANEOUS at 12:01

## 2020-02-17 RX ADMIN — MICONAZOLE NITRATE: 20 POWDER TOPICAL at 21:21

## 2020-02-17 RX ADMIN — SODIUM CHLORIDE, PRESERVATIVE FREE 10 ML: 5 INJECTION INTRAVENOUS at 08:50

## 2020-02-17 RX ADMIN — ENOXAPARIN SODIUM 40 MG: 40 INJECTION SUBCUTANEOUS at 08:44

## 2020-02-17 RX ADMIN — LISINOPRIL 2.5 MG: 2.5 TABLET ORAL at 08:44

## 2020-02-17 ASSESSMENT — PAIN DESCRIPTION - LOCATION
LOCATION: BACK

## 2020-02-17 ASSESSMENT — PAIN SCALES - GENERAL
PAINLEVEL_OUTOF10: 9
PAINLEVEL_OUTOF10: 8

## 2020-02-17 ASSESSMENT — PAIN DESCRIPTION - PAIN TYPE
TYPE: CHRONIC PAIN
TYPE: CHRONIC PAIN

## 2020-02-17 NOTE — PROGRESS NOTES
tricuspid regurgitation. Right ventricular systolic pressure of 47 mmHg consistent with mild to   moderate pulmonary hypertension. There is a mild pericardial effusion. There is no evidence of right atrial or right ventricular collapse. PAST MEDICAL HISTORY:  Coronary artery disease, history of heart  failure, EF is around 30% range present. She did have angioplasty of  the right coronary artery in the past also. Hypertension,  hyperlipidemia, COPD, renal insufficiency present, sees Dr. Eloisa Delvalle for  that. History of sleep apnea, COPD, and seizure disorder.     PAST SURGICAL HISTORY:  Gallbladder surgery, tonsillectomy, and  angioplasty done.     SOCIAL HISTORY:  She is living at home right now.     ALLERGIES:  AUGMENTIN.     MEDICATIONS AT HOME:  She is on Lasix 20 mg b.i.d., lisinopril, Lipitor,  Coreg, Imdur, Brilinta, Ranexa, aspirin. She is not on Aldactone due to  her potassium and renal insufficiency.     Objective:   /78   Pulse 97   Temp 97.7 °F (36.5 °C) (Oral)   Resp 14   Ht 5' 4\" (1.626 m)   Wt 269 lb 10 oz (122.3 kg)   SpO2 98%   BMI 46.28 kg/m²       Intake/Output Summary (Last 24 hours) at 2/17/2020 0847  Last data filed at 2/17/2020 0357  Gross per 24 hour   Intake 381.52 ml   Output 3000 ml   Net -2618.48 ml       Medications:   Scheduled Meds:   sodium chloride flush  10 mL Intravenous 2 times per day    enoxaparin  40 mg Subcutaneous Daily    ipratropium-albuterol  1 ampule Inhalation Q4H WA    aspirin  81 mg Oral Daily    atorvastatin  80 mg Oral Nightly    buPROPion  100 mg Oral BID    carvedilol  12.5 mg Oral BID WC    docusate sodium  100 mg Oral BID    DULoxetine  60 mg Oral Daily    furosemide  20 mg Intravenous BID    insulin glargine  55 Units Subcutaneous Nightly    isosorbide mononitrate  60 mg Oral Daily    lactulose  10 g Oral BID    levothyroxine  50 mcg Oral Daily    lisinopril  2.5 mg Oral Daily    miconazole   Topical BID    pantoprazole

## 2020-02-17 NOTE — PROGRESS NOTES
Hospitalist Progress Note      Name:  Claudette Harden /Age/Sex: 1960  (61 y.o. female)   MRN & CSN:  [id number] & [id number] Admission Date/Time: 2020 10:23 AM   Location:  -A PCP: Lalit Padron MD         Hospital Day: 2    Assessment and Plan:   Claudette Harden is a 61 y.o.  female  who presents with Acute respiratory distress    1. Acute on Chronic Congestive Heart Failure, Systolic:  · EF 71%, elevated BNP. CXR with no acute process. · Continue Lasix IV  · Also on Dobutamine infusion  · Continue BB, ACE I  · Repeat Echo today. 2. CAD: S/P PCI 2020: continue ASA, Ticagrelor, high intensity statin, BB, Ranexa, Imdur. May need intervention on RCA. 3. Acute on Chronic Respiratory Failure: due to CHF exacerbation  4. Currently on O2 via NC, was on Vapotherm yesterday. Continue duoneb. Pulse oximetry. 5. Chronic Kidney Disease Stage 3: creatinine 1.9 at baseline. 6. Type 2 DM: continue Lantus and ISS. Hypoglycemia protocol  7. Tobacco Abuse: on Nicotine patch  8. Obesity  9. Hyperlipidemia: continue statin  10. Hypertension: Blood pressure controlled. Continue medications. 11. Hypothyroidism: continue Synthroid. TSH pending    Still on Dobutamine infusion. May need intervention on RCA    Diet DIET CARDIAC; Carb Control: 4 carb choices (60 gms)/meal; Low Sodium (2 GM)   DVT Prophylaxis [x] Lovenox, []  Heparin, [] SCDs, [] Warfarin  [] NOAC     GI Prophylaxis [x] PPI,  [] H2 Blocker,  [] Carafate,  [] Diet/Tube Feeds   Code Status Full Code   MDM [] Low, [x] Moderate,[]  High     History of Present Illness:     Chief Complaint: Acute respiratory distress    She was seen and examined today. Still with shortness of breath and cough. Denied fever or chills. No chest pain. No nausea or vomiting. Ten point ROS reviewed negative, unless as noted above    Objective:        Intake/Output Summary (Last 24 hours) at 2020 0930  Last data filed at 2020 0850  Gross per 24 hour Intake 391.52 ml   Output 3000 ml   Net -2608.48 ml      Vitals:   Vitals:    02/17/20 0742   BP: 139/78   Pulse: 97   Resp: 14   Temp: 97.7 °F (36.5 °C)   SpO2: 98%     Physical Exam:   GEN Awake female, sitting upright in bed in no apparent distress. Appears given age. Obese. On O2 via [place] are equally round. No scleral erythema, discharge, or conjunctivitis. HENT Mucous membranes are moist. Oral pharynx without exudates, no evidence of thrush. NECK Supple, no apparent thyromegaly or masses. RESP Occasional crackles, no wheezes  CARDIO/VASC S1/S2 auscultated. Regular rate without appreciable murmurs, rubs, or gallops. No JVD or carotid bruits. Peripheral pulses equal bilaterally and palpable. +ve peripheral edema. GI Abdomen is soft without significant tenderness, masses, or guarding. Bowel sounds are normoactive. Rectal exam deferred. MSK No gross joint deformities. SKIN Normal coloration, warm, dry. NEURO Cranial nerves appear grossly intact, normal speech, no lateralizing weakness. PSYCH Awake, alert, oriented x 4. Affect appropriate.     Medications:   Medications:    sodium chloride flush  10 mL Intravenous 2 times per day    enoxaparin  40 mg Subcutaneous Daily    ipratropium-albuterol  1 ampule Inhalation Q4H WA    aspirin  81 mg Oral Daily    atorvastatin  80 mg Oral Nightly    buPROPion  100 mg Oral BID    carvedilol  12.5 mg Oral BID WC    docusate sodium  100 mg Oral BID    DULoxetine  60 mg Oral Daily    furosemide  20 mg Intravenous BID    insulin glargine  55 Units Subcutaneous Nightly    isosorbide mononitrate  60 mg Oral Daily    lactulose  10 g Oral BID    levothyroxine  50 mcg Oral Daily    lisinopril  2.5 mg Oral Daily    miconazole   Topical BID    pantoprazole  40 mg Oral QAM AC    ranolazine  500 mg Oral BID    ticagrelor  90 mg Oral BID    vitamin D  3,000 Units Oral Daily    polyethylene glycol  17 g Oral BID    insulin lispro  0-18 Units

## 2020-02-17 NOTE — PROGRESS NOTES
Pt Ambulated to bed without difficulty. Skin check completed per 2 RN. Unremarkable. Pt oriented to room. \"Friend\" at bedside. No questions or c/o at this time.

## 2020-02-18 LAB
GLUCOSE BLD-MCNC: 154 MG/DL (ref 70–99)
GLUCOSE BLD-MCNC: 215 MG/DL (ref 70–99)
GLUCOSE BLD-MCNC: 222 MG/DL (ref 70–99)
GLUCOSE BLD-MCNC: 297 MG/DL (ref 70–99)

## 2020-02-18 PROCEDURE — 6370000000 HC RX 637 (ALT 250 FOR IP): Performed by: INTERNAL MEDICINE

## 2020-02-18 PROCEDURE — 94640 AIRWAY INHALATION TREATMENT: CPT

## 2020-02-18 PROCEDURE — 82962 GLUCOSE BLOOD TEST: CPT

## 2020-02-18 PROCEDURE — 94761 N-INVAS EAR/PLS OXIMETRY MLT: CPT

## 2020-02-18 PROCEDURE — 6360000002 HC RX W HCPCS: Performed by: NURSE PRACTITIONER

## 2020-02-18 PROCEDURE — 2580000003 HC RX 258: Performed by: NURSE PRACTITIONER

## 2020-02-18 PROCEDURE — 6370000000 HC RX 637 (ALT 250 FOR IP): Performed by: NURSE PRACTITIONER

## 2020-02-18 PROCEDURE — 6360000002 HC RX W HCPCS: Performed by: INTERNAL MEDICINE

## 2020-02-18 PROCEDURE — 2700000000 HC OXYGEN THERAPY PER DAY

## 2020-02-18 PROCEDURE — 2060000000 HC ICU INTERMEDIATE R&B

## 2020-02-18 RX ORDER — FUROSEMIDE 10 MG/ML
40 INJECTION INTRAMUSCULAR; INTRAVENOUS 2 TIMES DAILY
Status: DISCONTINUED | OUTPATIENT
Start: 2020-02-18 | End: 2020-02-19

## 2020-02-18 RX ADMIN — IPRATROPIUM BROMIDE AND ALBUTEROL SULFATE 1 AMPULE: .5; 3 SOLUTION RESPIRATORY (INHALATION) at 16:28

## 2020-02-18 RX ADMIN — CARVEDILOL 12.5 MG: 12.5 TABLET, FILM COATED ORAL at 08:47

## 2020-02-18 RX ADMIN — SODIUM CHLORIDE, PRESERVATIVE FREE 10 ML: 5 INJECTION INTRAVENOUS at 21:33

## 2020-02-18 RX ADMIN — FUROSEMIDE 20 MG: 10 INJECTION, SOLUTION INTRAVENOUS at 08:45

## 2020-02-18 RX ADMIN — INSULIN LISPRO 6 UNITS: 100 INJECTION, SOLUTION INTRAVENOUS; SUBCUTANEOUS at 17:26

## 2020-02-18 RX ADMIN — HYDROCODONE BITARTRATE AND ACETAMINOPHEN 2 TABLET: 5; 325 TABLET ORAL at 14:17

## 2020-02-18 RX ADMIN — LEVOTHYROXINE SODIUM 50 MCG: 50 TABLET ORAL at 08:47

## 2020-02-18 RX ADMIN — TICAGRELOR 90 MG: 90 TABLET ORAL at 21:33

## 2020-02-18 RX ADMIN — RANOLAZINE 500 MG: 500 TABLET, FILM COATED, EXTENDED RELEASE ORAL at 21:33

## 2020-02-18 RX ADMIN — DOCUSATE SODIUM 100 MG: 100 CAPSULE, LIQUID FILLED ORAL at 21:33

## 2020-02-18 RX ADMIN — RANOLAZINE 500 MG: 500 TABLET, FILM COATED, EXTENDED RELEASE ORAL at 08:46

## 2020-02-18 RX ADMIN — TICAGRELOR 90 MG: 90 TABLET ORAL at 08:46

## 2020-02-18 RX ADMIN — POLYETHYLENE GLYCOL (3350) 17 G: 17 POWDER, FOR SOLUTION ORAL at 08:46

## 2020-02-18 RX ADMIN — DOCUSATE SODIUM 100 MG: 100 CAPSULE, LIQUID FILLED ORAL at 08:47

## 2020-02-18 RX ADMIN — BUPROPION HYDROCHLORIDE 100 MG: 100 TABLET, FILM COATED, EXTENDED RELEASE ORAL at 21:33

## 2020-02-18 RX ADMIN — DULOXETINE HYDROCHLORIDE 60 MG: 30 CAPSULE, DELAYED RELEASE ORAL at 08:47

## 2020-02-18 RX ADMIN — ASPIRIN 81 MG 81 MG: 81 TABLET ORAL at 08:47

## 2020-02-18 RX ADMIN — ENOXAPARIN SODIUM 40 MG: 40 INJECTION SUBCUTANEOUS at 08:46

## 2020-02-18 RX ADMIN — SODIUM CHLORIDE, PRESERVATIVE FREE 10 ML: 5 INJECTION INTRAVENOUS at 08:48

## 2020-02-18 RX ADMIN — BUPROPION HYDROCHLORIDE 100 MG: 100 TABLET, FILM COATED, EXTENDED RELEASE ORAL at 08:48

## 2020-02-18 RX ADMIN — PANTOPRAZOLE SODIUM 40 MG: 40 TABLET, DELAYED RELEASE ORAL at 06:28

## 2020-02-18 RX ADMIN — POLYETHYLENE GLYCOL (3350) 17 G: 17 POWDER, FOR SOLUTION ORAL at 21:33

## 2020-02-18 RX ADMIN — FUROSEMIDE 40 MG: 10 INJECTION, SOLUTION INTRAMUSCULAR; INTRAVENOUS at 17:32

## 2020-02-18 RX ADMIN — IPRATROPIUM BROMIDE AND ALBUTEROL SULFATE 1 AMPULE: .5; 3 SOLUTION RESPIRATORY (INHALATION) at 08:11

## 2020-02-18 RX ADMIN — DOBUTAMINE IN DEXTROSE 2.5 MCG/KG/MIN: 200 INJECTION, SOLUTION INTRAVENOUS at 14:13

## 2020-02-18 RX ADMIN — MICONAZOLE NITRATE: 20 POWDER TOPICAL at 08:54

## 2020-02-18 RX ADMIN — ISOSORBIDE MONONITRATE 60 MG: 60 TABLET, EXTENDED RELEASE ORAL at 08:46

## 2020-02-18 RX ADMIN — Medication 3000 UNITS: at 08:46

## 2020-02-18 RX ADMIN — LISINOPRIL 2.5 MG: 2.5 TABLET ORAL at 08:47

## 2020-02-18 RX ADMIN — INSULIN GLARGINE 55 UNITS: 100 INJECTION, SOLUTION SUBCUTANEOUS at 21:34

## 2020-02-18 RX ADMIN — IPRATROPIUM BROMIDE AND ALBUTEROL SULFATE 1 AMPULE: .5; 3 SOLUTION RESPIRATORY (INHALATION) at 21:44

## 2020-02-18 RX ADMIN — INSULIN LISPRO 6 UNITS: 100 INJECTION, SOLUTION INTRAVENOUS; SUBCUTANEOUS at 12:40

## 2020-02-18 RX ADMIN — LORAZEPAM 1 MG: 1 TABLET ORAL at 17:32

## 2020-02-18 RX ADMIN — ATORVASTATIN CALCIUM 80 MG: 40 TABLET, FILM COATED ORAL at 21:33

## 2020-02-18 RX ADMIN — MICONAZOLE NITRATE: 20 POWDER TOPICAL at 21:34

## 2020-02-18 RX ADMIN — IPRATROPIUM BROMIDE AND ALBUTEROL SULFATE 1 AMPULE: .5; 3 SOLUTION RESPIRATORY (INHALATION) at 11:38

## 2020-02-18 RX ADMIN — CARVEDILOL 12.5 MG: 12.5 TABLET, FILM COATED ORAL at 17:32

## 2020-02-18 RX ADMIN — LORAZEPAM 1 MG: 1 TABLET ORAL at 06:28

## 2020-02-18 ASSESSMENT — PAIN SCALES - GENERAL
PAINLEVEL_OUTOF10: 0
PAINLEVEL_OUTOF10: 0
PAINLEVEL_OUTOF10: 2
PAINLEVEL_OUTOF10: 3
PAINLEVEL_OUTOF10: 6
PAINLEVEL_OUTOF10: 0
PAINLEVEL_OUTOF10: 4
PAINLEVEL_OUTOF10: 0
PAINLEVEL_OUTOF10: 3
PAINLEVEL_OUTOF10: 0
PAINLEVEL_OUTOF10: 2
PAINLEVEL_OUTOF10: 0
PAINLEVEL_OUTOF10: 0
PAINLEVEL_OUTOF10: 5

## 2020-02-18 NOTE — PROGRESS NOTES
acid level, resolved    Musculoskeletal chest pain    Essential hypertension    Diabetes mellitus with hyperglycemia (HCC)    Severe dehydration secondary to significant hyperglycemia    Sepsis secondary to UTI, POA    Generalized weakness    Sepsis associated hypotension, POA    Acute renal failure, POA    E. coli UTI (urinary tract infection)    Syncope    Acute pain of right shoulder    Hypotension    DM (diabetes mellitus), secondary uncontrolled (HCC)    Generalized anxiety disorder    Nausea & vomiting    Fever    Debility    Gait disturbance    Acute respiratory failure (HCC)    Hyperglycemia    Pleural effusion on left    UTI (urinary tract infection), bacterial    Sinus tachycardia    Uncontrolled type 2 diabetes mellitus with hyperglycemia (HCC)    Class 3 severe obesity due to excess calories with body mass index (BMI) of 45.0 to 49.9 in adult [facility])    Anasarca    Acute kidney injury (HCC)    DM (diabetes mellitus) ([facility])    Fluid overload    Cor pulmonale (chronic) (HCC)    Cellulitis of abdominal wall    STEMI (ST elevation myocardial infarction) ([facility])    Acute respiratory distress        Allergies   Allergen Reactions    Augmentin [Amoxicillin-Pot Clavulanate] Diarrhea        Current Inpatient Medications:    Current Facility-Administered Medications   Medication Dose Route Frequency Provider Last Rate Last Dose    DOBUTamine (DOBUTREX) 500 mg in dextrose 5 % 250 mL infusion  2.5 mcg/kg/min Intravenous Continuous Wesley Aguirre MD 9.5 mL/hr at 02/17/20 1340 2.5 mcg/kg/min at 02/17/20 1340    sodium chloride flush 0.9 % injection 10 mL  10 mL Intravenous 2 times per day Deryl Washington, APRN - CNP   10 mL at 02/18/20 0848    sodium chloride flush 0.9 % injection 10 mL  10 mL Intravenous PRN Rebeca Flaherty, APRN - CNP        magnesium hydroxide (MILK OF MAGNESIA) 400 MG/5ML suspension 30 mL  30 mL Oral Daily PRN Deryl Washington, APRN - CNP        ondansetron (ZOFRAN) injection 4 mg  4 mg Intravenous Q6H PRN Jeffrey Aaron, APRN - CNP        enoxaparin (LOVENOX) injection 40 mg  40 mg Subcutaneous Daily Jeffrey Aaron, APRN - CNP   40 mg at 02/18/20 0846    ipratropium-albuterol (DUONEB) nebulizer solution 1 ampule  1 ampule Inhalation Q4H WA Jeffrey Aaron, APRN - CNP   1 ampule at 02/18/20 1138    acetaminophen (TYLENOL) tablet 650 mg  650 mg Oral Q4H PRN Jeffrey Aaron, APRN - CNP        aspirin chewable tablet 81 mg  81 mg Oral Daily Jeffrey Aaron, APRN - CNP   81 mg at 02/18/20 0847    atorvastatin (LIPITOR) tablet 80 mg  80 mg Oral Nightly Jeffrey Aaron, APRN - CNP   80 mg at 02/17/20 2118    buPROPion [facility]) extended release tablet 100 mg  100 mg Oral BID Jeffrey Aaron, APRN - CNP   100 mg at 02/18/20 0848    carvedilol (COREG) tablet 12.5 mg  12.5 mg Oral BID WC Jeffrey Aaron, APRN - CNP   12.5 mg at 02/18/20 0847    docusate sodium (COLACE) capsule 100 mg  100 mg Oral BID Jeffrey Aaron, APRN - CNP   100 mg at 02/18/20 0847    DULoxetine (CYMBALTA) extended release capsule 60 mg  60 mg Oral Daily Jeffrey Aaron, APRN - CNP   60 mg at 02/18/20 0847    furosemide (LASIX) injection 20 mg  20 mg Intravenous BID Jeffrey Aaron, APRN - CNP   20 mg at 02/18/20 0845    HYDROcodone-acetaminophen (NORCO) 5-325 MG per tablet 2 tablet  2 tablet Oral Q4H PRN Jeffrey Aaron, APRN - CNP   2 tablet at 02/17/20 1801    insulin glargine (LANTUS) injection vial 55 Units  55 Units Subcutaneous Nightly Jeffrey Aaron, APRN - CNP   55 Units at 02/17/20 2124    isosorbide mononitrate (IMDUR) extended release tablet 60 mg  60 mg Oral Daily Rebeca Flaherty APRN - CNP   60 mg at 02/18/20 0846    lactulose (CHRONULAC) 10 GM/15ML solution 10 g  10 g Oral BID Jeffrey Aaron, APRN - CNP        levothyroxine (SYNTHROID) tablet 50 mcg  50 mcg Oral Daily THAO Dean CNP   50 mcg at 02/18/20 0847    lisinopril (PRINIVIL;ZESTRIL) tablet 2.5 mg  2.5 mg Oral Daily

## 2020-02-19 LAB
ANION GAP SERPL CALCULATED.3IONS-SCNC: 13 MMOL/L (ref 4–16)
BACTERIA: ABNORMAL /HPF
BILIRUBIN URINE: NEGATIVE MG/DL
BLOOD, URINE: ABNORMAL
BUN BLDV-MCNC: 57 MG/DL (ref 6–23)
CALCIUM SERPL-MCNC: 8.8 MG/DL (ref 8.3–10.6)
CHLORIDE BLD-SCNC: 99 MMOL/L (ref 99–110)
CLARITY: ABNORMAL
CO2: 27 MMOL/L (ref 21–32)
COLOR: YELLOW
CREAT SERPL-MCNC: 1.9 MG/DL (ref 0.6–1.1)
CREATININE URINE: 39.2 MG/DL (ref 28–217)
GFR AFRICAN AMERICAN: 33 ML/MIN/1.73M2
GFR NON-AFRICAN AMERICAN: 27 ML/MIN/1.73M2
GLUCOSE BLD-MCNC: 103 MG/DL (ref 70–99)
GLUCOSE BLD-MCNC: 164 MG/DL (ref 70–99)
GLUCOSE BLD-MCNC: 182 MG/DL (ref 70–99)
GLUCOSE BLD-MCNC: 192 MG/DL (ref 70–99)
GLUCOSE BLD-MCNC: 252 MG/DL (ref 70–99)
GLUCOSE, URINE: NEGATIVE MG/DL
KETONES, URINE: NEGATIVE MG/DL
LEUKOCYTE ESTERASE, URINE: ABNORMAL
MAGNESIUM: 1.9 MG/DL (ref 1.8–2.4)
MUCUS: ABNORMAL HPF
NITRITE URINE, QUANTITATIVE: NEGATIVE
PH, URINE: 5 (ref 5–8)
PHOSPHORUS: 4.6 MG/DL (ref 2.5–4.9)
POTASSIUM SERPL-SCNC: 4.7 MMOL/L (ref 3.5–5.1)
PROT/CREAT RATIO, UR: ABNORMAL
PROTEIN UA: 30 MG/DL
RBC URINE: 42 /HPF (ref 0–6)
SODIUM BLD-SCNC: 139 MMOL/L (ref 135–145)
SPECIFIC GRAVITY UA: 1.01 (ref 1–1.03)
SQUAMOUS EPITHELIAL: 2 /HPF
TRICHOMONAS: ABNORMAL /HPF
URINE TOTAL PROTEIN: 31.9 MG/DL
UROBILINOGEN, URINE: NORMAL MG/DL (ref 0.2–1)
WBC CLUMP: ABNORMAL /HPF
WBC UA: 63 /HPF (ref 0–5)

## 2020-02-19 PROCEDURE — 2580000003 HC RX 258: Performed by: NURSE PRACTITIONER

## 2020-02-19 PROCEDURE — 80048 BASIC METABOLIC PNL TOTAL CA: CPT

## 2020-02-19 PROCEDURE — 81001 URINALYSIS AUTO W/SCOPE: CPT

## 2020-02-19 PROCEDURE — 84156 ASSAY OF PROTEIN URINE: CPT

## 2020-02-19 PROCEDURE — 6370000000 HC RX 637 (ALT 250 FOR IP): Performed by: INTERNAL MEDICINE

## 2020-02-19 PROCEDURE — 82962 GLUCOSE BLOOD TEST: CPT

## 2020-02-19 PROCEDURE — 94640 AIRWAY INHALATION TREATMENT: CPT

## 2020-02-19 PROCEDURE — 97166 OT EVAL MOD COMPLEX 45 MIN: CPT

## 2020-02-19 PROCEDURE — 97116 GAIT TRAINING THERAPY: CPT

## 2020-02-19 PROCEDURE — 97162 PT EVAL MOD COMPLEX 30 MIN: CPT

## 2020-02-19 PROCEDURE — 83735 ASSAY OF MAGNESIUM: CPT

## 2020-02-19 PROCEDURE — 84100 ASSAY OF PHOSPHORUS: CPT

## 2020-02-19 PROCEDURE — 2700000000 HC OXYGEN THERAPY PER DAY

## 2020-02-19 PROCEDURE — 2060000000 HC ICU INTERMEDIATE R&B

## 2020-02-19 PROCEDURE — 6360000002 HC RX W HCPCS: Performed by: NURSE PRACTITIONER

## 2020-02-19 PROCEDURE — 97535 SELF CARE MNGMENT TRAINING: CPT

## 2020-02-19 PROCEDURE — 36415 COLL VENOUS BLD VENIPUNCTURE: CPT

## 2020-02-19 PROCEDURE — 82570 ASSAY OF URINE CREATININE: CPT

## 2020-02-19 PROCEDURE — 6360000002 HC RX W HCPCS: Performed by: INTERNAL MEDICINE

## 2020-02-19 PROCEDURE — 94761 N-INVAS EAR/PLS OXIMETRY MLT: CPT

## 2020-02-19 PROCEDURE — 6370000000 HC RX 637 (ALT 250 FOR IP): Performed by: NURSE PRACTITIONER

## 2020-02-19 RX ORDER — FUROSEMIDE 10 MG/ML
40 INJECTION INTRAMUSCULAR; INTRAVENOUS 3 TIMES DAILY
Status: DISCONTINUED | OUTPATIENT
Start: 2020-02-19 | End: 2020-02-20

## 2020-02-19 RX ADMIN — ISOSORBIDE MONONITRATE 60 MG: 60 TABLET, EXTENDED RELEASE ORAL at 10:06

## 2020-02-19 RX ADMIN — PANTOPRAZOLE SODIUM 40 MG: 40 TABLET, DELAYED RELEASE ORAL at 05:53

## 2020-02-19 RX ADMIN — TICAGRELOR 90 MG: 90 TABLET ORAL at 10:06

## 2020-02-19 RX ADMIN — ATORVASTATIN CALCIUM 80 MG: 40 TABLET, FILM COATED ORAL at 21:37

## 2020-02-19 RX ADMIN — POLYETHYLENE GLYCOL (3350) 17 G: 17 POWDER, FOR SOLUTION ORAL at 10:05

## 2020-02-19 RX ADMIN — IPRATROPIUM BROMIDE AND ALBUTEROL SULFATE 1 AMPULE: .5; 3 SOLUTION RESPIRATORY (INHALATION) at 10:41

## 2020-02-19 RX ADMIN — MICONAZOLE NITRATE: 20 POWDER TOPICAL at 10:14

## 2020-02-19 RX ADMIN — DULOXETINE HYDROCHLORIDE 60 MG: 30 CAPSULE, DELAYED RELEASE ORAL at 10:05

## 2020-02-19 RX ADMIN — IPRATROPIUM BROMIDE AND ALBUTEROL SULFATE 1 AMPULE: .5; 3 SOLUTION RESPIRATORY (INHALATION) at 15:30

## 2020-02-19 RX ADMIN — MICONAZOLE NITRATE: 20 POWDER TOPICAL at 21:38

## 2020-02-19 RX ADMIN — ENOXAPARIN SODIUM 40 MG: 40 INJECTION SUBCUTANEOUS at 10:08

## 2020-02-19 RX ADMIN — IPRATROPIUM BROMIDE AND ALBUTEROL SULFATE 1 AMPULE: .5; 3 SOLUTION RESPIRATORY (INHALATION) at 08:06

## 2020-02-19 RX ADMIN — LISINOPRIL 2.5 MG: 2.5 TABLET ORAL at 10:04

## 2020-02-19 RX ADMIN — LORAZEPAM 1 MG: 1 TABLET ORAL at 17:38

## 2020-02-19 RX ADMIN — RANOLAZINE 500 MG: 500 TABLET, FILM COATED, EXTENDED RELEASE ORAL at 21:37

## 2020-02-19 RX ADMIN — BUPROPION HYDROCHLORIDE 100 MG: 100 TABLET, FILM COATED, EXTENDED RELEASE ORAL at 10:05

## 2020-02-19 RX ADMIN — DOCUSATE SODIUM 100 MG: 100 CAPSULE, LIQUID FILLED ORAL at 10:06

## 2020-02-19 RX ADMIN — IPRATROPIUM BROMIDE AND ALBUTEROL SULFATE 1 AMPULE: .5; 3 SOLUTION RESPIRATORY (INHALATION) at 22:25

## 2020-02-19 RX ADMIN — HYDROCODONE BITARTRATE AND ACETAMINOPHEN 2 TABLET: 5; 325 TABLET ORAL at 10:20

## 2020-02-19 RX ADMIN — HYDROCODONE BITARTRATE AND ACETAMINOPHEN 2 TABLET: 5; 325 TABLET ORAL at 19:48

## 2020-02-19 RX ADMIN — ASPIRIN 81 MG 81 MG: 81 TABLET ORAL at 10:06

## 2020-02-19 RX ADMIN — HYDROCODONE BITARTRATE AND ACETAMINOPHEN 2 TABLET: 5; 325 TABLET ORAL at 00:35

## 2020-02-19 RX ADMIN — FUROSEMIDE 40 MG: 10 INJECTION, SOLUTION INTRAMUSCULAR; INTRAVENOUS at 10:08

## 2020-02-19 RX ADMIN — TICAGRELOR 90 MG: 90 TABLET ORAL at 21:37

## 2020-02-19 RX ADMIN — LORAZEPAM 1 MG: 1 TABLET ORAL at 05:53

## 2020-02-19 RX ADMIN — INSULIN LISPRO 3 UNITS: 100 INJECTION, SOLUTION INTRAVENOUS; SUBCUTANEOUS at 12:21

## 2020-02-19 RX ADMIN — SODIUM CHLORIDE, PRESERVATIVE FREE 10 ML: 5 INJECTION INTRAVENOUS at 21:37

## 2020-02-19 RX ADMIN — POLYETHYLENE GLYCOL (3350) 17 G: 17 POWDER, FOR SOLUTION ORAL at 21:37

## 2020-02-19 RX ADMIN — FUROSEMIDE 40 MG: 10 INJECTION, SOLUTION INTRAMUSCULAR; INTRAVENOUS at 21:37

## 2020-02-19 RX ADMIN — DOCUSATE SODIUM 100 MG: 100 CAPSULE, LIQUID FILLED ORAL at 21:37

## 2020-02-19 RX ADMIN — CARVEDILOL 12.5 MG: 12.5 TABLET, FILM COATED ORAL at 17:38

## 2020-02-19 RX ADMIN — Medication 3000 UNITS: at 10:04

## 2020-02-19 RX ADMIN — INSULIN GLARGINE 55 UNITS: 100 INJECTION, SOLUTION SUBCUTANEOUS at 21:41

## 2020-02-19 RX ADMIN — INSULIN LISPRO 3 UNITS: 100 INJECTION, SOLUTION INTRAVENOUS; SUBCUTANEOUS at 10:08

## 2020-02-19 RX ADMIN — LEVOTHYROXINE SODIUM 50 MCG: 50 TABLET ORAL at 10:06

## 2020-02-19 RX ADMIN — CARVEDILOL 12.5 MG: 12.5 TABLET, FILM COATED ORAL at 10:06

## 2020-02-19 RX ADMIN — BUPROPION HYDROCHLORIDE 100 MG: 100 TABLET, FILM COATED, EXTENDED RELEASE ORAL at 21:37

## 2020-02-19 RX ADMIN — DOBUTAMINE IN DEXTROSE 2.5 MCG/KG/MIN: 200 INJECTION, SOLUTION INTRAVENOUS at 17:37

## 2020-02-19 RX ADMIN — RANOLAZINE 500 MG: 500 TABLET, FILM COATED, EXTENDED RELEASE ORAL at 10:04

## 2020-02-19 ASSESSMENT — PAIN DESCRIPTION - FREQUENCY
FREQUENCY: INTERMITTENT
FREQUENCY: CONTINUOUS
FREQUENCY: CONTINUOUS

## 2020-02-19 ASSESSMENT — PAIN DESCRIPTION - PAIN TYPE
TYPE: CHRONIC PAIN

## 2020-02-19 ASSESSMENT — PAIN SCALES - GENERAL
PAINLEVEL_OUTOF10: 0
PAINLEVEL_OUTOF10: 8
PAINLEVEL_OUTOF10: 0
PAINLEVEL_OUTOF10: 8
PAINLEVEL_OUTOF10: 6

## 2020-02-19 ASSESSMENT — PAIN - FUNCTIONAL ASSESSMENT
PAIN_FUNCTIONAL_ASSESSMENT: ACTIVITIES ARE NOT PREVENTED

## 2020-02-19 ASSESSMENT — PAIN DESCRIPTION - LOCATION
LOCATION: BACK

## 2020-02-19 ASSESSMENT — PAIN DESCRIPTION - ORIENTATION
ORIENTATION: MID;LOWER
ORIENTATION: LOWER
ORIENTATION: LOWER;MID

## 2020-02-19 ASSESSMENT — PAIN DESCRIPTION - ONSET
ONSET: ON-GOING
ONSET: ON-GOING
ONSET: GRADUAL

## 2020-02-19 ASSESSMENT — PAIN DESCRIPTION - PROGRESSION
CLINICAL_PROGRESSION: NOT CHANGED

## 2020-02-19 ASSESSMENT — PAIN DESCRIPTION - DESCRIPTORS
DESCRIPTORS: ACHING
DESCRIPTORS: DISCOMFORT;DULL
DESCRIPTORS: ACHING

## 2020-02-19 NOTE — PROGRESS NOTES
Pt has complained of being tired since taking norco and stated she is worried about how she can't keep her eyes open and feels so tired all the time even at home. Pt educated that her pain med can cause these feelings after taking. Pt now crying that she does not \"feel good\" and her \"head hurts\"  being notified.

## 2020-02-19 NOTE — CONSULTS
Nephrology Service Consultation    Patient:  Estella Gillis  MRN: [id number]  Consulting physician:  Ariel Dye MD  Reason for Consult:   JUAN J on stage 3 CKD     History Obtained From:  patient  PCP: Rashid Beck MD    HISTORY OF PRESENT ILLNESS:   The patient is a 61 y.o. female who was transported to [address] [place] [address] ED on 2/16  With SOB. It is reported that patient had been experiencing SOB since 2/15. She had described a sensation that her lungs were filling up with fluid, and she  Had also reported increased lower extremity edema. She had reported   That she had been taking her Lasix consistently. Patient denies any chest or abdominal pain during our visit    REVIEW OF SYSTEMS:  The ten point review of systems   are negative except as mentioned above.       Medical History: DM2 (diagnosed in [facility])  Possible TIA in the past / HTN (circa 1990's) / mood disorder   CAD with severe atherosclerotic disease; deemed inappropriate for surgical therapy   chronic systolic HF (CMP) / chronic hypoxic respiratory failure: on home O2     Surgical History:  PCI with coronary stent placement x 2 / Cholecystectomy      Renal History:  Stage 3 CKD with baseline creat: 1.5 - 1.7      -no history of RRT.       SOCIAL HISTORY:   Tobacco:  active smoker      Alcohol: denies use      Recreational Drug Use: denies use      Demographic History: resides at home with a \"friend\"    Medications:   Scheduled Meds:   insulin lispro  10 Units Subcutaneous TID WC    furosemide  40 mg Intravenous TID    sodium chloride flush  10 mL Intravenous 2 times per day    enoxaparin  40 mg Subcutaneous Daily    ipratropium-albuterol  1 ampule Inhalation Q4H WA    aspirin  81 mg Oral Daily    atorvastatin  80 mg Oral Nightly    buPROPion  100 mg Oral BID    carvedilol  12.5 mg Oral BID WC    docusate sodium  100 mg Oral BID    DULoxetine  60 mg Oral Daily    insulin glargine  55 Units Subcutaneous Nightly    isosorbide mononitrate hyperglycemia ([facility]) E11.65    Class 3 severe obesity due to excess calories with body mass index (BMI) of 45.0 to 49.9 in adult ([facility]) E66.01, Z68.42    Anasarca R60.1    Acute kidney injury ([facility] N17.9    DM (diabetes mellitus) ([facility]) E11.9    Fluid overload E87.70    Cor pulmonale (chronic) ([facility]) I27.81    Cellulitis of abdominal wall L03.311    STEMI (ST elevation myocardial infarction) ([facility]) I21.3    Acute respiratory distress R06.03     Assessment and Recommendations     Impression   1. JUAN J on stage 3 CKD   -likely etiology: ADHF-JUAN J (CRS)    2.  Acute on chronic systolic heart failure   3. HTN   4.  DM  5. CAD   6. Acute on chronic hypoxic respiratory failure  7. Anemia      PLAN  1.    -additional labs: upc, bmp qam, ua with micro, magnesium  -uop: 2.8 liters in the last 24 hours via mixon   -stable serum creatinine with normal: Na / K / CO2  2.   -on Lisinopril and Coreg BID   -currently on dobutamine drip   -on IV Lasix 40 mg TID   -monitor: O2 saturations / urine output and volume status   3. -BP trend: systolic BP's have recently been 93 - 146; on coreg BID and Lisinopril   4.   -on Lantus and SSI for diabetes management   5.   -followed by cardiology   -continue medical therapy for CAD  6.   -O2 sat: 99 - 100 % on 2 LPM via nasal cannula   7.   -latest Hb: 9.9; follow hemoglobin trend      I spoke with patient today about the importance of   Outpatient follow-up with Dr. Benson Sinha in regard to managing  Her CKD as well as diuretic therapy. Electronically signed by THAO Mcintosh - [facility]        Nephrology Attending Progress Note  2/19/2020 12:07 PM  Subjective:   Admit Date: 2/16/2020  PCP: Nevaeh Stroud MD    Interval History: I have personally performed face to face diagnostic evaluation on this patient. I have personally reviewed pertinent labs and imaging and agree with the care plan above.  My additional findings are as follows:  61year old white female with Dm2, HTN, cad, hx tia

## 2020-02-19 NOTE — PROGRESS NOTES
Occupational Therapy    [facility], located within [facility] ACUTE CARE OCCUPATIONAL THERAPY EVALUATION  Christ Mosley, 1960, -A, 2020    History  [place]:  The primary encounter diagnosis was Respiratory distress. A diagnosis of Acute on chronic congestive heart failure, unspecified heart failure type [facility]) was also pertinent to this visit. Patient  has a past medical history of CAD (coronary artery disease), CKD (chronic kidney disease) stage 3, GFR 30-59 ml/min ([address] Diabetes type 2, uncontrolled ([address] Diastolic heart failure ([address] Essential hypertension, Financial problems, Generalized anxiety disorder, Herpes genitalia, Obesity, morbid (more than 100 lbs over ideal weight or BMI > 40) ([address] and STEMI (ST elevation myocardial infarction) ([address] Patient  has a past surgical history that includes Cholecystectomy;  section; Tonsillectomy; other surgical history (Right, [id number]); Cardiac surgery; and Coronary angioplasty with stent. Subjective:  Patient states:  \"I can get up but I get winded\". Pain:  No.    Communication with other providers:  Handoff to RN, co-eval with PT.   Restrictions: General Precautions, Fall Risk    Home Setup/Prior level of function    Lives With: Significant other  Type of Home: Apartment  Home Layout: One level  Home Access: Level entry  Bathroom Shower/Tub: Tub/Shower unit, Shower chair with back  Bathroom Toilet: Standard  Bathroom Equipment: Grab bars in [address]: [place]  ADL Assistance: Needs assistance  Bath:  (some LE bathing assist for david area/buttocks from significant other but UE bathing pt does self)  Dressing:  (UE dressing performed independently by pt LE dressing performed by significant others from socks to threading pants/underwear)  Grooming: Independent  Feeding: Independent  Toileting: Needs assistance (pt's significant other helps w/ david care buttocks care and threading clothes)  Ambulation Assistance: mod I with

## 2020-02-19 NOTE — PROGRESS NOTES
Cardiology Progress Note       Denise Peoples is a 61 y.o. female   1960     SUBJECTIVE:   Patient seen and examined feels much better rhythm is sinus diuresed about 7     says he feels better but  Feels  Spaced  Out  ,   no chest pain  OBJECTIVE:    Review of Systems:  General appearance: alert, appears stated age and cooperative  Skin: Skin color, texture, normal. No rashes or lesions  HEENT: No nose bleed, headache, vision problems  CV: C/O chest pain, tightness, pressure,   Respiratory: C/o no SOB, GABRIEL, Orthopnea, PND  GI: No abdominal pain, black stool, bloating  Limbs: No c/o edema, pain, swelling, intermittent claudication, joint pains  Neuro: No dizziness, lightheadedness, syncope, gait problems, memory problems  Psych: grossly normal. No SI/depression. Vitals:   Blood pressure (!) 94/56, pulse 81, temperature 98.3 °F (36.8 °C), temperature source Oral, resp. rate 15, height 5' 4\" (1.626 m), weight 277 lb 5.4 oz (125.8 kg), SpO2 98 %, not currently breastfeeding.     HEENT: AT, NC, PERRLA  Neck: No JVD  Heart: S1 S2 audible, no murmur   Lungs: Some rales and rhonchi    Abdomen: Nontender   Limbs: No edema   CNS: no focal deficit      Past Medical History:   Diagnosis Date    CAD (coronary artery disease)     CKD (chronic kidney disease) stage 3, GFR 30-59 ml/min (HCC)     Diabetes type 2, uncontrolled (HCC)     Diastolic heart failure (HCC)     Essential hypertension     Financial problems 3/22/2013    Generalized anxiety disorder 1/8/2018    Herpes genitalia     Obesity, morbid (more than 100 lbs over ideal weight or BMI > 40) ([facility] 03/22/2013    STEMI (ST elevation myocardial infarction) [facility])         Patient Active Problem List   Diagnosis    CKD (chronic kidney disease) stage 3, GFR 30-59 ml/min (HCC)    CAD (coronary artery disease)    Chronic diastolic heart failure ([facility]    Diabetes type 2, uncontrolled ([facility]    Morbid obesity with BMI of 50.0-59.9, adult (HCC)    Elevated lactic acid level, resolved    Musculoskeletal chest pain    Essential hypertension    Diabetes mellitus with hyperglycemia (HCC)    Severe dehydration secondary to significant hyperglycemia    Sepsis secondary to UTI, POA    Generalized weakness    Sepsis associated hypotension, POA    Acute renal failure, POA    E. coli UTI (urinary tract infection)    Syncope    Acute pain of right shoulder    Hypotension    DM (diabetes mellitus), secondary uncontrolled (HCC)    Generalized anxiety disorder    Nausea & vomiting    Fever    Debility    Gait disturbance    Acute respiratory failure (HCC)    Hyperglycemia    Pleural effusion on left    UTI (urinary tract infection), bacterial    Sinus tachycardia    Uncontrolled type 2 diabetes mellitus with hyperglycemia (HCC)    Class 3 severe obesity due to excess calories with body mass index (BMI) of 45.0 to 49.9 in adult [facility])    Anasarca    Acute kidney injury (HCC)    DM (diabetes mellitus) (HCC)    Fluid overload    Cor pulmonale (chronic) (HCC)    Cellulitis of abdominal wall    STEMI (ST elevation myocardial infarction) (HCC)    Acute respiratory distress        Allergies   Allergen Reactions    Augmentin [Amoxicillin-Pot Clavulanate] Diarrhea        Current Inpatient Medications:    Current Facility-Administered Medications   Medication Dose Route Frequency Provider Last Rate Last Dose    insulin lispro (HUMALOG) injection vial 10 Units  10 Units Subcutaneous TID WC Jose Figueredo MD   10 Units at 02/19/20 1224    furosemide (LASIX) injection 40 mg  40 mg Intravenous TID Jose Figueredo MD   40 mg at 02/19/20 1008    DOBUTamine (DOBUTREX) 500 mg in dextrose 5 % 250 mL infusion  2.5 mcg/kg/min Intravenous Continuous Sophia Beltrán MD 9.5 mL/hr at 02/18/20 1413 2.5 mcg/kg/min at 02/18/20 1413    sodium chloride flush 0.9 % injection 10 mL  10 mL Intravenous 2 times per day THAO Gr - CNP   10 mL at 02/18/20 2133    sodium Daily Thurl Karely, APRN - CNP   50 mcg at 02/19/20 1006    lisinopril (PRINIVIL;ZESTRIL) tablet 2.5 mg  2.5 mg Oral Daily Thurl Karely, APRN - CNP   2.5 mg at 02/19/20 1004    miconazole (MICOTIN) 2 % powder   Topical BID Thurl Karely, APRN - CNP        pantoprazole (PROTONIX) tablet 40 mg  40 mg Oral QAM AC Thurl Karely, APRN - CNP   40 mg at 02/19/20 0553    ranolazine (RANEXA) extended release tablet 500 mg  500 mg Oral BID Thurl Karely, APRN - CNP   500 mg at 02/19/20 1004    ticagrelor (BRILINTA) tablet 90 mg  90 mg Oral BID Thurl Karely, APRN - CNP   90 mg at 02/19/20 1006    vitamin D CAPS 3,000 Units  3,000 Units Oral Daily Thurl Karely, APRN - CNP   3,000 Units at 02/19/20 1004    polyethylene glycol (GLYCOLAX) packet 17 g  17 g Oral BID Elvira Lofton MD   17 g at 02/19/20 1005    insulin lispro (HUMALOG) injection vial 0-18 Units  0-18 Units Subcutaneous TID WC Thurl Karely, APRN - CNP   3 Units at 02/19/20 1221    insulin lispro (HUMALOG) injection vial 0-9 Units  0-9 Units Subcutaneous Nightly Thurl Karely, APRN - CNP   5 Units at 02/18/20 2135    glucose (GLUTOSE) 40 % oral gel 15 g  15 g Oral PRN Rebcea Flaherty, APRN - CNP        dextrose 50 % IV solution  12.5 g Intravenous PRN Rebeca Flaherty, APRN - CNP        glucagon (rDNA) injection 1 mg  1 mg Intramuscular PRN Rebeca Flaherty, APRN - CNP        dextrose 5 % solution  100 mL/hr Intravenous PRN Rebeca Flaherty, APRN - CNP        nicotine (NICODERM CQ) 21 MG/24HR 1 patch  1 patch Transdermal Daily Rebeca Flaherty, APRN - CNP        LORazepam (ATIVAN) tablet 1 mg  1 mg Oral BID Thurl Karely, APRN - CNP   1 mg at 02/19/20 0553           Labs:  CBC with Differential:    Lab Results   Component Value Date    WBC 11.3 02/17/2020    RBC 3.84 02/17/2020    HGB 9.9 02/17/2020    HCT 32.5 02/17/2020     02/17/2020    MCV 84.6 02/17/2020    MCH 25.8 02/17/2020    MCHC 30.5 02/17/2020    RDW 16.6 02/17/2020 SEGSPCT 72.0 02/17/2020    LYMPHOPCT 18.1 02/17/2020    MONOPCT 7.0 02/17/2020    EOSPCT 3.1 10/19/2011    BASOPCT 0.2 02/17/2020    MONOSABS 0.8 02/17/2020    LYMPHSABS 2.0 02/17/2020    EOSABS 0.2 02/17/2020    BASOSABS 0.0 02/17/2020    DIFFTYPE AUTOMATED DIFFERENTIAL 02/17/2020     CMP:    Lab Results   Component Value Date     02/19/2020    K 4.7 02/19/2020    CL 99 02/19/2020    CO2 27 02/19/2020    BUN 57 02/19/2020    CREATININE 1.9 02/19/2020    GFRAA 33 02/19/2020    LABGLOM 27 02/19/2020    GLUCOSE 192 02/19/2020    PROT 5.3 02/17/2020    PROT 6.5 10/18/2011    LABALBU 3.5 02/17/2020    CALCIUM 8.8 02/19/2020    BILITOT 0.3 02/17/2020    ALKPHOS 88 02/17/2020    AST 12 02/17/2020    ALT 12 02/17/2020     Hepatic Function Panel:    Lab Results   Component Value Date    ALKPHOS 88 02/17/2020    ALT 12 02/17/2020    AST 12 02/17/2020    PROT 5.3 02/17/2020    PROT 6.5 10/18/2011    BILITOT 0.3 02/17/2020    BILIDIR 0.2 08/06/2015    IBILI 0.3 08/06/2015    LABALBU 3.5 02/17/2020     Magnesium:    Lab Results   Component Value Date    MG 1.9 02/19/2020     PT/INR:    Lab Results   Component Value Date    PROTIME 13.6 01/08/2020    INR 1.12 01/08/2020     Last 3 Troponin:    Lab Results   Component Value Date    TROPONINI <0.006 03/22/2013    TROPONINI 0.014 03/20/2013    TROPONINI 0.009 03/20/2013     U/A:    Lab Results   Component Value Date    COLORU YELLOW 01/06/2020    WBCUA 15 01/06/2020    RBCUA 2 01/06/2020    MUCUS RARE 01/06/2020    TRICHOMONAS NONE SEEN 01/06/2020    YEAST OCCASIONAL 11/24/2019    BACTERIA OCCASIONAL 01/06/2020    CLARITYU HAZY 01/06/2020    SPECGRAV 1.017 01/06/2020    LEUKOCYTESUR TRACE 01/06/2020    UROBILINOGEN NORMAL 01/06/2020    BILIRUBINUR NEGATIVE 01/06/2020    BLOODU SMALL 01/06/2020     ABG:    Lab Results   Component Value Date    SAS6CFA 47.0 07/14/2019    PO2ART 149 07/14/2019    CJH0PRK 25.9 07/14/2019     FLP:    Lab Results   Component Value Date    TRIG 149 11/24/2019    HDL 29 11/24/2019    LDLCALC NOT VALID WHEN TRIGLYCERIDE >400 MG/DL. 01/30/2017    LDLDIRECT 109 11/24/2019     TSH:  No results found for: TSH   DATA:   ECG: Sinus Rhythm       ASSESSMENT:   1 acute on chronic systolic heart failure   clinically   better diuresed  7  L negative    2 CAD continue medical treatment  Discussed with Dr. Sophia Beltrán no   RCA intervention for now    3 cardiomyopathy EF has improved from 20% to now 40%    4 acute on chronic hypoxic respiratory failure  Clinically improved    5 CKD stage III  Nephrology  Managing  Diuresis    6 diabetes as well as my physician    7  Morbid  obesity chronic due to excess caloric intake    PLAN   Continue current treatment   stable from cardiology standpoint

## 2020-02-19 NOTE — PROGRESS NOTES
Hospitalist Progress Note      Name:  Lucius Simon /Age/Sex: 1960  (61 y.o. female)   MRN & CSN:  [id number] & [id number] Admission Date/Time: 2020 10:23 AM   Location:  -A PCP: Malini Roman MD         Hospital Day: 4    ASSESSMENT & PLAN:   Lucius Simon is a 61 y.o.  female was admitted to the hospital for acute on chronic systolic CHF exacerbation. Patient states that she is feeling much better. However, she has not been ambulating. 1.  Acute on chronic systolic CHF exacerbation--EF 40 to 45%. Chronically on 2 L of oxygen continuous at home. Initially treated with Lasix 20 IV twice daily. On dobutamine drip.   -Lasix 40 IV 3 times daily as of   -Daily chemistry panel  -On Coreg 12.5 twice daily  -On lisinopril 2.5 daily  -Nephro consultation to help with diuresis    2. CAD-- s/p PCI on 20. On aspirin/Brilinta/Ranexa/Coreg/Lipitor. No acute issues    3. CKD 3/--CR at baseline. BUN trending up.  -Avoid NSAIDs/contrast  -Daily chemistry panel  -Nephro consultation    4. Hypertension--BP controlled, on Coreg/lisinopril    5. Chronic hypoxic respiratory failure--on 2 L of oxygen continuously at home    6. Hypothyroidism--on levothyroxine    7. DM2--on Lantus 55 units and 24 units with meals 3 times daily at home  -Lantus 55 units  -High-dose sliding scale insulin  -Hypoglycemic protocol  -Humalog 10 units with meals 3 times daily    8. Tobacco use disorder-- on nicotine patch    9. Morbid obesity--weight 269 pounds, BMI 46.3    10. Hyperlipidemia--on Lipitor    11.   Anxiety/depression--on Wellbutrin/Cymbalta/Ativan      MEDICAL DECISION MAKING:  Labs reviewed   No new imaging  Level of risk moderate  Diet DIET CARDIAC; Carb Control: 4 carb choices (60 gms)/meal; Low Sodium (2 GM)   DVT Prophylaxis  Lovenox   GI Prophylaxis [x] PPI,  [] H2 Blocker,  [] Carafate,  [] Diet/Tube Feeds   Code Status Full Code   Disposition  Home   MDM [] Low, [x] Moderate,[]  High glycol  17 g Oral BID    insulin lispro  0-18 Units Subcutaneous TID WC    insulin lispro  0-9 Units Subcutaneous Nightly    nicotine  1 patch Transdermal Daily    LORazepam  1 mg Oral BID      Infusions:    DOBUTamine 2.5 mcg/kg/min (02/18/20 1413)    dextrose       PRN Meds: sodium chloride flush, 10 mL, PRN  magnesium hydroxide, 30 mL, Daily PRN  ondansetron, 4 mg, Q6H PRN  acetaminophen, 650 mg, Q4H PRN  HYDROcodone-acetaminophen, 2 tablet, Q4H PRN  glucose, 15 g, PRN  dextrose, 12.5 g, PRN  glucagon (rDNA), 1 mg, PRN  dextrose, 100 mL/hr, PRN      Electronically signed by Rosanna Howard MD on 2/19/2020 at 11:42 AM

## 2020-02-19 NOTE — CONSULTS
[address] [facility] A Dimitri, 1960, -A, 2020    History  [place]:  The primary encounter diagnosis was Respiratory distress. A diagnosis of Acute on chronic congestive heart failure, unspecified heart failure type [facility]) was also pertinent to this visit. Patient  has a past medical history of CAD (coronary artery disease), CKD (chronic kidney disease) stage 3, GFR 30-59 ml/min ([address] Diabetes type 2, uncontrolled ([facility] Diastolic heart failure ([facility] Essential hypertension, Financial problems, Generalized anxiety disorder, Herpes genitalia, Obesity, morbid (more than 100 lbs over ideal weight or BMI > 40) ([facility] [facility] and STEMI (ST elevation myocardial infarction) ([facility] Patient  has a past surgical history that includes Cholecystectomy;  section; Tonsillectomy; other surgical history (Right, [id number]); Cardiac surgery; and Coronary angioplasty with stent. Subjective:  Patient states:  \"I'm doing good today\". Pain:  Denies. Communication with other providers:  Handoff to RN, co-eval with OT.    Restrictions: fall risk    Home Setup/Prior level of function  Lives With: Significant other  Type of Home: Apartment  Home Layout: One level  Home Access: Level entry  Bathroom Shower/Tub: Tub/Shower unit, Shower chair with back  Bathroom Toilet: Standard  Bathroom Equipment: Grab bars in [address] [place]: [employer]  ADL Assistance: Needs assistance  Bath:  (some LE bathing assist for david area/buttocks from significant other but UE bathing pt does self)  Dressing:  (UE dressing performed independently by pt LE dressing performed by significant others from socks to threading pants/underwear)  Grooming: Independent  Feeding: Independent  Toileting: Needs assistance (pt's significant other helps w/ david care buttocks care and threading clothes)  Ambulation Assistance: mod I with RW  Transfer Assistance: Needs assistance (significant other helps w/ all transfers)  Active : No  *from previous admission, verified with patient    Examination of body systems (includes body structures/functions, activity/participation limitations):  · Observation:  Sitting EOB upon arrival  · Vision:  [facility]  · Hearing:  [facility]  · Cardiopulmonary:  On 2L of O2  · Cognition: WFL, see OT/SLP note for further evaluation. Musculoskeletal  · ROM R/L:  WFL. · Strength R/L:  4+/5, min weakness in function and endurance. · Neuro:  [facility]      Mobility:  · Transfers: SBA  · Sitting balance:  SBA. · Standing balance:  SBA. · Gait: SBA/CGA with [facility] 6 Clicks Inpatient Mobility:  AM-PAC Inpatient Mobility Raw Score : 18    Treatment:  STS from chair x3, and commode, patient performed with good LE power and safe use of AD. Gait: ambulated x50ft, x70ft, seated rest break in between trials, at end of 1st trial patient lightheaded and SOB, 3-4 min rest break required. Improved stability and endurance on second gait trial but fatigued at end. Cues throughout for pursed lip breathing, patient on 2L of O2, intermittently dropping to 88% but able to return to high 90's. Educated on d/c planning    Safety: patient left in chair with chair alarm, call light within reach, RN notified, gait belt used. Assessment:  Patient is a 62 yo female who presents with SOB and COPD exacerbation. Patient demonstrates impaired mobility below baseline, at baseline ambulating independently, currently demonstrates decreased endurance and increased fall risk. Patient would benefit from skilled PT services and regular staff mobilization, currently rec d/c to SNF. If patient mobilizes regularly may be able to return home with [facility]. Complexity: Moderate  Prognosis: Good, no significant barriers to participation at this time.    Plan Times per week: 3/week, 1 week,   Discharge Recommendations: [address], Home with Home health PT  Equipment: patient has necessary equipment. Goals:  Short term goals  Time Frame for Short term goals: 1 week  Short term goal 1: Patient will perform supine to sit mod I. Short term goal 2: Patient will perform STS mod I with RW. Short term goal 3: Patient will ambulate 150ft mod I with RW and stable vitals. Treatment plan:  Bed mobility, transfers, balance, gait, TA, TX.     Recommendations for NURSING mobility: ambulate 3x per day in stringer with RW and O2    Time:   Time in: 0928  Time out: 0958  Timed treatment minutes: 23  Total time: 30    Electronically signed by:    Kamran Lu, PT  2/19/2020, 12:13 PM

## 2020-02-19 NOTE — PROGRESS NOTES
Dr Cindi العلي gave ok for nurse to hold lasix and to not give additional pain meds as requested by pt

## 2020-02-20 LAB
ANION GAP SERPL CALCULATED.3IONS-SCNC: 12 MMOL/L (ref 4–16)
BASE EXCESS MIXED: ABNORMAL (ref 0–2.3)
BUN BLDV-MCNC: 67 MG/DL (ref 6–23)
CALCIUM SERPL-MCNC: 9.2 MG/DL (ref 8.3–10.6)
CARBON MONOXIDE, BLOOD: 0 % (ref 0–5)
CHLORIDE BLD-SCNC: 97 MMOL/L (ref 99–110)
CO2 CONTENT: 32.5 MMOL/L (ref 19–24)
CO2: 30 MMOL/L (ref 21–32)
COMMENT: ABNORMAL
CREAT SERPL-MCNC: 2.2 MG/DL (ref 0.6–1.1)
GFR AFRICAN AMERICAN: 28 ML/MIN/1.73M2
GFR NON-AFRICAN AMERICAN: 23 ML/MIN/1.73M2
GLUCOSE BLD-MCNC: 152 MG/DL (ref 70–99)
GLUCOSE BLD-MCNC: 158 MG/DL (ref 70–99)
GLUCOSE BLD-MCNC: 180 MG/DL (ref 70–99)
GLUCOSE BLD-MCNC: 184 MG/DL (ref 70–99)
GLUCOSE BLD-MCNC: 194 MG/DL (ref 70–99)
HCO3 ARTERIAL: 31 MMOL/L (ref 18–23)
MAGNESIUM: 2.1 MG/DL (ref 1.8–2.4)
METHEMOGLOBIN ARTERIAL: 0 %
O2 SATURATION: 95.1 % (ref 96–97)
PCO2 ARTERIAL: 50 MMHG (ref 32–45)
PH BLOOD: 7.4 (ref 7.34–7.45)
PO2 ARTERIAL: 95 MMHG (ref 75–100)
POTASSIUM SERPL-SCNC: 5.2 MMOL/L (ref 3.5–5.1)
SODIUM BLD-SCNC: 139 MMOL/L (ref 135–145)

## 2020-02-20 PROCEDURE — 6370000000 HC RX 637 (ALT 250 FOR IP): Performed by: NURSE PRACTITIONER

## 2020-02-20 PROCEDURE — 6370000000 HC RX 637 (ALT 250 FOR IP): Performed by: INTERNAL MEDICINE

## 2020-02-20 PROCEDURE — 80048 BASIC METABOLIC PNL TOTAL CA: CPT

## 2020-02-20 PROCEDURE — 6360000002 HC RX W HCPCS: Performed by: NURSE PRACTITIONER

## 2020-02-20 PROCEDURE — 6360000002 HC RX W HCPCS: Performed by: INTERNAL MEDICINE

## 2020-02-20 PROCEDURE — 2580000003 HC RX 258: Performed by: NURSE PRACTITIONER

## 2020-02-20 PROCEDURE — 1200000000 HC SEMI PRIVATE

## 2020-02-20 PROCEDURE — 82962 GLUCOSE BLOOD TEST: CPT

## 2020-02-20 PROCEDURE — 82803 BLOOD GASES ANY COMBINATION: CPT

## 2020-02-20 PROCEDURE — 2700000000 HC OXYGEN THERAPY PER DAY

## 2020-02-20 PROCEDURE — 83735 ASSAY OF MAGNESIUM: CPT

## 2020-02-20 PROCEDURE — 97530 THERAPEUTIC ACTIVITIES: CPT

## 2020-02-20 PROCEDURE — 94761 N-INVAS EAR/PLS OXIMETRY MLT: CPT

## 2020-02-20 PROCEDURE — 94640 AIRWAY INHALATION TREATMENT: CPT

## 2020-02-20 RX ORDER — FUROSEMIDE 10 MG/ML
40 INJECTION INTRAMUSCULAR; INTRAVENOUS 2 TIMES DAILY
Status: DISCONTINUED | OUTPATIENT
Start: 2020-02-20 | End: 2020-02-21

## 2020-02-20 RX ADMIN — CARVEDILOL 12.5 MG: 12.5 TABLET, FILM COATED ORAL at 08:45

## 2020-02-20 RX ADMIN — LORAZEPAM 1 MG: 1 TABLET ORAL at 18:17

## 2020-02-20 RX ADMIN — TICAGRELOR 90 MG: 90 TABLET ORAL at 21:25

## 2020-02-20 RX ADMIN — DOBUTAMINE IN DEXTROSE 2.5 MCG/KG/MIN: 200 INJECTION, SOLUTION INTRAVENOUS at 21:25

## 2020-02-20 RX ADMIN — INSULIN LISPRO 3 UNITS: 100 INJECTION, SOLUTION INTRAVENOUS; SUBCUTANEOUS at 11:30

## 2020-02-20 RX ADMIN — PANTOPRAZOLE SODIUM 40 MG: 40 TABLET, DELAYED RELEASE ORAL at 06:05

## 2020-02-20 RX ADMIN — LISINOPRIL 2.5 MG: 2.5 TABLET ORAL at 08:45

## 2020-02-20 RX ADMIN — DOCUSATE SODIUM 100 MG: 100 CAPSULE, LIQUID FILLED ORAL at 21:24

## 2020-02-20 RX ADMIN — IPRATROPIUM BROMIDE AND ALBUTEROL SULFATE 1 AMPULE: .5; 3 SOLUTION RESPIRATORY (INHALATION) at 12:56

## 2020-02-20 RX ADMIN — ISOSORBIDE MONONITRATE 60 MG: 60 TABLET, EXTENDED RELEASE ORAL at 08:45

## 2020-02-20 RX ADMIN — LEVOTHYROXINE SODIUM 50 MCG: 50 TABLET ORAL at 08:45

## 2020-02-20 RX ADMIN — POLYETHYLENE GLYCOL (3350) 17 G: 17 POWDER, FOR SOLUTION ORAL at 21:24

## 2020-02-20 RX ADMIN — MICONAZOLE NITRATE: 20 POWDER TOPICAL at 21:28

## 2020-02-20 RX ADMIN — FUROSEMIDE 40 MG: 10 INJECTION, SOLUTION INTRAMUSCULAR; INTRAVENOUS at 17:23

## 2020-02-20 RX ADMIN — INSULIN LISPRO 3 UNITS: 100 INJECTION, SOLUTION INTRAVENOUS; SUBCUTANEOUS at 07:31

## 2020-02-20 RX ADMIN — IPRATROPIUM BROMIDE AND ALBUTEROL SULFATE 1 AMPULE: .5; 3 SOLUTION RESPIRATORY (INHALATION) at 09:10

## 2020-02-20 RX ADMIN — TICAGRELOR 90 MG: 90 TABLET ORAL at 08:45

## 2020-02-20 RX ADMIN — INSULIN LISPRO 3 UNITS: 100 INJECTION, SOLUTION INTRAVENOUS; SUBCUTANEOUS at 17:19

## 2020-02-20 RX ADMIN — POLYETHYLENE GLYCOL (3350) 17 G: 17 POWDER, FOR SOLUTION ORAL at 08:45

## 2020-02-20 RX ADMIN — RANOLAZINE 500 MG: 500 TABLET, FILM COATED, EXTENDED RELEASE ORAL at 21:25

## 2020-02-20 RX ADMIN — BUPROPION HYDROCHLORIDE 100 MG: 100 TABLET, FILM COATED, EXTENDED RELEASE ORAL at 21:25

## 2020-02-20 RX ADMIN — SODIUM ZIRCONIUM CYCLOSILICATE 10 G: 10 POWDER, FOR SUSPENSION ORAL at 11:27

## 2020-02-20 RX ADMIN — ENOXAPARIN SODIUM 40 MG: 40 INJECTION SUBCUTANEOUS at 08:45

## 2020-02-20 RX ADMIN — ASPIRIN 81 MG 81 MG: 81 TABLET ORAL at 08:45

## 2020-02-20 RX ADMIN — LORAZEPAM 1 MG: 1 TABLET ORAL at 06:05

## 2020-02-20 RX ADMIN — RANOLAZINE 500 MG: 500 TABLET, FILM COATED, EXTENDED RELEASE ORAL at 08:45

## 2020-02-20 RX ADMIN — DULOXETINE HYDROCHLORIDE 60 MG: 30 CAPSULE, DELAYED RELEASE ORAL at 08:45

## 2020-02-20 RX ADMIN — FUROSEMIDE 40 MG: 10 INJECTION, SOLUTION INTRAMUSCULAR; INTRAVENOUS at 08:46

## 2020-02-20 RX ADMIN — MICONAZOLE NITRATE: 20 POWDER TOPICAL at 08:46

## 2020-02-20 RX ADMIN — HYDROCODONE BITARTRATE AND ACETAMINOPHEN 2 TABLET: 5; 325 TABLET ORAL at 17:23

## 2020-02-20 RX ADMIN — ATORVASTATIN CALCIUM 80 MG: 40 TABLET, FILM COATED ORAL at 21:24

## 2020-02-20 RX ADMIN — SODIUM CHLORIDE, PRESERVATIVE FREE 10 ML: 5 INJECTION INTRAVENOUS at 21:26

## 2020-02-20 RX ADMIN — CARVEDILOL 12.5 MG: 12.5 TABLET, FILM COATED ORAL at 17:23

## 2020-02-20 RX ADMIN — Medication 3000 UNITS: at 08:45

## 2020-02-20 RX ADMIN — BUPROPION HYDROCHLORIDE 100 MG: 100 TABLET, FILM COATED, EXTENDED RELEASE ORAL at 11:26

## 2020-02-20 RX ADMIN — INSULIN GLARGINE 55 UNITS: 100 INJECTION, SOLUTION SUBCUTANEOUS at 21:25

## 2020-02-20 RX ADMIN — HYDROCODONE BITARTRATE AND ACETAMINOPHEN 2 TABLET: 5; 325 TABLET ORAL at 06:05

## 2020-02-20 ASSESSMENT — PAIN SCALES - GENERAL
PAINLEVEL_OUTOF10: 0
PAINLEVEL_OUTOF10: 0
PAINLEVEL_OUTOF10: 7
PAINLEVEL_OUTOF10: 6

## 2020-02-20 ASSESSMENT — PAIN - FUNCTIONAL ASSESSMENT
PAIN_FUNCTIONAL_ASSESSMENT: 0-10
PAIN_FUNCTIONAL_ASSESSMENT: ACTIVITIES ARE NOT PREVENTED
PAIN_FUNCTIONAL_ASSESSMENT: 0-10
PAIN_FUNCTIONAL_ASSESSMENT: 0-10

## 2020-02-20 ASSESSMENT — PAIN DESCRIPTION - DESCRIPTORS
DESCRIPTORS: ACHING
DESCRIPTORS: ACHING

## 2020-02-20 ASSESSMENT — PAIN DESCRIPTION - ORIENTATION: ORIENTATION: LOWER

## 2020-02-20 ASSESSMENT — PAIN DESCRIPTION - ONSET: ONSET: ON-GOING

## 2020-02-20 ASSESSMENT — PAIN DESCRIPTION - PAIN TYPE: TYPE: CHRONIC PAIN

## 2020-02-20 ASSESSMENT — PAIN DESCRIPTION - LOCATION: LOCATION: BACK

## 2020-02-20 ASSESSMENT — PAIN DESCRIPTION - FREQUENCY: FREQUENCY: CONTINUOUS

## 2020-02-20 ASSESSMENT — PAIN DESCRIPTION - PROGRESSION
CLINICAL_PROGRESSION: NOT CHANGED
CLINICAL_PROGRESSION: NOT CHANGED

## 2020-02-20 NOTE — PROGRESS NOTES
Physical Therapy    Physical Therapy Treatment Note  Name: Harish Cardozo MRN: [id number] :   1960   Date:  2020   Admission Date: 2020 Room:  -A   Restrictions/Precautions:        Fall risk  Communication with other providers:  Handoff to RN  Subjective:  Patient states:  \"I'm so tired\"  Pain:   Location, Type, Intensity (0/10 to 10/10): Denies  Objective:    Observation:  Supine in bed  Treatment, including education/measures:  Encouraged patient to ambulate, states she's too tired, patient looks fatigued and has been charted that patient much more lethargic today compared to previously in admission. Supine to sit: SBA  STS: CGA with RW, min instability noted, able to take steps to chair with CGA. BP: 94/52, RN notified. Patient required increased time d/t lethargy. Safety: left in chair with chair alarm, with call light, RN notified, gait belt used. Assessment / Impression:       Patient's tolerance of treatment:  Tolerated fair   Adverse Reaction: None  Significant change in status and impact:  Improved mobility  Barriers to improvement:  Lethargy, balance, endurance, strength. Plan for Next Session:    Cont POC. Time in:  1455  Time out:  1330  Timed treatment minutes:  25  Total treatment time:  35    Previously filed items:     Short term goals  Time Frame for Short term goals: 1 week  Short term goal 1: Patient will perform supine to sit mod I. Short term goal 2: Patient will perform STS mod I with RW. Short term goal 3: Patient will ambulate 150ft mod I with RW and stable vitals.         Electronically signed by:    Melissa Yanes, PT  2020, 3:36 PM

## 2020-02-20 NOTE — PROGRESS NOTES
Nephrology Progress Note  2/20/2020 10:35 AM  Subjective:   Admit Date: 2/16/2020    PCP: He Mazariegos MD    Interval History: patient seems more sleepy today.   -restarted on IV lasix today     Diet: DIET CARDIAC; Low Sodium (2 GM); Daily Fluid Restriction: 1800 ml; Low Potassium      Data:   Scheduled Meds:   furosemide  40 mg Intravenous BID    sodium zirconium cyclosilicate  10 g Oral Once    insulin lispro  10 Units Subcutaneous TID WC    sodium chloride flush  10 mL Intravenous 2 times per day    enoxaparin  40 mg Subcutaneous Daily    ipratropium-albuterol  1 ampule Inhalation Q4H WA    aspirin  81 mg Oral Daily    atorvastatin  80 mg Oral Nightly    buPROPion  100 mg Oral BID    carvedilol  12.5 mg Oral BID WC    docusate sodium  100 mg Oral BID    DULoxetine  60 mg Oral Daily    insulin glargine  55 Units Subcutaneous Nightly    isosorbide mononitrate  60 mg Oral Daily    lactulose  10 g Oral BID    levothyroxine  50 mcg Oral Daily    lisinopril  2.5 mg Oral Daily    miconazole   Topical BID    pantoprazole  40 mg Oral QAM AC    ranolazine  500 mg Oral BID    ticagrelor  90 mg Oral BID    vitamin D  3,000 Units Oral Daily    polyethylene glycol  17 g Oral BID    insulin lispro  0-18 Units Subcutaneous TID WC    insulin lispro  0-9 Units Subcutaneous Nightly    nicotine  1 patch Transdermal Daily    LORazepam  1 mg Oral BID     Continuous Infusions:   DOBUTamine 2.5 mcg/kg/min (02/19/20 1737)    dextrose       PRN Meds:sodium chloride flush, magnesium hydroxide, ondansetron, acetaminophen, HYDROcodone-acetaminophen, glucose, dextrose, glucagon (rDNA), dextrose  I/O last 3 completed shifts: In: 560 [P.O.:360; I.V.:200]  Out: 2350 [Urine:2350]  No intake/output data recorded.     Intake/Output Summary (Last 24 hours) at 2/20/2020 1035  Last data filed at 2/20/2020 0359  Gross per 24 hour   Intake 560 ml   Output 2350 ml   Net -1790 ml     CBC: No results for input(s): WBC, RPR:  No results found for: RPR  MITRA:  No results found for: ANATITER, MITRA    Objective:   Patient Active Problem List:     CKD (chronic kidney disease) stage 3, GFR 30-59 ml/min ([facility])     CAD (coronary artery disease)     Chronic diastolic heart failure ([facility])     Diabetes type 2, uncontrolled ([facility]     Morbid obesity with BMI of 50.0-59.9, adult ([facility])     Elevated lactic acid level, resolved     Musculoskeletal chest pain     Essential hypertension     Diabetes mellitus with hyperglycemia ([facility])     Severe dehydration secondary to significant hyperglycemia     Sepsis secondary to UTI, POA     Generalized weakness     Sepsis associated hypotension, POA     Acute renal failure, POA     E. coli UTI (urinary tract infection)     Syncope     Acute pain of right shoulder     Hypotension     DM (diabetes mellitus), secondary uncontrolled ([facility])     Generalized anxiety disorder     Nausea & vomiting     Fever     Debility     Gait disturbance     Acute respiratory failure ([facility])     Hyperglycemia     Pleural effusion on left     UTI (urinary tract infection), bacterial     Sinus tachycardia     Uncontrolled type 2 diabetes mellitus with hyperglycemia ([facility])     Class 3 severe obesity due to excess calories with body mass index (BMI) of 45.0 to 49.9 in adult ([facility]     Anasarca     Acute kidney injury ([facility]     DM (diabetes mellitus) ([facility])     Fluid overload     Cor pulmonale (chronic) ([facility])     Cellulitis of abdominal wall     STEMI (ST elevation myocardial infarction) ([facility])     Acute respiratory distress    BP (!) 105/59   Pulse 82   Temp 98.3 °F (36.8 °C) (Oral)   Resp 12   Ht 5' 4\" (1.626 m)   Wt 278 lb (126.1 kg)   SpO2 98%   BMI 47.72 kg/m²      General appearance: Patient is drowsy; awakens easily  HEENT: Head: Normal, normocephalic, atraumatic.   Neck: supple, symmetrical, trachea midline  Cardiovascular: S1 and S2: normal / no rub  Pulmonary: diminished lung sounds bilaterally  Abdomen:  soft / non-tender

## 2020-02-20 NOTE — PROGRESS NOTES
Prophylaxis  Lovenox   GI Prophylaxis [x] PPI,  [] H2 Blocker,  [] Carafate,  [] Diet/Tube Feeds   Code Status Full Code   Disposition  Home   MDM [] Low, [x] Moderate,[]  High     Chief complaint/Interval History/ROS     Chief Complaint:  Acute respiratory distress    INTERVAL HISTORY: States that she is somewhat better but not good enough to go home. Has not been ambulating. ROS:  No chest pain. No abdominal pain. No fevers or chills. Objective: Intake/Output Summary (Last 24 hours) at 2/20/2020 1420  Last data filed at 2/20/2020 0359  Gross per 24 hour   Intake 560 ml   Output 2350 ml   Net -1790 ml      Vitals:   Vitals:    02/20/20 1134   BP: 102/69   Pulse:    Resp:    Temp:    SpO2:      Physical Exam:   GEN: Awake female, sitting upright in chair. Eating breakfast.  EYES: Pupils equal.  Ocular muscles intact. HENT: Moist membranes. No nasal discharge  NECK: Supple  RESP: No wheezing. Symmetric breath sounds. No accessory muscle use. CV: RRR. Conchis Marten No murmur. No peripheral edema. GI: Soft abdomen. Nontender. Obese abdomen  : Reyes in place  MSK: No bony fractures. No gross deformities. SKIN: warm, dry, no rashes, no pressure ulcer. NEURO: Normal speech. No focal deficit. Cranial nerves grossly intact.   PSYCH: Awake, alert, oriented, normal affect, normal mood    Medications:   Medications:    furosemide  40 mg Intravenous BID    insulin lispro  10 Units Subcutaneous TID     sodium chloride flush  10 mL Intravenous 2 times per day    enoxaparin  40 mg Subcutaneous Daily    ipratropium-albuterol  1 ampule Inhalation Q4H WA    aspirin  81 mg Oral Daily    atorvastatin  80 mg Oral Nightly    buPROPion  100 mg Oral BID    carvedilol  12.5 mg Oral BID     docusate sodium  100 mg Oral BID    DULoxetine  60 mg Oral Daily    insulin glargine  55 Units Subcutaneous Nightly    isosorbide mononitrate  60 mg Oral Daily    lactulose  10 g Oral BID    levothyroxine  50 mcg Oral Daily    lisinopril  2.5 mg Oral Daily    miconazole   Topical BID    pantoprazole  40 mg Oral QAM AC    ranolazine  500 mg Oral BID    ticagrelor  90 mg Oral BID    vitamin D  3,000 Units Oral Daily    polyethylene glycol  17 g Oral BID    insulin lispro  0-18 Units Subcutaneous TID WC    insulin lispro  0-9 Units Subcutaneous Nightly    nicotine  1 patch Transdermal Daily    LORazepam  1 mg Oral BID      Infusions:    DOBUTamine 2.5 mcg/kg/min (02/19/20 8106)    dextrose       PRN Meds: sodium chloride flush, 10 mL, PRN  magnesium hydroxide, 30 mL, Daily PRN  ondansetron, 4 mg, Q6H PRN  acetaminophen, 650 mg, Q4H PRN  HYDROcodone-acetaminophen, 2 tablet, Q4H PRN  glucose, 15 g, PRN  dextrose, 12.5 g, PRN  glucagon (rDNA), 1 mg, PRN  dextrose, 100 mL/hr, PRN      Electronically signed by Sarah Parra MD on 2/20/2020 at 2:20 PM

## 2020-02-20 NOTE — PROGRESS NOTES
Cardiology Progress Note       Yaquelin Guerrero is a 61 y.o. female   1960     SUBJECTIVE:   Patient seen and examined feels much better rhythm is sinus diuresed about 8  Litters    patient lethargic and sleepy most recent Accu-Chek glucose was above 140 to be rechecked again now  OBJECTIVE:    Review of Systems:  General appearance: alert, appears stated age and cooperative  Skin: Skin color, texture, normal. No rashes or lesions  HEENT: No nose bleed, headache, vision problems  CV: C/O chest pain, tightness, pressure,   Respiratory: C/o no SOB, GABRIEL, Orthopnea, PND  GI: No abdominal pain, black stool, bloating  Limbs: No c/o edema, pain, swelling, intermittent claudication, joint pains  Neuro: No dizziness, lightheadedness, syncope, gait problems, memory problems  Psych: grossly normal. No SI/depression. Vitals:   Blood pressure 102/69, pulse 77, temperature 98.6 °F (37 °C), temperature source Oral, resp. rate 12, height 5' 4\" (1.626 m), weight 278 lb (126.1 kg), SpO2 99 %, not currently breastfeeding.     HEENT: AT, NC, PERRLA  Neck: No JVD  Heart: S1 S2 audible, no murmur   Lungs: Some rales and rhonchi    Abdomen: Nontender   Limbs: No edema   CNS: no focal deficit      Past Medical History:   Diagnosis Date    CAD (coronary artery disease)     CKD (chronic kidney disease) stage 3, GFR 30-59 ml/min (HCC)     Diabetes type 2, uncontrolled (HCC)     Diastolic heart failure (HCC)     Essential hypertension     Financial problems 3/22/2013    Generalized anxiety disorder 1/8/2018    Herpes genitalia     Obesity, morbid (more than 100 lbs over ideal weight or BMI > 40) ([facility] [address] 03/22/2013    STEMI (ST elevation myocardial infarction) [facility])         Patient Active Problem List   Diagnosis    CKD (chronic kidney disease) stage 3, GFR 30-59 ml/min (HCC)    CAD (coronary artery disease)    Chronic diastolic heart failure ([address]    Diabetes type 2, uncontrolled ([facility]    Morbid obesity with BMI of 50.0-59.9, at 02/19/20 2137    sodium chloride flush 0.9 % injection 10 mL  10 mL Intravenous PRN THAO Gr CNP        magnesium hydroxide (MILK OF MAGNESIA) 400 MG/5ML suspension 30 mL  30 mL Oral Daily PRN THAO Gr CNP        ondansetron (ZOFRAN) injection 4 mg  4 mg Intravenous Q6H PRN THAO Gr CNP        enoxaparin (LOVENOX) injection 40 mg  40 mg Subcutaneous Daily THAO Gr CNP   40 mg at 02/20/20 0845    ipratropium-albuterol (DUONEB) nebulizer solution 1 ampule  1 ampule Inhalation Q4H WA THAO Gr CNP   1 ampule at 02/20/20 0910    acetaminophen (TYLENOL) tablet 650 mg  650 mg Oral Q4H PRN THAO Gr CNP        aspirin chewable tablet 81 mg  81 mg Oral Daily THAO Gr CNP   81 mg at 02/20/20 0845    atorvastatin (LIPITOR) tablet 80 mg  80 mg Oral Nightly THAO Gr CNP   80 mg at 02/19/20 2137    buPROPion [facility]) extended release tablet 100 mg  100 mg Oral BID THAO Gr CNP   100 mg at 02/20/20 1126    carvedilol (COREG) tablet 12.5 mg  12.5 mg Oral BID WC THAO Gr CNP   12.5 mg at 02/20/20 0845    docusate sodium (COLACE) capsule 100 mg  100 mg Oral BID THAO Gr CNP   100 mg at 02/19/20 2137    DULoxetine (CYMBALTA) extended release capsule 60 mg  60 mg Oral Daily THAO Dean CNP   60 mg at 02/20/20 0845    HYDROcodone-acetaminophen (NORCO) 5-325 MG per tablet 2 tablet  2 tablet Oral Q4H PRN THAO Gr CNP   2 tablet at 02/20/20 4959    insulin glargine (LANTUS) injection vial 55 Units  55 Units Subcutaneous Nightly THAO Dean CNP   55 Units at 02/19/20 2141    isosorbide mononitrate (IMDUR) extended release tablet 60 mg  60 mg Oral Daily THAO Dean CNP   60 mg at 02/20/20 0845    lactulose (CHRONULAC) 10 GM/15ML solution 10 g  10 g Oral BID THAO Gr CNP        levothyroxine (SYNTHROID) tablet 50 mcg  50 mcg Oral Daily Selvin Donnelly, APRN - CNP   50 mcg at 02/20/20 0845    lisinopril (PRINIVIL;ZESTRIL) tablet 2.5 mg  2.5 mg Oral Daily Selvin Donnelly, APRN - CNP   2.5 mg at 02/20/20 0845    miconazole (MICOTIN) 2 % powder   Topical BID Drujeanette Diazjeanie, APRN - CNP        pantoprazole (PROTONIX) tablet 40 mg  40 mg Oral QAM AC Drujeanette Diazoa, APRN - CNP   40 mg at 02/20/20 0605    ranolazine (RANEXA) extended release tablet 500 mg  500 mg Oral BID Selvin Donnelly, APRN - CNP   500 mg at 02/20/20 0845    ticagrelor (BRILINTA) tablet 90 mg  90 mg Oral BID Sooi Andrzej, APRN - CNP   90 mg at 02/20/20 0845    vitamin D CAPS 3,000 Units  3,000 Units Oral Daily Vicmasoni Andrzej, APRN - CNP   3,000 Units at 02/20/20 0845    polyethylene glycol (GLYCOLAX) packet 17 g  17 g Oral BID Leila Rajan MD   17 g at 02/20/20 0845    insulin lispro (HUMALOG) injection vial 0-18 Units  0-18 Units Subcutaneous TID WC Drujeanette Diazjeanie, APRN - CNP   3 Units at 02/20/20 1130    insulin lispro (HUMALOG) injection vial 0-9 Units  0-9 Units Subcutaneous Nightly [facility] APR[facility] - CNP   5 Units at 02/19/20 2141    glucose (GLUTOSE) 40 % oral gel 15 g  15 g Oral PRN Rebeca Flaherty, APRN - CNP        dextrose 50 % IV solution  12.5 g Intravenous PRN Rebeca Flaherty, APRN - CNP        glucagon (rDNA) injection 1 mg  1 mg Intramuscular PRN Rebeca Flaherty, APRN - CNP        dextrose 5 % solution  100 mL/hr Intravenous PRN Rebeca Flaherty, APRN - CNP        nicotine (NICODERM CQ) 21 MG/24HR 1 patch  1 patch Transdermal Daily Rebeca Flaherty, APRN - CNP        LORazepam (ATIVAN) tablet 1 mg  1 mg Oral BID Selvin Donnelly, APRN - CNP   1 mg at 02/20/20 9819           Labs:  CBC with Differential:    Lab Results   Component Value Date    WBC 11.3 02/17/2020    RBC 3.84 02/17/2020    HGB 9.9 02/17/2020    HCT 32.5 02/17/2020     02/17/2020    MCV 84.6 02/17/2020    MCH 25.8 02/17/2020    MCHC 30.5 02/17/2020 Results   Component Value Date    TRIG 149 11/24/2019    HDL 29 11/24/2019    LDLCALC NOT VALID WHEN TRIGLYCERIDE >400 MG/DL. 01/30/2017    LDLDIRECT 109 11/24/2019     TSH:  No results found for: TSH   DATA:   ECG: Sinus Rhythm       ASSESSMENT:   1 acute on chronic systolic heart failure   clinically   better diuresed 8 L L negative    2 CAD continue medical treatment  Discussed with Dr. Nghia Holliday no   RCA intervention for now    3 cardiomyopathy EF has improved from 20% to now 40%  Clinically much better    #Mental status changes patient lethargic and sleepy  Evaluation by hospitalist in progress    4 acute on chronic hypoxic respiratory failure  Clinically improved    5 CKD stage III  Nephrology  Managing  Diuresis    6 diabetes as well as my physician    7  Morbid  obesity chronic due to excess caloric intake    PLAN   Continue current treatment   stable from cardiology standpoint

## 2020-02-21 VITALS
BODY MASS INDEX: 47.16 KG/M2 | TEMPERATURE: 97.9 F | RESPIRATION RATE: 18 BRPM | SYSTOLIC BLOOD PRESSURE: 111 MMHG | DIASTOLIC BLOOD PRESSURE: 54 MMHG | HEART RATE: 84 BPM | HEIGHT: 64 IN | OXYGEN SATURATION: 99 % | WEIGHT: 276.24 LBS

## 2020-02-21 LAB
ANION GAP SERPL CALCULATED.3IONS-SCNC: 14 MMOL/L (ref 4–16)
BUN BLDV-MCNC: 74 MG/DL (ref 6–23)
CALCIUM SERPL-MCNC: 8.8 MG/DL (ref 8.3–10.6)
CHLORIDE BLD-SCNC: 98 MMOL/L (ref 99–110)
CO2: 27 MMOL/L (ref 21–32)
CREAT SERPL-MCNC: 2.3 MG/DL (ref 0.6–1.1)
GFR AFRICAN AMERICAN: 26 ML/MIN/1.73M2
GFR NON-AFRICAN AMERICAN: 22 ML/MIN/1.73M2
GLUCOSE BLD-MCNC: 224 MG/DL (ref 70–99)
GLUCOSE BLD-MCNC: 270 MG/DL (ref 70–99)
GLUCOSE BLD-MCNC: 274 MG/DL (ref 70–99)
HCT VFR BLD CALC: 32.7 % (ref 37–47)
HEMOGLOBIN: 9.5 GM/DL (ref 12.5–16)
MAGNESIUM: 2.1 MG/DL (ref 1.8–2.4)
MCH RBC QN AUTO: 25.3 PG (ref 27–31)
MCHC RBC AUTO-ENTMCNC: 29.1 % (ref 32–36)
MCV RBC AUTO: 87 FL (ref 78–100)
PDW BLD-RTO: 16.1 % (ref 11.7–14.9)
PHOSPHORUS: 5.2 MG/DL (ref 2.5–4.9)
PLATELET # BLD: 300 K/CU MM (ref 140–440)
PMV BLD AUTO: 10.4 FL (ref 7.5–11.1)
POTASSIUM SERPL-SCNC: 4.9 MMOL/L (ref 3.5–5.1)
RBC # BLD: 3.76 M/CU MM (ref 4.2–5.4)
SODIUM BLD-SCNC: 139 MMOL/L (ref 135–145)
WBC # BLD: 9.5 K/CU MM (ref 4–10.5)

## 2020-02-21 PROCEDURE — 6360000002 HC RX W HCPCS: Performed by: INTERNAL MEDICINE

## 2020-02-21 PROCEDURE — 94761 N-INVAS EAR/PLS OXIMETRY MLT: CPT

## 2020-02-21 PROCEDURE — 6370000000 HC RX 637 (ALT 250 FOR IP): Performed by: NURSE PRACTITIONER

## 2020-02-21 PROCEDURE — 6370000000 HC RX 637 (ALT 250 FOR IP): Performed by: INTERNAL MEDICINE

## 2020-02-21 PROCEDURE — 82962 GLUCOSE BLOOD TEST: CPT

## 2020-02-21 PROCEDURE — 6360000002 HC RX W HCPCS: Performed by: NURSE PRACTITIONER

## 2020-02-21 PROCEDURE — 94640 AIRWAY INHALATION TREATMENT: CPT

## 2020-02-21 PROCEDURE — 84100 ASSAY OF PHOSPHORUS: CPT

## 2020-02-21 PROCEDURE — 85027 COMPLETE CBC AUTOMATED: CPT

## 2020-02-21 PROCEDURE — 83735 ASSAY OF MAGNESIUM: CPT

## 2020-02-21 PROCEDURE — 80048 BASIC METABOLIC PNL TOTAL CA: CPT

## 2020-02-21 PROCEDURE — 2580000003 HC RX 258: Performed by: NURSE PRACTITIONER

## 2020-02-21 PROCEDURE — 2700000000 HC OXYGEN THERAPY PER DAY

## 2020-02-21 RX ORDER — FUROSEMIDE 40 MG/1
40 TABLET ORAL 2 TIMES DAILY
Status: DISCONTINUED | OUTPATIENT
Start: 2020-02-21 | End: 2020-02-21 | Stop reason: HOSPADM

## 2020-02-21 RX ORDER — FUROSEMIDE 40 MG/1
40 TABLET ORAL 2 TIMES DAILY
Qty: 60 TABLET | Refills: 3 | Status: ON HOLD | OUTPATIENT
Start: 2020-02-21 | End: 2020-02-26 | Stop reason: HOSPADM

## 2020-02-21 RX ADMIN — RANOLAZINE 500 MG: 500 TABLET, FILM COATED, EXTENDED RELEASE ORAL at 09:43

## 2020-02-21 RX ADMIN — Medication 3000 UNITS: at 09:42

## 2020-02-21 RX ADMIN — ASPIRIN 81 MG 81 MG: 81 TABLET ORAL at 09:42

## 2020-02-21 RX ADMIN — PANTOPRAZOLE SODIUM 40 MG: 40 TABLET, DELAYED RELEASE ORAL at 05:29

## 2020-02-21 RX ADMIN — FUROSEMIDE 40 MG: 40 TABLET ORAL at 17:56

## 2020-02-21 RX ADMIN — ISOSORBIDE MONONITRATE 60 MG: 60 TABLET, EXTENDED RELEASE ORAL at 09:44

## 2020-02-21 RX ADMIN — CARVEDILOL 12.5 MG: 12.5 TABLET, FILM COATED ORAL at 09:44

## 2020-02-21 RX ADMIN — INSULIN LISPRO 9 UNITS: 100 INJECTION, SOLUTION INTRAVENOUS; SUBCUTANEOUS at 09:52

## 2020-02-21 RX ADMIN — LORAZEPAM 1 MG: 1 TABLET ORAL at 05:29

## 2020-02-21 RX ADMIN — POLYETHYLENE GLYCOL (3350) 17 G: 17 POWDER, FOR SOLUTION ORAL at 09:44

## 2020-02-21 RX ADMIN — FUROSEMIDE 40 MG: 10 INJECTION, SOLUTION INTRAMUSCULAR; INTRAVENOUS at 09:41

## 2020-02-21 RX ADMIN — MICONAZOLE NITRATE: 20 POWDER TOPICAL at 09:40

## 2020-02-21 RX ADMIN — LEVOTHYROXINE SODIUM 50 MCG: 50 TABLET ORAL at 09:43

## 2020-02-21 RX ADMIN — IPRATROPIUM BROMIDE AND ALBUTEROL SULFATE 1 AMPULE: .5; 3 SOLUTION RESPIRATORY (INHALATION) at 12:09

## 2020-02-21 RX ADMIN — SODIUM CHLORIDE, PRESERVATIVE FREE 10 ML: 5 INJECTION INTRAVENOUS at 09:57

## 2020-02-21 RX ADMIN — INSULIN LISPRO 9 UNITS: 100 INJECTION, SOLUTION INTRAVENOUS; SUBCUTANEOUS at 13:14

## 2020-02-21 RX ADMIN — DULOXETINE HYDROCHLORIDE 60 MG: 30 CAPSULE, DELAYED RELEASE ORAL at 09:42

## 2020-02-21 RX ADMIN — TICAGRELOR 90 MG: 90 TABLET ORAL at 09:42

## 2020-02-21 RX ADMIN — HYDROCODONE BITARTRATE AND ACETAMINOPHEN 2 TABLET: 5; 325 TABLET ORAL at 18:05

## 2020-02-21 RX ADMIN — HYDROCODONE BITARTRATE AND ACETAMINOPHEN 2 TABLET: 5; 325 TABLET ORAL at 09:43

## 2020-02-21 RX ADMIN — ENOXAPARIN SODIUM 40 MG: 40 INJECTION SUBCUTANEOUS at 09:40

## 2020-02-21 RX ADMIN — LISINOPRIL 2.5 MG: 2.5 TABLET ORAL at 09:44

## 2020-02-21 RX ADMIN — CARVEDILOL 12.5 MG: 12.5 TABLET, FILM COATED ORAL at 17:56

## 2020-02-21 RX ADMIN — IPRATROPIUM BROMIDE AND ALBUTEROL SULFATE 1 AMPULE: .5; 3 SOLUTION RESPIRATORY (INHALATION) at 08:17

## 2020-02-21 RX ADMIN — DOCUSATE SODIUM 100 MG: 100 CAPSULE, LIQUID FILLED ORAL at 09:42

## 2020-02-21 RX ADMIN — BUPROPION HYDROCHLORIDE 100 MG: 100 TABLET, FILM COATED, EXTENDED RELEASE ORAL at 09:41

## 2020-02-21 ASSESSMENT — PAIN SCALES - GENERAL
PAINLEVEL_OUTOF10: 9
PAINLEVEL_OUTOF10: 8

## 2020-02-21 NOTE — PROGRESS NOTES
Nutrition Assessment (Low Risk)    Type and Reason for Visit: Initial    Nutrition Assessment: Pt assessed due to a length of stay. Pt has been on a cardiac, low sodium, low potassium, fluid restricted diet and is consuming % of most of her meals. Pt does not have a wound and her weight appears stable. Will provide pt with renal supplement to help meet her estimated kcal needs on a restrictive diet. Pt is at low risk at this time.      Malnutrition Assessment:  · Malnutrition Status: No malnutrition    Nutrition Risk Level   Risk Level: Low    Nutrition Diagnosis:   · Problem: No nutrition diagnosis at this time    Nutrition Intervention:  Food and/or Delivery: Continue current diet  Nutrition Education/Counseling/Coordination of Care:  Continued Inpatient Monitoring, Education Not Indicated, Coordination of Care      Electronically signed by Mikki Olszewski, RD, LD on 1/31/94 at 11:12 AM    Contact Number: [phone]

## 2020-02-21 NOTE — PROGRESS NOTES
[facility] Liaison spoke with pt and pt is agreeable to [facility] at discharge. . Please place inpatient consult to home health needs order in [facility] [place]. Lives with friend and has cats. Pt agreeable to Telehealth for CHF. Referral from [facility] CHF Navigator and Jayde Styles CM.

## 2020-02-21 NOTE — PROGRESS NOTES
Cardiology Progress Note       Elkin Obregon is a 61 y.o. female   1960     SUBJECTIVE:   Patient seen and examined feels much better rhythm is sinus diuresed about 8  Litters    patient lethargic and sleepy most recent A  2/21  Patient seen and examined she is much more awake today feels much better  OBJECTIVE:    Review of Systems:  General appearance: alert, appears stated age and cooperative  Skin: Skin color, texture, normal. No rashes or lesions  HEENT: No nose bleed, headache, vision problems  CV: C/O chest pain, tightness, pressure,   Respiratory: C/o no SOB, GABRIEL, Orthopnea, PND  GI: No abdominal pain, black stool, bloating  Limbs: No c/o edema, pain, swelling, intermittent claudication, joint pains  Neuro: No dizziness, lightheadedness, syncope, gait problems, memory problems  Psych: grossly normal. No SI/depression. Vitals:   Blood pressure (!) 161/60, pulse 91, temperature 97.3 °F (36.3 °C), temperature source Axillary, resp. rate 16, height 5' 4\" (1.626 m), weight 276 lb 3.8 oz (125.3 kg), SpO2 98 %, not currently breastfeeding.     HEENT: AT, NC, PERRLA  Neck: No JVD  Heart: S1 S2 audible, no murmur   Lungs: Some rales and rhonchi    Abdomen: Nontender   Limbs: No edema   CNS: no focal deficit      Past Medical History:   Diagnosis Date    CAD (coronary artery disease)     CKD (chronic kidney disease) stage 3, GFR 30-59 ml/min (HCC)     Diabetes type 2, uncontrolled (HCC)     Diastolic heart failure [facility]     Essential hypertension     Financial problems 3/22/2013    Generalized anxiety disorder 1/8/2018    Herpes genitalia     Obesity, morbid (more than 100 lbs over ideal weight or BMI > 40) ([facility] [address] 03/22/2013    STEMI (ST elevation myocardial infarction) [facility])         Patient Active Problem List   Diagnosis    CKD (chronic kidney disease) stage 3, GFR 30-59 ml/min (HCC)    CAD (coronary artery disease)    Chronic diastolic heart failure ([address]    Diabetes type 2, uncontrolled ([address]  Morbid obesity with BMI of 50.0-59.9, adult ([facility])    Elevated lactic acid level, resolved    Musculoskeletal chest pain    Essential hypertension    Diabetes mellitus with hyperglycemia ([facility]    Severe dehydration secondary to significant hyperglycemia    Sepsis secondary to UTI, POA    Generalized weakness    Sepsis associated hypotension, POA    Acute renal failure, POA    E. coli UTI (urinary tract infection)    Syncope    Acute pain of right shoulder    Hypotension    DM (diabetes mellitus), secondary uncontrolled ([facility]    Generalized anxiety disorder    Nausea & vomiting    Fever    Debility    Gait disturbance    Acute respiratory failure ([facility]    Hyperglycemia    Pleural effusion on left    UTI (urinary tract infection), bacterial    Sinus tachycardia    Uncontrolled type 2 diabetes mellitus with hyperglycemia ([facility]    Class 3 severe obesity due to excess calories with body mass index (BMI) of 45.0 to 49.9 in adult [facility])    Anasarca    Acute kidney injury ([facility]    DM (diabetes mellitus) ([facility]    Fluid overload    Cor pulmonale (chronic) ([facility])    Cellulitis of abdominal wall    STEMI (ST elevation myocardial infarction) ([facility])    Acute respiratory distress        Allergies   Allergen Reactions    Augmentin [Amoxicillin-Pot Clavulanate] Diarrhea        Current Inpatient Medications:    Current Facility-Administered Medications   Medication Dose Route Frequency Provider Last Rate Last Dose    furosemide (LASIX) tablet 40 mg  40 mg Oral BID Nabil Ruiz MD        insulin lispro (HUMALOG) injection vial 10 Units  10 Units Subcutaneous TID  Nikki Herr MD   10 Units at 02/21/20 0945    sodium chloride flush 0.9 % injection 10 mL  10 mL Intravenous 2 times per day Charissa December, APRN - CNP   10 mL at 02/21/20 0957    sodium chloride flush 0.9 % injection 10 mL  10 mL Intravenous PRN Charissa December, APRN - CNP        magnesium hydroxide (MILK OF MAGNESIA) 400 MG/5ML suspension 30 mL  30 mL Oral Daily PRN Connies Miryam, APRN - CNP        ondansetron (ZOFRAN) injection 4 mg  4 mg Intravenous Q6H PRN Isaias Witt, APRN - CNP        enoxaparin (LOVENOX) injection 40 mg  40 mg Subcutaneous Daily Connies Miryam, APRN - CNP   40 mg at 02/21/20 0940    ipratropium-albuterol (DUONEB) nebulizer solution 1 ampule  1 ampule Inhalation Q4H WA Isaias Witt, APRN - CNP   1 ampule at 02/21/20 0817    acetaminophen (TYLENOL) tablet 650 mg  650 mg Oral Q4H PRN Isaias Witt, APRN - CNP        aspirin chewable tablet 81 mg  81 mg Oral Daily Connies Miryam, APRN - CNP   81 mg at 02/21/20 0942    atorvastatin (LIPITOR) tablet 80 mg  80 mg Oral Nightly Connies Miryam, APRN - CNP   80 mg at 02/20/20 2124    buPROPion [facility]) extended release tablet 100 mg  100 mg Oral BID Connies Miryam, APRN - CNP   100 mg at 02/21/20 0941    carvedilol (COREG) tablet 12.5 mg  12.5 mg Oral BID WC Isaias Witt, APRN - CNP   12.5 mg at 02/21/20 0944    docusate sodium (COLACE) capsule 100 mg  100 mg Oral BID Connies Miryam, APRN - CNP   100 mg at 02/21/20 2364    DULoxetine (CYMBALTA) extended release capsule 60 mg  60 mg Oral Daily Isaias Witt, APRN - CNP   60 mg at 02/21/20 0942    HYDROcodone-acetaminophen (NORCO) 5-325 MG per tablet 2 tablet  2 tablet Oral Q4H PRN Isaias Witt APRN - CNP   2 tablet at 02/21/20 0943    insulin glargine (LANTUS) injection vial 55 Units  55 Units Subcutaneous Nightly THAO Dean - CNP   55 Units at 02/20/20 2125    isosorbide mononitrate (IMDUR) extended release tablet 60 mg  60 mg Oral Daily THAO Dean - CNP   60 mg at 02/21/20 0944    lactulose (CHRONULAC) 10 GM/15ML solution 10 g  10 g Oral BID THAO Levi CNP        levothyroxine (SYNTHROID) tablet 50 mcg  50 mcg Oral Daily THAO Dean CNP   50 mcg at 02/21/20 0943    lisinopril (PRINIVIL;ZESTRIL) tablet 2.5 mg  2.5 mg Oral Daily Isaias Witt, APRN - CNP   2.5 mg at 02/21/20 0944    miconazole (MICOTIN) 2 % powder   Topical BID Jen Oar, APRN - CNP        pantoprazole (PROTONIX) tablet 40 mg  40 mg Oral QAM AC Jen Oar, APRN - CNP   40 mg at 02/21/20 0529    ranolazine (RANEXA) extended release tablet 500 mg  500 mg Oral BID Jen Oar, APRN - CNP   500 mg at 02/21/20 0943    ticagrelor (BRILINTA) tablet 90 mg  90 mg Oral BID Jen Oar, APRN - CNP   90 mg at 02/21/20 4875    vitamin D CAPS 3,000 Units  3,000 Units Oral Daily Jen Oar, APRN - CNP   3,000 Units at 02/21/20 4581    polyethylene glycol (GLYCOLAX) packet 17 g  17 g Oral BID Bibi Powell MD   17 g at 02/21/20 0944    insulin lispro (HUMALOG) injection vial 0-18 Units  0-18 Units Subcutaneous TID WC Jen Oar, APRN - CNP   9 Units at 02/21/20 0952    insulin lispro (HUMALOG) injection vial 0-9 Units  0-9 Units Subcutaneous Nightly Jen Oar, APRN - CNP   2 Units at 02/20/20 2125    glucose (GLUTOSE) 40 % oral gel 15 g  15 g Oral PRN Rebeca Flaherty, APRN - CNP        dextrose 50 % IV solution  12.5 g Intravenous PRN Rebeca Flaherty, APRN - CNP        glucagon (rDNA) injection 1 mg  1 mg Intramuscular PRN Rebeca Flaherty, APRN - CNP        dextrose 5 % solution  100 mL/hr Intravenous PRN Rebeca Flaherty, APRN - CNP        nicotine (NICODERM CQ) 21 MG/24HR 1 patch  1 patch Transdermal Daily Rebeca Flaherty, APRN - CNP        LORazepam (ATIVAN) tablet 1 mg  1 mg Oral BID Jen Oar, APRN - CNP   1 mg at 02/21/20 0529           Labs:  CBC with Differential:    Lab Results   Component Value Date    WBC 9.5 02/21/2020    RBC 3.76 02/21/2020    HGB 9.5 02/21/2020    HCT 32.7 02/21/2020     02/21/2020    MCV 87.0 02/21/2020    MCH 25.3 02/21/2020    MCHC 29.1 02/21/2020    RDW 16.1 02/21/2020    SEGSPCT 72.0 02/17/2020    LYMPHOPCT 18.1 02/17/2020    MONOPCT 7.0 02/17/2020    EOSPCT 3.1 10/19/2011    BASOPCT 0.2 02/17/2020    MONOSABS 0.8 02/17/2020    LYMPHSABS 2.0 02/17/2020    EOSABS 0.2 02/17/2020    BASOSABS 0.0 02/17/2020    DIFFTYPE AUTOMATED DIFFERENTIAL 02/17/2020     CMP:    Lab Results   Component Value Date     02/21/2020    K 4.9 02/21/2020    CL 98 02/21/2020    CO2 27 02/21/2020    BUN 74 02/21/2020    CREATININE 2.3 02/21/2020    GFRAA 26 02/21/2020    LABGLOM 22 02/21/2020    GLUCOSE 224 02/21/2020    PROT 5.3 02/17/2020    PROT 6.5 10/18/2011    LABALBU 3.5 02/17/2020    CALCIUM 8.8 02/21/2020    BILITOT 0.3 02/17/2020    ALKPHOS 88 02/17/2020    AST 12 02/17/2020    ALT 12 02/17/2020     Hepatic Function Panel:    Lab Results   Component Value Date    ALKPHOS 88 02/17/2020    ALT 12 02/17/2020    AST 12 02/17/2020    PROT 5.3 02/17/2020    PROT 6.5 10/18/2011    BILITOT 0.3 02/17/2020    BILIDIR 0.2 08/06/2015    IBILI 0.3 08/06/2015    LABALBU 3.5 02/17/2020     Magnesium:    Lab Results   Component Value Date    MG 2.1 02/21/2020     PT/INR:    Lab Results   Component Value Date    PROTIME 13.6 01/08/2020    INR 1.12 01/08/2020     Last 3 Troponin:    Lab Results   Component Value Date    TROPONINI <0.006 03/22/2013    TROPONINI 0.014 03/20/2013    TROPONINI 0.009 03/20/2013     U/A:    Lab Results   Component Value Date    COLORU YELLOW 02/19/2020    WBCUA 63 02/19/2020    RBCUA 42 02/19/2020    MUCUS RARE 02/19/2020    TRICHOMONAS NONE SEEN 02/19/2020    YEAST OCCASIONAL 11/24/2019    BACTERIA OCCASIONAL 02/19/2020    CLARITYU SLIGHTLY CLOUDY 02/19/2020    SPECGRAV 1.009 02/19/2020    LEUKOCYTESUR LARGE 02/19/2020    UROBILINOGEN NORMAL 02/19/2020    BILIRUBINUR NEGATIVE 02/19/2020    BLOODU MODERATE 02/19/2020     ABG:    Lab Results   Component Value Date    NUL9ELM 50.0 02/20/2020    PO2ART 95 02/20/2020    TZV7BMV 31.0 02/20/2020     FLP:    Lab Results   Component Value Date    TRIG 149 11/24/2019    HDL 29 11/24/2019    LDLCALC NOT VALID WHEN TRIGLYCERIDE >400 MG/DL. 01/30/2017    LDLDIRECT 109 11/24/2019     TSH:

## 2020-02-21 NOTE — DISCHARGE SUMMARY
Cardiology  Primary care physician:  within 1 to 2 weeks    Diet:  diabetic diet   Activity: activity as tolerated  Disposition: Discharged to:   []Home, [x]HHC, []SNF, []Acute Rehab, []Hospice   Condition on discharge: Stable    Discharge Medications:      Alcide Rinne   Home Medication Instructions [facility]:[id number]    Printed on:02/21/20 0507   Medication Information                      albuterol sulfate HFA (VENTOLIN HFA) 108 (90 Base) MCG/ACT inhaler  Inhale 2 puffs into the lungs every 4 hours as needed for Wheezing             aspirin 81 MG chewable tablet  Take 1 tablet by mouth daily             atorvastatin (LIPITOR) 80 MG tablet  Take 1 tablet by mouth nightly             BREO ELLIPTA 100-25 MCG/INH AEPB inhaler  Inhale 1 puff into the lungs daily             buPROPion (WELLBUTRIN SR) 100 MG extended release tablet  Take 100 mg by mouth 2 times daily             carvedilol (COREG) 12.5 MG tablet  Take 1 tablet by mouth 2 times daily (with meals)             docusate (COLACE, DULCOLAX) 100 MG CAPS  Take 100 mg by mouth 2 times daily             DULoxetine (CYMBALTA) 60 MG extended release capsule  Take 1 capsule by mouth daily             furosemide (LASIX) 40 MG tablet  Take 1 tablet by mouth 2 times daily             HYDROcodone-acetaminophen (NORCO) 5-325 MG per tablet  Take 2 tablets by mouth every 4 hours as needed for Pain.              insulin glargine (LANTUS SOLOSTAR) 100 UNIT/ML injection pen  Inject 55 Units into the skin nightly             insulin lispro (HUMALOG) 100 UNIT/ML injection vial  Check blood sugar three times daily before meals and at bedtime  For blood sugar less than 150= no insulin, 151-200=2, 201-250=4, 251-300=6, 301-350=6, 351-400=8, >400=10 units and call MD             isosorbide mononitrate (IMDUR) 60 MG extended release tablet  Take 1 tablet by mouth daily             lactulose (CHRONULAC) 10 GM/15ML solution  Take 15 mLs by mouth 2 times daily             levothyroxine 27 30 27   BUN 57* 67* 74*   CREATININE 1.9* 2.2* 2.3*   WBC  --   --  9.5   HCT  --   --  32.7*   PLT  --   --  300         Discharge Time of 33 minutes    Electronically signed by Daina Mata MD on 2/21/2020 at 2:54 PM

## 2020-02-21 NOTE — PROGRESS NOTES
Discharge instructions explained to pt, no questions or concerns at this time. Pt was able to void after mixon catheter removal. Peripheral IVs removed. Pt going home with significant other. Pt to follow-up with Dr. Nini Singleton, Dr. Tammy Baca, and Dr. Jean Paul Davis. CHF education provided by care navigator.        Lasix prescription sent to pharmacy

## 2020-02-21 NOTE — DISCHARGE INSTR - OTHER ORDERS
Follow up with Dr Emperatriz Conway on Tuesday 2/21 at 9:00 (Nurse visit)  [facility], [address] [place] [address]  [phone]

## 2020-02-21 NOTE — PROGRESS NOTES
Nephrology Progress Note  2/21/2020 3:53 PM        Subjective:   Admit Date: 2/16/2020  PCP: Gwen Gentile MD     This is a late entry. Pt seen in  Early  am     Interval History:  Her hospital course briefly reviewed     Diet: some    ROS:  No overt sob- with Dobutrex     Data:     Current med's:    furosemide  40 mg Oral BID    insulin lispro  10 Units Subcutaneous TID WC    sodium chloride flush  10 mL Intravenous 2 times per day    enoxaparin  40 mg Subcutaneous Daily    ipratropium-albuterol  1 ampule Inhalation Q4H WA    aspirin  81 mg Oral Daily    atorvastatin  80 mg Oral Nightly    buPROPion  100 mg Oral BID    carvedilol  12.5 mg Oral BID WC    docusate sodium  100 mg Oral BID    DULoxetine  60 mg Oral Daily    insulin glargine  55 Units Subcutaneous Nightly    isosorbide mononitrate  60 mg Oral Daily    lactulose  10 g Oral BID    levothyroxine  50 mcg Oral Daily    lisinopril  2.5 mg Oral Daily    miconazole   Topical BID    pantoprazole  40 mg Oral QAM AC    ranolazine  500 mg Oral BID    ticagrelor  90 mg Oral BID    vitamin D  3,000 Units Oral Daily    polyethylene glycol  17 g Oral BID    insulin lispro  0-18 Units Subcutaneous TID WC    insulin lispro  0-9 Units Subcutaneous Nightly    nicotine  1 patch Transdermal Daily    LORazepam  1 mg Oral BID      dextrose           I/O last 3 completed shifts: In: 2044 [P.O.:1790;  I.V.:254]  Out: 2900 [Urine:2900]    CBC:   Recent Labs     02/21/20  0421   WBC 9.5   HGB 9.5*             Recent Labs     02/19/20  0405 02/20/20  0440 02/21/20  0421    139 139   K 4.7 5.2* 4.9   CL 99 97* 98*   CO2 27 30 27   BUN 57* 67* 74*   CREATININE 1.9* 2.2* 2.3*   GLUCOSE 192* 158* 224*       Lab Results   Component Value Date    CALCIUM 8.8 02/21/2020    PHOS 5.2 (H) 02/21/2020       Objective:     Vitals: BP (!) 106/54   Pulse 81   Temp 98 °F (36.7 °C) (Oral)   Resp 18   Ht 5' 4\" (1.626 m)   Wt 276 lb 3.8 oz (125.3 kg) SpO2 96%   BMI 47.42 kg/m²     General appearance:  Obese , no ac distress  HEENT:  + conj pallor  Neck:  supple  Lungs:  No gross crackles   Heart:  Seems RRR  Abdomen: soft  Extremities:  No overt LE edema       Problem List :         Impression :     1. JUAN J/ CKD stage G 3 B A3- acceptable sec to CRS type 1  2. ASCVD/ CMP / fluid overload / DM     Recommendation/Plan  :     1. She is with po loop   2. Low salt  3. Good diet  4. Daily wt  5. Good BS control  6. Follow clinically  7. Has risk for renal dz progression  8. Need close out pt f/U after d/C ( in 2 wk's )   9.  BMP 1 wk after d/C      Yarelis Bran MD

## 2020-02-22 ENCOUNTER — APPOINTMENT (OUTPATIENT)
Dept: GENERAL RADIOLOGY | Age: 60
DRG: 291 | End: 2020-02-22
Payer: MEDICARE

## 2020-02-22 ENCOUNTER — HOSPITAL ENCOUNTER (INPATIENT)
Age: 60
LOS: 4 days | Discharge: HOME HEALTH | DRG: 291 | End: 2020-02-26
Attending: FAMILY MEDICINE | Admitting: INTERNAL MEDICINE
Payer: MEDICARE

## 2020-02-22 DIAGNOSIS — J44.1 COPD WITH ACUTE EXACERBATION (HCC): ICD-10-CM

## 2020-02-22 DIAGNOSIS — R06.02 SHORTNESS OF BREATH: Primary | ICD-10-CM

## 2020-02-22 DIAGNOSIS — I48.92 ATRIAL FLUTTER, PAROXYSMAL (HCC): ICD-10-CM

## 2020-02-22 DIAGNOSIS — F41.0 ANXIETY ATTACK: ICD-10-CM

## 2020-02-22 DIAGNOSIS — N18.30 CHRONIC RENAL FAILURE, STAGE 3 (MODERATE) (HCC): ICD-10-CM

## 2020-02-22 PROBLEM — I50.23 ACUTE ON CHRONIC SYSTOLIC CHF (CONGESTIVE HEART FAILURE) (HCC): Status: ACTIVE | Noted: 2020-02-22

## 2020-02-22 LAB
ALBUMIN SERPL-MCNC: 4.1 GM/DL (ref 3.4–5)
ALP BLD-CCNC: 117 IU/L (ref 40–128)
ALT SERPL-CCNC: 14 U/L (ref 10–40)
ANION GAP SERPL CALCULATED.3IONS-SCNC: 13 MMOL/L (ref 4–16)
ANION GAP SERPL CALCULATED.3IONS-SCNC: 14 MMOL/L (ref 4–16)
APTT: 31.9 SECONDS (ref 25.1–37.1)
APTT: 33.8 SECONDS (ref 25.1–37.1)
APTT: 34.8 SECONDS (ref 25.1–37.1)
APTT: 36 SECONDS (ref 25.1–37.1)
AST SERPL-CCNC: 17 IU/L (ref 15–37)
BACTERIA: ABNORMAL /HPF
BASE EXCESS MIXED: ABNORMAL (ref 0–2.3)
BASOPHILS ABSOLUTE: 0.1 K/CU MM
BASOPHILS RELATIVE PERCENT: 0.4 % (ref 0–1)
BETA-HYDROXYBUTYRATE: 1.6 MG/DL (ref 0–3)
BILIRUB SERPL-MCNC: 0.3 MG/DL (ref 0–1)
BILIRUBIN URINE: NEGATIVE MG/DL
BLOOD, URINE: ABNORMAL
BUN BLDV-MCNC: 79 MG/DL (ref 6–23)
BUN BLDV-MCNC: 80 MG/DL (ref 6–23)
CALCIUM SERPL-MCNC: 8.7 MG/DL (ref 8.3–10.6)
CALCIUM SERPL-MCNC: 9.2 MG/DL (ref 8.3–10.6)
CHLORIDE BLD-SCNC: 93 MMOL/L (ref 99–110)
CHLORIDE BLD-SCNC: 97 MMOL/L (ref 99–110)
CLARITY: CLEAR
CO2: 26 MMOL/L (ref 21–32)
CO2: 27 MMOL/L (ref 21–32)
COLOR: YELLOW
COMMENT: ABNORMAL
CREAT SERPL-MCNC: 2.4 MG/DL (ref 0.6–1.1)
CREAT SERPL-MCNC: 2.5 MG/DL (ref 0.6–1.1)
CREATININE URINE: 55.3 MG/DL (ref 28–217)
CULTURE: NORMAL
CULTURE: NORMAL
DIFFERENTIAL TYPE: ABNORMAL
EKG ATRIAL RATE: 116 BPM
EKG ATRIAL RATE: 91 BPM
EKG DIAGNOSIS: NORMAL
EKG DIAGNOSIS: NORMAL
EKG P AXIS: 111 DEGREES
EKG P AXIS: 57 DEGREES
EKG P-R INTERVAL: 202 MS
EKG Q-T INTERVAL: 316 MS
EKG Q-T INTERVAL: 394 MS
EKG QRS DURATION: 100 MS
EKG QRS DURATION: 112 MS
EKG QTC CALCULATION (BAZETT): 439 MS
EKG QTC CALCULATION (BAZETT): 484 MS
EKG R AXIS: 38 DEGREES
EKG R AXIS: 58 DEGREES
EKG T AXIS: 12 DEGREES
EKG T AXIS: 24 DEGREES
EKG VENTRICULAR RATE: 116 BPM
EKG VENTRICULAR RATE: 91 BPM
EOSINOPHILS ABSOLUTE: 0.4 K/CU MM
EOSINOPHILS RELATIVE PERCENT: 2.5 % (ref 0–3)
ESTIMATED AVERAGE GLUCOSE: 200 MG/DL
GFR AFRICAN AMERICAN: 24 ML/MIN/1.73M2
GFR AFRICAN AMERICAN: 25 ML/MIN/1.73M2
GFR NON-AFRICAN AMERICAN: 20 ML/MIN/1.73M2
GFR NON-AFRICAN AMERICAN: 21 ML/MIN/1.73M2
GLUCOSE BLD-MCNC: 247 MG/DL (ref 70–99)
GLUCOSE BLD-MCNC: 273 MG/DL (ref 70–99)
GLUCOSE BLD-MCNC: 277 MG/DL (ref 70–99)
GLUCOSE BLD-MCNC: 290 MG/DL (ref 70–99)
GLUCOSE BLD-MCNC: 312 MG/DL (ref 70–99)
GLUCOSE BLD-MCNC: 422 MG/DL (ref 70–99)
GLUCOSE, URINE: NEGATIVE MG/DL
HBA1C MFR BLD: 8.6 % (ref 4.2–6.3)
HCO3 VENOUS: 29.4 MMOL/L (ref 19–25)
HCT VFR BLD CALC: 34.3 % (ref 37–47)
HCT VFR BLD CALC: 36.5 % (ref 37–47)
HEMOGLOBIN: 10.1 GM/DL (ref 12.5–16)
HEMOGLOBIN: 11.1 GM/DL (ref 12.5–16)
HYALINE CASTS: 0 /LPF
IMMATURE NEUTROPHIL %: 1.4 % (ref 0–0.43)
KETONES, URINE: NEGATIVE MG/DL
LACTATE: 0.5 MMOL/L (ref 0.4–2)
LEUKOCYTE ESTERASE, URINE: NEGATIVE
LYMPHOCYTES ABSOLUTE: 1.9 K/CU MM
LYMPHOCYTES RELATIVE PERCENT: 13.4 % (ref 24–44)
Lab: NORMAL
Lab: NORMAL
MAGNESIUM: 2.2 MG/DL (ref 1.8–2.4)
MCH RBC QN AUTO: 25.7 PG (ref 27–31)
MCH RBC QN AUTO: 26 PG (ref 27–31)
MCHC RBC AUTO-ENTMCNC: 29.4 % (ref 32–36)
MCHC RBC AUTO-ENTMCNC: 30.4 % (ref 32–36)
MCV RBC AUTO: 85.5 FL (ref 78–100)
MCV RBC AUTO: 87.3 FL (ref 78–100)
MONOCYTES ABSOLUTE: 0.8 K/CU MM
MONOCYTES RELATIVE PERCENT: 5.5 % (ref 0–4)
NITRITE URINE, QUANTITATIVE: NEGATIVE
NUCLEATED RBC %: 0 %
O2 SAT, VEN: 86.2 % (ref 50–70)
PCO2, VEN: 57 MMHG (ref 38–52)
PDW BLD-RTO: 15.9 % (ref 11.7–14.9)
PDW BLD-RTO: 16.1 % (ref 11.7–14.9)
PH VENOUS: 7.32 (ref 7.32–7.42)
PH, URINE: 5 (ref 5–8)
PLATELET # BLD: 270 K/CU MM (ref 140–440)
PLATELET # BLD: 334 K/CU MM (ref 140–440)
PMV BLD AUTO: 10.3 FL (ref 7.5–11.1)
PMV BLD AUTO: 10.5 FL (ref 7.5–11.1)
PO2, VEN: 53 MMHG (ref 28–48)
POTASSIUM SERPL-SCNC: ABNORMAL MMOL/L (ref 3.5–5.1)
POTASSIUM SERPL-SCNC: ABNORMAL MMOL/L (ref 3.5–5.1)
POTASSIUM, UR: 38.7 MMOL/L (ref 22–119)
PRO-BNP: 4240 PG/ML
PRO-BNP: 4257 PG/ML
PROT/CREAT RATIO, UR: ABNORMAL
PROTEIN UA: 30 MG/DL
RBC # BLD: 3.93 M/CU MM (ref 4.2–5.4)
RBC # BLD: 4.27 M/CU MM (ref 4.2–5.4)
RBC URINE: 1 /HPF (ref 0–6)
SEGMENTED NEUTROPHILS ABSOLUTE COUNT: 10.6 K/CU MM
SEGMENTED NEUTROPHILS RELATIVE PERCENT: 76.8 % (ref 36–66)
SODIUM BLD-SCNC: 133 MMOL/L (ref 135–145)
SODIUM BLD-SCNC: 137 MMOL/L (ref 135–145)
SODIUM URINE: 59 MMOL/L (ref 35–167)
SPECIFIC GRAVITY UA: 1.01 (ref 1–1.03)
SPECIMEN: NORMAL
SPECIMEN: NORMAL
SQUAMOUS EPITHELIAL: <1 /HPF
TOTAL IMMATURE NEUTOROPHIL: 0.19 K/CU MM
TOTAL NUCLEATED RBC: 0 K/CU MM
TOTAL PROTEIN: 7.3 GM/DL (ref 6.4–8.2)
TRICHOMONAS: ABNORMAL /HPF
TROPONIN T: 0.07 NG/ML
TROPONIN T: 0.08 NG/ML
TROPONIN T: 0.08 NG/ML
TSH HIGH SENSITIVITY: 2.08 UIU/ML (ref 0.27–4.2)
URINE TOTAL PROTEIN: 37.1 MG/DL
UROBILINOGEN, URINE: NORMAL MG/DL (ref 0.2–1)
WBC # BLD: 13.8 K/CU MM (ref 4–10.5)
WBC # BLD: 16.4 K/CU MM (ref 4–10.5)
WBC UA: 2 /HPF (ref 0–5)

## 2020-02-22 PROCEDURE — 85730 THROMBOPLASTIN TIME PARTIAL: CPT

## 2020-02-22 PROCEDURE — 6360000002 HC RX W HCPCS: Performed by: FAMILY MEDICINE

## 2020-02-22 PROCEDURE — 51798 US URINE CAPACITY MEASURE: CPT

## 2020-02-22 PROCEDURE — 6370000000 HC RX 637 (ALT 250 FOR IP): Performed by: INTERNAL MEDICINE

## 2020-02-22 PROCEDURE — 82805 BLOOD GASES W/O2 SATURATION: CPT

## 2020-02-22 PROCEDURE — 96372 THER/PROPH/DIAG INJ SC/IM: CPT

## 2020-02-22 PROCEDURE — 93010 ELECTROCARDIOGRAM REPORT: CPT | Performed by: INTERNAL MEDICINE

## 2020-02-22 PROCEDURE — 96361 HYDRATE IV INFUSION ADD-ON: CPT

## 2020-02-22 PROCEDURE — 96376 TX/PRO/DX INJ SAME DRUG ADON: CPT

## 2020-02-22 PROCEDURE — 82010 KETONE BODYS QUAN: CPT

## 2020-02-22 PROCEDURE — 2580000003 HC RX 258: Performed by: FAMILY MEDICINE

## 2020-02-22 PROCEDURE — 80048 BASIC METABOLIC PNL TOTAL CA: CPT

## 2020-02-22 PROCEDURE — 71045 X-RAY EXAM CHEST 1 VIEW: CPT

## 2020-02-22 PROCEDURE — 2500000003 HC RX 250 WO HCPCS: Performed by: FAMILY MEDICINE

## 2020-02-22 PROCEDURE — 96375 TX/PRO/DX INJ NEW DRUG ADDON: CPT

## 2020-02-22 PROCEDURE — 83605 ASSAY OF LACTIC ACID: CPT

## 2020-02-22 PROCEDURE — 84156 ASSAY OF PROTEIN URINE: CPT

## 2020-02-22 PROCEDURE — 2580000003 HC RX 258: Performed by: INTERNAL MEDICINE

## 2020-02-22 PROCEDURE — 82570 ASSAY OF URINE CREATININE: CPT

## 2020-02-22 PROCEDURE — 83036 HEMOGLOBIN GLYCOSYLATED A1C: CPT

## 2020-02-22 PROCEDURE — 84300 ASSAY OF URINE SODIUM: CPT

## 2020-02-22 PROCEDURE — 6360000002 HC RX W HCPCS: Performed by: INTERNAL MEDICINE

## 2020-02-22 PROCEDURE — 2700000000 HC OXYGEN THERAPY PER DAY

## 2020-02-22 PROCEDURE — 82962 GLUCOSE BLOOD TEST: CPT

## 2020-02-22 PROCEDURE — 84133 ASSAY OF URINE POTASSIUM: CPT

## 2020-02-22 PROCEDURE — 93005 ELECTROCARDIOGRAM TRACING: CPT | Performed by: FAMILY MEDICINE

## 2020-02-22 PROCEDURE — 99285 EMERGENCY DEPT VISIT HI MDM: CPT

## 2020-02-22 PROCEDURE — 2140000000 HC CCU INTERMEDIATE R&B

## 2020-02-22 PROCEDURE — 2500000003 HC RX 250 WO HCPCS: Performed by: INTERNAL MEDICINE

## 2020-02-22 PROCEDURE — 94640 AIRWAY INHALATION TREATMENT: CPT

## 2020-02-22 PROCEDURE — 80053 COMPREHEN METABOLIC PANEL: CPT

## 2020-02-22 PROCEDURE — 81001 URINALYSIS AUTO W/SCOPE: CPT

## 2020-02-22 PROCEDURE — 36415 COLL VENOUS BLD VENIPUNCTURE: CPT

## 2020-02-22 PROCEDURE — 84443 ASSAY THYROID STIM HORMONE: CPT

## 2020-02-22 PROCEDURE — 94761 N-INVAS EAR/PLS OXIMETRY MLT: CPT

## 2020-02-22 PROCEDURE — 84484 ASSAY OF TROPONIN QUANT: CPT

## 2020-02-22 PROCEDURE — 85027 COMPLETE CBC AUTOMATED: CPT

## 2020-02-22 PROCEDURE — 83880 ASSAY OF NATRIURETIC PEPTIDE: CPT

## 2020-02-22 PROCEDURE — 83735 ASSAY OF MAGNESIUM: CPT

## 2020-02-22 PROCEDURE — 85025 COMPLETE CBC W/AUTO DIFF WBC: CPT

## 2020-02-22 PROCEDURE — 6370000000 HC RX 637 (ALT 250 FOR IP): Performed by: FAMILY MEDICINE

## 2020-02-22 PROCEDURE — 96374 THER/PROPH/DIAG INJ IV PUSH: CPT

## 2020-02-22 RX ORDER — DEXTROSE MONOHYDRATE 25 G/50ML
12.5 INJECTION, SOLUTION INTRAVENOUS PRN
Status: DISCONTINUED | OUTPATIENT
Start: 2020-02-22 | End: 2020-02-26 | Stop reason: HOSPADM

## 2020-02-22 RX ORDER — BUMETANIDE 0.25 MG/ML
0.5 INJECTION, SOLUTION INTRAMUSCULAR; INTRAVENOUS 2 TIMES DAILY
Status: DISCONTINUED | OUTPATIENT
Start: 2020-02-22 | End: 2020-02-25

## 2020-02-22 RX ORDER — HEPARIN SODIUM 1000 [USP'U]/ML
4000 INJECTION, SOLUTION INTRAVENOUS; SUBCUTANEOUS PRN
Status: DISCONTINUED | OUTPATIENT
Start: 2020-02-22 | End: 2020-02-22

## 2020-02-22 RX ORDER — IPRATROPIUM BROMIDE AND ALBUTEROL SULFATE 2.5; .5 MG/3ML; MG/3ML
1 SOLUTION RESPIRATORY (INHALATION)
Status: DISCONTINUED | OUTPATIENT
Start: 2020-02-22 | End: 2020-02-26 | Stop reason: HOSPADM

## 2020-02-22 RX ORDER — ASPIRIN 81 MG/1
324 TABLET, CHEWABLE ORAL ONCE
Status: COMPLETED | OUTPATIENT
Start: 2020-02-22 | End: 2020-02-22

## 2020-02-22 RX ORDER — 0.9 % SODIUM CHLORIDE 0.9 %
1000 INTRAVENOUS SOLUTION INTRAVENOUS ONCE
Status: COMPLETED | OUTPATIENT
Start: 2020-02-22 | End: 2020-02-22

## 2020-02-22 RX ORDER — ACETAMINOPHEN 325 MG/1
650 TABLET ORAL EVERY 6 HOURS PRN
Status: DISCONTINUED | OUTPATIENT
Start: 2020-02-22 | End: 2020-02-26 | Stop reason: HOSPADM

## 2020-02-22 RX ORDER — NICOTINE POLACRILEX 4 MG
15 LOZENGE BUCCAL PRN
Status: DISCONTINUED | OUTPATIENT
Start: 2020-02-22 | End: 2020-02-26 | Stop reason: HOSPADM

## 2020-02-22 RX ORDER — ISOSORBIDE MONONITRATE 30 MG/1
60 TABLET, EXTENDED RELEASE ORAL DAILY
Status: DISCONTINUED | OUTPATIENT
Start: 2020-02-22 | End: 2020-02-26 | Stop reason: HOSPADM

## 2020-02-22 RX ORDER — METHYLPREDNISOLONE SODIUM SUCCINATE 40 MG/ML
40 INJECTION, POWDER, LYOPHILIZED, FOR SOLUTION INTRAMUSCULAR; INTRAVENOUS EVERY 12 HOURS
Status: DISCONTINUED | OUTPATIENT
Start: 2020-02-22 | End: 2020-02-24

## 2020-02-22 RX ORDER — POLYETHYLENE GLYCOL 3350 17 G/17G
17 POWDER, FOR SOLUTION ORAL 2 TIMES DAILY
Status: DISCONTINUED | OUTPATIENT
Start: 2020-02-22 | End: 2020-02-26 | Stop reason: HOSPADM

## 2020-02-22 RX ORDER — HEPARIN SODIUM 10000 [USP'U]/100ML
8 INJECTION, SOLUTION INTRAVENOUS CONTINUOUS
Status: DISCONTINUED | OUTPATIENT
Start: 2020-02-22 | End: 2020-02-22

## 2020-02-22 RX ORDER — ALBUTEROL SULFATE 2.5 MG/3ML
7.5 SOLUTION RESPIRATORY (INHALATION) ONCE
Status: COMPLETED | OUTPATIENT
Start: 2020-02-22 | End: 2020-02-22

## 2020-02-22 RX ORDER — DULOXETIN HYDROCHLORIDE 30 MG/1
60 CAPSULE, DELAYED RELEASE ORAL DAILY
Status: DISCONTINUED | OUTPATIENT
Start: 2020-02-22 | End: 2020-02-26 | Stop reason: HOSPADM

## 2020-02-22 RX ORDER — SODIUM CHLORIDE 0.9 % (FLUSH) 0.9 %
10 SYRINGE (ML) INJECTION PRN
Status: DISCONTINUED | OUTPATIENT
Start: 2020-02-22 | End: 2020-02-26 | Stop reason: HOSPADM

## 2020-02-22 RX ORDER — ASPIRIN 81 MG/1
81 TABLET, CHEWABLE ORAL DAILY
Status: DISCONTINUED | OUTPATIENT
Start: 2020-02-22 | End: 2020-02-26 | Stop reason: HOSPADM

## 2020-02-22 RX ORDER — HEPARIN SODIUM 1000 [USP'U]/ML
60 INJECTION, SOLUTION INTRAVENOUS; SUBCUTANEOUS ONCE
Status: DISCONTINUED | OUTPATIENT
Start: 2020-02-22 | End: 2020-02-22

## 2020-02-22 RX ORDER — FUROSEMIDE 10 MG/ML
40 INJECTION INTRAMUSCULAR; INTRAVENOUS ONCE
Status: COMPLETED | OUTPATIENT
Start: 2020-02-22 | End: 2020-02-22

## 2020-02-22 RX ORDER — ACETAMINOPHEN 650 MG/1
650 SUPPOSITORY RECTAL EVERY 6 HOURS PRN
Status: DISCONTINUED | OUTPATIENT
Start: 2020-02-22 | End: 2020-02-26 | Stop reason: HOSPADM

## 2020-02-22 RX ORDER — HYDROCODONE BITARTRATE AND ACETAMINOPHEN 5; 325 MG/1; MG/1
2 TABLET ORAL EVERY 4 HOURS PRN
Status: DISCONTINUED | OUTPATIENT
Start: 2020-02-22 | End: 2020-02-26 | Stop reason: HOSPADM

## 2020-02-22 RX ORDER — HEPARIN SODIUM 1000 [USP'U]/ML
2000 INJECTION, SOLUTION INTRAVENOUS; SUBCUTANEOUS PRN
Status: DISCONTINUED | OUTPATIENT
Start: 2020-02-22 | End: 2020-02-22

## 2020-02-22 RX ORDER — ALBUTEROL SULFATE 90 UG/1
2 AEROSOL, METERED RESPIRATORY (INHALATION) EVERY 4 HOURS PRN
Status: DISCONTINUED | OUTPATIENT
Start: 2020-02-22 | End: 2020-02-26 | Stop reason: HOSPADM

## 2020-02-22 RX ORDER — SODIUM POLYSTYRENE SULFONATE 15 G/60ML
15 SUSPENSION ORAL; RECTAL ONCE
Status: COMPLETED | OUTPATIENT
Start: 2020-02-22 | End: 2020-02-22

## 2020-02-22 RX ORDER — ALPRAZOLAM 0.25 MG/1
1 TABLET ORAL ONCE
Status: COMPLETED | OUTPATIENT
Start: 2020-02-22 | End: 2020-02-22

## 2020-02-22 RX ORDER — RANOLAZINE 500 MG/1
500 TABLET, EXTENDED RELEASE ORAL 2 TIMES DAILY
Status: DISCONTINUED | OUTPATIENT
Start: 2020-02-22 | End: 2020-02-26 | Stop reason: HOSPADM

## 2020-02-22 RX ORDER — HEPARIN SODIUM 5000 [USP'U]/ML
5000 INJECTION, SOLUTION INTRAVENOUS; SUBCUTANEOUS EVERY 8 HOURS SCHEDULED
Status: DISCONTINUED | OUTPATIENT
Start: 2020-02-22 | End: 2020-02-26 | Stop reason: HOSPADM

## 2020-02-22 RX ORDER — CARVEDILOL 12.5 MG/1
12.5 TABLET ORAL 2 TIMES DAILY WITH MEALS
Status: DISCONTINUED | OUTPATIENT
Start: 2020-02-22 | End: 2020-02-26 | Stop reason: HOSPADM

## 2020-02-22 RX ORDER — BUPROPION HYDROCHLORIDE 100 MG/1
100 TABLET, EXTENDED RELEASE ORAL 2 TIMES DAILY
Status: DISCONTINUED | OUTPATIENT
Start: 2020-02-22 | End: 2020-02-26 | Stop reason: HOSPADM

## 2020-02-22 RX ORDER — DEXTROSE MONOHYDRATE 50 MG/ML
100 INJECTION, SOLUTION INTRAVENOUS PRN
Status: DISCONTINUED | OUTPATIENT
Start: 2020-02-22 | End: 2020-02-26 | Stop reason: HOSPADM

## 2020-02-22 RX ORDER — ATORVASTATIN CALCIUM 40 MG/1
80 TABLET, FILM COATED ORAL NIGHTLY
Status: DISCONTINUED | OUTPATIENT
Start: 2020-02-22 | End: 2020-02-26 | Stop reason: HOSPADM

## 2020-02-22 RX ORDER — LEVOTHYROXINE SODIUM 0.05 MG/1
50 TABLET ORAL DAILY
Status: DISCONTINUED | OUTPATIENT
Start: 2020-02-22 | End: 2020-02-26 | Stop reason: HOSPADM

## 2020-02-22 RX ORDER — INSULIN GLARGINE 100 [IU]/ML
55 INJECTION, SOLUTION SUBCUTANEOUS NIGHTLY
Status: DISCONTINUED | OUTPATIENT
Start: 2020-02-22 | End: 2020-02-23

## 2020-02-22 RX ORDER — PANTOPRAZOLE SODIUM 40 MG/1
40 TABLET, DELAYED RELEASE ORAL
Status: DISCONTINUED | OUTPATIENT
Start: 2020-02-22 | End: 2020-02-26 | Stop reason: HOSPADM

## 2020-02-22 RX ORDER — METOPROLOL TARTRATE 5 MG/5ML
5 INJECTION INTRAVENOUS ONCE
Status: COMPLETED | OUTPATIENT
Start: 2020-02-22 | End: 2020-02-22

## 2020-02-22 RX ORDER — ONDANSETRON 2 MG/ML
4 INJECTION INTRAMUSCULAR; INTRAVENOUS EVERY 6 HOURS PRN
Status: DISCONTINUED | OUTPATIENT
Start: 2020-02-22 | End: 2020-02-26 | Stop reason: HOSPADM

## 2020-02-22 RX ORDER — SODIUM CHLORIDE 0.9 % (FLUSH) 0.9 %
10 SYRINGE (ML) INJECTION EVERY 12 HOURS SCHEDULED
Status: DISCONTINUED | OUTPATIENT
Start: 2020-02-22 | End: 2020-02-26 | Stop reason: HOSPADM

## 2020-02-22 RX ORDER — HEPARIN SODIUM 1000 [USP'U]/ML
30 INJECTION, SOLUTION INTRAVENOUS; SUBCUTANEOUS PRN
Status: DISCONTINUED | OUTPATIENT
Start: 2020-02-22 | End: 2020-02-22 | Stop reason: SDUPTHER

## 2020-02-22 RX ORDER — HEPARIN SODIUM 10000 [USP'U]/100ML
12 INJECTION, SOLUTION INTRAVENOUS CONTINUOUS
Status: DISCONTINUED | OUTPATIENT
Start: 2020-02-22 | End: 2020-02-22 | Stop reason: SDUPTHER

## 2020-02-22 RX ORDER — LACTULOSE 10 G/15ML
10 SOLUTION ORAL 2 TIMES DAILY
Status: DISCONTINUED | OUTPATIENT
Start: 2020-02-22 | End: 2020-02-26 | Stop reason: HOSPADM

## 2020-02-22 RX ORDER — POLYETHYLENE GLYCOL 3350 17 G/17G
17 POWDER, FOR SOLUTION ORAL DAILY PRN
Status: DISCONTINUED | OUTPATIENT
Start: 2020-02-22 | End: 2020-02-26 | Stop reason: HOSPADM

## 2020-02-22 RX ORDER — FUROSEMIDE 10 MG/ML
40 INJECTION INTRAMUSCULAR; INTRAVENOUS 2 TIMES DAILY
Status: DISCONTINUED | OUTPATIENT
Start: 2020-02-22 | End: 2020-02-22

## 2020-02-22 RX ORDER — HEPARIN SODIUM 1000 [USP'U]/ML
60 INJECTION, SOLUTION INTRAVENOUS; SUBCUTANEOUS PRN
Status: DISCONTINUED | OUTPATIENT
Start: 2020-02-22 | End: 2020-02-22 | Stop reason: SDUPTHER

## 2020-02-22 RX ORDER — NITROGLYCERIN 0.4 MG/1
0.4 TABLET SUBLINGUAL EVERY 5 MIN PRN
Status: DISCONTINUED | OUTPATIENT
Start: 2020-02-22 | End: 2020-02-26 | Stop reason: HOSPADM

## 2020-02-22 RX ORDER — PROMETHAZINE HYDROCHLORIDE 25 MG/1
12.5 TABLET ORAL EVERY 6 HOURS PRN
Status: DISCONTINUED | OUTPATIENT
Start: 2020-02-22 | End: 2020-02-26 | Stop reason: HOSPADM

## 2020-02-22 RX ADMIN — PANTOPRAZOLE SODIUM 40 MG: 40 TABLET, DELAYED RELEASE ORAL at 09:35

## 2020-02-22 RX ADMIN — IPRATROPIUM BROMIDE AND ALBUTEROL SULFATE 1 AMPULE: .5; 3 SOLUTION RESPIRATORY (INHALATION) at 16:10

## 2020-02-22 RX ADMIN — SODIUM CHLORIDE, PRESERVATIVE FREE 10 ML: 5 INJECTION INTRAVENOUS at 21:14

## 2020-02-22 RX ADMIN — TICAGRELOR 90 MG: 90 TABLET ORAL at 21:12

## 2020-02-22 RX ADMIN — RANOLAZINE 500 MG: 500 TABLET, FILM COATED, EXTENDED RELEASE ORAL at 09:36

## 2020-02-22 RX ADMIN — BUPROPION HYDROCHLORIDE 100 MG: 100 TABLET, FILM COATED, EXTENDED RELEASE ORAL at 09:36

## 2020-02-22 RX ADMIN — Medication 50 MEQ: at 04:51

## 2020-02-22 RX ADMIN — METHYLPREDNISOLONE SODIUM SUCCINATE 40 MG: 40 INJECTION, POWDER, FOR SOLUTION INTRAMUSCULAR; INTRAVENOUS at 09:06

## 2020-02-22 RX ADMIN — DULOXETINE HYDROCHLORIDE 60 MG: 30 CAPSULE, DELAYED RELEASE ORAL at 09:36

## 2020-02-22 RX ADMIN — ALPRAZOLAM 1 MG: 0.25 TABLET ORAL at 03:05

## 2020-02-22 RX ADMIN — RANOLAZINE 500 MG: 500 TABLET, FILM COATED, EXTENDED RELEASE ORAL at 21:12

## 2020-02-22 RX ADMIN — BUMETANIDE 0.5 MG: 0.25 INJECTION INTRAMUSCULAR; INTRAVENOUS at 21:15

## 2020-02-22 RX ADMIN — CARVEDILOL 12.5 MG: 12.5 TABLET, FILM COATED ORAL at 09:36

## 2020-02-22 RX ADMIN — METHYLPREDNISOLONE SODIUM SUCCINATE 40 MG: 40 INJECTION, POWDER, FOR SOLUTION INTRAMUSCULAR; INTRAVENOUS at 21:13

## 2020-02-22 RX ADMIN — POLYETHYLENE GLYCOL (3350) 17 G: 17 POWDER, FOR SOLUTION ORAL at 09:35

## 2020-02-22 RX ADMIN — LEVOTHYROXINE SODIUM 50 MCG: 0.05 TABLET ORAL at 09:36

## 2020-02-22 RX ADMIN — HEPARIN SODIUM 5000 UNITS: 5000 INJECTION, SOLUTION INTRAVENOUS; SUBCUTANEOUS at 21:33

## 2020-02-22 RX ADMIN — SODIUM CHLORIDE, PRESERVATIVE FREE 10 ML: 5 INJECTION INTRAVENOUS at 09:38

## 2020-02-22 RX ADMIN — SODIUM ZIRCONIUM CYCLOSILICATE 10 G: 10 POWDER, FOR SUSPENSION ORAL at 21:14

## 2020-02-22 RX ADMIN — IPRATROPIUM BROMIDE AND ALBUTEROL SULFATE 1 AMPULE: .5; 3 SOLUTION RESPIRATORY (INHALATION) at 09:05

## 2020-02-22 RX ADMIN — NITROGLYCERIN 0.4 MG: 0.4 TABLET SUBLINGUAL at 03:05

## 2020-02-22 RX ADMIN — ISOSORBIDE MONONITRATE 60 MG: 30 TABLET, EXTENDED RELEASE ORAL at 09:35

## 2020-02-22 RX ADMIN — SODIUM POLYSTYRENE SULFONATE 15 G: 15 SUSPENSION ORAL; RECTAL at 04:51

## 2020-02-22 RX ADMIN — FUROSEMIDE 40 MG: 10 INJECTION, SOLUTION INTRAVENOUS at 04:51

## 2020-02-22 RX ADMIN — SODIUM CHLORIDE, PRESERVATIVE FREE 10 ML: 5 INJECTION INTRAVENOUS at 09:40

## 2020-02-22 RX ADMIN — INSULIN LISPRO 12 UNITS: 100 INJECTION, SOLUTION INTRAVENOUS; SUBCUTANEOUS at 12:02

## 2020-02-22 RX ADMIN — ALBUTEROL SULFATE 7.5 MG: 2.5 SOLUTION RESPIRATORY (INHALATION) at 03:50

## 2020-02-22 RX ADMIN — CARVEDILOL 12.5 MG: 12.5 TABLET, FILM COATED ORAL at 17:04

## 2020-02-22 RX ADMIN — IPRATROPIUM BROMIDE AND ALBUTEROL SULFATE 1 AMPULE: .5; 3 SOLUTION RESPIRATORY (INHALATION) at 21:41

## 2020-02-22 RX ADMIN — HYDROCODONE BITARTRATE AND ACETAMINOPHEN 2 TABLET: 5; 325 TABLET ORAL at 21:22

## 2020-02-22 RX ADMIN — SODIUM ZIRCONIUM CYCLOSILICATE 10 G: 10 POWDER, FOR SUSPENSION ORAL at 09:52

## 2020-02-22 RX ADMIN — TICAGRELOR 90 MG: 90 TABLET ORAL at 09:36

## 2020-02-22 RX ADMIN — BUMETANIDE 0.5 MG: 0.25 INJECTION INTRAMUSCULAR; INTRAVENOUS at 09:03

## 2020-02-22 RX ADMIN — SODIUM CHLORIDE 1000 ML: 9 INJECTION, SOLUTION INTRAVENOUS at 03:05

## 2020-02-22 RX ADMIN — INSULIN GLARGINE 55 UNITS: 100 INJECTION, SOLUTION SUBCUTANEOUS at 21:33

## 2020-02-22 RX ADMIN — INSULIN LISPRO 12 UNITS: 100 INJECTION, SOLUTION INTRAVENOUS; SUBCUTANEOUS at 17:10

## 2020-02-22 RX ADMIN — POLYETHYLENE GLYCOL (3350) 17 G: 17 POWDER, FOR SOLUTION ORAL at 21:12

## 2020-02-22 RX ADMIN — HEPARIN SODIUM 5000 UNITS: 5000 INJECTION, SOLUTION INTRAVENOUS; SUBCUTANEOUS at 14:39

## 2020-02-22 RX ADMIN — ASPIRIN 81 MG 81 MG: 81 TABLET ORAL at 09:36

## 2020-02-22 RX ADMIN — ATORVASTATIN CALCIUM 80 MG: 40 TABLET, FILM COATED ORAL at 21:13

## 2020-02-22 RX ADMIN — HYDROCODONE BITARTRATE AND ACETAMINOPHEN 2 TABLET: 5; 325 TABLET ORAL at 14:40

## 2020-02-22 RX ADMIN — ASPIRIN 81 MG 324 MG: 81 TABLET ORAL at 03:05

## 2020-02-22 RX ADMIN — INSULIN LISPRO 12 UNITS: 100 INJECTION, SOLUTION INTRAVENOUS; SUBCUTANEOUS at 08:51

## 2020-02-22 RX ADMIN — BUPROPION HYDROCHLORIDE 100 MG: 100 TABLET, FILM COATED, EXTENDED RELEASE ORAL at 21:13

## 2020-02-22 RX ADMIN — SODIUM ZIRCONIUM CYCLOSILICATE 10 G: 10 POWDER, FOR SUSPENSION ORAL at 14:15

## 2020-02-22 RX ADMIN — METOPROLOL TARTRATE 5 MG: 1 INJECTION, SOLUTION INTRAVENOUS at 03:19

## 2020-02-22 ASSESSMENT — PAIN DESCRIPTION - PROGRESSION: CLINICAL_PROGRESSION: GRADUALLY IMPROVING

## 2020-02-22 ASSESSMENT — PAIN SCALES - GENERAL
PAINLEVEL_OUTOF10: 0
PAINLEVEL_OUTOF10: 6
PAINLEVEL_OUTOF10: 0
PAINLEVEL_OUTOF10: 8
PAINLEVEL_OUTOF10: 9
PAINLEVEL_OUTOF10: 0
PAINLEVEL_OUTOF10: 0
PAINLEVEL_OUTOF10: 8

## 2020-02-22 ASSESSMENT — PAIN DESCRIPTION - DESCRIPTORS
DESCRIPTORS: ACHING;CONSTANT;DISCOMFORT

## 2020-02-22 ASSESSMENT — PAIN DESCRIPTION - LOCATION
LOCATION: BACK
LOCATION: BACK

## 2020-02-22 ASSESSMENT — PAIN DESCRIPTION - PAIN TYPE
TYPE: CHRONIC PAIN

## 2020-02-22 ASSESSMENT — PAIN DESCRIPTION - ORIENTATION: ORIENTATION: LOWER

## 2020-02-22 ASSESSMENT — PAIN DESCRIPTION - ONSET: ONSET: ON-GOING

## 2020-02-22 ASSESSMENT — PAIN DESCRIPTION - FREQUENCY
FREQUENCY: CONTINUOUS
FREQUENCY: CONTINUOUS

## 2020-02-22 NOTE — ED NOTES
Paged Dr Luciana Das @ [phone] through perfect serve for Dr Dion Das returned call [phone] -- transferred call to Dr Juanita Miller  02/22/20 0816

## 2020-02-22 NOTE — ED NOTES
Dr Marli Alejandro made aware of current pressure prior to administration of [place]. States being okay to give. Pt continues to moan.       Anil Mast, JUNI  02/22/20 8306

## 2020-02-22 NOTE — PROGRESS NOTES
Dr. Jessica Hutchins was consulted on patient and this a Dr. Samantha Zelaya patient. Dr. Estevan Kirk is on call for Dr. Samantha Zelaya and perfect serve was sent to notify Dr. Estevan Kirk of consult.

## 2020-02-22 NOTE — PROGRESS NOTES
Hospitalist at bedside and states that he has ordered ABGs and feels that pt may just be sleepy. Pt did tell pt her name.

## 2020-02-22 NOTE — ED TRIAGE NOTES
Pt reports to ED for complaints of anxiety and SOB. Pt was recently released from the hospital and has stated it makes her anxious to be released. Pt is on 2L NC baseline and is on 2L NC at this time at 97%. Pt A&Ox4.

## 2020-02-22 NOTE — CARE COORDINATION
Attempted to see patient twice this morning and nursing students in room with patient providing care. CM will re-attempt. From chart review and conversation with Tanika Haynes RN - from home with SO and [address] and returned hours after discharging home. Tanika Haynes RN states she will obtain PT/OT orders from physician once appropriate. CM following. 3:00 PM  Chart reviewed, in to see pt for dc planning. Introduced self and board updated. Pt was admitted for CHF exacerbation. States she lives at home with her long term friend and SO/P[place] and is considering SNF. CM provided in network SNF list and medicare. gov rating sheet for her to review and will follow up with patient tomorrow for a few choices for SNF per patient request. CM following.

## 2020-02-22 NOTE — ED PROVIDER NOTES
Triage Chief Complaint:   Anxiety and Shortness of Breath    [place]:  Trice Hernandez is a 61 y.o. female that presents with complaint of anxiety and shortness of breath and diaphoresis. This started while she was working on her computer this evening. She denies chest pain on repeated multiple questioning. She states that she had been well. She was just in the hospital for CHF. She denies medication noncompliance. She has had recent PCI in January.     ROS:  Patient initially moaning and not willing to participate in review of systems secondary to shortness of breath and anxiety    Past Medical History:   Diagnosis Date    Acute on chronic systolic CHF (congestive heart failure) ([facility] 2020    CAD (coronary artery disease)     CKD (chronic kidney disease) stage 3, GFR 30-59 ml/min ([facility])     Diabetes type 2, uncontrolled ([facility]     Diastolic heart failure ([facility]     Essential hypertension     Financial problems 3/22/2013    Generalized anxiety disorder 2018    Herpes genitalia     Obesity, morbid (more than 100 lbs over ideal weight or BMI > 40) ([address] 2013    STEMI (ST elevation myocardial infarction) ([facility] [address] [facility]      Past Surgical History:   Procedure Laterality Date    CARDIAC SURGERY       SECTION      CHOLECYSTECTOMY      CORONARY ANGIOPLASTY WITH STENT PLACEMENT      OTHER SURGICAL HISTORY Right [id number]    I and D rt big toe    TONSILLECTOMY       Family History   Problem Relation Age of Onset    Arthritis Mother     Diabetes Mother     Heart Disease Mother     High Blood Pressure Mother     Arthritis Father     Heart Disease Father     High Blood Pressure Father     Stroke Father     Substance Abuse Father     Substance Abuse Brother     Diabetes Maternal Aunt     Diabetes Maternal Uncle     Cancer Maternal Grandmother     Diabetes Maternal Grandmother     Diabetes Maternal Grandfather      Social History     Socioeconomic History    Marital status: Single Spouse name: Not on file    Number of children: Not on file    Years of education: Not on file    Highest education level: Not on file   Occupational History    Not on file   Social Needs    Financial resource strain: Not on file    Food insecurity:     Worry: Not on file     Inability: Not on file    Transportation needs:     Medical: Not on file     Non-medical: Not on file   Tobacco Use    Smoking status: Current Every Day Smoker     Packs/day: 0.20     Years: 34.00     Pack years: 6.80     Types: Cigarettes    Smokeless tobacco: Never Used   Substance and Sexual Activity    Alcohol use: No     Alcohol/week: 0.0 standard drinks    Drug use: No    Sexual activity: Not on file   Lifestyle    Physical activity:     Days per week: Not on file     Minutes per session: Not on file    Stress: Not on file   Relationships    Social connections:     Talks on phone: Not on file     Gets together: Not on file     Attends Muslim service: Not on file     Active member of club or organization: Not on file     Attends meetings of clubs or organizations: Not on file     Relationship status: Not on file    Intimate partner violence:     Fear of current or ex partner: Not on file     Emotionally abused: Not on file     Physically abused: Not on file     Forced sexual activity: Not on file   Other Topics Concern    Not on file   Social History Narrative    Not on file     Current Facility-Administered Medications   Medication Dose Route Frequency Provider Last Rate Last Dose    nitroGLYCERIN (NITROSTAT) SL tablet 0.4 mg  0.4 mg Sublingual Q5 Min PRN Thee Zaman MD   0.4 mg at 02/22/20 0305    albuterol sulfate  (90 Base) MCG/ACT inhaler 2 puff  2 puff Inhalation Q4H PRN Bruce Portillo MD        aspirin chewable tablet 81 mg  81 mg Oral Daily Bruce Portillo MD        atorvastatin (LIPITOR) tablet 80 mg  80 mg Oral Nightly Bruce Portillo MD        buPROPion [facility]) extended release tablet 100 mg  100 mg Oral BID Cayden Mendosa MD        carvedilol (COREG) tablet 12.5 mg  12.5 mg Oral BID WC Cayden Mendosa MD        DULoxetine (CYMBALTA) extended release capsule 60 mg  60 mg Oral Daily Cayden Mendosa MD        HYDROcodone-acetaminophen (NORCO) 5-325 MG per tablet 2 tablet  2 tablet Oral Q4H PRN Cayden Mendosa MD        insulin glargine (LANTUS) injection vial 55 Units  55 Units Subcutaneous Nightly Cayden Mendosa MD        isosorbide mononitrate (IMDUR) extended release tablet 60 mg  60 mg Oral Daily Cayden Mendosa MD        lactulose (CHRONULAC) 10 GM/15ML solution 10 g  10 g Oral BID Cayden Mendosa MD        levothyroxine (SYNTHROID) tablet 50 mcg  50 mcg Oral Daily Cayden Mendosa MD        pantoprazole (PROTONIX) tablet 40 mg  40 mg Oral QAM AC Cayden Mendosa MD        polyethylene glycol (GLYCOLAX) packet 17 g  17 g Oral BID Cayden Mendosa MD        ranolazine (RANEXA) extended release tablet 500 mg  500 mg Oral BID Cayden Mendosa MD        ticagrelor (BRILINTA) tablet 90 mg  90 mg Oral BID Cayden Mendosa MD        sodium chloride flush 0.9 % injection 10 mL  10 mL Intravenous 2 times per day Cayden Mendosa MD        sodium chloride flush 0.9 % injection 10 mL  10 mL Intravenous PRN Cayden Mendosa MD        acetaminophen (TYLENOL) tablet 650 mg  650 mg Oral Q6H PRN Cayden Mendosa MD        acetaminophen (TYLENOL) suppository 650 mg  650 mg Rectal Q6H PRN Cayden Mendosa MD        polyethylene glycol (GLYCOLAX) packet 17 g  17 g Oral Daily PRN Cayden Mendosa MD        promethazine (PHENERGAN) tablet 12.5 mg  12.5 mg Oral Q6H PRN Cayden Mendosa MD        ondansetron (ZOFRAN) injection 4 mg  4 mg Intravenous Q6H PRN Cayden Mendosa MD        glucose (GLUTOSE) 40 % oral gel 15 g  15 g Oral PRN Cayden Mendosa MD        dextrose 50 % IV solution  12.5 g Intravenous PRN Cayden Mendosa MD        glucagon (rDNA) injection 1 mg  1 mg Intramuscular PRN Akilah Workman MD Atul        dextrose 5 % solution  100 mL/hr Intravenous PRN Saranya Palumbo MD        insulin lispro (HUMALOG) injection vial 0-6 Units  0-6 Units Subcutaneous TID WC Saranya Palumbo MD        insulin lispro (HUMALOG) injection vial 0-3 Units  0-3 Units Subcutaneous Nightly Saranya Palumbo MD        furosemide (LASIX) injection 40 mg  40 mg Intravenous BID Saranya Palumbo MD        heparin (porcine) injection 4,000 Units  4,000 Units Intravenous PRN Saranya Palumbo MD        heparin (porcine) injection 2,000 Units  2,000 Units Intravenous PRN Saranya Palumbo MD        heparin 25,000 units in dextrose 5% 250 mL infusion  8 Units/kg/hr Intravenous Continuous Saranya Palumbo MD         Allergies   Allergen Reactions    Augmentin [Amoxicillin-Pot Clavulanate] Diarrhea       Nursing Notes Reviewed    Physical Exam:  ED Triage Vitals [02/22/20 6312]   Enc Vitals Group      BP (!) 180/81      Pulse 116      Resp 22      Temp 97.4 °F (36.3 °C)      Temp Source Oral      SpO2 97 %      Weight       Height       Head Circumference       Peak Flow       Pain Score       Pain Loc       Pain Edu? Excl. in [address]? My pulse ox interpretation is - normal    General appearance: Anxious, diaphoretic, moaning  Skin:  Warm. Dry. No petechiae or purpura. Eye:  Extraocular movements intact. PERRLA  Ears, nose, mouth and throat:  Oral mucosa moist, no trismus. Oropharynx with no exudate or erythema. Neck:  Trachea midline. Supple. No cervical lymphadenopathy  Extremity:  No swelling. Normal ROM. No calf pain or asymmetric swelling. No lower extremity edema  Heart: Initially tachycardic, normal S1 & S2, no extra heart sounds. Perfusion:  Intact, capillary refill less than 2 seconds  Respiratory: Tachypneic but quite clear. No wheezing. I do not appreciate rales or rhonchi. Abdominal:  Normal bowel sounds. Soft. No peritoneal signs. No hepatosplenomegaly.   Back:  No CVA tenderness to palpation. No bruising. No CTL tenderness to palpation or step-off  Neurological:  Alert and oriented times 3. No focal neuro deficits. Cranial nerves II through XII are grossly intact.           Psychiatric:  Appropriate    I have reviewed and interpreted all of the currently available lab results from this visit (if applicable):  Results for orders placed or performed during the hospital encounter of 02/22/20   CBC Auto Differential   Result Value Ref Range    WBC 13.8 (H) 4.0 - 10.5 K/CU MM    RBC 4.27 4.2 - 5.4 M/CU MM    Hemoglobin 11.1 (L) 12.5 - 16.0 GM/DL    Hematocrit 36.5 (L) 37 - 47 %    MCV 85.5 78 - 100 FL    MCH 26.0 (L) 27 - 31 PG    MCHC 30.4 (L) 32.0 - 36.0 %    RDW 16.1 (H) 11.7 - 14.9 %    Platelets 960 341 - 232 K/CU MM    MPV 10.3 7.5 - 11.1 FL    Differential Type AUTOMATED DIFFERENTIAL     Segs Relative 76.8 (H) 36 - 66 %    Lymphocytes % 13.4 (L) 24 - 44 %    Monocytes % 5.5 (H) 0 - 4 %    Eosinophils % 2.5 0 - 3 %    Basophils % 0.4 0 - 1 %    Segs Absolute 10.6 K/CU MM    Lymphocytes Absolute 1.9 K/CU MM    Monocytes Absolute 0.8 K/CU MM    Eosinophils Absolute 0.4 K/CU MM    Basophils Absolute 0.1 K/CU MM    Nucleated RBC % 0.0 %    Total Nucleated RBC 0.0 K/CU MM    Total Immature Neutrophil 0.19 K/CU MM    Immature Neutrophil % 1.4 (H) 0 - 0.43 %   Comprehensive Metabolic Panel   Result Value Ref Range    Sodium 133 (L) 135 - 145 MMOL/L    Potassium (HH) 3.5 - 5.1 MMOL/L     5.7  K CALLED TO DR WHARTON 2/22 0341 BY ANTOINETTE JIMÉNEZ      Chloride 93 (L) 99 - 110 mMol/L    CO2 26 21 - 32 MMOL/L    BUN 79 (H) 6 - 23 MG/DL    CREATININE 2.4 (H) 0.6 - 1.1 MG/DL    Glucose 247 (H) 70 - 99 MG/DL    Calcium 9.2 8.3 - 10.6 MG/DL    Alb 4.1 3.4 - 5.0 GM/DL    Total Protein 7.3 6.4 - 8.2 GM/DL    Total Bilirubin 0.3 0.0 - 1.0 MG/DL    ALT 14 10 - 40 U/L    AST 17 15 - 37 IU/L    Alkaline Phosphatase 117 40 - 128 IU/L    GFR Non- 21 (L) >60 mL/min/1.73m2    GFR African American 25 (L) >60 mL/min/1.73m2    Anion Gap 14 4 - 16   Lactic Acid, Plasma   Result Value Ref Range    Lactate 0.5 0.4 - 2.0 mMOL/L   Troponin   Result Value Ref Range    Troponin T 0.077 (H) <0.01 NG/ML   Beta-Hydroxybutyrate   Result Value Ref Range    Beta-Hydroxybutyrate 1.6 0.0 - 3.0 MG/DL   APTT   Result Value Ref Range    aPTT 36.0 25.1 - 37.1 SECONDS   Brain Natriuretic Peptide   Result Value Ref Range    Pro-BNP 4,257 (H) <300 PG/ML   CBC   Result Value Ref Range    WBC 16.4 (H) 4.0 - 10.5 K/CU MM    RBC 3.93 (L) 4.2 - 5.4 M/CU MM    Hemoglobin 10.1 (L) 12.5 - 16.0 GM/DL    Hematocrit 34.3 (L) 37 - 47 %    MCV 87.3 78 - 100 FL    MCH 25.7 (L) 27 - 31 PG    MCHC 29.4 (L) 32.0 - 36.0 %    RDW 15.9 (H) 11.7 - 14.9 %    Platelets 539 509 - 233 K/CU MM    MPV 10.5 7.5 - 11.1 FL   EKG 12 Lead   Result Value Ref Range    Ventricular Rate 91 BPM    Atrial Rate 91 BPM    P-R Interval 202 ms    QRS Duration 112 ms    Q-T Interval 394 ms    QTc Calculation (Bazett) 484 ms    P Axis 57 degrees    R Axis 38 degrees    T Axis 12 degrees    Diagnosis       Normal sinus rhythm  Low voltage QRS  Inferior infarct (cited on or before 07-JAN-2020)  Abnormal ECG  When compared with ECG of 16-FEB-2020 10:27,  Minimal criteria for Anterior infarct are no longer present  Serial changes of Inferior infarct present        Radiographs (if obtained):  [] The following radiograph was interpreted by myself in the absence of a radiologist:   [] Radiologist's Report Reviewed:  XR CHEST PORTABLE   Final Result   No acute process.          NM LUNG SCAN PERFUSION ONLY    (Results Pending)         EKG (if obtained): (All EKG's are interpreted by myself in the absence of a cardiologist)    Chart review shows recent radiographs:  Xr Chest Portable    Result Date: 2/16/2020  EXAMINATION: ONE XRAY VIEW OF THE CHEST 2/16/2020 10:49 am COMPARISON: 01/07/2020 HISTORY: ORDERING SYSTEM PROVIDED HISTORY: shortness of breath TECHNOLOGIST PROVIDED HISTORY: Reason for exam:->shortness of breath Reason for Exam: shortness of breath Acuity: Acute Type of Exam: Initial FINDINGS: No focal consolidation, pleural effusion or pneumothorax. The cardiomediastinal silhouette is stable. No overt pulmonary edema. The osseous structures are stable. No acute cardiopulmonary findings. MDM:  0303: Per Dr. Maia Mazariegos will give 5 mg IV Lopressor and repeat EKG. No STEMI at this time. 63-year-old female who presents with history and physical as above. Initial EKG read in real-time with Dr. Luciana Das and no STEMI. .  Patient given 5 mg IV Lopressor and a flutter resolved and symptoms began to improve. Patient also treated with aspirin, nitro, and Xanax on initial presentation. Patient has a chronic troponin leak. IV heparin was started. Patient with hyperkalemia and chronic renal disease. Albuterol x3, bicarb, Kayexalate, fluids, Lasix given. Critical care time of 45 minutes with initial concern for ST elevation MI and frequent bedside repeat evaluations. At time of discharge patient is feeling significantly better. Vital signs are stable. Tachycardia resolved. Anxiety improved. Shortness of breath improved. Clinical Impression:  1. Shortness of breath    2. Chronic renal failure, stage 3 (moderate) (HCC)    3. Anxiety attack    4. Atrial flutter, paroxysmal ([facility]      Disposition referral (if applicable):  Candace Naqvi MD  [address] [id number] [phone]          Disposition medications (if applicable):  Current Discharge Medication List          Comment: Please note this report has been produced using speech recognition software and may contain errors related to that system including errors in grammar, punctuation, and spelling, as well as words and phrases that may be inappropriate. If there are any questions or concerns please feel free to contact the dictating provider for clarification.       Mely Cox MD  02/22/20 71056 Davonte Rodriguez MD  02/22/20 2004

## 2020-02-22 NOTE — PROGRESS NOTES
Type and Reason for Visit: Patient Education    Spoke with pt regarding a carb controlled, heart health diet. Pt agrees to discuss increasing her insulin with her MD and to reduce the amount of chocolate she consumes. If consistent with goals, please schedule pt for outpatient cardiac rehab.         Electronically signed by Petrina Kussmaul, RD, JASPER on 7/43/65 at 1:25 PM    Contact Number: [phone]

## 2020-02-22 NOTE — PLAN OF CARE
Problem: Falls - Risk of:  Goal: Will remain free from falls  Description  Will remain free from falls  2/22/2020 1856 by Rosa M Serrano  Outcome: Ongoing  2/22/2020 1235 by Aishwarya Farmer RN  Outcome: Ongoing

## 2020-02-22 NOTE — PROGRESS NOTES
Patient was seen and evaluated bedside. She was discharged yesterday and came with sudden onset of shortness of breath today. Cardiology and nephrology on board. [facility]. Continue diuresis and start Solu-Medrol IV, nebulization for possible COPD exacerbation, contributing factor. Additional recommendation as per hospital course.   Continue monitor clinically, vitals, labs and imaging data

## 2020-02-22 NOTE — PROGRESS NOTES
Verified with lab and with RT that blood gases were not performed as ordered in ER.  Raine RT to come to floor and message sent to [facility] that pt needs further assessment and evaluation of lethargy and orders for ABGs

## 2020-02-22 NOTE — CONSULTS
[address] [place] [address], [address] [facility]                                  CONSULTATION    PATIENT NAME: LIONEL CLEVELAND                      :        1960  MED REC NO:   [id number]                          ROOM:       3109  ACCOUNT NO:   [de-identified]                           ADMIT DATE: 2020  PROVIDER:     Kavon Winn MD    CONSULT DATE:  2020    CONSULT REQUESTED BY:  Deb Zuñiga MD    REASON FOR CONSULT:  Acute kidney injury with underlying CKD stage  IIB/IV and hyperkalemia. BRIEF HISTORY:  The patient is a 49-year-old obese white female with  multiple medical conditions, which include but not limited to  progressive CKD and atherosclerotic cardiovascular disease, was just  discharged yesterday only to come back to the emergency room six or  seven hours after her discharge with increasing shortness of breath. The patient reported she went to home and developed some shortness of  breath, which prompted her to come to the emergency room. In the emergency room, her blood pressure initially was high, did go  down, and she underwent several diagnostic tests, which include  biochemical and imaging. Imaging study with chest x-ray was  unrevealing. Biochemical testing showed slightly increased BUN and  creatinine, potassium was 5.7, but repeat this morning is 6.2, and her  BUN and creatinine are 80 and 2.5 respectively with sugar of 277. She  was given IV loop diuretics and Lokelma as K binders. When I saw her, she is not in any acute distress. She is not using  accessory muscles and able to finish her sentence. She is enjoying her  lunch, sitting in her bed. I am very familiar with her, of course, I have been seeing her for a  long time, although I was only able to see her in my office once. Briefly speaking, her creatinine was about 1.5 or so about a couple of  years ago, but has slowly progressed. SYSTEMS:  Apparently shortness of breath. No fever, chills,  or rigor. Rest of the review of systems is negative other than previous  paragraph. PHYSICAL EXAMINATION:  VITAL SIGNS:  At the time of examination reveal temperature afebrile,  blood pressure 130s/70s, pulse 80, respiratory rate 16, saturating 99%. GENERAL:  The patient is without any acute distress. She is not using  accessory muscles. HEENT:  Head and Neck:  Normocephalic and atraumatic. Eyes:  Positive  conjunctival pallor. Ears, Mouth, and Throat:  Normal oral mucosa. CARDIOVASCULAR:  Irregular rhythm. RESPIRATORY:  Very poor airflow. ABDOMEN:  Obese. EXTREMITIES:  No overt edema. LABORATORY VALUES AND ANCILLARY SERVICES:  Potassium is 6.2. Venous pH  of 7.32. Hemoglobin is 10.1. IMPRESSION:  A 49-year-old female with acute kidney injury. 1.  Acute kidney injury on top of CKD stage IIIB/IVA3. First of all, I  would like to rule out acute bladder obstruction. Other etiology would  be cardiorenal syndrome type 1, etc.  2.  Hyperkalemia, likely from JUAN J, lab error, or from ACE inhibitors. 3.  Underlying atherosclerotic cardiovascular disease, diabetes,  obesity, COPD, etc.    PLAN:  We will start with bladder scan, UA and urinary indices. She is  on IV fluid. We will adjust as she does, also agree with K binders. Low potassium diet, i.e. renal diet, daily weight. Rule out any other  process and follow clinically. We will redo the potassium soon.         Garrett Moore MD    D: 02/22/2020 12:22:09       T: 02/22/2020 13:20:49     [email or web address]  Job#: [id number]     Doc#: [id number]    CC:

## 2020-02-22 NOTE — PROGRESS NOTES
Pt arrived to room 3109 via cart from ER. Pt transferred to bed with 2 assist. Pt frequently cued to open eyes and assist with admission process. Unable to assess if pt is lethargic vs uncooperative. Oriented to room. Pt signficant other in and out of room with his own private oxygen. Pt given bedbath and put into hospital gown. Skin assessment with Papo Mcmanus. Pt with multiple scattered bruises. Abdominal pannus is red with much dried cream/powder present. Joaquina area reddened. Coccyx reddened. No open areas identified at this time.

## 2020-02-22 NOTE — CONSULTS
Pt seen ,examined,interviewed and chart reviewed. Please see the dictated consult for details     Imp :   1. JUAN J/ CKD stage G 3b/4 A3   - R/O ac bladder obstruction- other etiology CRS type 1   2. High K- d/d lab eero- JUAN J/ acei etc   3. Underlying ASCVD/ DM / obesity / / COPD etc     Plan:  1. Start with bladder scan  2. UA and urinary indices  3. She is on IV loop and po K binders  4. Low diet / renal diet   5. Daily wt  6. R/O other process  7.  Follow,C;clinically       Thanks for the consult    #[id number]

## 2020-02-22 NOTE — CONSULTS
CARDIOLOGY CONSULT NOTE       Reason for consultation:     Referring physician:  Cayden Mendosa MD     Primary care physician: Bruce Jeffries MD    Thanks for the consult. History of present illness:Maribel is a 61 y. o.year old who  presents with had concerns including Anxiety and Shortness of Breath. Chief Complaint   Patient presents with    Anxiety    Shortness of Breath   Patient with history of congestive heart failure EF 30%, CAD had a PCI of RCA in the past, hypertension, high cholesterol, morbid obesity, CKD, COPD, obstructive sleep apnea ,seizure disorder. Patient recently admitted    And  Treated   for congestive heart failure, she was then discharged yesterday after treatment for heart failure    patient presented back to the emergency room with more shortness of breath was found in atrial flutter with 2-1 block and given Lopressor and patient has converted to sinus rhythm remains in sinus rhythm     . This  morning patient is somnolent as usual no chest pain. BNP 4240    Past medical history:    has a past medical history of Acute on chronic systolic CHF (congestive heart failure) ([address] CAD (coronary artery disease), CKD (chronic kidney disease) stage 3, GFR 30-59 ml/min ([facility]), Diabetes type 2, uncontrolled ([address] Diastolic heart failure ([address] Essential hypertension, Financial problems, Generalized anxiety disorder, Herpes genitalia, Obesity, morbid (more than 100 lbs over ideal weight or BMI > 40) ([address] and STEMI (ST elevation myocardial infarction) ([address] Past surgical history:   has a past surgical history that includes Cholecystectomy;  section; Tonsillectomy; other surgical history (Right, [id number]); Cardiac surgery; and Coronary angioplasty with stent. Social History:   reports that she has been smoking cigarettes. She has a 6.80 pack-year smoking history. She has never used smokeless tobacco. She reports that she does not drink alcohol or use drugs.   Family history:  family history includes Arthritis in her father and mother; Cancer in her maternal grandmother; Diabetes in her maternal aunt, maternal grandfather, maternal grandmother, maternal uncle, and mother; Heart Disease in her father and mother; High Blood Pressure in her father and mother; Stroke in her father; Substance Abuse in her brother and father.     Allergies   Allergen Reactions    Augmentin [Amoxicillin-Pot Clavulanate] Diarrhea       nitroGLYCERIN (NITROSTAT) SL tablet 0.4 mg, Q5 Min PRN  albuterol sulfate  (90 Base) MCG/ACT inhaler 2 puff, Q4H PRN  aspirin chewable tablet 81 mg, Daily  atorvastatin (LIPITOR) tablet 80 mg, Nightly  buPROPion (WELLBUTRIN SR) extended release tablet 100 mg, BID  carvedilol (COREG) tablet 12.5 mg, BID WC  DULoxetine (CYMBALTA) extended release capsule 60 mg, Daily  HYDROcodone-acetaminophen (NORCO) 5-325 MG per tablet 2 tablet, Q4H PRN  insulin glargine (LANTUS) injection vial 55 Units, Nightly  isosorbide mononitrate (IMDUR) extended release tablet 60 mg, Daily  lactulose (CHRONULAC) 10 GM/15ML solution 10 g, BID  levothyroxine (SYNTHROID) tablet 50 mcg, Daily  pantoprazole (PROTONIX) tablet 40 mg, QAM AC  polyethylene glycol (GLYCOLAX) packet 17 g, BID  ranolazine (RANEXA) extended release tablet 500 mg, BID  ticagrelor (BRILINTA) tablet 90 mg, BID  sodium chloride flush 0.9 % injection 10 mL, 2 times per day  sodium chloride flush 0.9 % injection 10 mL, PRN  acetaminophen (TYLENOL) tablet 650 mg, Q6H PRN  acetaminophen (TYLENOL) suppository 650 mg, Q6H PRN  polyethylene glycol (GLYCOLAX) packet 17 g, Daily PRN  promethazine (PHENERGAN) tablet 12.5 mg, Q6H PRN  ondansetron (ZOFRAN) injection 4 mg, Q6H PRN  glucose (GLUTOSE) 40 % oral gel 15 g, PRN  dextrose 50 % IV solution, PRN  glucagon (rDNA) injection 1 mg, PRN  dextrose 5 % solution, PRN  insulin lispro (HUMALOG) injection vial 0-6 Units, TID WC  insulin lispro (HUMALOG) injection vial 0-3 Units, Nightly  sodium zirconium methylPREDNISolone sodium (SOLU-MEDROL) injection 40 mg  40 mg Intravenous Q12H Ruby Ashley MD   40 mg at 02/22/20 7090     Review of Systems:   · Constitutional: No Fever or Weight Loss   · Eyes: No Decreased Vision  · ENT: No Headaches, Hearing Loss or Vertigo  · Cardiovascular: No chest pain, dyspnea on exertion, palpitations or loss of consciousness  · Respiratory: No cough or wheezing    · Gastrointestinal: No abdominal pain, appetite loss, blood in stools, constipation, diarrhea or heartburn  · Genitourinary: No dysuria, trouble voiding, or hematuria  · Musculoskeletal:  No gait disturbance, weakness or joint complaints  · Integumentary: No rash or pruritis  · Neurological: No TIA or stroke symptoms  · Psychiatric: No anxiety or depression  · Endocrine: No malaise, fatigue or temperature intolerance  · Hematologic/Lymphatic: No bleeding problems, blood clots or swollen lymph nodes  · Allergic/Immunologic: No nasal congestion or hives  ·   ·      Physical Examination:    Vitals:    02/22/20 0829   BP: (!) 129/43   Pulse: 88   Resp:    Temp:    SpO2: 99%        General Appearance:      HEENT: normal    NECK: No JVP or carotid bruits  No thromegaly  Cardiovascular:   Auscultation: Normal S1 and S2,      Respiratory: Bilateral rhonchi and some decreased breath sounds    Extremities:  No Edema clubbing or cyanosis    SKIN: Warm and well perfused, no pallor or cyanosis    Vascular exam:  : ,Femoral Arteries: 2+ and equal, Pedal Pulses: 2+ and equal     Abdomen: nontender      Neurological/Psychiatric:  nonfocal  Normal affect  Lab Review   Recent Labs     02/22/20  0651   WBC 16.4*   HGB 10.1*   HCT 34.3*         Recent Labs     02/21/20  0421  02/22/20  0651      < > 137   K 4.9   < > 6.2  K CALLED TO DANK RN @ [id number] ROSANA MLT  RESULTS READ BACK  *   CL 98*   < > 97*   CO2 27   < > 27   PHOS 5.2*  --   --    BUN 74*   < > 80*   CREATININE 2.3*   < > 2.5*    < > = values in this interval not displayed. Recent Labs     02/22/20  0305   AST 17   ALT 14   BILITOT 0.3   ALKPHOS 117     No results for input(s): TROPONINI in the last 72 hours.   No results found for: BNP  Lab Results   Component Value Date    INR 1.12 01/08/2020    PROTIME 13.6 01/08/2020       QUALITY MEASURES:    EKG:    Chest Xray:    ECHO:  Labs, echo, meds reviewed  Assessment:   [unfilled]   Patient Active Problem List   Diagnosis Code    CKD (chronic kidney disease) stage 3, GFR 30-59 ml/min ([facility]) N18.3    CAD (coronary artery disease) I25.10    Chronic diastolic heart failure ([facility]) I50.32    Diabetes type 2, uncontrolled ([facility] E11.65    Morbid obesity with BMI of 50.0-59.9, adult ([facility]) E66.01, Z68.43    Elevated lactic acid level, resolved R79.89    Musculoskeletal chest pain R07.89    Essential hypertension I10    Diabetes mellitus with hyperglycemia ([facility]) E11.65    Severe dehydration secondary to significant hyperglycemia E86.0    Sepsis secondary to UTI, POA A41.9, N39.0    Generalized weakness R53.1    Sepsis associated hypotension, POA A41.9, I95.9    Acute renal failure, POA N17.9    E. coli UTI (urinary tract infection) N39.0, B96.20    Syncope R55    Acute pain of right shoulder M25.511    Hypotension I95.9    DM (diabetes mellitus), secondary uncontrolled ([facility]) E13.65    Generalized anxiety disorder F41.1    Nausea & vomiting R11.2    Fever R50.9    Debility R53.81    Gait disturbance R26.9    Acute respiratory failure ([facility]) J96.00    Hyperglycemia R73.9    Pleural effusion on left J90    UTI (urinary tract infection), bacterial N39.0, A49.9    Sinus tachycardia R00.0    Uncontrolled type 2 diabetes mellitus with hyperglycemia ([facility]) E11.65    Class 3 severe obesity due to excess calories with body mass index (BMI) of 45.0 to 49.9 in adult ([facility]) E66.01, Z68.42    Anasarca R60.1    Acute kidney injury ([facility] N17.9    DM (diabetes mellitus) ([facility]) E11.9    Fluid overload E87.70    Cor pulmonale (chronic) ([facility]) I27.81    Cellulitis of abdominal wall L03.311    STEMI (ST elevation myocardial infarction) ([facility]) I21.3    Acute respiratory distress R06.03    Acute on chronic systolic CHF (congestive heart failure) ([facility]) I50.23     [unfilled]  Problem List Items Addressed This Visit     None      Visit Diagnoses     Shortness of breath    -  Primary    Chronic renal failure, stage 3 (moderate) ([facility])        Anxiety attack        Relevant Medications    buPROPion [facility] SR) extended release tablet 100 mg    DULoxetine (CYMBALTA) extended release capsule 60 mg    Atrial flutter, paroxysmal ([facility])          1 acute on chronic systolic heart failure  She was just discharged yesterday and readmitted with shortness of breath probably due to atrial flutter patient converted to sinus rhythm and remains in sinus rhythm    2 atrial flutter converted to sinus rhythm  Upon telemetry monitoring    3 chronic kidney injury  Nephrology   is consulted and will manage diuresis    4 CAD with a history of stents to RCA no chest pain    5 COPD with obstructive sleep apnea    6 morbid obesity chronic due to excess caloric intake    7 hypertension well controlled    Recommendations:   Resumed home medication  Nephtology to manage diuresis  Telemetry monitor  Celeste Johnson MD, 2/22/2020 9:41 AM

## 2020-02-22 NOTE — H&P
History and Physical      Name:  Mehreen Quezada /Age/Sex: 1960  (61 y.o. female)   MRN & CSN:  [id number] & [id number] Admission Date/Time: 2020  2:32 AM   Location:  ED16/ED-16 PCP: Mary Cartagena MD       Hospital Day: 1    Assessment and Plan:   Mehreen Quezada is a 61 y.o.  female  who presents with shortness of breath    -Atrial flutter with RVR, currently heart rate controlled post IV Lopressor  -Acute on chronic systolic congestive heart failure  -Diabetes mellitus type 2 poorly controlled  -Hyperkalemia, potassium 5.7 got medical treatment in ER  -Chronic kidney disease stage IV, creatinine baseline    Plan  Telemetry  Cardiology consult  Lasix 40 mg IV every 12 hours  Recheck potassium in 4 hours        Diet No diet orders on file   DVT Prophylaxis [] Lovenox, []  Heparin, [] SCDs, [] Ambulation   GI Prophylaxis [] PPI,  [] H2 Blocker,  [] Carafate,  [] Diet/Tube Feeds   Code Status Prior   Disposition Patient requires continued admission due to    MDM [] Low, [] Moderate,[]  High  Patient's risk as above due to      History of Present Illness:     Chief Complaint: Shortness of breath  Mehreen Quezada is a 61 y.o.  female  who presents with shortness of breath. Patient presented with shortness of breath started the day of admission. No chest pain, fever, chills, cough, lightheadedness or palpitations. She had nausea but no vomiting. Patient was hospitalized for CHF exacerbation and was discharged on . Patient was on dobutamine drip and IV Lasix. The ER she was found to be in atrial flutter with rapid ventricular response heart rate in the 120s got IV Lopressor and heart rate went down below 100.        Ten point ROS reviewed negative, unless as noted above    Objective:   No intake or output data in the 24 hours ending 20 0425   Vitals:   Vitals:    20 0340   BP: (!) 125/59   Pulse: 94   Resp: 22   Temp:    SpO2: 99%     Physical Exam:   GEN Awake female, sitting her father and mother; Cancer in her maternal grandmother; Diabetes in her maternal aunt, maternal grandfather, maternal grandmother, maternal uncle, and mother; Heart Disease in her father and mother; High Blood Pressure in her father and mother; Stroke in her father; Substance Abuse in her brother and father.   Soc HX:   Social History     Socioeconomic History    Marital status: Single     Spouse name: None    Number of children: None    Years of education: None    Highest education level: None   Occupational History    None   Social Needs    Financial resource strain: None    Food insecurity:     Worry: None     Inability: None    Transportation needs:     Medical: None     Non-medical: None   Tobacco Use    Smoking status: Current Every Day Smoker     Packs/day: 0.20     Years: 34.00     Pack years: 6.80     Types: Cigarettes    Smokeless tobacco: Never Used   Substance and Sexual Activity    Alcohol use: No     Alcohol/week: 0.0 standard drinks    Drug use: No    Sexual activity: None   Lifestyle    Physical activity:     Days per week: None     Minutes per session: None    Stress: None   Relationships    Social connections:     Talks on phone: None     Gets together: None     Attends [employer] service: None     Active member of club or organization: None     Attends meetings of clubs or organizations: None     Relationship status: None    Intimate partner violence:     Fear of current or ex partner: None     Emotionally abused: None     Physically abused: None     Forced sexual activity: None   Other Topics Concern    None   Social History Narrative    None       Medications:   Medications:    heparin (porcine)  60 Units/kg Intravenous Once    sodium bicarbonate  50 mEq Intravenous Once    sodium polystyrene  15 g Oral Once    furosemide  40 mg Intravenous Once      Infusions:    heparin (porcine)       PRN Meds: nitroGLYCERIN, 0.4 mg, Q5 Min PRN  heparin (porcine), 60 Units/kg, tablet Take 50 mcg by mouth daily 2/26/19   Historical Provider, MD   insulin glargine (LANTUS SOLOSTAR) 100 UNIT/ML injection pen Inject 55 Units into the skin nightly 11/21/18   Jerman Ibarra MD   linagliptin (TRADJENTA) 5 MG tablet Take 1 tablet by mouth daily 11/22/18   Jerman Ibarra MD   buPROPion [facility] SR) 100 MG extended release tablet Take 100 mg by mouth 2 times daily    Historical Provider, MD   aspirin 81 MG chewable tablet Take 1 tablet by mouth daily 1/8/18   Saud Menard MD   DULoxetine (CYMBALTA) 60 MG extended release capsule Take 1 capsule by mouth daily 1/8/18   Saud Menard MD   docusate (COLACE, DULCOLAX) 100 MG CAPS Take 100 mg by mouth 2 times daily 1/8/18   Saud Menard MD   pantoprazole (PROTONIX) 40 MG tablet Take 1 tablet by mouth every morning (before breakfast) 1/8/18   Saud Menard MD   albuterol sulfate HFA (VENTOLIN HFA) 108 (90 Base) MCG/ACT inhaler Inhale 2 puffs into the lungs every 4 hours as needed for Wheezing 1/8/18   Saud Menard MD       Electronically signed by Blanca Blackwood MD on 2/22/2020 at 4:25 AM

## 2020-02-23 PROBLEM — E87.5 HYPERKALEMIA: Status: ACTIVE | Noted: 2020-02-23

## 2020-02-23 PROBLEM — J44.1 COPD WITH ACUTE EXACERBATION (HCC): Status: ACTIVE | Noted: 2020-02-23

## 2020-02-23 PROBLEM — I50.33 ACUTE ON CHRONIC DIASTOLIC (CONGESTIVE) HEART FAILURE (HCC): Status: ACTIVE | Noted: 2020-02-23

## 2020-02-23 LAB
ANION GAP SERPL CALCULATED.3IONS-SCNC: 17 MMOL/L (ref 4–16)
BETA-HYDROXYBUTYRATE: 1.3 MG/DL (ref 0–3)
BUN BLDV-MCNC: 75 MG/DL (ref 6–23)
CALCIUM SERPL-MCNC: 8.9 MG/DL (ref 8.3–10.6)
CHLORIDE BLD-SCNC: 88 MMOL/L (ref 99–110)
CHOLESTEROL: 154 MG/DL
CO2: 25 MMOL/L (ref 21–32)
CREAT SERPL-MCNC: 2.1 MG/DL (ref 0.6–1.1)
GFR AFRICAN AMERICAN: 29 ML/MIN/1.73M2
GFR NON-AFRICAN AMERICAN: 24 ML/MIN/1.73M2
GLUCOSE BLD-MCNC: 390 MG/DL (ref 70–99)
GLUCOSE BLD-MCNC: 414 MG/DL (ref 70–99)
GLUCOSE BLD-MCNC: 519 MG/DL (ref 70–99)
GLUCOSE BLD-MCNC: 530 MG/DL (ref 70–99)
GLUCOSE BLD-MCNC: 534 MG/DL (ref 70–99)
GLUCOSE BLD-MCNC: 661 MG/DL (ref 70–99)
GLUCOSE BLD-MCNC: 765 MG/DL (ref 70–99)
GLUCOSE BLD-MCNC: 765 MG/DL (ref 70–99)
GLUCOSE BLD-MCNC: >550 MG/DL (ref 70–99)
GLUCOSE BLD-MCNC: >550 MG/DL (ref 70–99)
HDLC SERPL-MCNC: 39 MG/DL
LDL CHOLESTEROL DIRECT: 92 MG/DL
MAGNESIUM: 2.2 MG/DL (ref 1.8–2.4)
POTASSIUM SERPL-SCNC: 5.3 MMOL/L (ref 3.5–5.1)
SODIUM BLD-SCNC: 130 MMOL/L (ref 135–145)
TRIGL SERPL-MCNC: 268 MG/DL

## 2020-02-23 PROCEDURE — 6370000000 HC RX 637 (ALT 250 FOR IP): Performed by: INTERNAL MEDICINE

## 2020-02-23 PROCEDURE — 82962 GLUCOSE BLOOD TEST: CPT

## 2020-02-23 PROCEDURE — 2140000000 HC CCU INTERMEDIATE R&B

## 2020-02-23 PROCEDURE — 2700000000 HC OXYGEN THERAPY PER DAY

## 2020-02-23 PROCEDURE — 82010 KETONE BODYS QUAN: CPT

## 2020-02-23 PROCEDURE — 96372 THER/PROPH/DIAG INJ SC/IM: CPT

## 2020-02-23 PROCEDURE — 83735 ASSAY OF MAGNESIUM: CPT

## 2020-02-23 PROCEDURE — 94761 N-INVAS EAR/PLS OXIMETRY MLT: CPT

## 2020-02-23 PROCEDURE — 2500000003 HC RX 250 WO HCPCS: Performed by: INTERNAL MEDICINE

## 2020-02-23 PROCEDURE — 85730 THROMBOPLASTIN TIME PARTIAL: CPT

## 2020-02-23 PROCEDURE — 6360000002 HC RX W HCPCS: Performed by: INTERNAL MEDICINE

## 2020-02-23 PROCEDURE — 6370000000 HC RX 637 (ALT 250 FOR IP): Performed by: PHYSICIAN ASSISTANT

## 2020-02-23 PROCEDURE — 96376 TX/PRO/DX INJ SAME DRUG ADON: CPT

## 2020-02-23 PROCEDURE — 2580000003 HC RX 258: Performed by: INTERNAL MEDICINE

## 2020-02-23 PROCEDURE — 80048 BASIC METABOLIC PNL TOTAL CA: CPT

## 2020-02-23 PROCEDURE — 80061 LIPID PANEL: CPT

## 2020-02-23 PROCEDURE — 83721 ASSAY OF BLOOD LIPOPROTEIN: CPT

## 2020-02-23 PROCEDURE — 94640 AIRWAY INHALATION TREATMENT: CPT

## 2020-02-23 PROCEDURE — 96375 TX/PRO/DX INJ NEW DRUG ADDON: CPT

## 2020-02-23 RX ORDER — INSULIN GLARGINE 100 [IU]/ML
60 INJECTION, SOLUTION SUBCUTANEOUS NIGHTLY
Status: DISCONTINUED | OUTPATIENT
Start: 2020-02-23 | End: 2020-02-26

## 2020-02-23 RX ORDER — AMLODIPINE BESYLATE 5 MG/1
5 TABLET ORAL 2 TIMES DAILY
Status: DISCONTINUED | OUTPATIENT
Start: 2020-02-23 | End: 2020-02-26 | Stop reason: HOSPADM

## 2020-02-23 RX ORDER — CLOPIDOGREL BISULFATE 75 MG/1
75 TABLET ORAL DAILY
Status: DISCONTINUED | OUTPATIENT
Start: 2020-02-23 | End: 2020-02-26 | Stop reason: HOSPADM

## 2020-02-23 RX ADMIN — HEPARIN SODIUM 5000 UNITS: 5000 INJECTION, SOLUTION INTRAVENOUS; SUBCUTANEOUS at 21:13

## 2020-02-23 RX ADMIN — CLOPIDOGREL BISULFATE 75 MG: 75 TABLET ORAL at 15:06

## 2020-02-23 RX ADMIN — PANTOPRAZOLE SODIUM 40 MG: 40 TABLET, DELAYED RELEASE ORAL at 07:08

## 2020-02-23 RX ADMIN — TICAGRELOR 90 MG: 90 TABLET ORAL at 08:18

## 2020-02-23 RX ADMIN — ATORVASTATIN CALCIUM 80 MG: 40 TABLET, FILM COATED ORAL at 21:13

## 2020-02-23 RX ADMIN — LACTULOSE 10 G: 10 SOLUTION ORAL at 08:18

## 2020-02-23 RX ADMIN — HEPARIN SODIUM 5000 UNITS: 5000 INJECTION, SOLUTION INTRAVENOUS; SUBCUTANEOUS at 07:08

## 2020-02-23 RX ADMIN — SODIUM ZIRCONIUM CYCLOSILICATE 10 G: 10 POWDER, FOR SUSPENSION ORAL at 15:05

## 2020-02-23 RX ADMIN — DULOXETINE HYDROCHLORIDE 60 MG: 30 CAPSULE, DELAYED RELEASE ORAL at 08:19

## 2020-02-23 RX ADMIN — RANOLAZINE 500 MG: 500 TABLET, FILM COATED, EXTENDED RELEASE ORAL at 21:13

## 2020-02-23 RX ADMIN — INSULIN HUMAN 5 UNITS: 100 INJECTION, SOLUTION PARENTERAL at 05:10

## 2020-02-23 RX ADMIN — LEVOTHYROXINE SODIUM 50 MCG: 0.05 TABLET ORAL at 08:18

## 2020-02-23 RX ADMIN — AMLODIPINE BESYLATE 5 MG: 5 TABLET ORAL at 21:13

## 2020-02-23 RX ADMIN — HYDROCODONE BITARTRATE AND ACETAMINOPHEN 2 TABLET: 5; 325 TABLET ORAL at 08:24

## 2020-02-23 RX ADMIN — BUMETANIDE 0.5 MG: 0.25 INJECTION INTRAMUSCULAR; INTRAVENOUS at 21:13

## 2020-02-23 RX ADMIN — POLYETHYLENE GLYCOL (3350) 17 G: 17 POWDER, FOR SOLUTION ORAL at 21:12

## 2020-02-23 RX ADMIN — SODIUM CHLORIDE, PRESERVATIVE FREE 10 ML: 5 INJECTION INTRAVENOUS at 08:18

## 2020-02-23 RX ADMIN — BUPROPION HYDROCHLORIDE 100 MG: 100 TABLET, FILM COATED, EXTENDED RELEASE ORAL at 21:13

## 2020-02-23 RX ADMIN — AMLODIPINE BESYLATE 5 MG: 5 TABLET ORAL at 15:06

## 2020-02-23 RX ADMIN — BUPROPION HYDROCHLORIDE 100 MG: 100 TABLET, FILM COATED, EXTENDED RELEASE ORAL at 08:19

## 2020-02-23 RX ADMIN — INSULIN LISPRO 12 UNITS: 100 INJECTION, SOLUTION INTRAVENOUS; SUBCUTANEOUS at 07:09

## 2020-02-23 RX ADMIN — CARVEDILOL 12.5 MG: 12.5 TABLET, FILM COATED ORAL at 18:07

## 2020-02-23 RX ADMIN — RANOLAZINE 500 MG: 500 TABLET, FILM COATED, EXTENDED RELEASE ORAL at 08:19

## 2020-02-23 RX ADMIN — METHYLPREDNISOLONE SODIUM SUCCINATE 40 MG: 40 INJECTION, POWDER, FOR SOLUTION INTRAMUSCULAR; INTRAVENOUS at 21:12

## 2020-02-23 RX ADMIN — HEPARIN SODIUM 5000 UNITS: 5000 INJECTION, SOLUTION INTRAVENOUS; SUBCUTANEOUS at 15:06

## 2020-02-23 RX ADMIN — IPRATROPIUM BROMIDE AND ALBUTEROL SULFATE 1 AMPULE: .5; 3 SOLUTION RESPIRATORY (INHALATION) at 19:21

## 2020-02-23 RX ADMIN — METHYLPREDNISOLONE SODIUM SUCCINATE 40 MG: 40 INJECTION, POWDER, FOR SOLUTION INTRAMUSCULAR; INTRAVENOUS at 08:18

## 2020-02-23 RX ADMIN — SODIUM CHLORIDE, PRESERVATIVE FREE 10 ML: 5 INJECTION INTRAVENOUS at 21:13

## 2020-02-23 RX ADMIN — INSULIN HUMAN 10 UNITS: 100 INJECTION, SOLUTION PARENTERAL at 04:42

## 2020-02-23 RX ADMIN — INSULIN HUMAN 20 UNITS: 100 INJECTION, SUSPENSION SUBCUTANEOUS at 11:46

## 2020-02-23 RX ADMIN — SODIUM ZIRCONIUM CYCLOSILICATE 10 G: 10 POWDER, FOR SUSPENSION ORAL at 08:20

## 2020-02-23 RX ADMIN — POLYETHYLENE GLYCOL (3350) 17 G: 17 POWDER, FOR SOLUTION ORAL at 08:19

## 2020-02-23 RX ADMIN — CARVEDILOL 12.5 MG: 12.5 TABLET, FILM COATED ORAL at 08:19

## 2020-02-23 RX ADMIN — INSULIN GLARGINE 60 UNITS: 100 INJECTION, SOLUTION SUBCUTANEOUS at 21:14

## 2020-02-23 RX ADMIN — BUMETANIDE 0.5 MG: 0.25 INJECTION INTRAMUSCULAR; INTRAVENOUS at 08:18

## 2020-02-23 RX ADMIN — ISOSORBIDE MONONITRATE 60 MG: 30 TABLET, EXTENDED RELEASE ORAL at 08:18

## 2020-02-23 RX ADMIN — ASPIRIN 81 MG 81 MG: 81 TABLET ORAL at 08:19

## 2020-02-23 RX ADMIN — IPRATROPIUM BROMIDE AND ALBUTEROL SULFATE 1 AMPULE: .5; 3 SOLUTION RESPIRATORY (INHALATION) at 16:08

## 2020-02-23 ASSESSMENT — PAIN SCALES - GENERAL
PAINLEVEL_OUTOF10: 0
PAINLEVEL_OUTOF10: 8

## 2020-02-23 ASSESSMENT — PAIN DESCRIPTION - PAIN TYPE: TYPE: CHRONIC PAIN

## 2020-02-23 ASSESSMENT — PAIN DESCRIPTION - LOCATION: LOCATION: BACK

## 2020-02-23 NOTE — PLAN OF CARE
Problem: Falls - Risk of:  Goal: Will remain free from falls  Description  Will remain free from falls  2/23/2020 1323 by Pau Fournier RN  Outcome: Ongoing  2/23/2020 0052 by Shayla Payan RN  Outcome: Ongoing  Goal: Absence of physical injury  Description  Absence of physical injury  2/23/2020 1323 by Pau Fournier RN  Outcome: Ongoing  2/23/2020 0052 by Shayla Payan RN  Outcome: Ongoing     Problem: Risk for Impaired Skin Integrity  Goal: Tissue integrity - skin and mucous membranes  Description  Structural intactness and normal physiological function of skin and  mucous membranes. 2/23/2020 1323 by Pau Fournier RN  Outcome: Ongoing  2/23/2020 0052 by Shayla Payan RN  Outcome: Ongoing     Problem: Pain:  Goal: Pain level will decrease  Description  Pain level will decrease  2/23/2020 1323 by Pau Fournier RN  Outcome: Ongoing  2/23/2020 0052 by Shayla Payan RN  Outcome: Ongoing  Goal: Control of acute pain  Description  Control of acute pain  2/23/2020 1323 by Pau Fournier RN  Outcome: Ongoing  2/23/2020 0052 by Shayla Payan RN  Outcome: Ongoing  Goal: Control of chronic pain  Description  Control of chronic pain  2/23/2020 1323 by Pau Fournier RN  Outcome: Ongoing  2/23/2020 0052 by Shayla Payan RN  Outcome: Ongoing     Problem:  Activity:  Goal: Risk for activity intolerance will decrease  Description  Risk for activity intolerance will decrease  Outcome: Ongoing     Problem: Coping:  Goal: Ability to adjust to condition or change in health will improve  Description  Ability to adjust to condition or change in health will improve  Outcome: Ongoing     Problem: Fluid Volume:  Goal: Ability to maintain a balanced intake and output will improve  Description  Ability to maintain a balanced intake and output will improve  Outcome: Ongoing     Problem: Health Behavior:  Goal: Ability to identify and utilize available resources and services will improve  Description  Ability to identify and utilize available resources and services will improve  Outcome: Ongoing  Goal: Ability to manage health-related needs will improve  Description  Ability to manage health-related needs will improve  Outcome: Ongoing     Problem: Metabolic:  Goal: Ability to maintain appropriate glucose levels will improve  Description  Ability to maintain appropriate glucose levels will improve  Outcome: Ongoing     Problem: Nutritional:  Goal: Maintenance of adequate nutrition will improve  Description  Maintenance of adequate nutrition will improve  Outcome: Ongoing  Goal: Progress toward achieving an optimal weight will improve  Description  Progress toward achieving an optimal weight will improve  Outcome: Ongoing     Problem: Physical Regulation:  Goal: Complications related to the disease process, condition or treatment will be avoided or minimized  Description  Complications related to the disease process, condition or treatment will be avoided or minimized  Outcome: Ongoing  Goal: Diagnostic test results will improve  Description  Diagnostic test results will improve  Outcome: Ongoing     Problem: Skin Integrity:  Goal: Risk for impaired skin integrity will decrease  Description  Risk for impaired skin integrity will decrease  Outcome: Ongoing     Problem: Tissue Perfusion:  Goal: Adequacy of tissue perfusion will improve  Description  Adequacy of tissue perfusion will improve  Outcome: Ongoing

## 2020-02-23 NOTE — PROGRESS NOTES
Nephrology Progress Note  2/23/2020 2:10 PM        Subjective:   Admit Date: 2/22/2020  PCP: Lakshmi Lowery MD    Interval History: sleeping    Diet: some    ROS:  Very high BS     Data:     Current med's:    insulin lispro  20 Units Subcutaneous TID WC    insulin glargine  60 Units Subcutaneous Nightly    insulin lispro  0-18 Units Subcutaneous TID WC    insulin lispro  0-18 Units Subcutaneous 2 times per day    clopidogrel  75 mg Oral Daily    amLODIPine  5 mg Oral BID    aspirin  81 mg Oral Daily    atorvastatin  80 mg Oral Nightly    buPROPion  100 mg Oral BID    carvedilol  12.5 mg Oral BID WC    DULoxetine  60 mg Oral Daily    isosorbide mononitrate  60 mg Oral Daily    lactulose  10 g Oral BID    levothyroxine  50 mcg Oral Daily    pantoprazole  40 mg Oral QAM AC    polyethylene glycol  17 g Oral BID    ranolazine  500 mg Oral BID    sodium chloride flush  10 mL Intravenous 2 times per day    sodium zirconium cyclosilicate  10 g Oral TID    bumetanide  0.5 mg Intravenous BID    ipratropium-albuterol  1 ampule Inhalation Q4H WA    methylPREDNISolone  40 mg Intravenous Q12H    heparin (porcine)  5,000 Units Subcutaneous 3 times per day      dextrose           I/O last 3 completed shifts:   In: 900 [P.O.:900]  Out: -     CBC:   Recent Labs     02/21/20  0421 02/22/20  0305 02/22/20  0651   WBC 9.5 13.8* 16.4*   HGB 9.5* 11.1* 10.1*    334 270          Recent Labs     02/22/20  0305 02/22/20  0651 02/23/20  0251 02/23/20  0515   * 137 130*  --    K 5.7  K CALLED TO DR WHARTON 2/22 0341 BY ANTOINETTE JIMÉNEZ  * 6.2  K CALLED TO Meredith Beck RN @ [phone] [id number] ROSANA T  RESULTS READ BACK  * 5.3*  --    CL 93* 97* 88*  --    CO2 26 27 25  --    BUN 79* 80* 75*  --    CREATININE 2.4* 2.5* 2.1*  --    GLUCOSE 247* 277* 765*  765* 661*       Lab Results   Component Value Date    CALCIUM 8.9 02/23/2020    PHOS 5.2 (H) 02/21/2020       Objective:     Vitals: BP (!) 174/68   Pulse 101   Temp 98.4 °F (36.9 °C) (Oral)   Resp 19   Ht 5' 4\" (1.626 m)   Wt 272 lb 6.4 oz (123.6 kg)   SpO2 99%   BMI 46.76 kg/m²     General appearance:  Sleeping , could not  Wake  her up   HEENT:  Unable to check conj   Neck:  seems supple  Lungs:  No gross crackles  Heart:  Seems irregular  Abdomen: soft  Extremities:  No overt LE edema       Problem List :         Impression :     1. Shahab/ CKD stage 3 b/4 A3- stable  2. K better but ha very high BS  3. DM with very high BS  4. ASCVD/CMP- / DM / HTn    Recommendation/Plan  :     1. Manual BP when awake  2. Good BS control  3. Low salt  4. Loop/ daily wt  5. Follow clinically  6.  BMP in am       Ananth Nam MD

## 2020-02-23 NOTE — PROGRESS NOTES
Phleb at bedside. This RN checked bedside glucose with reading reported as \"out of reportable range\". Orders entered to verify glucose with stat lab. Additional 5 units insulin given IVP as ordered.  Pt remains asymptomatic

## 2020-02-23 NOTE — CARE COORDINATION
Followed up with patient for continued dc planning. Patient shares she is not interested in SNF and instead intends to return home with at discharge with her SO and [id number]. Declined needing any additional DME. CM remains available if needs arise.

## 2020-02-23 NOTE — PROGRESS NOTES
Pt bedside glucose checked at approx 0230 with reading too high to register. Order placed with lab to verify. [id number] lab called critical value to this RN. Chika Dears PA notified and new orders obtained. Pharmacy required clarification of these orders and 10 units IVP insulin given at 0442. Pt denies any symptoms but does state, \"it's my fault, I woke up and ate a bag of Doritos. \" Pt has a large glass of soda at bedside and pt reports this is diet. Education provided on the importance of carb control and the possible outcome of continued non compliance. Pt states, \"I've had high blood sugas before, it's not that big of a deal. I wish people would just worry about my blood pressure\". Pt's  is staying in the room with her and there are currently multiple bags of food and belongings in the room. Will continue to provide education and emotional support to this pt.

## 2020-02-23 NOTE — PLAN OF CARE
Problem: Falls - Risk of:  Goal: Will remain free from falls  Description  Will remain free from falls  2/23/2020 0052 by Clarissa Atkins RN  Outcome: Ongoing  2/22/2020 1856 by Rosa M Serrano  Outcome: Ongoing  2/22/2020 1235 by Vic Villavicencio RN  Outcome: Ongoing  Goal: Absence of physical injury  Description  Absence of physical injury  2/23/2020 0052 by Clarissa Atkins RN  Outcome: Ongoing  2/22/2020 1235 by Vic Villavicencio RN  Outcome: Ongoing     Problem: Risk for Impaired Skin Integrity  Goal: Tissue integrity - skin and mucous membranes  Description  Structural intactness and normal physiological function of skin and  mucous membranes. 2/23/2020 0052 by Clarissa Atkins RN  Outcome: Ongoing  2/22/2020 1235 by Vic Villavicencio RN  Outcome: Ongoing     Problem: Pain:  Description  Pain management should include both nonpharmacologic and pharmacologic interventions.   Goal: Pain level will decrease  Description  Pain level will decrease  2/23/2020 0052 by Clarissa Atkins RN  Outcome: Ongoing  2/22/2020 1235 by Vic Villavicencio RN  Outcome: Ongoing  Goal: Control of acute pain  Description  Control of acute pain  2/23/2020 0052 by Clarissa Atkins RN  Outcome: Ongoing  2/22/2020 1235 by Vic Villavicencio RN  Outcome: Ongoing  Goal: Control of chronic pain  Description  Control of chronic pain  2/23/2020 0052 by Clarissa Atkins RN  Outcome: Ongoing  2/22/2020 1235 by Vic Villavicencio RN  Outcome: Ongoing

## 2020-02-23 NOTE — PROGRESS NOTES
Hospitalist Progress Note         Admit Date: 2/22/2020    PCP: Giovanna Soni MD     Chief Complaint   Patient presents with    Anxiety    Shortness of Breath        Assessment and Plan:      Hyperkalemia/CKD stage IIIb/early stage IV: Improving on potassium binders. Continue IV Bumex. Monitor clinically, vitals, I/os and BMP. Nephro on board   Uncontrolled diabetes mellitus with chronic kidney disease ([facility] Endo consult. Continue current changed insulin regimen as per endocrinology. Follow Accu-Cheks   Acute on chronic diastolic (congestive) heart failure (HCC) on IV Bumex. Echo with normal EF recently. Continue Coreg. No ACE due to kidney disease.  COPD with acute exacerbation (HCC) Solu-Medrol IV, nebulization and O2 support   Essential hypertension continue current anti-HTN regimen. Follow vitals closely   CAD (coronary artery disease) continue aspirin, statin, Coreg, Ranexa and Brilinta.  Morbid obesity diet and exercise counseling      VTE prophylaxis Heparin  DC plan hopefully in couple of days.     Current Facility-Administered Medications   Medication Dose Route Frequency Provider Last Rate Last Dose    insulin lispro (HUMALOG) injection vial 20 Units  20 Units Subcutaneous TID  LEVY Díaz MD        insulin glargine (LANTUS) injection vial 60 Units  60 Units Subcutaneous Nightly LEVY Díaz MD        insulin lispro (HUMALOG) injection vial 0-18 Units  0-18 Units Subcutaneous TID  Miguel Sweeney MD        insulin lispro (HUMALOG) injection vial 0-18 Units  0-18 Units Subcutaneous 2 times per day Miguel Sweeney MD        insulin NPH (HUMULIN N;NOVOLIN N) injection vial 20 Units  20 Units Subcutaneous Once Miguel Sweeney MD        nitroGLYCERIN (NITROSTAT) SL tablet 0.4 mg  0.4 mg Sublingual Q5 Min PRN Tico Fan MD   0.4 mg at 02/22/20 0305    albuterol sulfate  (90 Base) MCG/ACT inhaler 2 puff  2 puff Inhalation Q4H PRN Mary Rendon MD  aspirin chewable tablet 81 mg  81 mg Oral Daily Bruce Portillo MD   81 mg at 02/23/20 0819    atorvastatin (LIPITOR) tablet 80 mg  80 mg Oral Nightly Bruce Portillo MD   80 mg at 02/22/20 2113    buPROPion [facility]) extended release tablet 100 mg  100 mg Oral BID Bruce Portillo MD   100 mg at 02/23/20 0819    carvedilol (COREG) tablet 12.5 mg  12.5 mg Oral BID WC Bruce Portillo MD   12.5 mg at 02/23/20 0819    DULoxetine (CYMBALTA) extended release capsule 60 mg  60 mg Oral Daily Bruce Portillo MD   60 mg at 02/23/20 0819    HYDROcodone-acetaminophen (NORCO) 5-325 MG per tablet 2 tablet  2 tablet Oral Q4H PRN Bruce Portillo MD   2 tablet at 02/23/20 9271    isosorbide mononitrate (IMDUR) extended release tablet 60 mg  60 mg Oral Daily Bruce Portillo MD   60 mg at 02/23/20 0818    lactulose (CHRONULAC) 10 GM/15ML solution 10 g  10 g Oral BID Bruce Portillo MD   10 g at 02/23/20 0818    levothyroxine (SYNTHROID) tablet 50 mcg  50 mcg Oral Daily Bruce Portillo MD   50 mcg at 02/23/20 0818    pantoprazole (PROTONIX) tablet 40 mg  40 mg Oral QAM AC Bruce Portillo MD   40 mg at 02/23/20 0708    polyethylene glycol (GLYCOLAX) packet 17 g  17 g Oral BID Bruce Portillo MD   17 g at 02/23/20 0819    ranolazine (RANEXA) extended release tablet 500 mg  500 mg Oral BID Bruce Portillo MD   500 mg at 02/23/20 0819    ticagrelor (BRILINTA) tablet 90 mg  90 mg Oral BID Bruce Portillo MD   90 mg at 02/23/20 0818    sodium chloride flush 0.9 % injection 10 mL  10 mL Intravenous 2 times per day Bruce Portillo MD   10 mL at 02/23/20 0818    sodium chloride flush 0.9 % injection 10 mL  10 mL Intravenous PRN Bruce Portillo MD   10 mL at 02/22/20 0940    acetaminophen (TYLENOL) tablet 650 mg  650 mg Oral Q6H PRN Bruce Portillo MD        acetaminophen (TYLENOL) suppository 650 mg  650 mg Rectal Q6H PRN Bruce Portillo MD        polyethylene glycol (GLYCOLAX) packet 17 g  17 g Oral Daily PRN Eleazar Bonds Olinda Tong MD        promethazine (PHENERGAN) tablet 12.5 mg  12.5 mg Oral Q6H PRN Radn Borges MD        ondansetron [facility]) injection 4 mg  4 mg Intravenous Q6H PRN Rand Borges MD        glucose (GLUTOSE) 40 % oral gel 15 g  15 g Oral PRN Rand Borges MD        dextrose 50 % IV solution  12.5 g Intravenous PRN Radn Borges MD        glucagon (rDNA) injection 1 mg  1 mg Intramuscular PRN Rand Borges MD        dextrose 5 % solution  100 mL/hr Intravenous PRN Rand Borges MD        sodium zirconium cyclosilicate (LOKELMA) oral suspension 10 g  10 g Oral TID Filippo Nuno MD   10 g at 02/23/20 0820    bumetanide (BUMEX) injection 0.5 mg  0.5 mg Intravenous BID Filippo Nuno MD   0.5 mg at 02/23/20 0818    ipratropium-albuterol (DUONEB) nebulizer solution 1 ampule  1 ampule Inhalation Q4H WA Filippo Nuno MD   1 ampule at 02/22/20 2141    insulin lispro (HUMALOG) injection vial 12 Units  12 Units Subcutaneous TID WC Filpipo Nuno MD   12 Units at 02/23/20 0709    methylPREDNISolone sodium (SOLU-MEDROL) injection 40 mg  40 mg Intravenous Q12H Filippo Nuno MD   40 mg at 02/23/20 0818    heparin (porcine) injection 5,000 Units  5,000 Units Subcutaneous 3 times per day Dinesh Krishnamurthy MD   5,000 Units at 02/23/20 0708       Subjective:     Patient denied any new complaints. Reports improving shortness of breath. Today morning her blood sugars were found to be in 700s  Received IV insulin and increased the current doses of insulin as well   It was found to be patient is noncompliant with her diabetic diet. Educated bedside      Objective:        Intake/Output Summary (Last 24 hours) at 2/23/2020 0917  Last data filed at 2/22/2020 1650  Gross per 24 hour   Intake 900 ml   Output --   Net 900 ml      Vitals:   Vitals:    02/23/20 0815   BP: (!) 174/68   Pulse: 101   Resp: 19   Temp:    SpO2: 99%     Physical Exam:  General Appearance:    Alert, cooperative, no distress  Head:      Normocephalic, without obvious abnormality, atraumatic  Eyes:       Conjunctiva/corneas clear, EOM's intact  Lungs:    Stable intermittent wheezing and crackles +   Heart:                Regular rate and rhythm, S1 and S2 normal, no murmur,   rub or gallop  Abdomen:     Soft, non-tender, bowel sounds active, no masses, no organomegaly  Extremities:   Extremities normal, atraumatic, no cyanosis or edema  Neurological:   Grossly Intact. Significant Diagnostic Studies:   DATA:    CBC   Recent Labs     02/21/20  0421 02/22/20  0305 02/22/20  0651   WBC 9.5 13.8* 16.4*   HGB 9.5* 11.1* 10.1*   HCT 32.7* 36.5* 34.3*    334 270      BMP   Recent Labs     02/21/20  0421 02/22/20  0305 02/22/20  0651 02/23/20  0251    133* 137 130*   K 4.9 5.7  K CALLED TO DR WHARTON 2/22 0341 BY ANTOINETTE JIMÉNEZ  * 6.2  K CALLED TO DANK RN @ [facility] [id number] ROSANA MLT  RESULTS READ BACK  * 5.3*   CL 98* 93* 97* 88*   CO2 27 26 27 25   PHOS 5.2*  --   --   --    BUN 74* 79* 80* 75*   CREATININE 2.3* 2.4* 2.5* 2.1*     LFT'S   Recent Labs     02/22/20  0305   AST 17   ALT 14   BILITOT 0.3   ALKPHOS 117     COAG No results for input(s): INR in the last 72 hours. POC:   Lab Results   Component Value Date    POCGLU 530 02/23/2020    POCGLU 422 02/22/2020    POCGLU 312 02/22/2020    POCGLU 273 02/22/2020     [id number]:  Lab Results   Component Value Date    LABA1C 8.6 02/22/2020     CARDIAC ENZYMES  No results for input(s): CKTOTAL, CKMB, CKMBINDEX, TROPONINI in the last 72 hours.   Troponin:   Recent Labs     02/22/20  0305 02/22/20  0651 02/22/20  1245   TROPONINT 0.077* 0.071* 0.080*     BNP:   Recent Labs     02/22/20  0305 02/22/20  0651   PROBNP 4,257* 4,240*     U/A:    Lab Results   Component Value Date    COLORU YELLOW 02/22/2020    WBCUA 2 02/22/2020    RBCUA 1 02/22/2020    MUCUS RARE 02/19/2020    BACTERIA MODERATE 02/22/2020    CLARITYU CLEAR 02/22/2020    SPECGRAV 1.011 02/22/2020 LEUKOCYTESUR NEGATIVE 02/22/2020    BLOODU SMALL 02/22/2020       Xr Chest Portable    Result Date: 2/22/2020  EXAMINATION: ONE XRAY VIEW OF THE CHEST 2/22/2020 3:02 am COMPARISON: 02/16/2020 HISTORY: ORDERING SYSTEM PROVIDED HISTORY: chest pain TECHNOLOGIST PROVIDED HISTORY: Reason for exam:->chest pain Reason for Exam: chest pain Acuity: Unknown Type of Exam: Initial FINDINGS: The lungs are underinflated, resulting in vascular crowding and subsegmental atelectasis. No focal consolidation, pleural effusion or pneumothorax. The cardiac silhouette and osseous structures are stable. No acute process. Xr Chest Portable    Result Date: 2/16/2020  EXAMINATION: ONE XRAY VIEW OF THE CHEST 2/16/2020 10:49 am COMPARISON: 01/07/2020 HISTORY: ORDERING SYSTEM PROVIDED HISTORY: shortness of breath TECHNOLOGIST PROVIDED HISTORY: Reason for exam:->shortness of breath Reason for Exam: shortness of breath Acuity: Acute Type of Exam: Initial FINDINGS: No focal consolidation, pleural effusion or pneumothorax. The cardiomediastinal silhouette is stable. No overt pulmonary edema. The osseous structures are stable. No acute cardiopulmonary findings.            Faisal [facility]

## 2020-02-23 NOTE — PROGRESS NOTES
Daily Progress Note     patient is awake alert feeling better  No chest pain//Has dyspnea   hx of CAD and HFrEF   Hx of CAD s/p PCI-LAD and LC X-RCA medical treatment  JAIMIE and obesity and COPD and diabetes  Cardio-renal syndrome -renal on case   Keep on antiplatelets   HTN need control   Will change to palvix, as this may be causing some dyspnea   Not on ACEI due to renal insuffiencey   EF 40% range   Need med compliance and diet   She is on oxygen     DICTATED -[id number]  LEFT MAIIN PATENT  LAD MID 80% TO 0% MEGAN XIENCE STENT 2.5X18MM STENT  LCX MILD DX  OM 90% TO 0% MEGAN XIENCE 2.25X33 MM STENT  LVEDP 35  RCA %  ASA AND BRIANTA AND HEPARIN  RIGHT BRACHIAL  NO COMPLICATIONS       [facility]1/43   This is a limited echocardiogram.   Left ventricular systolic function is normal.   Ejection fraction is visually estimated at 40-45%. Mild left ventricular hypertrophy. Lateral and inferior wall appear hypokinetic. No evidence of any pericardial effusion. The patient has a history of heart failure and coronary artery disease  present. The patient's last echo was done back on 01/08/2020. EF was  around 20% range present with pulmonary hypertension present. The  patient also has severe 3-vessel coronary artery disease present. She  is referred for bypass and is high risk for surgery. Therefore it was  declined and underwent angioplasty of the LAD and circumflex artery. RCA is 100% occluded. The patient has ischemia in the inferior wall and  the plan was to do angioplasty of the right coronary artery.     PAST MEDICAL HISTORY:  Coronary artery disease, history of heart  failure, EF is around 30% range present. She did have angioplasty of  the right coronary artery in the past also. Hypertension,  hyperlipidemia, COPD, renal insufficiency present, sees Dr. Markus Gallego for  that. History of sleep apnea, COPD, and seizure disorder.     Objective:   BP (!) 174/68   Pulse 101   Temp 97.7 °F (36.5 °C) (Oral)   Resp 19   Ht 5' 4\" (1.626 m)   Wt 272 lb 6.4 oz (123.6 kg)   SpO2 99%   BMI 46.76 kg/m²       Intake/Output Summary (Last 24 hours) at 2/23/2020 1230  Last data filed at 2/22/2020 1650  Gross per 24 hour   Intake 300 ml   Output --   Net 300 ml       Medications:   Scheduled Meds:   insulin lispro  20 Units Subcutaneous TID WC    insulin glargine  60 Units Subcutaneous Nightly    insulin lispro  0-18 Units Subcutaneous TID WC    insulin lispro  0-18 Units Subcutaneous 2 times per day    aspirin  81 mg Oral Daily    atorvastatin  80 mg Oral Nightly    buPROPion  100 mg Oral BID    carvedilol  12.5 mg Oral BID WC    DULoxetine  60 mg Oral Daily    isosorbide mononitrate  60 mg Oral Daily    lactulose  10 g Oral BID    levothyroxine  50 mcg Oral Daily    pantoprazole  40 mg Oral QAM AC    polyethylene glycol  17 g Oral BID    ranolazine  500 mg Oral BID    ticagrelor  90 mg Oral BID    sodium chloride flush  10 mL Intravenous 2 times per day    sodium zirconium cyclosilicate  10 g Oral TID    bumetanide  0.5 mg Intravenous BID    ipratropium-albuterol  1 ampule Inhalation Q4H WA    methylPREDNISolone  40 mg Intravenous Q12H    heparin (porcine)  5,000 Units Subcutaneous 3 times per day      Infusions:   dextrose        PRN Meds:  nitroGLYCERIN, albuterol sulfate HFA, HYDROcodone-acetaminophen, sodium chloride flush, acetaminophen, acetaminophen, polyethylene glycol, promethazine, ondansetron, glucose, dextrose, glucagon (rDNA), dextrose       Physical Exam:  Vitals:    02/23/20 0815   BP: (!) 174/68   Pulse: 101   Resp: 19   Temp:    SpO2: 99%        General: awake alert   Chest: Nontender  Cardiac: sinus   Lungs:Clear to auscultation and percussion. Abdomen: Soft, NT, ND, +BS  Extremities: no edema   Vascular:  Equal 2+ peripheral pulses.         Lab Data:  CBC:   Recent Labs     02/21/20  0421 02/22/20  0305 02/22/20  0651   WBC 9.5 13.8* 16.4*   HGB 9.5* 11.1* 10.1*   HCT 32.7* 36.5* 34.3*   MCV 87.0 85.5 87.3    334 270     BMP:   Recent Labs     02/21/20  0421 02/22/20  0305 02/22/20  0651 02/23/20  0251    133* 137 130*   K 4.9 5.7  K CALLED TO DR WHARTON 2/22 0341 BY ANTOINETTE JIMÉNEZ  * 6.2  K CALLED TO DANK RN @ [facility] [id number] ROSANA MLT  RESULTS READ BACK  * 5.3*   CL 98* 93* 97* 88*   CO2 27 26 27 25   PHOS 5.2*  --   --   --    BUN 74* 79* 80* 75*   CREATININE 2.3* 2.4* 2.5* 2.1*     LIVER PROFILE:   Recent Labs     02/22/20  0305   AST 17   ALT 14   BILITOT 0.3   ALKPHOS 117     PT/INR: No results for input(s): PROTIME, INR in the last 72 hours. APTT:   Recent Labs     02/22/20  0651 02/22/20  1245 02/22/20  1640   APTT 31.9 34.8 33.8     BNP:  No results for input(s): BNP in the last 72 hours.       Assessment:  Patient Active Problem List    Diagnosis Date Noted    E. coli UTI (urinary tract infection) 07/22/2016     Priority: High    Sepsis associated hypotension, POA 07/21/2016     Priority: High    Sepsis secondary to UTI, POA 07/20/2016     Priority: High    Musculoskeletal chest pain 07/20/2016     Priority: Medium    Diabetes mellitus with hyperglycemia ([address] 07/20/2016     Priority: Medium    Severe dehydration secondary to significant hyperglycemia 07/20/2016     Priority: Medium    Generalized weakness 07/20/2016     Priority: Medium    Nausea & vomiting 04/05/2018     Priority: Low    Fever 04/05/2018     Priority: Low    Generalized anxiety disorder 01/08/2018     Priority: Low    DM (diabetes mellitus), secondary uncontrolled ([address] 01/04/2018     Priority: Low    Syncope 08/26/2016     Priority: Low    Acute pain of right shoulder 08/26/2016     Priority: Low    Hypotension 08/26/2016     Priority: Low    Acute renal failure, POA 07/21/2016     Priority: Low    Diabetes type 2, uncontrolled ([address] 07/20/2016     Priority: Low    Morbid obesity with BMI of 50.0-59.9, adult [facility] 07/20/2016     Priority: Low    Elevated lactic acid level, resolved 07/20/2016     Priority: Low    Essential hypertension      Priority: Low    CAD (coronary artery disease)      Priority: Low    Chronic diastolic heart failure (HCC)      Priority: Low    CKD (chronic kidney disease) stage 3, GFR 30-59 ml/min ([facility])      Priority: Low    Hyperkalemia 02/23/2020    Uncontrolled diabetes mellitus with chronic kidney disease ([facility] [address] 02/23/2020    Acute on chronic diastolic (congestive) heart failure ([facility] 02/23/2020    COPD with acute exacerbation ([facility] 02/23/2020    Acute respiratory distress 02/16/2020    STEMI (ST elevation myocardial infarction) ([facility] 11/23/2019    Cellulitis of abdominal wall 07/13/2019    Acute kidney injury ([facility] 04/29/2019    DM (diabetes mellitus) ([facility] 04/29/2019    Fluid overload 04/29/2019    Cor pulmonale (chronic) ([facility] 04/29/2019    Anasarca 04/23/2019    UTI (urinary tract infection), bacterial 03/15/2019    Sinus tachycardia 03/15/2019    Uncontrolled type 2 diabetes mellitus with hyperglycemia ([facility] 03/15/2019    Class 3 severe obesity due to excess calories with body mass index (BMI) of 45.0 to 49.9 in adult ([facility] 03/15/2019    Pleural effusion on left 02/28/2019    Hyperglycemia 11/17/2018    Acute respiratory failure ([facility] [address] 10/02/2018    Gait disturbance 09/21/2018    Debility 09/19/2018       Liilbeth Munoz MD 2/23/2020 12:30 PM

## 2020-02-23 NOTE — CONSULTS
Endocrinology   Consult Note  Dear Doctor  Jaquan Lopez for the Consult     Pt. Was Admitted for : Severe shortness of breath atrial flutter fibrillation with rapid ventricular response    Reason for Consult: Better control of blood glucose was very hyperglycemic blood sugar on over 500      History Obtained From:  Patient/ EMR       HISTORY OF PRESENT ILLNESS:                The patient is a 61 y.o. female with significant past medical history of CAD, has angioplasty with a stent placed CKD, diabetes mellitus, congestive heart failure, hypertension, morbid obesity was admitted to hospital with severe shortness of breath possibly exacerbation of COPD and congestive heart failure. Patient has a very high blood glucose she has not been compliant with diet and has been eating a lot of high carb diet/foods. Her blood glucose or running over 500+. I was  consulted for better control of blood glucose. ROS:   Pt's ROS done in detail. Abnormal ROS are noted in Medical and Surgical History Section below:      Other Medical History:        Diagnosis Date    Acute on chronic systolic CHF (congestive heart failure) ([address] 2020    CAD (coronary artery disease)     CKD (chronic kidney disease) stage 3, GFR 30-59 ml/min ([facility])     Diabetes type 2, uncontrolled ([address]     Diastolic heart failure ([address]     Essential hypertension     Financial problems 3/22/2013    Generalized anxiety disorder 2018    Herpes genitalia     Obesity, morbid (more than 100 lbs over ideal weight or BMI > 40) ([address] 2013    STEMI (ST elevation myocardial infarction) [facility])      Surgical History:        Procedure Laterality Date    CARDIAC SURGERY       SECTION      CHOLECYSTECTOMY      CORONARY ANGIOPLASTY WITH STENT PLACEMENT      OTHER SURGICAL HISTORY Right [id number]    I and D rt big toe    TONSILLECTOMY         Allergies:  Augmentin [amoxicillin-pot clavulanate]    Family History:       Problem Relation Age of Onset    Arthritis Mother     Diabetes Mother     Heart Disease Mother     High Blood Pressure Mother     Arthritis Father     Heart Disease Father     High Blood Pressure Father     Stroke Father     Substance Abuse Father     Substance Abuse Brother     Diabetes Maternal Aunt     Diabetes Maternal Uncle     Cancer Maternal Grandmother     Diabetes Maternal Grandmother     Diabetes Maternal Grandfather      REVIEW OF SYSTEMS:  Review of System Done as noted above     PHYSICAL EXAM:      Vitals:    BP (!) 174/68   Pulse 101   Temp 97.7 °F (36.5 °C) (Oral)   Resp 19   Ht 5' 4\" (1.626 m)   Wt 272 lb 6.4 oz (123.6 kg)   SpO2 99%   BMI 46.76 kg/m²     CONSTITUTIONAL:  awake, alert, cooperative, appears stated age   EYES:  vision intact Fundoscopic Exam not performed   ENT:Normal  NECK:  Supple, No JVD. Thyroid Exam:Normal   LUNGS:  Has Vesicular Breath Sounds,   CARDIOVASCULAR: Atrial fibrillation with rapid ventricular response  ABDOMEN:  No scars, normal bowel sounds, soft, non-distended, non-tender, no masses palpated, no hepatolienomegaly  Musculoskeletal: Normal  Extremities: Normal, peripheral pulses normal, , has no edema   NEUROLOGIC:  Awake, alert, oriented to name, place and time. Cranial nerves II-XII are grossly intact. Motor is  intact. Sensory neuropathy.  ,  and gait is abnormal.    DATA:    CBC:   Recent Labs     02/21/20  0421 02/22/20  0305 02/22/20  0651   WBC 9.5 13.8* 16.4*   HGB 9.5* 11.1* 10.1*    334 270    CMP:  Recent Labs     02/22/20  0305 02/22/20  0651 02/23/20  0251   * 137 130*   K 5.7  K CALLED TO DR WHARTON 2/22 0341 BY ANTOINETTE JIMÉNEZ  * 6.2  K CALLED TO Lara Dale RN @ [phone] ROSANA MLT  RESULTS READ BACK  * 5.3*   CL 93* 97* 88*   CO2 26 27 25   BUN 79* 80* 75*   CREATININE 2.4* 2.5* 2.1*   CALCIUM 9.2 8.7 8.9   PROT 7.3  --   --    LABALBU 4.1  --   --    BILITOT 0.3  --   --    ALKPHOS 117  --   --    AST 17  --   --    ALT 14  --   -- Lipids:   Lab Results   Component Value Date    CHOL 154 02/23/2020    HDL 39 02/23/2020    TRIG 268 02/23/2020     Glucose:   Recent Labs     02/22/20  1634 02/22/20  2110 02/23/20  0654   POCGLU 312* 422* 530*     Hemoglobin A1C:   Lab Results   Component Value Date    LABA1C 8.6 02/22/2020     Free T4:   Lab Results   Component Value Date    T4FREE 1.11 02/28/2019     Free T3: No results found for: FT3  TSH High Sensitivity:   Lab Results   Component Value Date    TSHHS 2.080 02/22/2020       Xr Chest Portable    Result Date: 2/22/2020  EXAMINATION: ONE XRAY VIEW OF THE CHEST 2/22/2020 3:02 am COMPARISON: 02/16/2020 HISTORY: ORDERING SYSTEM PROVIDED HISTORY: chest pain TECHNOLOGIST PROVIDED HISTORY: Reason for exam:->chest pain Reason for Exam: chest pain Acuity: Unknown Type of Exam: Initial FINDINGS: The lungs are underinflated, resulting in vascular crowding and subsegmental atelectasis. No focal consolidation, pleural effusion or pneumothorax. The cardiac silhouette and osseous structures are stable. No acute process. Xr Chest Portable    Result Date: 2/16/2020  EXAMINATION: ONE XRAY VIEW OF THE CHEST 2/16/2020 10:49 am COMPARISON: 01/07/2020 HISTORY: ORDERING SYSTEM PROVIDED HISTORY: shortness of breath TECHNOLOGIST PROVIDED HISTORY: Reason for exam:->shortness of breath Reason for Exam: shortness of breath Acuity: Acute Type of Exam: Initial FINDINGS: No focal consolidation, pleural effusion or pneumothorax. The cardiomediastinal silhouette is stable. No overt pulmonary edema. The osseous structures are stable. No acute cardiopulmonary findings.        Scheduled Medicines   Medications:    insulin lispro  20 Units Subcutaneous TID     insulin glargine  60 Units Subcutaneous Nightly    insulin lispro  0-18 Units Subcutaneous TID     insulin lispro  0-18 Units Subcutaneous 2 times per day    insulin NPH  20 Units Subcutaneous Once    aspirin  81 mg Oral Daily    atorvastatin 80 mg Oral Nightly    buPROPion  100 mg Oral BID    carvedilol  12.5 mg Oral BID WC    DULoxetine  60 mg Oral Daily    isosorbide mononitrate  60 mg Oral Daily    lactulose  10 g Oral BID    levothyroxine  50 mcg Oral Daily    pantoprazole  40 mg Oral QAM AC    polyethylene glycol  17 g Oral BID    ranolazine  500 mg Oral BID    ticagrelor  90 mg Oral BID    sodium chloride flush  10 mL Intravenous 2 times per day    sodium zirconium cyclosilicate  10 g Oral TID    bumetanide  0.5 mg Intravenous BID    ipratropium-albuterol  1 ampule Inhalation Q4H WA    insulin lispro  12 Units Subcutaneous TID WC    methylPREDNISolone  40 mg Intravenous Q12H    heparin (porcine)  5,000 Units Subcutaneous 3 times per day      Infusions:    dextrose           IMPRESSION    Patient Active Problem List   Diagnosis    CKD (chronic kidney disease) stage 3, GFR 30-59 ml/min ([facility])    CAD (coronary artery disease)    Chronic diastolic heart failure ([facility])    Diabetes type 2, uncontrolled ([facility])    Morbid obesity with BMI of 50.0-59.9, adult ([facility])    Elevated lactic acid level, resolved    Musculoskeletal chest pain    Essential hypertension    Diabetes mellitus with hyperglycemia ([facility])    Severe dehydration secondary to significant hyperglycemia    Sepsis secondary to UTI, POA    Generalized weakness    Sepsis associated hypotension, POA    Acute renal failure, POA    E. coli UTI (urinary tract infection)    Syncope    Acute pain of right shoulder    Hypotension    DM (diabetes mellitus), secondary uncontrolled ([facility])    Generalized anxiety disorder    Nausea & vomiting    Fever    Debility    Gait disturbance    Acute respiratory failure ([facility])    Hyperglycemia    Pleural effusion on left    UTI (urinary tract infection), bacterial    Sinus tachycardia    Uncontrolled type 2 diabetes mellitus with hyperglycemia ([facility])    Class 3 severe obesity due to excess calories with body mass index (BMI) of 45.0 to 49.9 in adult [facility])    Anasarca    [facility]    DM (diabetes mellitus) ([facility]    Fluid overload    Cor pulmonale (chronic) (HCC)    Cellulitis of abdominal wall    STEMI (ST elevation myocardial infarction) ([facility]    Acute respiratory distress    Acute on chronic systolic CHF (congestive heart failure) ([facility])    Hyperkalemia         RECOMMENDATIONS:      1. Reviewed POC blood glucose . Labs and X ray results   2. Reviewed Home and Current Medicines   3. Will Start On meal/ Correction bolus Humalog/ Lantus Insulin regime   4. Monitor Blood glucose frequently   5. Modify  the dose of Insulin as needed        Will follow with you  Again thank you for sharing pt's care with me.      Truly yours,       Inez Valdivia MD

## 2020-02-24 ENCOUNTER — APPOINTMENT (OUTPATIENT)
Dept: NUCLEAR MEDICINE | Age: 60
DRG: 291 | End: 2020-02-24
Payer: MEDICARE

## 2020-02-24 ENCOUNTER — APPOINTMENT (OUTPATIENT)
Dept: GENERAL RADIOLOGY | Age: 60
DRG: 291 | End: 2020-02-24
Payer: MEDICARE

## 2020-02-24 LAB
ANION GAP SERPL CALCULATED.3IONS-SCNC: 13 MMOL/L (ref 4–16)
BUN BLDV-MCNC: 68 MG/DL (ref 6–23)
CALCIUM SERPL-MCNC: 9.4 MG/DL (ref 8.3–10.6)
CHLORIDE BLD-SCNC: 96 MMOL/L (ref 99–110)
CO2: 30 MMOL/L (ref 21–32)
CREAT SERPL-MCNC: 1.7 MG/DL (ref 0.6–1.1)
GFR AFRICAN AMERICAN: 37 ML/MIN/1.73M2
GFR NON-AFRICAN AMERICAN: 31 ML/MIN/1.73M2
GLUCOSE BLD-MCNC: 225 MG/DL (ref 70–99)
GLUCOSE BLD-MCNC: 227 MG/DL (ref 70–99)
GLUCOSE BLD-MCNC: 241 MG/DL (ref 70–99)
GLUCOSE BLD-MCNC: 263 MG/DL (ref 70–99)
GLUCOSE BLD-MCNC: 274 MG/DL (ref 70–99)
GLUCOSE BLD-MCNC: 281 MG/DL (ref 70–99)
GLUCOSE BLD-MCNC: 295 MG/DL (ref 70–99)
MAGNESIUM: 2.4 MG/DL (ref 1.8–2.4)
PLATELET # BLD: 313 K/CU MM (ref 140–440)
POTASSIUM SERPL-SCNC: 5.3 MMOL/L (ref 3.5–5.1)
SODIUM BLD-SCNC: 139 MMOL/L (ref 135–145)

## 2020-02-24 PROCEDURE — 6370000000 HC RX 637 (ALT 250 FOR IP): Performed by: INTERNAL MEDICINE

## 2020-02-24 PROCEDURE — 80048 BASIC METABOLIC PNL TOTAL CA: CPT

## 2020-02-24 PROCEDURE — 6360000002 HC RX W HCPCS: Performed by: INTERNAL MEDICINE

## 2020-02-24 PROCEDURE — 83735 ASSAY OF MAGNESIUM: CPT

## 2020-02-24 PROCEDURE — A9540 TC99M MAA: HCPCS | Performed by: INTERNAL MEDICINE

## 2020-02-24 PROCEDURE — 2500000003 HC RX 250 WO HCPCS: Performed by: INTERNAL MEDICINE

## 2020-02-24 PROCEDURE — 78582 LUNG VENTILAT&PERFUS IMAGING: CPT

## 2020-02-24 PROCEDURE — 94640 AIRWAY INHALATION TREATMENT: CPT

## 2020-02-24 PROCEDURE — 2580000003 HC RX 258: Performed by: INTERNAL MEDICINE

## 2020-02-24 PROCEDURE — 2700000000 HC OXYGEN THERAPY PER DAY

## 2020-02-24 PROCEDURE — 96376 TX/PRO/DX INJ SAME DRUG ADON: CPT

## 2020-02-24 PROCEDURE — A9539 TC99M PENTETATE: HCPCS | Performed by: INTERNAL MEDICINE

## 2020-02-24 PROCEDURE — 85049 AUTOMATED PLATELET COUNT: CPT

## 2020-02-24 PROCEDURE — 3430000000 HC RX DIAGNOSTIC RADIOPHARMACEUTICAL: Performed by: INTERNAL MEDICINE

## 2020-02-24 PROCEDURE — 36415 COLL VENOUS BLD VENIPUNCTURE: CPT

## 2020-02-24 PROCEDURE — 2140000000 HC CCU INTERMEDIATE R&B

## 2020-02-24 PROCEDURE — 71046 X-RAY EXAM CHEST 2 VIEWS: CPT

## 2020-02-24 PROCEDURE — 96372 THER/PROPH/DIAG INJ SC/IM: CPT

## 2020-02-24 PROCEDURE — 94761 N-INVAS EAR/PLS OXIMETRY MLT: CPT

## 2020-02-24 PROCEDURE — 82962 GLUCOSE BLOOD TEST: CPT

## 2020-02-24 RX ORDER — METHYLPREDNISOLONE SODIUM SUCCINATE 40 MG/ML
20 INJECTION, POWDER, LYOPHILIZED, FOR SOLUTION INTRAMUSCULAR; INTRAVENOUS EVERY 12 HOURS
Status: DISCONTINUED | OUTPATIENT
Start: 2020-02-24 | End: 2020-02-26 | Stop reason: HOSPADM

## 2020-02-24 RX ORDER — KIT FOR THE PREPARATION OF TECHNETIUM TC 99M PENTETATE 20 MG/1
3 INJECTION, POWDER, LYOPHILIZED, FOR SOLUTION INTRAVENOUS; RESPIRATORY (INHALATION)
Status: COMPLETED | OUTPATIENT
Start: 2020-02-24 | End: 2020-02-24

## 2020-02-24 RX ADMIN — AMLODIPINE BESYLATE 5 MG: 5 TABLET ORAL at 11:41

## 2020-02-24 RX ADMIN — PANTOPRAZOLE SODIUM 40 MG: 40 TABLET, DELAYED RELEASE ORAL at 11:39

## 2020-02-24 RX ADMIN — LACTULOSE 10 G: 10 SOLUTION ORAL at 11:40

## 2020-02-24 RX ADMIN — BUMETANIDE 0.5 MG: 0.25 INJECTION INTRAMUSCULAR; INTRAVENOUS at 11:41

## 2020-02-24 RX ADMIN — SODIUM CHLORIDE, PRESERVATIVE FREE 10 ML: 5 INJECTION INTRAVENOUS at 20:49

## 2020-02-24 RX ADMIN — CLOPIDOGREL BISULFATE 75 MG: 75 TABLET ORAL at 11:39

## 2020-02-24 RX ADMIN — RANOLAZINE 500 MG: 500 TABLET, FILM COATED, EXTENDED RELEASE ORAL at 20:50

## 2020-02-24 RX ADMIN — LEVOTHYROXINE SODIUM 50 MCG: 0.05 TABLET ORAL at 11:40

## 2020-02-24 RX ADMIN — CARVEDILOL 12.5 MG: 12.5 TABLET, FILM COATED ORAL at 17:29

## 2020-02-24 RX ADMIN — IPRATROPIUM BROMIDE AND ALBUTEROL SULFATE 1 AMPULE: .5; 3 SOLUTION RESPIRATORY (INHALATION) at 08:26

## 2020-02-24 RX ADMIN — ATORVASTATIN CALCIUM 80 MG: 40 TABLET, FILM COATED ORAL at 20:50

## 2020-02-24 RX ADMIN — HYDROCODONE BITARTRATE AND ACETAMINOPHEN 2 TABLET: 5; 325 TABLET ORAL at 01:00

## 2020-02-24 RX ADMIN — IPRATROPIUM BROMIDE AND ALBUTEROL SULFATE 1 AMPULE: .5; 3 SOLUTION RESPIRATORY (INHALATION) at 12:05

## 2020-02-24 RX ADMIN — KIT FOR THE PREPARATION OF TECHNETIUM TC 99M PENTETATE 3 MILLICURIE: 20 INJECTION, POWDER, LYOPHILIZED, FOR SOLUTION INTRAVENOUS; RESPIRATORY (INHALATION) at 10:22

## 2020-02-24 RX ADMIN — ASPIRIN 81 MG 81 MG: 81 TABLET ORAL at 11:39

## 2020-02-24 RX ADMIN — POLYETHYLENE GLYCOL (3350) 17 G: 17 POWDER, FOR SOLUTION ORAL at 11:41

## 2020-02-24 RX ADMIN — CARVEDILOL 12.5 MG: 12.5 TABLET, FILM COATED ORAL at 11:39

## 2020-02-24 RX ADMIN — HEPARIN SODIUM 5000 UNITS: 5000 INJECTION, SOLUTION INTRAVENOUS; SUBCUTANEOUS at 14:57

## 2020-02-24 RX ADMIN — POLYETHYLENE GLYCOL (3350) 17 G: 17 POWDER, FOR SOLUTION ORAL at 20:49

## 2020-02-24 RX ADMIN — BUMETANIDE 0.5 MG: 0.25 INJECTION INTRAMUSCULAR; INTRAVENOUS at 20:50

## 2020-02-24 RX ADMIN — AMLODIPINE BESYLATE 5 MG: 5 TABLET ORAL at 20:50

## 2020-02-24 RX ADMIN — DULOXETINE HYDROCHLORIDE 60 MG: 30 CAPSULE, DELAYED RELEASE ORAL at 11:39

## 2020-02-24 RX ADMIN — SODIUM CHLORIDE, PRESERVATIVE FREE 10 ML: 5 INJECTION INTRAVENOUS at 11:55

## 2020-02-24 RX ADMIN — Medication 5.5 MILLICURIE: at 10:21

## 2020-02-24 RX ADMIN — METHYLPREDNISOLONE SODIUM SUCCINATE 20 MG: 40 INJECTION, POWDER, FOR SOLUTION INTRAMUSCULAR; INTRAVENOUS at 20:49

## 2020-02-24 RX ADMIN — IPRATROPIUM BROMIDE AND ALBUTEROL SULFATE 1 AMPULE: .5; 3 SOLUTION RESPIRATORY (INHALATION) at 16:01

## 2020-02-24 RX ADMIN — HYDROCODONE BITARTRATE AND ACETAMINOPHEN 2 TABLET: 5; 325 TABLET ORAL at 20:51

## 2020-02-24 RX ADMIN — ISOSORBIDE MONONITRATE 60 MG: 30 TABLET, EXTENDED RELEASE ORAL at 11:40

## 2020-02-24 RX ADMIN — RANOLAZINE 500 MG: 500 TABLET, FILM COATED, EXTENDED RELEASE ORAL at 11:39

## 2020-02-24 RX ADMIN — BUPROPION HYDROCHLORIDE 100 MG: 100 TABLET, FILM COATED, EXTENDED RELEASE ORAL at 20:51

## 2020-02-24 RX ADMIN — INSULIN GLARGINE 60 UNITS: 100 INJECTION, SOLUTION SUBCUTANEOUS at 21:06

## 2020-02-24 RX ADMIN — LACTULOSE 10 G: 10 SOLUTION ORAL at 20:50

## 2020-02-24 RX ADMIN — METHYLPREDNISOLONE SODIUM SUCCINATE 40 MG: 40 INJECTION, POWDER, FOR SOLUTION INTRAMUSCULAR; INTRAVENOUS at 11:41

## 2020-02-24 RX ADMIN — BUPROPION HYDROCHLORIDE 100 MG: 100 TABLET, FILM COATED, EXTENDED RELEASE ORAL at 11:40

## 2020-02-24 RX ADMIN — HEPARIN SODIUM 5000 UNITS: 5000 INJECTION, SOLUTION INTRAVENOUS; SUBCUTANEOUS at 21:06

## 2020-02-24 ASSESSMENT — PAIN DESCRIPTION - DESCRIPTORS: DESCRIPTORS: CONSTANT;ACHING

## 2020-02-24 ASSESSMENT — PAIN DESCRIPTION - LOCATION: LOCATION: BACK

## 2020-02-24 ASSESSMENT — PAIN SCALES - GENERAL
PAINLEVEL_OUTOF10: 10
PAINLEVEL_OUTOF10: 6
PAINLEVEL_OUTOF10: 6
PAINLEVEL_OUTOF10: 3

## 2020-02-24 ASSESSMENT — PAIN DESCRIPTION - ORIENTATION: ORIENTATION: LOWER

## 2020-02-24 ASSESSMENT — PAIN DESCRIPTION - ONSET: ONSET: ON-GOING

## 2020-02-24 ASSESSMENT — PAIN DESCRIPTION - FREQUENCY: FREQUENCY: CONTINUOUS

## 2020-02-24 NOTE — CARE COORDINATION
Consult received that pt is agreeable to go to a SNF. CM met with pt to discuss d/c plan. Pt states that she is agreeable to go to a SNF. CM informed pt that she cannot refuse to work with PT/OT d/t her insurance will not approve a SNF if she refuses to work with PT/OT. Pt voices understanding and states that she did no refuse to work with PT. She states that she just asked them to come back later. SNF list of in network providers and the Medicare star rating list provided. Pt has [facility]. A pre-cert can be obtained within a couple of hours. CM to f/u for SNF choice tomorrow.   TE

## 2020-02-24 NOTE — PROGRESS NOTES
Daily Progress Note     awake alert feeling better  No chest pain  Heart rate and BP stable  Hx of CAD and HFrEF-EF improved to 40% range  Need good BP control  Obesity and COPD  K is elevated per renal \  Home when ok with primary team      Will change to palvix, as this may be causing some dyspnea   Not on ACEI due to renal insuffiencey   EF 40% range   Need med compliance and diet   She is on oxygen      DICTATED -[id number]  LEFT MAIIN PATENT  LAD MID 80% TO 0% MEGAN XIENCE STENT 2.5X18MM STENT  LCX MILD DX  OM 90% TO 0% MEGAN XIENCE 2.25X33 MM STENT  LVEDP 35  RCA %  ASA AND BRIANTA AND HEPARIN  RIGHT BRACHIAL  NO COMPLICATIONS         [facility]--6/05   This is a limited echocardiogram.   Left ventricular systolic function is normal.   Ejection fraction is visually estimated at 40-45%.   Mild left ventricular hypertrophy.   Lateral and inferior wall appear hypokinetic.   No evidence of any pericardial effusion.     The patient has a history of heart failure and coronary artery disease  present.  The patient's last echo was done back on 01/08/2020.  EF was  around 20% range present with pulmonary hypertension present.  The  patient also has severe 3-vessel coronary artery disease present. Mateo Topete  is referred for bypass and is high risk for surgery.  Therefore it was  declined and underwent angioplasty of the LAD and circumflex artery. RCA is 100% occluded.  The patient has ischemia in the inferior wall and  the plan was to do angioplasty of the right coronary artery.     PAST MEDICAL HISTORY:  Coronary artery disease, history of heart  failure, EF is around 30% range present. Mateo Topete did have angioplasty of  the right coronary artery in the past also.  Hypertension,  hyperlipidemia, COPD, renal insufficiency present, sees Dr. Tae Figueroa for  that[place] [facility] [place], COPD, and seizure disorder.       Objective:   /73   Pulse 96   Temp 98 °F (36.7 °C) (Oral)   Resp 16   Ht 5' 4\" (1.626 m)   Wt diastolic heart failure (HCC)      Priority: Low    CKD (chronic kidney disease) stage 3, GFR 30-59 ml/min ([facility])      Priority: Low    Hyperkalemia 02/23/2020    Uncontrolled diabetes mellitus with chronic kidney disease ([facility] 02/23/2020    Acute on chronic diastolic (congestive) heart failure ([address] 02/23/2020    COPD with acute exacerbation ([facility] 02/23/2020    Acute respiratory distress 02/16/2020    STEMI (ST elevation myocardial infarction) ([address] 11/23/2019    Cellulitis of abdominal wall 07/13/2019    Acute kidney injury ([address] 04/29/2019    DM (diabetes mellitus) ([address] 04/29/2019    Fluid overload 04/29/2019    Cor pulmonale (chronic) ([address] 04/29/2019    Anasarca 04/23/2019    UTI (urinary tract infection), bacterial 03/15/2019    Sinus tachycardia 03/15/2019    Uncontrolled type 2 diabetes mellitus with hyperglycemia ([address] 03/15/2019    Class 3 severe obesity due to excess calories with body mass index (BMI) of 45.0 to 49.9 in adult [facility]) 03/15/2019    Pleural effusion on left 02/28/2019    Hyperglycemia 11/17/2018    Acute respiratory failure ([address] 10/02/2018    Gait disturbance 09/21/2018    Debility 09/19/2018       Becca Obrien MD 2/24/2020 9:23 AM

## 2020-02-24 NOTE — PROGRESS NOTES
Progress Note( Dr. Sade Meade)  2/24/2020  Subjective:   Admit Date: 2/22/2020  PCP: Odell Schmitz MD    Admitted For :    Consulted For: Severe shortness of breath atrial flutter fibrillation with rapid ventricular response  Also has been severe hyperglycemia    Interval History: Better control of blood glucose initially blood glucose was over 500 on admission    Denies any chest pains,   Still has SOB . On breathing treatment   denies nausea or vomiting. Eating well baby too well  No new bowel or bladder symptoms.        Intake/Output Summary (Last 24 hours) at 2/24/2020 0747  Last data filed at 2/23/2020 1806  Gross per 24 hour   Intake 900 ml   Output --   Net 900 ml       DATA    CBC:   Recent Labs     02/22/20 0305 02/22/20  0651   WBC 13.8* 16.4*   HGB 11.1* 10.1*    270    CMP:  Recent Labs     02/22/20 0305 02/22/20  0651 02/23/20  0251   * 137 130*   K 5.7  K CALLED TO DR WHARTON 2/22 0341 BY ANTOINETTE JIMÉNEZ  * 6.2  K CALLED TO Blanca Parisi RN @ [phone] [facility] MLT  RESULTS READ BACK  * 5.3*   CL 93* 97* 88*   CO2 26 27 25   BUN 79* 80* 75*   CREATININE 2.4* 2.5* 2.1*   CALCIUM 9.2 8.7 8.9   PROT 7.3  --   --    LABALBU 4.1  --   --    BILITOT 0.3  --   --    ALKPHOS 117  --   --    AST 17  --   --    ALT 14  --   --      Lipids:   Lab Results   Component Value Date    CHOL 154 02/23/2020    HDL 39 02/23/2020    TRIG 268 02/23/2020     Glucose:  Recent Labs     02/23/20 2025 02/24/20  0242 02/24/20  0623   POCGLU 390* 274* 227*     [id number]:  Lab Results   Component Value Date    LABA1C 8.6 02/22/2020     High Sensitivity TSH:   Lab Results   Component Value Date    TSHHS 2.080 02/22/2020     Free T3: No results found for: FT3  Free T4:  Lab Results   Component Value Date    T4FREE 1.11 02/28/2019       Xr Chest Portable    Result Date: 2/22/2020  EXAMINATION: ONE XRAY VIEW OF THE CHEST 2/22/2020 3:02 am COMPARISON: 02/16/2020 HISTORY: ORDERING SYSTEM PROVIDED HISTORY: chest pain place and time. Cranial nerves II-XII are grossly intact. Motor is  intact. Sensory i neuropathy t.,  and gait is abnormal.    Assessment:     Patient Active Problem List:     CKD (chronic kidney disease) stage 3, GFR 30-59 ml/min ([facility])     CAD (coronary artery disease)     Chronic diastolic heart failure (HCC)     Morbid obesity with BMI of 50.0-59.9, adult ([facility])     Musculoskeletal chest pain     Essential hypertension     Diabetes mellitus with hyperglycemia ([facility])     Syncope     DM (diabetes mellitus), secondary uncontrolled ([facility])     Generalized anxiety disorder     Gait disturbance     Acute respiratory failure ([facility])     Hyperglycemia     UTI (urinary tract infection), bacterial     Sinus tachycardia     Uncontrolled type 2 diabetes mellitus with hyperglycemia ([facility])     Class 3 severe obesity due to excess calories with body mass index (BMI) of 45.0 to 49.9 in adult [facility])     Acute kidney injury ([facility] [address]     DM (diabetes mellitus) ([facility])     Fluid overload     Cor pulmonale (chronic) ([facility])     Cellulitis of abdominal wall     STEMI (ST elevation myocardial infarction) ([facility] [address]     Acute respiratory distress     Hyperkalemia     Uncontrolled diabetes mellitus with chronic kidney disease (HCC)     Acute on chronic diastolic (congestive) heart failure (HCC)     COPD with acute exacerbation ([facility]      Plan:     1. Reviewed POC blood glucose . Labs and X ray results   2. Reviewed Current Medicines   3. On meal/ Correction bolus Humalog/ Basal Lantus Insulin regime /  4. Monitor Blood glucose frequently   5. Modified  the dose of Insulin/ other medicines as needed   6. Will follow     .      Philippe Marshall MD

## 2020-02-24 NOTE — CARE COORDINATION
Spoke with patient at bedside regarding discharge plan . CM let her know that therapy is recommending either SNF vs home with [facility]. Patient choice home with [facility] and wanted [facility].
cookbook provided    Fluid Restriction:  [x] Yes  [] No    Proper use of digital scale:  [x] Yes  [] No  I provided the patient with a scale:  [x] Yes  [] No (Patient has a functioning scale at home)     Reading a medication bottle:  [] Yes  [x] No (Why?):    Exercise/Cardiac Rehab review:  [x] Yes  [] No   Consult placed for Cardiac Rehab: (EF less than 40%)  [] Yes  [x] No    Pacemaker/ Lifevest/ ICD review:  [x] Yes  [] No     Date: 20            Time: 2:20  CN visit done. Patient is receiving patient care. Introduced myself and explained my role as CN. Educational information and my contact information provided. CN will follow. Date:20     Time: 3:15  Time spent educatin minutes  Patient is being discharged. CN visit done. She is known to me from a previous admit  And was last educated 19. CN provided Teaching following the Heart Failure book, the HF Zones, and the Sodium Content pamphlet. We reviewed the HF zones, signs and symptoms to report on day 1 of onset, medication compliance, daily weights, low sodium diet, limiting fluids and smoking cessation. The patient's contributing risk factors for heart failure are identified as:CKD3, HFpEF, CAD, HTN and STEMI. Advised patient you can reduce the risk for heart failure exacerbations by modifying/controlling the risk factors they have. Pt informed to take medications as prescribed, follow a cardiac heart healthy / low sodium diet, limit fluids, exercise regularly and not to smoke. Appointment made with the nurse at Dr Jesse Camilo office continued CHF care. Patients O2 makes a loud humming noise. [facility] DME called and they will visit and check it on Monday. All questions verbalized were answered. CN will be available if needs arise.     Discharge Checklist:  Destination:  [x] Home  [] SNF  [] LTC  Additional support:  [x] Maure[facility] [address] [place] [address]  [] Hospice                                   Home medication review:  ACE/ARB/ARNI   (EF < 40%)

## 2020-02-24 NOTE — PROGRESS NOTES
How Severe Is Your Skin Lesion?: moderate 136/73   Pulse 96   Temp 98 °F (36.7 °C) (Oral)   Resp 16   Ht 5' 4\" (1.626 m)   Wt 272 lb 6.4 oz (123.6 kg)   SpO2 97%   BMI 46.76 kg/m²     General appearance:  Obese, no ac distress  HEENT:  Mild conj pallor   Neck:  supple  Lungs:  No gross crackles  Heart:  Seems irregular  Abdomen: soft  Extremities:  No gross edema       Problem List :         Impression :     1. JUAN J/ CKD stage G3b/ A3- no BMP today   2. K was < 5 but has very high BS which can drine high K   3. DM/ASCVD/HTN / obesity etc     Recommendation/Plan  :     1. Some high BS from IV steroid so I would consider d/C - as she does not seem to have much bronchospasm and risk likely out weigh the benefit  2. Review BMP  3. Watch UOP/ low salt/ daily wt   4. Follow clinically  5.  Will Babin MD Have Your Skin Lesions Been Treated?: not been treated Is This A New Presentation, Or A Follow-Up?: Skin Lesions

## 2020-02-24 NOTE — PLAN OF CARE
Problem: Falls - Risk of:  Goal: Will remain free from falls  Description  Will remain free from falls  Outcome: Ongoing  Goal: Absence of physical injury  Description  Absence of physical injury  Outcome: Ongoing     Problem: Risk for Impaired Skin Integrity  Goal: Tissue integrity - skin and mucous membranes  Description  Structural intactness and normal physiological function of skin and  mucous membranes. Outcome: Ongoing     Problem: Pain:  Goal: Pain level will decrease  Description  Pain level will decrease  Outcome: Ongoing  Goal: Control of acute pain  Description  Control of acute pain  Outcome: Ongoing  Goal: Control of chronic pain  Description  Control of chronic pain  Outcome: Ongoing     Problem:  Activity:  Goal: Risk for activity intolerance will decrease  Description  Risk for activity intolerance will decrease  Outcome: Ongoing     Problem: Fluid Volume:  Goal: Ability to maintain a balanced intake and output will improve  Description  Ability to maintain a balanced intake and output will improve  Outcome: Ongoing     Problem: Health Behavior:  Goal: Ability to identify and utilize available resources and services will improve  Description  Ability to identify and utilize available resources and services will improve  Outcome: Ongoing  Goal: Ability to manage health-related needs will improve  Description  Ability to manage health-related needs will improve  Outcome: Ongoing     Problem: Metabolic:  Goal: Ability to maintain appropriate glucose levels will improve  Description  Ability to maintain appropriate glucose levels will improve  Outcome: Ongoing     Problem: Nutritional:  Goal: Maintenance of adequate nutrition will improve  Description  Maintenance of adequate nutrition will improve  Outcome: Ongoing  Goal: Progress toward achieving an optimal weight will improve  Description  Progress toward achieving an optimal weight will improve  Outcome: Ongoing     Problem: Physical Regulation:  Goal: Complications related to the disease process, condition or treatment will be avoided or minimized  Description  Complications related to the disease process, condition or treatment will be avoided or minimized  Outcome: Ongoing  Goal: Diagnostic test results will improve  Description  Diagnostic test results will improve  Outcome: Ongoing     Problem: Skin Integrity:  Goal: Risk for impaired skin integrity will decrease  Description  Risk for impaired skin integrity will decrease  Outcome: Ongoing     Problem: Tissue Perfusion:  Goal: Adequacy of tissue perfusion will improve  Description  Adequacy of tissue perfusion will improve  Outcome: Ongoing     Problem: Tissue Perfusion:  Goal: Adequacy of tissue perfusion will improve  Description  Adequacy of tissue perfusion will improve  Outcome: Ongoing

## 2020-02-25 LAB
ANION GAP SERPL CALCULATED.3IONS-SCNC: 13 MMOL/L (ref 4–16)
BUN BLDV-MCNC: 72 MG/DL (ref 6–23)
CALCIUM SERPL-MCNC: 9 MG/DL (ref 8.3–10.6)
CHLORIDE BLD-SCNC: 94 MMOL/L (ref 99–110)
CO2: 28 MMOL/L (ref 21–32)
CREAT SERPL-MCNC: 2 MG/DL (ref 0.6–1.1)
GFR AFRICAN AMERICAN: 31 ML/MIN/1.73M2
GFR NON-AFRICAN AMERICAN: 26 ML/MIN/1.73M2
GLUCOSE BLD-MCNC: 130 MG/DL (ref 70–99)
GLUCOSE BLD-MCNC: 258 MG/DL (ref 70–99)
GLUCOSE BLD-MCNC: 341 MG/DL (ref 70–99)
GLUCOSE BLD-MCNC: 352 MG/DL (ref 70–99)
GLUCOSE BLD-MCNC: 395 MG/DL (ref 70–99)
GLUCOSE BLD-MCNC: 413 MG/DL (ref 70–99)
MAGNESIUM: 2.5 MG/DL (ref 1.8–2.4)
POTASSIUM SERPL-SCNC: 5.4 MMOL/L (ref 3.5–5.1)
PRO-BNP: ABNORMAL PG/ML
SODIUM BLD-SCNC: 135 MMOL/L (ref 135–145)

## 2020-02-25 PROCEDURE — 97162 PT EVAL MOD COMPLEX 30 MIN: CPT

## 2020-02-25 PROCEDURE — 94761 N-INVAS EAR/PLS OXIMETRY MLT: CPT

## 2020-02-25 PROCEDURE — 6370000000 HC RX 637 (ALT 250 FOR IP): Performed by: INTERNAL MEDICINE

## 2020-02-25 PROCEDURE — 2700000000 HC OXYGEN THERAPY PER DAY

## 2020-02-25 PROCEDURE — 83880 ASSAY OF NATRIURETIC PEPTIDE: CPT

## 2020-02-25 PROCEDURE — 6360000002 HC RX W HCPCS: Performed by: INTERNAL MEDICINE

## 2020-02-25 PROCEDURE — 82962 GLUCOSE BLOOD TEST: CPT

## 2020-02-25 PROCEDURE — 80048 BASIC METABOLIC PNL TOTAL CA: CPT

## 2020-02-25 PROCEDURE — 97116 GAIT TRAINING THERAPY: CPT

## 2020-02-25 PROCEDURE — 2140000000 HC CCU INTERMEDIATE R&B

## 2020-02-25 PROCEDURE — 36415 COLL VENOUS BLD VENIPUNCTURE: CPT

## 2020-02-25 PROCEDURE — 83735 ASSAY OF MAGNESIUM: CPT

## 2020-02-25 PROCEDURE — 96376 TX/PRO/DX INJ SAME DRUG ADON: CPT

## 2020-02-25 PROCEDURE — 2580000003 HC RX 258: Performed by: INTERNAL MEDICINE

## 2020-02-25 PROCEDURE — 97165 OT EVAL LOW COMPLEX 30 MIN: CPT

## 2020-02-25 PROCEDURE — 94640 AIRWAY INHALATION TREATMENT: CPT

## 2020-02-25 PROCEDURE — 97530 THERAPEUTIC ACTIVITIES: CPT

## 2020-02-25 PROCEDURE — 96372 THER/PROPH/DIAG INJ SC/IM: CPT

## 2020-02-25 RX ORDER — TORSEMIDE 20 MG/1
20 TABLET ORAL 2 TIMES DAILY
Status: DISCONTINUED | OUTPATIENT
Start: 2020-02-25 | End: 2020-02-26 | Stop reason: HOSPADM

## 2020-02-25 RX ADMIN — BUPROPION HYDROCHLORIDE 100 MG: 100 TABLET, FILM COATED, EXTENDED RELEASE ORAL at 22:08

## 2020-02-25 RX ADMIN — LACTULOSE 10 G: 10 SOLUTION ORAL at 22:07

## 2020-02-25 RX ADMIN — POLYETHYLENE GLYCOL (3350) 17 G: 17 POWDER, FOR SOLUTION ORAL at 22:08

## 2020-02-25 RX ADMIN — TORSEMIDE 20 MG: 20 TABLET ORAL at 08:30

## 2020-02-25 RX ADMIN — METHYLPREDNISOLONE SODIUM SUCCINATE 20 MG: 40 INJECTION, POWDER, FOR SOLUTION INTRAMUSCULAR; INTRAVENOUS at 08:21

## 2020-02-25 RX ADMIN — HEPARIN SODIUM 5000 UNITS: 5000 INJECTION, SOLUTION INTRAVENOUS; SUBCUTANEOUS at 22:11

## 2020-02-25 RX ADMIN — AMLODIPINE BESYLATE 5 MG: 5 TABLET ORAL at 08:19

## 2020-02-25 RX ADMIN — HYDROCODONE BITARTRATE AND ACETAMINOPHEN 2 TABLET: 5; 325 TABLET ORAL at 18:39

## 2020-02-25 RX ADMIN — IPRATROPIUM BROMIDE AND ALBUTEROL SULFATE 1 AMPULE: .5; 3 SOLUTION RESPIRATORY (INHALATION) at 20:43

## 2020-02-25 RX ADMIN — LACTULOSE 10 G: 10 SOLUTION ORAL at 10:32

## 2020-02-25 RX ADMIN — SODIUM CHLORIDE, PRESERVATIVE FREE 10 ML: 5 INJECTION INTRAVENOUS at 22:10

## 2020-02-25 RX ADMIN — CARVEDILOL 12.5 MG: 12.5 TABLET, FILM COATED ORAL at 08:20

## 2020-02-25 RX ADMIN — DULOXETINE HYDROCHLORIDE 60 MG: 30 CAPSULE, DELAYED RELEASE ORAL at 08:19

## 2020-02-25 RX ADMIN — RANOLAZINE 500 MG: 500 TABLET, FILM COATED, EXTENDED RELEASE ORAL at 22:08

## 2020-02-25 RX ADMIN — CLOPIDOGREL BISULFATE 75 MG: 75 TABLET ORAL at 08:19

## 2020-02-25 RX ADMIN — ATORVASTATIN CALCIUM 80 MG: 40 TABLET, FILM COATED ORAL at 22:07

## 2020-02-25 RX ADMIN — IPRATROPIUM BROMIDE AND ALBUTEROL SULFATE 1 AMPULE: .5; 3 SOLUTION RESPIRATORY (INHALATION) at 15:18

## 2020-02-25 RX ADMIN — IPRATROPIUM BROMIDE AND ALBUTEROL SULFATE 1 AMPULE: .5; 3 SOLUTION RESPIRATORY (INHALATION) at 07:51

## 2020-02-25 RX ADMIN — HYDROCODONE BITARTRATE AND ACETAMINOPHEN 2 TABLET: 5; 325 TABLET ORAL at 06:49

## 2020-02-25 RX ADMIN — LEVOTHYROXINE SODIUM 50 MCG: 0.05 TABLET ORAL at 08:30

## 2020-02-25 RX ADMIN — INSULIN HUMAN 20 UNITS: 100 INJECTION, SUSPENSION SUBCUTANEOUS at 08:17

## 2020-02-25 RX ADMIN — ISOSORBIDE MONONITRATE 60 MG: 30 TABLET, EXTENDED RELEASE ORAL at 08:19

## 2020-02-25 RX ADMIN — CARVEDILOL 12.5 MG: 12.5 TABLET, FILM COATED ORAL at 16:33

## 2020-02-25 RX ADMIN — HEPARIN SODIUM 5000 UNITS: 5000 INJECTION, SOLUTION INTRAVENOUS; SUBCUTANEOUS at 06:49

## 2020-02-25 RX ADMIN — HEPARIN SODIUM 5000 UNITS: 5000 INJECTION, SOLUTION INTRAVENOUS; SUBCUTANEOUS at 14:22

## 2020-02-25 RX ADMIN — BUPROPION HYDROCHLORIDE 100 MG: 100 TABLET, FILM COATED, EXTENDED RELEASE ORAL at 08:19

## 2020-02-25 RX ADMIN — POLYETHYLENE GLYCOL (3350) 17 G: 17 POWDER, FOR SOLUTION ORAL at 08:20

## 2020-02-25 RX ADMIN — HYDROCODONE BITARTRATE AND ACETAMINOPHEN 2 TABLET: 5; 325 TABLET ORAL at 01:36

## 2020-02-25 RX ADMIN — TORSEMIDE 20 MG: 20 TABLET ORAL at 22:07

## 2020-02-25 RX ADMIN — PANTOPRAZOLE SODIUM 40 MG: 40 TABLET, DELAYED RELEASE ORAL at 06:49

## 2020-02-25 RX ADMIN — INSULIN GLARGINE 20 UNITS: 100 INJECTION, SOLUTION SUBCUTANEOUS at 22:00

## 2020-02-25 RX ADMIN — AMLODIPINE BESYLATE 5 MG: 5 TABLET ORAL at 22:08

## 2020-02-25 RX ADMIN — METHYLPREDNISOLONE SODIUM SUCCINATE 20 MG: 40 INJECTION, POWDER, FOR SOLUTION INTRAMUSCULAR; INTRAVENOUS at 22:07

## 2020-02-25 RX ADMIN — IPRATROPIUM BROMIDE AND ALBUTEROL SULFATE 1 AMPULE: .5; 3 SOLUTION RESPIRATORY (INHALATION) at 12:08

## 2020-02-25 RX ADMIN — RANOLAZINE 500 MG: 500 TABLET, FILM COATED, EXTENDED RELEASE ORAL at 08:20

## 2020-02-25 RX ADMIN — ASPIRIN 81 MG 81 MG: 81 TABLET ORAL at 08:20

## 2020-02-25 ASSESSMENT — PAIN SCALES - GENERAL
PAINLEVEL_OUTOF10: 5
PAINLEVEL_OUTOF10: 5
PAINLEVEL_OUTOF10: 9
PAINLEVEL_OUTOF10: 4

## 2020-02-25 ASSESSMENT — PAIN DESCRIPTION - ORIENTATION: ORIENTATION: LOWER

## 2020-02-25 ASSESSMENT — PAIN DESCRIPTION - PAIN TYPE: TYPE: CHRONIC PAIN

## 2020-02-25 ASSESSMENT — PAIN DESCRIPTION - PROGRESSION: CLINICAL_PROGRESSION: NOT CHANGED

## 2020-02-25 ASSESSMENT — PAIN DESCRIPTION - DESCRIPTORS: DESCRIPTORS: ACHING

## 2020-02-25 ASSESSMENT — PAIN DESCRIPTION - LOCATION: LOCATION: BACK

## 2020-02-25 NOTE — PROGRESS NOTES
I&O not accurate for this pt d/t pt and pts  getting her drinks and snacks and taking her to the bathroom and emptying hat without telling nursing staff. This nurse educated pt on importance of limiting or at the very least keeping track of her intake and measuring her output.  Pt voiced understanding and stated that \"everyone just lectures me in here\"

## 2020-02-25 NOTE — PROGRESS NOTES
Daily Progress Note    Overall doing ok  No chest pain-dyspnea stable  Going to ECF  She is not happy with that  Need good diet and fluid restriction   Hx of CAd and PCI keep on antiplatelets  Diuretics per renal   K is elevated  EF improved to 40%       Pt. Awake, alert and feeling ok  HR stable, NSR, BP with diastolic elevated  Mild CP today, and SOB    Acute on Chronic HFrEF    EF 40% range-improved from previous    Ischemic CMP    BNP elevated today-adjusted diuretic    On Coreg- May be able to titrate up tomorrow, see how BP does with diuretic adjustment    No ACE/ ARB/ aldactone d/t kidneys    Hx of CAD s/p PCI    Recent PCI    On DAPT, statin, BB    Stable for now    Will cont. To watch    CKD    Cr 2.0    Renal following    Will cont. To follow    ACE/ ARB: No, D/t elevated Cr. Can this be changed to ARNI: No    B-blocker Yes    Persistently symptomatic AA with NYHA class III-IV  EF < 35 despite being on ACE/ ARB/ARNI: No  hydralazine + Nitrate On imdur, may start hydralazine soon    Loop Diuretic Yes    NYHA class II-IV with eGFR >30/mil/min/173.m2 and K <5.0 mEq/l   mineralcorctcoid receptor antagonist (aldactone/ eplerenone) in addition to ACE or ARB and in conjunction with B blocker  No, d/t renal insuff.     HR greater than 70 with patient with LVEF  < 35 No  Able to use Ivabradine No    DICTATED -[id number]  LEFT MAIIN PATENT  LAD MID 80% TO 0% MEGAN XIENCE STENT 2.5X18MM STENT  LCX MILD DX  OM 90% TO 0% MEGAN XIENCE 2.25X33 MM STENT  LVEDP 35  RCA %  ASA AND BRIANTA AND HEPARIN  RIGHT BRACHIAL  NO COMPLICATIONS         [facility]--3/18   This is a limited echocardiogram.   Left ventricular systolic function is normal.   Ejection fraction is visually estimated at 40-45%.   Mild left ventricular hypertrophy.   Lateral and inferior wall appear hypokinetic.   No evidence of any pericardial effusion.     The patient has a history of heart failure and coronary artery disease  present.  The patient's last echo was done back on 01/08/2020.  EF was  around 20% range present with pulmonary hypertension present.  The  patient also has severe 3-vessel coronary artery disease present. Minal Barron  is referred for bypass and is high risk for surgery.  Therefore it was  declined and underwent angioplasty of the LAD and circumflex artery. RCA is 100% occluded.  The patient has ischemia in the inferior wall and  the plan was to do angioplasty of the right coronary artery.     PAST MEDICAL HISTORY:  Coronary artery disease, history of heart  failure, EF is around 30% range present. Minal Barron did have angioplasty of  the right coronary artery in the past also.  Hypertension,  hyperlipidemia, COPD, renal insufficiency present, sees Dr. Chente Burns for  that. Nayeli Foil of sleep apnea, COPD, and seizure disorder.     Objective:   /60   Pulse 88   Temp 97.8 °F (36.6 °C) (Oral)   Resp 18   Ht 5' 4\" (1.626 m)   Wt 272 lb 6.4 oz (123.6 kg)   SpO2 97%   BMI 46.76 kg/m²       Intake/Output Summary (Last 24 hours) at 2/25/2020 1025  Last data filed at 2/25/2020 0200  Gross per 24 hour   Intake 600 ml   Output --   Net 600 ml       Medications:   Scheduled Meds:   insulin lispro  25 Units Subcutaneous TID WC    torsemide  20 mg Oral BID    methylPREDNISolone  20 mg Intravenous Q12H    insulin glargine  60 Units Subcutaneous Nightly    clopidogrel  75 mg Oral Daily    amLODIPine  5 mg Oral BID    insulin lispro  0-24 Units Subcutaneous TID WC    insulin lispro  0-24 Units Subcutaneous 2 times per day    insulin NPH  30 Units Subcutaneous Once    aspirin  81 mg Oral Daily    atorvastatin  80 mg Oral Nightly    buPROPion  100 mg Oral BID    carvedilol  12.5 mg Oral BID WC    DULoxetine  60 mg Oral Daily    isosorbide mononitrate  60 mg Oral Daily    lactulose  10 g Oral BID    levothyroxine  50 mcg Oral Daily    pantoprazole  40 mg Oral QAM AC    polyethylene glycol  17 g Oral BID    ranolazine  500 mg Oral BID    sodium chloride flush  10 mL Intravenous 2 times per day    ipratropium-albuterol  1 ampule Inhalation Q4H WA    heparin (porcine)  5,000 Units Subcutaneous 3 times per day      Infusions:   dextrose        PRN Meds:  nitroGLYCERIN, albuterol sulfate HFA, HYDROcodone-acetaminophen, sodium chloride flush, acetaminophen, acetaminophen, polyethylene glycol, promethazine, ondansetron, glucose, dextrose, glucagon (rDNA), dextrose       Physical Exam:  Vitals:    02/25/20 0800   BP: 119/60   Pulse:    Resp:    Temp: 97.8 °F (36.6 °C)   SpO2:         General: AAO, NAD  Chest: Nontender  Cardiac: First and Second Heart Sounds are Normal, No Murmurs or Gallops noted  Lungs:Clear to auscultation and percussion. Abdomen: Soft, NT, ND, +BS  Extremities: No clubbing, no edema  Vascular:  Equal 2+ peripheral pulses. Lab Data:  CBC:   Recent Labs     02/24/20  0755        BMP:   Recent Labs     02/23/20  0251 02/24/20  0755 02/25/20  0444   * 139 135   K 5.3* 5.3* 5.4*   CL 88* 96* 94*   CO2 25 30 28   BUN 75* 68* 72*   CREATININE 2.1* 1.7* 2.0*     LIVER PROFILE: No results for input(s): AST, ALT, LIPASE, BILIDIR, BILITOT, ALKPHOS in the last 72 hours. Invalid input(s): AMYLASE,  ALB  PT/INR: No results for input(s): PROTIME, INR in the last 72 hours. APTT:   Recent Labs     02/22/20  1245 02/22/20  1640   APTT 34.8 33.8     BNP:  No results for input(s): BNP in the last 72 hours.       Assessment:  Patient Active Problem List    Diagnosis Date Noted    E. coli UTI (urinary tract infection) 07/22/2016     Priority: High    Sepsis associated hypotension, POA 07/21/2016     Priority: High    Sepsis secondary to UTI, POA 07/20/2016     Priority: High    Musculoskeletal chest pain 07/20/2016     Priority: Medium    Diabetes mellitus with hyperglycemia ([facility] 07/20/2016     Priority: Medium    Severe dehydration secondary to significant hyperglycemia 07/20/2016     Priority: Medium    Generalized weakness 07/20/2016     Priority: Medium    Elevated lactic acid level, resolved 07/20/2016     Priority: Low    Hyperkalemia 02/23/2020    Uncontrolled diabetes mellitus with chronic kidney disease ([address] 02/23/2020    Acute on chronic diastolic (congestive) heart failure ([address] 02/23/2020    COPD with acute exacerbation ([address] 02/23/2020    Acute respiratory distress 02/16/2020    STEMI (ST elevation myocardial infarction) ([address] 11/23/2019    Cellulitis of abdominal wall 07/13/2019    Acute kidney injury ([address] 04/29/2019    DM (diabetes mellitus) ([address] 04/29/2019    Fluid overload 04/29/2019    Cor pulmonale (chronic) ([address] 04/29/2019    Anasarca 04/23/2019    UTI (urinary tract infection), bacterial 03/15/2019    Sinus tachycardia 03/15/2019    Uncontrolled type 2 diabetes mellitus with hyperglycemia ([address] 03/15/2019    Class 3 severe obesity due to excess calories with body mass index (BMI) of 45.0 to 49.9 in adult ([address] 03/15/2019    Pleural effusion on left 02/28/2019    Hyperglycemia 11/17/2018    Acute respiratory failure ([address] 10/02/2018    Gait disturbance 09/21/2018    Debility 09/19/2018    Nausea & vomiting 04/05/2018    Fever 04/05/2018    Generalized anxiety disorder 01/08/2018    DM (diabetes mellitus), secondary uncontrolled ([address] 01/04/2018    Syncope 08/26/2016    Acute pain of right shoulder 08/26/2016    Hypotension 08/26/2016    Acute renal failure, POA 07/21/2016    Diabetes type 2, uncontrolled ([address] 07/20/2016    Morbid obesity with BMI of 50.0-59.9, adult ([address] 07/20/2016    Essential hypertension     CAD (coronary artery disease)     Chronic diastolic heart failure (HCC)     CKD (chronic kidney disease) stage 3, GFR 30-59 ml/min ([address]        Electronically signed by Evy Harper PA-C on 2/25/2020 at 10:25 AM

## 2020-02-25 NOTE — PLAN OF CARE
Problem: Falls - Risk of:  Goal: Will remain free from falls  Description  Will remain free from falls  2/25/2020 0157 by Kaya Arriaza RN  Outcome: Ongoing  2/24/2020 1757 by Ruperto Yanes RN  Outcome: Ongoing  Goal: Absence of physical injury  Description  Absence of physical injury  2/25/2020 0157 by Kaya Arriaza RN  Outcome: Ongoing  2/24/2020 1757 by Ruperto Yanes RN  Outcome: Ongoing     Problem: Risk for Impaired Skin Integrity  Goal: Tissue integrity - skin and mucous membranes  Description  Structural intactness and normal physiological function of skin and  mucous membranes. 2/24/2020 1757 by Ruperto Yanes RN  Outcome: Ongoing     Problem: Pain:  Goal: Pain level will decrease  Description  Pain level will decrease  2/25/2020 0157 by Kaya Arriaza RN  Outcome: Ongoing  2/24/2020 1757 by Ruperto Yanes RN  Outcome: Ongoing  Goal: Control of acute pain  Description  Control of acute pain  2/25/2020 0157 by Kaya Arriaza RN  Outcome: Ongoing  2/24/2020 1757 by Ruperto Yanes RN  Outcome: Ongoing  Goal: Control of chronic pain  Description  Control of chronic pain  2/25/2020 0157 by Kaya Arriaza RN  Outcome: Ongoing  2/24/2020 1757 by Ruperto Yanes RN  Outcome: Ongoing     Problem:  Activity:  Goal: Risk for activity intolerance will decrease  Description  Risk for activity intolerance will decrease  2/24/2020 1757 by Ruperto Yanes RN  Outcome: Ongoing     Problem: Coping:  Goal: Ability to adjust to condition or change in health will improve  Description  Ability to adjust to condition or change in health will improve  2/24/2020 1757 by Ruperto Yanes RN  Outcome: Ongoing     Problem: Fluid Volume:  Goal: Ability to maintain a balanced intake and output will improve  Description  Ability to maintain a balanced intake and output will improve  2/24/2020 1757 by Ruperto Yanes RN  Outcome: Ongoing     Problem: Health Behavior:  Goal: Ability to identify and utilize available resources and services will improve  Description  Ability to identify and utilize available resources and services will improve  2/24/2020 1757 by Payton Lopez RN  Outcome: Ongoing  Goal: Ability to manage health-related needs will improve  Description  Ability to manage health-related needs will improve  2/24/2020 1757 by Payton Lopez RN  Outcome: Ongoing     Problem: Metabolic:  Goal: Ability to maintain appropriate glucose levels will improve  Description  Ability to maintain appropriate glucose levels will improve  2/24/2020 1757 by Payton Lopez RN  Outcome: Ongoing     Problem: Nutritional:  Goal: Maintenance of adequate nutrition will improve  Description  Maintenance of adequate nutrition will improve  2/24/2020 1757 by Payton Lopez RN  Outcome: Ongoing  Goal: Progress toward achieving an optimal weight will improve  Description  Progress toward achieving an optimal weight will improve  2/24/2020 1757 by Payton Lopez RN  Outcome: Ongoing     Problem: Physical Regulation:  Goal: Complications related to the disease process, condition or treatment will be avoided or minimized  Description  Complications related to the disease process, condition or treatment will be avoided or minimized  2/24/2020 1757 by Payton Lopez RN  Outcome: Ongoing  Goal: Diagnostic test results will improve  Description  Diagnostic test results will improve  2/24/2020 1757 by Payton Lopez RN  Outcome: Ongoing     Problem: Skin Integrity:  Goal: Risk for impaired skin integrity will decrease  Description  Risk for impaired skin integrity will decrease  2/24/2020 1757 by Payton Lopez RN  Outcome: Ongoing     Problem: Tissue Perfusion:  Goal: Adequacy of tissue perfusion will improve  Description  Adequacy of tissue perfusion will improve  2/24/2020 1757 by Payton Lopez RN  Outcome: Ongoing

## 2020-02-25 NOTE — PROGRESS NOTES
[facility]    Hospitalist Progress Note      Name:  James Peña /Age/Sex: 1960  (61 y.o. female)   MRN & CSN:  [id number] & [id number] Admission Date/Time: 2020  2:32 AM   Location:  [address] [place] [address] PCP: Juan Cash MD         Hospital Day: 4    Assessment and Plan:   James Peña is a 61 y.o.  female  who presents with shortness of breath     Hyperkalemia / CKD stage III     Improved with diuresis and Lokelma  Monitoring BMP    Nephrology following     Chronic systolic CHF - no sign of exacerbation      ECHO with LV EF 40 to 45%. , Chronically on 2 L of oxygen continuous at home  On Bumex PO BIX  Continue with Coreg, [address] [place] [address]  Cardiology following      CAD s/p PCI on 20 - On aspirin/Brilinta/Ranexa/Coreg/Lipitor.  No acute issues     CKD 3/4  - CR close to baseline. nephrology following     Chronic conditions  Hypertension  Chronic hypoxic respiratory failure--on 2 L of oxygen   Hypothyroidism   DM2  Tobacco use disorder  Morbid obesity   Hyperlipidemia  Anxiety/depression    Medically stable for DC to SNF when approved     Diet DIET RENAL;   DVT Prophylaxis [] Lovenox, []  Heparin, [] SCDs, []No VTE prophylaxis, patient ambulating   GI Prophylaxis [] PPI, [] H2 Blocker, [] No GI prophylaxis, patient is receiving diet/Tube Feeds   Code Status Full Code   Disposition Patient requires continued admission due to hyperkalemia, CHF   MDM [] Low, [x] Moderate,[]  High     History of Present Illness: Subjective     Patient Seen & Examined at the bedside     Patient is resting at the edge of her bed - no chest pain or SOB - discussed about DC planning - appears to be undecided     Ten point ROS reviewed negative, unless as noted above    Objective:        Intake/Output Summary (Last 24 hours) at 2020 1332  Last data filed at 2020 0200  Gross per 24 hour   Intake 600 ml   Output --   Net 600 ml      Vitals:   Vitals:    20 0800   BP: 119/60   Pulse:    Resp:    Temp: 97.8 °F (36.6 °C)   SpO2: Physical Exam:    GEN Awake female, resting in bed in no apparent distress. Appears given age. HENT Mucous membranes are moist.   RESP Clear to auscultation, no wheezes, rales or rhonchi. CARDIO/VASC -S1/S2 auscultated. Regular rate without appreciable murmurs, rubs, or gallops. Peripheral pulses equal bilaterally and palpable. No peripheral edema. GI Abdomen is soft without significant tenderness, masses, or guarding. Bowel sounds are normoactive. Rectal exam deferred.  Reyes catheter is not present. MSK No gross joint deformities. Spontaneous movement of all extremities  SKIN Normal coloration, warm, dry.     Medications:   Medications:    insulin lispro  25 Units Subcutaneous TID WC    torsemide  20 mg Oral BID    methylPREDNISolone  20 mg Intravenous Q12H    insulin glargine  60 Units Subcutaneous Nightly    clopidogrel  75 mg Oral Daily    amLODIPine  5 mg Oral BID    insulin lispro  0-24 Units Subcutaneous TID WC    insulin lispro  0-24 Units Subcutaneous 2 times per day    insulin NPH  30 Units Subcutaneous Once    aspirin  81 mg Oral Daily    atorvastatin  80 mg Oral Nightly    buPROPion  100 mg Oral BID    carvedilol  12.5 mg Oral BID WC    DULoxetine  60 mg Oral Daily    isosorbide mononitrate  60 mg Oral Daily    lactulose  10 g Oral BID    levothyroxine  50 mcg Oral Daily    pantoprazole  40 mg Oral QAM AC    polyethylene glycol  17 g Oral BID    ranolazine  500 mg Oral BID    sodium chloride flush  10 mL Intravenous 2 times per day    ipratropium-albuterol  1 ampule Inhalation Q4H WA    heparin (porcine)  5,000 Units Subcutaneous 3 times per day      Infusions:    dextrose       PRN Meds: nitroGLYCERIN, 0.4 mg, Q5 Min PRN  albuterol sulfate HFA, 2 puff, Q4H PRN  HYDROcodone-acetaminophen, 2 tablet, Q4H PRN  sodium chloride flush, 10 mL, PRN  acetaminophen, 650 mg, Q6H PRN  acetaminophen, 650 mg, Q6H PRN  polyethylene glycol, 17 g, Daily PRN  promethazine, 12.5 mg, Q6H PRN  ondansetron, 4 mg, Q6H PRN  glucose, 15 g, PRN  dextrose, 12.5 g, PRN  glucagon (rDNA), 1 mg, PRN  dextrose, 100 mL/hr, PRN          Electronically signed by Suresh Pederson MD on 2/25/2020 at 1:32 PM

## 2020-02-25 NOTE — DISCHARGE INSTR - COC
Continuity of Care Form    Patient Name: Mandy Bañuelos   :  1960  MRN:  [id number]    Admit date:  2020  Discharge date:  ***    Code Status Order: Full Code   Advance Directives:   [facility] Documentation     Date/Time Healthcare Directive Type of Healthcare Directive Copy in [facility] [address] Agent's Name Healthcare Agent's Phone Number    [phone]  No, patient does not have an advance directive for healthcare treatment -- -- -- -- --          Admitting Physician:  Shelbie Salinas MD  PCP: Bridget Boyd MD    Discharging Nurse: [facility] Unit/Room#: 3109/3109-A  Discharging Unit Phone Number: ***    Emergency Contact:   Extended Emergency Contact Information  Primary Emergency Contact: Cristofer Rincon  Address: [address]           [address] Phone: [phone]  Relation: Domestic Partner    Past Surgical History:  Past Surgical History:   Procedure Laterality Date    CARDIAC SURGERY       SECTION      CHOLECYSTECTOMY      CORONARY ANGIOPLASTY WITH STENT PLACEMENT      OTHER SURGICAL HISTORY Right [id number]    I and D rt big toe    TONSILLECTOMY         Immunization History:   Immunization History   Administered Date(s) Administered    Influenza, Quadv, 6 mo and older, IM, PF (Flulaval, Fluarix) 2018    Influenza, Quadv, IM, PF (6 mo and older Fluzone, Flulaval, Fluarix, and 3 yrs and older Afluria) 2018, 2019    Pneumococcal Conjugate 13-valent (Noah Aver) 2019    Pneumococcal Polysaccharide ([id number]) 2018       Active Problems:  Patient Active Problem List   Diagnosis Code    CKD (chronic kidney disease) stage 3, GFR 30-59 ml/min ([facility]) N18.3    CAD (coronary artery disease) I25.10    Chronic diastolic heart failure ([facility] I50.32    Diabetes type 2, uncontrolled ([facility] E11.65    Morbid obesity with BMI of 50.0-59.9, adult ([facility] E66.01, Z68.43    Elevated lactic acid level, resolved R79.89    Musculoskeletal chest pain R07.89    Essential hypertension I10    Diabetes mellitus with hyperglycemia ([facility]) E11.65    Severe dehydration secondary to significant hyperglycemia E86.0    Sepsis secondary to UTI, POA A41.9, N39.0    Generalized weakness R53.1    Sepsis associated hypotension, POA A41.9, I95.9    Acute renal failure, POA N17.9    E. coli UTI (urinary tract infection) N39.0, B96.20    Syncope R55    Acute pain of right shoulder M25.511    Hypotension I95.9    DM (diabetes mellitus), secondary uncontrolled ([facility]) E13.65    Generalized anxiety disorder F41.1    Nausea & vomiting R11.2    Fever R50.9    Debility R53.81    Gait disturbance R26.9    Acute respiratory failure ([facility]) J96.00    Hyperglycemia R73.9    Pleural effusion on left J90    UTI (urinary tract infection), bacterial N39.0, A49.9    Sinus tachycardia R00.0    Uncontrolled type 2 diabetes mellitus with hyperglycemia ([facility]) E11.65    Class 3 severe obesity due to excess calories with body mass index (BMI) of 45.0 to 49.9 in adult ([facility]) E66.01, Z68.42    Anasarca R60.1    Acute kidney injury ([facility] N17.9    DM (diabetes mellitus) ([facility]) E11.9    Fluid overload E87.70    Cor pulmonale (chronic) ([facility]) I27.81    Cellulitis of abdominal wall L03.311    STEMI (ST elevation myocardial infarction) ([facility]) I21.3    Acute respiratory distress R06.03    Hyperkalemia E87.5    Uncontrolled diabetes mellitus with chronic kidney disease ([facility]) E11.22, E11.65    Acute on chronic diastolic (congestive) heart failure ([facility]) I50.33    COPD with acute exacerbation ([facility]) J44.1       Isolation/Infection:   Isolation          No Isolation        Patient Infection Status     Infection Onset Added Last Indicated Last Indicated By Review Planned Expiration Resolved Resolved By    None active    Resolved    MRSA  07/14/15 07/14/15 Alton Alex   07/20/16 Bryan Grandchild, RN    7/10/15, wound    MRSA  03/21/13 03/21/13 Adore Arana, JUNI   07/06/15 Joby Large    wound 3/20/13          Nurse Assessment:  Last Vital Signs: /60   Pulse 88   Temp 97.8 °F (36.6 °C) (Oral)   Resp 18   Ht 5' 4\" (1.626 m)   Wt 272 lb 6.4 oz (123.6 kg)   SpO2 97%   BMI 46.76 kg/m²     Last documented pain score (0-10 scale): Pain Level: 4  Last Weight:   Wt Readings from Last 1 Encounters:   02/22/20 272 lb 6.4 oz (123.6 kg)     Mental Status:  {IP PT MENTAL STATUS:20030}    IV Access:  { HERBERT IV ACCESS:461275149}    Nursing Mobility/ADLs:  Walking   {CHP DME PNSI:458684711}  Transfer  {P DME MHVE:969403692}  Bathing  {P DME WUMH:131751667}  Dressing  {CHP DME KERF:394953222}  Toileting  {P DME ALGP:210207023}  Feeding  {P DME YNFJ:335530876}  Med Admin  {P DME DDLB:545783789}  Med Delivery   {[facility] MED Delivery:915437511}    Wound Care Documentation and Therapy:        Elimination:  Continence:   · Bowel: {YES / JR:52570}  · Bladder: {YES / KI:40846}  Urinary Catheter: {Urinary Catheter:004599480}   Colostomy/Ileostomy/Ileal Conduit: {YES / ZD:04757}       Date of Last BM: ***    Intake/Output Summary (Last 24 hours) at 2/25/2020 1137  Last data filed at 2/25/2020 0200  Gross per 24 hour   Intake 600 ml   Output --   Net 600 ml     I/O last 3 completed shifts:   In: 600 [P.O.:600]  Out: -     Safety Concerns:     [address] [employer] Safety Concerns:441626193}    Impairments/Disabilities:      [address] [employer] [facility] Impairments/Disabilities:197147473}    Patient's personal belongings (please select all that are sent with patient):  {[facility] DME Belongings:307006216}    RN SIGNATURE:  {Esignature:790807696}    CASE MANAGEMENT/SOCIAL WORK SECTION    Inpatient Status Date: ***    Readmission Risk Assessment Score:  Readmission Risk              Risk of Unplanned Readmission:        52           Discharging to Facility/ Agency   · Name:  [facility]  · Address: [address]  · Phone: [phone]  · Fax: [phone]      PHYSICIAN SECTION    Nutrition Therapy:  Current Nutrition Therapy:   { HERBERT Diet List:858173003}    Routes of Feeding: {P DME Other Feedings:974350492}  Liquids: {Slp liquid thickness:11251}  Daily Fluid Restriction: {CHP DME Yes amt example:377051380}  Last Modified Barium Swallow with Video (Video Swallowing Test): {Done Not Done LGAV:691277812}    Treatments at the Time of Hospital Discharge:   Respiratory Treatments: ***  Oxygen Therapy:  {Therapy; copd oxygen:65279}  Ventilator:    { CC Vent PWTF:315490536}    Rehab Therapies: {THERAPEUTIC INTERVENTION:7256709990}  Weight Bearing Status/Restrictions: {[facility] Weight Bearin}  Other Medical Equipment (for information only, NOT a DME order):  {EQUIPMENT:325232721}  Other Treatments: ***      Prognosis: {Prognosis:2793283102}    Condition at Discharge: [address] [place] [address] Patient Condition:327128368}    Rehab Potential (if transferring to Rehab): {Prognosis:5861289605}    Recommended Labs or Other Treatments After Discharge: ***    Physician Certification: I certify the above information and transfer of Adis Mendes  is necessary for the continuing treatment of the diagnosis listed and that she requires {Admit to Appropriate Level of Care:42322} for {GREATER/LESS:501932131} 30 days.      Update Admission H&P: {CHP DME Changes in XKFSZ:276960934}    PHYSICIAN SIGNATURE:  {Esignature:618656772}

## 2020-02-25 NOTE — PROGRESS NOTES
Hospitalist Progress Note         Admit Date: 2/22/2020    PCP: Nicolas Horowitz MD     Chief Complaint   Patient presents with    Anxiety    Shortness of Breath        Assessment and Plan:      Hyperkalemia/CKD stage IIIb/early stage IV: Improving on potassium binders. Continue IV Bumex. Monitor clinically, vitals, I/os and BMP. Nephro on board   Uncontrolled diabetes mellitus with chronic kidney disease ([facility] [address] Endo consult. Continue current changed insulin regimen as per endocrinology. Follow Accu-Cheks   Acute on chronic diastolic (congestive) heart failure (HCC) on IV Bumex. Echo with normal EF recently. Continue Coreg. No ACE due to kidney disease.  COPD with acute exacerbation (HCC) tapering Solu-Medrol IV, nebulization and O2 support   Essential hypertension continue current anti-HTN regimen. Follow vitals closely   CAD (coronary artery disease) continue aspirin, statin, Coreg, Ranexa and Brilinta.    Morbid obesity diet and exercise counseling      VTE prophylaxis Heparin  DC plan hopefully tomorrow    Current Facility-Administered Medications   Medication Dose Route Frequency Provider Last Rate Last Dose    methylPREDNISolone sodium (SOLU-MEDROL) injection 20 mg  20 mg Intravenous Q12H Agustin Estes MD        insulin lispro (HUMALOG) injection vial 20 Units  20 Units Subcutaneous TID  Leola Porter MD   20 Units at 02/24/20 1717    insulin glargine (LANTUS) injection vial 60 Units  60 Units Subcutaneous Nightly Leola Porter MD   60 Units at 02/23/20 2114    clopidogrel (PLAVIX) tablet 75 mg  75 mg Oral Daily Eloy Sanchez MD   75 mg at 02/24/20 1139    amLODIPine (NORVASC) tablet 5 mg  5 mg Oral BID Eloy Sanchez MD   5 mg at 02/24/20 1141    insulin lispro (HUMALOG) injection vial 0-24 Units  0-24 Units Subcutaneous TID  Leola Porter MD   12 Units at 02/24/20 1715    insulin lispro (HUMALOG) injection vial 0-24 Units  0-24 Units Subcutaneous 2 times per day M Marylou Monaco MD   12 Units at 02/24/20 0248    insulin NPH (HUMULIN N;NOVOLIN N) injection vial 30 Units  30 Units Subcutaneous Once Donna Dunn MD        nitroGLYCERIN (NITROSTAT) SL tablet 0.4 mg  0.4 mg Sublingual Q5 Min PRN Juana Renee MD   0.4 mg at 02/22/20 0305    albuterol sulfate  (90 Base) MCG/ACT inhaler 2 puff  2 puff Inhalation Q4H PRN Kenroy Ruth MD        aspirin chewable tablet 81 mg  81 mg Oral Daily Kenroy Ruth MD   81 mg at 02/24/20 1139    atorvastatin (LIPITOR) tablet 80 mg  80 mg Oral Nightly Kenroy Ruth MD   80 mg at 02/23/20 2113    buPROPion [facility]) extended release tablet 100 mg  100 mg Oral BID Kenroy Ruth MD   100 mg at 02/24/20 1140    carvedilol (COREG) tablet 12.5 mg  12.5 mg Oral BID WC Kenroy Ruth MD   12.5 mg at 02/24/20 1729    DULoxetine (CYMBALTA) extended release capsule 60 mg  60 mg Oral Daily Kenroy Ruth MD   60 mg at 02/24/20 1139    HYDROcodone-acetaminophen (NORCO) 5-325 MG per tablet 2 tablet  2 tablet Oral Q4H PRN Kenroy Ruth MD   2 tablet at 02/24/20 0100    isosorbide mononitrate (IMDUR) extended release tablet 60 mg  60 mg Oral Daily Kenroy Ruth MD   60 mg at 02/24/20 1140    lactulose (CHRONULAC) 10 GM/15ML solution 10 g  10 g Oral BID Kenroy Ruth MD   10 g at 02/24/20 1140    levothyroxine (SYNTHROID) tablet 50 mcg  50 mcg Oral Daily Kenroy Ruth MD   50 mcg at 02/24/20 1140    pantoprazole (PROTONIX) tablet 40 mg  40 mg Oral QAM AC Kenroy Ruth MD   40 mg at 02/24/20 1139    polyethylene glycol (GLYCOLAX) packet 17 g  17 g Oral BID Kenroy Ruth MD   17 g at 02/24/20 1141    ranolazine (RANEXA) extended release tablet 500 mg  500 mg Oral BID Kenroy Ruth MD   500 mg at 02/24/20 1139    sodium chloride flush 0.9 % injection 10 mL  10 mL Intravenous 2 times per day Kenroy Ruth MD   10 mL at 02/24/20 1155    sodium chloride flush 0.9 % injection 10 mL  10 mL Intravenous PRN Daniel Coe MD   10 mL at 02/22/20 0940    acetaminophen (TYLENOL) tablet 650 mg  650 mg Oral Q6H PRN Daniel Coe MD        acetaminophen (TYLENOL) suppository 650 mg  650 mg Rectal Q6H PRN Daniel Coe MD        polyethylene glycol (GLYCOLAX) packet 17 g  17 g Oral Daily PRN Daniel Coe MD        promethazine (PHENERGAN) tablet 12.5 mg  12.5 mg Oral Q6H PRN Daniel Coe MD        ondansetron (ZOFRAN) injection 4 mg  4 mg Intravenous Q6H PRN Daniel Coe MD        glucose (GLUTOSE) 40 % oral gel 15 g  15 g Oral PRN Daniel Coe MD        dextrose 50 % IV solution  12.5 g Intravenous PRN Daniel Coe MD        glucagon (rDNA) injection 1 mg  1 mg Intramuscular PRN Daniel Coe MD        dextrose 5 % solution  100 mL/hr Intravenous PRN Daniel Coe MD        bumetanide (BUMEX) injection 0.5 mg  0.5 mg Intravenous BID Yasmin Moreno MD   0.5 mg at 02/24/20 1141    ipratropium-albuterol (DUONEB) nebulizer solution 1 ampule  1 ampule Inhalation Q4H WA Yasmin Moreno MD   1 ampule at 02/24/20 1601    heparin (porcine) injection 5,000 Units  5,000 Units Subcutaneous 3 times per day Nicole Swartz MD   5,000 Units at 02/24/20 1457       Subjective:     Patient denied any new complaints.   Improving shortness of breath  No acute overnight events  Blood sugar is much better controlled now      Objective:     No intake or output data in the 24 hours ending 02/24/20 1910   Vitals:   Vitals:    02/24/20 1623   BP: (!) 149/76   Pulse: 90   Resp: 18   Temp: 98.6 °F (37 °C)   SpO2:      Physical Exam:  General Appearance:    Alert, cooperative, no distress  Head:      Normocephalic, without obvious abnormality, atraumatic  Eyes:       Conjunctiva/corneas clear, EOM's intact  Lungs:    Improving intermittent wheezing and crackles +   Heart:                Regular rate and rhythm, S1 and S2 normal, no murmur,   rub or gallop  Abdomen:     Soft, non-tender, bowel sounds active, no masses, no organomegaly  Extremities:   Extremities normal, atraumatic, no cyanosis or edema  Neurological:   Grossly Intact. Significant Diagnostic Studies:   DATA:    CBC   Recent Labs     02/22/20  0305 02/22/20  0651 02/24/20  0755   WBC 13.8* 16.4*  --    HGB 11.1* 10.1*  --    HCT 36.5* 34.3*  --     270 313      BMP   Recent Labs     02/22/20  0651 02/23/20  0251 02/24/20  0755    130* 139   K 6.2  K CALLED TO DANK RN @ 0805 [id number] GABY[place]renton MLT  RESULTS READ BACK  * 5.3* 5.3*   CL 97* 88* 96*   CO2 27 25 30   BUN 80* 75* 68*   CREATININE 2.5* 2.1* 1.7*     LFT'S   Recent Labs     02/22/20  0305   AST 17   ALT 14   BILITOT 0.3   ALKPHOS 117     COAG No results for input(s): INR in the last 72 hours. POC:   Lab Results   Component Value Date    POCGLU 295 02/24/2020    POCGLU 241 02/24/2020    POCGLU 227 02/24/2020    POCGLU 274 02/24/2020     [id number]:  Lab Results   Component Value Date    LABA1C 8.6 02/22/2020     CARDIAC ENZYMES  No results for input(s): CKTOTAL, CKMB, CKMBINDEX, TROPONINI in the last 72 hours.   Troponin:   Recent Labs     02/22/20  0305 02/22/20  0651 02/22/20  1245   TROPONINT 0.077* 0.071* 0.080*     BNP:   Recent Labs     02/22/20  0305 02/22/20  0651   PROBNP 4,257* 4,240*     U/A:    Lab Results   Component Value Date    COLORU YELLOW 02/22/2020    WBCUA 2 02/22/2020    RBCUA 1 02/22/2020    MUCUS RARE 02/19/2020    BACTERIA MODERATE 02/22/2020    CLARITYU CLEAR 02/22/2020    SPECGRAV 1.011 02/22/2020    LEUKOCYTESUR NEGATIVE 02/22/2020    BLOODU SMALL 02/22/2020       Xr Chest Portable    Result Date: 2/22/2020  EXAMINATION: ONE XRAY VIEW OF THE CHEST 2/22/2020 3:02 am COMPARISON: 02/16/2020 HISTORY: ORDERING SYSTEM PROVIDED HISTORY: chest pain TECHNOLOGIST PROVIDED HISTORY: Reason for exam:->chest pain Reason for Exam: chest pain Acuity: Unknown Type of Exam: Initial FINDINGS: The lungs are underinflated, resulting in vascular crowding and subsegmental atelectasis. No focal consolidation, pleural effusion or pneumothorax. The cardiac silhouette and osseous structures are stable. No acute process. Xr Chest Portable    Result Date: 2/16/2020  EXAMINATION: ONE XRAY VIEW OF THE CHEST 2/16/2020 10:49 am COMPARISON: 01/07/2020 HISTORY: ORDERING SYSTEM PROVIDED HISTORY: shortness of breath TECHNOLOGIST PROVIDED HISTORY: Reason for exam:->shortness of breath Reason for Exam: shortness of breath Acuity: Acute Type of Exam: Initial FINDINGS: No focal consolidation, pleural effusion or pneumothorax. The cardiomediastinal silhouette is stable. No overt pulmonary edema. The osseous structures are stable. No acute cardiopulmonary findings.            Garrett Funez  [facility]

## 2020-02-25 NOTE — PROGRESS NOTES
Nephrology Progress Note  2/25/2020 8:11 AM        Subjective:   Admit Date: 2/22/2020  PCP: Mayra Chen MD    Interval History: not feeling well    Diet: some    ROS:  BS little better , still C/O SOB    Data:     Current med's:    insulin lispro  25 Units Subcutaneous TID WC    insulin NPH  20 Units Subcutaneous Once    methylPREDNISolone  20 mg Intravenous Q12H    insulin glargine  60 Units Subcutaneous Nightly    clopidogrel  75 mg Oral Daily    amLODIPine  5 mg Oral BID    insulin lispro  0-24 Units Subcutaneous TID WC    insulin lispro  0-24 Units Subcutaneous 2 times per day    insulin NPH  30 Units Subcutaneous Once    aspirin  81 mg Oral Daily    atorvastatin  80 mg Oral Nightly    buPROPion  100 mg Oral BID    carvedilol  12.5 mg Oral BID WC    DULoxetine  60 mg Oral Daily    isosorbide mononitrate  60 mg Oral Daily    lactulose  10 g Oral BID    levothyroxine  50 mcg Oral Daily    pantoprazole  40 mg Oral QAM AC    polyethylene glycol  17 g Oral BID    ranolazine  500 mg Oral BID    sodium chloride flush  10 mL Intravenous 2 times per day    ipratropium-albuterol  1 ampule Inhalation Q4H WA    heparin (porcine)  5,000 Units Subcutaneous 3 times per day      dextrose           I/O last 3 completed shifts: In: 600 [P.O.:600]  Out: -     CBC:   Recent Labs     02/24/20  0755             Recent Labs     02/23/20  0251  02/24/20  0200 02/24/20  0755 02/25/20  0444   *  --   --  139 135   K 5.3*  --   --  5.3* 5.4*   CL 88*  --   --  96* 94*   CO2 25  --   --  30 28   BUN 75*  --   --  68* 72*   CREATININE 2.1*  --   --  1.7* 2.0*   GLUCOSE 765*  765*   < > 281* 225* 395*    < > = values in this interval not displayed.        Lab Results   Component Value Date    CALCIUM 9.0 02/25/2020    PHOS 5.2 (H) 02/21/2020       Objective:     Vitals: /60   Pulse 88   Temp 97.8 °F (36.6 °C) (Oral)   Resp 18   Ht 5' 4\" (1.626 m)   Wt 272 lb 6.4 oz (123.6 kg)   SpO2 97% BMI 46.76 kg/m²     General appearance:  No ac distress  HE ENT:  + conj pallor  Neck:  supple  Lungs:  No gross crackles  Heart:  Seems RRR  Abdomen: soft  Extremities:  No overt edema       Problem List :         Impression :     1. Shahab/ CKD stage G 3 B A3- cr will fluctuates from day to day sec to CRS  2. ASDHF/ fluid overload- acceptable all things considered  3. DM with high BS better with lower dose of steroid   4. underlying AS CVD/CMP/  HTN/ obesity / poor functional status   5. ?  ACOPDE   6. K little up some from high BS     Recommendation/Plan  :     1. B;ladder scan just incase   2. Low K diet  3. Good BS control  4. adjust lop/ diuretics a she does   5.  Follow clinically       Juan Archer MD

## 2020-02-25 NOTE — PROGRESS NOTES
Occupational Therapy  [facility] OCCUPATIONAL THERAPY EVALUATION    History  [place]:  The primary encounter diagnosis was Shortness of breath. Diagnoses of Chronic renal failure, stage 3 (moderate) ([address] Anxiety attack, and Atrial flutter, paroxysmal ([address] were also pertinent to this visit. Restrictions:                           Communication with other providers: PT    Subjective:  Patient states:  \"I need to go to a nursing home so someone makes me walk. \"  Pain:  none  Patient goal:  Pt needs accountability to mobilize herself at home to avoid deconditioning. Occupational profile (relevant social history and personal factors):    Social/Functional History  Lives With: Spouse  ADL Assistance: Needs assistance(spouse assists with bathing and dressing due to chronic difficulty related to COPD and obesity/impaired reaching (especially distal components) ; she toilets self Sarthak)  Ambulation Assistance: Independent  Transfer Assistance: Independent    Examination of body systems (includes body structures/functions, activity/participation limitations):  · Orientation: normal  · Cognition:  normal  · Observation:  Received pt in bed. Alert and cooperative. · Vision:  [employer]  · Hearing:  WFL   · ROM:  WFL BUE  · Strength: WFL BUE  · Sensation: not tested    ADLs  Feeding: Sarthak    Grooming: Sarthak    Dressing: UB SBA in seated LB ModA (needs assistance with socks/threading brief)    Bathing: UB SBA in seated LB ModA    Toileting: SBA    *Some ADL determined per observation of actual ADL performance, functional mobility, balance, activity tolerance, and cognition.      AM-PAC 6 click short form for inpatient daily activity:  Raw Score: 18  24/24 = unimpaired  23/24 = 1-20% impaired   20/24-22/24 = 21-40% impaired  15/24-19/24 = 41-59% impaired   10/24-14/24 = 60%-79% impaired  7/24-9/24 = 80%-99% impaired  6/24 = 100% impaired    Functional Mobility  Bed mobility:   Supine to sit: Sarthak HOB flat but using bed rail    Sitting balance: good    Transfers:   Sit to stand: supervision  Stand to sit: SBA/cues for safe technique. Good eccentric control    Standing balance: supervision c RW    Ambulation:  supervision c +100' c self initiated seated rest break. Slow, cautious pace. No unsteadiness. No O2 desaturation remaining on 1L    Activity tolerance  WFL. At baseline. No desaturation below 90% during functional activity remaining on 1L air    Assessment:  Assessment  Performance deficits / Impairments: Decreased ADL status, Decreased high-level IADLs, Decreased endurance(these are chronic impairments that are slightly exacerbated at this time)  Treatment Diagnosis: COPD exacerbation  Prognosis: Good  Decision Making: Low Complexity  REQUIRES OT FOLLOW UP: No  Discharge Recommendations: S Level 1, Home with Home health OT( OT for general strength and activity tolerance maintainance as well as  modified ADL to reduce caregiver burden)        Goals:  By d/c or goals met:     n/a    Plan:  Plan  Times per week: n/a      Recommendation for activity with nursing staff:  ambulate to bathroom with RW for toileting  ambulate in halls with RW    Treatment today:      Therapeutic Activity Training:   Therapeutic activity training was instructed today. Cues were given for safety, sequence, UE/LE placement, visual cues, and balance. Activities performed today included bed mobility training, sup-sit, sit-stand, walk c walker  Education: Role of OT, OT POC, d/c needs, home safety    Safety: Left in chair with all needs in reach. Gait belt used for transfer and mobility. Time in:  1007  Time out:  1030  Timed treatment minutes:  10  Total treatment time:  23    Electronically signed by:    Jacki Gregg [place], [place]   [id number]   12:35 PM, 2/25/2020

## 2020-02-25 NOTE — CARE COORDINATION
Pt has requested [facility] for Rehab. Referral made to [facility]. Pt has [facility] and will require a pre-cert. He will review info and will notify CM if they are able to accept pt. The Pre-cert cannot be started until the PT/OT evals have been completed. PT/OT evals were requested yesterday via [facility] for this am.  Will await a decision from [address]  TE

## 2020-02-25 NOTE — PLAN OF CARE
Problem: Falls - Risk of:  Goal: Will remain free from falls  Description  Will remain free from falls  2/25/2020 1247 by Brie Alejandro RN  Outcome: Ongoing  2/25/2020 0157 by Evertt Kussmaul, RN  Outcome: Ongoing  Goal: Absence of physical injury  Description  Absence of physical injury  2/25/2020 1247 by Brie Alejandro RN  Outcome: Ongoing  2/25/2020 0157 by Evertt Kussmaul, RN  Outcome: Ongoing

## 2020-02-25 NOTE — CONSULTS
[address] [facility] A Dimitri, 1960, [id number]/3109-A, 2020    History  [place]:  The primary encounter diagnosis was Shortness of breath. Diagnoses of Chronic renal failure, stage 3 (moderate) ([address] Anxiety attack, and Atrial flutter, paroxysmal ([address] were also pertinent to this visit. Patient  has a past medical history of Acute on chronic systolic CHF (congestive heart failure) ([address] CAD (coronary artery disease), CKD (chronic kidney disease) stage 3, GFR 30-59 ml/min ([address] COPD with acute exacerbation ([address] [address] Diabetes type 2, uncontrolled ([facility] [address] Diastolic heart failure ([address] Essential hypertension, Financial problems, Generalized anxiety disorder, Herpes genitalia, Obesity, morbid (more than 100 lbs over ideal weight or BMI > 40) ([address] and STEMI (ST elevation myocardial infarction) ([facility] [address] Patient  has a past surgical history that includes Cholecystectomy;  section; Tonsillectomy; other surgical history (Right, [id number]); Cardiac surgery; and Coronary angioplasty with stent. Subjective:  Patient states:  Amenable to therapy, \"I go home and I say I'm going to do things and then I don't\". Pain:  Denies. Communication with other providers:  Handoff to RN, co-eval with OT. Restrictions: General    Home Setup/Prior level of function  Social/Functional History  Lives With: Spouse  ADL Assistance: Needs assistance(spouse assists with bathing and dressing due to chronic difficulty related to COPD and obesity/impaired reaching (especially distal components) ; she toilets self Sarthak)  Ambulation Assistance: Independent  Transfer Assistance: Independent    Examination of body systems (includes body structures/functions, activity/participation limitations):  · Observation:  Supine in bed upon arrival   · Vision:  [facility]  · Hearing:  [facility]  · Cardiopulmonary:  On 2L of O2  · Cognition: WFL, see OT/SLP note for further evaluation.     Musculoskeletal  · ROM R/L:  WFL. · Strength R/L:  4+/5, min weakness in function and endurance. · Neuro:  [facility]      Mobility:  · Supine to sit:  SBA  · Transfers: SBA  · Sitting balance:  SBA. · Standing balance:  SBA. · Gait: SBA    [facility] 6 Clicks Inpatient Mobility:  AM-PAC Inpatient Mobility Raw Score : 18    Treatment:  Sup to sit: SBA, able to scoot to edge of bed. Transfers: STS from bed and chair with SBA and RW. Gait: ambulated x75ft, x2 with RW, demonstrates good activity pacing and O2 sats maintained in mid 90's. SBA as patient demonstrates good balance. Safety: patient left in chair with chair alarm, call light within reach, RN notified, gait belt used. Assessment:  Patient is a 60 yo female who presents with SOB, d/c from hospital recently and was home for 6 hours before returning to ED d/t SOB. Upon eval demonstrates good balance and improved endurance compared to previous evaluation. Patient would benefit from skilled PT services and continued mobilization with staff to prevent deconditioning. Rec d/[facility] home with [facility]. Complexity: Moderate  Prognosis: Good, no significant barriers to participation at this time. Plan Times per week: 2+/week, 1 week,   Discharge Recommendations: Home with Home health PT  Equipment: patient has necessary equipment. Goals:  Short term goals  Time Frame for Short term goals: 1 week  Short term goal 1: Patient will ambulate x150ft mod I with RW and 2L of O2. Treatment plan:  Bed mobility, transfers, balance, gait, TA, TX. Recommendations for NURSING mobility: ambulate in halls 3x per day with RW and O2.      Time:   Time in: 1008  Time out: 1033  Timed treatment minutes: 10  Total time: 25    Electronically signed by:    Laurence Escalera, PT  2/25/2020, 12:46 PM

## 2020-02-26 VITALS
RESPIRATION RATE: 18 BRPM | OXYGEN SATURATION: 100 % | TEMPERATURE: 97.6 F | HEIGHT: 64 IN | DIASTOLIC BLOOD PRESSURE: 66 MMHG | SYSTOLIC BLOOD PRESSURE: 115 MMHG | BODY MASS INDEX: 46.51 KG/M2 | HEART RATE: 80 BPM | WEIGHT: 272.4 LBS

## 2020-02-26 LAB
ANION GAP SERPL CALCULATED.3IONS-SCNC: 12 MMOL/L (ref 4–16)
BUN BLDV-MCNC: 72 MG/DL (ref 6–23)
CALCIUM SERPL-MCNC: 9.5 MG/DL (ref 8.3–10.6)
CHLORIDE BLD-SCNC: 94 MMOL/L (ref 99–110)
CO2: 32 MMOL/L (ref 21–32)
CREAT SERPL-MCNC: 2.3 MG/DL (ref 0.6–1.1)
GFR AFRICAN AMERICAN: 26 ML/MIN/1.73M2
GFR NON-AFRICAN AMERICAN: 22 ML/MIN/1.73M2
GLUCOSE BLD-MCNC: 102 MG/DL (ref 70–99)
GLUCOSE BLD-MCNC: 105 MG/DL (ref 70–99)
GLUCOSE BLD-MCNC: 233 MG/DL (ref 70–99)
GLUCOSE BLD-MCNC: 81 MG/DL (ref 70–99)
MAGNESIUM: 2.5 MG/DL (ref 1.8–2.4)
PLATELET # BLD: 353 K/CU MM (ref 140–440)
POTASSIUM SERPL-SCNC: 5.3 MMOL/L (ref 3.5–5.1)
SODIUM BLD-SCNC: 138 MMOL/L (ref 135–145)

## 2020-02-26 PROCEDURE — 6370000000 HC RX 637 (ALT 250 FOR IP): Performed by: INTERNAL MEDICINE

## 2020-02-26 PROCEDURE — 6360000002 HC RX W HCPCS: Performed by: INTERNAL MEDICINE

## 2020-02-26 PROCEDURE — 85049 AUTOMATED PLATELET COUNT: CPT

## 2020-02-26 PROCEDURE — 82962 GLUCOSE BLOOD TEST: CPT

## 2020-02-26 PROCEDURE — 96372 THER/PROPH/DIAG INJ SC/IM: CPT

## 2020-02-26 PROCEDURE — 94761 N-INVAS EAR/PLS OXIMETRY MLT: CPT

## 2020-02-26 PROCEDURE — 80048 BASIC METABOLIC PNL TOTAL CA: CPT

## 2020-02-26 PROCEDURE — 2580000003 HC RX 258: Performed by: INTERNAL MEDICINE

## 2020-02-26 PROCEDURE — 36415 COLL VENOUS BLD VENIPUNCTURE: CPT

## 2020-02-26 PROCEDURE — 83735 ASSAY OF MAGNESIUM: CPT

## 2020-02-26 PROCEDURE — 96376 TX/PRO/DX INJ SAME DRUG ADON: CPT

## 2020-02-26 PROCEDURE — 2700000000 HC OXYGEN THERAPY PER DAY

## 2020-02-26 PROCEDURE — 94640 AIRWAY INHALATION TREATMENT: CPT

## 2020-02-26 RX ORDER — IPRATROPIUM BROMIDE AND ALBUTEROL SULFATE 2.5; .5 MG/3ML; MG/3ML
1 SOLUTION RESPIRATORY (INHALATION) EVERY 4 HOURS
Qty: 360 ML | Refills: 1 | Status: ON HOLD | OUTPATIENT
Start: 2020-02-26 | End: 2020-05-06 | Stop reason: SDUPTHER

## 2020-02-26 RX ORDER — INSULIN GLARGINE 100 [IU]/ML
30 INJECTION, SOLUTION SUBCUTANEOUS NIGHTLY
Status: DISCONTINUED | OUTPATIENT
Start: 2020-02-26 | End: 2020-02-26 | Stop reason: HOSPADM

## 2020-02-26 RX ORDER — PREDNISONE 10 MG/1
20 TABLET ORAL 2 TIMES DAILY
Qty: 20 TABLET | Refills: 0 | Status: SHIPPED | OUTPATIENT
Start: 2020-02-26 | End: 2020-03-02

## 2020-02-26 RX ORDER — INSULIN GLARGINE 100 [IU]/ML
20 INJECTION, SOLUTION SUBCUTANEOUS NIGHTLY
Status: DISCONTINUED | OUTPATIENT
Start: 2020-02-26 | End: 2020-02-26

## 2020-02-26 RX ORDER — TORSEMIDE 20 MG/1
20 TABLET ORAL 2 TIMES DAILY
Qty: 30 TABLET | Refills: 3 | Status: ON HOLD | OUTPATIENT
Start: 2020-02-26 | End: 2020-05-06 | Stop reason: SDUPTHER

## 2020-02-26 RX ORDER — CLOPIDOGREL BISULFATE 75 MG/1
75 TABLET ORAL DAILY
Qty: 30 TABLET | Refills: 3 | Status: SHIPPED | OUTPATIENT
Start: 2020-02-27

## 2020-02-26 RX ORDER — AMLODIPINE BESYLATE 5 MG/1
5 TABLET ORAL 2 TIMES DAILY
Qty: 30 TABLET | Refills: 3 | Status: ON HOLD | OUTPATIENT
Start: 2020-02-26 | End: 2020-05-06 | Stop reason: HOSPADM

## 2020-02-26 RX ORDER — SODIUM POLYSTYRENE SULFONATE 15 G/60ML
15 SUSPENSION ORAL; RECTAL ONCE
Status: COMPLETED | OUTPATIENT
Start: 2020-02-26 | End: 2020-02-26

## 2020-02-26 RX ADMIN — HEPARIN SODIUM 5000 UNITS: 5000 INJECTION, SOLUTION INTRAVENOUS; SUBCUTANEOUS at 05:51

## 2020-02-26 RX ADMIN — CARVEDILOL 12.5 MG: 12.5 TABLET, FILM COATED ORAL at 08:40

## 2020-02-26 RX ADMIN — SODIUM POLYSTYRENE SULFONATE 15 G: 15 SUSPENSION ORAL; RECTAL at 12:25

## 2020-02-26 RX ADMIN — ISOSORBIDE MONONITRATE 60 MG: 30 TABLET, EXTENDED RELEASE ORAL at 08:40

## 2020-02-26 RX ADMIN — HYDROCODONE BITARTRATE AND ACETAMINOPHEN 2 TABLET: 5; 325 TABLET ORAL at 11:12

## 2020-02-26 RX ADMIN — DULOXETINE HYDROCHLORIDE 60 MG: 30 CAPSULE, DELAYED RELEASE ORAL at 08:40

## 2020-02-26 RX ADMIN — RANOLAZINE 500 MG: 500 TABLET, FILM COATED, EXTENDED RELEASE ORAL at 08:40

## 2020-02-26 RX ADMIN — TORSEMIDE 20 MG: 20 TABLET ORAL at 08:41

## 2020-02-26 RX ADMIN — BUPROPION HYDROCHLORIDE 100 MG: 100 TABLET, FILM COATED, EXTENDED RELEASE ORAL at 08:40

## 2020-02-26 RX ADMIN — PANTOPRAZOLE SODIUM 40 MG: 40 TABLET, DELAYED RELEASE ORAL at 05:51

## 2020-02-26 RX ADMIN — SODIUM CHLORIDE, PRESERVATIVE FREE 10 ML: 5 INJECTION INTRAVENOUS at 08:32

## 2020-02-26 RX ADMIN — IPRATROPIUM BROMIDE AND ALBUTEROL SULFATE 1 AMPULE: .5; 3 SOLUTION RESPIRATORY (INHALATION) at 07:18

## 2020-02-26 RX ADMIN — CLOPIDOGREL BISULFATE 75 MG: 75 TABLET ORAL at 08:40

## 2020-02-26 RX ADMIN — HYDROCODONE BITARTRATE AND ACETAMINOPHEN 2 TABLET: 5; 325 TABLET ORAL at 00:42

## 2020-02-26 RX ADMIN — POLYETHYLENE GLYCOL (3350) 17 G: 17 POWDER, FOR SOLUTION ORAL at 08:41

## 2020-02-26 RX ADMIN — LEVOTHYROXINE SODIUM 50 MCG: 0.05 TABLET ORAL at 09:16

## 2020-02-26 RX ADMIN — AMLODIPINE BESYLATE 5 MG: 5 TABLET ORAL at 08:40

## 2020-02-26 RX ADMIN — METHYLPREDNISOLONE SODIUM SUCCINATE 20 MG: 40 INJECTION, POWDER, FOR SOLUTION INTRAMUSCULAR; INTRAVENOUS at 08:31

## 2020-02-26 RX ADMIN — ASPIRIN 81 MG 81 MG: 81 TABLET ORAL at 08:40

## 2020-02-26 ASSESSMENT — PAIN SCALES - GENERAL
PAINLEVEL_OUTOF10: 4
PAINLEVEL_OUTOF10: 9

## 2020-02-26 NOTE — PROGRESS NOTES
Daily Progress Note     doing better  Going home today  Need diet restriction on K and fluid restriction  Hx of CAD and HFrEF medical treatment  F/u in few weeks      Pt. Awake, alert and feeling ok  HR stable, NSR, BP stable  No CP, SOB improved per pt.     Acute on Chronic HFrEF    EF 40% range-improved from previous    Ischemic CMP    BNP elevated    On Coreg- would keep same dose for now as doing better and BP lower    No ACE/ ARB/ aldactone d/t kidneys     Hx of CAD s/p PCI    Recent PCI    On DAPT, statin, BB    Stable for now    Will cont. To watch     CKD    Cr 2.3    Renal following     Pt. Wants to D/C today  Will cont. To follow-if D/C f/u at office in 1-2 weeks     ACE/ ARB: No, D/t elevated Cr.   Can this be changed to ARNI: No     B-blocker Yes     Persistently symptomatic AA with NYHA class III-IV  EF < 35 despite being on ACE/ ARB/ARNI: No  hydralazine + Nitrate On imdur, may start hydralazine soon if BP permits, can do OP     Loop Diuretic Yes     NYHA class II-IV with eGFR >30/mil/min/173.m2 and K <5.0 mEq/l   mineralcorctcoid receptor antagonist (aldactone/ eplerenone) in addition to ACE or ARB and in conjunction with B blocker  No, d/t renal insuff.     HR greater than 70 with patient with LVEF  < 35 No  Able to use Ivabradine No     DICTATED -[id number]  LEFT MAIIN PATENT  LAD MID 80% TO 0% MEGAN XIENCE STENT 2.5X18MM STENT  LCX MILD DX  OM 90% TO 0% MEGAN XIENCE 2.25X33 MM STENT  LVEDP 35  RCA %  ASA AND BRIANTA AND HEPARIN  RIGHT BRACHIAL  NO COMPLICATIONS         [id number]--3/62   This is a limited echocardiogram.   Left ventricular systolic function is normal.   Ejection fraction is visually estimated at 40-45%.   Mild left ventricular hypertrophy.   Lateral and inferior wall appear hypokinetic.   No evidence of any pericardial effusion.     The patient has a history of heart failure and coronary artery disease  present.  The patient's last echo was done back on 01/08/2020.  EF Units Subcutaneous 3 times per day      Infusions:   dextrose        PRN Meds:  nitroGLYCERIN, albuterol sulfate HFA, HYDROcodone-acetaminophen, sodium chloride flush, acetaminophen, acetaminophen, polyethylene glycol, promethazine, ondansetron, glucose, dextrose, glucagon (rDNA), dextrose       Physical Exam:  Vitals:    02/26/20 0800   BP: 115/66   Pulse: 80   Resp: 18   Temp: 97.6 °F (36.4 °C)   SpO2: 100%        General: AAO, NAD  Chest: Nontender  Cardiac: First and Second Heart Sounds are Normal, No Murmurs or Gallops noted  Lungs:Clear to auscultation and percussion. Abdomen: Soft, NT, ND, +BS  Extremities: No clubbing, no edema  Vascular:  Equal 2+ peripheral pulses. Lab Data:  CBC:   Recent Labs     02/24/20  0755 02/26/20  0537    353     BMP:   Recent Labs     02/24/20  0755 02/25/20  0444 02/26/20  0537    135 138   K 5.3* 5.4* 5.3*   CL 96* 94* 94*   CO2 30 28 32   BUN 68* 72* 72*   CREATININE 1.7* 2.0* 2.3*     LIVER PROFILE: No results for input(s): AST, ALT, LIPASE, BILIDIR, BILITOT, ALKPHOS in the last 72 hours. Invalid input(s): AMYLASE,  ALB  PT/INR: No results for input(s): PROTIME, INR in the last 72 hours. APTT: No results for input(s): APTT in the last 72 hours. BNP:  No results for input(s): BNP in the last 72 hours.       Assessment:  Patient Active Problem List    Diagnosis Date Noted    E. coli UTI (urinary tract infection) 07/22/2016     Priority: High    Sepsis associated hypotension, POA 07/21/2016     Priority: High    Sepsis secondary to UTI, POA 07/20/2016     Priority: High    Musculoskeletal chest pain 07/20/2016     Priority: Medium    Diabetes mellitus with hyperglycemia ([facility] [address] 07/20/2016     Priority: Medium    Severe dehydration secondary to significant hyperglycemia 07/20/2016     Priority: Medium    Generalized weakness 07/20/2016     Priority: Medium    Elevated lactic acid level, resolved 07/20/2016     Priority: Low    Hyperkalemia 02/23/2020    Uncontrolled diabetes mellitus with chronic kidney disease ([address] 02/23/2020    Acute on chronic diastolic (congestive) heart failure ([address] 02/23/2020    COPD with acute exacerbation ([address] 02/23/2020    Acute respiratory distress 02/16/2020    STEMI (ST elevation myocardial infarction) ([address] 11/23/2019    Cellulitis of abdominal wall 07/13/2019    Acute kidney injury ([address] 04/29/2019    DM (diabetes mellitus) ([address] 04/29/2019    Fluid overload 04/29/2019    Cor pulmonale (chronic) ([address] 04/29/2019    Anasarca 04/23/2019    UTI (urinary tract infection), bacterial 03/15/2019    Sinus tachycardia 03/15/2019    Uncontrolled type 2 diabetes mellitus with hyperglycemia ([address] 03/15/2019    Class 3 severe obesity due to excess calories with body mass index (BMI) of 45.0 to 49.9 in adult ([address] 03/15/2019    Pleural effusion on left 02/28/2019    Hyperglycemia 11/17/2018    Acute respiratory failure ([address] 10/02/2018    Gait disturbance 09/21/2018    Debility 09/19/2018    Nausea & vomiting 04/05/2018    Fever 04/05/2018    Generalized anxiety disorder 01/08/2018    DM (diabetes mellitus), secondary uncontrolled ([address] 01/04/2018    Syncope 08/26/2016    Acute pain of right shoulder 08/26/2016    Hypotension 08/26/2016    Acute renal failure, POA 07/21/2016    Diabetes type 2, uncontrolled ([address] 07/20/2016    Morbid obesity with BMI of 50.0-59.9, adult ([address] 07/20/2016    Essential hypertension     CAD (coronary artery disease)     Chronic diastolic heart failure (HCC)     CKD (chronic kidney disease) stage 3, GFR 30-59 ml/min ([address]        Electronically signed by Sade Reece PA-C on 2/26/2020 at 10:13 AM

## 2020-02-26 NOTE — PROGRESS NOTES
Nephrology Progress Note  2/26/2020 10:02 AM        Subjective:   Admit Date: 2/22/2020  PCP: Irina Patel MD    Interval History: BS better    Diet: better    ROS:  Intermittent CP at rest    Data:     Current med's:    insulin lispro  20 Units Subcutaneous TID     insulin lispro  0-18 Units Subcutaneous TID     insulin lispro  0-9 Units Subcutaneous 2 times per day    insulin glargine  30 Units Subcutaneous Nightly    sodium polystyrene  15 g Oral Once    torsemide  20 mg Oral BID    methylPREDNISolone  20 mg Intravenous Q12H    clopidogrel  75 mg Oral Daily    amLODIPine  5 mg Oral BID    aspirin  81 mg Oral Daily    atorvastatin  80 mg Oral Nightly    buPROPion  100 mg Oral BID    carvedilol  12.5 mg Oral BID WC    DULoxetine  60 mg Oral Daily    isosorbide mononitrate  60 mg Oral Daily    lactulose  10 g Oral BID    levothyroxine  50 mcg Oral Daily    pantoprazole  40 mg Oral QAM AC    polyethylene glycol  17 g Oral BID    ranolazine  500 mg Oral BID    sodium chloride flush  10 mL Intravenous 2 times per day    ipratropium-albuterol  1 ampule Inhalation Q4H WA    heparin (porcine)  5,000 Units Subcutaneous 3 times per day      dextrose           No intake/output data recorded.     CBC:   Recent Labs     02/24/20  0755 02/26/20  0537    353          Recent Labs     02/24/20  0755 02/25/20  0444 02/26/20  0537    135 138   K 5.3* 5.4* 5.3*   CL 96* 94* 94*   CO2 30 28 32   BUN 68* 72* 72*   CREATININE 1.7* 2.0* 2.3*   GLUCOSE 225* 395* 81       Lab Results   Component Value Date    CALCIUM 9.5 02/26/2020    PHOS 5.2 (H) 02/21/2020       Objective:     Vitals: /66   Pulse 80   Temp 97.6 °F (36.4 °C) (Axillary)   Resp 18   Ht 5' 4\" (1.626 m)   Wt 272 lb 6.4 oz (123.6 kg)   SpO2 100%   BMI 46.76 kg/m²     General appearance:  No ac distress  HEENT:  ++ conj pallor  Neck:  supple  Lungs:  No gross crackles  Heart:  Seems RRR  Abdomen: soft  Extremities:  No edema       Problem List :         Impression :     1. JUAN J/ CKD stage 3 B a3- acceptable   2. K still little up with loop - po   3. DM- BS better  4. ASCVD/ angina / CMP/ COPD etc     Recommendation/Plan  :     1. Low salt  2. Low K diet  3. Keep po loop  4. Good BS control  5. Daily wt  6. May d/C with po loop from renal standpoint - her cr will fluctuate   7. If d/C BMP in 1 wk  8. Low K diet  9.  F/U with me in 2 wk's       Steven Mazariegos MD

## 2020-02-26 NOTE — PROGRESS NOTES
Progress Note( Dr. Romero Albarado)  2/25/2020  Subjective:   Admit Date: 2/22/2020  PCP: Fred Carrasco MD    Admitted For :    Consulted For: Severe shortness of breath atrial flutter fibrillation with rapid ventricular response  Also has been severe hyperglycemia    Interval History: Better control of blood glucose initially blood glucose was over 500 on admission    Denies any chest pains, feels lot better  Still has SOB . On breathing treatment   denies nausea or vomiting. Eating well baby too well  No new bowel or bladder symptoms. Intake/Output Summary (Last 24 hours) at 2/25/2020 2304  Last data filed at 2/25/2020 0200  Gross per 24 hour   Intake 600 ml   Output --   Net 600 ml       DATA    CBC:   Recent Labs     02/24/20  0755       CMP:  Recent Labs     02/23/20  0251 02/24/20  0755 02/25/20  0444   * 139 135   K 5.3* 5.3* 5.4*   CL 88* 96* 94*   CO2 25 30 28   BUN 75* 68* 72*   CREATININE 2.1* 1.7* 2.0*   CALCIUM 8.9 9.4 9.0     Lipids:   Lab Results   Component Value Date    CHOL 154 02/23/2020    HDL 39 02/23/2020    TRIG 268 02/23/2020     Glucose:  Recent Labs     02/25/20  1126 02/25/20  1612 02/25/20  2159   POCGLU 341* 258* 130*     [id number]:  Lab Results   Component Value Date    LABA1C 8.6 02/22/2020     High Sensitivity TSH:   Lab Results   Component Value Date    TSHHS 2.080 02/22/2020     Free T3: No results found for: FT3  Free T4:  Lab Results   Component Value Date    T4FREE 1.11 02/28/2019       Xr Chest Portable    Result Date: 2/22/2020  EXAMINATION: ONE XRAY VIEW OF THE CHEST 2/22/2020 3:02 am COMPARISON: 02/16/2020 HISTORY: ORDERING SYSTEM PROVIDED HISTORY: chest pain TECHNOLOGIST PROVIDED HISTORY: Reason for exam:->chest pain Reason for Exam: chest pain Acuity: Unknown Type of Exam: Initial FINDINGS: The lungs are underinflated, resulting in vascular crowding and subsegmental atelectasis. No focal consolidation, pleural effusion or pneumothorax.   The cardiac silhouette and osseous structures are stable. No acute process. Scheduled Medicines   Medications:    insulin lispro  25 Units Subcutaneous TID     torsemide  20 mg Oral BID    methylPREDNISolone  20 mg Intravenous Q12H    insulin glargine  60 Units Subcutaneous Nightly    clopidogrel  75 mg Oral Daily    amLODIPine  5 mg Oral BID    insulin lispro  0-24 Units Subcutaneous TID     insulin lispro  0-24 Units Subcutaneous 2 times per day    insulin NPH  30 Units Subcutaneous Once    aspirin  81 mg Oral Daily    atorvastatin  80 mg Oral Nightly    buPROPion  100 mg Oral BID    carvedilol  12.5 mg Oral BID WC    DULoxetine  60 mg Oral Daily    isosorbide mononitrate  60 mg Oral Daily    lactulose  10 g Oral BID    levothyroxine  50 mcg Oral Daily    pantoprazole  40 mg Oral QAM AC    polyethylene glycol  17 g Oral BID    ranolazine  500 mg Oral BID    sodium chloride flush  10 mL Intravenous 2 times per day    ipratropium-albuterol  1 ampule Inhalation Q4H WA    heparin (porcine)  5,000 Units Subcutaneous 3 times per day      Infusions:    dextrose           Objective:   Vitals: /70   Pulse 78   Temp 98 °F (36.7 °C) (Oral)   Resp 17   Ht 5' 4\" (1.626 m)   Wt 272 lb 6.4 oz (123.6 kg)   SpO2 98%   BMI 46.76 kg/m²   General appearance: alert and cooperative with exam  Neck: no JVD or bruit  Thyroid : Normal lobes   Lungs: Has Vesicular Breath sounds   Heart: Atrial fibrillation  Abdomen: soft, non-tender; bowel sounds normal; no masses,  no organomegaly  Musculoskeletal: Normal  Extremities: extremities normal, , no edema  Neurologic:  Awake, alert, oriented to name, place and time. Cranial nerves II-XII are grossly intact. Motor is  intact.   Sensory i neuropathy t.,  and gait is abnormal.    Assessment:     Patient Active Problem List:     CKD (chronic kidney disease) stage 3, GFR 30-59 ml/min ([facility])     CAD (coronary artery disease)     Chronic diastolic heart failure ([facility]     Morbid obesity with BMI of 50.0-59.9, [facility])     Musculoskeletal chest pain     Essential hypertension     Diabetes mellitus with hyperglycemia ([facility]     Syncope     DM (diabetes mellitus), secondary uncontrolled (HCC)     Generalized anxiety disorder     Gait disturbance     Acute respiratory failure (HCC)     Hyperglycemia     UTI (urinary tract infection), bacterial     Sinus tachycardia     Uncontrolled type 2 diabetes mellitus with hyperglycemia ([facility])     Class 3 severe obesity due to excess calories with body mass index (BMI) of 45.0 to 49.9 in [facility])     Acute kidney injury ([facility]     DM (diabetes mellitus) (HCC)     Fluid overload     Cor pulmonale (chronic) ([facility])     Cellulitis of abdominal wall     STEMI (ST elevation myocardial infarction) ([facility]     Acute respiratory distress     Hyperkalemia     Uncontrolled diabetes mellitus with chronic kidney disease (HCC)     Acute on chronic diastolic (congestive) heart failure (HCC)     COPD with acute exacerbation ([facility]      Plan:     1. Reviewed POC blood glucose . Labs and X ray results   2. Reviewed Current Medicines   3. On meal/ Correction bolus Humalog/ Basal Lantus Insulin regime /  4. Monitor Blood glucose frequently   5. Modified  the dose of Insulin/ other medicines as needed   6. Will follow     .      Leola Porter MD

## 2020-02-26 NOTE — DISCHARGE INSTR - OTHER ORDERS
Follow up at Dr Ventura Tad (nurse visit) on Friday 2/28 at 1:15  [address] [facility], [address] [place] [address]  [phone]    Follow up with Dr Leobardo Mcdonald on Friday 3/13/20 at 1:30.  (BMP labwork 1 week prior.)  [address] [facility]  [phone]

## 2020-02-26 NOTE — DISCHARGE SUMMARY
CONSULT TO HOSPITALIST  IP CONSULT TO HEART FAILURE NURSE/COORDINATOR  IP CONSULT TO DIETITIAN  IP CONSULT TO SOCIAL WORK  IP CONSULT TO NEPHROLOGY  IP CONSULT TO ENDOCRINOLOGY  IP CONSULT TO CASE MANAGEMENT  IP CONSULT TO Sonya Omer Dr    Discharge Instruction:   Follow up appointments: Cardiology   Primary care physician:  within 1 to 2 weeks    Diet:  diabetic diet   Activity: activity as tolerated  Disposition: Discharged to:   []Home, [x]C, []SNF, []Acute Rehab, []Hospice   Condition on discharge: Stable    Discharge Medications:      Mercedes Mosley 26 Medication Instructions UYY:[id number]    Printed on:02/26/20 2976   Medication Information                      albuterol sulfate HFA (VENTOLIN HFA) 108 (90 Base) MCG/ACT inhaler  Inhale 2 puffs into the lungs every 4 hours as needed for Wheezing             amLODIPine (NORVASC) 5 MG tablet  Take 1 tablet by mouth 2 times daily             aspirin 81 MG chewable tablet  Take 1 tablet by mouth daily             atorvastatin (LIPITOR) 80 MG tablet  Take 1 tablet by mouth nightly             BREO ELLIPTA 100-25 MCG/INH AEPB inhaler  Inhale 1 puff into the lungs daily             buPROPion (WELLBUTRIN SR) 100 MG extended release tablet  Take 100 mg by mouth 2 times daily             carvedilol (COREG) 12.5 MG tablet  Take 1 tablet by mouth 2 times daily (with meals)             clopidogrel (PLAVIX) 75 MG tablet  Take 1 tablet by mouth daily             docusate (COLACE, DULCOLAX) 100 MG CAPS  Take 100 mg by mouth 2 times daily             DULoxetine (CYMBALTA) 60 MG extended release capsule  Take 1 capsule by mouth daily             HYDROcodone-acetaminophen (NORCO) 5-325 MG per tablet  Take 2 tablets by mouth every 4 hours as needed for Pain.              insulin glargine (LANTUS SOLOSTAR) 100 UNIT/ML injection pen  Inject 55 Units into the skin nightly             insulin lispro (HUMALOG) 100 UNIT/ML injection vial  Check blood sugar three times daily murmurs, rubs, or gallops. Peripheral pulses equal bilaterally and palpable. No peripheral edema. GI Abdomen is soft without significant tenderness, masses, or guarding. Bowel sounds are normoactive  MSK No gross joint deformities. Spontaneous movement of all extremities  SKIN Normal coloration, warm, dry.     BMP/CBC  Recent Labs     02/24/20  0755 02/25/20  0444 02/26/20  0537    135 138   K 5.3* 5.4* 5.3*   CL 96* 94* 94*   CO2 30 28 32   BUN 68* 72* 72*   CREATININE 1.7* 2.0* 2.3*     --  353         Discharge Time of 32 minutes    Electronically signed by Antonino Arreola MD on 2/26/2020 at 11:53 AM

## 2020-02-26 NOTE — CARE COORDINATION
Date: 2/26/20    Time: 11:45  20 minutes of education provided  CN visit done. Patient is well known to me and has been educated in the past. Education last provided on 2/21/20. Patient is going home today. We discussed:  -Low sodium/low potassium diet.  -Limiting fluids  -Weighing daily  -Following the CHF zones. Patient states \"The breathing treatments have helped and I would like to continue them at home\" Dr Burden Fears notified and breathing treatment ordered. Patient denies any other needs, questions or concerns. CN will follow. Date: 2/28/20    Time: 11:15  Attempted to call but call would not go through. I could not leave a message as this was not an option. CN will be available if needs or questions arise.      Willian Jackman RN CHF CN

## 2020-02-26 NOTE — PROGRESS NOTES
Progress Note( Dr. Kiki Jj)  2/26/2020  Subjective:   Admit Date: 2/22/2020  PCP: Loly Oconnor MD    Admitted For :    Consulted For: Severe shortness of breath atrial flutter fibrillation with rapid ventricular response  Also has been severe hyperglycemia    Interval History: Better control of blood glucose initially blood glucose was over 500 on admission    Denies any chest pains, feels lot better  Still has SOB . On breathing treatment   denies nausea or vomiting. Eating well baby too well  No new bowel or bladder symptoms. No intake or output data in the 24 hours ending 02/26/20 0750    DATA    CBC:   Recent Labs     02/24/20  0755 02/26/20  0537    353    CMP:  Recent Labs     02/24/20  0755 02/25/20  0444 02/26/20  0537    135 138   K 5.3* 5.4* 5.3*   CL 96* 94* 94*   CO2 30 28 32   BUN 68* 72* 72*   CREATININE 1.7* 2.0* 2.3*   CALCIUM 9.4 9.0 9.5     Lipids:   Lab Results   Component Value Date    CHOL 154 02/23/2020    HDL 39 02/23/2020    TRIG 268 02/23/2020     Glucose:  Recent Labs     02/25/20  2159 02/26/20  0232 02/26/20  0634   POCGLU 130* 105* 102*     [id number]:  Lab Results   Component Value Date    LABA1C 8.6 02/22/2020     High Sensitivity TSH:   Lab Results   Component Value Date    TSHHS 2.080 02/22/2020     Free T3: No results found for: FT3  Free T4:  Lab Results   Component Value Date    T4FREE 1.11 02/28/2019       Xr Chest Portable    Result Date: 2/22/2020  EXAMINATION: ONE XRAY VIEW OF THE CHEST 2/22/2020 3:02 am COMPARISON: 02/16/2020 HISTORY: ORDERING SYSTEM PROVIDED HISTORY: chest pain TECHNOLOGIST PROVIDED HISTORY: Reason for exam:->chest pain Reason for Exam: chest pain Acuity: Unknown Type of Exam: Initial FINDINGS: The lungs are underinflated, resulting in vascular crowding and subsegmental atelectasis. No focal consolidation, pleural effusion or pneumothorax. The cardiac silhouette and osseous structures are stable. No acute process. Scheduled Medicines   Medications:    insulin lispro  20 Units Subcutaneous TID     insulin lispro  0-18 Units Subcutaneous TID     insulin lispro  0-9 Units Subcutaneous 2 times per day    insulin glargine  30 Units Subcutaneous Nightly    torsemide  20 mg Oral BID    methylPREDNISolone  20 mg Intravenous Q12H    clopidogrel  75 mg Oral Daily    amLODIPine  5 mg Oral BID    aspirin  81 mg Oral Daily    atorvastatin  80 mg Oral Nightly    buPROPion  100 mg Oral BID    carvedilol  12.5 mg Oral BID WC    DULoxetine  60 mg Oral Daily    isosorbide mononitrate  60 mg Oral Daily    lactulose  10 g Oral BID    levothyroxine  50 mcg Oral Daily    pantoprazole  40 mg Oral QAM AC    polyethylene glycol  17 g Oral BID    ranolazine  500 mg Oral BID    sodium chloride flush  10 mL Intravenous 2 times per day    ipratropium-albuterol  1 ampule Inhalation Q4H WA    heparin (porcine)  5,000 Units Subcutaneous 3 times per day      Infusions:    dextrose           Objective:   Vitals: /68   Pulse 81   Temp 97.9 °F (36.6 °C) (Axillary)   Resp 21   Ht 5' 4\" (1.626 m)   Wt 272 lb 6.4 oz (123.6 kg)   SpO2 99%   BMI 46.76 kg/m²   General appearance: alert and cooperative with exam  Neck: no JVD or bruit  Thyroid : Normal lobes   Lungs: Has Vesicular Breath sounds   Heart: Atrial fibrillation  Abdomen: soft, non-tender; bowel sounds normal; no masses,  no organomegaly  Musculoskeletal: Normal  Extremities: extremities normal, , no edema  Neurologic:  Awake, alert, oriented to name, place and time. Cranial nerves II-XII are grossly intact. Motor is  intact.   Sensory i neuropathy t.,  and gait is abnormal.    Assessment:     Patient Active Problem List:     CKD (chronic kidney disease) stage 3, GFR 30-59 ml/min ([facility])     CAD (coronary artery disease)     Chronic diastolic heart failure ([facility]     Morbid obesity with BMI of 50.0-59.9, adult [facility])     Musculoskeletal chest pain     Essential hypertension     Diabetes mellitus with hyperglycemia (HCC)     Syncope     DM (diabetes mellitus), secondary uncontrolled (HCC)     Generalized anxiety disorder     Gait disturbance     Acute respiratory failure (HCC)     Hyperglycemia     UTI (urinary tract infection), bacterial     Sinus tachycardia     Uncontrolled type 2 diabetes mellitus with hyperglycemia (HCC)     Class 3 severe obesity due to excess calories with body mass index (BMI) of 45.0 to 49.9 in adult [facility])     Acute kidney injury ([facility]     DM (diabetes mellitus) (HCC)     Fluid overload     Cor pulmonale (chronic) (HCC)     Cellulitis of abdominal wall     STEMI (ST elevation myocardial infarction) ([facility] [facility]     Acute respiratory distress     Hyperkalemia     Uncontrolled diabetes mellitus with chronic kidney disease (HCC)     Acute on chronic diastolic (congestive) heart failure (HCC)     COPD with acute exacerbation ([facility]      Plan:     1. Reviewed POC blood glucose . Labs and X ray results   2. Reviewed Current Medicines   3. On meal/ Correction bolus Humalog/ Basal Lantus Insulin regime /  4. Monitor Blood glucose frequently   5. Modified  the dose of Insulin/ other medicines as needed   6. Will follow     .      Catalina Burton MD

## 2020-03-31 ENCOUNTER — HOSPITAL ENCOUNTER (EMERGENCY)
Age: 60
Discharge: HOME | End: 2020-03-31
Attending: EMERGENCY MEDICINE
Payer: MEDICARE

## 2020-03-31 ENCOUNTER — APPOINTMENT (OUTPATIENT)
Dept: GENERAL RADIOLOGY | Age: 60
End: 2020-03-31
Payer: MEDICARE

## 2020-03-31 VITALS
RESPIRATION RATE: 16 BRPM | SYSTOLIC BLOOD PRESSURE: 118 MMHG | HEART RATE: 106 BPM | DIASTOLIC BLOOD PRESSURE: 52 MMHG | WEIGHT: 293 LBS | OXYGEN SATURATION: 98 % | TEMPERATURE: 101 F | BODY MASS INDEX: 50.02 KG/M2 | HEIGHT: 64 IN

## 2020-03-31 LAB
ALBUMIN SERPL-MCNC: 3.4 GM/DL (ref 3.4–5)
ALP BLD-CCNC: 109 IU/L (ref 40–128)
ALT SERPL-CCNC: 13 U/L (ref 10–40)
ANION GAP SERPL CALCULATED.3IONS-SCNC: 11 MMOL/L (ref 4–16)
AST SERPL-CCNC: 12 IU/L (ref 15–37)
BASOPHILS ABSOLUTE: 0 K/CU MM
BASOPHILS RELATIVE PERCENT: 0.3 % (ref 0–1)
BILIRUB SERPL-MCNC: 0.4 MG/DL (ref 0–1)
BUN BLDV-MCNC: 42 MG/DL (ref 6–23)
CALCIUM SERPL-MCNC: 8.9 MG/DL (ref 8.3–10.6)
CHLORIDE BLD-SCNC: 95 MMOL/L (ref 99–110)
CO2: 25 MMOL/L (ref 21–32)
CREAT SERPL-MCNC: 1.6 MG/DL (ref 0.6–1.1)
DIFFERENTIAL TYPE: ABNORMAL
EOSINOPHILS ABSOLUTE: 0 K/CU MM
EOSINOPHILS RELATIVE PERCENT: 0.2 % (ref 0–3)
GFR AFRICAN AMERICAN: 40 ML/MIN/1.73M2
GFR NON-AFRICAN AMERICAN: 33 ML/MIN/1.73M2
GLUCOSE BLD-MCNC: 257 MG/DL (ref 70–99)
HCT VFR BLD CALC: 34.4 % (ref 37–47)
HEMOGLOBIN: 10.8 GM/DL (ref 12.5–16)
HIGH SENSITIVE C-REACTIVE PROTEIN: 43.4 MG/L
IMMATURE NEUTROPHIL %: 0.8 % (ref 0–0.43)
LACTATE DEHYDROGENASE: 206 IU/L (ref 120–246)
LACTATE: 1.2 MMOL/L (ref 0.4–2)
LYMPHOCYTES ABSOLUTE: 0.9 K/CU MM
LYMPHOCYTES RELATIVE PERCENT: 6.5 % (ref 24–44)
MCH RBC QN AUTO: 26.2 PG (ref 27–31)
MCHC RBC AUTO-ENTMCNC: 31.4 % (ref 32–36)
MCV RBC AUTO: 83.5 FL (ref 78–100)
MONOCYTES ABSOLUTE: 0.9 K/CU MM
MONOCYTES RELATIVE PERCENT: 6.2 % (ref 0–4)
NUCLEATED RBC %: 0 %
PDW BLD-RTO: 15.2 % (ref 11.7–14.9)
PLATELET # BLD: 294 K/CU MM (ref 140–440)
PMV BLD AUTO: 9.8 FL (ref 7.5–11.1)
POTASSIUM SERPL-SCNC: 5.3 MMOL/L (ref 3.5–5.1)
PROCALCITONIN: 0.4
RBC # BLD: 4.12 M/CU MM (ref 4.2–5.4)
SEGMENTED NEUTROPHILS ABSOLUTE COUNT: 12 K/CU MM
SEGMENTED NEUTROPHILS RELATIVE PERCENT: 86 % (ref 36–66)
SODIUM BLD-SCNC: 131 MMOL/L (ref 135–145)
TOTAL IMMATURE NEUTOROPHIL: 0.11 K/CU MM
TOTAL NUCLEATED RBC: 0 K/CU MM
TOTAL PROTEIN: 5.9 GM/DL (ref 6.4–8.2)
WBC # BLD: 14 K/CU MM (ref 4–10.5)

## 2020-03-31 PROCEDURE — 86141 C-REACTIVE PROTEIN HS: CPT

## 2020-03-31 PROCEDURE — 99285 EMERGENCY DEPT VISIT HI MDM: CPT

## 2020-03-31 PROCEDURE — 83605 ASSAY OF LACTIC ACID: CPT

## 2020-03-31 PROCEDURE — 6370000000 HC RX 637 (ALT 250 FOR IP): Performed by: EMERGENCY MEDICINE

## 2020-03-31 PROCEDURE — 80053 COMPREHEN METABOLIC PANEL: CPT

## 2020-03-31 PROCEDURE — 71045 X-RAY EXAM CHEST 1 VIEW: CPT

## 2020-03-31 PROCEDURE — 83615 LACTATE (LD) (LDH) ENZYME: CPT

## 2020-03-31 PROCEDURE — 85025 COMPLETE CBC W/AUTO DIFF WBC: CPT

## 2020-03-31 PROCEDURE — 84145 PROCALCITONIN (PCT): CPT

## 2020-03-31 RX ORDER — ONDANSETRON 4 MG/1
4 TABLET, ORALLY DISINTEGRATING ORAL ONCE
Status: COMPLETED | OUTPATIENT
Start: 2020-03-31 | End: 2020-03-31

## 2020-03-31 RX ORDER — ONDANSETRON 4 MG/1
4 TABLET, ORALLY DISINTEGRATING ORAL EVERY 8 HOURS PRN
Qty: 15 TABLET | Refills: 0 | Status: SHIPPED | OUTPATIENT
Start: 2020-03-31

## 2020-03-31 RX ORDER — ACETAMINOPHEN 500 MG
1000 TABLET ORAL ONCE
Status: COMPLETED | OUTPATIENT
Start: 2020-03-31 | End: 2020-03-31

## 2020-03-31 RX ADMIN — ONDANSETRON 4 MG: 4 TABLET, ORALLY DISINTEGRATING ORAL at 02:24

## 2020-03-31 RX ADMIN — ACETAMINOPHEN 1000 MG: 500 TABLET, FILM COATED ORAL at 02:24

## 2020-03-31 ASSESSMENT — PAIN DESCRIPTION - LOCATION
LOCATION: ABDOMEN;ARM;BACK
LOCATION: ABDOMEN

## 2020-03-31 ASSESSMENT — PAIN DESCRIPTION - DESCRIPTORS
DESCRIPTORS: CRAMPING
DESCRIPTORS: ACHING

## 2020-03-31 ASSESSMENT — PAIN DESCRIPTION - ORIENTATION: ORIENTATION: RIGHT;LEFT

## 2020-03-31 ASSESSMENT — PAIN SCALES - GENERAL
PAINLEVEL_OUTOF10: 10
PAINLEVEL_OUTOF10: 10
PAINLEVEL_OUTOF10: 5

## 2020-03-31 ASSESSMENT — PAIN - FUNCTIONAL ASSESSMENT: PAIN_FUNCTIONAL_ASSESSMENT: 0-10

## 2020-03-31 ASSESSMENT — PAIN DESCRIPTION - PROGRESSION: CLINICAL_PROGRESSION: NOT CHANGED

## 2020-03-31 ASSESSMENT — PAIN DESCRIPTION - FREQUENCY
FREQUENCY: INTERMITTENT
FREQUENCY: CONTINUOUS

## 2020-03-31 ASSESSMENT — PAIN DESCRIPTION - PAIN TYPE
TYPE: ACUTE PAIN
TYPE: ACUTE PAIN

## 2020-03-31 NOTE — ED NOTES
Bed: ED-01  Expected date:   Expected time:   Means of arrival: EMS  Comments:  Medic 8   Fever, Shortness of breath, Cough     Brian Carson RN  03/31/20 0157

## 2020-03-31 NOTE — ED TRIAGE NOTES
Patient arrived in the ED via stretcher. Arrived from home with complaints of SOB/Cough and fever. Patients temp orally is 101.0. Patient stated she had taken tylenol today around 4pm yesterday. Patient is complaining of body aches mainly in back, arms and legs. Stated she has \"had a fever for 3 days. \"

## 2020-03-31 NOTE — ED NOTES
Patient refusing to wear nasal cannula and mask. States that is makes her feel like she cant breath. Education provided regarding oxygen importance and wearing mask in room. Patient understood but continues to take off both.      Delia Castaneda RN  03/31/20 0316       Delia Castaneda RN  03/31/20 3440

## 2020-03-31 NOTE — ED NOTES
Notified Dr. Powell Situ of patient temp continuing to be 101.0. after tylenol administration. No new orders or recommendations given at this time.      Jorge Stokes RN  03/31/20 0296

## 2020-03-31 NOTE — ED PROVIDER NOTES
Number of children: Not on file    Years of education: Not on file    Highest education level: Not on file   Occupational History    Not on file   Social Needs    Financial resource strain: Not on file    Food insecurity     Worry: Not on file     Inability: Not on file    Transportation needs     Medical: Not on file     Non-medical: Not on file   Tobacco Use    Smoking status: Current Every Day Smoker     Packs/day: 0.20     Years: 34.00     Pack years: 6.80     Types: Cigarettes    Smokeless tobacco: Never Used   Substance and Sexual Activity    Alcohol use: No     Alcohol/week: 0.0 standard drinks    Drug use: No    Sexual activity: Not on file   Lifestyle    Physical activity     Days per week: Not on file     Minutes per session: Not on file    Stress: Not on file   Relationships    Social connections     Talks on phone: Not on file     Gets together: Not on file     Attends Nondenominational service: Not on file     Active member of club or organization: Not on file     Attends meetings of clubs or organizations: Not on file     Relationship status: Not on file    Intimate partner violence     Fear of current or ex partner: Not on file     Emotionally abused: Not on file     Physically abused: Not on file     Forced sexual activity: Not on file   Other Topics Concern    Not on file   Social History Narrative    Not on file     No current facility-administered medications for this encounter.       Current Outpatient Medications   Medication Sig Dispense Refill    ondansetron (ZOFRAN ODT) 4 MG disintegrating tablet Take 1 tablet by mouth every 8 hours as needed for Nausea 15 tablet 0    amLODIPine (NORVASC) 5 MG tablet Take 1 tablet by mouth 2 times daily 30 tablet 3    torsemide (DEMADEX) 20 MG tablet Take 1 tablet by mouth 2 times daily 30 tablet 3    clopidogrel (PLAVIX) 75 MG tablet Take 1 tablet by mouth daily 30 tablet 3    ipratropium-albuterol (DUONEB) 0.5-2.5 (3) MG/3ML SOLN nebulizer solution Inhale 3 mLs into the lungs every 4 hours 360 mL 1    lactulose (CHRONULAC) 10 GM/15ML solution Take 15 mLs by mouth 2 times daily 2 Bottle 1    lisinopril (PRINIVIL;ZESTRIL) 2.5 MG tablet Take 1 tablet by mouth daily Ignore 5 mg prescription. Correct prescription 2.5 mg. 30 tablet 1    polyethylene glycol (GLYCOLAX) powder Take 17 g by mouth 2 times daily      HYDROcodone-acetaminophen (NORCO) 5-325 MG per tablet Take 2 tablets by mouth every 4 hours as needed for Pain.       atorvastatin (LIPITOR) 80 MG tablet Take 1 tablet by mouth nightly 30 tablet 0    insulin lispro (HUMALOG) 100 UNIT/ML injection vial Check blood sugar three times daily before meals and at bedtime  For blood sugar less than 150= no insulin, 151-200=2, 201-250=4, 251-300=6, 301-350=6, 351-400=8, >400=10 units and call MD 1 vial 3    carvedilol (COREG) 12.5 MG tablet Take 1 tablet by mouth 2 times daily (with meals) 60 tablet 3    isosorbide mononitrate (IMDUR) 60 MG extended release tablet Take 1 tablet by mouth daily 30 tablet 3    vitamin D 1000 units CAPS Take 3 capsules by mouth daily 30 capsule 0    nystatin (MYCOSTATIN) 502249 UNIT/GM powder Apply topically 2 times daily as needed  1    ranolazine (RANEXA) 500 MG extended release tablet Take 1 tablet by mouth 2 times daily 60 tablet 3    BREO ELLIPTA 100-25 MCG/INH AEPB inhaler Inhale 1 puff into the lungs daily  0    levothyroxine (SYNTHROID) 50 MCG tablet Take 50 mcg by mouth daily  3    insulin glargine (LANTUS SOLOSTAR) 100 UNIT/ML injection pen Inject 55 Units into the skin nightly 5 pen 1    linagliptin (TRADJENTA) 5 MG tablet Take 1 tablet by mouth daily 30 tablet 3    buPROPion (WELLBUTRIN SR) 100 MG extended release tablet Take 100 mg by mouth 2 times daily      aspirin 81 MG chewable tablet Take 1 tablet by mouth daily 30 tablet 0    DULoxetine (CYMBALTA) 60 MG extended release capsule Take 1 capsule by mouth daily 30 capsule 3    docusate (COLACE, arrival.  She overall has symptomatology consistent with likely viral syndrome. Lab work is collected and the patient is given Tylenol and Zofran. Chest x-ray ordered for further evaluation. Patient's work-up here shows a leukocytosis. She has chronic renal insufficiency with a slightly high potassium that is unchanged from prior studies. Lactate is normal.  She does not have a significantly elevated procalcitonin. Chest x-ray does not show any evidence of pneumonia. On reevaluation, the patient is sleeping, no respiratory distress noted. I feel that overall presentation is still consistent with viral-like etiology. Plan to discharge with Zofran, symptomatic management. I considered the possibility of COVID19 in light of the current available information. The patient is low risk for mortality based on demographic, history and clinical factors. Specific COVID19 testing is not indicated in this patient. Given the best available information and clinical assessment, I estimate the risk of hospitalization to be greater than the risk of treatment at home. I have explained to the patient that their condition may change and have given them return precautions. In addition, they are given instructions regarding appropriate symptomatic care at home and instructions on how to minimize spread of the infection. Clinical Impression:  1. Acute febrile illness    2. Cough    3.  Nausea vomiting and diarrhea      (Please note that portions of this note may have been completed with a voice recognition program. Efforts were made to edit the dictations but occasionally words are mis-transcribed.)    MD Mychal Hutson MD  03/31/20 8956

## 2020-04-01 ENCOUNTER — CARE COORDINATION (OUTPATIENT)
Dept: CARE COORDINATION | Age: 60
End: 2020-04-01

## 2020-04-02 ENCOUNTER — CARE COORDINATION (OUTPATIENT)
Dept: CARE COORDINATION | Age: 60
End: 2020-04-02

## 2020-04-08 LAB
EKG ATRIAL RATE: 113 BPM
EKG DIAGNOSIS: NORMAL
EKG P AXIS: 88 DEGREES
EKG P-R INTERVAL: 186 MS
EKG Q-T INTERVAL: 326 MS
EKG QRS DURATION: 114 MS
EKG QTC CALCULATION (BAZETT): 447 MS
EKG R AXIS: 76 DEGREES
EKG T AXIS: -11 DEGREES
EKG VENTRICULAR RATE: 113 BPM

## 2020-05-01 ENCOUNTER — APPOINTMENT (OUTPATIENT)
Dept: GENERAL RADIOLOGY | Age: 60
DRG: 291 | End: 2020-05-01
Payer: MEDICARE

## 2020-05-01 ENCOUNTER — HOSPITAL ENCOUNTER (INPATIENT)
Age: 60
LOS: 5 days | Discharge: HOME HEALTH | DRG: 291 | End: 2020-05-06
Attending: EMERGENCY MEDICINE | Admitting: INTERNAL MEDICINE
Payer: MEDICARE

## 2020-05-01 PROBLEM — I50.9 HEART FAILURE (HCC): Status: ACTIVE | Noted: 2020-05-01

## 2020-05-01 LAB
ALBUMIN SERPL-MCNC: 3.7 GM/DL (ref 3.4–5)
ALP BLD-CCNC: 145 IU/L (ref 40–129)
ALT SERPL-CCNC: 13 U/L (ref 10–40)
ANION GAP SERPL CALCULATED.3IONS-SCNC: 10 MMOL/L (ref 4–16)
APTT: 36.8 SECONDS (ref 25.1–37.1)
AST SERPL-CCNC: 16 IU/L (ref 15–37)
BASE EXCESS MIXED: 2.4 (ref 0–2.3)
BASOPHILS ABSOLUTE: 0.1 K/CU MM
BASOPHILS RELATIVE PERCENT: 0.5 % (ref 0–1)
BILIRUB SERPL-MCNC: 0.3 MG/DL (ref 0–1)
BUN BLDV-MCNC: 28 MG/DL (ref 6–23)
CALCIUM SERPL-MCNC: 9.4 MG/DL (ref 8.3–10.6)
CHLORIDE BLD-SCNC: 97 MMOL/L (ref 99–110)
CO2: 26 MMOL/L (ref 21–32)
COMMENT: ABNORMAL
CREAT SERPL-MCNC: 1.3 MG/DL (ref 0.6–1.1)
DIFFERENTIAL TYPE: ABNORMAL
EKG ATRIAL RATE: 122 BPM
EKG DIAGNOSIS: NORMAL
EKG P AXIS: 46 DEGREES
EKG P-R INTERVAL: 160 MS
EKG Q-T INTERVAL: 304 MS
EKG QRS DURATION: 104 MS
EKG QTC CALCULATION (BAZETT): 433 MS
EKG R AXIS: 39 DEGREES
EKG T AXIS: -22 DEGREES
EKG VENTRICULAR RATE: 122 BPM
EOSINOPHILS ABSOLUTE: 0.2 K/CU MM
EOSINOPHILS RELATIVE PERCENT: 1.7 % (ref 0–3)
GFR AFRICAN AMERICAN: 51 ML/MIN/1.73M2
GFR NON-AFRICAN AMERICAN: 42 ML/MIN/1.73M2
GLUCOSE BLD-MCNC: 203 MG/DL (ref 70–99)
GLUCOSE BLD-MCNC: 218 MG/DL (ref 70–99)
GLUCOSE BLD-MCNC: 235 MG/DL (ref 70–99)
GLUCOSE BLD-MCNC: 307 MG/DL (ref 70–99)
HCO3 VENOUS: 30.5 MMOL/L (ref 19–25)
HCT VFR BLD CALC: 33.5 % (ref 37–47)
HEMOGLOBIN: 10 GM/DL (ref 12.5–16)
IMMATURE NEUTROPHIL %: 1.2 % (ref 0–0.43)
INR BLD: 1 INDEX
LACTATE: 1.3 MMOL/L (ref 0.4–2)
LYMPHOCYTES ABSOLUTE: 1.9 K/CU MM
LYMPHOCYTES RELATIVE PERCENT: 15 % (ref 24–44)
MAGNESIUM: 2 MG/DL (ref 1.8–2.4)
MCH RBC QN AUTO: 26.2 PG (ref 27–31)
MCHC RBC AUTO-ENTMCNC: 29.9 % (ref 32–36)
MCV RBC AUTO: 87.7 FL (ref 78–100)
MONOCYTES ABSOLUTE: 0.7 K/CU MM
MONOCYTES RELATIVE PERCENT: 5.3 % (ref 0–4)
NUCLEATED RBC %: 0 %
O2 SAT, VEN: 90.9 % (ref 50–70)
PCO2, VEN: 62 MMHG (ref 38–52)
PDW BLD-RTO: 14.9 % (ref 11.7–14.9)
PH VENOUS: 7.3 (ref 7.32–7.42)
PLATELET # BLD: 359 K/CU MM (ref 140–440)
PMV BLD AUTO: 10.1 FL (ref 7.5–11.1)
PO2, VEN: 76 MMHG (ref 28–48)
POTASSIUM SERPL-SCNC: 5 MMOL/L (ref 3.5–5.1)
PRO-BNP: 5993 PG/ML
PROTHROMBIN TIME: 12.1 SECONDS (ref 11.7–14.5)
RBC # BLD: 3.82 M/CU MM (ref 4.2–5.4)
SEGMENTED NEUTROPHILS ABSOLUTE COUNT: 9.5 K/CU MM
SEGMENTED NEUTROPHILS RELATIVE PERCENT: 76.3 % (ref 36–66)
SODIUM BLD-SCNC: 133 MMOL/L (ref 135–145)
TOTAL IMMATURE NEUTOROPHIL: 0.15 K/CU MM
TOTAL NUCLEATED RBC: 0 K/CU MM
TOTAL PROTEIN: 6.4 GM/DL (ref 6.4–8.2)
TROPONIN T: 0.04 NG/ML
WBC # BLD: 12.4 K/CU MM (ref 4–10.5)

## 2020-05-01 PROCEDURE — 96374 THER/PROPH/DIAG INJ IV PUSH: CPT

## 2020-05-01 PROCEDURE — 85025 COMPLETE CBC W/AUTO DIFF WBC: CPT

## 2020-05-01 PROCEDURE — 2580000003 HC RX 258: Performed by: INTERNAL MEDICINE

## 2020-05-01 PROCEDURE — 6370000000 HC RX 637 (ALT 250 FOR IP): Performed by: INTERNAL MEDICINE

## 2020-05-01 PROCEDURE — 94761 N-INVAS EAR/PLS OXIMETRY MLT: CPT

## 2020-05-01 PROCEDURE — 82805 BLOOD GASES W/O2 SATURATION: CPT

## 2020-05-01 PROCEDURE — 85610 PROTHROMBIN TIME: CPT

## 2020-05-01 PROCEDURE — 6360000002 HC RX W HCPCS: Performed by: INTERNAL MEDICINE

## 2020-05-01 PROCEDURE — 82962 GLUCOSE BLOOD TEST: CPT

## 2020-05-01 PROCEDURE — 99291 CRITICAL CARE FIRST HOUR: CPT

## 2020-05-01 PROCEDURE — 93010 ELECTROCARDIOGRAM REPORT: CPT | Performed by: INTERNAL MEDICINE

## 2020-05-01 PROCEDURE — 83605 ASSAY OF LACTIC ACID: CPT

## 2020-05-01 PROCEDURE — 94660 CPAP INITIATION&MGMT: CPT

## 2020-05-01 PROCEDURE — 4500000027

## 2020-05-01 PROCEDURE — 83735 ASSAY OF MAGNESIUM: CPT

## 2020-05-01 PROCEDURE — 71045 X-RAY EXAM CHEST 1 VIEW: CPT

## 2020-05-01 PROCEDURE — 6360000002 HC RX W HCPCS: Performed by: EMERGENCY MEDICINE

## 2020-05-01 PROCEDURE — 2500000003 HC RX 250 WO HCPCS: Performed by: EMERGENCY MEDICINE

## 2020-05-01 PROCEDURE — 2700000000 HC OXYGEN THERAPY PER DAY

## 2020-05-01 PROCEDURE — 94640 AIRWAY INHALATION TREATMENT: CPT

## 2020-05-01 PROCEDURE — 93005 ELECTROCARDIOGRAM TRACING: CPT | Performed by: EMERGENCY MEDICINE

## 2020-05-01 PROCEDURE — 84484 ASSAY OF TROPONIN QUANT: CPT

## 2020-05-01 PROCEDURE — 1200000000 HC SEMI PRIVATE

## 2020-05-01 PROCEDURE — 83880 ASSAY OF NATRIURETIC PEPTIDE: CPT

## 2020-05-01 PROCEDURE — 85730 THROMBOPLASTIN TIME PARTIAL: CPT

## 2020-05-01 PROCEDURE — 80053 COMPREHEN METABOLIC PANEL: CPT

## 2020-05-01 RX ORDER — NICOTINE POLACRILEX 4 MG
15 LOZENGE BUCCAL PRN
Status: DISCONTINUED | OUTPATIENT
Start: 2020-05-01 | End: 2020-05-03 | Stop reason: SDUPTHER

## 2020-05-01 RX ORDER — INSULIN GLARGINE 100 [IU]/ML
55 INJECTION, SOLUTION SUBCUTANEOUS NIGHTLY
Status: DISCONTINUED | OUTPATIENT
Start: 2020-05-01 | End: 2020-05-06 | Stop reason: HOSPADM

## 2020-05-01 RX ORDER — ATORVASTATIN CALCIUM 40 MG/1
40 TABLET, FILM COATED ORAL NIGHTLY
Status: DISCONTINUED | OUTPATIENT
Start: 2020-05-01 | End: 2020-05-06 | Stop reason: HOSPADM

## 2020-05-01 RX ORDER — DEXTROSE MONOHYDRATE 50 MG/ML
100 INJECTION, SOLUTION INTRAVENOUS PRN
Status: DISCONTINUED | OUTPATIENT
Start: 2020-05-01 | End: 2020-05-03 | Stop reason: SDUPTHER

## 2020-05-01 RX ORDER — FUROSEMIDE 10 MG/ML
40 INJECTION INTRAMUSCULAR; INTRAVENOUS ONCE
Status: COMPLETED | OUTPATIENT
Start: 2020-05-01 | End: 2020-05-01

## 2020-05-01 RX ORDER — HYDROCODONE BITARTRATE AND ACETAMINOPHEN 7.5; 325 MG/1; MG/1
1 TABLET ORAL EVERY 6 HOURS PRN
Status: DISCONTINUED | OUTPATIENT
Start: 2020-05-01 | End: 2020-05-06 | Stop reason: HOSPADM

## 2020-05-01 RX ORDER — ALOGLIPTIN 25 MG/1
25 TABLET, FILM COATED ORAL DAILY
Status: DISCONTINUED | OUTPATIENT
Start: 2020-05-01 | End: 2020-05-06 | Stop reason: HOSPADM

## 2020-05-01 RX ORDER — ISOSORBIDE MONONITRATE 60 MG/1
60 TABLET, EXTENDED RELEASE ORAL DAILY
Status: DISCONTINUED | OUTPATIENT
Start: 2020-05-01 | End: 2020-05-03

## 2020-05-01 RX ORDER — DEXTROSE MONOHYDRATE 25 G/50ML
12.5 INJECTION, SOLUTION INTRAVENOUS PRN
Status: DISCONTINUED | OUTPATIENT
Start: 2020-05-01 | End: 2020-05-03 | Stop reason: SDUPTHER

## 2020-05-01 RX ORDER — ACETAMINOPHEN 325 MG/1
650 TABLET ORAL EVERY 6 HOURS PRN
Status: DISCONTINUED | OUTPATIENT
Start: 2020-05-01 | End: 2020-05-06 | Stop reason: HOSPADM

## 2020-05-01 RX ORDER — SODIUM CHLORIDE 0.9 % (FLUSH) 0.9 %
10 SYRINGE (ML) INJECTION PRN
Status: DISCONTINUED | OUTPATIENT
Start: 2020-05-01 | End: 2020-05-06 | Stop reason: HOSPADM

## 2020-05-01 RX ORDER — CLOPIDOGREL BISULFATE 75 MG/1
75 TABLET ORAL DAILY
Status: DISCONTINUED | OUTPATIENT
Start: 2020-05-01 | End: 2020-05-06 | Stop reason: HOSPADM

## 2020-05-01 RX ORDER — PROMETHAZINE HYDROCHLORIDE 25 MG/1
12.5 TABLET ORAL EVERY 6 HOURS PRN
Status: DISCONTINUED | OUTPATIENT
Start: 2020-05-01 | End: 2020-05-06 | Stop reason: HOSPADM

## 2020-05-01 RX ORDER — IPRATROPIUM BROMIDE AND ALBUTEROL SULFATE 2.5; .5 MG/3ML; MG/3ML
1 SOLUTION RESPIRATORY (INHALATION) EVERY 4 HOURS PRN
Status: DISCONTINUED | OUTPATIENT
Start: 2020-05-01 | End: 2020-05-01 | Stop reason: SDUPTHER

## 2020-05-01 RX ORDER — POLYETHYLENE GLYCOL 3350 17 G/17G
17 POWDER, FOR SOLUTION ORAL DAILY PRN
Status: DISCONTINUED | OUTPATIENT
Start: 2020-05-01 | End: 2020-05-06 | Stop reason: HOSPADM

## 2020-05-01 RX ORDER — NITROGLYCERIN 20 MG/100ML
5 INJECTION INTRAVENOUS CONTINUOUS
Status: DISCONTINUED | OUTPATIENT
Start: 2020-05-01 | End: 2020-05-01

## 2020-05-01 RX ORDER — LEVOTHYROXINE SODIUM 0.05 MG/1
50 TABLET ORAL DAILY
Status: DISCONTINUED | OUTPATIENT
Start: 2020-05-01 | End: 2020-05-06 | Stop reason: HOSPADM

## 2020-05-01 RX ORDER — BUPROPION HYDROCHLORIDE 100 MG/1
100 TABLET, EXTENDED RELEASE ORAL 2 TIMES DAILY
Status: DISCONTINUED | OUTPATIENT
Start: 2020-05-01 | End: 2020-05-06 | Stop reason: HOSPADM

## 2020-05-01 RX ORDER — PANTOPRAZOLE SODIUM 40 MG/1
40 TABLET, DELAYED RELEASE ORAL
Status: DISCONTINUED | OUTPATIENT
Start: 2020-05-02 | End: 2020-05-06 | Stop reason: HOSPADM

## 2020-05-01 RX ORDER — FLUTICASONE FUROATE AND VILANTEROL 100; 25 UG/1; UG/1
1 POWDER RESPIRATORY (INHALATION) DAILY
Status: DISCONTINUED | OUTPATIENT
Start: 2020-05-01 | End: 2020-05-01 | Stop reason: CLARIF

## 2020-05-01 RX ORDER — CARVEDILOL 12.5 MG/1
12.5 TABLET ORAL 2 TIMES DAILY WITH MEALS
Status: DISCONTINUED | OUTPATIENT
Start: 2020-05-01 | End: 2020-05-03

## 2020-05-01 RX ORDER — ATORVASTATIN CALCIUM 40 MG/1
80 TABLET, FILM COATED ORAL NIGHTLY
Status: DISCONTINUED | OUTPATIENT
Start: 2020-05-01 | End: 2020-05-01

## 2020-05-01 RX ORDER — DULOXETIN HYDROCHLORIDE 30 MG/1
60 CAPSULE, DELAYED RELEASE ORAL DAILY
Status: DISCONTINUED | OUTPATIENT
Start: 2020-05-01 | End: 2020-05-06 | Stop reason: HOSPADM

## 2020-05-01 RX ORDER — SODIUM CHLORIDE 0.9 % (FLUSH) 0.9 %
10 SYRINGE (ML) INJECTION EVERY 12 HOURS SCHEDULED
Status: DISCONTINUED | OUTPATIENT
Start: 2020-05-01 | End: 2020-05-06 | Stop reason: HOSPADM

## 2020-05-01 RX ORDER — RANOLAZINE 500 MG/1
500 TABLET, EXTENDED RELEASE ORAL 2 TIMES DAILY
Status: DISCONTINUED | OUTPATIENT
Start: 2020-05-01 | End: 2020-05-06 | Stop reason: HOSPADM

## 2020-05-01 RX ORDER — NICOTINE 21 MG/24HR
1 PATCH, TRANSDERMAL 24 HOURS TRANSDERMAL DAILY
Status: DISCONTINUED | OUTPATIENT
Start: 2020-05-01 | End: 2020-05-06 | Stop reason: HOSPADM

## 2020-05-01 RX ORDER — ASPIRIN 81 MG/1
81 TABLET, CHEWABLE ORAL DAILY
Status: DISCONTINUED | OUTPATIENT
Start: 2020-05-01 | End: 2020-05-06 | Stop reason: HOSPADM

## 2020-05-01 RX ORDER — FUROSEMIDE 10 MG/ML
40 INJECTION INTRAMUSCULAR; INTRAVENOUS 2 TIMES DAILY
Status: DISCONTINUED | OUTPATIENT
Start: 2020-05-01 | End: 2020-05-01

## 2020-05-01 RX ORDER — ONDANSETRON 2 MG/ML
4 INJECTION INTRAMUSCULAR; INTRAVENOUS EVERY 6 HOURS PRN
Status: DISCONTINUED | OUTPATIENT
Start: 2020-05-01 | End: 2020-05-06 | Stop reason: HOSPADM

## 2020-05-01 RX ORDER — AMLODIPINE BESYLATE 5 MG/1
10 TABLET ORAL DAILY
Status: CANCELLED | OUTPATIENT
Start: 2020-05-01

## 2020-05-01 RX ORDER — LISINOPRIL 2.5 MG/1
2.5 TABLET ORAL DAILY
Status: DISCONTINUED | OUTPATIENT
Start: 2020-05-01 | End: 2020-05-03

## 2020-05-01 RX ORDER — ACETAMINOPHEN 650 MG/1
650 SUPPOSITORY RECTAL EVERY 6 HOURS PRN
Status: DISCONTINUED | OUTPATIENT
Start: 2020-05-01 | End: 2020-05-06 | Stop reason: HOSPADM

## 2020-05-01 RX ORDER — BUDESONIDE AND FORMOTEROL FUMARATE DIHYDRATE 80; 4.5 UG/1; UG/1
2 AEROSOL RESPIRATORY (INHALATION) 2 TIMES DAILY
Status: DISCONTINUED | OUTPATIENT
Start: 2020-05-01 | End: 2020-05-06 | Stop reason: HOSPADM

## 2020-05-01 RX ADMIN — CARVEDILOL 12.5 MG: 12.5 TABLET, FILM COATED ORAL at 13:18

## 2020-05-01 RX ADMIN — ALOGLIPTIN 25 MG: 25 TABLET, FILM COATED ORAL at 13:19

## 2020-05-01 RX ADMIN — ENOXAPARIN SODIUM 40 MG: 40 INJECTION SUBCUTANEOUS at 13:19

## 2020-05-01 RX ADMIN — Medication 3000 UNITS: at 13:18

## 2020-05-01 RX ADMIN — ATORVASTATIN CALCIUM 40 MG: 40 TABLET, FILM COATED ORAL at 22:23

## 2020-05-01 RX ADMIN — FUROSEMIDE 10 MG/HR: 10 INJECTION, SOLUTION INTRAMUSCULAR; INTRAVENOUS at 20:35

## 2020-05-01 RX ADMIN — HYDROCODONE BITARTRATE AND ACETAMINOPHEN 1 TABLET: 7.5; 325 TABLET ORAL at 15:49

## 2020-05-01 RX ADMIN — INSULIN LISPRO 1 UNITS: 100 INJECTION, SOLUTION INTRAVENOUS; SUBCUTANEOUS at 22:24

## 2020-05-01 RX ADMIN — LISINOPRIL 2.5 MG: 2.5 TABLET ORAL at 13:18

## 2020-05-01 RX ADMIN — BUPROPION HYDROCHLORIDE 100 MG: 100 TABLET, EXTENDED RELEASE ORAL at 13:18

## 2020-05-01 RX ADMIN — BUDESONIDE AND FORMOTEROL FUMARATE DIHYDRATE 2 PUFF: 80; 4.5 AEROSOL RESPIRATORY (INHALATION) at 21:07

## 2020-05-01 RX ADMIN — FUROSEMIDE 40 MG: 10 INJECTION, SOLUTION INTRAVENOUS at 08:34

## 2020-05-01 RX ADMIN — CLOPIDOGREL BISULFATE 75 MG: 75 TABLET ORAL at 13:18

## 2020-05-01 RX ADMIN — SODIUM CHLORIDE, PRESERVATIVE FREE 10 ML: 5 INJECTION INTRAVENOUS at 22:27

## 2020-05-01 RX ADMIN — DULOXETINE HYDROCHLORIDE 60 MG: 30 CAPSULE, DELAYED RELEASE ORAL at 13:18

## 2020-05-01 RX ADMIN — INSULIN LISPRO 2 UNITS: 100 INJECTION, SOLUTION INTRAVENOUS; SUBCUTANEOUS at 18:09

## 2020-05-01 RX ADMIN — ISOSORBIDE MONONITRATE 60 MG: 60 TABLET, EXTENDED RELEASE ORAL at 13:18

## 2020-05-01 RX ADMIN — HYDROCODONE BITARTRATE AND ACETAMINOPHEN 1 TABLET: 7.5; 325 TABLET ORAL at 22:28

## 2020-05-01 RX ADMIN — NITROGLYCERIN 5 MCG/MIN: 20 INJECTION INTRAVENOUS at 07:01

## 2020-05-01 RX ADMIN — INSULIN GLARGINE 55 UNITS: 100 INJECTION, SOLUTION SUBCUTANEOUS at 22:23

## 2020-05-01 RX ADMIN — BUPROPION HYDROCHLORIDE 100 MG: 100 TABLET, EXTENDED RELEASE ORAL at 22:23

## 2020-05-01 RX ADMIN — ASPIRIN 81 MG 81 MG: 81 TABLET ORAL at 13:18

## 2020-05-01 RX ADMIN — RANOLAZINE 500 MG: 500 TABLET, FILM COATED, EXTENDED RELEASE ORAL at 13:18

## 2020-05-01 RX ADMIN — SODIUM CHLORIDE, PRESERVATIVE FREE 10 ML: 5 INJECTION INTRAVENOUS at 13:19

## 2020-05-01 RX ADMIN — LEVOTHYROXINE SODIUM 50 MCG: 50 TABLET ORAL at 13:18

## 2020-05-01 RX ADMIN — INSULIN LISPRO 2 UNITS: 100 INJECTION, SOLUTION INTRAVENOUS; SUBCUTANEOUS at 13:27

## 2020-05-01 RX ADMIN — RANOLAZINE 500 MG: 500 TABLET, FILM COATED, EXTENDED RELEASE ORAL at 22:23

## 2020-05-01 RX ADMIN — FUROSEMIDE 10 MG/HR: 10 INJECTION, SOLUTION INTRAMUSCULAR; INTRAVENOUS at 13:19

## 2020-05-01 ASSESSMENT — PAIN SCALES - GENERAL
PAINLEVEL_OUTOF10: 0
PAINLEVEL_OUTOF10: 0
PAINLEVEL_OUTOF10: 8
PAINLEVEL_OUTOF10: 8
PAINLEVEL_OUTOF10: 0
PAINLEVEL_OUTOF10: 8
PAINLEVEL_OUTOF10: 8

## 2020-05-01 ASSESSMENT — ENCOUNTER SYMPTOMS
WHEEZING: 1
DIARRHEA: 0
CONSTIPATION: 0
SORE THROAT: 0
SHORTNESS OF BREATH: 1
BACK PAIN: 0
EYE REDNESS: 0
NAUSEA: 0
COUGH: 1
VOMITING: 0
ABDOMINAL PAIN: 0
RHINORRHEA: 0

## 2020-05-01 ASSESSMENT — PAIN DESCRIPTION - PROGRESSION: CLINICAL_PROGRESSION: NOT CHANGED

## 2020-05-01 ASSESSMENT — PAIN DESCRIPTION - FREQUENCY: FREQUENCY: INTERMITTENT

## 2020-05-01 NOTE — ED NOTES
Pt reports that she has not taken her meds in a couple days d/t just forgetting. Pt states that she lives with boyfriend \"Pavan\" and he helps her get around at home. She reports becoming SOB with much movement and needing help and she has been using a w/c the past few days. Pt has been on 3.5L NC.       Cassius Holly RN  05/01/20 1934

## 2020-05-01 NOTE — H&P
prophylaxis,    Code Status FULL   Disposition To be determined   MDM [] Low, [x] Moderate,[]  High     History of Present Illness:     Principal Problem: SOB X 1 week. Tran Isaac is a 61 y.o. female who presents to the ED with above complaint. Patient has PMH of CHF, COPD, type 2 diabetes, hypothyroidism, morbid obesity and hypertension. She complains of progressive shortness of breath with exertion for the past week. She initially tried albuterol nebulization which helped for a few days but no longer helping. Also endorses worsening leg swelling. She endorses orthopnea and about  36 pounds weight gain since the end of March. She endorses nonadherence with her antihypertensives and diuretics. In the ED today, blood pressure was 202/116 mmhg, she was tachycardic [116/min] and chest x-ray showed pulmonary edema. She was started on nitroglycerin drip in the ED and admitted for further management. ED Course: Discussed case with ED physician prior to admission. ROS: As per HPI, otherwise 10-systems reviewed negative, unless as noted above. Objective:   No intake or output data in the 24 hours ending 05/01/20 0955     Vitals:   Vitals:    05/01/20 0802 05/01/20 0803 05/01/20 0832 05/01/20 0900   BP: (!) 169/78 (!) 169/78 (!) 185/89    Pulse: 107 107 109 110   Resp: 17 15 18 17   Temp:  98 °F (36.7 °C)     TempSrc:  Oral     SpO2: 97% 97% 93% 93%   Weight:  (!) 316 lb (143.3 kg)     Height:  5' 4\" (1.626 m)       Physical Exam: 05/01/20    GEN  -obese  woman, sitting upright in bed. Clarene Erick EYES -Pupils are equally round. No vision changes. No scleral erythema, discharge, or conjunctivitis. HENT -MM are moist. Oral pharynx without exudates, no evidence of thrush. NECK -Supple, no apparent thyromegaly or masses. RESP -LS CTA equal bilat, no wheezes, rales or rhonchi. Breathing oxygen via nasal cannula 4 L/min. C/V  -S1/S2 auscultated. Regular tachycardia without appreciable M/R/G.  No Occupational History    Not on file   Social Needs    Financial resource strain: Not on file    Food insecurity     Worry: Not on file     Inability: Not on file    Transportation needs     Medical: Not on file     Non-medical: Not on file   Tobacco Use    Smoking status: Current Every Day Smoker     Packs/day: 0.20     Years: 34.00     Pack years: 6.80     Types: Cigarettes    Smokeless tobacco: Never Used   Substance and Sexual Activity    Alcohol use: No     Alcohol/week: 0.0 standard drinks    Drug use: No    Sexual activity: Not on file   Lifestyle    Physical activity     Days per week: Not on file     Minutes per session: Not on file    Stress: Not on file   Relationships    Social connections     Talks on phone: Not on file     Gets together: Not on file     Attends Congregation service: Not on file     Active member of club or organization: Not on file     Attends meetings of clubs or organizations: Not on file     Relationship status: Not on file    Intimate partner violence     Fear of current or ex partner: Not on file     Emotionally abused: Not on file     Physically abused: Not on file     Forced sexual activity: Not on file   Other Topics Concern    Not on file   Social History Narrative    Not on file     TOBACCO:   reports that she has been smoking cigarettes. She has a 6.80 pack-year smoking history. She has never used smokeless tobacco.  ETOH:   reports no history of alcohol use. Drugs:  reports no history of drug use. Allergies: Allergies   Allergen Reactions    Augmentin [Amoxicillin-Pot Clavulanate] Diarrhea     Medications:   Medications:    Infusions:    nitroGLYCERIN 5 mcg/min (05/01/20 0701)     PRN Meds:    Prior to Admission Meds:  Prior to Admission medications    Medication Sig Start Date End Date Taking?  Authorizing Provider   ondansetron (ZOFRAN ODT) 4 MG disintegrating tablet Take 1 tablet by mouth every 8 hours as needed for Nausea 3/31/20   Elin Barajas MD amLODIPine (NORVASC) 5 MG tablet Take 1 tablet by mouth 2 times daily 2/26/20   Jeremias Palumbo MD   torsemide (DEMADEX) 20 MG tablet Take 1 tablet by mouth 2 times daily 2/26/20   Jeremias Palumbo MD   clopidogrel (PLAVIX) 75 MG tablet Take 1 tablet by mouth daily 2/27/20   Jeremias Palumbo MD   ipratropium-albuterol (DUONEB) 0.5-2.5 (3) MG/3ML SOLN nebulizer solution Inhale 3 mLs into the lungs every 4 hours 2/26/20   Jeremias Palumbo MD   lactulose (CHRONULAC) 10 GM/15ML solution Take 15 mLs by mouth 2 times daily 1/11/20   Melisa Espino MD   lisinopril (PRINIVIL;ZESTRIL) 2.5 MG tablet Take 1 tablet by mouth daily Ignore 5 mg prescription. Correct prescription 2.5 mg. 1/11/20   Melisa Espino MD   polyethylene glycol (GLYCOLAX) powder Take 17 g by mouth 2 times daily    Historical Provider, MD   HYDROcodone-acetaminophen (NORCO) 5-325 MG per tablet Take 2 tablets by mouth every 4 hours as needed for Pain.     Historical Provider, MD   atorvastatin (LIPITOR) 80 MG tablet Take 1 tablet by mouth nightly 12/4/19   Connie Benz DO   insulin lispro (HUMALOG) 100 UNIT/ML injection vial Check blood sugar three times daily before meals and at bedtime  For blood sugar less than 150= no insulin, 151-200=2, 201-250=4, 251-300=6, 301-350=6, 351-400=8, >400=10 units and call MD 12/2/19   Linda Coy MD   carvedilol (COREG) 12.5 MG tablet Take 1 tablet by mouth 2 times daily (with meals) 12/2/19   Linda Coy MD   isosorbide mononitrate (IMDUR) 60 MG extended release tablet Take 1 tablet by mouth daily 12/3/19   Linda Coy MD   vitamin D 1000 units CAPS Take 3 capsules by mouth daily 7/18/19   Jeremias Palumbo MD   nystatin (MYCOSTATIN) 093141 UNIT/GM powder Apply topically 2 times daily as needed 4/19/19   Historical Provider, MD   ranolazine (RANEXA) 500 MG extended release tablet Take 1 tablet by mouth 2 times daily 3/4/19   MD TONYA Adames ELLIPTA 100-25 MCG/INH AEPB inhaler Inhale 1 puff into the lungs daily 2/26/19   Historical Provider, MD   levothyroxine (SYNTHROID) 50 MCG tablet Take 50 mcg by mouth daily 2/26/19   Historical Provider, MD   insulin glargine (LANTUS SOLOSTAR) 100 UNIT/ML injection pen Inject 55 Units into the skin nightly 11/21/18   MD Rosangela   linagliptin (TRADJENTA) 5 MG tablet Take 1 tablet by mouth daily 11/22/18   MD Rosangela   buPROPion [facility] SR) 100 MG extended release tablet Take 100 mg by mouth 2 times daily    Historical Provider, MD   aspirin 81 MG chewable tablet Take 1 tablet by mouth daily 1/8/18   Sony Rose MD   DULoxetine (CYMBALTA) 60 MG extended release capsule Take 1 capsule by mouth daily 1/8/18   Sony Rose MD   docusate (COLACE, DULCOLAX) 100 MG CAPS Take 100 mg by mouth 2 times daily 1/8/18   Sony Rose MD   pantoprazole (PROTONIX) 40 MG tablet Take 1 tablet by mouth every morning (before breakfast) 1/8/18   Sony Rose MD   albuterol sulfate HFA (VENTOLIN HFA) 108 (90 Base) MCG/ACT inhaler Inhale 2 puffs into the lungs every 4 hours as needed for Wheezing 1/8/18   Sony Rose MD     Data:     Laboratory this visit:  Reviewed  Recent Labs     05/01/20  0646   WBC 12.4*   HGB 10.0*   HCT 33.5*         Recent Labs     05/01/20  0646   *   K 5.0   CL 97*   CO2 26   BUN 28*   CREATININE 1.3*     Recent Labs     05/01/20  0646   AST 16   ALT 13   BILITOT 0.3   ALKPHOS 145*     No results for input(s): INR in the last 72 hours. No results for input(s): CKTOTAL, CKMB, CKMBINDEX in the last 72 hours. Invalid input(s): Manuel Cage input(s): PRO-BNP    Radiology this visit:  Reviewed.     Xr Chest Portable    Result Date: 5/1/2020  EXAMINATION: ONE XRAY VIEW OF THE CHEST 5/1/2020 6:39 am COMPARISON: 03/31/2020 HISTORY: ORDERING SYSTEM PROVIDED HISTORY: shortness of breath TECHNOLOGIST PROVIDED HISTORY: Reason for exam:->shortness of breath Reason for Exam: shortness of breath Acuity: Acute Type of Exam: Initial FINDINGS:

## 2020-05-01 NOTE — PROGRESS NOTES
Nutrition Education    Type and Reason for Visit: Consult    Nutrition Assessment:  Admit referral for heart failure diet. Tolerating low sodium diet so far. Has been working on adjusting to low sodium diet at home. Receptive to some discussion. Main concern on visit getting used to wearing cpap. Will continue to follwo up during stay. · Verbally reviewed information with Patient  · Written educational materials provided, heart failure nutrition therapy from Nutrition Care Manual.   · Contact name and number provided.     Electronically signed by Xiao Bahena RD, JASPER on 5/1/20 at 4:34 PM EDT    Contact Number: [phone]

## 2020-05-01 NOTE — CONSULTS
[facility] -[id number]
her  nephrologist.    PAST SURGICAL HISTORY:  Gallbladder surgery, tonsillectomy, and  angioplasty. SOCIAL HISTORY:  Living at home with her . She does not smoke  and does not drink. ALLERGIES:  AUGMENTIN. MEDICATIONS:  She was on Norvasc, Demadex 20 mg b.i.d., Plavix,  lisinopril, lactulose, Coreg, Ranexa, aspirin, and she should have been  on Plavix and I am not if sure she is taking that also. PHYSICAL EXAMINATION:  GENERAL:  The patient is awake and alert, answering the questions. VITAL SIGNS:  Temperature is afebrile, pulse is in 70s, blood pressure  180s. HEENT:  Head is normocephalic and atraumatic. Pupils equal and  reactive. CHEST:  Equal expansion. HEART:  Regular rhythm. ABDOMEN:  Soft and nontender. EXTREMITIES:  3+ edema present. NEUROLOGIC:  Cranial nerves II through XII are grossly intact. LABORATORY DATA:  Potassium is 5.0, creatinine is 1.3. BNP is 5900. Troponins are elevated, but no positive. LFTs normal.  CBC is within  normal range. Chest x-ray shows edema present. IMPRESSION AND PLAN:  This is a 51-year-old female patient with a  history of having coronary artery disease, heart failure, sleep apnea,  COPD present, comes in with significant edema present and hypertensive  urgency present. We will start her on medications and we will adjust  her medications and see how she responds to that.         Benedict Gavin MD    D: 05/01/2020 11:58:26       T: 05/01/2020 13:23:27     [id number]  Job#: [id number]     Doc#: [id number]    CC:

## 2020-05-01 NOTE — PROGRESS NOTES
Pt arrived from ED to room 3007 at 1120. Pt in bed. No skin issues noted. Pt refuses to wear gown, wants to remain in personal night gown. Pt on tele. /69, nitro gtt infusing.  Pt on 2 L NC.

## 2020-05-01 NOTE — ED TRIAGE NOTES
EMS reports pts darinportia called d/t pt being SOB. Pt \"forgot\" to take her medications for 2 days.

## 2020-05-01 NOTE — ED PROVIDER NOTES
650 mg  650 mg Oral Q6H PRN Madeleine Meehan MD        Or    acetaminophen (TYLENOL) suppository 650 mg  650 mg Rectal Q6H PRN Madeleine Meehan MD        polyethylene glycol (GLYCOLAX) packet 17 g  17 g Oral Daily PRN Madeleine Meehan MD        promethazine (PHENERGAN) tablet 12.5 mg  12.5 mg Oral Q6H PRN Madeleine Meehan MD        Or    ondansetron (ZOFRAN) injection 4 mg  4 mg Intravenous Q6H PRN Madeleine Meehan MD        enoxaparin (LOVENOX) injection 40 mg  40 mg Subcutaneous Daily Madeleine Meehan MD   40 mg at 05/02/20 7285    lisinopril (PRINIVIL;ZESTRIL) tablet 2.5 mg  2.5 mg Oral Daily Madeleine Meehan MD   Stopped at 05/02/20 0900    glucose (GLUTOSE) 40 % oral gel 15 g  15 g Oral PRN Madeleine Meehan MD        dextrose 50 % IV solution  12.5 g Intravenous PRN Madeleine Meehan MD        glucagon (rDNA) injection 1 mg  1 mg Intramuscular PRN Madeleine Meehan MD        dextrose 5 % solution  100 mL/hr Intravenous PRN Madeleine Meehan MD        insulin lispro (HUMALOG) injection vial 11 Units  0.08 Units/kg Subcutaneous TID  Madeleine Meehan MD   11 Units at 05/02/20 1134    insulin lispro (HUMALOG) injection vial 0-6 Units  0-6 Units Subcutaneous TID  Madeleine Meehan MD   2 Units at 05/02/20 1134    insulin lispro (HUMALOG) injection vial 0-3 Units  0-3 Units Subcutaneous Nightly Madeleine Meehan MD   1 Units at 05/01/20 2224    nicotine (NICODERM CQ) 14 MG/24HR 1 patch  1 patch Transdermal Daily Madeleine Meehan MD   Stopped at 05/01/20 1318    albuterol-ipratropium (COMBIVENT RESPIMAT)  MCG/ACT inhaler 1 puff  1 puff Inhalation Q6H PRN Madeleine Meehan MD        budesonide-formoterol (SYMBICORT) 80-4.5 MCG/ACT inhaler 2 puff  2 puff Inhalation BID Madeleine Meehan MD   2 puff at 05/02/20 0825    furosemide (LASIX) 100 mg in dextrose 5 % 100 mL infusion  5 mg/hr Intravenous Continuous Wesley Aguirre MD 5 mL/hr at 05/02/20 1037 5 mg/hr at 05/02/20 1037    atorvastatin (LIPITOR) tablet 40 mg  40 mg Oral Nightly Angela Alarcon MD   40 mg at 05/01/20 2223    HYDROcodone-acetaminophen (NORCO) 7.5-325 MG per tablet 1 tablet  1 tablet Oral Q6H PRN David Topete MD   1 tablet at 05/02/20 1464     Allergies   Allergen Reactions    Augmentin [Amoxicillin-Pot Clavulanate] Diarrhea       Nursing Notes Reviewed     Physical Exam:   ED Triage Vitals   Enc Vitals Group      BP       Pulse       Resp       Temp       Temp src       SpO2       Weight       Height       Head Circumference       Peak Flow       Pain Score       Pain Loc       Pain Edu? Excl. in [address]? BP (!) 162/85   Pulse 87   Temp 98 °F (36.7 °C) (Oral)   Resp 20   Ht 5' 4\" (1.626 m)   Wt (!) 306 lb 1.6 oz (138.8 kg)   SpO2 92%   BMI 52.54 kg/m²   My pulse ox interpretation is - abnormal  Physical Exam  Constitutional:       General: She is in acute distress. Appearance: She is well-developed. She is not toxic-appearing or diaphoretic. Comments: Appears short of breath and anxious   HENT:      Head: Normocephalic and atraumatic. Eyes:      General:         Right eye: No discharge. Left eye: No discharge. Conjunctiva/sclera: Conjunctivae normal.   Cardiovascular:      Rate and Rhythm: Regular rhythm. Tachycardia present. Pulses: Normal pulses. Radial pulses are 2+ on the right side and 2+ on the left side. Pulmonary:      Effort: Pulmonary effort is normal. Tachypnea present. No prolonged expiration or respiratory distress. Breath sounds: Decreased breath sounds (throughout) present. No wheezing. Comments: Requiring supplemental oxygen   Abdominal:      General: There is no distension. Palpations: Abdomen is soft. Tenderness: There is no abdominal tenderness. There is no guarding or rebound. Musculoskeletal: Normal range of motion. General: No swelling or signs of injury. Skin:     General: Skin is warm and dry. Neurological:      General: No focal deficit present. Mental Status: She is alert. Cranial Nerves: No cranial nerve deficit. Psychiatric:         Mood and Affect: Mood is anxious.          Behavior: Behavior normal.         I have reviewed and interpreted all of the currently available lab results from this visit (if applicable):  Results for orders placed or performed during the hospital encounter of 05/01/20   CBC Auto Differential   Result Value Ref Range    WBC 12.4 (H) 4.0 - 10.5 K/CU MM    RBC 3.82 (L) 4.2 - 5.4 M/CU MM    Hemoglobin 10.0 (L) 12.5 - 16.0 GM/DL    Hematocrit 33.5 (L) 37 - 47 %    MCV 87.7 78 - 100 FL    MCH 26.2 (L) 27 - 31 PG    MCHC 29.9 (L) 32.0 - 36.0 %    RDW 14.9 11.7 - 14.9 %    Platelets 647 856 - 053 K/CU MM    MPV 10.1 7.5 - 11.1 FL    Differential Type AUTOMATED DIFFERENTIAL     Segs Relative 76.3 (H) 36 - 66 %    Lymphocytes % 15.0 (L) 24 - 44 %    Monocytes % 5.3 (H) 0 - 4 %    Eosinophils % 1.7 0 - 3 %    Basophils % 0.5 0 - 1 %    Segs Absolute 9.5 K/CU MM    Lymphocytes Absolute 1.9 K/CU MM    Monocytes Absolute 0.7 K/CU MM    Eosinophils Absolute 0.2 K/CU MM    Basophils Absolute 0.1 K/CU MM    Nucleated RBC % 0.0 %    Total Nucleated RBC 0.0 K/CU MM    Total Immature Neutrophil 0.15 K/CU MM    Immature Neutrophil % 1.2 (H) 0 - 0.43 %   Comprehensive Metabolic Panel w/ Reflex to MG   Result Value Ref Range    Sodium 133 (L) 135 - 145 MMOL/L    Potassium 5.0 3.5 - 5.1 MMOL/L    Chloride 97 (L) 99 - 110 mMol/L    CO2 26 21 - 32 MMOL/L    BUN 28 (H) 6 - 23 MG/DL    CREATININE 1.3 (H) 0.6 - 1.1 MG/DL    Glucose 307 (H) 70 - 99 MG/DL    Calcium 9.4 8.3 - 10.6 MG/DL    Alb 3.7 3.4 - 5.0 GM/DL    Total Protein 6.4 6.4 - 8.2 GM/DL    Total Bilirubin 0.3 0.0 - 1.0 MG/DL    ALT 13 10 - 40 U/L    AST 16 15 - 37 IU/L    Alkaline Phosphatase 145 (H) 40 - 129 IU/L    GFR Non- 42 (L) >60 mL/min/1.73m2    GFR  51 (L) >60 mL/min/1.73m2    Anion Gap

## 2020-05-02 LAB
ANION GAP SERPL CALCULATED.3IONS-SCNC: 11 MMOL/L (ref 4–16)
BASE EXCESS MIXED: 3.2 (ref 0–2.3)
BUN BLDV-MCNC: 35 MG/DL (ref 6–23)
CALCIUM SERPL-MCNC: 9.1 MG/DL (ref 8.3–10.6)
CHLORIDE BLD-SCNC: 102 MMOL/L (ref 99–110)
CHOLESTEROL: 155 MG/DL
CO2: 27 MMOL/L (ref 21–32)
COMMENT: ABNORMAL
CREAT SERPL-MCNC: 1.9 MG/DL (ref 0.6–1.1)
GFR AFRICAN AMERICAN: 33 ML/MIN/1.73M2
GFR NON-AFRICAN AMERICAN: 27 ML/MIN/1.73M2
GLUCOSE BLD-MCNC: 177 MG/DL (ref 70–99)
GLUCOSE BLD-MCNC: 191 MG/DL (ref 70–99)
GLUCOSE BLD-MCNC: 221 MG/DL (ref 70–99)
GLUCOSE BLD-MCNC: 225 MG/DL (ref 70–99)
GLUCOSE BLD-MCNC: 238 MG/DL (ref 70–99)
HCO3 VENOUS: 30.6 MMOL/L (ref 19–25)
HDLC SERPL-MCNC: 33 MG/DL
LDL CHOLESTEROL DIRECT: 104 MG/DL
O2 SAT, VEN: 81.4 % (ref 50–70)
PCO2, VEN: 58 MMHG (ref 38–52)
PH VENOUS: 7.33 (ref 7.32–7.42)
PO2, VEN: 47 MMHG (ref 28–48)
POTASSIUM SERPL-SCNC: 5.1 MMOL/L (ref 3.5–5.1)
SODIUM BLD-SCNC: 140 MMOL/L (ref 135–145)
TRIGL SERPL-MCNC: 187 MG/DL
TROPONIN T: 0.07 NG/ML
TROPONIN T: 0.09 NG/ML
TROPONIN T: 0.1 NG/ML

## 2020-05-02 PROCEDURE — 82962 GLUCOSE BLOOD TEST: CPT

## 2020-05-02 PROCEDURE — 1200000000 HC SEMI PRIVATE

## 2020-05-02 PROCEDURE — 2580000003 HC RX 258: Performed by: INTERNAL MEDICINE

## 2020-05-02 PROCEDURE — 94640 AIRWAY INHALATION TREATMENT: CPT

## 2020-05-02 PROCEDURE — 6370000000 HC RX 637 (ALT 250 FOR IP): Performed by: INTERNAL MEDICINE

## 2020-05-02 PROCEDURE — 84484 ASSAY OF TROPONIN QUANT: CPT

## 2020-05-02 PROCEDURE — 82805 BLOOD GASES W/O2 SATURATION: CPT

## 2020-05-02 PROCEDURE — 93005 ELECTROCARDIOGRAM TRACING: CPT | Performed by: INTERNAL MEDICINE

## 2020-05-02 PROCEDURE — 83721 ASSAY OF BLOOD LIPOPROTEIN: CPT

## 2020-05-02 PROCEDURE — 2700000000 HC OXYGEN THERAPY PER DAY

## 2020-05-02 PROCEDURE — 94761 N-INVAS EAR/PLS OXIMETRY MLT: CPT

## 2020-05-02 PROCEDURE — 36415 COLL VENOUS BLD VENIPUNCTURE: CPT

## 2020-05-02 PROCEDURE — 80048 BASIC METABOLIC PNL TOTAL CA: CPT

## 2020-05-02 PROCEDURE — 80061 LIPID PANEL: CPT

## 2020-05-02 PROCEDURE — 94660 CPAP INITIATION&MGMT: CPT

## 2020-05-02 PROCEDURE — 6360000002 HC RX W HCPCS: Performed by: INTERNAL MEDICINE

## 2020-05-02 RX ORDER — FUROSEMIDE 10 MG/ML
40 INJECTION INTRAMUSCULAR; INTRAVENOUS 2 TIMES DAILY
Status: DISCONTINUED | OUTPATIENT
Start: 2020-05-02 | End: 2020-05-03

## 2020-05-02 RX ADMIN — ALOGLIPTIN 25 MG: 25 TABLET, FILM COATED ORAL at 08:26

## 2020-05-02 RX ADMIN — CARVEDILOL 12.5 MG: 12.5 TABLET, FILM COATED ORAL at 08:11

## 2020-05-02 RX ADMIN — ENOXAPARIN SODIUM 40 MG: 40 INJECTION SUBCUTANEOUS at 08:11

## 2020-05-02 RX ADMIN — CARVEDILOL 12.5 MG: 12.5 TABLET, FILM COATED ORAL at 17:20

## 2020-05-02 RX ADMIN — LEVOTHYROXINE SODIUM 50 MCG: 50 TABLET ORAL at 08:11

## 2020-05-02 RX ADMIN — BUPROPION HYDROCHLORIDE 100 MG: 100 TABLET, EXTENDED RELEASE ORAL at 22:43

## 2020-05-02 RX ADMIN — CLOPIDOGREL BISULFATE 75 MG: 75 TABLET ORAL at 08:12

## 2020-05-02 RX ADMIN — BUDESONIDE AND FORMOTEROL FUMARATE DIHYDRATE 2 PUFF: 80; 4.5 AEROSOL RESPIRATORY (INHALATION) at 20:00

## 2020-05-02 RX ADMIN — INSULIN LISPRO 2 UNITS: 100 INJECTION, SOLUTION INTRAVENOUS; SUBCUTANEOUS at 08:10

## 2020-05-02 RX ADMIN — BUDESONIDE AND FORMOTEROL FUMARATE DIHYDRATE 2 PUFF: 80; 4.5 AEROSOL RESPIRATORY (INHALATION) at 08:25

## 2020-05-02 RX ADMIN — FUROSEMIDE 40 MG: 10 INJECTION, SOLUTION INTRAMUSCULAR; INTRAVENOUS at 17:19

## 2020-05-02 RX ADMIN — SODIUM CHLORIDE, PRESERVATIVE FREE 10 ML: 5 INJECTION INTRAVENOUS at 22:44

## 2020-05-02 RX ADMIN — DULOXETINE HYDROCHLORIDE 60 MG: 30 CAPSULE, DELAYED RELEASE ORAL at 08:11

## 2020-05-02 RX ADMIN — INSULIN LISPRO 2 UNITS: 100 INJECTION, SOLUTION INTRAVENOUS; SUBCUTANEOUS at 17:20

## 2020-05-02 RX ADMIN — BUPROPION HYDROCHLORIDE 100 MG: 100 TABLET, EXTENDED RELEASE ORAL at 08:11

## 2020-05-02 RX ADMIN — INSULIN LISPRO 2 UNITS: 100 INJECTION, SOLUTION INTRAVENOUS; SUBCUTANEOUS at 11:34

## 2020-05-02 RX ADMIN — ATORVASTATIN CALCIUM 40 MG: 40 TABLET, FILM COATED ORAL at 22:43

## 2020-05-02 RX ADMIN — SODIUM CHLORIDE, PRESERVATIVE FREE 10 ML: 5 INJECTION INTRAVENOUS at 08:10

## 2020-05-02 RX ADMIN — PANTOPRAZOLE SODIUM 40 MG: 40 TABLET, DELAYED RELEASE ORAL at 06:37

## 2020-05-02 RX ADMIN — Medication 3000 UNITS: at 08:15

## 2020-05-02 RX ADMIN — ASPIRIN 81 MG 81 MG: 81 TABLET ORAL at 08:11

## 2020-05-02 RX ADMIN — RANOLAZINE 500 MG: 500 TABLET, FILM COATED, EXTENDED RELEASE ORAL at 22:42

## 2020-05-02 RX ADMIN — HYDROCODONE BITARTRATE AND ACETAMINOPHEN 1 TABLET: 7.5; 325 TABLET ORAL at 20:45

## 2020-05-02 RX ADMIN — HYDROCODONE BITARTRATE AND ACETAMINOPHEN 1 TABLET: 7.5; 325 TABLET ORAL at 08:11

## 2020-05-02 RX ADMIN — INSULIN LISPRO 1 UNITS: 100 INJECTION, SOLUTION INTRAVENOUS; SUBCUTANEOUS at 23:54

## 2020-05-02 RX ADMIN — RANOLAZINE 500 MG: 500 TABLET, FILM COATED, EXTENDED RELEASE ORAL at 08:11

## 2020-05-02 RX ADMIN — ISOSORBIDE MONONITRATE 60 MG: 60 TABLET, EXTENDED RELEASE ORAL at 08:11

## 2020-05-02 ASSESSMENT — PAIN DESCRIPTION - PAIN TYPE
TYPE: ACUTE PAIN
TYPE: ACUTE PAIN

## 2020-05-02 ASSESSMENT — PAIN DESCRIPTION - ORIENTATION: ORIENTATION: LOWER

## 2020-05-02 ASSESSMENT — PAIN DESCRIPTION - DESCRIPTORS: DESCRIPTORS: ACHING

## 2020-05-02 ASSESSMENT — PAIN DESCRIPTION - LOCATION
LOCATION: GENERALIZED
LOCATION: BACK;VAGINA

## 2020-05-02 ASSESSMENT — PAIN DESCRIPTION - PROGRESSION: CLINICAL_PROGRESSION: NOT CHANGED

## 2020-05-02 ASSESSMENT — PAIN DESCRIPTION - FREQUENCY: FREQUENCY: INTERMITTENT

## 2020-05-02 ASSESSMENT — PAIN SCALES - GENERAL
PAINLEVEL_OUTOF10: 9
PAINLEVEL_OUTOF10: 9

## 2020-05-02 NOTE — PROGRESS NOTES
Blood pressure 74/37 with a map of 49 lasix drip stopped and blood pressure cuff repositioned. Blood pressure retook and is 87/45 with a map of 176 Jonna Bender notified and given nursing communication to hold lasix for 30 minutes when blood pressure stable restart lasix. Blood pressure 108/46 map 65 and lasix restarted.

## 2020-05-02 NOTE — PROGRESS NOTES
Daily Progress Note    Awake alert feeling better  No chest pain   Had good urine output with lasix drip  She did tolerated CPAP last night  Heart rate and BP improved  Hx of renal insuffiencey -at baseline   Hx of CAD s/p PCI  Hx of JAIMIE/obesity and non compliance with meds  Elevated trop but no ACS-secondary to renal   Keep current treatment -recheck labs in am  Swelling in legs improved      Pt. Awake, alert and feeling better  HR stable, NSR, BP stable on exam  No CP, Still some SOB    Acute on Chronic HFrEF    EF improved now from previous to 40-45%    On Lasix drip- holding currently    UOP  Stable    Edema is improving    On Coreg, ACE and imdur- No aldactone d/t CKD    OMT as tolerated-need to watch cr closely    CAD s/p PCI    PCI 1/20- DAPT and statin    Stable overall    Trop elevated- will trend    Cont. Med. Tx.     Will cont. To follow    Echo-2/20  Summary   This is a limited echocardiogram.   Left ventricular systolic function is normal.   Ejection fraction is visually estimated at 40-45%. Mild left ventricular hypertrophy. Lateral and inferior wall appear hypokinetic. No evidence of any pericardial effusion. PAST MEDICAL HISTORY:  Coronary artery disease, angioplasty done of the  LAD and circ, history of heart failure, EF was around 40% range, history  of hypertension, hyperlipidemia, renal insufficiency present, COPD,  sleep apnea, obesity, seizure disorder. Dr. Amber Allison is her  nephrologist.     PAST SURGICAL HISTORY:  Gallbladder surgery, tonsillectomy, and  angioplasty.     SOCIAL HISTORY:  Living at home with her . She does not smoke  and does not drink.     ALLERGIES:  AUGMENTIN.     MEDICATIONS:  She was on Norvasc, Demadex 20 mg b.i.d., Plavix,  lisinopril, lactulose, Coreg, Ranexa, aspirin, and she should have been  on Plavix and I am not if sure she is taking that also.     Objective:   BP (!) 162/85   Pulse 93   Temp 97.9 °F (36.6 °C) (Oral)   Resp 20   Ht 5' 4\" Labs     05/01/20  0646 05/02/20  0417   * 140   K 5.0 5.1   CL 97* 102   CO2 26 27   BUN 28* 35*   CREATININE 1.3* 1.9*     LIVER PROFILE:   Recent Labs     05/01/20  0646   AST 16   ALT 13   BILITOT 0.3   ALKPHOS 145*     PT/INR:   Recent Labs     05/01/20  1249   PROTIME 12.1   INR 1.00     APTT:   Recent Labs     05/01/20  1249   APTT 36.8     BNP:  No results for input(s): BNP in the last 72 hours.       Assessment:  Patient Active Problem List    Diagnosis Date Noted    E. coli UTI (urinary tract infection) 07/22/2016     Priority: High    Sepsis associated hypotension, POA 07/21/2016     Priority: High    Sepsis secondary to UTI, POA 07/20/2016     Priority: High    Musculoskeletal chest pain 07/20/2016     Priority: Medium    Diabetes mellitus with hyperglycemia ([facility] [address] 07/20/2016     Priority: Medium    Severe dehydration secondary to significant hyperglycemia 07/20/2016     Priority: Medium    Generalized weakness 07/20/2016     Priority: Medium    Elevated lactic acid level, resolved 07/20/2016     Priority: Low    Heart failure ([address] 05/01/2020    Hyperkalemia 02/23/2020    Uncontrolled diabetes mellitus with chronic kidney disease ([address] 02/23/2020    Acute on chronic diastolic (congestive) heart failure ([address] 02/23/2020    COPD with acute exacerbation ([address] 02/23/2020    Acute respiratory distress 02/16/2020    STEMI (ST elevation myocardial infarction) ([address] 11/23/2019    Cellulitis of abdominal wall 07/13/2019    Acute kidney injury ([address] 04/29/2019    DM (diabetes mellitus) ([address] 04/29/2019    Fluid overload 04/29/2019    Cor pulmonale (chronic) ([address] 04/29/2019    Anasarca 04/23/2019    UTI (urinary tract infection), bacterial 03/15/2019    Sinus tachycardia 03/15/2019    Uncontrolled type 2 diabetes mellitus with hyperglycemia ([address] 03/15/2019    Class 3 severe obesity due to excess calories with body mass index (BMI) of 45.0 to 49.9 in adult ([facility] 03/15/2019    Pleural

## 2020-05-02 NOTE — PROGRESS NOTES
BP 88/46 manually. Patient is very lethargic but denies sweats or dizziness. Lasix  delayed and cardiology notified.

## 2020-05-02 NOTE — PROGRESS NOTES
furosemide (LASIX) 1mg/ml infusion 10 mg/hr (05/01/20 2035)     PRN Meds: sodium chloride flush, 10 mL, PRN  acetaminophen, 650 mg, Q6H PRN    Or  acetaminophen, 650 mg, Q6H PRN  polyethylene glycol, 17 g, Daily PRN  promethazine, 12.5 mg, Q6H PRN    Or  ondansetron, 4 mg, Q6H PRN  glucose, 15 g, PRN  dextrose, 12.5 g, PRN  glucagon (rDNA), 1 mg, PRN  dextrose, 100 mL/hr, PRN  albuterol-ipratropium, 1 puff, Q6H PRN  HYDROcodone-acetaminophen, 1 tablet, Q6H PRN        CBC   Recent Labs     05/01/20  0646   WBC 12.4*   HGB 10.0*   HCT 33.5*         BMP   Recent Labs     05/01/20  0646 05/02/20  0417   * 140   K 5.0 5.1   CL 97* 102   CO2 26 27   BUN 28* 35*   CREATININE 1.3* 1.9*       Radiology report reviewed     Electronically signed by Freedom Ibanez MD on 5/2/2020 at 8:55 AM

## 2020-05-03 LAB
ANION GAP SERPL CALCULATED.3IONS-SCNC: 12 MMOL/L (ref 4–16)
ANION GAP SERPL CALCULATED.3IONS-SCNC: 9 MMOL/L (ref 4–16)
BACTERIA: ABNORMAL /HPF
BILIRUBIN URINE: NEGATIVE MG/DL
BLOOD, URINE: ABNORMAL
BUN BLDV-MCNC: 43 MG/DL (ref 6–23)
BUN BLDV-MCNC: 45 MG/DL (ref 6–23)
CALCIUM SERPL-MCNC: 8.2 MG/DL (ref 8.3–10.6)
CALCIUM SERPL-MCNC: 8.6 MG/DL (ref 8.3–10.6)
CHLORIDE BLD-SCNC: 97 MMOL/L (ref 99–110)
CHLORIDE BLD-SCNC: 99 MMOL/L (ref 99–110)
CLARITY: ABNORMAL
CO2: 27 MMOL/L (ref 21–32)
CO2: 28 MMOL/L (ref 21–32)
COLOR: YELLOW
CREAT SERPL-MCNC: 2.2 MG/DL (ref 0.6–1.1)
CREAT SERPL-MCNC: 2.3 MG/DL (ref 0.6–1.1)
CREATININE URINE: 152.3 MG/DL (ref 28–217)
EKG ATRIAL RATE: 76 BPM
EKG DIAGNOSIS: NORMAL
EKG P AXIS: 63 DEGREES
EKG P-R INTERVAL: 174 MS
EKG Q-T INTERVAL: 434 MS
EKG QRS DURATION: 104 MS
EKG QTC CALCULATION (BAZETT): 488 MS
EKG R AXIS: 45 DEGREES
EKG T AXIS: 81 DEGREES
EKG VENTRICULAR RATE: 76 BPM
GFR AFRICAN AMERICAN: 26 ML/MIN/1.73M2
GFR AFRICAN AMERICAN: 28 ML/MIN/1.73M2
GFR NON-AFRICAN AMERICAN: 22 ML/MIN/1.73M2
GFR NON-AFRICAN AMERICAN: 23 ML/MIN/1.73M2
GLUCOSE BLD-MCNC: 146 MG/DL (ref 70–99)
GLUCOSE BLD-MCNC: 183 MG/DL (ref 70–99)
GLUCOSE BLD-MCNC: 184 MG/DL (ref 70–99)
GLUCOSE BLD-MCNC: 194 MG/DL (ref 70–99)
GLUCOSE BLD-MCNC: 201 MG/DL (ref 70–99)
GLUCOSE BLD-MCNC: 208 MG/DL (ref 70–99)
GLUCOSE BLD-MCNC: 214 MG/DL (ref 70–99)
GLUCOSE BLD-MCNC: 217 MG/DL (ref 70–99)
GLUCOSE BLD-MCNC: 229 MG/DL (ref 70–99)
GLUCOSE BLD-MCNC: 229 MG/DL (ref 70–99)
GLUCOSE BLD-MCNC: 260 MG/DL (ref 70–99)
GLUCOSE, URINE: NEGATIVE MG/DL
HCT VFR BLD CALC: 26.3 % (ref 37–47)
HCT VFR BLD CALC: 27.1 % (ref 37–47)
HEMOGLOBIN: 7.9 GM/DL (ref 12.5–16)
HEMOGLOBIN: 8.1 GM/DL (ref 12.5–16)
KETONES, URINE: NEGATIVE MG/DL
LEUKOCYTE ESTERASE, URINE: ABNORMAL
MCH RBC QN AUTO: 26.4 PG (ref 27–31)
MCH RBC QN AUTO: 26.4 PG (ref 27–31)
MCHC RBC AUTO-ENTMCNC: 29.9 % (ref 32–36)
MCHC RBC AUTO-ENTMCNC: 30 % (ref 32–36)
MCV RBC AUTO: 88 FL (ref 78–100)
MCV RBC AUTO: 88.3 FL (ref 78–100)
MUCUS: ABNORMAL HPF
NITRITE URINE, QUANTITATIVE: POSITIVE
PDW BLD-RTO: 15.2 % (ref 11.7–14.9)
PDW BLD-RTO: 15.3 % (ref 11.7–14.9)
PH, URINE: 5 (ref 5–8)
PLATELET # BLD: 262 K/CU MM (ref 140–440)
PLATELET # BLD: 268 K/CU MM (ref 140–440)
PMV BLD AUTO: 10.4 FL (ref 7.5–11.1)
PMV BLD AUTO: 10.6 FL (ref 7.5–11.1)
POTASSIUM SERPL-SCNC: 5.4 MMOL/L (ref 3.5–5.1)
POTASSIUM SERPL-SCNC: 5.6 MMOL/L (ref 3.5–5.1)
POTASSIUM, UR: 57.3 MMOL/L (ref 22–119)
PROT/CREAT RATIO, UR: 0.6
PROTEIN UA: 100 MG/DL
RBC # BLD: 2.99 M/CU MM (ref 4.2–5.4)
RBC # BLD: 3.07 M/CU MM (ref 4.2–5.4)
RBC URINE: 45 /HPF (ref 0–6)
SODIUM BLD-SCNC: 134 MMOL/L (ref 135–145)
SODIUM BLD-SCNC: 138 MMOL/L (ref 135–145)
SODIUM URINE: 41 MMOL/L (ref 35–167)
SPECIFIC GRAVITY UA: 1.01 (ref 1–1.03)
SQUAMOUS EPITHELIAL: <1 /HPF
TRICHOMONAS: ABNORMAL /HPF
URINE TOTAL PROTEIN: 93 MG/DL
UROBILINOGEN, URINE: NORMAL MG/DL (ref 0.2–1)
WBC # BLD: 10.2 K/CU MM (ref 4–10.5)
WBC # BLD: 10.5 K/CU MM (ref 4–10.5)
WBC CLUMP: ABNORMAL /HPF
WBC UA: 36 /HPF (ref 0–5)

## 2020-05-03 PROCEDURE — 80048 BASIC METABOLIC PNL TOTAL CA: CPT

## 2020-05-03 PROCEDURE — 94660 CPAP INITIATION&MGMT: CPT

## 2020-05-03 PROCEDURE — 81001 URINALYSIS AUTO W/SCOPE: CPT

## 2020-05-03 PROCEDURE — 2700000000 HC OXYGEN THERAPY PER DAY

## 2020-05-03 PROCEDURE — 94761 N-INVAS EAR/PLS OXIMETRY MLT: CPT

## 2020-05-03 PROCEDURE — 6370000000 HC RX 637 (ALT 250 FOR IP): Performed by: INTERNAL MEDICINE

## 2020-05-03 PROCEDURE — 2580000003 HC RX 258: Performed by: INTERNAL MEDICINE

## 2020-05-03 PROCEDURE — 2580000003 HC RX 258: Performed by: PHYSICIAN ASSISTANT

## 2020-05-03 PROCEDURE — 6360000002 HC RX W HCPCS: Performed by: PHYSICIAN ASSISTANT

## 2020-05-03 PROCEDURE — 84133 ASSAY OF URINE POTASSIUM: CPT

## 2020-05-03 PROCEDURE — 82570 ASSAY OF URINE CREATININE: CPT

## 2020-05-03 PROCEDURE — 97162 PT EVAL MOD COMPLEX 30 MIN: CPT

## 2020-05-03 PROCEDURE — 85027 COMPLETE CBC AUTOMATED: CPT

## 2020-05-03 PROCEDURE — 6370000000 HC RX 637 (ALT 250 FOR IP): Performed by: PHYSICIAN ASSISTANT

## 2020-05-03 PROCEDURE — 93010 ELECTROCARDIOGRAM REPORT: CPT | Performed by: INTERNAL MEDICINE

## 2020-05-03 PROCEDURE — 84132 ASSAY OF SERUM POTASSIUM: CPT

## 2020-05-03 PROCEDURE — 1200000000 HC SEMI PRIVATE

## 2020-05-03 PROCEDURE — 84156 ASSAY OF PROTEIN URINE: CPT

## 2020-05-03 PROCEDURE — 82962 GLUCOSE BLOOD TEST: CPT

## 2020-05-03 PROCEDURE — 84300 ASSAY OF URINE SODIUM: CPT

## 2020-05-03 PROCEDURE — 36415 COLL VENOUS BLD VENIPUNCTURE: CPT

## 2020-05-03 PROCEDURE — 94640 AIRWAY INHALATION TREATMENT: CPT

## 2020-05-03 PROCEDURE — 97530 THERAPEUTIC ACTIVITIES: CPT

## 2020-05-03 RX ORDER — CARVEDILOL 6.25 MG/1
6.25 TABLET ORAL 2 TIMES DAILY WITH MEALS
Status: DISCONTINUED | OUTPATIENT
Start: 2020-05-03 | End: 2020-05-06

## 2020-05-03 RX ORDER — DEXTROSE MONOHYDRATE 25 G/50ML
25 INJECTION, SOLUTION INTRAVENOUS ONCE
Status: COMPLETED | OUTPATIENT
Start: 2020-05-03 | End: 2020-05-03

## 2020-05-03 RX ORDER — SODIUM POLYSTYRENE SULFONATE 15 G/60ML
15 SUSPENSION ORAL; RECTAL ONCE
Status: DISCONTINUED | OUTPATIENT
Start: 2020-05-03 | End: 2020-05-03

## 2020-05-03 RX ORDER — NICOTINE POLACRILEX 4 MG
15 LOZENGE BUCCAL PRN
Status: DISCONTINUED | OUTPATIENT
Start: 2020-05-03 | End: 2020-05-06 | Stop reason: HOSPADM

## 2020-05-03 RX ORDER — SODIUM POLYSTYRENE SULFONATE 15 G/60ML
15 SUSPENSION ORAL; RECTAL ONCE
Status: COMPLETED | OUTPATIENT
Start: 2020-05-03 | End: 2020-05-03

## 2020-05-03 RX ORDER — DEXTROSE MONOHYDRATE 50 MG/ML
100 INJECTION, SOLUTION INTRAVENOUS PRN
Status: DISCONTINUED | OUTPATIENT
Start: 2020-05-03 | End: 2020-05-06 | Stop reason: HOSPADM

## 2020-05-03 RX ORDER — MIDODRINE HYDROCHLORIDE 5 MG/1
5 TABLET ORAL
Status: DISCONTINUED | OUTPATIENT
Start: 2020-05-03 | End: 2020-05-06 | Stop reason: HOSPADM

## 2020-05-03 RX ORDER — MORPHINE SULFATE 2 MG/ML
2 INJECTION, SOLUTION INTRAMUSCULAR; INTRAVENOUS ONCE
Status: COMPLETED | OUTPATIENT
Start: 2020-05-03 | End: 2020-05-03

## 2020-05-03 RX ORDER — ISOSORBIDE MONONITRATE 30 MG/1
30 TABLET, EXTENDED RELEASE ORAL DAILY
Status: DISCONTINUED | OUTPATIENT
Start: 2020-05-04 | End: 2020-05-06 | Stop reason: HOSPADM

## 2020-05-03 RX ORDER — DEXTROSE MONOHYDRATE 25 G/50ML
12.5 INJECTION, SOLUTION INTRAVENOUS PRN
Status: DISCONTINUED | OUTPATIENT
Start: 2020-05-03 | End: 2020-05-06 | Stop reason: HOSPADM

## 2020-05-03 RX ADMIN — SODIUM ZIRCONIUM CYCLOSILICATE 10 G: 10 POWDER, FOR SUSPENSION ORAL at 21:38

## 2020-05-03 RX ADMIN — BUPROPION HYDROCHLORIDE 100 MG: 100 TABLET, EXTENDED RELEASE ORAL at 21:35

## 2020-05-03 RX ADMIN — INSULIN LISPRO 2 UNITS: 100 INJECTION, SOLUTION INTRAVENOUS; SUBCUTANEOUS at 11:32

## 2020-05-03 RX ADMIN — HYDROCODONE BITARTRATE AND ACETAMINOPHEN 1 TABLET: 7.5; 325 TABLET ORAL at 09:43

## 2020-05-03 RX ADMIN — MIDODRINE HYDROCHLORIDE 5 MG: 5 TABLET ORAL at 16:21

## 2020-05-03 RX ADMIN — SODIUM CHLORIDE, PRESERVATIVE FREE 10 ML: 5 INJECTION INTRAVENOUS at 21:35

## 2020-05-03 RX ADMIN — CARVEDILOL 6.25 MG: 6.25 TABLET, FILM COATED ORAL at 16:21

## 2020-05-03 RX ADMIN — BUDESONIDE AND FORMOTEROL FUMARATE DIHYDRATE 2 PUFF: 80; 4.5 AEROSOL RESPIRATORY (INHALATION) at 08:11

## 2020-05-03 RX ADMIN — DEXTROSE MONOHYDRATE 25 G: 25 INJECTION, SOLUTION INTRAVENOUS at 04:12

## 2020-05-03 RX ADMIN — SODIUM ZIRCONIUM CYCLOSILICATE 10 G: 10 POWDER, FOR SUSPENSION ORAL at 14:59

## 2020-05-03 RX ADMIN — ENOXAPARIN SODIUM 40 MG: 40 INJECTION SUBCUTANEOUS at 09:30

## 2020-05-03 RX ADMIN — RANOLAZINE 500 MG: 500 TABLET, FILM COATED, EXTENDED RELEASE ORAL at 21:35

## 2020-05-03 RX ADMIN — CARVEDILOL 12.5 MG: 12.5 TABLET, FILM COATED ORAL at 09:30

## 2020-05-03 RX ADMIN — INSULIN LISPRO 1 UNITS: 100 INJECTION, SOLUTION INTRAVENOUS; SUBCUTANEOUS at 16:15

## 2020-05-03 RX ADMIN — ISOSORBIDE MONONITRATE 60 MG: 60 TABLET, EXTENDED RELEASE ORAL at 09:30

## 2020-05-03 RX ADMIN — Medication 3000 UNITS: at 09:30

## 2020-05-03 RX ADMIN — DULOXETINE HYDROCHLORIDE 60 MG: 30 CAPSULE, DELAYED RELEASE ORAL at 09:30

## 2020-05-03 RX ADMIN — ASPIRIN 81 MG 81 MG: 81 TABLET ORAL at 09:30

## 2020-05-03 RX ADMIN — HYDROCODONE BITARTRATE AND ACETAMINOPHEN 1 TABLET: 7.5; 325 TABLET ORAL at 16:21

## 2020-05-03 RX ADMIN — INSULIN LISPRO 1 UNITS: 100 INJECTION, SOLUTION INTRAVENOUS; SUBCUTANEOUS at 09:47

## 2020-05-03 RX ADMIN — INSULIN HUMAN 10 UNITS: 100 INJECTION, SOLUTION PARENTERAL at 04:12

## 2020-05-03 RX ADMIN — SODIUM CHLORIDE, PRESERVATIVE FREE 10 ML: 5 INJECTION INTRAVENOUS at 09:30

## 2020-05-03 RX ADMIN — LEVOTHYROXINE SODIUM 50 MCG: 50 TABLET ORAL at 09:30

## 2020-05-03 RX ADMIN — SODIUM POLYSTYRENE SULFONATE 15 G: 15 SUSPENSION ORAL; RECTAL at 14:08

## 2020-05-03 RX ADMIN — MORPHINE SULFATE 2 MG: 2 INJECTION, SOLUTION INTRAMUSCULAR; INTRAVENOUS at 03:13

## 2020-05-03 RX ADMIN — BUPROPION HYDROCHLORIDE 100 MG: 100 TABLET, EXTENDED RELEASE ORAL at 09:30

## 2020-05-03 RX ADMIN — CLOPIDOGREL BISULFATE 75 MG: 75 TABLET ORAL at 09:30

## 2020-05-03 RX ADMIN — RANOLAZINE 500 MG: 500 TABLET, FILM COATED, EXTENDED RELEASE ORAL at 09:30

## 2020-05-03 RX ADMIN — INSULIN LISPRO 1 UNITS: 100 INJECTION, SOLUTION INTRAVENOUS; SUBCUTANEOUS at 21:41

## 2020-05-03 RX ADMIN — ALOGLIPTIN 25 MG: 25 TABLET, FILM COATED ORAL at 09:30

## 2020-05-03 RX ADMIN — ATORVASTATIN CALCIUM 40 MG: 40 TABLET, FILM COATED ORAL at 21:35

## 2020-05-03 RX ADMIN — PANTOPRAZOLE SODIUM 40 MG: 40 TABLET, DELAYED RELEASE ORAL at 09:30

## 2020-05-03 ASSESSMENT — PAIN SCALES - GENERAL
PAINLEVEL_OUTOF10: 10
PAINLEVEL_OUTOF10: 9
PAINLEVEL_OUTOF10: 7

## 2020-05-03 ASSESSMENT — PAIN DESCRIPTION - PROGRESSION: CLINICAL_PROGRESSION: NOT CHANGED

## 2020-05-03 ASSESSMENT — PAIN DESCRIPTION - PAIN TYPE
TYPE: ACUTE PAIN

## 2020-05-03 ASSESSMENT — PAIN DESCRIPTION - LOCATION
LOCATION: BACK
LOCATION: BACK;ARM
LOCATION: BACK

## 2020-05-03 ASSESSMENT — PAIN DESCRIPTION - FREQUENCY: FREQUENCY: CONTINUOUS

## 2020-05-03 ASSESSMENT — PAIN DESCRIPTION - ORIENTATION
ORIENTATION: RIGHT;LEFT;LOWER
ORIENTATION: LEFT;RIGHT;LOWER
ORIENTATION: RIGHT;LEFT

## 2020-05-03 ASSESSMENT — PAIN DESCRIPTION - DESCRIPTORS: DESCRIPTORS: ACHING

## 2020-05-03 NOTE — PROGRESS NOTES
Hospitalist Progress Note      Name:  Delmis Garcia /Age/Sex: 1960  (61 y.o. female)   MRN & CSN:  [id number] & [id number] Admission Date/Time: 2020  6:30 AM   [facility] PCP: Estrada Tarango MD         Hospital Day: 3    Assessment and Plan:   Delmis Garcia is a 61 y.o.  female who presents with SOB.      Acute issues  --- Acute on chronic systolic CHF exacerbation (pulmonary edema on chest x-ray, leg swelling]. EF 40 to 45% on echo 2020.  --- Hypertensive emergency - due to medication non-adherence - now better controlled with resumption of oral antihypertensives. Off nitroglycerin drip. ---  JUAN J on CKD3  - (Baseline Cr 1.4- 2) - Cr today 2.2  --- Hyperkalemia (5.6) - treated with insulin dextrose overnight. Plan  - Lasix held due to JUAN J. - continue antihypertensive regimen [Imdur, and Coreg]. Holding amlodipine due to CHF exacerbation. Lisinopril held for JUAN J and hyperkalemia. - nephrology consult  - recheck potassium now, and if still elevated -> kayexalate.       Other diagnoses  -- CAD-- s/p PCI on 20.  On aspirin/Plavix/Ranexa/Coreg/Lipitor. Troponin chronically elevated. -- Chronic hypoxic respiratory failure--on 2 L of oxygen continuously at home  -- Hypothyroidism - on levothyroxine  -- DM2 on insulin-- hemoglobin A1c 8.6% on 2020. Will manage with subcutaneous insulin [basal, mealtime and correctional]. -- Tobacco use disorder-- nicotine patch  -- Morbid obesity--weight 269 pounds, BMI 46.3  -- Hyperlipidemia--on Lipitor  -- Anxiety/depression--on Wellbutrin/Cymbalta  --COPD- not in exacerbation. [facility], [facility]    Diet Diet NPO, After Midnight   DVT Prophylaxis [x] Lovenox, []  Heparin, [] SCDs, [] Ambulation   GI Prophylaxis [x] PPI,  [] H2 Blocker,  [] Carafate,  [] Diet/Tube Feeds   Code Status Full Code   Disposition To be determined   MDM [] Low, [x] Moderate,[]  High     History of Present Illness:   Patient seen and examined.

## 2020-05-03 NOTE — PROGRESS NOTES
Limits  Social/Functional History    Social/Functional History obtained from previous stay and confirmed with patient. Lives With: Spouse  ADL Assistance: Needs assistance(spouse assists with bathing and dressing due to chronic difficulty related to COPD and obesity/impaired reaching (especially distal components) ; she toilets self Sarthak)  Ambulation Assistance: Independent although has decreased recently due to decline  Transfer Assistance: Independent    Objective     Observation/Palpation  Observation: Pt up in chair at beginning of session and agreeable to therapy. AROM RLE (degrees)  RLE AROM: WFL  AROM LLE (degrees)  LLE AROM : WFL  Strength Other  Other: Grossly 4/5 bilaterally     Sensation  Overall Sensation Status: WNL  Bed mobility  Comment: Pt up in chair at beginning and end of session  Transfers  Sit to Stand: Moderate Assistance(Pt initially attempted x2 without assist and then required mod A on third trial. when transfer performed for a fourth time, pt stood CGA)  Stand to sit: Minimal Assistance(poor eccentric control on descent)  Ambulation  Ambulation?: Yes  Ambulation 1  Surface: level tile  Device: Rolling Walker  Assistance: Contact guard assistance  Gait Deviations: Slow Celina; Increased BELEM; Decreased step length;Decreased step height  Distance: 3 steps  Comments: Pt ambulated 3 steps then requested to sit due to increased SOB. SpO2 levels in the 90s throughout session.  no LOB throughout     Balance  Posture: Fair  Sitting - Static: Good  Sitting - Dynamic: Good  Standing - Static: Fair  Standing - Dynamic: [address]  Times per week: 4+  Current Treatment Recommendations: Strengthening, Functional Mobility Training, Manual Therapy - Joint Manipulation, Equipment Evaluation, Education, & procurement, Home Exercise Program, Transfer Training, Gait Training, Safety Education & Training, Balance Training, Endurance Training, Stair training, Patient/Caregiver Education & Training  Safety Devices  Type of devices: All fall risk precautions in place, Gait belt, Call light within reach, Left in chair    AM-PAC Score  AM-PAC Inpatient Mobility Raw Score : 11 (05/03/20 1509)  AM-PAC Inpatient T-Scale Score : 33.86 (05/03/20 1509)  Mobility Inpatient CMS 0-100% Score: 72.57 (05/03/20 1509)  Mobility Inpatient CMS G-Code Modifier : CL (05/03/20 1509)          Goals  Short term goals  Time Frame for Short term goals: 1 week  Short term goal 1: Pt to complete all STS transfers to/from bed, commode, and chair mod I  Short term goal 2: Pt to ambulate 48' LRAD with supervision  Short term goal 3: Pt to complete bilateral LE strengthening exercise program to improve bilateral LE strength and endurance.        Therapy Time   Individual Concurrent Group Co-treatment   Time In 1310         Time Out 1335         Minutes 25         Timed Code Treatment Minutes: [address] [place], [place]

## 2020-05-03 NOTE — PROGRESS NOTES
disease ([facility] 02/23/2020    Acute on chronic diastolic (congestive) heart failure ([address] 02/23/2020    COPD with acute exacerbation ([address] 02/23/2020    Acute respiratory distress 02/16/2020    STEMI (ST elevation myocardial infarction) ([address] 11/23/2019    Cellulitis of abdominal wall 07/13/2019    Acute kidney injury ([address] 04/29/2019    DM (diabetes mellitus) ([address] 04/29/2019    Fluid overload 04/29/2019    Cor pulmonale (chronic) ([address] 04/29/2019    Anasarca 04/23/2019    UTI (urinary tract infection), bacterial 03/15/2019    Sinus tachycardia 03/15/2019    Uncontrolled type 2 diabetes mellitus with hyperglycemia ([address] 03/15/2019    Class 3 severe obesity due to excess calories with body mass index (BMI) of 45.0 to 49.9 in adult ([address] 03/15/2019    Pleural effusion on left 02/28/2019    Hyperglycemia 11/17/2018    Acute respiratory failure ([address] 10/02/2018    Gait disturbance 09/21/2018    Debility 09/19/2018    Nausea & vomiting 04/05/2018    Fever 04/05/2018    Generalized anxiety disorder 01/08/2018    DM (diabetes mellitus), secondary uncontrolled ([address] 01/04/2018    Syncope 08/26/2016    Acute pain of right shoulder 08/26/2016    Hypotension 08/26/2016    Diabetes type 2, uncontrolled ([address] 07/20/2016    Morbid obesity with BMI of 50.0-59.9, adult ([address] 07/20/2016    Essential hypertension     CAD (coronary artery disease)     Chronic diastolic heart failure (HCC)     CKD (chronic kidney disease) stage 3, GFR 30-59 ml/min ([address]        Electronically signed by Allie Neff PA-C on 5/3/2020 at 8:14 AM

## 2020-05-04 ENCOUNTER — APPOINTMENT (OUTPATIENT)
Dept: GENERAL RADIOLOGY | Age: 60
DRG: 291 | End: 2020-05-04
Payer: MEDICARE

## 2020-05-04 LAB
ANION GAP SERPL CALCULATED.3IONS-SCNC: 10 MMOL/L (ref 4–16)
BUN BLDV-MCNC: 52 MG/DL (ref 6–23)
CALCIUM SERPL-MCNC: 8.4 MG/DL (ref 8.3–10.6)
CHLORIDE BLD-SCNC: 99 MMOL/L (ref 99–110)
CO2: 27 MMOL/L (ref 21–32)
CREAT SERPL-MCNC: 2.5 MG/DL (ref 0.6–1.1)
GFR AFRICAN AMERICAN: 24 ML/MIN/1.73M2
GFR NON-AFRICAN AMERICAN: 20 ML/MIN/1.73M2
GLUCOSE BLD-MCNC: 137 MG/DL (ref 70–99)
GLUCOSE BLD-MCNC: 159 MG/DL (ref 70–99)
GLUCOSE BLD-MCNC: 167 MG/DL (ref 70–99)
GLUCOSE BLD-MCNC: 204 MG/DL (ref 70–99)
GLUCOSE BLD-MCNC: 208 MG/DL (ref 70–99)
GLUCOSE BLD-MCNC: 232 MG/DL (ref 70–99)
HCT VFR BLD CALC: 26.3 % (ref 37–47)
HEMOGLOBIN: 7.8 GM/DL (ref 12.5–16)
MAGNESIUM: 1.9 MG/DL (ref 1.8–2.4)
MCH RBC QN AUTO: 26.4 PG (ref 27–31)
MCHC RBC AUTO-ENTMCNC: 29.7 % (ref 32–36)
MCV RBC AUTO: 89.2 FL (ref 78–100)
PDW BLD-RTO: 15.1 % (ref 11.7–14.9)
PHOSPHORUS: 5.7 MG/DL (ref 2.5–4.9)
PLATELET # BLD: 275 K/CU MM (ref 140–440)
PMV BLD AUTO: 10.8 FL (ref 7.5–11.1)
POTASSIUM SERPL-SCNC: 4.9 MMOL/L (ref 3.5–5.1)
PRO-BNP: 2819 PG/ML
RBC # BLD: 2.95 M/CU MM (ref 4.2–5.4)
SODIUM BLD-SCNC: 136 MMOL/L (ref 135–145)
TROPONIN T: 0.07 NG/ML
WBC # BLD: 9.5 K/CU MM (ref 4–10.5)

## 2020-05-04 PROCEDURE — 2700000000 HC OXYGEN THERAPY PER DAY

## 2020-05-04 PROCEDURE — 94660 CPAP INITIATION&MGMT: CPT

## 2020-05-04 PROCEDURE — 6360000002 HC RX W HCPCS: Performed by: PHYSICIAN ASSISTANT

## 2020-05-04 PROCEDURE — 6370000000 HC RX 637 (ALT 250 FOR IP): Performed by: INTERNAL MEDICINE

## 2020-05-04 PROCEDURE — 84484 ASSAY OF TROPONIN QUANT: CPT

## 2020-05-04 PROCEDURE — 85027 COMPLETE CBC AUTOMATED: CPT

## 2020-05-04 PROCEDURE — 83735 ASSAY OF MAGNESIUM: CPT

## 2020-05-04 PROCEDURE — 2580000003 HC RX 258: Performed by: INTERNAL MEDICINE

## 2020-05-04 PROCEDURE — 71045 X-RAY EXAM CHEST 1 VIEW: CPT

## 2020-05-04 PROCEDURE — 6360000002 HC RX W HCPCS: Performed by: INTERNAL MEDICINE

## 2020-05-04 PROCEDURE — 84100 ASSAY OF PHOSPHORUS: CPT

## 2020-05-04 PROCEDURE — 83880 ASSAY OF NATRIURETIC PEPTIDE: CPT

## 2020-05-04 PROCEDURE — 82962 GLUCOSE BLOOD TEST: CPT

## 2020-05-04 PROCEDURE — 80048 BASIC METABOLIC PNL TOTAL CA: CPT

## 2020-05-04 PROCEDURE — 1200000000 HC SEMI PRIVATE

## 2020-05-04 PROCEDURE — 94761 N-INVAS EAR/PLS OXIMETRY MLT: CPT

## 2020-05-04 PROCEDURE — 94640 AIRWAY INHALATION TREATMENT: CPT

## 2020-05-04 RX ORDER — FUROSEMIDE 10 MG/ML
40 INJECTION INTRAMUSCULAR; INTRAVENOUS ONCE
Status: COMPLETED | OUTPATIENT
Start: 2020-05-04 | End: 2020-05-04

## 2020-05-04 RX ADMIN — INSULIN LISPRO 2 UNITS: 100 INJECTION, SOLUTION INTRAVENOUS; SUBCUTANEOUS at 10:24

## 2020-05-04 RX ADMIN — INSULIN GLARGINE 55 UNITS: 100 INJECTION, SOLUTION SUBCUTANEOUS at 20:07

## 2020-05-04 RX ADMIN — HYDROCODONE BITARTRATE AND ACETAMINOPHEN 1 TABLET: 7.5; 325 TABLET ORAL at 10:36

## 2020-05-04 RX ADMIN — BUDESONIDE AND FORMOTEROL FUMARATE DIHYDRATE 2 PUFF: 80; 4.5 AEROSOL RESPIRATORY (INHALATION) at 19:39

## 2020-05-04 RX ADMIN — ATORVASTATIN CALCIUM 40 MG: 40 TABLET, FILM COATED ORAL at 20:08

## 2020-05-04 RX ADMIN — ALOGLIPTIN 25 MG: 25 TABLET, FILM COATED ORAL at 10:19

## 2020-05-04 RX ADMIN — CARVEDILOL 6.25 MG: 6.25 TABLET, FILM COATED ORAL at 17:22

## 2020-05-04 RX ADMIN — HYDROCODONE BITARTRATE AND ACETAMINOPHEN 1 TABLET: 7.5; 325 TABLET ORAL at 02:30

## 2020-05-04 RX ADMIN — INSULIN LISPRO 2 UNITS: 100 INJECTION, SOLUTION INTRAVENOUS; SUBCUTANEOUS at 12:20

## 2020-05-04 RX ADMIN — SODIUM CHLORIDE, PRESERVATIVE FREE 10 ML: 5 INJECTION INTRAVENOUS at 20:08

## 2020-05-04 RX ADMIN — BUPROPION HYDROCHLORIDE 100 MG: 100 TABLET, EXTENDED RELEASE ORAL at 20:08

## 2020-05-04 RX ADMIN — ASPIRIN 81 MG 81 MG: 81 TABLET ORAL at 10:20

## 2020-05-04 RX ADMIN — INSULIN LISPRO 1 UNITS: 100 INJECTION, SOLUTION INTRAVENOUS; SUBCUTANEOUS at 20:07

## 2020-05-04 RX ADMIN — LEVOTHYROXINE SODIUM 50 MCG: 50 TABLET ORAL at 10:19

## 2020-05-04 RX ADMIN — BUPROPION HYDROCHLORIDE 100 MG: 100 TABLET, EXTENDED RELEASE ORAL at 10:20

## 2020-05-04 RX ADMIN — CARVEDILOL 6.25 MG: 6.25 TABLET, FILM COATED ORAL at 10:19

## 2020-05-04 RX ADMIN — RANOLAZINE 500 MG: 500 TABLET, FILM COATED, EXTENDED RELEASE ORAL at 10:19

## 2020-05-04 RX ADMIN — RANOLAZINE 500 MG: 500 TABLET, FILM COATED, EXTENDED RELEASE ORAL at 20:08

## 2020-05-04 RX ADMIN — MIDODRINE HYDROCHLORIDE 5 MG: 5 TABLET ORAL at 13:26

## 2020-05-04 RX ADMIN — MIDODRINE HYDROCHLORIDE 5 MG: 5 TABLET ORAL at 10:20

## 2020-05-04 RX ADMIN — CLOPIDOGREL BISULFATE 75 MG: 75 TABLET ORAL at 10:20

## 2020-05-04 RX ADMIN — MIDODRINE HYDROCHLORIDE 5 MG: 5 TABLET ORAL at 17:22

## 2020-05-04 RX ADMIN — HYDROCODONE BITARTRATE AND ACETAMINOPHEN 1 TABLET: 7.5; 325 TABLET ORAL at 17:26

## 2020-05-04 RX ADMIN — BUDESONIDE AND FORMOTEROL FUMARATE DIHYDRATE 2 PUFF: 80; 4.5 AEROSOL RESPIRATORY (INHALATION) at 08:20

## 2020-05-04 RX ADMIN — SODIUM CHLORIDE, PRESERVATIVE FREE 10 ML: 5 INJECTION INTRAVENOUS at 10:20

## 2020-05-04 RX ADMIN — FUROSEMIDE 40 MG: 10 INJECTION, SOLUTION INTRAMUSCULAR; INTRAVENOUS at 10:19

## 2020-05-04 RX ADMIN — DULOXETINE HYDROCHLORIDE 60 MG: 30 CAPSULE, DELAYED RELEASE ORAL at 10:20

## 2020-05-04 RX ADMIN — ENOXAPARIN SODIUM 40 MG: 40 INJECTION SUBCUTANEOUS at 10:19

## 2020-05-04 RX ADMIN — Medication 3000 UNITS: at 10:23

## 2020-05-04 RX ADMIN — PANTOPRAZOLE SODIUM 40 MG: 40 TABLET, DELAYED RELEASE ORAL at 06:12

## 2020-05-04 RX ADMIN — ISOSORBIDE MONONITRATE 30 MG: 30 TABLET, EXTENDED RELEASE ORAL at 10:19

## 2020-05-04 ASSESSMENT — PAIN DESCRIPTION - LOCATION
LOCATION: BACK;GENERALIZED
LOCATION: BACK
LOCATION: BACK

## 2020-05-04 ASSESSMENT — PAIN SCALES - GENERAL
PAINLEVEL_OUTOF10: 8
PAINLEVEL_OUTOF10: 8
PAINLEVEL_OUTOF10: 6
PAINLEVEL_OUTOF10: 10
PAINLEVEL_OUTOF10: 8

## 2020-05-04 ASSESSMENT — PAIN DESCRIPTION - DESCRIPTORS: DESCRIPTORS: ACHING

## 2020-05-04 ASSESSMENT — PAIN DESCRIPTION - PAIN TYPE
TYPE: CHRONIC PAIN
TYPE: ACUTE PAIN;CHRONIC PAIN
TYPE: ACUTE PAIN;CHRONIC PAIN

## 2020-05-04 ASSESSMENT — PAIN DESCRIPTION - PROGRESSION: CLINICAL_PROGRESSION: NOT CHANGED

## 2020-05-04 ASSESSMENT — PAIN DESCRIPTION - FREQUENCY: FREQUENCY: CONTINUOUS

## 2020-05-04 ASSESSMENT — PAIN DESCRIPTION - ORIENTATION: ORIENTATION: RIGHT;LEFT;LOWER

## 2020-05-04 NOTE — PROGRESS NOTES
I spoke with patient on telephone for bedside tobacco rounding. Merari Perez stated that she smokes about 20 cigarettes per day. I explained that [facility] ([facility]) cares about her health and advised her to quit. Merari Perez stated that she would like to quit eventually, but not ready to focus on a time frame. Merari Perez is not using the nicotine patch and not having intense cravings. We discussed health concerns and the benefits of quitting. I explained the REACH cessation program and mailed information to her home address. Merari Perez stated that she quit in the past for 10 years. She explained that a lot of her cigarettes burn up while she sits at her computer.       Rosa Fuentes, Certified Tobacco Treatment Specialist, [facility]

## 2020-05-04 NOTE — PROGRESS NOTES
Oral Nightly      Infusions:    dextrose       PRN Meds: glucose, 15 g, PRN  dextrose, 12.5 g, PRN  glucagon (rDNA), 1 mg, PRN  dextrose, 100 mL/hr, PRN  sodium chloride flush, 10 mL, PRN  acetaminophen, 650 mg, Q6H PRN    Or  acetaminophen, 650 mg, Q6H PRN  polyethylene glycol, 17 g, Daily PRN  promethazine, 12.5 mg, Q6H PRN    Or  ondansetron, 4 mg, Q6H PRN  albuterol-ipratropium, 1 puff, Q6H PRN  HYDROcodone-acetaminophen, 1 tablet, Q6H PRN        CBC   Recent Labs     05/03/20  0121 05/03/20  1056   WBC 10.5 10.2   HGB 8.1* 7.9*   HCT 27.1* 26.3*    268      BMP   Recent Labs     05/03/20  0121 05/03/20  1056 05/04/20  0209    134* 136   K 5.6* 5.4* 4.9   CL 99 97* 99   CO2 27 28 27   PHOS  --   --  5.7*   BUN 43* 45* 52*   CREATININE 2.2* 2.3* 2.5*       Radiology report reviewed     Electronically signed by Tre Ramirez MD on 5/4/2020 at 10:12 AM

## 2020-05-05 LAB
ANION GAP SERPL CALCULATED.3IONS-SCNC: 11 MMOL/L (ref 4–16)
BUN BLDV-MCNC: 57 MG/DL (ref 6–23)
CALCIUM SERPL-MCNC: 8.9 MG/DL (ref 8.3–10.6)
CHLORIDE BLD-SCNC: 95 MMOL/L (ref 99–110)
CO2: 26 MMOL/L (ref 21–32)
CREAT SERPL-MCNC: 2.2 MG/DL (ref 0.6–1.1)
GFR AFRICAN AMERICAN: 28 ML/MIN/1.73M2
GFR NON-AFRICAN AMERICAN: 23 ML/MIN/1.73M2
GLUCOSE BLD-MCNC: 110 MG/DL (ref 70–99)
GLUCOSE BLD-MCNC: 158 MG/DL (ref 70–99)
GLUCOSE BLD-MCNC: 188 MG/DL (ref 70–99)
GLUCOSE BLD-MCNC: 204 MG/DL (ref 70–99)
GLUCOSE BLD-MCNC: 237 MG/DL (ref 70–99)
POTASSIUM SERPL-SCNC: 4.9 MMOL/L (ref 3.5–5.1)
SODIUM BLD-SCNC: 132 MMOL/L (ref 135–145)

## 2020-05-05 PROCEDURE — 97116 GAIT TRAINING THERAPY: CPT

## 2020-05-05 PROCEDURE — 1200000000 HC SEMI PRIVATE

## 2020-05-05 PROCEDURE — 94660 CPAP INITIATION&MGMT: CPT

## 2020-05-05 PROCEDURE — 97110 THERAPEUTIC EXERCISES: CPT

## 2020-05-05 PROCEDURE — 97530 THERAPEUTIC ACTIVITIES: CPT

## 2020-05-05 PROCEDURE — 80048 BASIC METABOLIC PNL TOTAL CA: CPT

## 2020-05-05 PROCEDURE — 6370000000 HC RX 637 (ALT 250 FOR IP): Performed by: INTERNAL MEDICINE

## 2020-05-05 PROCEDURE — 94761 N-INVAS EAR/PLS OXIMETRY MLT: CPT

## 2020-05-05 PROCEDURE — 94640 AIRWAY INHALATION TREATMENT: CPT

## 2020-05-05 PROCEDURE — 94664 DEMO&/EVAL PT USE INHALER: CPT

## 2020-05-05 PROCEDURE — 6360000002 HC RX W HCPCS: Performed by: PHYSICIAN ASSISTANT

## 2020-05-05 PROCEDURE — 97166 OT EVAL MOD COMPLEX 45 MIN: CPT

## 2020-05-05 PROCEDURE — 2700000000 HC OXYGEN THERAPY PER DAY

## 2020-05-05 PROCEDURE — 2580000003 HC RX 258: Performed by: INTERNAL MEDICINE

## 2020-05-05 PROCEDURE — 97535 SELF CARE MNGMENT TRAINING: CPT

## 2020-05-05 PROCEDURE — 82962 GLUCOSE BLOOD TEST: CPT

## 2020-05-05 PROCEDURE — 36415 COLL VENOUS BLD VENIPUNCTURE: CPT

## 2020-05-05 RX ADMIN — INSULIN LISPRO 1 UNITS: 100 INJECTION, SOLUTION INTRAVENOUS; SUBCUTANEOUS at 08:57

## 2020-05-05 RX ADMIN — POLYETHYLENE GLYCOL (3350) 17 G: 17 POWDER, FOR SOLUTION ORAL at 08:53

## 2020-05-05 RX ADMIN — MIDODRINE HYDROCHLORIDE 5 MG: 5 TABLET ORAL at 12:07

## 2020-05-05 RX ADMIN — ENOXAPARIN SODIUM 40 MG: 40 INJECTION SUBCUTANEOUS at 08:54

## 2020-05-05 RX ADMIN — SODIUM CHLORIDE, PRESERVATIVE FREE 10 ML: 5 INJECTION INTRAVENOUS at 09:19

## 2020-05-05 RX ADMIN — ATORVASTATIN CALCIUM 40 MG: 40 TABLET, FILM COATED ORAL at 21:36

## 2020-05-05 RX ADMIN — BUDESONIDE AND FORMOTEROL FUMARATE DIHYDRATE 2 PUFF: 80; 4.5 AEROSOL RESPIRATORY (INHALATION) at 08:53

## 2020-05-05 RX ADMIN — BUPROPION HYDROCHLORIDE 100 MG: 100 TABLET, EXTENDED RELEASE ORAL at 21:36

## 2020-05-05 RX ADMIN — ASPIRIN 81 MG 81 MG: 81 TABLET ORAL at 08:56

## 2020-05-05 RX ADMIN — LEVOTHYROXINE SODIUM 50 MCG: 50 TABLET ORAL at 08:56

## 2020-05-05 RX ADMIN — PANTOPRAZOLE SODIUM 40 MG: 40 TABLET, DELAYED RELEASE ORAL at 06:02

## 2020-05-05 RX ADMIN — CARVEDILOL 6.25 MG: 6.25 TABLET, FILM COATED ORAL at 08:56

## 2020-05-05 RX ADMIN — HYDROCODONE BITARTRATE AND ACETAMINOPHEN 1 TABLET: 7.5; 325 TABLET ORAL at 09:19

## 2020-05-05 RX ADMIN — SODIUM CHLORIDE, PRESERVATIVE FREE 10 ML: 5 INJECTION INTRAVENOUS at 21:36

## 2020-05-05 RX ADMIN — BUDESONIDE AND FORMOTEROL FUMARATE DIHYDRATE 2 PUFF: 80; 4.5 AEROSOL RESPIRATORY (INHALATION) at 20:55

## 2020-05-05 RX ADMIN — CARVEDILOL 6.25 MG: 6.25 TABLET, FILM COATED ORAL at 18:19

## 2020-05-05 RX ADMIN — DULOXETINE HYDROCHLORIDE 60 MG: 30 CAPSULE, DELAYED RELEASE ORAL at 08:55

## 2020-05-05 RX ADMIN — INSULIN LISPRO 2 UNITS: 100 INJECTION, SOLUTION INTRAVENOUS; SUBCUTANEOUS at 12:06

## 2020-05-05 RX ADMIN — ALOGLIPTIN 25 MG: 25 TABLET, FILM COATED ORAL at 09:19

## 2020-05-05 RX ADMIN — BUPROPION HYDROCHLORIDE 100 MG: 100 TABLET, EXTENDED RELEASE ORAL at 08:55

## 2020-05-05 RX ADMIN — ISOSORBIDE MONONITRATE 30 MG: 30 TABLET, EXTENDED RELEASE ORAL at 08:55

## 2020-05-05 RX ADMIN — MIDODRINE HYDROCHLORIDE 5 MG: 5 TABLET ORAL at 08:55

## 2020-05-05 RX ADMIN — MIDODRINE HYDROCHLORIDE 5 MG: 5 TABLET ORAL at 18:19

## 2020-05-05 RX ADMIN — RANOLAZINE 500 MG: 500 TABLET, FILM COATED, EXTENDED RELEASE ORAL at 08:55

## 2020-05-05 RX ADMIN — CLOPIDOGREL BISULFATE 75 MG: 75 TABLET ORAL at 08:55

## 2020-05-05 RX ADMIN — RANOLAZINE 500 MG: 500 TABLET, FILM COATED, EXTENDED RELEASE ORAL at 21:36

## 2020-05-05 RX ADMIN — INSULIN LISPRO 1 UNITS: 100 INJECTION, SOLUTION INTRAVENOUS; SUBCUTANEOUS at 18:13

## 2020-05-05 RX ADMIN — Medication 3000 UNITS: at 08:55

## 2020-05-05 ASSESSMENT — PAIN DESCRIPTION - PAIN TYPE
TYPE: CHRONIC PAIN

## 2020-05-05 ASSESSMENT — PAIN DESCRIPTION - ORIENTATION
ORIENTATION: RIGHT;LOWER
ORIENTATION: RIGHT;LOWER

## 2020-05-05 ASSESSMENT — PAIN DESCRIPTION - DESCRIPTORS
DESCRIPTORS: ACHING

## 2020-05-05 ASSESSMENT — PAIN DESCRIPTION - FREQUENCY
FREQUENCY: CONTINUOUS

## 2020-05-05 ASSESSMENT — PAIN DESCRIPTION - LOCATION
LOCATION: ARM;BACK
LOCATION: ARM;BACK
LOCATION: BACK

## 2020-05-05 ASSESSMENT — PAIN SCALES - GENERAL
PAINLEVEL_OUTOF10: 8
PAINLEVEL_OUTOF10: 8
PAINLEVEL_OUTOF10: 5
PAINLEVEL_OUTOF10: 5

## 2020-05-05 NOTE — PROGRESS NOTES
Nephrology Progress Note  5/5/2020 8:42 AM        Subjective:   Admit Date: 5/1/2020  PCP: Farida Gomes MD    Interval History: doing better- no overt sob    Diet: better    ROS:  C/O constipation- no sob    Data:     Current med's:    enoxaparin  40 mg Subcutaneous Daily    carvedilol  6.25 mg Oral BID WC    isosorbide mononitrate  30 mg Oral Daily    midodrine  5 mg Oral TID WC    aspirin  81 mg Oral Daily    buPROPion  100 mg Oral BID    clopidogrel  75 mg Oral Daily    DULoxetine  60 mg Oral Daily    insulin glargine  55 Units Subcutaneous Nightly    levothyroxine  50 mcg Oral Daily    alogliptin  25 mg Oral Daily    pantoprazole  40 mg Oral QAM AC    ranolazine  500 mg Oral BID    vitamin D  3,000 Units Oral Daily    sodium chloride flush  10 mL Intravenous 2 times per day    insulin lispro  0.08 Units/kg Subcutaneous TID WC    insulin lispro  0-6 Units Subcutaneous TID WC    insulin lispro  0-3 Units Subcutaneous Nightly    nicotine  1 patch Transdermal Daily    budesonide-formoterol  2 puff Inhalation BID    atorvastatin  40 mg Oral Nightly      dextrose           I/O last 3 completed shifts:  In: -   Out: 1400 [Urine:1400]    CBC:   Recent Labs     05/03/20  0121 05/03/20  1056 05/04/20  1427   WBC 10.5 10.2 9.5   HGB 8.1* 7.9* 7.8*    268 275          Recent Labs     05/03/20  1056 05/04/20  0209 05/05/20  0336   * 136 132*   K 5.4* 4.9 4.9   CL 97* 99 95*   CO2 28 27 26   BUN 45* 52* 57*   CREATININE 2.3* 2.5* 2.2*   GLUCOSE 217* 159* 204*       Lab Results   Component Value Date    CALCIUM 8.9 05/05/2020    PHOS 5.7 (H) 05/04/2020       Objective:     Vitals: BP (!) 157/78   Pulse 87   Temp 98.4 °F (36.9 °C) (Oral)   Resp 16   Ht 5' 4\" (1.626 m)   Wt (!) 305 lb 4.8 oz (138.5 kg)   SpO2 97%   BMI 52.40 kg/m²     General appearance:  fatigue / feels tired -  + conj pallor  HEENT:  See above   supple  Neck:  No gross crackles on exam - posterior lung field but has

## 2020-05-05 NOTE — PROGRESS NOTES
Daily Progress Note    Feels short of breath  Sitting in chair  Sinus rate is stable  Hx of CAD s/p PCI  Renal insuffiencey cr Is 2.2   JAIMIE and obesity  Need to increase activity   BP is stable on low dose meds     Pt. Awake, alert and doing ok  HR stable, NSR, BP stable  SOB improved but still present, no CP     Acute on Chronic HFpEF    EF improved now from previous to 40-45%    Holding Lasix d/t JUAN J    Good UOP    Edema is improving    On Coreg, and imdur- No ACE or aldactone d/t CKD    Cr is 2.2 today-better     CAD s/p PCI    PCI 1/20- DAPT and statin    Stable overall    Trop downtrending    d/t renal disease    Cont. Med. Tx.      Watch for now   Will cont. To follow     Echo-2/20  Summary   This is a limited echocardiogram.   Left ventricular systolic function is normal.   Ejection fraction is visually estimated at 40-45%.   Mild left ventricular hypertrophy.   Lateral and inferior wall appear hypokinetic.   No evidence of any pericardial effusion.     PAST MEDICAL HISTORY:  Coronary artery disease, angioplasty done of the  LAD and circ, history of heart failure, EF was around 40% range, history  of hypertension, hyperlipidemia, renal insufficiency present, COPD,  sleep apnea, obesity, seizure disorder.  Dr. Shaniqua Benavides is her  nephrologist.     PAST SURGICAL HISTORY:  Gallbladder surgery, tonsillectomy, and  angioplasty.     SOCIAL HISTORY: She Grow at home with her . Shayna Sanchez does not smoke  and does not drink.     ALLERGIES:  AUGMENTIN.     MEDICATIONS:  She was on Norvasc, Demadex 20 mg b.i.d., Plavix,  lisinopril, lactulose, Coreg, Ranexa, aspirin, and she should have been  on Plavix and I am not if sure she is taking that also.       Objective:   BP (!) 148/67   Pulse 93   Temp 98.6 °F (37 °C) (Oral)   Resp 16   Ht 5' 4\" (1.626 m)   Wt (!) 305 lb 4.8 oz (138.5 kg)   SpO2 98%   BMI 52.40 kg/m²       Intake/Output Summary (Last 24 hours) at 5/5/2020 1047  Last data filed at 5/4/2020 1439  Gross per 24 hour   Intake --   Output 1400 ml   Net -1400 ml       Medications:   Scheduled Meds:   enoxaparin  40 mg Subcutaneous Daily    carvedilol  6.25 mg Oral BID WC    isosorbide mononitrate  30 mg Oral Daily    midodrine  5 mg Oral TID WC    aspirin  81 mg Oral Daily    buPROPion  100 mg Oral BID    clopidogrel  75 mg Oral Daily    DULoxetine  60 mg Oral Daily    insulin glargine  55 Units Subcutaneous Nightly    levothyroxine  50 mcg Oral Daily    alogliptin  25 mg Oral Daily    pantoprazole  40 mg Oral QAM AC    ranolazine  500 mg Oral BID    vitamin D  3,000 Units Oral Daily    sodium chloride flush  10 mL Intravenous 2 times per day    insulin lispro  0.08 Units/kg Subcutaneous TID WC    insulin lispro  0-6 Units Subcutaneous TID WC    insulin lispro  0-3 Units Subcutaneous Nightly    nicotine  1 patch Transdermal Daily    budesonide-formoterol  2 puff Inhalation BID    atorvastatin  40 mg Oral Nightly      Infusions:   dextrose        PRN Meds:  glucose, dextrose, glucagon (rDNA), dextrose, sodium chloride flush, acetaminophen **OR** acetaminophen, polyethylene glycol, promethazine **OR** ondansetron, albuterol-ipratropium, HYDROcodone-acetaminophen       Physical Exam:  Vitals:    05/05/20 0853   BP:    Pulse:    Resp: 16   Temp:    SpO2: 98%        General: AAO, NAD  Chest: Nontender  Cardiac: First and Second Heart Sounds are Normal, No Murmurs or Gallops noted  Lungs:Clear to auscultation and percussion. Abdomen: Soft, NT, ND, +BS  Extremities: No clubbing, no edema  Vascular:  Equal 2+ peripheral pulses.         Lab Data:  CBC:   Recent Labs     05/03/20  0121 05/03/20  1056 05/04/20  1427   WBC 10.5 10.2 9.5   HGB 8.1* 7.9* 7.8*   HCT 27.1* 26.3* 26.3*   MCV 88.3 88.0 89.2    268 275     BMP:   Recent Labs     05/03/20  1056 05/04/20  0209 05/05/20  0336   * 136 132*   K 5.4* 4.9 4.9   CL 97* 99 95*   CO2 28 27 26   PHOS  --  5.7*  --    BUN 45* 52* 57*   CREATININE 2. 3* 2.5* 2.2*     LIVER PROFILE: No results for input(s): AST, ALT, LIPASE, BILIDIR, BILITOT, ALKPHOS in the last 72 hours. Invalid input(s): AMYLASE,  ALB  PT/INR: No results for input(s): PROTIME, INR in the last 72 hours. APTT: No results for input(s): APTT in the last 72 hours. BNP:  No results for input(s): BNP in the last 72 hours.       Assessment:  Patient Active Problem List    Diagnosis Date Noted    E. coli UTI (urinary tract infection) 07/22/2016     Priority: High    Sepsis associated hypotension, POA 07/21/2016     Priority: High    Sepsis secondary to UTI, POA 07/20/2016     Priority: High    Musculoskeletal chest pain 07/20/2016     Priority: Medium    Diabetes mellitus with hyperglycemia ([address] 07/20/2016     Priority: Medium    Severe dehydration secondary to significant hyperglycemia 07/20/2016     Priority: Medium    Generalized weakness 07/20/2016     Priority: Medium    Elevated lactic acid level, resolved 07/20/2016     Priority: Low    Heart failure ([address] 05/01/2020    Hyperkalemia 02/23/2020    Uncontrolled diabetes mellitus with chronic kidney disease ([address] 02/23/2020    Acute on chronic diastolic (congestive) heart failure ([address] 02/23/2020    COPD with acute exacerbation ([address] 02/23/2020    Acute respiratory distress 02/16/2020    STEMI (ST elevation myocardial infarction) ([address] 11/23/2019    Cellulitis of abdominal wall 07/13/2019    Acute kidney injury ([address] 04/29/2019    DM (diabetes mellitus) ([address] 04/29/2019    Fluid overload 04/29/2019    Cor pulmonale (chronic) ([address] 04/29/2019    Anasarca 04/23/2019    UTI (urinary tract infection), bacterial 03/15/2019    Sinus tachycardia 03/15/2019    Uncontrolled type 2 diabetes mellitus with hyperglycemia ([address] 03/15/2019    Class 3 severe obesity due to excess calories with body mass index (BMI) of 45.0 to 49.9 in adult ([address] 03/15/2019    Pleural effusion on left 02/28/2019    Hyperglycemia 11/17/2018   

## 2020-05-05 NOTE — PROGRESS NOTES
WFL  · Cardiopulmonary:  3L of 02      Body Systems and functions:  · ROM R/L:  WFL. · Strength R/L:  4-/5,   · Sensation: WFL  · Tone: Normal  · Coordination: WFL  · Perception: WNL    Activities of Daily Living (ADLs):  · Feeding: Maritza  · Grooming: SBA (oral care and washing face/hands sitting upright in chair)  · UB bathing: SBA  · LB bathing: maxA  · UB dressing: SBA  · LB dressing: maxA  · Toileting: maxA    Cognitive and Psychosocial Functioning:  · Overall cognitive status: WFL  · Affect: Normal   Cognitive Patterns  Cognitive Pattern Assessment Used: BIMS  Repetition of Three Words (First Attempt): 3  Temporal Orientation: Year: Correct  Temporal Orientation: Month: Accurate within 5 days  Temporal Orientation: Day: Correct  Able to recall \"sock: Yes, no cue required  Able to recall \"blue\": Yes, no cue required  Able to recall \"bed\": Yes, no cue required  BIMS Summary Score: 15    Mobility:  · Supine to sit:  DNT  · Transfers: Terry  · Sitting balance:  SBA. · Standing balance:  CGA w/ RW.  · Toilet/Shower Transfers: DNT             AM-PAC Daily Activity Inpatient   How much help for putting on and taking off regular lower body clothing?: Total  How much help for Bathing?: A Lot  How much help for Toileting?: Total  How much help for putting on and taking off regular upper body clothing?: A Little  How much help for taking care of personal grooming?: A Little  How much help for eating meals?: None  AM-[facility] Inpatient Daily Activity Raw Score: 14  AM-PAC Inpatient ADL T-Scale Score : 33.39  ADL Inpatient CMS 0-100% Score: 59.67  ADL Inpatient CMS G-Code Modifier : CK    Treatment:  Self Care Training:   Cues were given for safety, sequence, UE/LE placement, visual cues, and balance. Activities performed today included grooming    Safety: patient left in chair with chair alarm, call light within reach, RN notified, gait belt used.     Assessment:  Pt is a 62 yo female admitted from home for heart

## 2020-05-05 NOTE — PROGRESS NOTES
Physical Therapy    Physical Therapy Treatment Note  Name: Tran Isaac MRN: [id number] :   1960   Date:  2020   Admission Date: 2020 Room:  3007/3007-A   Restrictions/Precautions:        fall risk  Communication with other providers:  Raheel Barkley RN states pt is ok to see for therapy  Subjective:  Patient states:  She wants to go home,  Pain:   Location, Type, Intensity (0/10 to 10/10):  0/10  Objective:    Observation:  Pt was sitting up in the chair  Treatment, including education/measures:  Transfers  Scooting :VC's and SBA  Sit to stand :min A from chair with a low seat and SBA from the bed from a higher surface  Stand to sit :VC's for hand placements and CGA  Gait:  Pt amb with RW for 25 ft x 2 with CGA and assist for the tele, mixon and O2  Pt needed VC's for PLB d/t O2 sats dropping to 88% with gt  Sitting Exercises: Ankle pumps x 20  LAQ's x 15  Marching x 10   Hip Abd x 10  Therapeutic Exercise:  Therapeutic exercises were instructed today. Cues were given for technique, safety, recruitment, and rationale. Cues were verbal and/or tactile. Safety  Patient left safely in the chair, with call light/phone in reach pt ref alarm. Gait belt was used for transfers and gait.   Assessment / Impression:     Patient's tolerance of treatment:  Good, needed rest breaks for sob and fatigue but improved since eval and willing to do therapy to get home   Adverse Reaction: drop in O2 sat with gt  Significant change in status and impact:  none  Barriers to improvement:  fatigue  Plan for Next Session:    Will cont to work towards pt's goals per her tolerance  Time in:  0950  Time out:  1045  Timed treatment minutes:  55  Total treatment time:  55  Previously filed items:   Short term goals  Time Frame for Short term goals: 1 week  Short term goal 1: Pt to complete all STS transfers to/from bed, commode, and chair mod I  Short term goal 2: Pt to ambulate 48' LRAD with supervision  Short term goal 3: Pt to

## 2020-05-05 NOTE — PROGRESS NOTES
Hospitalist Progress Note      Name:  Reyes Lundborg /Age/Sex: 1960  (61 y.o. female)   MRN & CSN:  [id number] & [id number] Admission Date/Time: 2020  6:30 AM   Location:  [facility]/300- PCP: Reji Paulson MD         Hospital Day: 5    Assessment and Plan:   Reyes Lundborg is a 61 y.o.  female  who presents with SOB and leg swelling    Assessment and Plan  1) Acute on chronic systolic heart failure exacerbation  -CXR: Suggestive of pulmonary edema   -Echo:EF 40 to 45%  2020.  -Diuresed with IV lasix  -Cardio on board; resumed other HF regimen    2) Hypertensive emergency - due to medication non-adherence  -Improved  -Resumed PO meds  -Counseled on adherence    3) JUAN J on CKD3    - (Baseline Cr 1.4- 2)   - Cr slowly trending down; 2.2 today  -Nephrology on board; holding loop diuretic     Other diagnoses  -CAD s/p PCI on 20.  On aspirin/Plavix/Ranexa/Coreg/Lipitor.  -Chronic hypoxic respiratory failure--on 2 L of oxygen continuously at home  -Hypothyroidism - on levothyroxine  -DM2 on insulin-- hemoglobin A1c 8.6% on 2020  -Tobacco use disorder-- nicotine patch  - Morbid obesity--weight 269 pounds, BMI 46.3  -Hyperlipidemia--on Lipitor  -Anxiety/depression--on Wellbutrin/Cymbalta  -COPD- not in exacerbation.  DuoNebs and Breo Ellipta  -Chronic anemia - stable.       Diet DIET CARB CONTROL;   DVT Prophylaxis [] Lovenox, []  Heparin, [] SCDs, [] Ambulation   GI Prophylaxis [] PPI,  [] H2 Blocker,  [] Carafate,  [] Diet/Tube Feeds   Code Status Full Code   Disposition Patient requires continued admission due to JUAN J   MDM [] Low, [x] Moderate,[]  High  Patient's risk as above due to JUAN J     History of Present Illness:     Patient was seen and examined  Denied any worsening SOB  Reported significant improvement in her leg swelling  No fever, chills, N/V/D    Ten point ROS reviewed negative, unless as noted above    Objective:   No intake or output data in the 24 hours ending 20 [id number]

## 2020-05-06 VITALS
HEIGHT: 64 IN | TEMPERATURE: 98.4 F | OXYGEN SATURATION: 96 % | WEIGHT: 293 LBS | HEART RATE: 97 BPM | SYSTOLIC BLOOD PRESSURE: 164 MMHG | DIASTOLIC BLOOD PRESSURE: 67 MMHG | BODY MASS INDEX: 50.02 KG/M2 | RESPIRATION RATE: 18 BRPM

## 2020-05-06 LAB
ALBUMIN SERPL-MCNC: 3.2 GM/DL (ref 3.4–5)
ALP BLD-CCNC: 98 IU/L (ref 40–128)
ALT SERPL-CCNC: 9 U/L (ref 10–40)
ANION GAP SERPL CALCULATED.3IONS-SCNC: 9 MMOL/L (ref 4–16)
AST SERPL-CCNC: 10 IU/L (ref 15–37)
BILIRUB SERPL-MCNC: 0.5 MG/DL (ref 0–1)
BUN BLDV-MCNC: 49 MG/DL (ref 6–23)
CALCIUM SERPL-MCNC: 8.4 MG/DL (ref 8.3–10.6)
CHLORIDE BLD-SCNC: 101 MMOL/L (ref 99–110)
CO2: 26 MMOL/L (ref 21–32)
CREAT SERPL-MCNC: 1.9 MG/DL (ref 0.6–1.1)
GFR AFRICAN AMERICAN: 33 ML/MIN/1.73M2
GFR NON-AFRICAN AMERICAN: 27 ML/MIN/1.73M2
GLUCOSE BLD-MCNC: 114 MG/DL (ref 70–99)
GLUCOSE BLD-MCNC: 135 MG/DL (ref 70–99)
GLUCOSE BLD-MCNC: 162 MG/DL (ref 70–99)
GLUCOSE BLD-MCNC: 99 MG/DL (ref 70–99)
MAGNESIUM: 2.1 MG/DL (ref 1.8–2.4)
PHOSPHORUS: 4 MG/DL (ref 2.5–4.9)
POTASSIUM SERPL-SCNC: 4.8 MMOL/L (ref 3.5–5.1)
SODIUM BLD-SCNC: 136 MMOL/L (ref 135–145)
TOTAL PROTEIN: 4.9 GM/DL (ref 6.4–8.2)

## 2020-05-06 PROCEDURE — 94660 CPAP INITIATION&MGMT: CPT

## 2020-05-06 PROCEDURE — 6360000002 HC RX W HCPCS: Performed by: PHYSICIAN ASSISTANT

## 2020-05-06 PROCEDURE — 2580000003 HC RX 258: Performed by: INTERNAL MEDICINE

## 2020-05-06 PROCEDURE — 94640 AIRWAY INHALATION TREATMENT: CPT

## 2020-05-06 PROCEDURE — 83735 ASSAY OF MAGNESIUM: CPT

## 2020-05-06 PROCEDURE — 84100 ASSAY OF PHOSPHORUS: CPT

## 2020-05-06 PROCEDURE — 6370000000 HC RX 637 (ALT 250 FOR IP): Performed by: INTERNAL MEDICINE

## 2020-05-06 PROCEDURE — 94761 N-INVAS EAR/PLS OXIMETRY MLT: CPT

## 2020-05-06 PROCEDURE — 80053 COMPREHEN METABOLIC PANEL: CPT

## 2020-05-06 PROCEDURE — 82962 GLUCOSE BLOOD TEST: CPT

## 2020-05-06 PROCEDURE — 2700000000 HC OXYGEN THERAPY PER DAY

## 2020-05-06 RX ORDER — IPRATROPIUM BROMIDE AND ALBUTEROL SULFATE 2.5; .5 MG/3ML; MG/3ML
1 SOLUTION RESPIRATORY (INHALATION) EVERY 4 HOURS
Qty: 360 ML | Refills: 1 | Status: SHIPPED | OUTPATIENT
Start: 2020-05-06

## 2020-05-06 RX ORDER — CARVEDILOL 6.25 MG/1
6.25 TABLET ORAL 2 TIMES DAILY WITH MEALS
Qty: 60 TABLET | Refills: 3 | Status: ON HOLD | OUTPATIENT
Start: 2020-05-06 | End: 2020-10-03 | Stop reason: HOSPADM

## 2020-05-06 RX ORDER — TORSEMIDE 20 MG/1
20 TABLET ORAL DAILY
Qty: 30 TABLET | Refills: 3 | Status: ON HOLD | OUTPATIENT
Start: 2020-05-06 | End: 2020-05-14 | Stop reason: SDUPTHER

## 2020-05-06 RX ORDER — CARVEDILOL 12.5 MG/1
12.5 TABLET ORAL 2 TIMES DAILY WITH MEALS
Status: DISCONTINUED | OUTPATIENT
Start: 2020-05-06 | End: 2020-05-06

## 2020-05-06 RX ORDER — TIOTROPIUM BROMIDE 18 UG/1
18 CAPSULE ORAL; RESPIRATORY (INHALATION) DAILY
Qty: 90 CAPSULE | Refills: 1 | Status: SHIPPED | OUTPATIENT
Start: 2020-05-06 | End: 2020-05-06 | Stop reason: HOSPADM

## 2020-05-06 RX ORDER — CARVEDILOL 6.25 MG/1
6.25 TABLET ORAL 2 TIMES DAILY WITH MEALS
Status: DISCONTINUED | OUTPATIENT
Start: 2020-05-06 | End: 2020-05-06 | Stop reason: HOSPADM

## 2020-05-06 RX ADMIN — BUDESONIDE AND FORMOTEROL FUMARATE DIHYDRATE 2 PUFF: 80; 4.5 AEROSOL RESPIRATORY (INHALATION) at 12:10

## 2020-05-06 RX ADMIN — ALOGLIPTIN 25 MG: 25 TABLET, FILM COATED ORAL at 09:33

## 2020-05-06 RX ADMIN — BUPROPION HYDROCHLORIDE 100 MG: 100 TABLET, EXTENDED RELEASE ORAL at 09:34

## 2020-05-06 RX ADMIN — Medication 3000 UNITS: at 09:33

## 2020-05-06 RX ADMIN — HYDROCODONE BITARTRATE AND ACETAMINOPHEN 1 TABLET: 7.5; 325 TABLET ORAL at 00:14

## 2020-05-06 RX ADMIN — SODIUM CHLORIDE, PRESERVATIVE FREE 10 ML: 5 INJECTION INTRAVENOUS at 09:34

## 2020-05-06 RX ADMIN — MIDODRINE HYDROCHLORIDE 5 MG: 5 TABLET ORAL at 09:34

## 2020-05-06 RX ADMIN — LEVOTHYROXINE SODIUM 50 MCG: 50 TABLET ORAL at 09:34

## 2020-05-06 RX ADMIN — ENOXAPARIN SODIUM 40 MG: 40 INJECTION SUBCUTANEOUS at 09:34

## 2020-05-06 RX ADMIN — ASPIRIN 81 MG 81 MG: 81 TABLET ORAL at 09:34

## 2020-05-06 RX ADMIN — RANOLAZINE 500 MG: 500 TABLET, FILM COATED, EXTENDED RELEASE ORAL at 09:33

## 2020-05-06 RX ADMIN — DULOXETINE HYDROCHLORIDE 60 MG: 30 CAPSULE, DELAYED RELEASE ORAL at 09:34

## 2020-05-06 RX ADMIN — PANTOPRAZOLE SODIUM 40 MG: 40 TABLET, DELAYED RELEASE ORAL at 06:48

## 2020-05-06 RX ADMIN — CLOPIDOGREL BISULFATE 75 MG: 75 TABLET ORAL at 09:34

## 2020-05-06 RX ADMIN — CARVEDILOL 6.25 MG: 6.25 TABLET, FILM COATED ORAL at 09:34

## 2020-05-06 RX ADMIN — INSULIN LISPRO 1 UNITS: 100 INJECTION, SOLUTION INTRAVENOUS; SUBCUTANEOUS at 09:38

## 2020-05-06 RX ADMIN — HYDROCODONE BITARTRATE AND ACETAMINOPHEN 1 TABLET: 7.5; 325 TABLET ORAL at 11:18

## 2020-05-06 RX ADMIN — ISOSORBIDE MONONITRATE 30 MG: 30 TABLET, EXTENDED RELEASE ORAL at 09:34

## 2020-05-06 ASSESSMENT — PAIN DESCRIPTION - LOCATION: LOCATION: ARM;BACK

## 2020-05-06 ASSESSMENT — PAIN SCALES - GENERAL
PAINLEVEL_OUTOF10: 8
PAINLEVEL_OUTOF10: 10
PAINLEVEL_OUTOF10: 6
PAINLEVEL_OUTOF10: 6

## 2020-05-06 ASSESSMENT — PAIN DESCRIPTION - ORIENTATION: ORIENTATION: RIGHT;LOWER

## 2020-05-06 ASSESSMENT — PAIN DESCRIPTION - PAIN TYPE: TYPE: CHRONIC PAIN

## 2020-05-06 NOTE — PROGRESS NOTES
Physical Therapy  Pt states she is going home and wants to save her energy for getting there. González Chew.  Peter Funez PTA

## 2020-05-06 NOTE — DISCHARGE SUMMARY
Discharge Summary    Name:  Ginette Fish /Age/Sex: 1960  (61 y.o. female)   MRN & CSN:  [id number] & [id number] Admission Date/Time: 2020  6:30 AM   Attending:  Kesha Cobb MD Discharging Physician: Kristen Zelaya MD     Hospital Course:   Ginette Fish is a 61 y.o.  female  who presents with SOB and leg swelling     Patient was seen and examined  Denied any worsening SOB  Leg swelling has gone down significantly  No dizziness, palpitations  No fever, chills, N/V/D    Assessment and Plan  1) Acute on chronic systolic heart failure exacerbation  -CXR: Suggestive of pulmonary edema   -Echo:EF 40 to 45%  2020.  -Diuresed with IV lasix  -Cardio on board; resumed other HF regimen     2) Hypertensive emergency - due to medication non-adherence  -Improved  -Resumed PO meds  -Counseled on adherence     3) JUAN J on CKD3    - (Baseline Cr 1.4- 2)   - Cr slowly trending down; 1.9 today  -Nephrology on board; resume torsemide 20 mg daily on discharge  -Close follow up given    4) Debility due to general medical conditions  Brock Mosley requires a heavy duty bedside commode due to being confined to a single room, and is physically incapable of utilizing regular toilet facilities.  Current body weight is Weight: (!) 305 lb 2 oz (138.4 kg).    Other diagnoses  -CAD s/p PCI on 20.  On aspirin/Plavix/Ranexa/Coreg/Lipitor.  -Chronic hypoxic respiratory failure--on 2-3 L of oxygen continuously at home  -Hypothyroidism - on levothyroxine  -DM2 on insulin-- hemoglobin A1c 8.6% on 2020  -Tobacco use disorder-- nicotine patch  - Morbid obesity--weight 269 pounds, BMI 46.3  -Hyperlipidemia--on Lipitor  -Anxiety/depression--on Wellbutrin/Cymbalta  -COPD- not in exacerbation. DuoNebs and Breo Ellipta  -Chronic anemia - stable.       The patient expressed appropriate understanding of and agreement with the discharge recommendations, medications, and plan.      Consults this admission:  IP CONSULT TO HOSPITALIST  IP CONSULT TO HEART FAILURE NURSE/COORDINATOR  IP CONSULT TO DIETITIAN  IP CONSULT TO CARDIOLOGY  IP CONSULT TO NEPHROLOGY  IP CONSULT TO [address] [place] [address] NEEDS    Discharge Instruction:   Follow up appointments: Cardio and Nephro in 2 weeks  Primary care physician:  within 2 weeks    Diet:  cardiac diet   Activity: activity as tolerated  Disposition: Discharged to:   []Home, [x][facility], []SNF, []Acute Rehab, []Hospice   Condition on discharge: Stable    Discharge Medications:      Hansa Mosley   Home Medication Instructions LINDA:[id number]    Printed on:05/06/20 1122   Medication Information                      albuterol sulfate HFA (VENTOLIN HFA) 108 (90 Base) MCG/ACT inhaler  Inhale 2 puffs into the lungs every 4 hours as needed for Wheezing             aspirin 81 MG chewable tablet  Take 1 tablet by mouth daily             atorvastatin (LIPITOR) 80 MG tablet  Take 1 tablet by mouth nightly             BREO ELLIPTA 100-25 MCG/INH AEPB inhaler  Inhale 1 puff into the lungs daily             buPROPion (WELLBUTRIN SR) 100 MG extended release tablet  Take 100 mg by mouth 2 times daily             carvedilol (COREG) 6.25 MG tablet  Take 1 tablet by mouth 2 times daily (with meals)             clopidogrel (PLAVIX) 75 MG tablet  Take 1 tablet by mouth daily             docusate (COLACE, DULCOLAX) 100 MG CAPS  Take 100 mg by mouth 2 times daily             DULoxetine (CYMBALTA) 60 MG extended release capsule  Take 1 capsule by mouth daily             HYDROcodone-acetaminophen (NORCO) 5-325 MG per tablet  Take 2 tablets by mouth every 4 hours as needed for Pain.              insulin glargine (LANTUS SOLOSTAR) 100 UNIT/ML injection pen  Inject 55 Units into the skin nightly             insulin lispro (HUMALOG) 100 UNIT/ML injection vial  Check blood sugar three times daily before meals and at bedtime  For blood sugar less than 150= no insulin, 151-200=2, 201-250=4, 251-300=6, 301-350=6, 351-400=8, >400=10 units and call MD             ipratropium-albuterol (DUONEB) 0.5-2.5 (3) MG/3ML SOLN nebulizer solution  Inhale 3 mLs into the lungs every 4 hours             isosorbide mononitrate (IMDUR) 60 MG extended release tablet  Take 1 tablet by mouth daily             lactulose (CHRONULAC) 10 GM/15ML solution  Take 15 mLs by mouth 2 times daily             levothyroxine (SYNTHROID) 50 MCG tablet  Take 50 mcg by mouth daily             linagliptin (TRADJENTA) 5 MG tablet  Take 1 tablet by mouth daily             nystatin (MYCOSTATIN) 090461 UNIT/GM powder  Apply topically 2 times daily as needed             ondansetron (ZOFRAN ODT) 4 MG disintegrating tablet  Take 1 tablet by mouth every 8 hours as needed for Nausea             pantoprazole (PROTONIX) 40 MG tablet  Take 1 tablet by mouth every morning (before breakfast)             polyethylene glycol (GLYCOLAX) powder  Take 17 g by mouth 2 times daily             ranolazine (RANEXA) 500 MG extended release tablet  Take 1 tablet by mouth 2 times daily             tiotropium (SPIRIVA HANDIHALER) 18 MCG inhalation capsule  Inhale 1 capsule into the lungs daily Please dispense any other alternative if not covered by her insurance             torsemide (DEMADEX) 20 MG tablet  Take 1 tablet by mouth daily             vitamin D 1000 units CAPS  Take 3 capsules by mouth daily                 Objective Findings at Discharge:   BP (!) 164/67   Pulse 97   Temp 98.4 °F (36.9 °C) (Oral)   Resp 18   Ht 5' 4\" (1.626 m)   Wt (!) 304 lb 2 oz (138 kg)   SpO2 96%   BMI 52.20 kg/m²            PHYSICAL EXAM   GEN Awake female, sitting upright in bed in no apparent distress. Appears given age. EYES Pupils are equally round. No scleral erythema, discharge, or conjunctivitis. HENT Mucous membranes are moist. Oral pharynx without exudates, no evidence of thrush. NECK Supple, no apparent thyromegaly or masses. RESP Clear to auscultation, no wheezes, rales or rhonchi.   Symmetric chest movement while on 3 L of O2.  CARDIO/VASC S1/S2 auscultated. Regular rate without appreciable murmurs, rubs, or gallops. No JVD or carotid bruits. Peripheral pulses equal bilaterally and palpable. No peripheral edema. GI Abdomen is soft without significant tenderness, masses, or guarding. Bowel sounds are normoactive. Rectal exam deferred.  No costovertebral angle tenderness. Normal appearing external genitalia. Reyes catheter is not present. HEME/LYMPH No palpable cervical lymphadenopathy and no hepatosplenomegaly. No petechiae or ecchymoses. MSK No gross joint deformities. SKIN Normal coloration, warm, dry. NEURO Cranial nerves appear grossly intact, normal speech, no lateralizing weakness. PSYCH Awake, alert, oriented x 4. Affect appropriate.     BMP/CBC  Recent Labs     05/04/20  0209 05/04/20  1427 05/05/20  0336 05/06/20  0318     --  132* 136   K 4.9  --  4.9 4.8   CL 99  --  95* 101   CO2 27  --  26 26   BUN 52*  --  57* 49*   CREATININE 2.5*  --  2.2* 1.9*   WBC  --  9.5  --   --    HCT  --  26.3*  --   --    PLT  --  275  --   --        IMAGING:  As above    Discharge Time of 35 minutes    Electronically signed by Trace Singh MD on 5/6/2020 at 11:20 AM

## 2020-05-06 NOTE — PROGRESS NOTES
Plavix and I am not if sure she is taking that also. Objective:   BP (!) 164/67   Pulse 97   Temp 98.4 °F (36.9 °C) (Oral)   Resp 18   Ht 5' 4\" (1.626 m)   Wt (!) 304 lb 2 oz (138 kg)   SpO2 96%   BMI 52.20 kg/m²       Intake/Output Summary (Last 24 hours) at 5/6/2020 1047  Last data filed at 5/6/2020 1184  Gross per 24 hour   Intake 240 ml   Output 450 ml   Net -210 ml       Medications:   Scheduled Meds:   enoxaparin  40 mg Subcutaneous Daily    carvedilol  6.25 mg Oral BID WC    isosorbide mononitrate  30 mg Oral Daily    midodrine  5 mg Oral TID WC    aspirin  81 mg Oral Daily    buPROPion  100 mg Oral BID    clopidogrel  75 mg Oral Daily    DULoxetine  60 mg Oral Daily    insulin glargine  55 Units Subcutaneous Nightly    levothyroxine  50 mcg Oral Daily    alogliptin  25 mg Oral Daily    pantoprazole  40 mg Oral QAM AC    ranolazine  500 mg Oral BID    vitamin D  3,000 Units Oral Daily    sodium chloride flush  10 mL Intravenous 2 times per day    insulin lispro  0.08 Units/kg Subcutaneous TID WC    insulin lispro  0-6 Units Subcutaneous TID WC    insulin lispro  0-3 Units Subcutaneous Nightly    nicotine  1 patch Transdermal Daily    budesonide-formoterol  2 puff Inhalation BID    atorvastatin  40 mg Oral Nightly      Infusions:   dextrose        PRN Meds:  glucose, dextrose, glucagon (rDNA), dextrose, sodium chloride flush, acetaminophen **OR** acetaminophen, polyethylene glycol, promethazine **OR** ondansetron, albuterol-ipratropium, HYDROcodone-acetaminophen       Physical Exam:  Vitals:    05/06/20 0927   BP:    Pulse: 97   Resp:    Temp:    SpO2:         General: AAO, NAD  Chest: Nontender  Cardiac: First and Second Heart Sounds are Normal, No Murmurs or Gallops noted  Lungs:Clear to auscultation and percussion. Abdomen: Soft, NT, ND, +BS  Extremities: No clubbing, no edema  Vascular:  Equal 2+ peripheral pulses.         Lab Data:  CBC:   Recent Labs     05/03/20  1056

## 2020-05-06 NOTE — CARE COORDINATION
LSW called Beena with [address] regarding the [facility] for pt. Myla Miller stated they will look over the orders and will call the pt and set up delivery. LSW received a call from Myla Miller with [address] and she informed this LSW that pt receives a BSC in 2015. Myla Miller stated pt insurance will not pay for another unless it is over 11years old. LSW went to inform pt of this info and pt had already left the hospital.  LSW called the pt to inform her of this info. Unfortunately the number in     the computer for the pt is no longer in service.

## 2020-05-07 ENCOUNTER — CARE COORDINATION (OUTPATIENT)
Dept: CASE MANAGEMENT | Age: 60
End: 2020-05-07

## 2020-05-07 NOTE — CARE COORDINATION
[address] Transitions Initial Follow Up Call    Call within 2 business days of discharge: Yes    Patient: Dat Powell Patient : 1960   MRN: [id number]  Reason for Admission:   SOB  Discharge Date: 20 RARS: Readmission Risk Score: 46      Last Discharge [facility]       Complaint Diagnosis Description Type Department Provider    20 Shortness of Breath Acute on chronic congestive heart failure, unspecified heart failure type ([facility] [address] . .. ED to Hosp-Admission (Discharged) (ADMITTED) [facility] Shi Luciano MD; Mackenzie Arias. .. Facility:   [facility]      Follow Up:  COVID-19 Risk Initial follow up: Attempted patient contact. Phone is no longer in service. Left VM on second contact with CTN contact information and request for call back. Spoke with [facility]/ [address] [place] [address] Rd. Confirmed services and plan for [address] [place] [address] Rd follow up. No future appointments.     Marily Cockayne, RN

## 2020-05-08 ENCOUNTER — CARE COORDINATION (OUTPATIENT)
Dept: CASE MANAGEMENT | Age: 60
End: 2020-05-08

## 2020-05-12 ENCOUNTER — HOSPITAL ENCOUNTER (INPATIENT)
Age: 60
LOS: 2 days | Discharge: HOME HEALTH | DRG: 291 | End: 2020-05-14
Attending: FAMILY MEDICINE | Admitting: FAMILY MEDICINE
Payer: MEDICARE

## 2020-05-12 ENCOUNTER — APPOINTMENT (OUTPATIENT)
Dept: GENERAL RADIOLOGY | Age: 60
DRG: 291 | End: 2020-05-12
Payer: MEDICARE

## 2020-05-12 ENCOUNTER — APPOINTMENT (OUTPATIENT)
Dept: CT IMAGING | Age: 60
DRG: 291 | End: 2020-05-12
Payer: MEDICARE

## 2020-05-12 PROBLEM — I50.21 CHF (CONGESTIVE HEART FAILURE), NYHA CLASS I, ACUTE, SYSTOLIC (HCC): Status: ACTIVE | Noted: 2020-05-12

## 2020-05-12 LAB
ALBUMIN SERPL-MCNC: 3.8 GM/DL (ref 3.4–5)
ALP BLD-CCNC: 129 IU/L (ref 40–128)
ALT SERPL-CCNC: 11 U/L (ref 10–40)
AMMONIA: 104 UMOL/L (ref 11–51)
ANION GAP SERPL CALCULATED.3IONS-SCNC: 9 MMOL/L (ref 4–16)
AST SERPL-CCNC: 10 IU/L (ref 15–37)
BACTERIA: ABNORMAL /HPF
BASOPHILS ABSOLUTE: 0 K/CU MM
BASOPHILS RELATIVE PERCENT: 0.4 % (ref 0–1)
BILIRUB SERPL-MCNC: 0.3 MG/DL (ref 0–1)
BILIRUBIN URINE: NEGATIVE MG/DL
BLOOD, URINE: ABNORMAL
BUN BLDV-MCNC: 40 MG/DL (ref 6–23)
CALCIUM SERPL-MCNC: 9.2 MG/DL (ref 8.3–10.6)
CHLORIDE BLD-SCNC: 104 MMOL/L (ref 99–110)
CLARITY: ABNORMAL
CO2: 28 MMOL/L (ref 21–32)
COLOR: YELLOW
CREAT SERPL-MCNC: 1.5 MG/DL (ref 0.6–1.1)
DIFFERENTIAL TYPE: ABNORMAL
EOSINOPHILS ABSOLUTE: 0.2 K/CU MM
EOSINOPHILS RELATIVE PERCENT: 1.7 % (ref 0–3)
ESTIMATED AVERAGE GLUCOSE: 220 MG/DL
GFR AFRICAN AMERICAN: 43 ML/MIN/1.73M2
GFR NON-AFRICAN AMERICAN: 36 ML/MIN/1.73M2
GLUCOSE BLD-MCNC: 202 MG/DL (ref 70–99)
GLUCOSE BLD-MCNC: 227 MG/DL (ref 70–99)
GLUCOSE, URINE: 50 MG/DL
HBA1C MFR BLD: 9.3 % (ref 4.2–6.3)
HCT VFR BLD CALC: 28.8 % (ref 37–47)
HEMOGLOBIN: 8.6 GM/DL (ref 12.5–16)
IMMATURE NEUTROPHIL %: 0.8 % (ref 0–0.43)
INR BLD: 1.03 INDEX
IRON: 23 UG/DL (ref 37–145)
KETONES, URINE: NEGATIVE MG/DL
LEUKOCYTE ESTERASE, URINE: ABNORMAL
LYMPHOCYTES ABSOLUTE: 1 K/CU MM
LYMPHOCYTES RELATIVE PERCENT: 9.4 % (ref 24–44)
MCH RBC QN AUTO: 26.3 PG (ref 27–31)
MCHC RBC AUTO-ENTMCNC: 29.9 % (ref 32–36)
MCV RBC AUTO: 88.1 FL (ref 78–100)
MONOCYTES ABSOLUTE: 0.6 K/CU MM
MONOCYTES RELATIVE PERCENT: 5.6 % (ref 0–4)
MUCUS: ABNORMAL HPF
NITRITE URINE, QUANTITATIVE: POSITIVE
NUCLEATED RBC %: 0 %
PCT TRANSFERRIN: 6 % (ref 10–44)
PDW BLD-RTO: 14.8 % (ref 11.7–14.9)
PH, URINE: 5 (ref 5–8)
PLATELET # BLD: 336 K/CU MM (ref 140–440)
PMV BLD AUTO: 9.8 FL (ref 7.5–11.1)
POTASSIUM SERPL-SCNC: 4.8 MMOL/L (ref 3.5–5.1)
PRO-BNP: 6232 PG/ML
PROTEIN UA: 100 MG/DL
PROTHROMBIN TIME: 12.5 SECONDS (ref 11.7–14.5)
RBC # BLD: 3.27 M/CU MM (ref 4.2–5.4)
RBC URINE: 2 /HPF (ref 0–6)
SEGMENTED NEUTROPHILS ABSOLUTE COUNT: 9 K/CU MM
SEGMENTED NEUTROPHILS RELATIVE PERCENT: 82.1 % (ref 36–66)
SODIUM BLD-SCNC: 141 MMOL/L (ref 135–145)
SPECIFIC GRAVITY UA: 1.01 (ref 1–1.03)
SQUAMOUS EPITHELIAL: 2 /HPF
TOTAL IMMATURE NEUTOROPHIL: 0.09 K/CU MM
TOTAL IRON BINDING CAPACITY: 371 UG/DL (ref 250–450)
TOTAL NUCLEATED RBC: 0 K/CU MM
TOTAL PROTEIN: 6.4 GM/DL (ref 6.4–8.2)
TRANSITIONAL EPITHELIAL: <1 /HPF
TRICHOMONAS: ABNORMAL /HPF
TROPONIN T: 0.04 NG/ML
TROPONIN T: 0.05 NG/ML
UNSATURATED IRON BINDING CAPACITY: 348 UG/DL (ref 110–370)
UROBILINOGEN, URINE: NORMAL MG/DL (ref 0.2–1)
WBC # BLD: 11 K/CU MM (ref 4–10.5)
WBC CLUMP: ABNORMAL /HPF
WBC UA: 30 /HPF (ref 0–5)

## 2020-05-12 PROCEDURE — 6360000002 HC RX W HCPCS: Performed by: INTERNAL MEDICINE

## 2020-05-12 PROCEDURE — 74176 CT ABD & PELVIS W/O CONTRAST: CPT

## 2020-05-12 PROCEDURE — 83550 IRON BINDING TEST: CPT

## 2020-05-12 PROCEDURE — 83036 HEMOGLOBIN GLYCOSYLATED A1C: CPT

## 2020-05-12 PROCEDURE — 82962 GLUCOSE BLOOD TEST: CPT

## 2020-05-12 PROCEDURE — 2140000000 HC CCU INTERMEDIATE R&B

## 2020-05-12 PROCEDURE — 85025 COMPLETE CBC W/AUTO DIFF WBC: CPT

## 2020-05-12 PROCEDURE — 93005 ELECTROCARDIOGRAM TRACING: CPT | Performed by: PHYSICIAN ASSISTANT

## 2020-05-12 PROCEDURE — 82140 ASSAY OF AMMONIA: CPT

## 2020-05-12 PROCEDURE — 96374 THER/PROPH/DIAG INJ IV PUSH: CPT

## 2020-05-12 PROCEDURE — 83540 ASSAY OF IRON: CPT

## 2020-05-12 PROCEDURE — 2580000003 HC RX 258: Performed by: FAMILY MEDICINE

## 2020-05-12 PROCEDURE — 85610 PROTHROMBIN TIME: CPT

## 2020-05-12 PROCEDURE — 6370000000 HC RX 637 (ALT 250 FOR IP): Performed by: FAMILY MEDICINE

## 2020-05-12 PROCEDURE — 99285 EMERGENCY DEPT VISIT HI MDM: CPT

## 2020-05-12 PROCEDURE — 80053 COMPREHEN METABOLIC PANEL: CPT

## 2020-05-12 PROCEDURE — 81001 URINALYSIS AUTO W/SCOPE: CPT

## 2020-05-12 PROCEDURE — 6370000000 HC RX 637 (ALT 250 FOR IP): Performed by: INTERNAL MEDICINE

## 2020-05-12 PROCEDURE — 36415 COLL VENOUS BLD VENIPUNCTURE: CPT

## 2020-05-12 PROCEDURE — 83880 ASSAY OF NATRIURETIC PEPTIDE: CPT

## 2020-05-12 PROCEDURE — 94761 N-INVAS EAR/PLS OXIMETRY MLT: CPT

## 2020-05-12 PROCEDURE — 6360000002 HC RX W HCPCS: Performed by: FAMILY MEDICINE

## 2020-05-12 PROCEDURE — 84484 ASSAY OF TROPONIN QUANT: CPT

## 2020-05-12 PROCEDURE — 6360000002 HC RX W HCPCS: Performed by: PHYSICIAN ASSISTANT

## 2020-05-12 PROCEDURE — 2700000000 HC OXYGEN THERAPY PER DAY

## 2020-05-12 PROCEDURE — 71045 X-RAY EXAM CHEST 1 VIEW: CPT

## 2020-05-12 RX ORDER — CARVEDILOL 6.25 MG/1
6.25 TABLET ORAL 2 TIMES DAILY WITH MEALS
Status: DISCONTINUED | OUTPATIENT
Start: 2020-05-12 | End: 2020-05-14 | Stop reason: HOSPADM

## 2020-05-12 RX ORDER — DULOXETIN HYDROCHLORIDE 30 MG/1
60 CAPSULE, DELAYED RELEASE ORAL DAILY
Status: DISCONTINUED | OUTPATIENT
Start: 2020-05-12 | End: 2020-05-14 | Stop reason: HOSPADM

## 2020-05-12 RX ORDER — PANTOPRAZOLE SODIUM 40 MG/1
40 TABLET, DELAYED RELEASE ORAL
Status: DISCONTINUED | OUTPATIENT
Start: 2020-05-13 | End: 2020-05-14 | Stop reason: HOSPADM

## 2020-05-12 RX ORDER — BUPROPION HYDROCHLORIDE 100 MG/1
100 TABLET, EXTENDED RELEASE ORAL 2 TIMES DAILY
Status: DISCONTINUED | OUTPATIENT
Start: 2020-05-12 | End: 2020-05-14 | Stop reason: HOSPADM

## 2020-05-12 RX ORDER — POLYETHYLENE GLYCOL 3350 17 G/17G
17 POWDER, FOR SOLUTION ORAL DAILY PRN
Status: DISCONTINUED | OUTPATIENT
Start: 2020-05-12 | End: 2020-05-14 | Stop reason: HOSPADM

## 2020-05-12 RX ORDER — NICOTINE POLACRILEX 4 MG
15 LOZENGE BUCCAL PRN
Status: DISCONTINUED | OUTPATIENT
Start: 2020-05-12 | End: 2020-05-14 | Stop reason: HOSPADM

## 2020-05-12 RX ORDER — LEVOTHYROXINE SODIUM 0.05 MG/1
50 TABLET ORAL DAILY
Status: DISCONTINUED | OUTPATIENT
Start: 2020-05-13 | End: 2020-05-14 | Stop reason: HOSPADM

## 2020-05-12 RX ORDER — BUDESONIDE AND FORMOTEROL FUMARATE DIHYDRATE 160; 4.5 UG/1; UG/1
2 AEROSOL RESPIRATORY (INHALATION) 2 TIMES DAILY
Status: DISCONTINUED | OUTPATIENT
Start: 2020-05-12 | End: 2020-05-14 | Stop reason: HOSPADM

## 2020-05-12 RX ORDER — SODIUM CHLORIDE 0.9 % (FLUSH) 0.9 %
10 SYRINGE (ML) INJECTION PRN
Status: DISCONTINUED | OUTPATIENT
Start: 2020-05-12 | End: 2020-05-14 | Stop reason: HOSPADM

## 2020-05-12 RX ORDER — CLOPIDOGREL BISULFATE 75 MG/1
75 TABLET ORAL DAILY
Status: DISCONTINUED | OUTPATIENT
Start: 2020-05-13 | End: 2020-05-14 | Stop reason: HOSPADM

## 2020-05-12 RX ORDER — DEXTROSE MONOHYDRATE 25 G/50ML
12.5 INJECTION, SOLUTION INTRAVENOUS PRN
Status: DISCONTINUED | OUTPATIENT
Start: 2020-05-12 | End: 2020-05-14 | Stop reason: HOSPADM

## 2020-05-12 RX ORDER — ACETAMINOPHEN 650 MG/1
650 SUPPOSITORY RECTAL EVERY 6 HOURS PRN
Status: DISCONTINUED | OUTPATIENT
Start: 2020-05-12 | End: 2020-05-14 | Stop reason: HOSPADM

## 2020-05-12 RX ORDER — ATORVASTATIN CALCIUM 80 MG/1
80 TABLET, FILM COATED ORAL NIGHTLY
Status: DISCONTINUED | OUTPATIENT
Start: 2020-05-12 | End: 2020-05-14 | Stop reason: HOSPADM

## 2020-05-12 RX ORDER — ISOSORBIDE MONONITRATE 30 MG/1
60 TABLET, EXTENDED RELEASE ORAL DAILY
Status: DISCONTINUED | OUTPATIENT
Start: 2020-05-12 | End: 2020-05-14 | Stop reason: HOSPADM

## 2020-05-12 RX ORDER — DOCUSATE SODIUM 100 MG/1
100 CAPSULE, LIQUID FILLED ORAL 2 TIMES DAILY
Status: DISCONTINUED | OUTPATIENT
Start: 2020-05-12 | End: 2020-05-14 | Stop reason: HOSPADM

## 2020-05-12 RX ORDER — HYDROCODONE BITARTRATE AND ACETAMINOPHEN 5; 325 MG/1; MG/1
2 TABLET ORAL EVERY 6 HOURS PRN
Status: DISCONTINUED | OUTPATIENT
Start: 2020-05-12 | End: 2020-05-14 | Stop reason: HOSPADM

## 2020-05-12 RX ORDER — RANOLAZINE 500 MG/1
500 TABLET, EXTENDED RELEASE ORAL 2 TIMES DAILY
Status: DISCONTINUED | OUTPATIENT
Start: 2020-05-12 | End: 2020-05-14 | Stop reason: HOSPADM

## 2020-05-12 RX ORDER — ASPIRIN 81 MG/1
81 TABLET, CHEWABLE ORAL DAILY
Status: DISCONTINUED | OUTPATIENT
Start: 2020-05-13 | End: 2020-05-14 | Stop reason: HOSPADM

## 2020-05-12 RX ORDER — ALBUTEROL SULFATE 90 UG/1
2 AEROSOL, METERED RESPIRATORY (INHALATION) EVERY 4 HOURS PRN
Status: DISCONTINUED | OUTPATIENT
Start: 2020-05-12 | End: 2020-05-14 | Stop reason: HOSPADM

## 2020-05-12 RX ORDER — HYDROXYZINE HYDROCHLORIDE 50 MG/ML
50 INJECTION, SOLUTION INTRAMUSCULAR ONCE
Status: COMPLETED | OUTPATIENT
Start: 2020-05-12 | End: 2020-05-12

## 2020-05-12 RX ORDER — ONDANSETRON 2 MG/ML
4 INJECTION INTRAMUSCULAR; INTRAVENOUS EVERY 6 HOURS PRN
Status: DISCONTINUED | OUTPATIENT
Start: 2020-05-12 | End: 2020-05-14 | Stop reason: HOSPADM

## 2020-05-12 RX ORDER — LACTULOSE 10 G/15ML
20 SOLUTION ORAL 3 TIMES DAILY
Status: DISCONTINUED | OUTPATIENT
Start: 2020-05-12 | End: 2020-05-14 | Stop reason: HOSPADM

## 2020-05-12 RX ORDER — SODIUM CHLORIDE 0.9 % (FLUSH) 0.9 %
10 SYRINGE (ML) INJECTION EVERY 12 HOURS SCHEDULED
Status: DISCONTINUED | OUTPATIENT
Start: 2020-05-12 | End: 2020-05-14 | Stop reason: HOSPADM

## 2020-05-12 RX ORDER — PROMETHAZINE HYDROCHLORIDE 25 MG/1
12.5 TABLET ORAL EVERY 6 HOURS PRN
Status: DISCONTINUED | OUTPATIENT
Start: 2020-05-12 | End: 2020-05-14 | Stop reason: HOSPADM

## 2020-05-12 RX ORDER — DEXTROSE MONOHYDRATE 50 MG/ML
100 INJECTION, SOLUTION INTRAVENOUS PRN
Status: DISCONTINUED | OUTPATIENT
Start: 2020-05-12 | End: 2020-05-14 | Stop reason: HOSPADM

## 2020-05-12 RX ORDER — ACETAMINOPHEN 325 MG/1
650 TABLET ORAL EVERY 6 HOURS PRN
Status: DISCONTINUED | OUTPATIENT
Start: 2020-05-12 | End: 2020-05-14 | Stop reason: HOSPADM

## 2020-05-12 RX ORDER — INSULIN GLARGINE 100 [IU]/ML
55 INJECTION, SOLUTION SUBCUTANEOUS NIGHTLY
Status: DISCONTINUED | OUTPATIENT
Start: 2020-05-12 | End: 2020-05-14 | Stop reason: HOSPADM

## 2020-05-12 RX ORDER — FUROSEMIDE 10 MG/ML
40 INJECTION INTRAMUSCULAR; INTRAVENOUS 2 TIMES DAILY
Status: DISCONTINUED | OUTPATIENT
Start: 2020-05-12 | End: 2020-05-14

## 2020-05-12 RX ADMIN — ENOXAPARIN SODIUM 40 MG: 40 INJECTION SUBCUTANEOUS at 20:38

## 2020-05-12 RX ADMIN — DULOXETINE 60 MG: 30 CAPSULE, DELAYED RELEASE ORAL at 20:30

## 2020-05-12 RX ADMIN — LACTULOSE 20 G: 10 SOLUTION ORAL at 20:30

## 2020-05-12 RX ADMIN — BUPROPION HYDROCHLORIDE 100 MG: 100 TABLET, EXTENDED RELEASE ORAL at 20:30

## 2020-05-12 RX ADMIN — ATORVASTATIN CALCIUM 80 MG: 80 TABLET, FILM COATED ORAL at 20:30

## 2020-05-12 RX ADMIN — LACTULOSE 20 G: 10 SOLUTION ORAL at 18:37

## 2020-05-12 RX ADMIN — SODIUM CHLORIDE, PRESERVATIVE FREE 10 ML: 5 INJECTION INTRAVENOUS at 20:31

## 2020-05-12 RX ADMIN — INSULIN GLARGINE 55 UNITS: 100 INJECTION, SOLUTION SUBCUTANEOUS at 20:38

## 2020-05-12 RX ADMIN — ISOSORBIDE MONONITRATE 60 MG: 30 TABLET, EXTENDED RELEASE ORAL at 20:37

## 2020-05-12 RX ADMIN — RANOLAZINE 500 MG: 500 TABLET, FILM COATED, EXTENDED RELEASE ORAL at 20:30

## 2020-05-12 RX ADMIN — FUROSEMIDE 40 MG: 10 INJECTION, SOLUTION INTRAMUSCULAR; INTRAVENOUS at 18:37

## 2020-05-12 RX ADMIN — HYDROXYZINE HYDROCHLORIDE 50 MG: 50 INJECTION, SOLUTION INTRAMUSCULAR at 20:24

## 2020-05-12 RX ADMIN — DOCUSATE SODIUM 100 MG: 100 CAPSULE, LIQUID FILLED ORAL at 20:30

## 2020-05-12 ASSESSMENT — PAIN DESCRIPTION - ORIENTATION: ORIENTATION: RIGHT;LOWER

## 2020-05-12 ASSESSMENT — PAIN DESCRIPTION - PAIN TYPE: TYPE: CHRONIC PAIN

## 2020-05-12 ASSESSMENT — PAIN SCALES - GENERAL: PAINLEVEL_OUTOF10: 10

## 2020-05-12 ASSESSMENT — PAIN DESCRIPTION - LOCATION: LOCATION: ARM;BACK

## 2020-05-12 NOTE — CONSULTS
BID     Continuous Infusions:  PRN Meds:. Allergies:  Augmentin [amoxicillin-pot clavulanate]    Social History:   TOBACCO:   reports that she has been smoking cigarettes. She has a 34.00 pack-year smoking history. She has never used smokeless tobacco.  ETOH:   reports no history of alcohol use. OCCUPATION:      Family History:       Problem Relation Age of Onset    Arthritis Mother     Diabetes Mother     Heart Disease Mother     High Blood Pressure Mother     Arthritis Father     Heart Disease Father     High Blood Pressure Father     Stroke Father     Substance Abuse Father     Substance Abuse Brother     Diabetes Maternal Aunt     Diabetes Maternal Uncle     Cancer Maternal Grandmother     Diabetes Maternal Grandmother     Diabetes Maternal Grandfather        REVIEW OF SYSTEMS:  Negative except for weak sob edema GABRIEL. Physical Exam:    Vitals: BP (!) 163/69   Pulse 100   Temp 98.6 °F (37 °C) (Oral)   Resp 18   SpO2 100%   General appearance: awake weak  HEENT: Head: Normal, normocephalic, atraumatic. Neck: supple, symmetrical, trachea midline  Lungs: diminished breath sounds bilaterally  Heart: S1, S2 normal  Abdomen: abnormal findings:  hypoactive bowel sounds obese  Extremities: edema+  Neurologic: Mental status: alertness: awake    CBC:   Recent Labs     05/12/20  1550   WBC 11.0*   HGB 8.6*        BMP:    Recent Labs     05/12/20  1550      K 4.8      CO2 28   BUN 40*   CREATININE 1.5*   GLUCOSE 227*     Hepatic:   Recent Labs     05/12/20  1550   AST 10*   ALT 11   BILITOT 0.3   ALKPHOS 129*     Troponin: No results for input(s): TROPONINI in the last 72 hours. Mg, Phos: No results for input(s): MG, PHOS in the last 72 hours.     ABGs:   Lab Results   Component Value Date    PO2ART 95 02/20/2020    EWJ0WVR 50.0 02/20/2020     INR:   Recent Labs     05/12/20  1550   INR 1.03     -----------------------------------------------------------------      Assessment and

## 2020-05-12 NOTE — ED PROVIDER NOTES
12 lead EKG per my interpretation:  Sinus Tachycardia 102  Axis is   Normal  QTc is  456  There is specific T wave changes appreciated. Inverted T wave III  There is no specific ST wave changes appreciated.     Prior EKG to compare with was not available      Sandie Field DO  05/12/20 9165
(RANEXA) 500 MG extended release tablet Take 1 tablet by mouth 2 times daily 60 tablet 3    BREO ELLIPTA 100-25 MCG/INH AEPB inhaler Inhale 1 puff into the lungs daily  0    levothyroxine (SYNTHROID) 50 MCG tablet Take 50 mcg by mouth daily  3    insulin glargine (LANTUS SOLOSTAR) 100 UNIT/ML injection pen Inject 55 Units into the skin nightly 5 pen 1    linagliptin (TRADJENTA) 5 MG tablet Take 1 tablet by mouth daily 30 tablet 3    buPROPion (WELLBUTRIN SR) 100 MG extended release tablet Take 100 mg by mouth 2 times daily      aspirin 81 MG chewable tablet Take 1 tablet by mouth daily 30 tablet 0    DULoxetine (CYMBALTA) 60 MG extended release capsule Take 1 capsule by mouth daily 30 capsule 3    docusate (COLACE, DULCOLAX) 100 MG CAPS Take 100 mg by mouth 2 times daily 60 capsule 1    pantoprazole (PROTONIX) 40 MG tablet Take 1 tablet by mouth every morning (before breakfast) 30 tablet 3    albuterol sulfate HFA (VENTOLIN HFA) 108 (90 Base) MCG/ACT inhaler Inhale 2 puffs into the lungs every 4 hours as needed for Wheezing 1 Inhaler 3       ALLERGIES    Allergies   Allergen Reactions    Augmentin [Amoxicillin-Pot Clavulanate] Diarrhea       SOCIAL AND FAMILY HISTORY    Social History     Socioeconomic History    Marital status: Single     Spouse name: None    Number of children: None    Years of education: None    Highest education level: None   Occupational History    None   Social Needs    Financial resource strain: None    Food insecurity     Worry: None     Inability: None    Transportation needs     Medical: None     Non-medical: None   Tobacco Use    Smoking status: Current Every Day Smoker     Packs/day: 1.00     Years: 34.00     Pack years: 34.00     Types: Cigarettes    Smokeless tobacco: Never Used   Substance and Sexual Activity    Alcohol use: No     Alcohol/week: 0.0 standard drinks    Drug use: No    Sexual activity: None   Lifestyle    Physical activity     Days per week:

## 2020-05-12 NOTE — H&P
History and Physical      Name:  Tran Isaac /Age/Sex: 1960  (61 y.o. female)   MRN & CSN:  [id number] & [id number] Admission Date/Time: 2020  3:13 PM   Location:  ED26/ED-26 PCP: Amando Schwartz MD       Tran Isaac is a 61 y.o.  female  who presents with Abdominal Pain (swelling, previous paracentesis) and Shortness of Breath      Assessment and Plan:   Acute on Chronic Systolic CHF  - IV Lasix BID  - check CXR  - strict I and O  - low sodium diet  - Echo: ef 40 to 45%   - no ace due to CKD  - nephro for diuretic mx    Abd Pain with hx of Ascites  - check ct abd/eplvis to make sure no fluid that needs tap    Hyperammonemia  - lactulose TID  - re-check in am    Debility/weakness  - likely needs SNF  - pt/ot    CKD - stable, nephro following  COPD  HTN        Diet No diet orders on file   Code Status Prior     Medications:   Medications:    lactulose  20 g Oral TID    furosemide  40 mg Intravenous BID      Infusions:   PRN Meds:      Current Facility-Administered Medications:     lactulose (CHRONULAC) 10 GM/15ML solution 20 g, 20 g, Oral, TID, Mandeep Wynn MD    furosemide (LASIX) injection 40 mg, 40 mg, Intravenous, BID, Mandeep Wynn MD    Current Outpatient Medications:     ipratropium-albuterol (DUONEB) 0.5-2.5 (3) MG/3ML SOLN nebulizer solution, Inhale 3 mLs into the lungs every 4 hours, Disp: 360 mL, Rfl: 1    torsemide (DEMADEX) 20 MG tablet, Take 1 tablet by mouth daily, Disp: 30 tablet, Rfl: 3    carvedilol (COREG) 6.25 MG tablet, Take 1 tablet by mouth 2 times daily (with meals), Disp: 60 tablet, Rfl: 3    tiotropium (SPIRIVA RESPIMAT) 2.5 MCG/ACT AERS inhaler, Inhale 2 puffs into the lungs daily, Disp: 2 Inhaler, Rfl: 3    ondansetron (ZOFRAN ODT) 4 MG disintegrating tablet, Take 1 tablet by mouth every 8 hours as needed for Nausea, Disp: 15 tablet, Rfl: 0    clopidogrel (PLAVIX) 75 MG tablet, Take 1 tablet by mouth daily, Disp: 30 tablet, Rfl: 3    lactulose pantoprazole (PROTONIX) 40 MG tablet, Take 1 tablet by mouth every morning (before breakfast), Disp: 30 tablet, Rfl: 3    albuterol sulfate HFA (VENTOLIN HFA) 108 (90 Base) MCG/ACT inhaler, Inhale 2 puffs into the lungs every 4 hours as needed for Wheezing, Disp: 1 Inhaler, Rfl: 3    History of present illness     Chief Complaint: Abdominal Pain (swelling, previous paracentesis) and Shortness of Breath      Brenda Chester is a 61 y.o.  female  who presents with abd pain, SOB and LE edema. Pt has hx of CHF, CKD and DM. Pt was discharged recently for the same thing. She reports not moving around much at home and getting more SOB. She denies any fevers or cough, she refuses to go to SNF but likely needs it. She thinks she was discharged too soon last time. Review of Systems : Ten point ROS reviewed and negative, unless as noted above per HPI       Objective:   No intake or output data in the 24 hours ending 05/12/20 1716   Vitals:   Vitals:    05/12/20 1518   BP: (!) 163/69   Pulse:    Resp:    Temp:    SpO2:      Physical Exam:   Gen:  awake, alert, no apparent distress  Head/Eyes:  Normocephalic atraumatic, EOMI   NECK:   symmetrical, trachea midline  LUNGS: Normal Effort   CARDIOVASCULAR:  Normal rate, trace pedal edema b/l  ABDOMEN:  non distended, mild tender  MUSCULOSKELETAL:  ROM WNL  NEUROLOGIC: Alert and Oriented,  Cranial nerves II-XII are grossly intact.    SKIN:  no bruising or bleeding, normal skin color,  no redness      Past Medical History:      Past Medical History:   Diagnosis Date    Acute on chronic systolic CHF (congestive heart failure) ([address] 2/22/2020    CAD (coronary artery disease)     CKD (chronic kidney disease) stage 3, GFR 30-59 ml/min ([facility])     COPD with acute exacerbation ([address] 2/23/2020    Diabetes type 2, uncontrolled ([address]     Diastolic heart failure ([address]     Essential hypertension     Financial problems 3/22/2013    Generalized anxiety disorder 1/8/2018    Herpes

## 2020-05-13 ENCOUNTER — APPOINTMENT (OUTPATIENT)
Dept: CT IMAGING | Age: 60
DRG: 291 | End: 2020-05-13
Payer: MEDICARE

## 2020-05-13 LAB
AMMONIA: 42 UMOL/L (ref 11–51)
ANION GAP SERPL CALCULATED.3IONS-SCNC: 10 MMOL/L (ref 4–16)
BUN BLDV-MCNC: 37 MG/DL (ref 6–23)
CALCIUM SERPL-MCNC: 9.3 MG/DL (ref 8.3–10.6)
CHLORIDE BLD-SCNC: 103 MMOL/L (ref 99–110)
CHOLESTEROL: 117 MG/DL
CO2: 28 MMOL/L (ref 21–32)
CREAT SERPL-MCNC: 1.5 MG/DL (ref 0.6–1.1)
FERRITIN: 77 NG/ML (ref 15–150)
GFR AFRICAN AMERICAN: 43 ML/MIN/1.73M2
GFR NON-AFRICAN AMERICAN: 36 ML/MIN/1.73M2
GLUCOSE BLD-MCNC: 111 MG/DL (ref 70–99)
GLUCOSE BLD-MCNC: 118 MG/DL (ref 70–99)
GLUCOSE BLD-MCNC: 122 MG/DL (ref 70–99)
GLUCOSE BLD-MCNC: 139 MG/DL (ref 70–99)
GLUCOSE BLD-MCNC: 153 MG/DL (ref 70–99)
HDLC SERPL-MCNC: 39 MG/DL
LDL CHOLESTEROL DIRECT: 61 MG/DL
MAGNESIUM: 2.2 MG/DL (ref 1.8–2.4)
POTASSIUM SERPL-SCNC: 4.9 MMOL/L (ref 3.5–5.1)
SODIUM BLD-SCNC: 141 MMOL/L (ref 135–145)
TRIGL SERPL-MCNC: 158 MG/DL
TROPONIN T: 0.05 NG/ML

## 2020-05-13 PROCEDURE — 87077 CULTURE AEROBIC IDENTIFY: CPT

## 2020-05-13 PROCEDURE — 71250 CT THORAX DX C-: CPT

## 2020-05-13 PROCEDURE — 87086 URINE CULTURE/COLONY COUNT: CPT

## 2020-05-13 PROCEDURE — 94761 N-INVAS EAR/PLS OXIMETRY MLT: CPT

## 2020-05-13 PROCEDURE — 83721 ASSAY OF BLOOD LIPOPROTEIN: CPT

## 2020-05-13 PROCEDURE — 6370000000 HC RX 637 (ALT 250 FOR IP): Performed by: FAMILY MEDICINE

## 2020-05-13 PROCEDURE — 93010 ELECTROCARDIOGRAM REPORT: CPT | Performed by: INTERNAL MEDICINE

## 2020-05-13 PROCEDURE — 80061 LIPID PANEL: CPT

## 2020-05-13 PROCEDURE — 82962 GLUCOSE BLOOD TEST: CPT

## 2020-05-13 PROCEDURE — 2580000003 HC RX 258: Performed by: FAMILY MEDICINE

## 2020-05-13 PROCEDURE — 83735 ASSAY OF MAGNESIUM: CPT

## 2020-05-13 PROCEDURE — 94640 AIRWAY INHALATION TREATMENT: CPT

## 2020-05-13 PROCEDURE — 84484 ASSAY OF TROPONIN QUANT: CPT

## 2020-05-13 PROCEDURE — 2140000000 HC CCU INTERMEDIATE R&B

## 2020-05-13 PROCEDURE — 2700000000 HC OXYGEN THERAPY PER DAY

## 2020-05-13 PROCEDURE — 80048 BASIC METABOLIC PNL TOTAL CA: CPT

## 2020-05-13 PROCEDURE — 82140 ASSAY OF AMMONIA: CPT

## 2020-05-13 PROCEDURE — 6360000002 HC RX W HCPCS: Performed by: FAMILY MEDICINE

## 2020-05-13 PROCEDURE — 82728 ASSAY OF FERRITIN: CPT

## 2020-05-13 PROCEDURE — 87186 SC STD MICRODIL/AGAR DIL: CPT

## 2020-05-13 RX ADMIN — BUPROPION HYDROCHLORIDE 100 MG: 100 TABLET, EXTENDED RELEASE ORAL at 10:18

## 2020-05-13 RX ADMIN — DULOXETINE 60 MG: 30 CAPSULE, DELAYED RELEASE ORAL at 20:16

## 2020-05-13 RX ADMIN — SODIUM CHLORIDE, PRESERVATIVE FREE 10 ML: 5 INJECTION INTRAVENOUS at 10:19

## 2020-05-13 RX ADMIN — HYDROCODONE BITARTRATE AND ACETAMINOPHEN 2 TABLET: 5; 325 TABLET ORAL at 02:50

## 2020-05-13 RX ADMIN — FUROSEMIDE 40 MG: 10 INJECTION, SOLUTION INTRAMUSCULAR; INTRAVENOUS at 17:07

## 2020-05-13 RX ADMIN — BUPROPION HYDROCHLORIDE 100 MG: 100 TABLET, EXTENDED RELEASE ORAL at 20:17

## 2020-05-13 RX ADMIN — BUDESONIDE AND FORMOTEROL FUMARATE DIHYDRATE 2 PUFF: 160; 4.5 AEROSOL RESPIRATORY (INHALATION) at 20:00

## 2020-05-13 RX ADMIN — CLOPIDOGREL BISULFATE 75 MG: 75 TABLET ORAL at 10:18

## 2020-05-13 RX ADMIN — PANTOPRAZOLE SODIUM 40 MG: 40 TABLET, DELAYED RELEASE ORAL at 06:24

## 2020-05-13 RX ADMIN — RANOLAZINE 500 MG: 500 TABLET, FILM COATED, EXTENDED RELEASE ORAL at 20:17

## 2020-05-13 RX ADMIN — Medication 3000 UNITS: at 10:27

## 2020-05-13 RX ADMIN — SODIUM CHLORIDE, PRESERVATIVE FREE 10 ML: 5 INJECTION INTRAVENOUS at 20:16

## 2020-05-13 RX ADMIN — RANOLAZINE 500 MG: 500 TABLET, FILM COATED, EXTENDED RELEASE ORAL at 10:18

## 2020-05-13 RX ADMIN — ATORVASTATIN CALCIUM 80 MG: 80 TABLET, FILM COATED ORAL at 20:17

## 2020-05-13 RX ADMIN — LACTULOSE 20 G: 10 SOLUTION ORAL at 20:16

## 2020-05-13 RX ADMIN — TIOTROPIUM BROMIDE INHALATION SPRAY 2 PUFF: 3.12 SPRAY, METERED RESPIRATORY (INHALATION) at 09:06

## 2020-05-13 RX ADMIN — LACTULOSE 20 G: 10 SOLUTION ORAL at 10:17

## 2020-05-13 RX ADMIN — DOCUSATE SODIUM 100 MG: 100 CAPSULE, LIQUID FILLED ORAL at 10:18

## 2020-05-13 RX ADMIN — LEVOTHYROXINE SODIUM 50 MCG: 50 TABLET ORAL at 06:24

## 2020-05-13 RX ADMIN — FUROSEMIDE 40 MG: 10 INJECTION, SOLUTION INTRAMUSCULAR; INTRAVENOUS at 10:19

## 2020-05-13 RX ADMIN — CARVEDILOL 6.25 MG: 6.25 TABLET, FILM COATED ORAL at 10:18

## 2020-05-13 RX ADMIN — CEFTRIAXONE SODIUM 1 G: 1 INJECTION, POWDER, FOR SOLUTION INTRAMUSCULAR; INTRAVENOUS at 10:28

## 2020-05-13 RX ADMIN — LACTULOSE 20 G: 10 SOLUTION ORAL at 17:07

## 2020-05-13 RX ADMIN — BUDESONIDE AND FORMOTEROL FUMARATE DIHYDRATE 2 PUFF: 160; 4.5 AEROSOL RESPIRATORY (INHALATION) at 09:00

## 2020-05-13 RX ADMIN — ASPIRIN 81 MG 81 MG: 81 TABLET ORAL at 10:18

## 2020-05-13 RX ADMIN — ISOSORBIDE MONONITRATE 60 MG: 30 TABLET, EXTENDED RELEASE ORAL at 10:18

## 2020-05-13 RX ADMIN — DOCUSATE SODIUM 100 MG: 100 CAPSULE, LIQUID FILLED ORAL at 20:16

## 2020-05-13 RX ADMIN — ENOXAPARIN SODIUM 40 MG: 40 INJECTION SUBCUTANEOUS at 10:17

## 2020-05-13 RX ADMIN — HYDROCODONE BITARTRATE AND ACETAMINOPHEN 2 TABLET: 5; 325 TABLET ORAL at 20:17

## 2020-05-13 RX ADMIN — CARVEDILOL 6.25 MG: 6.25 TABLET, FILM COATED ORAL at 17:07

## 2020-05-13 ASSESSMENT — PAIN DESCRIPTION - PAIN TYPE: TYPE: CHRONIC PAIN

## 2020-05-13 ASSESSMENT — PAIN SCALES - GENERAL
PAINLEVEL_OUTOF10: 5
PAINLEVEL_OUTOF10: 10

## 2020-05-13 ASSESSMENT — PAIN DESCRIPTION - LOCATION: LOCATION: ARM;BACK

## 2020-05-13 ASSESSMENT — PAIN DESCRIPTION - ORIENTATION: ORIENTATION: RIGHT;LOWER;UPPER

## 2020-05-13 NOTE — PROGRESS NOTES
Family updated. They stated pt stays up most of the night on the computer playing and sleeps very deeply in the afternoon.
stage 3 A3- - mainly now from CRS type 1- UA has alb and some wbc - but no  Dysuria -  But I think AIN less likely   2. ADHF with underlying ischemic CMP  3. DM/ ASCVD/ cor pulmonale / RHF and morbid obesity     Recommendation/Plan  :     1. Her problem is she is very susceptible  to loop - it does work but due to her RHF ( I think ) and low LVEF she omer snot have timely - capillary refill- and cr goes high with diuretics- so we have to find the \"happy  Sweet spot ' if we can   2. Good BS control  3. keep loop but daily wt- UOP and ddailycr   4. See how she does  5. If she is ready to go to ECF- d/C with torsemide 20 BID- BMP , mg weekly x 2   6. F/U with me in 2 wks    7. She did not tolerate the  [place]- cardioprotective /meds before   8.        Oz Gilliam MD

## 2020-05-13 NOTE — CARE COORDINATION
Chart reviewed. Pt seen in ED by ED/CM for d/c planning. [facility] Please notify [facility] upon discharge, [phone], they will provide fax number to send [facility] order & AVS.  CM will continue to follow. Please notify CM if any new d/c needs arise.   TE

## 2020-05-14 VITALS
WEIGHT: 293 LBS | HEIGHT: 64 IN | BODY MASS INDEX: 50.02 KG/M2 | TEMPERATURE: 98 F | OXYGEN SATURATION: 98 % | SYSTOLIC BLOOD PRESSURE: 124 MMHG | RESPIRATION RATE: 14 BRPM | DIASTOLIC BLOOD PRESSURE: 89 MMHG | HEART RATE: 94 BPM

## 2020-05-14 LAB
ANION GAP SERPL CALCULATED.3IONS-SCNC: 9 MMOL/L (ref 4–16)
BUN BLDV-MCNC: 40 MG/DL (ref 6–23)
CALCIUM SERPL-MCNC: 8.7 MG/DL (ref 8.3–10.6)
CHLORIDE BLD-SCNC: 103 MMOL/L (ref 99–110)
CO2: 28 MMOL/L (ref 21–32)
CREAT SERPL-MCNC: 1.8 MG/DL (ref 0.6–1.1)
GFR AFRICAN AMERICAN: 35 ML/MIN/1.73M2
GFR NON-AFRICAN AMERICAN: 29 ML/MIN/1.73M2
GLUCOSE BLD-MCNC: 150 MG/DL (ref 70–99)
GLUCOSE BLD-MCNC: 176 MG/DL (ref 70–99)
MAGNESIUM: 2.1 MG/DL (ref 1.8–2.4)
POTASSIUM SERPL-SCNC: 4.8 MMOL/L (ref 3.5–5.1)
SODIUM BLD-SCNC: 140 MMOL/L (ref 135–145)

## 2020-05-14 PROCEDURE — 80048 BASIC METABOLIC PNL TOTAL CA: CPT

## 2020-05-14 PROCEDURE — 94761 N-INVAS EAR/PLS OXIMETRY MLT: CPT

## 2020-05-14 PROCEDURE — 94640 AIRWAY INHALATION TREATMENT: CPT

## 2020-05-14 PROCEDURE — 2580000003 HC RX 258: Performed by: FAMILY MEDICINE

## 2020-05-14 PROCEDURE — 6370000000 HC RX 637 (ALT 250 FOR IP): Performed by: FAMILY MEDICINE

## 2020-05-14 PROCEDURE — 6360000002 HC RX W HCPCS: Performed by: INTERNAL MEDICINE

## 2020-05-14 PROCEDURE — 2700000000 HC OXYGEN THERAPY PER DAY

## 2020-05-14 PROCEDURE — 6360000002 HC RX W HCPCS: Performed by: FAMILY MEDICINE

## 2020-05-14 PROCEDURE — 36415 COLL VENOUS BLD VENIPUNCTURE: CPT

## 2020-05-14 PROCEDURE — 82962 GLUCOSE BLOOD TEST: CPT

## 2020-05-14 PROCEDURE — 83735 ASSAY OF MAGNESIUM: CPT

## 2020-05-14 RX ORDER — TORSEMIDE 20 MG/1
20 TABLET ORAL 2 TIMES DAILY
Qty: 60 TABLET | Refills: 3 | Status: SHIPPED | OUTPATIENT
Start: 2020-05-14 | End: 2020-07-20 | Stop reason: DRUGHIGH

## 2020-05-14 RX ORDER — FUROSEMIDE 10 MG/ML
20 INJECTION INTRAMUSCULAR; INTRAVENOUS 2 TIMES DAILY
Status: DISCONTINUED | OUTPATIENT
Start: 2020-05-14 | End: 2020-05-14 | Stop reason: HOSPADM

## 2020-05-14 RX ORDER — CIPROFLOXACIN 500 MG/1
500 TABLET, FILM COATED ORAL 2 TIMES DAILY
Qty: 14 TABLET | Refills: 0 | Status: SHIPPED | OUTPATIENT
Start: 2020-05-14 | End: 2020-05-21

## 2020-05-14 RX ADMIN — Medication 3000 UNITS: at 09:09

## 2020-05-14 RX ADMIN — CEFTRIAXONE SODIUM 1 G: 1 INJECTION, POWDER, FOR SOLUTION INTRAMUSCULAR; INTRAVENOUS at 10:48

## 2020-05-14 RX ADMIN — INSULIN LISPRO 1 UNITS: 100 INJECTION, SOLUTION INTRAVENOUS; SUBCUTANEOUS at 09:08

## 2020-05-14 RX ADMIN — HYDROCODONE BITARTRATE AND ACETAMINOPHEN 2 TABLET: 5; 325 TABLET ORAL at 10:48

## 2020-05-14 RX ADMIN — SODIUM CHLORIDE, PRESERVATIVE FREE 10 ML: 5 INJECTION INTRAVENOUS at 09:11

## 2020-05-14 RX ADMIN — LEVOTHYROXINE SODIUM 50 MCG: 50 TABLET ORAL at 05:08

## 2020-05-14 RX ADMIN — TIOTROPIUM BROMIDE INHALATION SPRAY 2 PUFF: 3.12 SPRAY, METERED RESPIRATORY (INHALATION) at 10:52

## 2020-05-14 RX ADMIN — BUDESONIDE AND FORMOTEROL FUMARATE DIHYDRATE 2 PUFF: 160; 4.5 AEROSOL RESPIRATORY (INHALATION) at 10:50

## 2020-05-14 RX ADMIN — ENOXAPARIN SODIUM 40 MG: 40 INJECTION SUBCUTANEOUS at 09:09

## 2020-05-14 RX ADMIN — PANTOPRAZOLE SODIUM 40 MG: 40 TABLET, DELAYED RELEASE ORAL at 05:08

## 2020-05-14 RX ADMIN — LACTULOSE 20 G: 10 SOLUTION ORAL at 09:09

## 2020-05-14 RX ADMIN — ISOSORBIDE MONONITRATE 60 MG: 30 TABLET, EXTENDED RELEASE ORAL at 09:09

## 2020-05-14 RX ADMIN — HYDROCODONE BITARTRATE AND ACETAMINOPHEN 2 TABLET: 5; 325 TABLET ORAL at 04:01

## 2020-05-14 RX ADMIN — RANOLAZINE 500 MG: 500 TABLET, FILM COATED, EXTENDED RELEASE ORAL at 09:09

## 2020-05-14 RX ADMIN — CLOPIDOGREL BISULFATE 75 MG: 75 TABLET ORAL at 09:09

## 2020-05-14 RX ADMIN — BUPROPION HYDROCHLORIDE 100 MG: 100 TABLET, EXTENDED RELEASE ORAL at 09:09

## 2020-05-14 RX ADMIN — DOCUSATE SODIUM 100 MG: 100 CAPSULE, LIQUID FILLED ORAL at 09:09

## 2020-05-14 RX ADMIN — CARVEDILOL 6.25 MG: 6.25 TABLET, FILM COATED ORAL at 09:09

## 2020-05-14 RX ADMIN — FUROSEMIDE 20 MG: 10 INJECTION, SOLUTION INTRAVENOUS at 09:07

## 2020-05-14 RX ADMIN — ASPIRIN 81 MG 81 MG: 81 TABLET ORAL at 09:09

## 2020-05-14 ASSESSMENT — PAIN DESCRIPTION - LOCATION
LOCATION: BACK
LOCATION: BACK
LOCATION: BACK;ARM

## 2020-05-14 ASSESSMENT — PAIN DESCRIPTION - ORIENTATION
ORIENTATION: LOWER
ORIENTATION: LOWER
ORIENTATION: RIGHT;LEFT

## 2020-05-14 ASSESSMENT — PAIN DESCRIPTION - PAIN TYPE
TYPE: CHRONIC PAIN

## 2020-05-14 ASSESSMENT — PAIN SCALES - GENERAL
PAINLEVEL_OUTOF10: 9
PAINLEVEL_OUTOF10: 10
PAINLEVEL_OUTOF10: 9
PAINLEVEL_OUTOF10: 5

## 2020-05-14 NOTE — DISCHARGE SUMMARY
Johnson Mosley 1960 [id number]  PCP:  Harrie Schwab, MD    Admit date: 5/12/2020  Admitting Physician: Harish Hayden MD    Discharge date: 5/14/2020 Discharge Physician: Harish Hayden MD         Hospital Course and Discharge Diagnoses Include:    Acute on Chronic Systolic CHF  - neg 0.7D  - IV Lasix BID, changed to torsemide 20mg Oral BID per nephro  - strict I and O  - low sodium diet  - Echo: ef 40 to 45%   - no ace due to CKD  - nephro for diuretic mx, okay for d/c     Abd Pain with hx of Ascites  -stable  - check ct abd/pelvis: no ascites noted     Nodular opacities in RUL and RML  - non-specific, needs repeat CT in 3 months to f/u, I d/w her.     Hyperammonemia  -resolved  - lactulose TID     Debility/weakness  - likely needs SNF, pt refuses and wants HHC  - pt/ot    UTI  - Rocephin changed to oral cipro  - urine cx: + coliform sens pending     CKD - stable, nephro following  COPD  HTN        Physical Exam on Discharge date: 05/14/20  Gen:  awake, alert, no apparent distress  Head/Eyes:  Normocephalic atraumatic, EOMI   NECK:   symmetrical, trachea midline  LUNGS: Normal Effort   CARDIOVASCULAR:  Normal rate  ABDOMEN:  non distended  MUSCULOSKELETAL:  ROM limited  NEUROLOGIC: Alert and Oriented,  Cranial nerves II-XII are grossly intact. SKIN:  no bruising or bleeding, normal skin color,  no redness    Procedures:  See above  Ct Abdomen Pelvis Wo Contrast Additional Contrast? None    Result Date: 5/12/2020  EXAMINATION: CT OF THE ABDOMEN AND PELVIS WITHOUT CONTRAST 5/12/2020 10:24 pm TECHNIQUE: CT of the abdomen and pelvis was performed without the administration of intravenous contrast. Multiplanar reformatted images are provided for review. Dose modulation, iterative reconstruction, and/or weight based adjustment of the mA/kV was utilized to reduce the radiation dose to as low as reasonably achievable.  COMPARISON: 01/06/2020 HISTORY: ORDERING SYSTEM PROVIDED HISTORY: abd pain, check for ascites TECHNOLOGIST PROVIDED HISTORY: Reason for exam:->abd pain, check for ascites Additional Contrast?->None Reason for Exam: abd pain, check for ascites Acuity: Acute Type of Exam: Initial Additional signs and symptoms: surg. hx; gallbladder. Relevant Medical/Surgical History: none FINDINGS: Lower Chest: Mild left-greater-than-right bibasilar and parenchymal disease. 12 mm focal subpleural parenchymal opacity in the middle lobe (axial image 2). Heart size is within normal limits. Three-vessel calcific coronary artery disease. Organs: The gallbladder is surgically absent. The liver, spleen, adrenal glands and kidneys are grossly normal with the limitations of a noncontrast study. Small bilateral renal vascular calcifications. Pancreas is atrophic. GI/Bowel: There is a mild to moderate stool load in the colon extending to the rectum. Small bowel loops are normal in caliber. Surgical clips in the right lower quadrant, possibly related to prior appendectomy. The appendix is not with certainty visualized as a discrete structure. No bowel obstruction. Pelvis: The urinary bladder is decompressed by Reyes catheter. Atrophic uterus. Peritoneum/Retroperitoneum: There is no adenopathy, free air or free fluid. Atherosclerotic calcifications in the abdomen and pelvis. No evidence of an aneurysm. Bones/Soft Tissues: Obesity. Bilateral flank edema. There is subcutaneous edema particularly in the panniculus with overlying skin thickening. Multilevel degenerative changes particularly degenerative disc disease at L2-L3. No acute bone finding. No evidence of ascites. Mild left-greater-than-right bibasilar pleural and parenchymal disease. 12 mm subpleural focal parenchymal opacity in the middle lobe. Follow-up recommendation as below. Three-vessel calcific coronary artery disease. Mild to moderate stool load suggests constipation. Obesity. Bilateral flank edema and panniculus edema.  RECOMMENDATIONS: 12.0 mm solid pulmonary nodule detected on incomplete chest CT. Recommend immediate non-contrast Chest CT for further evaluation. These guidelines do not apply to immunocompromised patients and patients with cancer. Follow up in patients with significant comorbidities as clinically warranted. For lung cancer screening, adhere to Lung-RADS guidelines. Reference: Radiology. 2017; 284(1):228-31. Ct Chest Wo Contrast    Result Date: 5/13/2020  EXAMINATION: CT OF THE CHEST WITHOUT CONTRAST 5/13/2020 12:11 pm TECHNIQUE: CT of the chest was performed without the administration of intravenous contrast. Multiplanar reformatted images are provided for review. Dose modulation, iterative reconstruction, and/or weight based adjustment of the mA/kV was utilized to reduce the radiation dose to as low as reasonably achievable. COMPARISON: CT abdomen 05/12/2020, CT chest 11/24/2019 HISTORY: ORDERING SYSTEM PROVIDED HISTORY: assess 12mm nodule noted in lung on CT abd/pelvis TECHNOLOGIST PROVIDED HISTORY: Reason for exam:->assess 12mm nodule noted in lung on CT abd/pelvis Reason for Exam: assess 12mm nodule noted in lung on CT abd/pelvis Acuity: Acute Type of Exam: Initial Additional signs and symptoms: unable to follow commands Relevant Medical/Surgical History: poor historian FINDINGS: Mediastinum: Stable mildly enlarged subcarinal lymph node measuring up to 1.1 cm. No new pathologically enlarged thoracic adenopathy. Stable cardiomegaly. No pericardial effusion. The thoracic aorta is normal caliber with mild-to-moderate atherosclerosis. The main pulmonary artery is minimally dilated measuring up to 3.1 cm. Extensive coronary arterial calcifications. The esophagus is unremarkable. Lungs/pleura: Small left and trace right pleural effusions with adjacent atelectasis. Evaluation of the lungs is otherwise mild to moderately limited due to motion artifact.   Again identified is a 12 x 10 mm nodular opacity in the anterior right middle lobe (image 48). There is a subtle 6 mm ground-glass nodule in the right middle lobe as well (image 54). There is a part solid nodule in the right apex measuring 15 x 9 mm (image 16). These are new since 11/24/2019. No other focal consolidation or pneumothorax. The central airways are otherwise patent. Upper Abdomen: No acute findings in the upper abdomen. Recommend correlation with CT abdomen 05/12/2020 for full details. Soft Tissues/Bones: No acute findings in the bones or soft tissues. Mild degenerative changes of the thoracic spine. 1. New nodular opacities within the right upper and middle lobes, the largest measuring up to 15 x 9 mm in the right apex. These are nonspecific and could be related to infection, inflammation or neoplastic process. Recommend follow-up to ensure complete resolution in 3 months. 2. Small left and trace right pleural effusions with adjacent atelectasis. Xr Chest Portable    Result Date: 5/12/2020  EXAMINATION: ONE XRAY VIEW OF THE CHEST 5/12/2020 5:11 pm COMPARISON: Chest x-ray May 4, 2020 HISTORY: ORDERING SYSTEM PROVIDED HISTORY: dyspnea TECHNOLOGIST PROVIDED HISTORY: Reason for exam:->dyspnea Reason for Exam:  dyspnea Acuity: Acute Type of Exam: Initial Additional signs and symptoms: na Relevant Medical/Surgical History: diabetes, cad, copd FINDINGS: Cardiac silhouette is enlarged but stable. There is central pulmonary vascular congestion and mild interstitial opacities throughout the lungs with findings overall appear grossly stable when compared with exam from May 4, 2020 and May 1, 2020. No large effusion or pneumothorax. Atherosclerotic changes of the aorta. Osseous structures appear intact. 1. Stable cardiomegaly with mild pulmonary vascular congestion. No overt pulmonary edema identified. Stable examination compared with radiographs from May 4, 2020 and May 1, 2020.      Xr Chest Portable    Result Date: 5/4/2020  EXAMINATION: ONE XRAY VIEW OF THE every morning (before breakfast)     ranolazine 500 MG extended release tablet  Commonly known as:  RANEXA  Take 1 tablet by mouth 2 times daily     tiotropium 2.5 MCG/ACT Aers inhaler  Commonly known as:  Spiriva Respimat  Inhale 2 puffs into the lungs daily     vitamin D 25 MCG (1000 UT) Caps  Take 3 capsules by mouth daily           Where to Get Your Medications      These medications were sent to [address], [address] [place] [address]    Phone:  [phone]   · ciprofloxacin 500 MG tablet  · torsemide 20 MG tablet            Code Status: Full Code     Consults:   None    Diet: diabetic diet    Activity: activity as tolerated   Work:    Discharged Condition: good    Prognosis: Fair    Disposition: home      Follow-up with   1. PCP within   5-7    Days    Follow up labs: bmp in 1 week       Discharge Physician Signed: Marli Garcia M.D. The patient was seen and examined on day of discharge and this discharge summary is in conjunction with any daily progress note from day of discharge.   Time spent on discharge in the examination, evaluation, counseling and review of medications and discharge plan: 34 minutes

## 2020-05-15 ENCOUNTER — CARE COORDINATION (OUTPATIENT)
Dept: CASE MANAGEMENT | Age: 60
End: 2020-05-15

## 2020-05-15 LAB
CULTURE: ABNORMAL
CULTURE: ABNORMAL
EKG ATRIAL RATE: 102 BPM
EKG DIAGNOSIS: NORMAL
EKG P AXIS: 41 DEGREES
EKG P-R INTERVAL: 210 MS
EKG Q-T INTERVAL: 350 MS
EKG QRS DURATION: 106 MS
EKG QTC CALCULATION (BAZETT): 456 MS
EKG R AXIS: 43 DEGREES
EKG T AXIS: 20 DEGREES
EKG VENTRICULAR RATE: 102 BPM
Lab: ABNORMAL
SPECIMEN: ABNORMAL
TOTAL COLONY COUNT: ABNORMAL

## 2020-05-18 ENCOUNTER — CARE COORDINATION (OUTPATIENT)
Dept: CASE MANAGEMENT | Age: 60
End: 2020-05-18

## 2020-05-30 ENCOUNTER — HOSPITAL ENCOUNTER (OUTPATIENT)
Age: 60
Setting detail: SPECIMEN
Discharge: HOME | End: 2020-05-30
Payer: MEDICARE

## 2020-05-30 PROCEDURE — 80048 BASIC METABOLIC PNL TOTAL CA: CPT

## 2020-05-30 PROCEDURE — 83880 ASSAY OF NATRIURETIC PEPTIDE: CPT

## 2020-06-01 LAB
ANION GAP SERPL CALCULATED.3IONS-SCNC: 11 MMOL/L (ref 4–16)
BUN BLDV-MCNC: 32 MG/DL (ref 6–23)
CALCIUM SERPL-MCNC: 9.1 MG/DL (ref 8.3–10.6)
CHLORIDE BLD-SCNC: 99 MMOL/L (ref 99–110)
CO2: 32 MMOL/L (ref 21–32)
CREAT SERPL-MCNC: 1.8 MG/DL (ref 0.6–1.1)
GFR AFRICAN AMERICAN: 35 ML/MIN/1.73M2
GFR NON-AFRICAN AMERICAN: 29 ML/MIN/1.73M2
GLUCOSE BLD-MCNC: 127 MG/DL (ref 70–99)
POTASSIUM SERPL-SCNC: 4.6 MMOL/L (ref 3.5–5.1)
PRO-BNP: 4109 PG/ML
SODIUM BLD-SCNC: 142 MMOL/L (ref 135–145)

## 2020-06-20 ENCOUNTER — APPOINTMENT (OUTPATIENT)
Dept: CT IMAGING | Age: 60
DRG: 871 | End: 2020-06-20
Payer: MEDICARE

## 2020-06-20 ENCOUNTER — APPOINTMENT (OUTPATIENT)
Dept: GENERAL RADIOLOGY | Age: 60
DRG: 871 | End: 2020-06-20
Payer: MEDICARE

## 2020-06-20 ENCOUNTER — HOSPITAL ENCOUNTER (INPATIENT)
Age: 60
LOS: 6 days | Discharge: HOME HEALTH | DRG: 871 | End: 2020-06-26
Attending: EMERGENCY MEDICINE | Admitting: STUDENT IN AN ORGANIZED HEALTH CARE EDUCATION/TRAINING PROGRAM
Payer: MEDICARE

## 2020-06-20 PROBLEM — A41.9 SEPSIS (HCC): Status: ACTIVE | Noted: 2020-06-20

## 2020-06-20 LAB
ADENOVIRUS DETECTION BY PCR: NOT DETECTED
ALBUMIN SERPL-MCNC: 3.4 GM/DL (ref 3.4–5)
ALP BLD-CCNC: 118 IU/L (ref 40–129)
ALT SERPL-CCNC: 11 U/L (ref 10–40)
AMMONIA: 26 UMOL/L (ref 11–51)
ANION GAP SERPL CALCULATED.3IONS-SCNC: 8 MMOL/L (ref 4–16)
APTT: 24.5 SECONDS (ref 25.1–37.1)
APTT: 35.3 SECONDS (ref 25.1–37.1)
AST SERPL-CCNC: 17 IU/L (ref 15–37)
BACTERIA: ABNORMAL /HPF
BASE EXCESS MIXED: 0.3 (ref 0–2.3)
BASOPHILS ABSOLUTE: 0 K/CU MM
BASOPHILS RELATIVE PERCENT: 0.2 % (ref 0–1)
BILIRUB SERPL-MCNC: 0.5 MG/DL (ref 0–1)
BILIRUBIN URINE: NEGATIVE MG/DL
BLOOD, URINE: ABNORMAL
BORDETELLA PARAPERTUSSIS BY PCR: NOT DETECTED
BORDETELLA PERTUSSIS PCR: NOT DETECTED
BUN BLDV-MCNC: 37 MG/DL (ref 6–23)
CALCIUM SERPL-MCNC: 8.8 MG/DL (ref 8.3–10.6)
CHLAMYDOPHILA PNEUMONIA PCR: NOT DETECTED
CHLORIDE BLD-SCNC: 92 MMOL/L (ref 99–110)
CLARITY: ABNORMAL
CO2: 28 MMOL/L (ref 21–32)
COLOR: YELLOW
COMMENT: ABNORMAL
CORONAVIRUS 229E PCR: NOT DETECTED
CORONAVIRUS HKU1 PCR: NOT DETECTED
CORONAVIRUS NL63 PCR: NOT DETECTED
CORONAVIRUS OC43 PCR: NOT DETECTED
CREAT SERPL-MCNC: 1.7 MG/DL (ref 0.6–1.1)
DIFFERENTIAL TYPE: ABNORMAL
EOSINOPHILS ABSOLUTE: 0.1 K/CU MM
EOSINOPHILS RELATIVE PERCENT: 0.9 % (ref 0–3)
FERRITIN: 79 NG/ML (ref 15–150)
FIBRINOGEN LEVEL: 394 MG/DL (ref 196.9–442.1)
GFR AFRICAN AMERICAN: 37 ML/MIN/1.73M2
GFR NON-AFRICAN AMERICAN: 31 ML/MIN/1.73M2
GLUCOSE BLD-MCNC: 285 MG/DL (ref 70–99)
GLUCOSE BLD-MCNC: 352 MG/DL (ref 70–99)
GLUCOSE BLD-MCNC: 402 MG/DL (ref 70–99)
GLUCOSE, URINE: >500 MG/DL
HCO3 VENOUS: 27.3 MMOL/L (ref 19–25)
HCT VFR BLD CALC: 28.8 % (ref 37–47)
HEMOGLOBIN: 8.3 GM/DL (ref 12.5–16)
HIGH SENSITIVE C-REACTIVE PROTEIN: 28.1 MG/L
HUMAN METAPNEUMOVIRUS PCR: NOT DETECTED
HYALINE CASTS: 0 /LPF
IMMATURE NEUTROPHIL %: 1.2 % (ref 0–0.43)
INFLUENZA A BY PCR: NOT DETECTED
INFLUENZA A H1 (2009) PCR: NOT DETECTED
INFLUENZA A H1 PANDEMIC PCR: NOT DETECTED
INFLUENZA A H3 PCR: NOT DETECTED
INFLUENZA B BY PCR: NOT DETECTED
INR BLD: 1.02 INDEX
INR BLD: 1.03 INDEX
KETONES, URINE: NEGATIVE MG/DL
LACTATE DEHYDROGENASE: 222 IU/L (ref 120–246)
LACTATE: 1.4 MMOL/L (ref 0.4–2)
LEUKOCYTE ESTERASE, URINE: ABNORMAL
LIPASE: 25 IU/L (ref 13–60)
LYMPHOCYTES ABSOLUTE: 0.8 K/CU MM
LYMPHOCYTES RELATIVE PERCENT: 5.2 % (ref 24–44)
MAGNESIUM: 2.1 MG/DL (ref 1.8–2.4)
MCH RBC QN AUTO: 24.5 PG (ref 27–31)
MCHC RBC AUTO-ENTMCNC: 28.8 % (ref 32–36)
MCV RBC AUTO: 85 FL (ref 78–100)
MONOCYTES ABSOLUTE: 0.6 K/CU MM
MONOCYTES RELATIVE PERCENT: 3.9 % (ref 0–4)
MUCUS: ABNORMAL HPF
MYCOPLASMA PNEUMONIAE PCR: NOT DETECTED
NITRITE URINE, QUANTITATIVE: NEGATIVE
NUCLEATED RBC %: 0 %
O2 SAT, VEN: 91.9 % (ref 50–70)
PARAINFLUENZA 1 PCR: NOT DETECTED
PARAINFLUENZA 2 PCR: NOT DETECTED
PARAINFLUENZA 3 PCR: NOT DETECTED
PARAINFLUENZA 4 PCR: NOT DETECTED
PCO2, VEN: 53 MMHG (ref 38–52)
PDW BLD-RTO: 15.3 % (ref 11.7–14.9)
PH VENOUS: 7.32 (ref 7.32–7.42)
PH, URINE: 6 (ref 5–8)
PLATELET # BLD: 287 K/CU MM (ref 140–440)
PMV BLD AUTO: 10.4 FL (ref 7.5–11.1)
PO2, VEN: 155 MMHG (ref 28–48)
POTASSIUM SERPL-SCNC: 5.9 MMOL/L (ref 3.5–5.1)
PRO-BNP: 9598 PG/ML
PROCALCITONIN: 0.13
PROTEIN UA: 100 MG/DL
PROTHROMBIN TIME: 12.3 SECONDS (ref 11.7–14.5)
PROTHROMBIN TIME: 12.5 SECONDS (ref 11.7–14.5)
RBC # BLD: 3.39 M/CU MM (ref 4.2–5.4)
RBC URINE: 2 /HPF (ref 0–6)
RHINOVIRUS ENTEROVIRUS PCR: NOT DETECTED
RSV PCR: NOT DETECTED
SEGMENTED NEUTROPHILS ABSOLUTE COUNT: 13.1 K/CU MM
SEGMENTED NEUTROPHILS RELATIVE PERCENT: 88.6 % (ref 36–66)
SODIUM BLD-SCNC: 128 MMOL/L (ref 135–145)
SPECIFIC GRAVITY UA: 1.01 (ref 1–1.03)
SQUAMOUS EPITHELIAL: <1 /HPF
TOTAL CK: 192 IU/L (ref 26–140)
TOTAL IMMATURE NEUTOROPHIL: 0.17 K/CU MM
TOTAL NUCLEATED RBC: 0 K/CU MM
TOTAL PROTEIN: 6.1 GM/DL (ref 6.4–8.2)
TRICHOMONAS: ABNORMAL /HPF
TROPONIN T: 0.05 NG/ML
TSH HIGH SENSITIVITY: 4.16 UIU/ML (ref 0.27–4.2)
UROBILINOGEN, URINE: NORMAL MG/DL (ref 0.2–1)
WBC # BLD: 14.7 K/CU MM (ref 4–10.5)
WBC UA: 29 /HPF (ref 0–5)

## 2020-06-20 PROCEDURE — 71045 X-RAY EXAM CHEST 1 VIEW: CPT

## 2020-06-20 PROCEDURE — 83880 ASSAY OF NATRIURETIC PEPTIDE: CPT

## 2020-06-20 PROCEDURE — 87449 NOS EACH ORGANISM AG IA: CPT

## 2020-06-20 PROCEDURE — 82805 BLOOD GASES W/O2 SATURATION: CPT

## 2020-06-20 PROCEDURE — 2580000003 HC RX 258: Performed by: EMERGENCY MEDICINE

## 2020-06-20 PROCEDURE — U0002 COVID-19 LAB TEST NON-CDC: HCPCS

## 2020-06-20 PROCEDURE — 83605 ASSAY OF LACTIC ACID: CPT

## 2020-06-20 PROCEDURE — 6370000000 HC RX 637 (ALT 250 FOR IP): Performed by: INTERNAL MEDICINE

## 2020-06-20 PROCEDURE — 6360000002 HC RX W HCPCS: Performed by: INTERNAL MEDICINE

## 2020-06-20 PROCEDURE — 76937 US GUIDE VASCULAR ACCESS: CPT

## 2020-06-20 PROCEDURE — 85610 PROTHROMBIN TIME: CPT

## 2020-06-20 PROCEDURE — 82962 GLUCOSE BLOOD TEST: CPT

## 2020-06-20 PROCEDURE — 87086 URINE CULTURE/COLONY COUNT: CPT

## 2020-06-20 PROCEDURE — 86141 C-REACTIVE PROTEIN HS: CPT

## 2020-06-20 PROCEDURE — 94761 N-INVAS EAR/PLS OXIMETRY MLT: CPT

## 2020-06-20 PROCEDURE — 87040 BLOOD CULTURE FOR BACTERIA: CPT

## 2020-06-20 PROCEDURE — 6370000000 HC RX 637 (ALT 250 FOR IP): Performed by: EMERGENCY MEDICINE

## 2020-06-20 PROCEDURE — 2580000003 HC RX 258: Performed by: STUDENT IN AN ORGANIZED HEALTH CARE EDUCATION/TRAINING PROGRAM

## 2020-06-20 PROCEDURE — 83735 ASSAY OF MAGNESIUM: CPT

## 2020-06-20 PROCEDURE — 87486 CHLMYD PNEUM DNA AMP PROBE: CPT

## 2020-06-20 PROCEDURE — 85025 COMPLETE CBC W/AUTO DIFF WBC: CPT

## 2020-06-20 PROCEDURE — 96375 TX/PRO/DX INJ NEW DRUG ADDON: CPT

## 2020-06-20 PROCEDURE — 2060000000 HC ICU INTERMEDIATE R&B

## 2020-06-20 PROCEDURE — 99291 CRITICAL CARE FIRST HOUR: CPT

## 2020-06-20 PROCEDURE — 84443 ASSAY THYROID STIM HORMONE: CPT

## 2020-06-20 PROCEDURE — 81001 URINALYSIS AUTO W/SCOPE: CPT

## 2020-06-20 PROCEDURE — 94640 AIRWAY INHALATION TREATMENT: CPT

## 2020-06-20 PROCEDURE — 87798 DETECT AGENT NOS DNA AMP: CPT

## 2020-06-20 PROCEDURE — 87077 CULTURE AEROBIC IDENTIFY: CPT

## 2020-06-20 PROCEDURE — 87899 AGENT NOS ASSAY W/OPTIC: CPT

## 2020-06-20 PROCEDURE — 84484 ASSAY OF TROPONIN QUANT: CPT

## 2020-06-20 PROCEDURE — 6370000000 HC RX 637 (ALT 250 FOR IP): Performed by: PHYSICIAN ASSISTANT

## 2020-06-20 PROCEDURE — B548ZZA ULTRASONOGRAPHY OF SUPERIOR VENA CAVA, GUIDANCE: ICD-10-PCS | Performed by: EMERGENCY MEDICINE

## 2020-06-20 PROCEDURE — 83690 ASSAY OF LIPASE: CPT

## 2020-06-20 PROCEDURE — 74176 CT ABD & PELVIS W/O CONTRAST: CPT

## 2020-06-20 PROCEDURE — 2580000003 HC RX 258: Performed by: INTERNAL MEDICINE

## 2020-06-20 PROCEDURE — 87581 M.PNEUMON DNA AMP PROBE: CPT

## 2020-06-20 PROCEDURE — 96374 THER/PROPH/DIAG INJ IV PUSH: CPT

## 2020-06-20 PROCEDURE — 6360000002 HC RX W HCPCS: Performed by: EMERGENCY MEDICINE

## 2020-06-20 PROCEDURE — 2700000000 HC OXYGEN THERAPY PER DAY

## 2020-06-20 PROCEDURE — 87186 SC STD MICRODIL/AGAR DIL: CPT

## 2020-06-20 PROCEDURE — 80053 COMPREHEN METABOLIC PANEL: CPT

## 2020-06-20 PROCEDURE — 71250 CT THORAX DX C-: CPT

## 2020-06-20 PROCEDURE — 82140 ASSAY OF AMMONIA: CPT

## 2020-06-20 PROCEDURE — 83615 LACTATE (LD) (LDH) ENZYME: CPT

## 2020-06-20 PROCEDURE — 94660 CPAP INITIATION&MGMT: CPT

## 2020-06-20 PROCEDURE — 85384 FIBRINOGEN ACTIVITY: CPT

## 2020-06-20 PROCEDURE — 87633 RESP VIRUS 12-25 TARGETS: CPT

## 2020-06-20 PROCEDURE — C1751 CATH, INF, PER/CENT/MIDLINE: HCPCS

## 2020-06-20 PROCEDURE — 36410 VNPNXR 3YR/> PHY/QHP DX/THER: CPT

## 2020-06-20 PROCEDURE — 93010 ELECTROCARDIOGRAM REPORT: CPT | Performed by: INTERNAL MEDICINE

## 2020-06-20 PROCEDURE — 85730 THROMBOPLASTIN TIME PARTIAL: CPT

## 2020-06-20 PROCEDURE — 82550 ASSAY OF CK (CPK): CPT

## 2020-06-20 PROCEDURE — 2500000003 HC RX 250 WO HCPCS: Performed by: EMERGENCY MEDICINE

## 2020-06-20 PROCEDURE — 93005 ELECTROCARDIOGRAM TRACING: CPT | Performed by: EMERGENCY MEDICINE

## 2020-06-20 PROCEDURE — 82728 ASSAY OF FERRITIN: CPT

## 2020-06-20 PROCEDURE — 84145 PROCALCITONIN (PCT): CPT

## 2020-06-20 PROCEDURE — 02HV33Z INSERTION OF INFUSION DEVICE INTO SUPERIOR VENA CAVA, PERCUTANEOUS APPROACH: ICD-10-PCS | Performed by: EMERGENCY MEDICINE

## 2020-06-20 RX ORDER — METOLAZONE 5 MG/1
2.5 TABLET ORAL DAILY
Status: DISCONTINUED | OUTPATIENT
Start: 2020-06-20 | End: 2020-06-22

## 2020-06-20 RX ORDER — LIDOCAINE HYDROCHLORIDE 10 MG/ML
5 INJECTION, SOLUTION EPIDURAL; INFILTRATION; INTRACAUDAL; PERINEURAL ONCE
Status: DISCONTINUED | OUTPATIENT
Start: 2020-06-20 | End: 2020-06-20

## 2020-06-20 RX ORDER — 0.9 % SODIUM CHLORIDE 0.9 %
1000 INTRAVENOUS SOLUTION INTRAVENOUS ONCE
Status: COMPLETED | OUTPATIENT
Start: 2020-06-20 | End: 2020-06-20

## 2020-06-20 RX ORDER — DEXTROSE MONOHYDRATE 50 MG/ML
100 INJECTION, SOLUTION INTRAVENOUS PRN
Status: DISCONTINUED | OUTPATIENT
Start: 2020-06-20 | End: 2020-06-26 | Stop reason: HOSPADM

## 2020-06-20 RX ORDER — ACETAMINOPHEN 650 MG/1
650 SUPPOSITORY RECTAL EVERY 6 HOURS PRN
Status: DISCONTINUED | OUTPATIENT
Start: 2020-06-20 | End: 2020-06-26 | Stop reason: HOSPADM

## 2020-06-20 RX ORDER — ASPIRIN 81 MG/1
81 TABLET, CHEWABLE ORAL DAILY
Status: DISCONTINUED | OUTPATIENT
Start: 2020-06-20 | End: 2020-06-26 | Stop reason: HOSPADM

## 2020-06-20 RX ORDER — ACETAMINOPHEN 650 MG/1
650 SUPPOSITORY RECTAL EVERY 6 HOURS PRN
Status: DISCONTINUED | OUTPATIENT
Start: 2020-06-20 | End: 2020-06-20 | Stop reason: SDUPTHER

## 2020-06-20 RX ORDER — PANTOPRAZOLE SODIUM 40 MG/1
40 TABLET, DELAYED RELEASE ORAL
Status: DISCONTINUED | OUTPATIENT
Start: 2020-06-21 | End: 2020-06-26 | Stop reason: HOSPADM

## 2020-06-20 RX ORDER — ALBUTEROL SULFATE 90 UG/1
2 AEROSOL, METERED RESPIRATORY (INHALATION) EVERY 4 HOURS PRN
Status: DISCONTINUED | OUTPATIENT
Start: 2020-06-20 | End: 2020-06-26 | Stop reason: HOSPADM

## 2020-06-20 RX ORDER — CLOPIDOGREL BISULFATE 75 MG/1
75 TABLET ORAL DAILY
Status: DISCONTINUED | OUTPATIENT
Start: 2020-06-20 | End: 2020-06-26 | Stop reason: HOSPADM

## 2020-06-20 RX ORDER — FUROSEMIDE 10 MG/ML
20 INJECTION INTRAMUSCULAR; INTRAVENOUS 3 TIMES DAILY
Status: DISCONTINUED | OUTPATIENT
Start: 2020-06-20 | End: 2020-06-22

## 2020-06-20 RX ORDER — ACETAMINOPHEN 325 MG/1
650 TABLET ORAL EVERY 6 HOURS PRN
Status: DISCONTINUED | OUTPATIENT
Start: 2020-06-20 | End: 2020-06-20 | Stop reason: SDUPTHER

## 2020-06-20 RX ORDER — FENTANYL CITRATE 50 UG/ML
25 INJECTION, SOLUTION INTRAMUSCULAR; INTRAVENOUS
Status: DISCONTINUED | OUTPATIENT
Start: 2020-06-20 | End: 2020-06-20

## 2020-06-20 RX ORDER — TORSEMIDE 10 MG/1
20 TABLET ORAL 2 TIMES DAILY
Status: DISCONTINUED | OUTPATIENT
Start: 2020-06-20 | End: 2020-06-20

## 2020-06-20 RX ORDER — NICOTINE POLACRILEX 4 MG
15 LOZENGE BUCCAL PRN
Status: DISCONTINUED | OUTPATIENT
Start: 2020-06-20 | End: 2020-06-26 | Stop reason: HOSPADM

## 2020-06-20 RX ORDER — DULOXETIN HYDROCHLORIDE 30 MG/1
60 CAPSULE, DELAYED RELEASE ORAL DAILY
Status: DISCONTINUED | OUTPATIENT
Start: 2020-06-20 | End: 2020-06-26 | Stop reason: HOSPADM

## 2020-06-20 RX ORDER — LACTULOSE 10 G/15ML
10 SOLUTION ORAL 2 TIMES DAILY
Status: DISCONTINUED | OUTPATIENT
Start: 2020-06-20 | End: 2020-06-22

## 2020-06-20 RX ORDER — SODIUM CHLORIDE 0.9 % (FLUSH) 0.9 %
10 SYRINGE (ML) INJECTION PRN
Status: DISCONTINUED | OUTPATIENT
Start: 2020-06-20 | End: 2020-06-26 | Stop reason: HOSPADM

## 2020-06-20 RX ORDER — BUPROPION HYDROCHLORIDE 100 MG/1
100 TABLET, EXTENDED RELEASE ORAL 2 TIMES DAILY
Status: DISCONTINUED | OUTPATIENT
Start: 2020-06-20 | End: 2020-06-26 | Stop reason: HOSPADM

## 2020-06-20 RX ORDER — INSULIN GLARGINE 100 [IU]/ML
55 INJECTION, SOLUTION SUBCUTANEOUS NIGHTLY
Status: DISCONTINUED | OUTPATIENT
Start: 2020-06-20 | End: 2020-06-22

## 2020-06-20 RX ORDER — DEXTROSE MONOHYDRATE 25 G/50ML
12.5 INJECTION, SOLUTION INTRAVENOUS PRN
Status: DISCONTINUED | OUTPATIENT
Start: 2020-06-20 | End: 2020-06-26 | Stop reason: HOSPADM

## 2020-06-20 RX ORDER — ONDANSETRON 2 MG/ML
4 INJECTION INTRAMUSCULAR; INTRAVENOUS EVERY 6 HOURS PRN
Status: DISCONTINUED | OUTPATIENT
Start: 2020-06-20 | End: 2020-06-26 | Stop reason: HOSPADM

## 2020-06-20 RX ORDER — SODIUM CHLORIDE 0.9 % (FLUSH) 0.9 %
10 SYRINGE (ML) INJECTION EVERY 12 HOURS SCHEDULED
Status: DISCONTINUED | OUTPATIENT
Start: 2020-06-20 | End: 2020-06-26 | Stop reason: HOSPADM

## 2020-06-20 RX ORDER — SODIUM POLYSTYRENE SULFONATE 15 G/60ML
15 SUSPENSION ORAL; RECTAL ONCE
Status: COMPLETED | OUTPATIENT
Start: 2020-06-20 | End: 2020-06-20

## 2020-06-20 RX ORDER — ONDANSETRON 2 MG/ML
4 INJECTION INTRAMUSCULAR; INTRAVENOUS EVERY 30 MIN PRN
Status: DISCONTINUED | OUTPATIENT
Start: 2020-06-20 | End: 2020-06-26 | Stop reason: HOSPADM

## 2020-06-20 RX ORDER — HYDROCODONE BITARTRATE AND ACETAMINOPHEN 5; 325 MG/1; MG/1
2 TABLET ORAL EVERY 4 HOURS PRN
Status: DISCONTINUED | OUTPATIENT
Start: 2020-06-20 | End: 2020-06-26 | Stop reason: HOSPADM

## 2020-06-20 RX ORDER — POLYETHYLENE GLYCOL 3350 17 G/17G
17 POWDER, FOR SOLUTION ORAL DAILY PRN
Status: DISCONTINUED | OUTPATIENT
Start: 2020-06-20 | End: 2020-06-26 | Stop reason: HOSPADM

## 2020-06-20 RX ORDER — ACETAMINOPHEN 325 MG/1
650 TABLET ORAL EVERY 6 HOURS PRN
Status: DISCONTINUED | OUTPATIENT
Start: 2020-06-20 | End: 2020-06-26 | Stop reason: HOSPADM

## 2020-06-20 RX ORDER — DOCUSATE SODIUM 100 MG/1
100 CAPSULE, LIQUID FILLED ORAL 2 TIMES DAILY
Status: DISCONTINUED | OUTPATIENT
Start: 2020-06-20 | End: 2020-06-22

## 2020-06-20 RX ORDER — CARVEDILOL 6.25 MG/1
6.25 TABLET ORAL 2 TIMES DAILY WITH MEALS
Status: DISCONTINUED | OUTPATIENT
Start: 2020-06-20 | End: 2020-06-26 | Stop reason: HOSPADM

## 2020-06-20 RX ORDER — ALBUTEROL SULFATE 90 UG/1
2 AEROSOL, METERED RESPIRATORY (INHALATION) ONCE
Status: COMPLETED | OUTPATIENT
Start: 2020-06-20 | End: 2020-06-20

## 2020-06-20 RX ORDER — PROMETHAZINE HYDROCHLORIDE 25 MG/1
12.5 TABLET ORAL EVERY 6 HOURS PRN
Status: DISCONTINUED | OUTPATIENT
Start: 2020-06-20 | End: 2020-06-26 | Stop reason: HOSPADM

## 2020-06-20 RX ORDER — ISOSORBIDE MONONITRATE 60 MG/1
60 TABLET, EXTENDED RELEASE ORAL DAILY
Status: DISCONTINUED | OUTPATIENT
Start: 2020-06-20 | End: 2020-06-26 | Stop reason: HOSPADM

## 2020-06-20 RX ORDER — RANOLAZINE 500 MG/1
500 TABLET, EXTENDED RELEASE ORAL 2 TIMES DAILY
Status: DISCONTINUED | OUTPATIENT
Start: 2020-06-20 | End: 2020-06-26 | Stop reason: HOSPADM

## 2020-06-20 RX ORDER — LEVOTHYROXINE SODIUM 0.05 MG/1
50 TABLET ORAL DAILY
Status: DISCONTINUED | OUTPATIENT
Start: 2020-06-21 | End: 2020-06-26 | Stop reason: HOSPADM

## 2020-06-20 RX ORDER — FUROSEMIDE 10 MG/ML
20 INJECTION INTRAMUSCULAR; INTRAVENOUS ONCE
Status: COMPLETED | OUTPATIENT
Start: 2020-06-20 | End: 2020-06-20

## 2020-06-20 RX ORDER — BUDESONIDE AND FORMOTEROL FUMARATE DIHYDRATE 160; 4.5 UG/1; UG/1
2 AEROSOL RESPIRATORY (INHALATION) 2 TIMES DAILY
Status: DISCONTINUED | OUTPATIENT
Start: 2020-06-20 | End: 2020-06-26 | Stop reason: HOSPADM

## 2020-06-20 RX ORDER — ATORVASTATIN CALCIUM 40 MG/1
80 TABLET, FILM COATED ORAL NIGHTLY
Status: DISCONTINUED | OUTPATIENT
Start: 2020-06-20 | End: 2020-06-26 | Stop reason: HOSPADM

## 2020-06-20 RX ORDER — LEVOFLOXACIN 5 MG/ML
750 INJECTION, SOLUTION INTRAVENOUS EVERY 24 HOURS
Status: CANCELLED | OUTPATIENT
Start: 2020-06-20

## 2020-06-20 RX ADMIN — BUDESONIDE AND FORMOTEROL FUMARATE DIHYDRATE 2 PUFF: 160; 4.5 AEROSOL RESPIRATORY (INHALATION) at 19:54

## 2020-06-20 RX ADMIN — ONDANSETRON 4 MG: 2 INJECTION INTRAMUSCULAR; INTRAVENOUS at 08:53

## 2020-06-20 RX ADMIN — DOCUSATE SODIUM 100 MG: 100 CAPSULE, LIQUID FILLED ORAL at 21:28

## 2020-06-20 RX ADMIN — FUROSEMIDE 20 MG: 10 INJECTION, SOLUTION INTRAVENOUS at 11:40

## 2020-06-20 RX ADMIN — SODIUM CHLORIDE, PRESERVATIVE FREE 10 ML: 5 INJECTION INTRAVENOUS at 21:31

## 2020-06-20 RX ADMIN — FENTANYL CITRATE 25 MCG: 50 INJECTION, SOLUTION INTRAMUSCULAR; INTRAVENOUS at 08:56

## 2020-06-20 RX ADMIN — LACTULOSE 10 G: 10 SOLUTION ORAL at 21:29

## 2020-06-20 RX ADMIN — FAMOTIDINE 20 MG: 10 INJECTION, SOLUTION INTRAVENOUS at 08:54

## 2020-06-20 RX ADMIN — DULOXETINE 60 MG: 30 CAPSULE, DELAYED RELEASE ORAL at 15:18

## 2020-06-20 RX ADMIN — ATORVASTATIN CALCIUM 80 MG: 40 TABLET, FILM COATED ORAL at 21:29

## 2020-06-20 RX ADMIN — MEROPENEM 1 G: 1 INJECTION, POWDER, FOR SOLUTION INTRAVENOUS at 09:14

## 2020-06-20 RX ADMIN — LACTULOSE 10 G: 10 SOLUTION ORAL at 15:18

## 2020-06-20 RX ADMIN — SODIUM POLYSTYRENE SULFONATE 15 G: 15 SUSPENSION ORAL; RECTAL at 12:01

## 2020-06-20 RX ADMIN — ACETAMINOPHEN 650 MG: 325 TABLET ORAL at 11:59

## 2020-06-20 RX ADMIN — INSULIN LISPRO 3 UNITS: 100 INJECTION, SOLUTION INTRAVENOUS; SUBCUTANEOUS at 21:31

## 2020-06-20 RX ADMIN — CLOPIDOGREL BISULFATE 75 MG: 75 TABLET ORAL at 15:18

## 2020-06-20 RX ADMIN — ASPIRIN 81 MG CHEWABLE TABLET 81 MG: 81 TABLET CHEWABLE at 15:18

## 2020-06-20 RX ADMIN — BUPROPION HYDROCHLORIDE 100 MG: 100 TABLET, FILM COATED, EXTENDED RELEASE ORAL at 21:29

## 2020-06-20 RX ADMIN — ALBUTEROL SULFATE 2 PUFF: 90 AEROSOL, METERED RESPIRATORY (INHALATION) at 07:45

## 2020-06-20 RX ADMIN — LINAGLIPTIN 5 MG: 5 TABLET, FILM COATED ORAL at 15:47

## 2020-06-20 RX ADMIN — ISOSORBIDE MONONITRATE 60 MG: 60 TABLET, EXTENDED RELEASE ORAL at 15:18

## 2020-06-20 RX ADMIN — CEFTRIAXONE 1 G: 1 INJECTION, POWDER, FOR SOLUTION INTRAMUSCULAR; INTRAVENOUS at 15:17

## 2020-06-20 RX ADMIN — SODIUM BICARBONATE 50 MEQ: 84 INJECTION INTRAVENOUS at 11:48

## 2020-06-20 RX ADMIN — METOLAZONE 2.5 MG: 5 TABLET ORAL at 17:23

## 2020-06-20 RX ADMIN — SODIUM CHLORIDE 1000 ML: 9 INJECTION, SOLUTION INTRAVENOUS at 09:09

## 2020-06-20 RX ADMIN — CARVEDILOL 6.25 MG: 6.25 TABLET, FILM COATED ORAL at 17:23

## 2020-06-20 RX ADMIN — FUROSEMIDE 20 MG: 10 INJECTION, SOLUTION INTRAVENOUS at 21:28

## 2020-06-20 RX ADMIN — VANCOMYCIN HYDROCHLORIDE 2000 MG: 1 INJECTION, POWDER, LYOPHILIZED, FOR SOLUTION INTRAVENOUS at 12:03

## 2020-06-20 RX ADMIN — RANOLAZINE 500 MG: 500 TABLET, FILM COATED, EXTENDED RELEASE ORAL at 21:28

## 2020-06-20 RX ADMIN — TIOTROPIUM BROMIDE INHALATION SPRAY 2 PUFF: 3.12 SPRAY, METERED RESPIRATORY (INHALATION) at 19:24

## 2020-06-20 RX ADMIN — PROMETHAZINE HYDROCHLORIDE 12.5 MG: 25 TABLET ORAL at 22:22

## 2020-06-20 RX ADMIN — INSULIN HUMAN 7 UNITS: 100 INJECTION, SOLUTION PARENTERAL at 11:31

## 2020-06-20 RX ADMIN — INSULIN LISPRO 10 UNITS: 100 INJECTION, SOLUTION INTRAVENOUS; SUBCUTANEOUS at 17:24

## 2020-06-20 RX ADMIN — TORSEMIDE 20 MG: 10 TABLET ORAL at 15:48

## 2020-06-20 RX ADMIN — INSULIN GLARGINE 55 UNITS: 100 INJECTION, SOLUTION SUBCUTANEOUS at 21:30

## 2020-06-20 RX ADMIN — DOCUSATE SODIUM 100 MG: 100 CAPSULE, LIQUID FILLED ORAL at 15:18

## 2020-06-20 ASSESSMENT — ENCOUNTER SYMPTOMS
ABDOMINAL PAIN: 1
SHORTNESS OF BREATH: 1
NAUSEA: 1
EYES NEGATIVE: 1
COUGH: 1
ALLERGIC/IMMUNOLOGIC NEGATIVE: 1
VOMITING: 1
WHEEZING: 1

## 2020-06-20 ASSESSMENT — PAIN SCALES - GENERAL
PAINLEVEL_OUTOF10: 8
PAINLEVEL_OUTOF10: 0
PAINLEVEL_OUTOF10: 8
PAINLEVEL_OUTOF10: 0

## 2020-06-20 NOTE — CONSULTS
[address], [address] [facility]                                  CONSULTATION    PATIENT NAME: LIONEL CLEVELAND                      :        1960  MED REC NO:   [id number]                          ROOM:       2018  ACCOUNT NO:   [de-identified]                           ADMIT DATE: 2020  PROVIDER:     SIMONA Coleman    CONSULT DATE:  2020    CHIEF COMPLAINT:  Shortness of breath. HISTORY OF PRESENT ILLNESS:  This is a 72-year-old female who presents  to the ER this afternoon with complaints of shortness of breath,  abdominal pain, nausea, vomiting, and fever at home. The patient has a  past medical history of COPD, for which she is on home oxygen at 3  liters all the time. She also has a history of CAD, status post PCI  more recently in 2020, and a history of heart failure with reduced  ejection fraction. The patient was last here in 2019 when she was  treated for a heart failure exacerbation. Repeat echocardiogram was  done that showed that her EF had actually improved to 40% to 45%. The  patient was then discharged home. The patient states that ever since  that discharge, she has been progressively getting more and more short  of breath until yesterday when she felt so bad that she needed to come  to the ER. She states that she vomited a few times at home, has tried  to vomit here, just feels very nauseated. She does say that she had a  fever at home of about 100 degrees. On exam, she is tachypneic,  although saturating well. She claims that she is very short of breath. Heart rate is stable on exam, but was elevated on admission. Troponin  is slightly elevated, although this is chronic and near her baseline. BNP is elevated above her baseline at 9000. Her white count is also  elevated. It appears that she may have SIRS with possible source being  a UTI, cultures are pending.     PAST MEDICAL stenosis, circ mid stent was patent, but was followed by an  obtuse marginal that had 90% stenosis noted. The LAD and the obtuse  marginal were intervened on. Due to elevated EDP and elevated  creatinine, the right coronary artery was not intervened on at that  time. IMAGING:  Chest x-ray done that shows mild congestive changes with a  small left-sided effusion overlying atelectatic changes. CT abdomen was  done and it showed no acute intraabdominal process. CT chest was done  that showed small trace left-sided pleural effusion with no acute  cardiopulmonary process noted. LABORATORY DATA:  Troponin is slightly elevated at 0.052, this is near  her baseline. BNP is elevated at 9500, this is about her baseline. Sodium is low at 128, potassium is high at 5.9, creatinine is high at  1.7, but this is near her baseline. CBC shows an elevated white count  of 14.7, she is slightly anemic at 8.3. Urinalysis is suggestive for  UTI. IMPRESSION AND PLAN:  A 59-year-old female with a past medical history  of heart failure, reduced ejection fraction, CAD, status post PCI in  01/2020. The patient has been in the hospital about a month ago with a  heart failure exacerbation. She does have qualifications of SIRS,  appears to have possible UTI noted. She is being started on antibiotics  by primary. She also appears to have a heart failure exacerbation as  BNP is elevated with mild congestive changes on imaging. Restarted her  home dose of torsemide 20 mg b.i.d., I will also add Zaroxolyn as well,  need to watch potassium closely. If she is still here on Monday, I may  order an echocardiogram as well to evaluate ejection fraction. I  believe the patient needs to be tested for sleep apnea while she was  here. I will look into this and make sure that she has not been tested  before. I think that she needs to be on BiPAP at home. Further  treatment will be dictated by hospital course.     Patient seen and chart

## 2020-06-20 NOTE — H&P
[facility]    History and Physical      Name:  Tran Isaac /Age/Sex: 1960  (61 y.o. female)   MRN & CSN:  [id number] & [id number] Admission Date/Time: 2020  7:08 AM   Location:  [facility]- PCP: Amando Schwartz MD       Hospital Day: 1    Assessment and Plan:   Tran Isaac is a 61 y.o.  female with past medical history of CHF, hypertension, COPD, CAD, chronic respiratory failure, DM type II, hypothyroidism, morbid obesity who presents with shortness of breath, cough and fever    · SIRS due to UTI -presented with fever at home, weakness and cough, WBC 14.7, tachycardia, chest x-ray with no acute issues, CT chest and abdomen unremarkable and showing resolution of groundglass nodules shown previously, COVID test sent from ED, started on empiric IV Rocephin pending urine culture-monitor cultures and maintain PPE and droplet plus isolation pending test results    · Moderate exacerbation of chronic systolic CHF -pedal edema and lower abdominal edema, shortness of breath, possible noncompliance with medication, elevated BNP about 2x her baseline, ECHO 2020 LVEF 40 to 45%, resume home torsemide, strict input output, consult cardiology    · UTI -UA suggestive, history of fever at home -started on Rocephin IV pending urine culture and sensitivity    · CKD stage III -baseline creatinine around 2-now at 1.8 -we will monitor creatinine closely while on fluid restriction and diuresis    · DM type II -resume Lantus and insulin sliding scale, check A1c in a.m. Other comorbid conditions addressed include:     Morbid obesity-BMI 52  Chronic hypoxic respiratory failure  Tobacco use disorder  CAD/stents  Hyperlipidemia  Hypertension  COPD with no exacerbation  Anxiety/depression  Hypothyroidism    Patient's VBG results are similar to her previous ones, saturating well on nasal cannula also will DC BiPAP at this time it was started in ER    Diet No diet orders on file   DVT Prophylaxis [] Lovenox, [] Regular rate without appreciable murmurs, rubs, or gallops. Peripheral pulses equal bilaterally and palpable. +2 pedal and pretibial edema.,  +4 abdominal edema  GI Abdomen is soft without significant tenderness, masses, or guarding. Bowel sounds are normoactive. Rectal exam deferred.  Reyes catheter is not present. HEME/LYMPH No petechiae or ecchymoses. MSK No gross joint deformities. Spontaneous movement of all extremities  SKIN Normal coloration, warm, dry. NEURO Cranial nerves appear grossly intact, normal speech, no lateralizing weakness. PSYCH Awake, alert, oriented x 4. Affect appropriate. Past Medical History:      Past Medical History:   Diagnosis Date    Acute on chronic systolic CHF (congestive heart failure) ([facility] 2020    CAD (coronary artery disease)     CKD (chronic kidney disease) stage 3, GFR 30-59 ml/min ([facility])     COPD with acute exacerbation ([facility] 2020    Diabetes type 2, uncontrolled ([facility]     Diastolic heart failure ([facility]     Essential hypertension     Financial problems 3/22/2013    Generalized anxiety disorder 2018    Herpes genitalia     Obesity, morbid (more than 100 lbs over ideal weight or BMI > 40) ([facility] 2013    STEMI (ST elevation myocardial infarction) ([facility]      PSHX:  has a past surgical history that includes Cholecystectomy;  section; Tonsillectomy; other surgical history (Right, [id number]); Cardiac surgery; and Coronary angioplasty with stent. Allergies: Allergies   Allergen Reactions    Augmentin [Amoxicillin-Pot Clavulanate] Diarrhea       FAM HX: family history includes Arthritis in her father and mother; Cancer in her maternal grandmother; Diabetes in her maternal aunt, maternal grandfather, maternal grandmother, maternal uncle, and mother; Heart Disease in her father and mother; High Blood Pressure in her father and mother; Stroke in her father; Substance Abuse in her brother and father.   Soc HX:   Social History medications   Prior to Admission medications    Medication Sig Start Date End Date Taking? Authorizing Provider   torsemide (DEMADEX) 20 MG tablet Take 1 tablet by mouth 2 times daily 5/14/20   Marck Allison MD   ipratropium-albuterol (DUONEB) 0.5-2.5 (3) MG/3ML SOLN nebulizer solution Inhale 3 mLs into the lungs every 4 hours 5/6/20   Paloma Mojica MD   carvedilol (COREG) 6.25 MG tablet Take 1 tablet by mouth 2 times daily (with meals) 5/6/20   Paloma Mojica MD   tiotropium (SPIRIVA RESPIMAT) 2.5 MCG/ACT AERS inhaler Inhale 2 puffs into the lungs daily 5/6/20   Paloma Mojica MD   ondansetron (ZOFRAN ODT) 4 MG disintegrating tablet Take 1 tablet by mouth every 8 hours as needed for Nausea 3/31/20   Liborio Graves MD   clopidogrel (PLAVIX) 75 MG tablet Take 1 tablet by mouth daily 2/27/20   Bonifacio Valenzuela MD   lactulose (CHRONULAC) 10 GM/15ML solution Take 15 mLs by mouth 2 times daily 1/11/20   Clem Gonzalez MD   HYDROcodone-acetaminophen (NORCO) 5-325 MG per tablet Take 2 tablets by mouth every 4 hours as needed for Pain.     Historical Provider, MD   atorvastatin (LIPITOR) 80 MG tablet Take 1 tablet by mouth nightly 12/4/19   Payal Wheeler DO   insulin lispro (HUMALOG) 100 UNIT/ML injection vial Check blood sugar three times daily before meals and at bedtime  For blood sugar less than 150= no insulin, 151-200=2, 201-250=4, 251-300=6, 301-350=6, 351-400=8, >400=10 units and call MD 12/2/19   Marleny Wise MD   isosorbide mononitrate (IMDUR) 60 MG extended release tablet Take 1 tablet by mouth daily 12/3/19   Marleny Wise MD   vitamin D 1000 units CAPS Take 3 capsules by mouth daily 7/18/19   Bonifacio Valenzuela MD   nystatin (MYCOSTATIN) 504745 UNIT/GM powder Apply topically 2 times daily as needed 4/19/19   Historical Provider, MD   ranolazine (RANEXA) 500 MG extended release tablet Take 1 tablet by mouth 2 times daily 3/4/19   MD TONYA Breen ELLIPTA 100-25 MCG/INH AEPB inhaler Inhale 1 puff into the lungs daily 2/26/19   Historical Provider, MD   levothyroxine (SYNTHROID) 50 MCG tablet Take 50 mcg by mouth daily 2/26/19   Historical Provider, MD   insulin glargine (LANTUS SOLOSTAR) 100 UNIT/ML injection pen Inject 55 Units into the skin nightly 11/21/18   Jax Green MD   linagliptin (TRADJENTA) 5 MG tablet Take 1 tablet by mouth daily 11/22/18   Jax Green MD   buPROPion [facility] SR) 100 MG extended release tablet Take 100 mg by mouth 2 times daily    Historical Provider, MD   aspirin 81 MG chewable tablet Take 1 tablet by mouth daily 1/8/18   Ilona Hamman, MD   DULoxetine (CYMBALTA) 60 MG extended release capsule Take 1 capsule by mouth daily 1/8/18   Ilona Hamman, MD   docusate (COLACE, DULCOLAX) 100 MG CAPS Take 100 mg by mouth 2 times daily 1/8/18   Ilona Hamman, MD   pantoprazole (PROTONIX) 40 MG tablet Take 1 tablet by mouth every morning (before breakfast) 1/8/18   Ilona Hamman, MD   albuterol sulfate HFA (VENTOLIN HFA) 108 (90 Base) MCG/ACT inhaler Inhale 2 puffs into the lungs every 4 hours as needed for Wheezing 1/8/18   Ilona Hamman, MD       PHYSICIAN CERTIFICATION    I certify that Nupur Gaitan is expected to be hospitalized for greate than 2 midnights based on the above assessment and plan:     Current diagnosis and plan of management discussed with the patient at the time of admission in lay language     Code status was discussed with patient  - Full code     Pain Assessment - no reported pain at this time     Electronically signed by Misael Witt MD on 6/20/2020 at 11:19 AM

## 2020-06-20 NOTE — ED PROVIDER NOTES
[facility]      TRIAGE CHIEF COMPLAINT:   Shortness of Breath (all night not getting any better)      [place]:  Marita Boggs is a 61 y.o. female that presents with complaint of cough shortness of breath wheezing fevers chills abdominal pain nausea vomiting. History of COPD she wears oxygen 3 L all the time she is morbidly obese also states she has a history of paracentesis and peripheral edema. Denies any current chest pain or headache or urine complaints or other bowel complaints. Patient on arrival is tachycardic and breathing fast brought in by EMS. Resuscitation started    REVIEW OF SYSTEMS:  At least 10 systems reviewed and otherwise acutely negative except as in the [address] Review of Systems   Unable to perform ROS: Acuity of condition   Constitutional: Positive for chills, fatigue and fever. HENT: Negative. Eyes: Negative. Respiratory: Positive for cough, shortness of breath and wheezing. Cardiovascular: Positive for chest pain. Gastrointestinal: Positive for abdominal pain, nausea and vomiting. Endocrine: Negative. Genitourinary: Negative. Musculoskeletal: Negative. Skin: Negative. Allergic/Immunologic: Negative. Neurological: Negative. Hematological: Negative. Psychiatric/Behavioral: Negative. All other systems reviewed and are negative.       Past Medical History:   Diagnosis Date    Acute on chronic systolic CHF (congestive heart failure) ([address] 2/22/2020    CAD (coronary artery disease)     CKD (chronic kidney disease) stage 3, GFR 30-59 ml/min ([facility])     COPD with acute exacerbation ([address] 2/23/2020    Diabetes type 2, uncontrolled ([address]     Diastolic heart failure ([address]     Essential hypertension     Financial problems 3/22/2013    Generalized anxiety disorder 1/8/2018    Herpes genitalia     Obesity, morbid (more than 100 lbs over ideal weight or BMI > 40) ([address] 03/22/2013    STEMI (ST elevation myocardial infarction) ([address] Fear of current or ex partner: Not on file     Emotionally abused: Not on file     Physically abused: Not on file     Forced sexual activity: Not on file   Other Topics Concern    Not on file   Social History Narrative    Not on file     Current Facility-Administered Medications   Medication Dose Route Frequency Provider Last Rate Last Dose    ondansetron (ZOFRAN) injection 4 mg  4 mg Intravenous Q30 Min PRN Paulette Brown, DO   4 mg at 06/20/20 0853    fentaNYL (SUBLIMAZE) injection 25 mcg  25 mcg Intravenous Q1H PRN Paulette Brown, DO   25 mcg at 06/20/20 0856    vancomycin (VANCOCIN) 2,000 mg in dextrose 5 % 500 mL IVPB  2,000 mg Intravenous Once Paulette Kevin, DO        meropenem (MERREM) 1 g in sodium chloride 0.9 % 100 mL IVPB (mini-bag)  1 g Intravenous Q8H Carroll Wynne,  mL/hr at 06/20/20 0914 1 g at 06/20/20 0914    furosemide (LASIX) injection 20 mg  20 mg Intravenous Once Paulette Kevin, DO        sodium polystyrene (KAYEXALATE) 15 GM/60ML suspension 15 g  15 g Oral Once Paulette Kevin, DO        insulin regular (HUMULIN R;NOVOLIN R) injection 7 Units  7 Units Intravenous Once Paulette Kevin, DO        sodium bicarbonate 8.4 % injection 50 mEq  50 mEq Intravenous Once Paulette Kevin, DO         Current Outpatient Medications   Medication Sig Dispense Refill    torsemide (DEMADEX) 20 MG tablet Take 1 tablet by mouth 2 times daily 60 tablet 3    ipratropium-albuterol (DUONEB) 0.5-2.5 (3) MG/3ML SOLN nebulizer solution Inhale 3 mLs into the lungs every 4 hours 360 mL 1    carvedilol (COREG) 6.25 MG tablet Take 1 tablet by mouth 2 times daily (with meals) 60 tablet 3    tiotropium (SPIRIVA RESPIMAT) 2.5 MCG/ACT AERS inhaler Inhale 2 puffs into the lungs daily 2 Inhaler 3    ondansetron (ZOFRAN ODT) 4 MG disintegrating tablet Take 1 tablet by mouth every 8 hours as needed for Nausea 15 tablet 0    clopidogrel (PLAVIX) 75 MG tablet Take 1 tablet by mouth daily 30 tablet 3 [Amoxicillin-Pot Clavulanate] Diarrhea     Current Facility-Administered Medications   Medication Dose Route Frequency Provider Last Rate Last Dose    ondansetron (ZOFRAN) injection 4 mg  4 mg Intravenous Q30 Min PRN Braulio Seat, DO   4 mg at 06/20/20 0853    fentaNYL (SUBLIMAZE) injection 25 mcg  25 mcg Intravenous Q1H PRN Braulio Seat, DO   25 mcg at 06/20/20 0856    vancomycin (VANCOCIN) 2,000 mg in dextrose 5 % 500 mL IVPB  2,000 mg Intravenous Once Braulio Seat, DO        meropenem (MERREM) 1 g in sodium chloride 0.9 % 100 mL IVPB (mini-bag)  1 g Intravenous Q8H Carroll Wynne,  mL/hr at 06/20/20 0914 1 g at 06/20/20 0914    furosemide (LASIX) injection 20 mg  20 mg Intravenous Once Braulio Seat, DO        sodium polystyrene (KAYEXALATE) 15 GM/60ML suspension 15 g  15 g Oral Once Braulio Seat, DO        insulin regular (HUMULIN R;NOVOLIN R) injection 7 Units  7 Units Intravenous Once Braulio Seat, DO        sodium bicarbonate 8.4 % injection 50 mEq  50 mEq Intravenous Once Braulio Seat, DO         Current Outpatient Medications   Medication Sig Dispense Refill    torsemide (DEMADEX) 20 MG tablet Take 1 tablet by mouth 2 times daily 60 tablet 3    ipratropium-albuterol (DUONEB) 0.5-2.5 (3) MG/3ML SOLN nebulizer solution Inhale 3 mLs into the lungs every 4 hours 360 mL 1    carvedilol (COREG) 6.25 MG tablet Take 1 tablet by mouth 2 times daily (with meals) 60 tablet 3    tiotropium (SPIRIVA RESPIMAT) 2.5 MCG/ACT AERS inhaler Inhale 2 puffs into the lungs daily 2 Inhaler 3    ondansetron (ZOFRAN ODT) 4 MG disintegrating tablet Take 1 tablet by mouth every 8 hours as needed for Nausea 15 tablet 0    clopidogrel (PLAVIX) 75 MG tablet Take 1 tablet by mouth daily 30 tablet 3    lactulose (CHRONULAC) 10 GM/15ML solution Take 15 mLs by mouth 2 times daily 2 Bottle 1    HYDROcodone-acetaminophen (NORCO) 5-325 MG per tablet Take 2 tablets by mouth every 4 hours as needed for Peak Flow       Pain Score       Pain Loc       Pain Edu? Excl. in [address]? PHYSICAL EXAM:  Physical Exam  Vitals signs and nursing note reviewed. Constitutional:       General: She is not in acute distress. Appearance: She is well-developed and well-groomed. She is morbidly obese. She is ill-appearing. She is not toxic-appearing or diaphoretic. HENT:      Head: Normocephalic and atraumatic. Right Ear: External ear normal.      Left Ear: External ear normal.      Nose: No congestion or rhinorrhea. Eyes:      General: No scleral icterus. Right eye: No discharge. Left eye: No discharge. Extraocular Movements: Extraocular movements intact. Conjunctiva/sclera: Conjunctivae normal.   Neck:      Musculoskeletal: Normal range of motion. No neck rigidity. Cardiovascular:      Rate and Rhythm: Regular rhythm. Tachycardia present. Pulses: Normal pulses. Heart sounds: Normal heart sounds. No murmur. No friction rub. No gallop. Pulmonary:      Effort: Tachypnea present. No respiratory distress. Breath sounds: Decreased air movement present. No stridor. Wheezing, rhonchi and rales present. Abdominal:      General: Bowel sounds are normal. There is no distension. Palpations: Abdomen is soft. There is no mass or pulsatile mass. Tenderness: There is generalized abdominal tenderness. There is no guarding or rebound. Negative signs include Taylor's sign, Rovsing's sign and McBurney's sign. Hernia: No hernia is present. Musculoskeletal: Normal range of motion. General: Swelling and tenderness present. No deformity or signs of injury. Right lower leg: Edema present. Left lower leg: Edema present. Skin:     General: Skin is warm. Coloration: Skin is not jaundiced or pale. Findings: No bruising, erythema, lesion or rash. Neurological:      General: No focal deficit present.       Mental Status: She is alert and Ventricular Rate 103 BPM    Atrial Rate 103 BPM    P-R Interval 178 ms    QRS Duration 110 ms    Q-T Interval 354 ms    QTc Calculation (Bazett) 463 ms    P Axis 51 degrees    R Axis 48 degrees    T Axis 18 degrees    Diagnosis       Sinus tachycardia  Low voltage QRS  Possible Inferior infarct (cited on or before 07-JAN-2020)  Abnormal ECG  When compared with ECG of 12-MAY-2020 15:59,  AR interval has decreased          Radiographs (if obtained):  [] The following radiograph was interpreted by myself in the absence of a radiologist:  [x] Radiologist's Report Reviewed:    CXR, CT PE, CT Abd/Pelv    EKG (if obtained): (All EKG's are interpreted by myself in the absence of a cardiologist)    12 lead EKG per my interpretation:  Sinus Tachycardia 103  Axis is   Normal  QTc is  463  There is specific T wave changes appreciated. Inverted T wave III  There is no specific ST wave changes appreciated. Prior EKG to compare with was not available     SIRS CRITERIA: (2 required)  Temperature <36 or >38  No  Heart rate >90    Yes  RR >20 or PCO2 <32   Yes  WBC >12 or <4 or >10% bands Yes    SEPSIS CRITERIA: (Both required)  Two or more of the above  Yes  Suspected source(s) of infection Lung/abdomen/urine    ANTIBIOTIC SELECTION RATIONAL  Vancomycin/Meropenem    ANTIBIOTIC SELECTION LIMITATIONS  Allergy    LACTIC ACID    Initial     See documentation  Follow - up if initial abnormal  See documentation    SEPTIC SHOCK CRITERIA:  SBP <90 or 40 reduction from baseline refractory to fluid resuscitation:  No  Serum Lactic Acid >4:   See documentation    FLUIDS  Saline 30 ml/kg given (over 30-60 min) No  (Septic SHOCK or lactic > 4)    FLUID ADMINISTRATION BARRIERS  Poor IV access and Significant active pulmonary edema or CHF     OTHER TREATMENT BARRIERS  None    CENTRAL LINE INSERTED? not applicable    Cristo Pete      Total critical care time today provided was at least 40 minutes. This excludes seperately billable procedure. Critical care time provided for reviewing labs, images, giving fluid, breathing treatment, oxygen, CPAP, antibiotics,treating for hyperkalemia, consulting medicine that required close evaluation and/or intervention with concern for patient decompensation. MDM:    Patient here with cough shortness of breath wheezing COPD exacerbation abdominal pain nausea vomiting. She is been short of breath all night she has a history of COPD wears oxygen 3 L all the time she is morbidly obese history of she states paracentesis peripheral edema. Patient brought in by EMS on arrival she is tachycardic she is afebrile she is breathing fast I have her on oxygen I will give her albuterol I will swab her for COVID I did wear appropriate PPE during the entire encounter including 95 mask glasses gloves gown with facial shield. We will give her pain nausea medicine some IV fluids as she does have peripheral edema. Will get imaging of her chest abdomen pelvis patient will need admission. I did order CPAP and started broad spectrum antibiotics for SIRS. Patient recheck doing better on oxygen. Work-up performed she does have hyperkalemia which I did start treatment for. Is hyperglycemic has elevated BNP slight elevation of troponin I cannot do a CT PE study due to her kidney function. Noncon of her chest and abdomen performed awaiting results patient does have some anemia and elevated white count will admit to hospital for observation. Otherwise stable.     CLINICAL IMPRESSION:  Final diagnoses:   Dyspnea, unspecified type   COPD exacerbation (HCC)   SIRS (systemic inflammatory response syndrome) (HCC)   Abdominal pain, unspecified abdominal location   Nausea and vomiting, intractability of vomiting not specified, unspecified vomiting type   Cough   Obesity, unspecified classification, unspecified obesity type, unspecified whether serious comorbidity present   Leukocytosis, unspecified type   Hyperkalemia   Troponin level elevated   Hyperglycemia   Elevated brain natriuretic peptide (BNP) level   Congestive heart failure, unspecified HF chronicity, unspecified heart failure type ([facility] [address]       (Please note that portions of this note may have been completed with a voice recognition program. Efforts were made to edit the dictations but occasionally words aremis-transcribed.)    DISPOSITION REFERRAL (if applicable):  No follow-up provider specified.     DISPOSITION MEDICATIONS (if applicable):  New Prescriptions    No medications on file          [facility], [address] [place] [address],   06/20/20 3704

## 2020-06-20 NOTE — PROGRESS NOTES
Pt is very familiar with me   Chart briefly reviewed  As she  Has  very high risk for progression for  CKD and needing RRT and hence vascular access  Probably prudent to avoid PICC  Full consult to come

## 2020-06-21 ENCOUNTER — APPOINTMENT (OUTPATIENT)
Dept: GENERAL RADIOLOGY | Age: 60
DRG: 871 | End: 2020-06-21
Payer: MEDICARE

## 2020-06-21 LAB
ALBUMIN SERPL-MCNC: 3.3 GM/DL (ref 3.4–5)
ALP BLD-CCNC: 95 IU/L (ref 40–128)
ALT SERPL-CCNC: 9 U/L (ref 10–40)
ANION GAP SERPL CALCULATED.3IONS-SCNC: 8 MMOL/L (ref 4–16)
APTT: 35.9 SECONDS (ref 25.1–37.1)
AST SERPL-CCNC: 9 IU/L (ref 15–37)
BASOPHILS ABSOLUTE: 0 K/CU MM
BASOPHILS RELATIVE PERCENT: 0.2 % (ref 0–1)
BILIRUB SERPL-MCNC: 0.6 MG/DL (ref 0–1)
BUN BLDV-MCNC: 33 MG/DL (ref 6–23)
CALCIUM SERPL-MCNC: 8.6 MG/DL (ref 8.3–10.6)
CHLORIDE BLD-SCNC: 99 MMOL/L (ref 99–110)
CO2: 32 MMOL/L (ref 21–32)
CREAT SERPL-MCNC: 1.7 MG/DL (ref 0.6–1.1)
D DIMER: 480 NG/ML(DDU)
DIFFERENTIAL TYPE: ABNORMAL
EOSINOPHILS ABSOLUTE: 0.2 K/CU MM
EOSINOPHILS RELATIVE PERCENT: 1.7 % (ref 0–3)
ESTIMATED AVERAGE GLUCOSE: 214 MG/DL
FIBRINOGEN LEVEL: 375 MG/DL (ref 196.9–442.1)
GFR AFRICAN AMERICAN: 37 ML/MIN/1.73M2
GFR NON-AFRICAN AMERICAN: 31 ML/MIN/1.73M2
GLUCOSE BLD-MCNC: 148 MG/DL (ref 70–99)
GLUCOSE BLD-MCNC: 152 MG/DL (ref 70–99)
GLUCOSE BLD-MCNC: 219 MG/DL (ref 70–99)
GLUCOSE BLD-MCNC: 220 MG/DL (ref 70–99)
GLUCOSE BLD-MCNC: 240 MG/DL (ref 70–99)
HBA1C MFR BLD: 9.1 % (ref 4.2–6.3)
HCT VFR BLD CALC: 26.9 % (ref 37–47)
HEMOGLOBIN: 7.8 GM/DL (ref 12.5–16)
HIGH SENSITIVE C-REACTIVE PROTEIN: 39.2 MG/L
IMMATURE NEUTROPHIL %: 0.7 % (ref 0–0.43)
INR BLD: 1.08 INDEX
LEGIONELLA URINARY AG: NEGATIVE
LYMPHOCYTES ABSOLUTE: 0.8 K/CU MM
LYMPHOCYTES RELATIVE PERCENT: 7.1 % (ref 24–44)
MCH RBC QN AUTO: 24.7 PG (ref 27–31)
MCHC RBC AUTO-ENTMCNC: 29 % (ref 32–36)
MCV RBC AUTO: 85.1 FL (ref 78–100)
MONOCYTES ABSOLUTE: 0.6 K/CU MM
MONOCYTES RELATIVE PERCENT: 5.5 % (ref 0–4)
NUCLEATED RBC %: 0 %
PDW BLD-RTO: 15.4 % (ref 11.7–14.9)
PLATELET # BLD: 269 K/CU MM (ref 140–440)
PMV BLD AUTO: 10.5 FL (ref 7.5–11.1)
POTASSIUM SERPL-SCNC: 4.4 MMOL/L (ref 3.5–5.1)
PROTHROMBIN TIME: 13.1 SECONDS (ref 11.7–14.5)
RBC # BLD: 3.16 M/CU MM (ref 4.2–5.4)
SARS-COV-2: NOT DETECTED
SEGMENTED NEUTROPHILS ABSOLUTE COUNT: 9.6 K/CU MM
SEGMENTED NEUTROPHILS RELATIVE PERCENT: 84.8 % (ref 36–66)
SODIUM BLD-SCNC: 139 MMOL/L (ref 135–145)
SOURCE: NORMAL
STREP PNEUMONIAE ANTIGEN: NORMAL
TOTAL IMMATURE NEUTOROPHIL: 0.08 K/CU MM
TOTAL NUCLEATED RBC: 0 K/CU MM
TOTAL PROTEIN: 5.1 GM/DL (ref 6.4–8.2)
TROPONIN T: 0.08 NG/ML
WBC # BLD: 11.3 K/CU MM (ref 4–10.5)

## 2020-06-21 PROCEDURE — 6370000000 HC RX 637 (ALT 250 FOR IP): Performed by: INTERNAL MEDICINE

## 2020-06-21 PROCEDURE — 71045 X-RAY EXAM CHEST 1 VIEW: CPT

## 2020-06-21 PROCEDURE — 2580000003 HC RX 258: Performed by: STUDENT IN AN ORGANIZED HEALTH CARE EDUCATION/TRAINING PROGRAM

## 2020-06-21 PROCEDURE — 2700000000 HC OXYGEN THERAPY PER DAY

## 2020-06-21 PROCEDURE — 85610 PROTHROMBIN TIME: CPT

## 2020-06-21 PROCEDURE — 83036 HEMOGLOBIN GLYCOSYLATED A1C: CPT

## 2020-06-21 PROCEDURE — 85025 COMPLETE CBC W/AUTO DIFF WBC: CPT

## 2020-06-21 PROCEDURE — 6360000002 HC RX W HCPCS: Performed by: INTERNAL MEDICINE

## 2020-06-21 PROCEDURE — 85730 THROMBOPLASTIN TIME PARTIAL: CPT

## 2020-06-21 PROCEDURE — 94762 N-INVAS EAR/PLS OXIMTRY CONT: CPT

## 2020-06-21 PROCEDURE — 86141 C-REACTIVE PROTEIN HS: CPT

## 2020-06-21 PROCEDURE — 1200000000 HC SEMI PRIVATE

## 2020-06-21 PROCEDURE — 6370000000 HC RX 637 (ALT 250 FOR IP): Performed by: PHYSICIAN ASSISTANT

## 2020-06-21 PROCEDURE — 84484 ASSAY OF TROPONIN QUANT: CPT

## 2020-06-21 PROCEDURE — 2580000003 HC RX 258: Performed by: INTERNAL MEDICINE

## 2020-06-21 PROCEDURE — 85379 FIBRIN DEGRADATION QUANT: CPT

## 2020-06-21 PROCEDURE — 80053 COMPREHEN METABOLIC PANEL: CPT

## 2020-06-21 PROCEDURE — 94640 AIRWAY INHALATION TREATMENT: CPT

## 2020-06-21 PROCEDURE — 85384 FIBRINOGEN ACTIVITY: CPT

## 2020-06-21 PROCEDURE — 94761 N-INVAS EAR/PLS OXIMETRY MLT: CPT

## 2020-06-21 PROCEDURE — 82962 GLUCOSE BLOOD TEST: CPT

## 2020-06-21 RX ADMIN — DULOXETINE 60 MG: 30 CAPSULE, DELAYED RELEASE ORAL at 09:05

## 2020-06-21 RX ADMIN — BUPROPION HYDROCHLORIDE 100 MG: 100 TABLET, FILM COATED, EXTENDED RELEASE ORAL at 21:04

## 2020-06-21 RX ADMIN — LEVOTHYROXINE SODIUM 50 MCG: 50 TABLET ORAL at 06:20

## 2020-06-21 RX ADMIN — ISOSORBIDE MONONITRATE 60 MG: 60 TABLET, EXTENDED RELEASE ORAL at 09:05

## 2020-06-21 RX ADMIN — Medication 1000 UNITS: at 09:04

## 2020-06-21 RX ADMIN — METOLAZONE 2.5 MG: 5 TABLET ORAL at 09:05

## 2020-06-21 RX ADMIN — RANOLAZINE 500 MG: 500 TABLET, FILM COATED, EXTENDED RELEASE ORAL at 09:04

## 2020-06-21 RX ADMIN — ATORVASTATIN CALCIUM 80 MG: 40 TABLET, FILM COATED ORAL at 21:04

## 2020-06-21 RX ADMIN — SODIUM CHLORIDE, PRESERVATIVE FREE 10 ML: 5 INJECTION INTRAVENOUS at 09:05

## 2020-06-21 RX ADMIN — DOCUSATE SODIUM 100 MG: 100 CAPSULE, LIQUID FILLED ORAL at 09:04

## 2020-06-21 RX ADMIN — INSULIN LISPRO 2 UNITS: 100 INJECTION, SOLUTION INTRAVENOUS; SUBCUTANEOUS at 17:26

## 2020-06-21 RX ADMIN — LACTULOSE 10 G: 10 SOLUTION ORAL at 21:03

## 2020-06-21 RX ADMIN — DOCUSATE SODIUM 100 MG: 100 CAPSULE, LIQUID FILLED ORAL at 21:04

## 2020-06-21 RX ADMIN — FUROSEMIDE 20 MG: 10 INJECTION, SOLUTION INTRAVENOUS at 13:14

## 2020-06-21 RX ADMIN — INSULIN LISPRO 4 UNITS: 100 INJECTION, SOLUTION INTRAVENOUS; SUBCUTANEOUS at 13:14

## 2020-06-21 RX ADMIN — INSULIN LISPRO 1 UNITS: 100 INJECTION, SOLUTION INTRAVENOUS; SUBCUTANEOUS at 21:44

## 2020-06-21 RX ADMIN — ASPIRIN 81 MG CHEWABLE TABLET 81 MG: 81 TABLET CHEWABLE at 09:04

## 2020-06-21 RX ADMIN — CEFTRIAXONE 1 G: 1 INJECTION, POWDER, FOR SOLUTION INTRAMUSCULAR; INTRAVENOUS at 14:53

## 2020-06-21 RX ADMIN — HYDROCODONE BITARTRATE AND ACETAMINOPHEN 2 TABLET: 5; 325 TABLET ORAL at 11:16

## 2020-06-21 RX ADMIN — FUROSEMIDE 20 MG: 10 INJECTION, SOLUTION INTRAVENOUS at 17:26

## 2020-06-21 RX ADMIN — SODIUM CHLORIDE 300 MG: 9 INJECTION, SOLUTION INTRAVENOUS at 13:14

## 2020-06-21 RX ADMIN — HYDROCODONE BITARTRATE AND ACETAMINOPHEN 2 TABLET: 5; 325 TABLET ORAL at 18:14

## 2020-06-21 RX ADMIN — INSULIN LISPRO 4 UNITS: 100 INJECTION, SOLUTION INTRAVENOUS; SUBCUTANEOUS at 09:00

## 2020-06-21 RX ADMIN — HYDROCODONE BITARTRATE AND ACETAMINOPHEN 2 TABLET: 5; 325 TABLET ORAL at 07:01

## 2020-06-21 RX ADMIN — LINAGLIPTIN 5 MG: 5 TABLET, FILM COATED ORAL at 09:05

## 2020-06-21 RX ADMIN — CARVEDILOL 6.25 MG: 6.25 TABLET, FILM COATED ORAL at 09:05

## 2020-06-21 RX ADMIN — SODIUM CHLORIDE, PRESERVATIVE FREE 10 ML: 5 INJECTION INTRAVENOUS at 21:04

## 2020-06-21 RX ADMIN — CLOPIDOGREL BISULFATE 75 MG: 75 TABLET ORAL at 09:04

## 2020-06-21 RX ADMIN — TIOTROPIUM BROMIDE INHALATION SPRAY 2 PUFF: 3.12 SPRAY, METERED RESPIRATORY (INHALATION) at 08:57

## 2020-06-21 RX ADMIN — BUDESONIDE AND FORMOTEROL FUMARATE DIHYDRATE 2 PUFF: 160; 4.5 AEROSOL RESPIRATORY (INHALATION) at 08:57

## 2020-06-21 RX ADMIN — LACTULOSE 10 G: 10 SOLUTION ORAL at 09:04

## 2020-06-21 RX ADMIN — RANOLAZINE 500 MG: 500 TABLET, FILM COATED, EXTENDED RELEASE ORAL at 21:04

## 2020-06-21 RX ADMIN — INSULIN GLARGINE 55 UNITS: 100 INJECTION, SOLUTION SUBCUTANEOUS at 21:44

## 2020-06-21 RX ADMIN — FUROSEMIDE 20 MG: 10 INJECTION, SOLUTION INTRAVENOUS at 06:19

## 2020-06-21 RX ADMIN — BUPROPION HYDROCHLORIDE 100 MG: 100 TABLET, FILM COATED, EXTENDED RELEASE ORAL at 09:06

## 2020-06-21 RX ADMIN — PANTOPRAZOLE SODIUM 40 MG: 40 TABLET, DELAYED RELEASE ORAL at 06:19

## 2020-06-21 RX ADMIN — CARVEDILOL 6.25 MG: 6.25 TABLET, FILM COATED ORAL at 17:26

## 2020-06-21 ASSESSMENT — PAIN SCALES - GENERAL
PAINLEVEL_OUTOF10: 9
PAINLEVEL_OUTOF10: 9
PAINLEVEL_OUTOF10: 8
PAINLEVEL_OUTOF10: 9

## 2020-06-21 ASSESSMENT — PAIN - FUNCTIONAL ASSESSMENT
PAIN_FUNCTIONAL_ASSESSMENT: ACTIVITIES ARE NOT PREVENTED
PAIN_FUNCTIONAL_ASSESSMENT: PREVENTS OR INTERFERES SOME ACTIVE ACTIVITIES AND ADLS

## 2020-06-21 ASSESSMENT — PAIN DESCRIPTION - ONSET: ONSET: ON-GOING

## 2020-06-21 ASSESSMENT — PAIN DESCRIPTION - LOCATION
LOCATION: GENERALIZED
LOCATION: ARM;BACK

## 2020-06-21 ASSESSMENT — PAIN DESCRIPTION - DESCRIPTORS
DESCRIPTORS: ACHING;DISCOMFORT
DESCRIPTORS: ACHING

## 2020-06-21 ASSESSMENT — PAIN DESCRIPTION - PAIN TYPE
TYPE: CHRONIC PAIN
TYPE: ACUTE PAIN

## 2020-06-21 ASSESSMENT — PAIN DESCRIPTION - ORIENTATION: ORIENTATION: RIGHT

## 2020-06-21 ASSESSMENT — PAIN DESCRIPTION - FREQUENCY: FREQUENCY: CONTINUOUS

## 2020-06-21 ASSESSMENT — PAIN DESCRIPTION - PROGRESSION: CLINICAL_PROGRESSION: NOT CHANGED

## 2020-06-21 NOTE — PROGRESS NOTES
Nephrology Progress Note  6/21/2020 12:47 PM        Subjective:   Admit Date: 6/20/2020  PCP: Jules Dowell MD    Interval History: breathing better     Diet: some    ROS:  No overt sob, good UOP_ she  Has  mixon- still fatigue/ tired - back pain     Data:     Current meds:    iron sucrose  300 mg Intravenous Once    aspirin  81 mg Oral Daily    atorvastatin  80 mg Oral Nightly    budesonide-formoterol  2 puff Inhalation BID    carvedilol  6.25 mg Oral BID WC    buPROPion  100 mg Oral BID    clopidogrel  75 mg Oral Daily    docusate sodium  100 mg Oral BID    DULoxetine  60 mg Oral Daily    insulin glargine  55 Units Subcutaneous Nightly    isosorbide mononitrate  60 mg Oral Daily    lactulose  10 g Oral BID    levothyroxine  50 mcg Oral Daily    linagliptin  5 mg Oral Daily    pantoprazole  40 mg Oral QAM AC    ranolazine  500 mg Oral BID    tiotropium  2 puff Inhalation Daily    vitamin D  1,000 Units Oral Daily    sodium chloride flush  10 mL Intravenous 2 times per day    insulin lispro  0-12 Units Subcutaneous TID WC    insulin lispro  0-6 Units Subcutaneous Nightly    cefTRIAXone (ROCEPHIN) IV  1 g Intravenous Q24H    sodium chloride flush  10 mL Intravenous 2 times per day    metOLazone  2.5 mg Oral Daily    furosemide  20 mg Intravenous TID      dextrose           I/O last 3 completed shifts:   In: 550 [P.O.:540; I.V.:10]  Out: 4200 [Urine:4200]    CBC:   Recent Labs     06/20/20  0830 06/21/20  0600   WBC 14.7* 11.3*   HGB 8.3* 7.8*    269          Recent Labs     06/20/20  0830 06/21/20  0600   * 139   K 5.9* 4.4   CL 92* 99   CO2 28 32   BUN 37* 33*   CREATININE 1.7* 1.7*   GLUCOSE 402* 220*       Lab Results   Component Value Date    CALCIUM 8.6 06/21/2020    PHOS 4.0 05/06/2020       Objective:     Vitals: BP (!) 145/68   Pulse 96   Temp 98.6 °F (37 °C) (Oral)   Resp 18   Ht 5' 4\" (1.626 m)   Wt (!) 318 lb 5.1 oz (144.4 kg)   SpO2 97%   BMI 54.64 kg/m²

## 2020-06-21 NOTE — PROGRESS NOTES
85.0 85.1    269     BMP:   Recent Labs     06/20/20  0830 06/21/20  0600   * 139   K 5.9* 4.4   CL 92* 99   CO2 28 32   BUN 37* 33*   CREATININE 1.7* 1.7*     LIVER PROFILE:   Recent Labs     06/20/20  0830 06/21/20  0600   AST 17 9*   ALT 11 9*   LIPASE 25  --    BILITOT 0.5 0.6   ALKPHOS 118 95     PT/INR:   Recent Labs     06/20/20  0830 06/20/20  1639 06/21/20  0600   PROTIME 12.3 12.5 13.1   INR 1.02 1.03 1.08     APTT:   Recent Labs     06/20/20  0830 06/20/20  1639 06/21/20  0600   APTT 24.5* 35.3 35.9     BNP:  No results for input(s): BNP in the last 72 hours.       Assessment:  Patient Active Problem List    Diagnosis Date Noted    E. coli UTI (urinary tract infection) 07/22/2016     Priority: High    Sepsis associated hypotension, POA 07/21/2016     Priority: High    Sepsis secondary to UTI, POA 07/20/2016     Priority: High    Musculoskeletal chest pain 07/20/2016     Priority: Medium    Diabetes mellitus with hyperglycemia ([facility] 07/20/2016     Priority: Medium    Severe dehydration secondary to significant hyperglycemia 07/20/2016     Priority: Medium    Generalized weakness 07/20/2016     Priority: Medium    Nausea & vomiting 04/05/2018     Priority: Low    Fever 04/05/2018     Priority: Low    Generalized anxiety disorder 01/08/2018     Priority: Low    DM (diabetes mellitus), secondary uncontrolled ([facility] 01/04/2018     Priority: Low    Syncope 08/26/2016     Priority: Low    Acute pain of right shoulder 08/26/2016     Priority: Low    Hypotension 08/26/2016     Priority: Low    Diabetes type 2, uncontrolled ([facility] 07/20/2016     Priority: Low    Morbid obesity with BMI of 50.0-59.9, adult ([facility] 07/20/2016     Priority: Low    Elevated lactic acid level, resolved 07/20/2016     Priority: Low    Essential hypertension      Priority: Low    CAD (coronary artery disease)      Priority: Low    Chronic diastolic heart failure (HCC)      Priority: Low    CKD (chronic kidney disease) stage 3, GFR 30-59 ml/min ([facility])      Priority: Low    Sepsis ([address] 06/20/2020    CHF (congestive heart failure), NYHA class I, acute, systolic ([address] 99/81/2646    Heart failure ([address] 05/01/2020    Hyperkalemia 02/23/2020    Uncontrolled diabetes mellitus with chronic kidney disease ([address] 02/23/2020    Acute on chronic diastolic (congestive) heart failure ([address] 02/23/2020    COPD with acute exacerbation ([address] 02/23/2020    Acute respiratory distress 02/16/2020    STEMI (ST elevation myocardial infarction) ([address] 11/23/2019    Cellulitis of abdominal wall 07/13/2019    Acute kidney injury ([address] 04/29/2019    DM (diabetes mellitus) ([address] 04/29/2019    Fluid overload 04/29/2019    Cor pulmonale (chronic) ([address] 04/29/2019    Anasarca 04/23/2019    UTI (urinary tract infection), bacterial 03/15/2019    Sinus tachycardia 03/15/2019    Uncontrolled type 2 diabetes mellitus with hyperglycemia ([address] 03/15/2019    Class 3 severe obesity due to excess calories with body mass index (BMI) of 45.0 to 49.9 in adult [facility]) 03/15/2019    Pleural effusion on left 02/28/2019    Hyperglycemia 11/17/2018    Acute respiratory failure ([address] 10/02/2018    Gait disturbance 09/21/2018    Debility 09/19/2018       Mallory Schultz MD 6/21/2020 11:02 AM

## 2020-06-21 NOTE — PLAN OF CARE
Problem: Falls - Risk of:  Goal: Will remain free from falls  Description: Will remain free from falls  6/20/2020 2151 by Amanda Tenorio RN  Outcome: Ongoing  6/20/2020 1828 by Carlos Machado RN  Outcome: Ongoing  Goal: Absence of physical injury  Description: Absence of physical injury  6/20/2020 2151 by Amanda Tenorio RN  Outcome: Ongoing  6/20/2020 1828 by Carlos Machado RN  Outcome: Ongoing

## 2020-06-21 NOTE — PROGRESS NOTES
sulfate HFA, 2 puff, Q4H PRN  HYDROcodone-acetaminophen, 2 tablet, Q4H PRN  sodium chloride flush, 10 mL, PRN  acetaminophen, 650 mg, Q6H PRN    Or  acetaminophen, 650 mg, Q6H PRN  polyethylene glycol, 17 g, Daily PRN  promethazine, 12.5 mg, Q6H PRN    Or  ondansetron, 4 mg, Q6H PRN  glucose, 15 g, PRN  dextrose, 12.5 g, PRN  glucagon (rDNA), 1 mg, PRN  dextrose, 100 mL/hr, PRN  sodium chloride flush, 10 mL, PRN          Electronically signed by Elva Randolph DO on 6/21/2020 at 11:44 AM

## 2020-06-21 NOTE — CONSULTS
seems a little confused. She was telling me that she is  in the hospital for two days even though she came just yesterday,  although I can understand when you are sick, it is very easy to get  confused. PHYSICAL EXAMINATION:  VITAL SIGNS:  Her T-max was 98.3, blood pressure 150s/70s, pulse 100,  respiratory rate 20, saturating about 100% on 2 liters of supplemental  oxygen. GENERAL:  The patient is slightly ill, confused, obese. She is lying in  bed with the head of the bed elevated about 30 degrees. HEENT:  Head and Neck:  Normocephalic, atraumatic. Eyes:  Mild  conjunctival pallor. Ear, Mouth, and Throat:  Unable to examine. CARDIOVASCULAR:  Tachycardia. RESPIRATORY:  Crackles bibasilar. ABDOMEN:  Soft. EXTREMITIES:  2+ edema bilaterally. LABORATORY VALUES AND ANCILLARY SERVICES:  As mentioned earlier, her  potassium was high. Sugar is getting better. CURRENT MEDICATIONS:  Include aspirin, Lipitor, bupropion, couple of  doses of furosemide was given. She is also on Tradjenta, metolazone,  and Kayexalate was given. IMPRESSION:  A 40-year-old female with CKD stage IIIb, looks like  pulmonary edema, comes with shortness of breath. 1.  CKD stage IIIbA3, stable, but has risk for progression or worsening. 2.  Acute decompensated heart failure. Her ground-glass appearance is  more than likely from pulmonary edema rather than COVID-19. I suspect  she will be negative for the infection. 3.  Atherosclerotic cardiovascular disease, cardiomyopathy, and  recurrent acute decompensated heart failure. 4.  Obesity, diabetes mellitus, and proteinuria. 5.  Hyperkalemia, likely from hyperglycemia or insulinopenia, should  come down especially with diuretics therapy. PLAN:  1. Blood sugar control. 2.  Intensify diuretics. We will start with Lasix 20 mg t.i.d. Stop  torsemide. Metolazone is okay for now. Watch urine output. She has a  Reyes catheter. Lab in the morning.   Low salt and follow clinically.         James Ye MD    D: 06/20/2020 19:12:44       T: 06/20/2020 22:05:10     [email or web address]  Job#: [id number]     Doc#: [id number]    CC:

## 2020-06-22 ENCOUNTER — APPOINTMENT (OUTPATIENT)
Dept: CT IMAGING | Age: 60
DRG: 871 | End: 2020-06-22
Payer: MEDICARE

## 2020-06-22 LAB
ALBUMIN SERPL-MCNC: 3.3 GM/DL (ref 3.4–5)
ALP BLD-CCNC: 86 IU/L (ref 40–128)
ALT SERPL-CCNC: 9 U/L (ref 10–40)
ANION GAP SERPL CALCULATED.3IONS-SCNC: 8 MMOL/L (ref 4–16)
APTT: 41.7 SECONDS (ref 25.1–37.1)
AST SERPL-CCNC: 11 IU/L (ref 15–37)
BASOPHILS ABSOLUTE: 0 K/CU MM
BASOPHILS RELATIVE PERCENT: 0.3 % (ref 0–1)
BILIRUB SERPL-MCNC: 0.4 MG/DL (ref 0–1)
BUN BLDV-MCNC: 36 MG/DL (ref 6–23)
CALCIUM SERPL-MCNC: 8.8 MG/DL (ref 8.3–10.6)
CHLORIDE BLD-SCNC: 100 MMOL/L (ref 99–110)
CO2: 32 MMOL/L (ref 21–32)
CREAT SERPL-MCNC: 2 MG/DL (ref 0.6–1.1)
CULTURE: ABNORMAL
D DIMER: 583 NG/ML(DDU)
DIFFERENTIAL TYPE: ABNORMAL
EOSINOPHILS ABSOLUTE: 0.2 K/CU MM
EOSINOPHILS RELATIVE PERCENT: 2.6 % (ref 0–3)
FIBRINOGEN LEVEL: 372 MG/DL (ref 196.9–442.1)
GFR AFRICAN AMERICAN: 31 ML/MIN/1.73M2
GFR NON-AFRICAN AMERICAN: 26 ML/MIN/1.73M2
GLUCOSE BLD-MCNC: 100 MG/DL (ref 70–99)
GLUCOSE BLD-MCNC: 116 MG/DL (ref 70–99)
GLUCOSE BLD-MCNC: 123 MG/DL (ref 70–99)
GLUCOSE BLD-MCNC: 143 MG/DL (ref 70–99)
GLUCOSE BLD-MCNC: 147 MG/DL (ref 70–99)
GLUCOSE BLD-MCNC: 168 MG/DL (ref 70–99)
GLUCOSE BLD-MCNC: 92 MG/DL (ref 70–99)
HCT VFR BLD CALC: 26.2 % (ref 37–47)
HEMOGLOBIN: 7.6 GM/DL (ref 12.5–16)
HIGH SENSITIVE C-REACTIVE PROTEIN: 51.4 MG/L
IMMATURE NEUTROPHIL %: 0.6 % (ref 0–0.43)
INR BLD: 1.13 INDEX
LYMPHOCYTES ABSOLUTE: 1.1 K/CU MM
LYMPHOCYTES RELATIVE PERCENT: 11.9 % (ref 24–44)
Lab: ABNORMAL
MCH RBC QN AUTO: 24.8 PG (ref 27–31)
MCHC RBC AUTO-ENTMCNC: 29 % (ref 32–36)
MCV RBC AUTO: 85.6 FL (ref 78–100)
MONOCYTES ABSOLUTE: 0.6 K/CU MM
MONOCYTES RELATIVE PERCENT: 6.7 % (ref 0–4)
NUCLEATED RBC %: 0 %
PDW BLD-RTO: 15.8 % (ref 11.7–14.9)
PLATELET # BLD: 272 K/CU MM (ref 140–440)
PMV BLD AUTO: 10.6 FL (ref 7.5–11.1)
POTASSIUM SERPL-SCNC: 4.2 MMOL/L (ref 3.5–5.1)
PROTHROMBIN TIME: 13.7 SECONDS (ref 11.7–14.5)
RBC # BLD: 3.06 M/CU MM (ref 4.2–5.4)
SEGMENTED NEUTROPHILS ABSOLUTE COUNT: 6.9 K/CU MM
SEGMENTED NEUTROPHILS RELATIVE PERCENT: 77.9 % (ref 36–66)
SODIUM BLD-SCNC: 140 MMOL/L (ref 135–145)
SPECIMEN: ABNORMAL
TOTAL IMMATURE NEUTOROPHIL: 0.05 K/CU MM
TOTAL NUCLEATED RBC: 0 K/CU MM
TOTAL PROTEIN: 5.1 GM/DL (ref 6.4–8.2)
WBC # BLD: 8.8 K/CU MM (ref 4–10.5)

## 2020-06-22 PROCEDURE — 1200000000 HC SEMI PRIVATE

## 2020-06-22 PROCEDURE — 85384 FIBRINOGEN ACTIVITY: CPT

## 2020-06-22 PROCEDURE — 70450 CT HEAD/BRAIN W/O DYE: CPT

## 2020-06-22 PROCEDURE — 85730 THROMBOPLASTIN TIME PARTIAL: CPT

## 2020-06-22 PROCEDURE — 82746 ASSAY OF FOLIC ACID SERUM: CPT

## 2020-06-22 PROCEDURE — 82962 GLUCOSE BLOOD TEST: CPT

## 2020-06-22 PROCEDURE — 80053 COMPREHEN METABOLIC PANEL: CPT

## 2020-06-22 PROCEDURE — 76937 US GUIDE VASCULAR ACCESS: CPT

## 2020-06-22 PROCEDURE — 6360000002 HC RX W HCPCS: Performed by: INTERNAL MEDICINE

## 2020-06-22 PROCEDURE — 6370000000 HC RX 637 (ALT 250 FOR IP): Performed by: PHYSICIAN ASSISTANT

## 2020-06-22 PROCEDURE — 85610 PROTHROMBIN TIME: CPT

## 2020-06-22 PROCEDURE — 6370000000 HC RX 637 (ALT 250 FOR IP): Performed by: INTERNAL MEDICINE

## 2020-06-22 PROCEDURE — 85379 FIBRIN DEGRADATION QUANT: CPT

## 2020-06-22 PROCEDURE — 94640 AIRWAY INHALATION TREATMENT: CPT

## 2020-06-22 PROCEDURE — 83550 IRON BINDING TEST: CPT

## 2020-06-22 PROCEDURE — 2700000000 HC OXYGEN THERAPY PER DAY

## 2020-06-22 PROCEDURE — 82607 VITAMIN B-12: CPT

## 2020-06-22 PROCEDURE — 86141 C-REACTIVE PROTEIN HS: CPT

## 2020-06-22 PROCEDURE — 94761 N-INVAS EAR/PLS OXIMETRY MLT: CPT

## 2020-06-22 PROCEDURE — 85025 COMPLETE CBC W/AUTO DIFF WBC: CPT

## 2020-06-22 PROCEDURE — 2580000003 HC RX 258: Performed by: INTERNAL MEDICINE

## 2020-06-22 PROCEDURE — 83540 ASSAY OF IRON: CPT

## 2020-06-22 PROCEDURE — 2580000003 HC RX 258: Performed by: STUDENT IN AN ORGANIZED HEALTH CARE EDUCATION/TRAINING PROGRAM

## 2020-06-22 RX ORDER — FUROSEMIDE 10 MG/ML
20 INJECTION INTRAMUSCULAR; INTRAVENOUS EVERY 8 HOURS
Status: DISCONTINUED | OUTPATIENT
Start: 2020-06-22 | End: 2020-06-22

## 2020-06-22 RX ORDER — INSULIN GLARGINE 100 [IU]/ML
25 INJECTION, SOLUTION SUBCUTANEOUS ONCE
Status: COMPLETED | OUTPATIENT
Start: 2020-06-22 | End: 2020-06-22

## 2020-06-22 RX ORDER — INSULIN GLARGINE 100 [IU]/ML
50 INJECTION, SOLUTION SUBCUTANEOUS NIGHTLY
Status: DISCONTINUED | OUTPATIENT
Start: 2020-06-22 | End: 2020-06-23

## 2020-06-22 RX ORDER — FUROSEMIDE 10 MG/ML
20 INJECTION INTRAMUSCULAR; INTRAVENOUS 2 TIMES DAILY
Status: DISCONTINUED | OUTPATIENT
Start: 2020-06-22 | End: 2020-06-23

## 2020-06-22 RX ADMIN — CLOPIDOGREL BISULFATE 75 MG: 75 TABLET ORAL at 08:12

## 2020-06-22 RX ADMIN — BUPROPION HYDROCHLORIDE 100 MG: 100 TABLET, FILM COATED, EXTENDED RELEASE ORAL at 19:59

## 2020-06-22 RX ADMIN — INSULIN LISPRO 2 UNITS: 100 INJECTION, SOLUTION INTRAVENOUS; SUBCUTANEOUS at 12:39

## 2020-06-22 RX ADMIN — LEVOTHYROXINE SODIUM 50 MCG: 50 TABLET ORAL at 05:31

## 2020-06-22 RX ADMIN — CEFTRIAXONE 1 G: 1 INJECTION, POWDER, FOR SOLUTION INTRAMUSCULAR; INTRAVENOUS at 13:33

## 2020-06-22 RX ADMIN — BUPROPION HYDROCHLORIDE 100 MG: 100 TABLET, FILM COATED, EXTENDED RELEASE ORAL at 08:13

## 2020-06-22 RX ADMIN — LACTULOSE 10 G: 10 SOLUTION ORAL at 08:10

## 2020-06-22 RX ADMIN — ASPIRIN 81 MG CHEWABLE TABLET 81 MG: 81 TABLET CHEWABLE at 08:10

## 2020-06-22 RX ADMIN — SODIUM CHLORIDE, PRESERVATIVE FREE 10 ML: 5 INJECTION INTRAVENOUS at 08:14

## 2020-06-22 RX ADMIN — METOLAZONE 2.5 MG: 5 TABLET ORAL at 08:12

## 2020-06-22 RX ADMIN — ATORVASTATIN CALCIUM 80 MG: 40 TABLET, FILM COATED ORAL at 19:59

## 2020-06-22 RX ADMIN — FUROSEMIDE 20 MG: 10 INJECTION, SOLUTION INTRAVENOUS at 08:06

## 2020-06-22 RX ADMIN — ONDANSETRON 4 MG: 2 INJECTION INTRAMUSCULAR; INTRAVENOUS at 08:47

## 2020-06-22 RX ADMIN — HYDROCODONE BITARTRATE AND ACETAMINOPHEN 2 TABLET: 5; 325 TABLET ORAL at 22:50

## 2020-06-22 RX ADMIN — RANOLAZINE 500 MG: 500 TABLET, FILM COATED, EXTENDED RELEASE ORAL at 19:59

## 2020-06-22 RX ADMIN — ISOSORBIDE MONONITRATE 60 MG: 60 TABLET, EXTENDED RELEASE ORAL at 08:13

## 2020-06-22 RX ADMIN — RANOLAZINE 500 MG: 500 TABLET, FILM COATED, EXTENDED RELEASE ORAL at 08:12

## 2020-06-22 RX ADMIN — HYDROCODONE BITARTRATE AND ACETAMINOPHEN 2 TABLET: 5; 325 TABLET ORAL at 09:39

## 2020-06-22 RX ADMIN — CARVEDILOL 6.25 MG: 6.25 TABLET, FILM COATED ORAL at 08:12

## 2020-06-22 RX ADMIN — BUDESONIDE AND FORMOTEROL FUMARATE DIHYDRATE 2 PUFF: 160; 4.5 AEROSOL RESPIRATORY (INHALATION) at 19:33

## 2020-06-22 RX ADMIN — Medication 1000 UNITS: at 08:13

## 2020-06-22 RX ADMIN — PANTOPRAZOLE SODIUM 40 MG: 40 TABLET, DELAYED RELEASE ORAL at 05:30

## 2020-06-22 RX ADMIN — FUROSEMIDE 20 MG: 10 INJECTION, SOLUTION INTRAVENOUS at 18:11

## 2020-06-22 RX ADMIN — SODIUM CHLORIDE, PRESERVATIVE FREE 10 ML: 5 INJECTION INTRAVENOUS at 20:00

## 2020-06-22 RX ADMIN — CARVEDILOL 6.25 MG: 6.25 TABLET, FILM COATED ORAL at 17:01

## 2020-06-22 RX ADMIN — LINAGLIPTIN 5 MG: 5 TABLET, FILM COATED ORAL at 08:13

## 2020-06-22 RX ADMIN — INSULIN GLARGINE 25 UNITS: 100 INJECTION, SOLUTION SUBCUTANEOUS at 23:59

## 2020-06-22 RX ADMIN — HYDROCODONE BITARTRATE AND ACETAMINOPHEN 2 TABLET: 5; 325 TABLET ORAL at 14:11

## 2020-06-22 RX ADMIN — TIOTROPIUM BROMIDE INHALATION SPRAY 2 PUFF: 3.12 SPRAY, METERED RESPIRATORY (INHALATION) at 10:03

## 2020-06-22 RX ADMIN — BUDESONIDE AND FORMOTEROL FUMARATE DIHYDRATE 2 PUFF: 160; 4.5 AEROSOL RESPIRATORY (INHALATION) at 10:04

## 2020-06-22 RX ADMIN — DULOXETINE 60 MG: 30 CAPSULE, DELAYED RELEASE ORAL at 08:13

## 2020-06-22 ASSESSMENT — PAIN DESCRIPTION - ORIENTATION
ORIENTATION: LOWER;MID
ORIENTATION: LOWER;MID

## 2020-06-22 ASSESSMENT — PAIN SCALES - GENERAL
PAINLEVEL_OUTOF10: 8
PAINLEVEL_OUTOF10: 7
PAINLEVEL_OUTOF10: 5
PAINLEVEL_OUTOF10: 0
PAINLEVEL_OUTOF10: 8
PAINLEVEL_OUTOF10: 9
PAINLEVEL_OUTOF10: 9
PAINLEVEL_OUTOF10: 5
PAINLEVEL_OUTOF10: 0
PAINLEVEL_OUTOF10: 9
PAINLEVEL_OUTOF10: 0

## 2020-06-22 ASSESSMENT — PAIN DESCRIPTION - PROGRESSION
CLINICAL_PROGRESSION: NOT CHANGED

## 2020-06-22 ASSESSMENT — PAIN DESCRIPTION - PAIN TYPE
TYPE: CHRONIC PAIN
TYPE: ACUTE PAIN;CHRONIC PAIN
TYPE: CHRONIC PAIN

## 2020-06-22 ASSESSMENT — PAIN DESCRIPTION - ONSET
ONSET: ON-GOING

## 2020-06-22 ASSESSMENT — PAIN DESCRIPTION - FREQUENCY
FREQUENCY: CONTINUOUS

## 2020-06-22 ASSESSMENT — PAIN DESCRIPTION - LOCATION
LOCATION: BACK

## 2020-06-22 ASSESSMENT — PAIN DESCRIPTION - DESCRIPTORS
DESCRIPTORS: ACHING
DESCRIPTORS: ACHING;CONSTANT;DISCOMFORT

## 2020-06-22 ASSESSMENT — PAIN - FUNCTIONAL ASSESSMENT: PAIN_FUNCTIONAL_ASSESSMENT: PREVENTS OR INTERFERES WITH MANY ACTIVE NOT PASSIVE ACTIVITIES

## 2020-06-22 NOTE — PROGRESS NOTES
obesity with BMI of 50.0-59.9, adult (HCC)    Elevated lactic acid level, resolved    Musculoskeletal chest pain    Essential hypertension    Diabetes mellitus with hyperglycemia (HCC)    Severe dehydration secondary to significant hyperglycemia    Sepsis secondary to UTI, POA    Generalized weakness    Sepsis associated hypotension, POA    E. coli UTI (urinary tract infection)    Syncope    Acute pain of right shoulder    Hypotension    DM (diabetes mellitus), secondary uncontrolled (HCC)    Generalized anxiety disorder    Nausea & vomiting    Fever    Debility    Gait disturbance    Acute respiratory failure (HCC)    Hyperglycemia    Pleural effusion on left    UTI (urinary tract infection), bacterial    Sinus tachycardia    Uncontrolled type 2 diabetes mellitus with hyperglycemia (HCC)    Class 3 severe obesity due to excess calories with body mass index (BMI) of 45.0 to 49.9 in adult [facility])    Anasarca    Acute kidney injury ([facility] [address]    DM (diabetes mellitus) (HCC)    Fluid overload    Cor pulmonale (chronic) (HCC)    Cellulitis of abdominal wall    STEMI (ST elevation myocardial infarction) (HCC)    Acute respiratory distress    Hyperkalemia    Uncontrolled diabetes mellitus with chronic kidney disease (HCC)    Acute on chronic diastolic (congestive) heart failure (HCC)    COPD with acute exacerbation (HCC)    Heart failure (HCC)    CHF (congestive heart failure), NYHA class I, acute, systolic (HCC)    Sepsis (HCC)        Allergies   Allergen Reactions    Augmentin [Amoxicillin-Pot Clavulanate] Diarrhea        Current Inpatient Medications:    Current Facility-Administered Medications   Medication Dose Route Frequency Provider Last Rate Last Dose    furosemide (LASIX) injection 20 mg  20 mg Intravenous Q8H Floretta Soulier, MD   20 mg at 06/22/20 0806    ondansetron (ZOFRAN) injection 4 mg  4 mg Intravenous Q30 Min PRN Jean Head MD   4 mg at 06/20/20 0853    albuterol sulfate  (90 Base) MCG/ACT inhaler 2 puff  2 puff Inhalation Q4H PRN Minal Clark MD        aspirin chewable tablet 81 mg  81 mg Oral Daily Jonatan Awad MD   81 mg at 06/22/20 0810    atorvastatin (LIPITOR) tablet 80 mg  80 mg Oral Nightly Jonatan Awad MD   80 mg at 06/21/20 2104    budesonide-formoterol (SYMBICORT) 160-4.5 MCG/ACT inhaler 2 puff  2 puff Inhalation BID Minal Clark MD   2 puff at 06/22/20 1004    carvedilol (COREG) tablet 6.25 mg  6.25 mg Oral BID WC Jonatan Awad MD   6.25 mg at 06/22/20 8324    buPROPion [facility]) extended release tablet 100 mg  100 mg Oral BID Minal Clark MD   100 mg at 06/22/20 0813    clopidogrel (PLAVIX) tablet 75 mg  75 mg Oral Daily Jonatan Awad MD   75 mg at 06/22/20 2960    docusate sodium (COLACE) capsule 100 mg  100 mg Oral BID Jonatan Awad MD   100 mg at 06/21/20 2104    DULoxetine (CYMBALTA) extended release capsule 60 mg  60 mg Oral Daily Jonatan Awad MD   60 mg at 06/22/20 0813    HYDROcodone-acetaminophen (NORCO) 5-325 MG per tablet 2 tablet  2 tablet Oral Q4H PRN Minal Clark MD   2 tablet at 06/22/20 0939    insulin glargine (LANTUS) injection vial 55 Units  55 Units Subcutaneous Nightly Jonatan Awad MD   55 Units at 06/21/20 2144    isosorbide mononitrate (IMDUR) extended release tablet 60 mg  60 mg Oral Daily Jonatan Awad MD   60 mg at 06/22/20 0813    lactulose (CHRONULAC) 10 GM/15ML solution 10 g  10 g Oral BID Minal Clark MD   10 g at 06/22/20 0810    levothyroxine (SYNTHROID) tablet 50 mcg  50 mcg Oral Daily Jonatan Awad MD   50 mcg at 06/22/20 0531    linagliptin (TRADJENTA) tablet 5 mg  5 mg Oral Daily Jonatan Awad MD   5 mg at 06/22/20 0813    pantoprazole (PROTONIX) tablet 40 mg  40 mg Oral QA AC Jonatan Awad MD   40 mg at 06/22/20 0530    ranolazine (RANEXA) extended release tablet 500 mg  500 mg Oral BID Minal Clark MD   500 mg at 06/22/20 0812    tiotropium (SPIRIVA RESPIMAT) 2.5 MCG/ACT inhaler 2 puff  2 puff Inhalation Daily Misael Witt MD   2 puff at 06/22/20 1003    vitamin D CAPS 1,000 Units  1,000 Units Oral Daily Misael Witt MD   1,000 Units at 06/22/20 0813    sodium chloride flush 0.9 % injection 10 mL  10 mL Intravenous 2 times per day Misael Witt MD   Stopped at 06/21/20 0909    sodium chloride flush 0.9 % injection 10 mL  10 mL Intravenous PRN Misael Witt MD        acetaminophen (TYLENOL) tablet 650 mg  650 mg Oral Q6H PRN Misael Witt MD        Or   Ruthann Olives acetaminophen (TYLENOL) suppository 650 mg  650 mg Rectal Q6H PRN Misael Witt MD        polyethylene glycol (GLYCOLAX) packet 17 g  17 g Oral Daily PRN Misael Witt MD        promethazine (PHENERGAN) tablet 12.5 mg  12.5 mg Oral Q6H PRN Misael Witt MD   12.5 mg at 06/20/20 2222    Or    ondansetron (ZOFRAN) injection 4 mg  4 mg Intravenous Q6H PRN Misael Witt MD   4 mg at 06/22/20 0847    glucose (GLUTOSE) 40 % oral gel 15 g  15 g Oral PRN Misael Witt MD        dextrose 50 % IV solution  12.5 g Intravenous PRN Misael Witt MD        glucagon (rDNA) injection 1 mg  1 mg Intramuscular PRN Misael Witt MD        dextrose 5 % solution  100 mL/hr Intravenous PRN Misael Witt MD        insulin lispro (HUMALOG) injection vial 0-12 Units  0-12 Units Subcutaneous TID  Jonatan Awad MD   2 Units at 06/21/20 1726    insulin lispro (HUMALOG) injection vial 0-6 Units  0-6 Units Subcutaneous Nightly Misael Witt MD   1 Units at 06/21/20 2144    cefTRIAXone (ROCEPHIN) 1 g IVPB in 50 mL D5W minibag  1 g Intravenous Q24H Misael Witt MD   Stopped at 06/21/20 1530    sodium chloride flush 0.9 % injection 10 mL  10 mL Intravenous 2 times per day [employer] DO   10 mL at 06/22/20 0814    sodium chloride flush 0.9 % injection 10 mL  10 mL Intravenous PRN Brina Posada DO        metOLazone (ZAROXOLYN) tablet 2.5 mg  2.5 mg Oral Daily Noam Dire,

## 2020-06-22 NOTE — PLAN OF CARE
minimized  Outcome: Ongoing     Problem: Respiratory:  Goal: Ability to maintain adequate ventilation will improve  Description: Ability to maintain adequate ventilation will improve  Outcome: Ongoing  Goal: Respiratory status will improve  Description: Respiratory status will improve  Outcome: Ongoing     Problem: Sensory Perception - Impaired:  Goal: Ability to maintain a stable neurologic state will improve  Description: Ability to maintain a stable neurologic state will improve  Outcome: Ongoing

## 2020-06-22 NOTE — PROGRESS NOTES
appearance:  No distress - more up bit    HEENT:  + conj pallor  Neck:  supple  Lungs:  Coarse BS , no gross crackles  Heart:  Seems RRR  Abdomen: soft  Extremities:  Way less edema of LE       Problem List :         Impression :     1. JUAN J- CKD stag e3 - sec to CRS type 1- she is sensitive to loop- so will back up a she has no crackles and edema better   2. Anemia/DM/ASCFD / etc   3. ? Cystitis      Recommendation/Plan  :     1. De intensify diuretics   2. Watch UOP  3. Good BS control  4. daily wt  5. Follow clinically  6.  She is keep coming back - so revisit and see what we can do to keep her readmission away       Teo Skaggs MD

## 2020-06-22 NOTE — CONSULTS
the administration of intravenous contrast. Multiplanar reformatted images are provided for review. Dose modulation, iterative reconstruction, and/or weight based adjustment of the mA/kV was utilized to reduce the radiation dose to as low as reasonably achievable. COMPARISON: X-ray done earlier today and CT scan done May 13, 2020 HISTORY: ORDERING SYSTEM PROVIDED HISTORY: dyspnea/tachy TECHNOLOGIST PROVIDED HISTORY: Reason for exam:->dyspnea/tachy Reason for Exam: shortness of breath, chest pain FINDINGS: Mediastinum: There is a stable small 1 cm subcarinal lymph node. There is no new adenopathy. Atherosclerotic disease seen in the aorta and coronary arteries. The heart appears unremarkable. Lungs/pleura: The right apical nodule previously seen on the prior CT scan is no longer present likely represent atelectatic changes or possible old section. The opacity within the anterior right middle lobe which has a ground-glass appearance on the prior examination has also resolved. There is no evidence for alveolar consolidation or pneumothorax. There is some minimal atelectatic changes seen at the lung bases with small trace left-sided effusion. Upper Abdomen: Visualized solid organs of the abdomen are normal. Soft Tissues/Bones: No acute bony or soft tissue abnormalities are identified. The ground-glass nodules previously seen May 13, 2020 have resolved and likely related to prior infection. There is a small trace left-sided pleural effusion. There is no acute cardiopulmonary process noted. Xr Chest Portable    Result Date: 6/21/2020  EXAMINATION: ONE XRAY VIEW OF THE CHEST 6/21/2020 9:30 pm COMPARISON: None. HISTORY: ORDERING SYSTEM PROVIDED HISTORY: veriy left midline placement. Patient with severe pain when flushing/swelling TECHNOLOGIST PROVIDED HISTORY: Reason for exam:->veriy left midline placement. Patient with severe pain when flushing/swelling Reason for Exam: veriy left midline placement.  Patient

## 2020-06-22 NOTE — PROGRESS NOTES
pt has a midline to left lower arm and it is swollen and hurts really bad when I flush it. Paged placed to NP [address] to see what she would like done.  Will continue to monitor

## 2020-06-22 NOTE — PROGRESS NOTES
 sodium chloride flush  10 mL Intravenous 2 times per day       Infusions:   dextrose         PRN Meds:  ondansetron, albuterol sulfate HFA, HYDROcodone-acetaminophen, sodium chloride flush, acetaminophen **OR** acetaminophen, polyethylene glycol, promethazine **OR** ondansetron, glucose, dextrose, glucagon (rDNA), dextrose, sodium chloride flush    ASSESSMENT AND PLAN      Sepsis--of urinary origin, leukocytosis, tachycardia, urine cx with e coli, pending speciation and sensitivities, on IV ceftriaxone, f/u culture results    Acute on chronic LV systolic HF EF 20%--PGWTP neg, on IV lasix, improved, will decreased IV lasix dose, monitor I/o, chem daily, daily weights, hyponatremia improved, Cr slightly increased today, repeat chem tomorrow  §   · CKD3 with baseline creatinine ~2. At baseline at time of admission  · DM type 2 uncontrolled a1c 9.1--endocrine consult, SSI  · Morbid obesity with BMI of 52--nutrition consulted  · Chronic hypoxic respiratory failure/ COPD.  Stable  · Chronic tobacco abuse, remission  · CAD s/p PCI to hx  · HLD, stable  · HTN, stable   · Chronic anxiety, depression  · Hypothyroidism     Mixon in place, will d/c tomorrow        PT/OT Eval Status: ordered    DVT Prophylaxis: SQ lovenox  Diet: DIET CARDIAC; Daily Fluid Restriction: 1800 ml  Code Status: Full Code      Dispo - pt wants to go home with [facility], refusing SNF, frequent admissions, f/u Pt/OT recs, f/u urine cx sensitivities, d/c mixon tomororw, can likely switch to PO lasix tomorrow, d/c with [facility] in 2-3 days    Cody Obrien MD

## 2020-06-22 NOTE — CONSULTS
IV Consult complete. Extended dwell painful, slight edema to left FA, no blood return. Catheter pulled back 1.5 cm and able to withdraw blood but patient screams when attempting to flush. Extended dwell PIV removed. Nexiva 22G Pressure Injectable PIV inserted in left proximal FA x2 attempt with sterile ultrasound guided technique, brisk blood return, flushes easily.

## 2020-06-23 LAB
ALBUMIN SERPL-MCNC: 3.3 GM/DL (ref 3.4–5)
ALP BLD-CCNC: 88 IU/L (ref 40–128)
ALT SERPL-CCNC: 9 U/L (ref 10–40)
ANION GAP SERPL CALCULATED.3IONS-SCNC: 9 MMOL/L (ref 4–16)
AST SERPL-CCNC: 11 IU/L (ref 15–37)
BACTERIA: ABNORMAL /HPF
BASOPHILS ABSOLUTE: 0 K/CU MM
BASOPHILS RELATIVE PERCENT: 0.4 % (ref 0–1)
BILIRUB SERPL-MCNC: 0.3 MG/DL (ref 0–1)
BILIRUBIN URINE: NEGATIVE MG/DL
BLOOD, URINE: ABNORMAL
BUN BLDV-MCNC: 39 MG/DL (ref 6–23)
CALCIUM SERPL-MCNC: 8.5 MG/DL (ref 8.3–10.6)
CHLORIDE BLD-SCNC: 99 MMOL/L (ref 99–110)
CLARITY: ABNORMAL
CO2: 31 MMOL/L (ref 21–32)
COLOR: YELLOW
CREAT SERPL-MCNC: 2.3 MG/DL (ref 0.6–1.1)
CREATININE URINE: 130.7 MG/DL (ref 28–217)
DIFFERENTIAL TYPE: ABNORMAL
EOSINOPHILS ABSOLUTE: 0.3 K/CU MM
EOSINOPHILS RELATIVE PERCENT: 2.8 % (ref 0–3)
FOLATE: 8.5 NG/ML (ref 3.1–17.5)
GFR AFRICAN AMERICAN: 26 ML/MIN/1.73M2
GFR NON-AFRICAN AMERICAN: 22 ML/MIN/1.73M2
GLUCOSE BLD-MCNC: 102 MG/DL (ref 70–99)
GLUCOSE BLD-MCNC: 127 MG/DL (ref 70–99)
GLUCOSE BLD-MCNC: 130 MG/DL (ref 70–99)
GLUCOSE BLD-MCNC: 139 MG/DL (ref 70–99)
GLUCOSE BLD-MCNC: 149 MG/DL (ref 70–99)
GLUCOSE BLD-MCNC: 150 MG/DL (ref 70–99)
GLUCOSE BLD-MCNC: 157 MG/DL (ref 70–99)
GLUCOSE, URINE: NEGATIVE MG/DL
HCT VFR BLD CALC: 25.8 % (ref 37–47)
HEMOGLOBIN: 7.5 GM/DL (ref 12.5–16)
HYALINE CASTS: >20 /LPF
IMMATURE NEUTROPHIL %: 0.8 % (ref 0–0.43)
IRON: 27 UG/DL (ref 37–145)
KETONES, URINE: NEGATIVE MG/DL
LEUKOCYTE ESTERASE, URINE: ABNORMAL
LYMPHOCYTES ABSOLUTE: 1.2 K/CU MM
LYMPHOCYTES RELATIVE PERCENT: 11.7 % (ref 24–44)
MCH RBC QN AUTO: 24.9 PG (ref 27–31)
MCHC RBC AUTO-ENTMCNC: 29.1 % (ref 32–36)
MCV RBC AUTO: 85.7 FL (ref 78–100)
MONOCYTES ABSOLUTE: 0.6 K/CU MM
MONOCYTES RELATIVE PERCENT: 6.3 % (ref 0–4)
MUCUS: ABNORMAL HPF
NITRITE URINE, QUANTITATIVE: NEGATIVE
NUCLEATED RBC %: 0 %
PCT TRANSFERRIN: 10 % (ref 10–44)
PDW BLD-RTO: 15.9 % (ref 11.7–14.9)
PH, URINE: 5 (ref 5–8)
PLATELET # BLD: 254 K/CU MM (ref 140–440)
PMV BLD AUTO: 10.3 FL (ref 7.5–11.1)
POTASSIUM SERPL-SCNC: 4.4 MMOL/L (ref 3.5–5.1)
PROT/CREAT RATIO, UR: 0.7
PROTEIN UA: 100 MG/DL
RBC # BLD: 3.01 M/CU MM (ref 4.2–5.4)
RBC URINE: 51 /HPF (ref 0–6)
SEGMENTED NEUTROPHILS ABSOLUTE COUNT: 7.8 K/CU MM
SEGMENTED NEUTROPHILS RELATIVE PERCENT: 78 % (ref 36–66)
SODIUM BLD-SCNC: 139 MMOL/L (ref 135–145)
SODIUM URINE: 34 MMOL/L (ref 35–167)
SPECIFIC GRAVITY UA: 1.02 (ref 1–1.03)
SQUAMOUS EPITHELIAL: 5 /HPF
TOTAL IMMATURE NEUTOROPHIL: 0.08 K/CU MM
TOTAL IRON BINDING CAPACITY: 265 UG/DL (ref 250–450)
TOTAL NUCLEATED RBC: 0 K/CU MM
TOTAL PROTEIN: 5 GM/DL (ref 6.4–8.2)
TRICHOMONAS: ABNORMAL /HPF
UNSATURATED IRON BINDING CAPACITY: 238 UG/DL (ref 110–370)
URINE TOTAL PROTEIN: 94.7 MG/DL
UROBILINOGEN, URINE: NORMAL MG/DL (ref 0.2–1)
VITAMIN B-12: 490.9 PG/ML (ref 211–911)
WBC # BLD: 10.1 K/CU MM (ref 4–10.5)
WBC UA: 4 /HPF (ref 0–5)

## 2020-06-23 PROCEDURE — 2700000000 HC OXYGEN THERAPY PER DAY

## 2020-06-23 PROCEDURE — 6370000000 HC RX 637 (ALT 250 FOR IP): Performed by: INTERNAL MEDICINE

## 2020-06-23 PROCEDURE — 94640 AIRWAY INHALATION TREATMENT: CPT

## 2020-06-23 PROCEDURE — 84156 ASSAY OF PROTEIN URINE: CPT

## 2020-06-23 PROCEDURE — 2580000003 HC RX 258: Performed by: INTERNAL MEDICINE

## 2020-06-23 PROCEDURE — 2580000003 HC RX 258: Performed by: STUDENT IN AN ORGANIZED HEALTH CARE EDUCATION/TRAINING PROGRAM

## 2020-06-23 PROCEDURE — 6360000002 HC RX W HCPCS: Performed by: INTERNAL MEDICINE

## 2020-06-23 PROCEDURE — 1200000000 HC SEMI PRIVATE

## 2020-06-23 PROCEDURE — 84300 ASSAY OF URINE SODIUM: CPT

## 2020-06-23 PROCEDURE — 94761 N-INVAS EAR/PLS OXIMETRY MLT: CPT

## 2020-06-23 PROCEDURE — 81001 URINALYSIS AUTO W/SCOPE: CPT

## 2020-06-23 PROCEDURE — 82570 ASSAY OF URINE CREATININE: CPT

## 2020-06-23 RX ORDER — INSULIN GLARGINE 100 [IU]/ML
30 INJECTION, SOLUTION SUBCUTANEOUS NIGHTLY
Status: DISCONTINUED | OUTPATIENT
Start: 2020-06-23 | End: 2020-06-24

## 2020-06-23 RX ORDER — INSULIN GLARGINE 100 [IU]/ML
20 INJECTION, SOLUTION SUBCUTANEOUS ONCE
Status: COMPLETED | OUTPATIENT
Start: 2020-06-23 | End: 2020-06-23

## 2020-06-23 RX ORDER — CIPROFLOXACIN 500 MG/1
500 TABLET, FILM COATED ORAL EVERY 12 HOURS SCHEDULED
Status: DISCONTINUED | OUTPATIENT
Start: 2020-06-23 | End: 2020-06-26 | Stop reason: HOSPADM

## 2020-06-23 RX ORDER — LACTULOSE 10 G/15ML
20 SOLUTION ORAL 3 TIMES DAILY
Status: DISCONTINUED | OUTPATIENT
Start: 2020-06-23 | End: 2020-06-26 | Stop reason: HOSPADM

## 2020-06-23 RX ADMIN — CARVEDILOL 6.25 MG: 6.25 TABLET, FILM COATED ORAL at 17:47

## 2020-06-23 RX ADMIN — LACTULOSE 20 G: 10 SOLUTION ORAL at 22:08

## 2020-06-23 RX ADMIN — HYDROCODONE BITARTRATE AND ACETAMINOPHEN 2 TABLET: 5; 325 TABLET ORAL at 22:08

## 2020-06-23 RX ADMIN — BUPROPION HYDROCHLORIDE 100 MG: 100 TABLET, FILM COATED, EXTENDED RELEASE ORAL at 09:35

## 2020-06-23 RX ADMIN — HYDROCODONE BITARTRATE AND ACETAMINOPHEN 2 TABLET: 5; 325 TABLET ORAL at 09:42

## 2020-06-23 RX ADMIN — LACTULOSE 20 G: 10 SOLUTION ORAL at 14:49

## 2020-06-23 RX ADMIN — ASPIRIN 81 MG CHEWABLE TABLET 81 MG: 81 TABLET CHEWABLE at 09:35

## 2020-06-23 RX ADMIN — ATORVASTATIN CALCIUM 80 MG: 40 TABLET, FILM COATED ORAL at 22:08

## 2020-06-23 RX ADMIN — SODIUM CHLORIDE, PRESERVATIVE FREE 10 ML: 5 INJECTION INTRAVENOUS at 09:42

## 2020-06-23 RX ADMIN — CLOPIDOGREL BISULFATE 75 MG: 75 TABLET ORAL at 09:34

## 2020-06-23 RX ADMIN — LINAGLIPTIN 5 MG: 5 TABLET, FILM COATED ORAL at 09:35

## 2020-06-23 RX ADMIN — SODIUM CHLORIDE, PRESERVATIVE FREE 10 ML: 5 INJECTION INTRAVENOUS at 23:40

## 2020-06-23 RX ADMIN — ISOSORBIDE MONONITRATE 60 MG: 60 TABLET, EXTENDED RELEASE ORAL at 09:34

## 2020-06-23 RX ADMIN — CIPROFLOXACIN HYDROCHLORIDE 500 MG: 500 TABLET, FILM COATED ORAL at 09:35

## 2020-06-23 RX ADMIN — BUPROPION HYDROCHLORIDE 100 MG: 100 TABLET, FILM COATED, EXTENDED RELEASE ORAL at 22:08

## 2020-06-23 RX ADMIN — ONDANSETRON 4 MG: 2 INJECTION INTRAMUSCULAR; INTRAVENOUS at 04:40

## 2020-06-23 RX ADMIN — DULOXETINE 60 MG: 30 CAPSULE, DELAYED RELEASE ORAL at 09:35

## 2020-06-23 RX ADMIN — BUDESONIDE AND FORMOTEROL FUMARATE DIHYDRATE 2 PUFF: 160; 4.5 AEROSOL RESPIRATORY (INHALATION) at 20:16

## 2020-06-23 RX ADMIN — CIPROFLOXACIN HYDROCHLORIDE 500 MG: 500 TABLET, FILM COATED ORAL at 22:08

## 2020-06-23 RX ADMIN — BUDESONIDE AND FORMOTEROL FUMARATE DIHYDRATE 2 PUFF: 160; 4.5 AEROSOL RESPIRATORY (INHALATION) at 07:16

## 2020-06-23 RX ADMIN — HYDROCODONE BITARTRATE AND ACETAMINOPHEN 2 TABLET: 5; 325 TABLET ORAL at 05:21

## 2020-06-23 RX ADMIN — HYDROCODONE BITARTRATE AND ACETAMINOPHEN 2 TABLET: 5; 325 TABLET ORAL at 14:48

## 2020-06-23 RX ADMIN — Medication 1000 UNITS: at 09:43

## 2020-06-23 RX ADMIN — RANOLAZINE 500 MG: 500 TABLET, FILM COATED, EXTENDED RELEASE ORAL at 22:08

## 2020-06-23 RX ADMIN — PANTOPRAZOLE SODIUM 40 MG: 40 TABLET, DELAYED RELEASE ORAL at 05:21

## 2020-06-23 RX ADMIN — LEVOTHYROXINE SODIUM 50 MCG: 50 TABLET ORAL at 09:43

## 2020-06-23 RX ADMIN — ONDANSETRON 4 MG: 2 INJECTION INTRAMUSCULAR; INTRAVENOUS at 23:07

## 2020-06-23 RX ADMIN — SODIUM CHLORIDE, PRESERVATIVE FREE 10 ML: 5 INJECTION INTRAVENOUS at 22:07

## 2020-06-23 RX ADMIN — TIOTROPIUM BROMIDE INHALATION SPRAY 2 PUFF: 3.12 SPRAY, METERED RESPIRATORY (INHALATION) at 07:15

## 2020-06-23 RX ADMIN — RANOLAZINE 500 MG: 500 TABLET, FILM COATED, EXTENDED RELEASE ORAL at 09:35

## 2020-06-23 RX ADMIN — CARVEDILOL 6.25 MG: 6.25 TABLET, FILM COATED ORAL at 09:43

## 2020-06-23 RX ADMIN — INSULIN GLARGINE 20 UNITS: 100 INJECTION, SOLUTION SUBCUTANEOUS at 22:09

## 2020-06-23 RX ADMIN — ENOXAPARIN SODIUM 40 MG: 40 INJECTION SUBCUTANEOUS at 09:43

## 2020-06-23 RX ADMIN — SODIUM CHLORIDE, PRESERVATIVE FREE 10 ML: 5 INJECTION INTRAVENOUS at 12:22

## 2020-06-23 ASSESSMENT — PAIN DESCRIPTION - PROGRESSION
CLINICAL_PROGRESSION: NOT CHANGED
CLINICAL_PROGRESSION: NOT CHANGED

## 2020-06-23 ASSESSMENT — PAIN SCALES - GENERAL
PAINLEVEL_OUTOF10: 9
PAINLEVEL_OUTOF10: 3
PAINLEVEL_OUTOF10: 0
PAINLEVEL_OUTOF10: 8
PAINLEVEL_OUTOF10: 10
PAINLEVEL_OUTOF10: 9

## 2020-06-23 ASSESSMENT — PAIN DESCRIPTION - LOCATION: LOCATION: SHOULDER

## 2020-06-23 ASSESSMENT — PAIN DESCRIPTION - DESCRIPTORS: DESCRIPTORS: ACHING;CRAMPING;DISCOMFORT

## 2020-06-23 ASSESSMENT — PAIN - FUNCTIONAL ASSESSMENT: PAIN_FUNCTIONAL_ASSESSMENT: PREVENTS OR INTERFERES SOME ACTIVE ACTIVITIES AND ADLS

## 2020-06-23 ASSESSMENT — PAIN DESCRIPTION - FREQUENCY: FREQUENCY: CONTINUOUS

## 2020-06-23 ASSESSMENT — PAIN DESCRIPTION - ORIENTATION: ORIENTATION: RIGHT

## 2020-06-23 ASSESSMENT — PAIN DESCRIPTION - PAIN TYPE: TYPE: CHRONIC PAIN

## 2020-06-23 ASSESSMENT — PAIN DESCRIPTION - ONSET: ONSET: ON-GOING

## 2020-06-23 NOTE — PROGRESS NOTES
Nephrology Progress Note  6/23/2020 1:07 PM        Subjective:   Admit Date: 6/20/2020  PCP: Saleem Roa MD    Interval History: pt seen in early am     Diet: some    ROS:   Not feeling good- / fell last pm - she is in her chair this am - low UOP 1350    Data:     Current meds:    insulin glargine  30 Units Subcutaneous Nightly    ciprofloxacin  500 mg Oral 2 times per day    enoxaparin  40 mg Subcutaneous Daily    insulin lispro  0-12 Units Subcutaneous 2 times per day    linagliptin  5 mg Oral Daily    aspirin  81 mg Oral Daily    atorvastatin  80 mg Oral Nightly    budesonide-formoterol  2 puff Inhalation BID    carvedilol  6.25 mg Oral BID WC    buPROPion  100 mg Oral BID    clopidogrel  75 mg Oral Daily    DULoxetine  60 mg Oral Daily    isosorbide mononitrate  60 mg Oral Daily    levothyroxine  50 mcg Oral Daily    pantoprazole  40 mg Oral QAM AC    ranolazine  500 mg Oral BID    tiotropium  2 puff Inhalation Daily    vitamin D  1,000 Units Oral Daily    sodium chloride flush  10 mL Intravenous 2 times per day    insulin lispro  0-12 Units Subcutaneous TID WC    sodium chloride flush  10 mL Intravenous 2 times per day      dextrose           I/O last 3 completed shifts: In: 65 [P.O.:840;  I.V.:10]  Out: 1350 [Urine:1350]    CBC:   Recent Labs     06/21/20  0600 06/22/20  0556 06/22/20  2341   WBC 11.3* 8.8 10.1   HGB 7.8* 7.6* 7.5*    272 254          Recent Labs     06/21/20  0600 06/22/20  0556 06/22/20  2341    140 139   K 4.4 4.2 4.4   CL 99 100 99   CO2 32 32 31   BUN 33* 36* 39*   CREATININE 1.7* 2.0* 2.3*   GLUCOSE 220* 92 149*  150*       Lab Results   Component Value Date    CALCIUM 8.5 06/22/2020    PHOS 4.0 05/06/2020       Objective:     Vitals: BP (!) 118/56   Pulse 78   Temp 98.2 °F (36.8 °C) (Oral)   Resp 17   Ht 5' 4\" (1.626 m)   Wt (!) 316 lb 9.6 oz (143.6 kg)   SpO2 96%   BMI 54.34 kg/m²     General appearance:  No ac distress  HEENT:  + conj

## 2020-06-23 NOTE — PROGRESS NOTES
failure ([facility] [address]    [facility], [facility]    Morbid obesity with BMI of 50.0-59.9, adult ([facility]    Elevated lactic acid level, resolved    Musculoskeletal chest pain    Essential hypertension    Diabetes mellitus with hyperglycemia (HCC)    Severe dehydration secondary to significant hyperglycemia    Sepsis secondary to UTI, POA    Generalized weakness    Sepsis associated hypotension, POA    E. coli UTI (urinary tract infection)    Syncope    Acute pain of right shoulder    Hypotension    DM (diabetes mellitus), secondary uncontrolled (HCC)    Generalized anxiety disorder    Nausea & vomiting    Fever    Debility    Gait disturbance    Acute respiratory failure (HCC)    Hyperglycemia    Pleural effusion on left    UTI (urinary tract infection), bacterial    Sinus tachycardia    Uncontrolled type 2 diabetes mellitus with hyperglycemia (HCC)    Class 3 severe obesity due to excess calories with body mass index (BMI) of 45.0 to 49.9 in adult [facility])    Anarc    [facility] ([facility]    DM (diabetes mellitus) (HCC)    Fluid overload    Cor pulmonale (chronic) (HCC)    Cellulitis of abdominal wall    STEMI (ST elevation myocardial infarction) (HCC)    Acute respiratory distress    Hyperkalemia    Uncontrolled diabetes mellitus with chronic kidney disease (HCC)    Acute on chronic diastolic (congestive) heart failure (HCC)    COPD with acute exacerbation (HCC)    Heart failure (HCC)    CHF (congestive heart failure), NYHA class I, acute, systolic (HCC)    Sepsis (HCC)        Allergies   Allergen Reactions    Augmentin [Amoxicillin-Pot Clavulanate] Diarrhea        Current Inpatient Medications:    Current Facility-Administered Medications   Medication Dose Route Frequency Provider Last Rate Last Dose    insulin glargine (LANTUS) injection vial 30 Units  30 Units Subcutaneous Nightly M Brittany Mayfield MD        furosemide (LASIX) injection 20 mg  20 mg Intravenous BID Divina Torres MD   20 mg at 06/22/20 1811    insulin lispro (HUMALOG) injection vial 0-12 Units  0-12 Units Subcutaneous 2 times per day Taran Foster MD   2 Units at 06/23/20 0323    linagliptin (TRADJENTA) tablet 5 mg  5 mg Oral Daily Taran Foster MD        [facility]) [facility] 4 mg  4 mg Intravenous Q30 Min PRN Faye Mendez MD   4 mg at 06/20/20 0853    albuterol sulfate  (90 Base) MCG/ACT inhaler 2 puff  2 puff Inhalation Q4H PRN Faye Mendez MD        aspirin chewable tablet 81 mg  81 mg Oral Daily Jonatan Awad MD   81 mg at 06/22/20 0810    atorvastatin (LIPITOR) tablet 80 mg  80 mg Oral Nightly Jonatan Awad MD   80 mg at 06/22/20 1959    budesonide-formoterol (SYMBICORT) 160-4.5 MCG/ACT inhaler 2 puff  2 puff Inhalation BID Faye Mendez MD   2 puff at 06/23/20 0716    carvedilol (COREG) tablet 6.25 mg  6.25 mg Oral BID WC Jonatan Awad MD   6.25 mg at 06/22/20 1701    buPROPion [facility]) extended release tablet 100 mg  100 mg Oral BID Faye Mendez MD   100 mg at 06/22/20 1959    clopidogrel (PLAVIX) tablet 75 mg  75 mg Oral Daily Jonatan Awad MD   75 mg at 06/22/20 5733    DULoxetine (CYMBALTA) extended release capsule 60 mg  60 mg Oral Daily Jonatan Awad MD   60 mg at 06/22/20 0813    HYDROcodone-acetaminophen (NORCO) 5-325 MG per tablet 2 tablet  2 tablet Oral Q4H PRN Faye Mendez MD   2 tablet at 06/23/20 0521    isosorbide mononitrate (IMDUR) extended release tablet 60 mg  60 mg Oral Daily Jonatan Awad MD   60 mg at 06/22/20 0813    levothyroxine (SYNTHROID) tablet 50 mcg  50 mcg Oral Daily Jonatan Awad MD   50 mcg at 06/22/20 0531    pantoprazole (PROTONIX) tablet 40 mg  40 mg Oral QAM AC Jonatan Awad MD   40 mg at 06/23/20 0521    ranolazine (RANEXA) extended release tablet 500 mg  500 mg Oral BID Faye Mendez MD   500 mg at 06/22/20 1959    tiotropium (SPIRIVA RESPIMAT) 2.5 MCG/ACT inhaler 2 puff  2 puff

## 2020-06-23 NOTE — PROGRESS NOTES
Nutrition Assessment    Type and Reason for Visit: Initial, Patient Education(obese)    Nutrition Recommendations:   Continue current diet per order    Nutrition Assessment: Admit to eval for sepsis. Currently on cardiac diet with 1800 ml fluid restriction. Tolerating diet, good appetite. Discussed current diet with low fat low sodium foods. Patient has received diet ed many times for carb controlled, low sodium heart healthy diets. Able to converse well about diets with good understanding of best food choices but compliance/follow through at home questionable. Will continue to reinforce meal plan during stay. Low nutrition risk at this time.      Malnutrition Assessment:  · Malnutrition Status: No malnutrition  · Context: Chronic illness    Nutrition Risk Level: Low    Nutrient Needs:  · Estimated Daily Total Kcal: 0534-8818 (11-15 josue/kg current weight with BMI over 40)  · Estimated Daily Protein (g): 75 (1.4 g/kg IBW)   · Estimated Daily Total Fluid (ml/day): 6935-9289 (1ml/josue)     Nutrition Diagnosis:   · Problem: No nutrition diagnosis at this time    Objective Information:  · Nutrition-Focused Physical Findings: Up in chiar on visit, hx obesity and now CKD, tearful during visit  · Wound Type: None  · Current Nutrition Therapies:  · Oral Diet Orders: Cardiac, Fluid Restriction   · Oral Diet intake: %  · Oral Nutrition Supplement (ONS) Orders: None  · ONS intake:    · Anthropometric Measures:  · Ht: 5' 4\" (162.6 cm)   · Current Body Wt: 316 lb 9.3 oz (143.6 kg)  · Admission Body Wt: 316 lb (143.3 kg)  · Usual Body Wt: (per hx over 300# )  · % Weight Change:  ,  no recent loss but sharonda be desired   · Ideal Body Wt: 120 lb (54.4 kg), % [place] [address] 263  · BMI Classification: BMI > or equal to 40.0 Obese Class III(BMI-54.5)    Nutrition Interventions:   Continue current diet  Continued Inpatient Monitoring, Education Initiated, Coordination of Care    Nutrition Evaluation:   · Evaluation: Goals set · Goals: patient will comply with current diet, meal intake 50-75%     · Monitoring: Weight, Pertinent Labs, Meal Intake, Diet Tolerance, Patient/Family Education      Electronically signed by Stefanie Mackay RD, JASPER on 6/23/20 at 12:06 PM EDT    Contact Number: [phone]

## 2020-06-23 NOTE — PLAN OF CARE
Problem: Falls - Risk of:  Goal: Will remain free from falls  Outcome: Ongoing  Goal: Absence of physical injury  Outcome: Ongoing     Problem: Discharge Planning:  Goal: Discharged to appropriate level of care  Outcome: Ongoing     Problem: Gas Exchange - Impaired:  Goal: Levels of oxygenation will improve  Outcome: Ongoing     Problem: Infection, Septic Shock:  Goal: Will show no infection signs and symptoms  Outcome: Ongoing     Problem: Infection - Ventilator-Associated Pneumonia:  Goal: Absence of pulmonary infection  Outcome: Ongoing     Problem: Serum Glucose Level - Abnormal:  Goal: Ability to maintain appropriate glucose levels will improve  Outcome: Ongoing     Problem: Tissue Perfusion, Altered:  Goal: Circulatory function within specified parameters  Outcome: Ongoing     Problem: Venous Thromboembolism:  Goal: Will show no signs or symptoms of venous thromboembolism  Outcome: Ongoing  Goal: Absence of signs or symptoms of impaired coagulation  Outcome: Ongoing     Problem:  Activity:  Goal: Capacity to carry out activities will improve  Outcome: Ongoing  Goal: Will verbalize the importance of balancing activity with adequate rest periods  Outcome: Ongoing     Problem: Cardiac:  Goal: Hemodynamic stability will improve  Outcome: Ongoing  Goal: Ability to maintain an adequate cardiac output will improve  Outcome: Ongoing     Problem: Coping:  Goal: Verbalizations of decreased anxiety will decrease  Outcome: Ongoing     Problem: Fluid Volume:  Goal: Risk for excess fluid volume will decrease  Outcome: Ongoing  Goal: Maintenance of adequate hydration will improve  Outcome: Ongoing  Goal: Will show no signs and symptoms of electrolyte imbalance  Outcome: Ongoing     Problem: Health Behavior:  Goal: Ability to manage health-related needs will improve  Outcome: Ongoing  Goal: Ability to seek appropriate health care will improve  Outcome: Ongoing     Problem: Nutritional:  Goal: Maintenance of adequate nutrition will improve  Outcome: Ongoing     Problem: Physical Regulation:  Goal: Complications related to the disease process, condition or treatment will be avoided or minimized  Outcome: Ongoing     Problem: Respiratory:  Goal: Ability to maintain adequate ventilation will improve  Outcome: Ongoing  Goal: Respiratory status will improve  Outcome: Ongoing     Problem: Sensory Perception - Impaired:  Goal: Ability to maintain a stable neurologic state will improve  Outcome: Ongoing     Problem: Pain:  Goal: Pain level will decrease  Outcome: Ongoing  Goal: Control of acute pain  Outcome: Ongoing  Goal: Control of chronic pain  Outcome: Ongoing

## 2020-06-23 NOTE — PROGRESS NOTES
non-focal.  Mental status: Alert, oriented, thought content appropriate. Data    Recent Labs     06/21/20  0600 06/22/20  0556 06/22/20  2341   WBC 11.3* 8.8 10.1   HGB 7.8* 7.6* 7.5*   HCT 26.9* 26.2* 25.8*    272 254      Recent Labs     06/21/20  0600 06/22/20  0556 06/22/20  2341    140 139   K 4.4 4.2 4.4   CL 99 100 99   CO2 32 32 31   BUN 33* 36* 39*   CREATININE 1.7* 2.0* 2.3*     Recent Labs     06/21/20  0600 06/22/20  0556 06/22/20  2341   AST 9* 11* 11*   ALT 9* 9* 9*   BILITOT 0.6 0.4 0.3   ALKPHOS 95 86 88     Recent Labs     06/20/20  1639 06/21/20  0600 06/22/20  0556   INR 1.03 1.08 1.13     No results for input(s): CKTOTAL, CKMB, CKMBINDEX, TROPONINI in the last 72 hours.     Imaging/Test Results    Urine cx E coli   Hb 7.5            Consults:     IP CONSULT TO HOSPITALIST  IP CONSULT TO CARDIOLOGY  IP CONSULT TO SOCIAL WORK  IP CONSULT TO NEPHROLOGY  IP CONSULT TO IV TEAM  IP CONSULT TO IV TEAM  IP CONSULT TO DIETITIAN  IP CONSULT TO DIABETES EDUCATOR  IP CONSULT TO ENDOCRINOLOGY    Allergies  Augmentin [amoxicillin-pot clavulanate]    Medications      Scheduled Meds:   insulin glargine  30 Units Subcutaneous Nightly    ciprofloxacin  500 mg Oral 2 times per day    enoxaparin  40 mg Subcutaneous Daily    lactulose  20 g Oral TID    insulin lispro  0-12 Units Subcutaneous 2 times per day    linagliptin  5 mg Oral Daily    aspirin  81 mg Oral Daily    atorvastatin  80 mg Oral Nightly    budesonide-formoterol  2 puff Inhalation BID    carvedilol  6.25 mg Oral BID WC    buPROPion  100 mg Oral BID    clopidogrel  75 mg Oral Daily    DULoxetine  60 mg Oral Daily    isosorbide mononitrate  60 mg Oral Daily    levothyroxine  50 mcg Oral Daily    pantoprazole  40 mg Oral QAM AC    ranolazine  500 mg Oral BID    tiotropium  2 puff Inhalation Daily    vitamin D  1,000 Units Oral Daily    sodium chloride flush  10 mL Intravenous 2 times per day    insulin lispro  0-12 Units Subcutaneous TID WC    sodium chloride flush  10 mL Intravenous 2 times per day       Infusions:   dextrose         PRN Meds:  ondansetron, albuterol sulfate HFA, HYDROcodone-acetaminophen, sodium chloride flush, acetaminophen **OR** acetaminophen, polyethylene glycol, promethazine **OR** ondansetron, glucose, dextrose, glucagon (rDNA), dextrose, sodium chloride flush    ASSESSMENT AND PLAN      Sepsis--of urinary origin, leukocytosis, tachycardia, urine cx with e coli, PO cipro per sensitivities, resolved    Acute on chronic LV systolic HF EF 47%--GAFMQ neg, improved,  monitor I/o, chem daily, daily weights, hyponatremia improved, diuresed with IV lasix, Cr increased today, holding diuresis today, repeat chem tomorrow, on home 2L [place], mixon in place  §   · Acute on CKD3 with baseline creatinine--Cr 2.3 up from 1.6, likely d/t IV diuresis, neph following, repeat chem tomorrow  · DM type 2 uncontrolled a1c 9.1--endocrine consult, SSI  · Morbid obesity with BMI of 52--nutrition consulted  · Chronic hypoxic respiratory failure/ COPD.  Stable  · Chronic tobacco abuse, remission  · CAD s/p PCI to hx  · HLD, stable  · HTN, stable   · Chronic anxiety, depression  · Hypothyroidism     Mixon in place, will d/c tomorrow    Fall--PT/OT, head CT neg, pt likely needs SNF but declining wants to go home with [facility], fall precautions    Anemia--iron studies c/w ACD, h/h stable, will monitor    Constipation--start lactulose    PT/OT Eval Status: ordered    DVT Prophylaxis: SQ lovenox  Diet: DIET CARDIAC; Daily Fluid Restriction: 1800 ml  Code Status: Full Code      Dispo - pt wants to go home with [facility], refusing SNF, frequent admissions,  d/c mixon tomorrow if Cr improved,  d/c with [facility] in 2-3 days    Tomy Harrell MD

## 2020-06-24 LAB
ALBUMIN SERPL-MCNC: 3.6 GM/DL (ref 3.4–5)
ALP BLD-CCNC: 84 IU/L (ref 40–128)
ALT SERPL-CCNC: 9 U/L (ref 10–40)
ANION GAP SERPL CALCULATED.3IONS-SCNC: 9 MMOL/L (ref 4–16)
AST SERPL-CCNC: 14 IU/L (ref 15–37)
BASOPHILS ABSOLUTE: 0 K/CU MM
BASOPHILS RELATIVE PERCENT: 0.3 % (ref 0–1)
BILIRUB SERPL-MCNC: 0.3 MG/DL (ref 0–1)
BUN BLDV-MCNC: 45 MG/DL (ref 6–23)
CALCIUM SERPL-MCNC: 8.5 MG/DL (ref 8.3–10.6)
CHLORIDE BLD-SCNC: 101 MMOL/L (ref 99–110)
CO2: 30 MMOL/L (ref 21–32)
CREAT SERPL-MCNC: 2.6 MG/DL (ref 0.6–1.1)
DIFFERENTIAL TYPE: ABNORMAL
EOSINOPHILS ABSOLUTE: 0.2 K/CU MM
EOSINOPHILS RELATIVE PERCENT: 2.7 % (ref 0–3)
GFR AFRICAN AMERICAN: 23 ML/MIN/1.73M2
GFR NON-AFRICAN AMERICAN: 19 ML/MIN/1.73M2
GLUCOSE BLD-MCNC: 119 MG/DL (ref 70–99)
GLUCOSE BLD-MCNC: 136 MG/DL (ref 70–99)
GLUCOSE BLD-MCNC: 139 MG/DL (ref 70–99)
GLUCOSE BLD-MCNC: 140 MG/DL (ref 70–99)
GLUCOSE BLD-MCNC: 167 MG/DL (ref 70–99)
GLUCOSE BLD-MCNC: 175 MG/DL (ref 70–99)
HCT VFR BLD CALC: 25.1 % (ref 37–47)
HEMOGLOBIN: 7.1 GM/DL (ref 12.5–16)
HIGH SENSITIVE C-REACTIVE PROTEIN: 19 MG/L
IMMATURE NEUTROPHIL %: 0.6 % (ref 0–0.43)
LYMPHOCYTES ABSOLUTE: 1.1 K/CU MM
LYMPHOCYTES RELATIVE PERCENT: 12.6 % (ref 24–44)
MCH RBC QN AUTO: 24.6 PG (ref 27–31)
MCHC RBC AUTO-ENTMCNC: 28.3 % (ref 32–36)
MCV RBC AUTO: 86.9 FL (ref 78–100)
MONOCYTES ABSOLUTE: 0.6 K/CU MM
MONOCYTES RELATIVE PERCENT: 6.9 % (ref 0–4)
NUCLEATED RBC %: 0 %
PDW BLD-RTO: 16 % (ref 11.7–14.9)
PLATELET # BLD: 245 K/CU MM (ref 140–440)
PMV BLD AUTO: 10 FL (ref 7.5–11.1)
POTASSIUM SERPL-SCNC: 4.4 MMOL/L (ref 3.5–5.1)
RBC # BLD: 2.89 M/CU MM (ref 4.2–5.4)
SEGMENTED NEUTROPHILS ABSOLUTE COUNT: 6.8 K/CU MM
SEGMENTED NEUTROPHILS RELATIVE PERCENT: 76.9 % (ref 36–66)
SODIUM BLD-SCNC: 140 MMOL/L (ref 135–145)
TOTAL IMMATURE NEUTOROPHIL: 0.05 K/CU MM
TOTAL NUCLEATED RBC: 0 K/CU MM
TOTAL PROTEIN: 5.2 GM/DL (ref 6.4–8.2)
WBC # BLD: 8.8 K/CU MM (ref 4–10.5)

## 2020-06-24 PROCEDURE — 97530 THERAPEUTIC ACTIVITIES: CPT

## 2020-06-24 PROCEDURE — 80053 COMPREHEN METABOLIC PANEL: CPT

## 2020-06-24 PROCEDURE — 6360000002 HC RX W HCPCS: Performed by: INTERNAL MEDICINE

## 2020-06-24 PROCEDURE — 94761 N-INVAS EAR/PLS OXIMETRY MLT: CPT

## 2020-06-24 PROCEDURE — 2580000003 HC RX 258: Performed by: STUDENT IN AN ORGANIZED HEALTH CARE EDUCATION/TRAINING PROGRAM

## 2020-06-24 PROCEDURE — 97116 GAIT TRAINING THERAPY: CPT

## 2020-06-24 PROCEDURE — 97166 OT EVAL MOD COMPLEX 45 MIN: CPT

## 2020-06-24 PROCEDURE — 6370000000 HC RX 637 (ALT 250 FOR IP): Performed by: INTERNAL MEDICINE

## 2020-06-24 PROCEDURE — 1200000000 HC SEMI PRIVATE

## 2020-06-24 PROCEDURE — 97535 SELF CARE MNGMENT TRAINING: CPT

## 2020-06-24 PROCEDURE — 97162 PT EVAL MOD COMPLEX 30 MIN: CPT

## 2020-06-24 PROCEDURE — 2580000003 HC RX 258: Performed by: INTERNAL MEDICINE

## 2020-06-24 PROCEDURE — 82962 GLUCOSE BLOOD TEST: CPT

## 2020-06-24 PROCEDURE — 86141 C-REACTIVE PROTEIN HS: CPT

## 2020-06-24 PROCEDURE — 85025 COMPLETE CBC W/AUTO DIFF WBC: CPT

## 2020-06-24 PROCEDURE — 94640 AIRWAY INHALATION TREATMENT: CPT

## 2020-06-24 PROCEDURE — 2700000000 HC OXYGEN THERAPY PER DAY

## 2020-06-24 RX ORDER — INSULIN GLARGINE 100 [IU]/ML
20 INJECTION, SOLUTION SUBCUTANEOUS NIGHTLY
Status: DISCONTINUED | OUTPATIENT
Start: 2020-06-24 | End: 2020-06-26 | Stop reason: HOSPADM

## 2020-06-24 RX ADMIN — CARVEDILOL 6.25 MG: 6.25 TABLET, FILM COATED ORAL at 09:02

## 2020-06-24 RX ADMIN — LEVOTHYROXINE SODIUM 50 MCG: 50 TABLET ORAL at 09:10

## 2020-06-24 RX ADMIN — PROMETHAZINE HYDROCHLORIDE 12.5 MG: 25 TABLET ORAL at 22:41

## 2020-06-24 RX ADMIN — RANOLAZINE 500 MG: 500 TABLET, FILM COATED, EXTENDED RELEASE ORAL at 22:40

## 2020-06-24 RX ADMIN — BUPROPION HYDROCHLORIDE 100 MG: 100 TABLET, FILM COATED, EXTENDED RELEASE ORAL at 22:40

## 2020-06-24 RX ADMIN — CARVEDILOL 6.25 MG: 6.25 TABLET, FILM COATED ORAL at 16:11

## 2020-06-24 RX ADMIN — INSULIN GLARGINE 20 UNITS: 100 INJECTION, SOLUTION SUBCUTANEOUS at 22:42

## 2020-06-24 RX ADMIN — ASPIRIN 81 MG CHEWABLE TABLET 81 MG: 81 TABLET CHEWABLE at 09:02

## 2020-06-24 RX ADMIN — PANTOPRAZOLE SODIUM 40 MG: 40 TABLET, DELAYED RELEASE ORAL at 09:10

## 2020-06-24 RX ADMIN — HYDROCODONE BITARTRATE AND ACETAMINOPHEN 2 TABLET: 5; 325 TABLET ORAL at 22:40

## 2020-06-24 RX ADMIN — ISOSORBIDE MONONITRATE 60 MG: 60 TABLET, EXTENDED RELEASE ORAL at 09:02

## 2020-06-24 RX ADMIN — SODIUM CHLORIDE, PRESERVATIVE FREE 10 ML: 5 INJECTION INTRAVENOUS at 09:10

## 2020-06-24 RX ADMIN — RANOLAZINE 500 MG: 500 TABLET, FILM COATED, EXTENDED RELEASE ORAL at 09:02

## 2020-06-24 RX ADMIN — HYDROCODONE BITARTRATE AND ACETAMINOPHEN 2 TABLET: 5; 325 TABLET ORAL at 17:53

## 2020-06-24 RX ADMIN — SODIUM CHLORIDE, PRESERVATIVE FREE 10 ML: 5 INJECTION INTRAVENOUS at 22:42

## 2020-06-24 RX ADMIN — HYDROCODONE BITARTRATE AND ACETAMINOPHEN 2 TABLET: 5; 325 TABLET ORAL at 09:09

## 2020-06-24 RX ADMIN — ATORVASTATIN CALCIUM 80 MG: 40 TABLET, FILM COATED ORAL at 22:40

## 2020-06-24 RX ADMIN — ONDANSETRON 4 MG: 2 INJECTION INTRAMUSCULAR; INTRAVENOUS at 17:15

## 2020-06-24 RX ADMIN — INSULIN LISPRO 2 UNITS: 100 INJECTION, SOLUTION INTRAVENOUS; SUBCUTANEOUS at 12:47

## 2020-06-24 RX ADMIN — TIOTROPIUM BROMIDE INHALATION SPRAY 2 PUFF: 3.12 SPRAY, METERED RESPIRATORY (INHALATION) at 11:25

## 2020-06-24 RX ADMIN — DULOXETINE 60 MG: 30 CAPSULE, DELAYED RELEASE ORAL at 09:02

## 2020-06-24 RX ADMIN — ONDANSETRON 4 MG: 2 INJECTION INTRAMUSCULAR; INTRAVENOUS at 08:32

## 2020-06-24 RX ADMIN — CLOPIDOGREL BISULFATE 75 MG: 75 TABLET ORAL at 09:02

## 2020-06-24 RX ADMIN — BUDESONIDE AND FORMOTEROL FUMARATE DIHYDRATE 2 PUFF: 160; 4.5 AEROSOL RESPIRATORY (INHALATION) at 11:25

## 2020-06-24 RX ADMIN — CIPROFLOXACIN HYDROCHLORIDE 500 MG: 500 TABLET, FILM COATED ORAL at 22:40

## 2020-06-24 RX ADMIN — Medication 1000 UNITS: at 09:02

## 2020-06-24 RX ADMIN — CIPROFLOXACIN HYDROCHLORIDE 500 MG: 500 TABLET, FILM COATED ORAL at 09:02

## 2020-06-24 RX ADMIN — LACTULOSE 20 G: 10 SOLUTION ORAL at 22:39

## 2020-06-24 RX ADMIN — LACTULOSE 20 G: 10 SOLUTION ORAL at 09:03

## 2020-06-24 RX ADMIN — ENOXAPARIN SODIUM 40 MG: 40 INJECTION SUBCUTANEOUS at 09:02

## 2020-06-24 RX ADMIN — SODIUM CHLORIDE, PRESERVATIVE FREE 10 ML: 5 INJECTION INTRAVENOUS at 09:11

## 2020-06-24 RX ADMIN — BUDESONIDE AND FORMOTEROL FUMARATE DIHYDRATE 2 PUFF: 160; 4.5 AEROSOL RESPIRATORY (INHALATION) at 20:09

## 2020-06-24 RX ADMIN — LACTULOSE 20 G: 10 SOLUTION ORAL at 13:43

## 2020-06-24 RX ADMIN — HYDROCODONE BITARTRATE AND ACETAMINOPHEN 2 TABLET: 5; 325 TABLET ORAL at 13:43

## 2020-06-24 RX ADMIN — BUPROPION HYDROCHLORIDE 100 MG: 100 TABLET, FILM COATED, EXTENDED RELEASE ORAL at 09:02

## 2020-06-24 ASSESSMENT — PAIN DESCRIPTION - PROGRESSION

## 2020-06-24 ASSESSMENT — PAIN SCALES - GENERAL
PAINLEVEL_OUTOF10: 10
PAINLEVEL_OUTOF10: 10
PAINLEVEL_OUTOF10: 8
PAINLEVEL_OUTOF10: 0
PAINLEVEL_OUTOF10: 8
PAINLEVEL_OUTOF10: 10

## 2020-06-24 ASSESSMENT — PAIN DESCRIPTION - PAIN TYPE: TYPE: CHRONIC PAIN

## 2020-06-24 ASSESSMENT — PAIN DESCRIPTION - FREQUENCY: FREQUENCY: CONTINUOUS

## 2020-06-24 ASSESSMENT — PAIN DESCRIPTION - LOCATION: LOCATION: BACK

## 2020-06-24 ASSESSMENT — PAIN DESCRIPTION - DESCRIPTORS: DESCRIPTORS: CONSTANT

## 2020-06-24 ASSESSMENT — PAIN DESCRIPTION - ONSET: ONSET: ON-GOING

## 2020-06-24 ASSESSMENT — PAIN DESCRIPTION - ORIENTATION: ORIENTATION: RIGHT;LEFT

## 2020-06-24 NOTE — PROGRESS NOTES
Hospitalist Progress Note      Name:  Emeli Murphy /Age/Sex: 1960  (61 y.o. female)   MRN & CSN:  [id number] & [id number] Admission Date/Time: 2020  7:08 AM   Location:  [address] [place] [address] PCP: Angelina Castro MD         Hospital Day: 5    Assessment and Plan:   Emeli Murphy is a 61 y.o.  female  who presents with    1) Sepsis 2/2 UTI  -UA positive for UTI  -Urine Cx: E coli sensitive to cipro  -Continue Cipro      2) Acute on chronic LV systolic HF EF 88%  -Covid neg  -Improved with IV diuresis  -Diuresis on hold due to rise in Cr  -Cardiology on board    3) Acute on CKD3  -Likely due to over diuresis  -Lasix on hold  -Avoid neprotoxic medications  -Nephrology on board    Other chronic medical conditions; medication resumed unless contraindicated  -DM Type 2  -Morbid Obesity  -Chronic hypoxic failure on 2-4L of O2 2/2 COPD  -Nicotine Use disorder  -HL  -HTN  -Hypothyroidism  -Depression and Anxiety disoder       PT/OT Eval Status:Recommended SNF    Diet DIET CARDIAC; Daily Fluid Restriction: 1800 ml   DVT Prophylaxis [] Lovenox, []  Heparin, [] SCDs, [] Ambulation   GI Prophylaxis [] PPI,  [] H2 Blocker,  [] Carafate,  [] Diet/Tube Feeds   Code Status Full Code   Disposition TBD   MDM      History of Present Illness:     Patient was seen and examined  Denied any worsening SOB   No chest pain, palpitations  No fever, chills, N/V/D    Ten point ROS reviewed negative, unless as noted above    Objective: Intake/Output Summary (Last 24 hours) at 2020 1312  Last data filed at 2020 0911  Gross per 24 hour   Intake 20 ml   Output 1200 ml   Net -1180 ml      Vitals:   Vitals:    20 0858   BP: 138/62   Pulse: 89   Resp: 16   Temp: 99.1 °F (37.3 °C)   SpO2: 95%     Physical Exam:   GEN Awake female, sitting upright in bed in no apparent distress. Appears given age. EYES Pupils are equally round. No scleral erythema, discharge, or conjunctivitis.   HENT Mucous membranes are moist. Oral Q30 Min PRN  albuterol sulfate HFA, 2 puff, Q4H PRN  HYDROcodone-acetaminophen, 2 tablet, Q4H PRN  sodium chloride flush, 10 mL, PRN  acetaminophen, 650 mg, Q6H PRN    Or  acetaminophen, 650 mg, Q6H PRN  polyethylene glycol, 17 g, Daily PRN  promethazine, 12.5 mg, Q6H PRN    Or  ondansetron, 4 mg, Q6H PRN  glucose, 15 g, PRN  dextrose, 12.5 g, PRN  glucagon (rDNA), 1 mg, PRN  dextrose, 100 mL/hr, PRN  sodium chloride flush, 10 mL, PRN          Electronically signed by Nicol Samuels MD on 6/24/2020 at 1:12 PM

## 2020-06-24 NOTE — PROGRESS NOTES
fluid collection. No evidence of reason territorial infarct. Unchanged left basal ganglia lacunar infarct. There are nonspecific areas of hypoattenuation in the periventricular white matter and centrum semiovale that are likely related to chronic small vessel ischemic disease. The ventricles and cisternal spaces are prominent consistent with cerebral atrophy. There is no evidence of hydrocephalus. There is intracranial atherosclerosis. ORBITS: The visualized portion of the orbits demonstrate no acute abnormality. SINUSES: The visualized paranasal sinuses and mastoid air cells demonstrate no acute abnormality. SOFT TISSUES/SKULL:  No acute abnormality of the visualized skull or soft tissues. No acute intracranial abnormality. Ct Chest Wo Contrast    Result Date: 6/20/2020  EXAMINATION: CT OF THE CHEST WITHOUT CONTRAST 6/20/2020 9:49 am TECHNIQUE: CT of the chest was performed without the administration of intravenous contrast. Multiplanar reformatted images are provided for review. Dose modulation, iterative reconstruction, and/or weight based adjustment of the mA/kV was utilized to reduce the radiation dose to as low as reasonably achievable. COMPARISON: X-ray done earlier today and CT scan done May 13, 2020 HISTORY: ORDERING SYSTEM PROVIDED HISTORY: dyspnea/tachy TECHNOLOGIST PROVIDED HISTORY: Reason for exam:->dyspnea/tachy Reason for Exam: shortness of breath, chest pain FINDINGS: Mediastinum: There is a stable small 1 cm subcarinal lymph node. There is no new adenopathy. Atherosclerotic disease seen in the aorta and coronary arteries. The heart appears unremarkable. Lungs/pleura: The right apical nodule previously seen on the prior CT scan is no longer present likely represent atelectatic changes or possible old section. The opacity within the anterior right middle lobe which has a ground-glass appearance on the prior examination has also resolved.   There is no evidence for alveolar extremities normal, , has edema  Neurologic:  Awake, alert, oriented to name, place and time. Cranial nerves II-XII are grossly intact. Motor is  intact. Sensory neuropathy. ,  and gait is abnormal.    Assessment:     Patient Active Problem List:     CKD (chronic kidney disease) stage 3, GFR 30-59 ml/min ([facility])     CAD (coronary artery disease)     Chronic diastolic heart failure ([facility])     Diabetes type 2, uncontrolled ([address]     Morbid obesity with BMI of 50.0-59.9, adult ([facility])     Elevated lactic acid level, resolved     Musculoskeletal chest pain     Essential hypertension     Diabetes mellitus with hyperglycemia ([facility])     Severe dehydration secondary to significant hyperglycemia     Sepsis secondary to UTI, POA     Generalized weakness     Sepsis associated hypotension, POA     E. coli UTI (urinary tract infection)     Syncope     Acute pain of right shoulder     Hypotension     DM (diabetes mellitus), secondary uncontrolled ([facility])     Generalized anxiety disorder     Nausea & vomiting     Fever     Debility     Gait disturbance     Acute respiratory failure ([facility])     Hyperglycemia     Pleural effusion on left     UTI (urinary tract infection), bacterial     Sinus tachycardia     Uncontrolled type 2 diabetes mellitus with hyperglycemia ([facility])     Class 3 severe obesity due to excess calories with body mass index (BMI) of 45.0 to 49.9 in adult ([address]     Anasarca     Acute kidney injury ([address]     DM (diabetes mellitus) ([facility])     Fluid overload     Cor pulmonale (chronic) ([facility])     Cellulitis of abdominal wall     STEMI (ST elevation myocardial infarction) ([address]     Acute respiratory distress     Hyperkalemia     Uncontrolled diabetes mellitus with chronic kidney disease ([facility])     Acute on chronic diastolic (congestive) heart failure ([facility])     COPD with acute exacerbation ([facility])     Heart failure ([facility])     CHF (congestive heart failure), NYHA class I, acute, systolic ([address]     Sepsis ([address]      Plan:     1.  Reviewed

## 2020-06-24 NOTE — PROGRESS NOTES
pain/n/v TECHNOLOGIST PROVIDED HISTORY: Reason for exam:->abd pain/n/v Additional Contrast?->None Reason for Exam: abd pain/n/v FINDINGS: Lower Chest: The pleural based density seen on the prior CT exam have resolved. There is some minimal atelectatic changes seen at the lung bases. Organs: The liver appears grossly normal.  The gallbladder is been removed. The pancreas, adrenal glands and spleen are normal.  There is a small 3 mm nephrolith noted within the left kidney stable from the prior exam there is no evidence for obstruction. GI/Bowel: The large and small bowel of the abdomen is normal. There is no evidence for free air or free fluid noted to be present. Pelvis: A Reyes catheter is in place. There is no free air or free fluid Peritoneum/Retroperitoneum: Atherosclerotic disease seen in the aorta there is no aneurysm there is no pathologically enlarged adenopathy. Bones/Soft Tissues: There are no acute bony abnormalities. There are no acute soft tissue abnormalities. No acute intra-abdominal or pelvic process seen. Ct Head Wo Contrast    Result Date: 6/22/2020  EXAMINATION: CT OF THE HEAD WITHOUT CONTRAST  6/22/2020 10:29 pm TECHNIQUE: CT of the head was performed without the administration of intravenous contrast. Dose modulation, iterative reconstruction, and/or weight based adjustment of the mA/kV was utilized to reduce the radiation dose to as low as reasonably achievable. COMPARISON: 11/23/2019 HISTORY: ORDERING SYSTEM PROVIDED HISTORY: unwitnessed fall TECHNOLOGIST PROVIDED HISTORY: Reason for exam:->unwitnessed fall Has a \"code stroke\" or \"stroke alert\" been called? ->No Reason for Exam: unwitnessed fall Acuity: Acute Type of Exam: Initial FINDINGS: BRAIN/VENTRICLES: There is no acute intracranial hemorrhage, mass effect or midline shift. No abnormal extra-axial fluid collection. No evidence of reason territorial infarct. Unchanged left basal ganglia lacunar infarct.  There are nonspecific Abdomen: Visualized solid organs of the abdomen are normal. Soft Tissues/Bones: No acute bony or soft tissue abnormalities are identified. The ground-glass nodules previously seen May 13, 2020 have resolved and likely related to prior infection. There is a small trace left-sided pleural effusion. There is no acute cardiopulmonary process noted. Xr Chest Portable    Result Date: 6/21/2020  EXAMINATION: ONE XRAY VIEW OF THE CHEST 6/21/2020 9:30 pm COMPARISON: None. HISTORY: ORDERING SYSTEM PROVIDED HISTORY: veriy left midline placement. Patient with severe pain when flushing/swelling TECHNOLOGIST PROVIDED HISTORY: Reason for exam:->veriy left midline placement. Patient with severe pain when flushing/swelling Reason for Exam: veriy left midline placement. Patient with severe pain when flushing/swelling Acuity: Acute Type of Exam: Ongoing FINDINGS: Cardiomegaly was noted with mild pulmonary vascular redistribution. No hilar mass was identified. No pneumonia or interstitial edema was seen. I do not see any PICC line, subclavian line or Pepkeu-H-Epex. Moderate degenerative osteo arthritic changes involve the right shoulder with a probable Hill-Sachs deformity. Cardiomegaly with mild pulmonary vascular redistribution. No pneumonia or interstitial edema was identified. No pneumothorax. I do not see any PICC line, subclavian line or [id number]-[id number]. Xr Chest Portable    Result Date: 6/20/2020  EXAMINATION: ONE XRAY VIEW OF THE CHEST 6/20/2020 7:17 am COMPARISON: May 12, 2020 HISTORY: ORDERING SYSTEM PROVIDED HISTORY: dyspnea TECHNOLOGIST PROVIDED HISTORY: Reason for exam:->dyspnea Reason for Exam: dyspnea Acuity: Acute Type of Exam: Initial Additional signs and symptoms:  female that presents with complaint of cough shortness of breath wheezing fevers chills abdominal pain nausea vomiting.  History of COPD she wears oxygen 3 L all the time she is morbidly obese also states she has a history of paracentesis and peripheral edema FINDINGS: There is cephalization the pulmonary vascularity with a small left-sided effusion or no overlying atelectatic changes. The heart appears enlarged. There are no acute bony abnormalities. Mild congestive changes with small left-sided effusion and overlying atelectatic changes. Scheduled Medicines   Medications:    insulin glargine  30 Units Subcutaneous Nightly    ciprofloxacin  500 mg Oral 2 times per day    enoxaparin  40 mg Subcutaneous Daily    lactulose  20 g Oral TID    insulin lispro  0-12 Units Subcutaneous 2 times per day    linagliptin  5 mg Oral Daily    aspirin  81 mg Oral Daily    atorvastatin  80 mg Oral Nightly    budesonide-formoterol  2 puff Inhalation BID    carvedilol  6.25 mg Oral BID WC    buPROPion  100 mg Oral BID    clopidogrel  75 mg Oral Daily    DULoxetine  60 mg Oral Daily    isosorbide mononitrate  60 mg Oral Daily    levothyroxine  50 mcg Oral Daily    pantoprazole  40 mg Oral QAM AC    ranolazine  500 mg Oral BID    tiotropium  2 puff Inhalation Daily    vitamin D  1,000 Units Oral Daily    sodium chloride flush  10 mL Intravenous 2 times per day    insulin lispro  0-12 Units Subcutaneous TID WC    sodium chloride flush  10 mL Intravenous 2 times per day      Infusions:    dextrose           Objective:   Vitals: BP (!) 117/59   Pulse 86   Temp 98.1 °F (36.7 °C) (Oral)   Resp 16   Ht 5' 4\" (1.626 m)   Wt (!) 316 lb 9.6 oz (143.6 kg)   SpO2 94%   BMI 54.34 kg/m²   General appearance: alert and cooperative with exam  Neck: no JVD or bruit  Thyroid : Normal lobes   Lungs: Has Vesicular Breath sounds some wheezing and rales  Heart:  regular rate and rhythm  Abdomen: soft, non-tender; bowel sounds normal; no masses,  no organomegaly  Musculoskeletal: Normal  Extremities: extremities normal, , has edema  Neurologic:  Awake, alert, oriented to name, place and time. Cranial nerves II-XII are grossly intact.

## 2020-06-24 NOTE — PROGRESS NOTES
Nephrology Progress Note  6/24/2020 10:17 AM        Subjective:   Admit Date: 6/20/2020  PCP: Dana Romero MD    Interval History: pt seen in early am     Diet: some     ROS:  No overt sob     Data:     Current meds:    insulin glargine  20 Units Subcutaneous Nightly    ciprofloxacin  500 mg Oral 2 times per day    enoxaparin  40 mg Subcutaneous Daily    lactulose  20 g Oral TID    insulin lispro  0-12 Units Subcutaneous 2 times per day    linagliptin  5 mg Oral Daily    aspirin  81 mg Oral Daily    atorvastatin  80 mg Oral Nightly    budesonide-formoterol  2 puff Inhalation BID    carvedilol  6.25 mg Oral BID WC    buPROPion  100 mg Oral BID    clopidogrel  75 mg Oral Daily    DULoxetine  60 mg Oral Daily    isosorbide mononitrate  60 mg Oral Daily    levothyroxine  50 mcg Oral Daily    pantoprazole  40 mg Oral QAM AC    ranolazine  500 mg Oral BID    tiotropium  2 puff Inhalation Daily    vitamin D  1,000 Units Oral Daily    sodium chloride flush  10 mL Intravenous 2 times per day    insulin lispro  0-12 Units Subcutaneous TID WC    sodium chloride flush  10 mL Intravenous 2 times per day      dextrose           I/O last 3 completed shifts:   In: 20 [I.V.:20]  Out: 1200 [Urine:1200]    CBC:   Recent Labs     06/22/20  0556 06/22/20  2341 06/24/20  0448   WBC 8.8 10.1 8.8   HGB 7.6* 7.5* 7.1*    254 245          Recent Labs     06/22/20  0556 06/22/20  2341 06/24/20  0448    139 140   K 4.2 4.4 4.4    99 101   CO2 32 31 30   BUN 36* 39* 45*   CREATININE 2.0* 2.3* 2.6*   GLUCOSE 92 149*  150* 139*       Lab Results   Component Value Date    CALCIUM 8.5 06/24/2020    PHOS 4.0 05/06/2020       Objective:     Vitals: /62   Pulse 89   Temp 99.1 °F (37.3 °C) (Oral)   Resp 16   Ht 5' 4\" (1.626 m)   Wt (!) 316 lb 9.6 oz (143.6 kg)   SpO2 95%   BMI 54.34 kg/m²     General appearance:  No ac distress   HEENT:  Mild conj pallor   Neck:  Supple   Lungs:  Supple   Heart:

## 2020-06-24 NOTE — PLAN OF CARE
neurologic state will improve  Description: Ability to maintain a stable neurologic state will improve  Outcome: Ongoing     Problem: Pain:  Goal: Pain level will decrease  Description: Pain level will decrease  Outcome: Ongoing  Goal: Control of acute pain  Description: Control of acute pain  Outcome: Ongoing  Goal: Control of chronic pain  Description: Control of chronic pain  Outcome: Ongoing

## 2020-06-24 NOTE — PROGRESS NOTES
Activity Inpatient   How much help for putting on and taking off regular lower body clothing?: Total  How much help for Bathing?: A Lot  How much help for Toileting?: Total  How much help for putting on and taking off regular upper body clothing?: None  How much help for taking care of personal grooming?: A Little  How much help for eating meals?: None  AM-PAC Inpatient Daily Activity Raw Score: 15  AM-PAC Inpatient ADL T-Scale Score : 34.69  ADL Inpatient CMS 0-100% Score: 56.46  ADL Inpatient CMS G-Code Modifier : CK    Treatment:  Self Care Training:   Cues were given for safety, sequence, UE/LE placement, visual cues, and balance. Activities performed today included UB/LB bathing/dressing, grooming, toileting    Therapeutic Activity Training:   Therapeutic activity training was instructed today. Cues were given for safety, sequence, UE/LE placement, awareness, and balance. Activities performed today included bed mobility training, sup-sit, sit-stand, functional mobility, stand to sit      Safety: patient left in chair with chair alarm, call light within reach, RN notified, gait belt used. Assessment:  Pt is a 62 yo female admitted from home for sepsis. P Pt currently presents w/ deficits in ADL and high level IADL independence, functional activity tolerance, dynamic sitting and standing balance and tolerance and functional transfers, BUE strength. Pt would benefit from continued acute care OT services w/ discharge to SNF  Complexity: Moderate  Prognosis: Good, no significant barriers to participation at this time.    Plan  Times per week: 2x+  Times per day: Daily  Current Treatment Recommendations: Strengthening, Balance Training, Endurance Training, Pain Management, Patient/Caregiver Education & Training, Home Management Training, Functional Mobility Training, Safety Education & Training, Equipment Evaluation, Education, & procurement, Positioning, Self-Care / ADL     Equipment: defer    Goals:  Pt

## 2020-06-24 NOTE — PROGRESS NOTES
Physical Therapy    Facility/Department: [facility]N  Initial Assessment    NAME: Gunjan yWnn  : 1960  MRN: [id number]    Date of Service: 2020    Discharge Recommendations:  Subacute/Skilled Nursing Facility        Assessment   Body structures, Functions, Activity limitations: Decreased functional mobility ; Decreased strength;Decreased endurance;Decreased posture;Decreased ADL status; Decreased balance; Increased pain  Assessment: Pt is a 61 y.o. female presenting with the above impairments. Pt is currently functioning below baseline and would benefit from continued skilled PT while in acute care as well as SNF placement upon discharge. Pt has expressed refusal for any placement in facility so if pt continues to refuse, she will need [facility] PT and 24 hour supervision with assist for all mobility which pt states  can provide. Decision Making: Medium Complexity  PT Education: Goals; Home Exercise Program;PT Role;Plan of Care;Transfer Training;General Safety;Gait Training;Equipment  REQUIRES PT FOLLOW UP: Yes  Activity Tolerance  Activity Tolerance: Patient limited by fatigue;Patient limited by pain       Patient Diagnosis(es): The primary encounter diagnosis was Dyspnea, unspecified type. Diagnoses of COPD exacerbation ([address] SIRS (systemic inflammatory response syndrome) ([address] Abdominal pain, unspecified abdominal location, Nausea and vomiting, intractability of vomiting not specified, unspecified vomiting type, Cough, Obesity, unspecified classification, unspecified obesity type, unspecified whether serious comorbidity present, Leukocytosis, unspecified type, Hyperkalemia, Troponin level elevated, Hyperglycemia, Elevated brain natriuretic peptide (BNP) level, and Congestive heart failure, unspecified HF chronicity, unspecified heart failure type [facility]) were also pertinent to this visit.      has a past medical history of Acute on chronic systolic CHF (congestive heart failure) ([address] CAD (coronary artery disease), CKD (chronic kidney disease) stage 3, GFR 30-59 ml/min ([facility] with acute exacerbation [facility] Diabetes type 2, uncontrolled ([facility] Diastolic heart failure ([facility] Essential hypertension, Financial problems, Generalized anxiety disorder, Herpes genitalia, Obesity, morbid (more than 100 lbs over ideal weight or BMI > 40) ([facility] and STEMI (ST elevation myocardial infarction) ([facility] has a past surgical history that includes Cholecystectomy;  section; Tonsillectomy; other surgical history (Right, [id number]); Cardiac surgery; and Coronary angioplasty with stent. Restrictions   General precautions, fall precautions    Vision/Hearing   WFL       Subjective  Pain 10/10 back pain, RN notified  \"You guys are great, but I don't want to go anywhere for therapy\"     Orientation  Orientation  Overall Orientation Status: Within Normal Limits     Social/Functional History    Social/Functional History  Lives With: Spouse  ADL Assistance: Needs assistance(spouse assists with bathing and dressing due to chronic difficulty related to COPD and obesity/impaired reaching (especially distal components) ; she toilets self Sarthak)  Ambulation Assistance: Independent  Transfer Assistance: Independent    Objective     Observation/Palpation  Observation: Pt up in chair upon entry and agreeable to therapy    AROM RLE (degrees)  RLE AROM: WFL  AROM LLE (degrees)  LLE AROM : WFL  Strength Other  Other: Grossly 4/5 bilaterally     Sensation  Overall Sensation Status: Impaired(Pt reports bilateral feet numbness due to neuropathy)  Bed mobility  Supine to Sit: (pt up in chair at beginning of session)  Sit to Supine: Moderate assistance(assist at bilateral LEs and cues for sequencing)  Transfers  Stand to sit: Minimal Assistance(from chair)  Comment: Pt required 3 attempts to fully complete stand. Cues provided for transfer.   Ambulation  Ambulation?: Yes  Ambulation 1  Surface: level tile  Device: Rolling

## 2020-06-24 NOTE — PROGRESS NOTES
Diagnosis    CKD (chronic kidney disease) stage 3, GFR 30-59 ml/min ([facility])    CAD (coronary artery disease)    Chronic diastolic heart failure (HCC)    Diabetes type 2, uncontrolled ([facility]    Morbid obesity with BMI of 50.0-59.9, adult ([facility])    Elevated lactic acid level, resolved    Musculoskeletal chest pain    Essential hypertension    Diabetes mellitus with hyperglycemia ([facility])    Severe dehydration secondary to significant hyperglycemia    Sepsis secondary to UTI, POA    Generalized weakness    Sepsis associated hypotension, POA    E. coli UTI (urinary tract infection)    Syncope    Acute pain of right shoulder    Hypotension    DM (diabetes mellitus), secondary uncontrolled ([facility])    Generalized anxiety disorder    Nausea & vomiting    Fever    Debility    Gait disturbance    Acute respiratory failure ([facility])    Hyperglycemia    Pleural effusion on left    UTI (urinary tract infection), bacterial    Sinus tachycardia    Uncontrolled type 2 diabetes mellitus with hyperglycemia ([facility])    Class 3 severe obesity due to excess calories with body mass index (BMI) of 45.0 to 49.9 in adult [facility])    Anasarca    Acute kidney injury ([facility]    DM (diabetes mellitus) ([facility])    Fluid overload    Cor pulmonale (chronic) ([facility])    Cellulitis of abdominal wall    STEMI (ST elevation myocardial infarction) ([facility]    Acute respiratory distress    Hyperkalemia    Uncontrolled diabetes mellitus with chronic kidney disease (HCC)    Acute on chronic diastolic (congestive) heart failure ([facility])    COPD with acute exacerbation ([facility])    Heart failure ([facility])    CHF (congestive heart failure), NYHA class I, acute, systolic ([facility])    Sepsis ([facility])        Allergies   Allergen Reactions    Augmentin [Amoxicillin-Pot Clavulanate] Diarrhea        Current Inpatient Medications:    Current Facility-Administered Medications   Medication Dose Route Frequency Provider Last Rate Last Dose    insulin glargine (LANTUS) injection vial 20 Units  20 Units Subcutaneous Nightly M Ana Bernal MD        ciprofloxacin (CIPRO) tablet 500 mg  500 mg Oral 2 times per day Bernabe Isaacs MD   500 mg at 06/23/20 2208    enoxaparin (LOVENOX) injection 40 mg  40 mg Subcutaneous Daily Beranbe Isaacs MD   40 mg at 06/23/20 0943    lactulose (CHRONULAC) 10 GM/15ML solution 20 g  20 g Oral TID Bernabe Isaacs MD   20 g at 06/23/20 2208    insulin lispro (HUMALOG) injection vial 0-12 Units  0-12 Units Subcutaneous 2 times per day Dayana Gonzalez MD   2 Units at 06/24/20 0230    linagliptin (TRADJENTA) tablet 5 mg  5 mg Oral Daily Dayana Gonzalez MD   5 mg at 06/23/20 0935    ondansetron (ZOFRAN) injection 4 mg  4 mg Intravenous Q30 Min PRN Poppy Seymour MD   4 mg at 06/20/20 0853    albuterol sulfate  (90 Base) MCG/ACT inhaler 2 puff  2 puff Inhalation Q4H PRN Poppy Seymour MD        aspirin chewable tablet 81 mg  81 mg Oral Daily Jonatan Awad MD   81 mg at 06/23/20 0935    atorvastatin (LIPITOR) tablet 80 mg  80 mg Oral Nightly Jonatan Awad MD   80 mg at 06/23/20 2208    budesonide-formoterol (SYMBICORT) 160-4.5 MCG/ACT inhaler 2 puff  2 puff Inhalation BID Poppy Seymour MD   2 puff at 06/23/20 2016    carvedilol (COREG) tablet 6.25 mg  6.25 mg Oral BID  Jonatan Awad MD   6.25 mg at 06/23/20 1747    buPROPion [facility]) extended release tablet 100 mg  100 mg Oral BID Poppy Seymour MD   100 mg at 06/23/20 2208    clopidogrel (PLAVIX) tablet 75 mg  75 mg Oral Daily Jonatan Awad MD   75 mg at 06/23/20 0934    DULoxetine (CYMBALTA) extended release capsule 60 mg  60 mg Oral Daily Jonatan Awad MD   60 mg at 06/23/20 0935    HYDROcodone-acetaminophen (NORCO) 5-325 MG per tablet 2 tablet  2 tablet Oral Q4H PRN Poppy Seymour MD   2 tablet at 06/23/20 2208    isosorbide mononitrate (IMDUR) extended release tablet 60 mg  60 mg Oral Daily Poppy Seymour MD   60 mg at 06/23/20 2940    levothyroxine (SYNTHROID) tablet 50 mcg  50 mcg Oral Daily Darrel Canada MD   50 mcg at 06/23/20 0943    pantoprazole (PROTONIX) tablet 40 mg  40 mg Oral QAM AC Jonatan Awad MD   40 mg at 06/23/20 0521    ranolazine (RANEXA) extended release tablet 500 mg  500 mg Oral BID Darrel Canada MD   500 mg at 06/23/20 2208    tiotropium (SPIRIVA RESPIMAT) 2.5 MCG/ACT inhaler 2 puff  2 puff Inhalation Daily Darrel Canada MD   2 puff at 06/23/20 0715    vitamin D CAPS 1,000 Units  1,000 Units Oral Daily Darrel Canada MD   1,000 Units at 06/23/20 0943    sodium chloride flush 0.9 % injection 10 mL  10 mL Intravenous 2 times per day Darrel Canada MD   10 mL at 06/23/20 2340    sodium chloride flush 0.9 % injection 10 mL  10 mL Intravenous PRN Darrel Canada MD        acetaminophen (TYLENOL) tablet 650 mg  650 mg Oral Q6H PRN Darrel Canada MD        Or   [facility] acetaminophen (TYLENOL) suppository 650 mg  650 mg Rectal Q6H PRN Darrel Canada MD        polyethylene glycol (GLYCOLAX) packet 17 g  17 g Oral Daily PRN Darrel Canada MD        promethazine (PHENERGAN) tablet 12.5 mg  12.5 mg Oral Q6H JAVIERN Darrel Canada MD   12.5 mg at 06/20/20 2222    Or    ondansetron (ZOFRAN) injection 4 mg  4 mg Intravenous Q6H PRN Darrel Canada MD   4 mg at 06/23/20 2307    glucose (GLUTOSE) 40 % oral gel 15 g  15 g Oral PRN Darrel Canada MD        dextrose 50 % IV solution  12.5 g Intravenous PRN Darrel Canada MD        glucagon (rDNA) injection 1 mg  1 mg Intramuscular PRN Darrel Canada MD        dextrose 5 % solution  100 mL/hr Intravenous PRN Darrel Canada MD        insulin lispro (HUMALOG) injection vial 0-12 Units  0-12 Units Subcutaneous TID WC Darrel Canada MD   2 Units at 06/22/20 1239    sodium chloride flush 0.9 % injection 10 mL  10 mL Intravenous 2 times per day Oc Solano DO   10 mL at 06/23/20 2207    sodium chloride flush 0.9 % injection 10 mL  10 mL Intravenous PRN [employer] DO Albino               Labs:  CBC with Differential:    Lab Results   Component Value Date    WBC 8.8 06/24/2020    RBC 2.89 06/24/2020    HGB 7.1 06/24/2020    HCT 25.1 06/24/2020     06/24/2020    MCV 86.9 06/24/2020    MCH 24.6 06/24/2020    MCHC 28.3 06/24/2020    RDW 16.0 06/24/2020    SEGSPCT 76.9 06/24/2020    LYMPHOPCT 12.6 06/24/2020    MONOPCT 6.9 06/24/2020    EOSPCT 3.1 10/19/2011    BASOPCT 0.3 06/24/2020    MONOSABS 0.6 06/24/2020    LYMPHSABS 1.1 06/24/2020    EOSABS 0.2 06/24/2020    BASOSABS 0.0 06/24/2020    DIFFTYPE AUTOMATED DIFFERENTIAL 06/24/2020     CMP:    Lab Results   Component Value Date     06/24/2020    K 4.4 06/24/2020     06/24/2020    CO2 30 06/24/2020    BUN 45 06/24/2020    CREATININE 2.6 06/24/2020    GFRAA 23 06/24/2020    LABGLOM 19 06/24/2020    GLUCOSE 139 06/24/2020    PROT 5.2 06/24/2020    PROT 6.5 10/18/2011    LABALBU 3.6 06/24/2020    CALCIUM 8.5 06/24/2020    BILITOT 0.3 06/24/2020    ALKPHOS 84 06/24/2020    AST 14 06/24/2020    ALT 9 06/24/2020     Hepatic Function Panel:    Lab Results   Component Value Date    ALKPHOS 84 06/24/2020    ALT 9 06/24/2020    AST 14 06/24/2020    PROT 5.2 06/24/2020    PROT 6.5 10/18/2011    BILITOT 0.3 06/24/2020    BILIDIR 0.2 08/06/2015    IBILI 0.3 08/06/2015    LABALBU 3.6 06/24/2020     Magnesium:    Lab Results   Component Value Date    MG 2.1 06/20/2020     PT/INR:    Lab Results   Component Value Date    PROTIME 13.7 06/22/2020    INR 1.13 06/22/2020     Last 3 Troponin:    Lab Results   Component Value Date    TROPONINI <0.006 03/22/2013    TROPONINI 0.014 03/20/2013    TROPONINI 0.009 03/20/2013     U/A:    Lab Results   Component Value Date    COLORU YELLOW 06/23/2020    WBCUA 4 06/23/2020    RBCUA 51 06/23/2020    MUCUS RARE 06/23/2020    TRICHOMONAS NONE SEEN 06/23/2020    YEAST OCCASIONAL 11/24/2019    BACTERIA RARE 06/23/2020    CLARITYU HAZY 06/23/2020    SPECGRAV 1.017 06/23/2020    LEUKOCYTESUR TRACE 06/23/2020    UROBILINOGEN NORMAL 06/23/2020    BILIRUBINUR NEGATIVE 06/23/2020    BLOODU MODERATE 06/23/2020     ABG:    Lab Results   Component Value Date    ZWB6CLI 50.0 02/20/2020    PO2ART 95 02/20/2020    GVU6XAA 31.0 02/20/2020     FLP:    Lab Results   Component Value Date    TRIG 158 05/13/2020    HDL 39 05/13/2020    LDLCALC NOT VALID WHEN TRIGLYCERIDE >400 MG/DL. 01/30/2017    LDLDIRECT 61 05/13/2020     TSH:  No results found for: TSH   DATA:   ECG: Sinus Rhythm       ASSESSMENT:   1 CAD with PCI in January   Some chest pain last night but resolved  Continue on current treatment, chest pain today     2 history of congestive heart failure nephrology managing diuresis,  Fully well compensated     3 chronic renal insufficiency  Creatinine 2.6  Nephrology following     4 morbid obesity chronic due to excess caloric intake     5 COPD and obstructive sleep apnea  No active wheezing     6.elevated troponin patient has no chest pain  This is likely due to renal insufficiency     7 diabetes management as per hospitalist group     8 chronic smoking history     9 chronic anxiety and depression  PLAN   Continue current treatment  Stable from cardiology standpoint

## 2020-06-25 LAB
ALBUMIN SERPL-MCNC: 3.5 GM/DL (ref 3.4–5)
ALP BLD-CCNC: 87 IU/L (ref 40–128)
ALT SERPL-CCNC: 10 U/L (ref 10–40)
ANION GAP SERPL CALCULATED.3IONS-SCNC: 9 MMOL/L (ref 4–16)
AST SERPL-CCNC: 14 IU/L (ref 15–37)
BASOPHILS ABSOLUTE: 0 K/CU MM
BASOPHILS RELATIVE PERCENT: 0.2 % (ref 0–1)
BILIRUB SERPL-MCNC: 0.3 MG/DL (ref 0–1)
BUN BLDV-MCNC: 45 MG/DL (ref 6–23)
CALCIUM SERPL-MCNC: 8.5 MG/DL (ref 8.3–10.6)
CHLORIDE BLD-SCNC: 100 MMOL/L (ref 99–110)
CO2: 31 MMOL/L (ref 21–32)
CREAT SERPL-MCNC: 2.7 MG/DL (ref 0.6–1.1)
CULTURE: NORMAL
CULTURE: NORMAL
DIFFERENTIAL TYPE: ABNORMAL
EOSINOPHILS ABSOLUTE: 0.3 K/CU MM
EOSINOPHILS RELATIVE PERCENT: 3.4 % (ref 0–3)
GFR AFRICAN AMERICAN: 22 ML/MIN/1.73M2
GFR NON-AFRICAN AMERICAN: 18 ML/MIN/1.73M2
GLUCOSE BLD-MCNC: 105 MG/DL (ref 70–99)
GLUCOSE BLD-MCNC: 113 MG/DL (ref 70–99)
GLUCOSE BLD-MCNC: 120 MG/DL (ref 70–99)
GLUCOSE BLD-MCNC: 122 MG/DL (ref 70–99)
GLUCOSE BLD-MCNC: 127 MG/DL (ref 70–99)
GLUCOSE BLD-MCNC: 128 MG/DL (ref 70–99)
HCT VFR BLD CALC: 24.1 % (ref 37–47)
HEMOGLOBIN: 7.1 GM/DL (ref 12.5–16)
IMMATURE NEUTROPHIL %: 0.5 % (ref 0–0.43)
LYMPHOCYTES ABSOLUTE: 1.2 K/CU MM
LYMPHOCYTES RELATIVE PERCENT: 14.9 % (ref 24–44)
Lab: NORMAL
Lab: NORMAL
MCH RBC QN AUTO: 25.4 PG (ref 27–31)
MCHC RBC AUTO-ENTMCNC: 29.5 % (ref 32–36)
MCV RBC AUTO: 86.1 FL (ref 78–100)
MONOCYTES ABSOLUTE: 0.6 K/CU MM
MONOCYTES RELATIVE PERCENT: 7.2 % (ref 0–4)
NUCLEATED RBC %: 0 %
PDW BLD-RTO: 16.6 % (ref 11.7–14.9)
PLATELET # BLD: 247 K/CU MM (ref 140–440)
PMV BLD AUTO: 10.4 FL (ref 7.5–11.1)
POTASSIUM SERPL-SCNC: 4.5 MMOL/L (ref 3.5–5.1)
RBC # BLD: 2.8 M/CU MM (ref 4.2–5.4)
SEGMENTED NEUTROPHILS ABSOLUTE COUNT: 6.1 K/CU MM
SEGMENTED NEUTROPHILS RELATIVE PERCENT: 73.8 % (ref 36–66)
SODIUM BLD-SCNC: 140 MMOL/L (ref 135–145)
SPECIMEN: NORMAL
SPECIMEN: NORMAL
TOTAL IMMATURE NEUTOROPHIL: 0.04 K/CU MM
TOTAL NUCLEATED RBC: 0 K/CU MM
TOTAL PROTEIN: 5.2 GM/DL (ref 6.4–8.2)
WBC # BLD: 8.2 K/CU MM (ref 4–10.5)

## 2020-06-25 PROCEDURE — 82962 GLUCOSE BLOOD TEST: CPT

## 2020-06-25 PROCEDURE — 2580000003 HC RX 258: Performed by: STUDENT IN AN ORGANIZED HEALTH CARE EDUCATION/TRAINING PROGRAM

## 2020-06-25 PROCEDURE — 36415 COLL VENOUS BLD VENIPUNCTURE: CPT

## 2020-06-25 PROCEDURE — 97530 THERAPEUTIC ACTIVITIES: CPT

## 2020-06-25 PROCEDURE — 2700000000 HC OXYGEN THERAPY PER DAY

## 2020-06-25 PROCEDURE — 6360000002 HC RX W HCPCS: Performed by: INTERNAL MEDICINE

## 2020-06-25 PROCEDURE — 6370000000 HC RX 637 (ALT 250 FOR IP): Performed by: INTERNAL MEDICINE

## 2020-06-25 PROCEDURE — 94640 AIRWAY INHALATION TREATMENT: CPT

## 2020-06-25 PROCEDURE — 97116 GAIT TRAINING THERAPY: CPT

## 2020-06-25 PROCEDURE — 94761 N-INVAS EAR/PLS OXIMETRY MLT: CPT

## 2020-06-25 PROCEDURE — 80053 COMPREHEN METABOLIC PANEL: CPT

## 2020-06-25 PROCEDURE — 2580000003 HC RX 258: Performed by: INTERNAL MEDICINE

## 2020-06-25 PROCEDURE — 85025 COMPLETE CBC W/AUTO DIFF WBC: CPT

## 2020-06-25 PROCEDURE — 1200000000 HC SEMI PRIVATE

## 2020-06-25 RX ORDER — FUROSEMIDE 10 MG/ML
40 INJECTION INTRAMUSCULAR; INTRAVENOUS ONCE
Status: COMPLETED | OUTPATIENT
Start: 2020-06-25 | End: 2020-06-25

## 2020-06-25 RX ADMIN — BUPROPION HYDROCHLORIDE 100 MG: 100 TABLET, FILM COATED, EXTENDED RELEASE ORAL at 09:43

## 2020-06-25 RX ADMIN — IRON SUCROSE 300 MG: 20 INJECTION, SOLUTION INTRAVENOUS at 13:27

## 2020-06-25 RX ADMIN — LEVOTHYROXINE SODIUM 50 MCG: 50 TABLET ORAL at 09:43

## 2020-06-25 RX ADMIN — BUDESONIDE AND FORMOTEROL FUMARATE DIHYDRATE 2 PUFF: 160; 4.5 AEROSOL RESPIRATORY (INHALATION) at 20:14

## 2020-06-25 RX ADMIN — LINAGLIPTIN 5 MG: 5 TABLET, FILM COATED ORAL at 09:43

## 2020-06-25 RX ADMIN — CIPROFLOXACIN HYDROCHLORIDE 500 MG: 500 TABLET, FILM COATED ORAL at 21:57

## 2020-06-25 RX ADMIN — CLOPIDOGREL BISULFATE 75 MG: 75 TABLET ORAL at 09:43

## 2020-06-25 RX ADMIN — RANOLAZINE 500 MG: 500 TABLET, FILM COATED, EXTENDED RELEASE ORAL at 09:43

## 2020-06-25 RX ADMIN — RANOLAZINE 500 MG: 500 TABLET, FILM COATED, EXTENDED RELEASE ORAL at 21:57

## 2020-06-25 RX ADMIN — PANTOPRAZOLE SODIUM 40 MG: 40 TABLET, DELAYED RELEASE ORAL at 05:23

## 2020-06-25 RX ADMIN — CARVEDILOL 6.25 MG: 6.25 TABLET, FILM COATED ORAL at 09:43

## 2020-06-25 RX ADMIN — CARVEDILOL 6.25 MG: 6.25 TABLET, FILM COATED ORAL at 17:26

## 2020-06-25 RX ADMIN — ASPIRIN 81 MG CHEWABLE TABLET 81 MG: 81 TABLET CHEWABLE at 09:43

## 2020-06-25 RX ADMIN — ONDANSETRON 4 MG: 2 INJECTION INTRAMUSCULAR; INTRAVENOUS at 05:36

## 2020-06-25 RX ADMIN — SODIUM CHLORIDE, PRESERVATIVE FREE 10 ML: 5 INJECTION INTRAVENOUS at 11:01

## 2020-06-25 RX ADMIN — LACTULOSE 20 G: 10 SOLUTION ORAL at 09:43

## 2020-06-25 RX ADMIN — FUROSEMIDE 40 MG: 10 INJECTION, SOLUTION INTRAMUSCULAR; INTRAVENOUS at 11:00

## 2020-06-25 RX ADMIN — SODIUM CHLORIDE, PRESERVATIVE FREE 10 ML: 5 INJECTION INTRAVENOUS at 21:58

## 2020-06-25 RX ADMIN — ATORVASTATIN CALCIUM 80 MG: 40 TABLET, FILM COATED ORAL at 21:57

## 2020-06-25 RX ADMIN — BUPROPION HYDROCHLORIDE 100 MG: 100 TABLET, FILM COATED, EXTENDED RELEASE ORAL at 21:57

## 2020-06-25 RX ADMIN — ONDANSETRON 4 MG: 2 INJECTION INTRAMUSCULAR; INTRAVENOUS at 17:32

## 2020-06-25 RX ADMIN — BUDESONIDE AND FORMOTEROL FUMARATE DIHYDRATE 2 PUFF: 160; 4.5 AEROSOL RESPIRATORY (INHALATION) at 08:54

## 2020-06-25 RX ADMIN — DULOXETINE 60 MG: 30 CAPSULE, DELAYED RELEASE ORAL at 09:43

## 2020-06-25 RX ADMIN — TIOTROPIUM BROMIDE INHALATION SPRAY 2 PUFF: 3.12 SPRAY, METERED RESPIRATORY (INHALATION) at 08:52

## 2020-06-25 RX ADMIN — HYDROCODONE BITARTRATE AND ACETAMINOPHEN 2 TABLET: 5; 325 TABLET ORAL at 03:32

## 2020-06-25 RX ADMIN — HYDROCODONE BITARTRATE AND ACETAMINOPHEN 2 TABLET: 5; 325 TABLET ORAL at 17:26

## 2020-06-25 RX ADMIN — ISOSORBIDE MONONITRATE 60 MG: 60 TABLET, EXTENDED RELEASE ORAL at 09:43

## 2020-06-25 RX ADMIN — CIPROFLOXACIN HYDROCHLORIDE 500 MG: 500 TABLET, FILM COATED ORAL at 09:43

## 2020-06-25 RX ADMIN — Medication 1000 UNITS: at 09:43

## 2020-06-25 RX ADMIN — SODIUM CHLORIDE, PRESERVATIVE FREE 10 ML: 5 INJECTION INTRAVENOUS at 09:44

## 2020-06-25 RX ADMIN — ENOXAPARIN SODIUM 40 MG: 40 INJECTION SUBCUTANEOUS at 09:44

## 2020-06-25 ASSESSMENT — PAIN DESCRIPTION - PROGRESSION
CLINICAL_PROGRESSION: NOT CHANGED

## 2020-06-25 ASSESSMENT — PAIN SCALES - GENERAL
PAINLEVEL_OUTOF10: 9
PAINLEVEL_OUTOF10: 6
PAINLEVEL_OUTOF10: 0

## 2020-06-25 NOTE — PROGRESS NOTES
Progress Note( Dr. Judson Fleming)    Subjective:   Admit Date: 6/20/2020  PCP: Cammie Stuart MD    Admitted For :  Cough shortness of breath fever    Consulted For: Better control of blood glucose    Interval History: Feels much better blood glucose has been better    Denies any chest pains,   Has SOB . On breathing treatment   denies nausea or vomiting. No new bowel or bladder symptoms. Intake/Output Summary (Last 24 hours) at 6/25/2020 9599  Last data filed at 6/25/2020 0529  Gross per 24 hour   Intake 220 ml   Output 1350 ml   Net -1130 ml       DATA    CBC:   Recent Labs     06/22/20  2341 06/24/20  0448 06/25/20  0432   WBC 10.1 8.8 8.2   HGB 7.5* 7.1* 7.1*    245 247    CMP:  Recent Labs     06/22/20  2341 06/24/20  0448 06/25/20  0432    140 140   K 4.4 4.4 4.5   CL 99 101 100   CO2 31 30 31   BUN 39* 45* 45*   CREATININE 2.3* 2.6* 2.7*   CALCIUM 8.5 8.5 8.5   PROT 5.0* 5.2* 5.2*   LABALBU 3.3* 3.6 3.5   BILITOT 0.3 0.3 0.3   ALKPHOS 88 84 87   AST 11* 14* 14*   ALT 9* 9* 10     Lipids:   Lab Results   Component Value Date    CHOL 117 05/13/2020    HDL 39 05/13/2020    TRIG 158 05/13/2020     Glucose:  Recent Labs     06/24/20  1728 06/24/20  2237 06/25/20  0329   POCGLU 136* 175* 128*     [id number]:  Lab Results   Component Value Date    LABA1C 9.1 06/21/2020     High Sensitivity TSH:   Lab Results   Component Value Date    TSHHS 4.160 06/20/2020     Free T3: No results found for: FT3  Free T4:  Lab Results   Component Value Date    T4FREE 1.11 02/28/2019       Ct Abdomen Pelvis Wo Contrast Additional Contrast? None    Result Date: 6/20/2020  EXAMINATION: CT OF THE ABDOMEN AND PELVIS WITHOUT CONTRAST 6/20/2020 9:49 am TECHNIQUE: CT of the abdomen and pelvis was performed without the administration of intravenous contrast. Multiplanar reformatted images are provided for review.  Dose modulation, iterative reconstruction, and/or weight based adjustment of the mA/kV was utilized to reduce abdominal pain nausea vomiting. History of COPD she wears oxygen 3 L all the time she is morbidly obese also states she has a history of paracentesis and peripheral edema FINDINGS: There is cephalization the pulmonary vascularity with a small left-sided effusion or no overlying atelectatic changes. The heart appears enlarged. There are no acute bony abnormalities. Mild congestive changes with small left-sided effusion and overlying atelectatic changes.        Scheduled Medicines   Medications:    insulin glargine  20 Units Subcutaneous Nightly    ciprofloxacin  500 mg Oral 2 times per day    enoxaparin  40 mg Subcutaneous Daily    lactulose  20 g Oral TID    insulin lispro  0-12 Units Subcutaneous 2 times per day    linagliptin  5 mg Oral Daily    aspirin  81 mg Oral Daily    atorvastatin  80 mg Oral Nightly    budesonide-formoterol  2 puff Inhalation BID    carvedilol  6.25 mg Oral BID WC    buPROPion  100 mg Oral BID    clopidogrel  75 mg Oral Daily    DULoxetine  60 mg Oral Daily    isosorbide mononitrate  60 mg Oral Daily    levothyroxine  50 mcg Oral Daily    pantoprazole  40 mg Oral QAM AC    ranolazine  500 mg Oral BID    tiotropium  2 puff Inhalation Daily    vitamin D  1,000 Units Oral Daily    sodium chloride flush  10 mL Intravenous 2 times per day    insulin lispro  0-12 Units Subcutaneous TID WC    sodium chloride flush  10 mL Intravenous 2 times per day      Infusions:    dextrose           Objective:   Vitals: /69   Pulse 79   Temp 97.8 °F (36.6 °C) (Oral)   Resp 16   Ht 5' 4\" (1.626 m)   Wt (!) 316 lb 9.6 oz (143.6 kg)   SpO2 100%   BMI 54.34 kg/m²   General appearance: alert and cooperative with exam  Neck: no JVD or bruit  Thyroid : Normal lobes   Lungs: Has Vesicular Breath sounds some wheezing and rales  Heart:  regular rate and rhythm  Abdomen: soft, non-tender; bowel sounds normal; no masses,  no organomegaly  Musculoskeletal: Normal  Extremities:

## 2020-06-25 NOTE — PROGRESS NOTES
Hospitalist Progress Note      Name:  Haven Carlos /Age/Sex: 1960  (61 y.o. female)   MRN & CSN:  [id number] & [id number] Admission Date/Time: 2020  7:08 AM   Location:  [address] [place] [address] PCP: Al Guzman MD         Hospital Day: 6    Assessment and Plan:   Haven Carlos is a 61 y.o.  female  who presents with     1) Sepsis 2/2 UTI  -UA positive for UTI  -Urine Cx: E coli sensitive to cipro  -Continue Cipro      2) Acute on chronic LV systolic HF EF 78%  -Covid neg  -Improved with IV diuresis  -Diuresis on hold due to rise in Cr  -Cardiology on board     3) Acute on CKD3  -Likely due to over diuresis  -Lasix on hold  -Avoid neprotoxic medications  -Nephrology on board     Other chronic medical conditions; medication resumed unless contraindicated  -DM Type 2  -Morbid Obesity  -Chronic hypoxic failure on 2-4L of O2 2/2 COPD  -Nicotine Use disorder  -HL  -HTN  -Hypothyroidism  -Depression and Anxiety disoder    PT/OT on board; patient being planned to be discharged to [facility]  Diet DIET CARDIAC; Daily Fluid Restriction: 1800 ml   DVT Prophylaxis [] Lovenox, []  Heparin, [] SCDs, [] Ambulation   GI Prophylaxis [] PPI,  [] H2 Blocker,  [] Carafate,  [] Diet/Tube Feeds   Code Status Full Code   Disposition Swing bed   MDM      History of Present Illness:     Patient was seen and examined  Denied any worsening SOB  No fever, chills, dizziness  Still feeling a bit fatigued    Ten point ROS reviewed negative, unless as noted above    Objective: Intake/Output Summary (Last 24 hours) at 2020 0928  Last data filed at 2020 0529  Gross per 24 hour   Intake 200 ml   Output 1350 ml   Net -1150 ml      Vitals:   Vitals:    20 0856   BP:    Pulse:    Resp:    Temp:    SpO2: 98%     Physical Exam:   GEN Awake female, sitting upright in bed in no apparent distress. Appears given age. EYES Pupils are equally round. No scleral erythema, discharge, or conjunctivitis.   HENT Mucous membranes are moist. Oral pharynx without exudates, no evidence of thrush. NECK Supple, no apparent thyromegaly or masses. RESP Clear to auscultation, no wheezes, rales or rhonchi. Symmetric chest movement while on 2 L of O2.  CARDIO/VASC S1/S2 auscultated. Regular rate without appreciable murmurs, rubs, or gallops. No JVD or carotid bruits. Peripheral pulses equal bilaterally and palpable. No peripheral edema. GI Abdomen is soft without significant tenderness, masses, or guarding. Bowel sounds are normoactive. Rectal exam deferred.  No costovertebral angle tenderness. Normal appearing external genitalia. Reyes catheter is not present. HEME/LYMPH No palpable cervical lymphadenopathy and no hepatosplenomegaly. No petechiae or ecchymoses. MSK No gross joint deformities. SKIN Normal coloration, warm, dry. NEURO Cranial nerves appear grossly intact, normal speech, no lateralizing weakness. PSYCH Awake, alert, oriented x 4. Affect appropriate.     Medications:   Medications:    furosemide  40 mg Intravenous Once    insulin glargine  20 Units Subcutaneous Nightly    ciprofloxacin  500 mg Oral 2 times per day    enoxaparin  40 mg Subcutaneous Daily    lactulose  20 g Oral TID    insulin lispro  0-12 Units Subcutaneous 2 times per day    linagliptin  5 mg Oral Daily    aspirin  81 mg Oral Daily    atorvastatin  80 mg Oral Nightly    budesonide-formoterol  2 puff Inhalation BID    carvedilol  6.25 mg Oral BID WC    buPROPion  100 mg Oral BID    clopidogrel  75 mg Oral Daily    DULoxetine  60 mg Oral Daily    isosorbide mononitrate  60 mg Oral Daily    levothyroxine  50 mcg Oral Daily    pantoprazole  40 mg Oral QAM AC    ranolazine  500 mg Oral BID    tiotropium  2 puff Inhalation Daily    vitamin D  1,000 Units Oral Daily    sodium chloride flush  10 mL Intravenous 2 times per day    insulin lispro  0-12 Units Subcutaneous TID WC    sodium chloride flush  10 mL Intravenous 2 times per day Infusions:    dextrose       PRN Meds: ondansetron, 4 mg, Q30 Min PRN  albuterol sulfate HFA, 2 puff, Q4H PRN  HYDROcodone-acetaminophen, 2 tablet, Q4H PRN  sodium chloride flush, 10 mL, PRN  acetaminophen, 650 mg, Q6H PRN    Or  acetaminophen, 650 mg, Q6H PRN  polyethylene glycol, 17 g, Daily PRN  promethazine, 12.5 mg, Q6H PRN    Or  ondansetron, 4 mg, Q6H PRN  glucose, 15 g, PRN  dextrose, 12.5 g, PRN  glucagon (rDNA), 1 mg, PRN  dextrose, 100 mL/hr, PRN  sodium chloride flush, 10 mL, PRN          Electronically signed by Angelina Choudhary MD on 6/25/2020 at 9:28 AM

## 2020-06-25 NOTE — PROGRESS NOTES
Daily Progress Note  Awake alert feeling ok  No chest pain   Heart rate and BP stable  Keep on diuretics renal on case  Hx of CAD and PCI  Obesity and JAIMIE and CAD   Renal insuffiencey and anemia   Keep on plavix and give IV iron     Pt. Awake, alert and feeling ok  HR stable, off tele, BP stable  No CP, SOB stable today    Acute on Chronic HFpEF    Last check 40-45% range    BNP elevated    Improved with diuresis    Holding diuretics now d/t JUAN J    Cont. Med. Tx.     CAD s/p PCI    Las in     No sig. CP for now    Trop elevated but chronic and at baseline    On BB, statin, DAPT    Cont. Med. Tx.     UTI    ON ABx    Per primary    JUAN J on CKD    Cr up to 2.7 today    Renal following and assisting with diuretics    Appreciate input    Put on tele  Will cont. To follow    Echo-  Summary   This is a limited echocardiogram.   Left ventricular systolic function is normal.   Ejection fraction is visually estimated at 40-45%. Mild left ventricular hypertrophy. Lateral and inferior wall appear hypokinetic. No evidence of any pericardial effusion. [facility]-  IMPRESSION:  1. Successful angioplasty of the mid LAD, lesion decreased from 80% to  0% with a drug-eluting stent Xience 2.5 x 18 mm stent. 2.  Successful angioplasty of the OM branch. Lesion decreased from 90%  to 0% with a drug-eluting stent Xience 2.25 x 33 mm stent. 3.  Right coronary artery is mid 100% occluded, did not see any  collaterals filling from the left system right coronary artery. The  patient tolerated the procedure well. No complications noted.     Due to her elevated EDP and also elevated creatinine, right coronary  artery was not intervened at this time.     PAST MEDICAL HISTORY:  Heart failure with reduced ejection fraction,  CAD, CKD, COPD, diabetes, hypertension, anxiety, obesity, and STEMI.     PAST SURGICAL HISTORY:  Includes angioplasty, ,  cholecystectomy, and tonsillectomy.     FAMILY HISTORY:  Mother and father both had heart disease and diabetes.     SOCIAL HISTORY:  The patient is a smoker, but she does not drink  alcohol.     ALLERGIES:  Include AUGMENTIN.     HOME MEDICATIONS:  The patient was on torsemide 20 mg b.i.d., DuoNeb  inhaler, Coreg 6.25 mg b.i.d., Spiriva, Zofran, Plavix 75 mg daily,  lactulose, Norco, Lipitor 80 mg at bedtime, Humalog, Imdur 60 mg daily,  vitamin D, nystatin, Ranexa 500 mg b.i.d., Breo, Synthroid, Lantus,  Tradjenta, Wellbutrin, aspirin 81 mg daily, Cymbalta, Colace, and  Protonix.     Objective:   /79   Pulse 83   Temp 98.2 °F (36.8 °C) (Oral)   Resp 17   Ht 5' 4\" (1.626 m)   Wt (!) 316 lb 9.6 oz (143.6 kg)   SpO2 98%   BMI 54.34 kg/m²       Intake/Output Summary (Last 24 hours) at 6/25/2020 1027  Last data filed at 6/25/2020 1769  Gross per 24 hour   Intake 200 ml   Output 1350 ml   Net -1150 ml       Medications:   Scheduled Meds:   furosemide  40 mg Intravenous Once    insulin glargine  20 Units Subcutaneous Nightly    ciprofloxacin  500 mg Oral 2 times per day    enoxaparin  40 mg Subcutaneous Daily    lactulose  20 g Oral TID    insulin lispro  0-12 Units Subcutaneous 2 times per day    linagliptin  5 mg Oral Daily    aspirin  81 mg Oral Daily    atorvastatin  80 mg Oral Nightly    budesonide-formoterol  2 puff Inhalation BID    carvedilol  6.25 mg Oral BID WC    buPROPion  100 mg Oral BID    clopidogrel  75 mg Oral Daily    DULoxetine  60 mg Oral Daily    isosorbide mononitrate  60 mg Oral Daily    levothyroxine  50 mcg Oral Daily    pantoprazole  40 mg Oral QAM AC    ranolazine  500 mg Oral BID    tiotropium  2 puff Inhalation Daily    vitamin D  1,000 Units Oral Daily    sodium chloride flush  10 mL Intravenous 2 times per day    insulin lispro  0-12 Units Subcutaneous TID WC    sodium chloride flush  10 mL Intravenous 2 times per day      Infusions:   dextrose        PRN Meds:  ondansetron, albuterol sulfate HFA, HYDROcodone-acetaminophen,

## 2020-06-25 NOTE — PLAN OF CARE
Problem: Falls - Risk of:  Goal: Will remain free from falls  Description: Will remain free from falls  6/25/2020 1055 by Lee Buckley LPN  Outcome: Ongoing  6/25/2020 0625 by Aroldo Mayorga RN  Outcome: Ongoing  Goal: Absence of physical injury  Description: Absence of physical injury  6/25/2020 1055 by Lee Buckley LPN  Outcome: Ongoing  6/25/2020 0625 by Aroldo Mayorga RN  Outcome: Ongoing     Problem: Discharge Planning:  Goal: Discharged to appropriate level of care  Description: Discharged to appropriate level of care  6/25/2020 1055 by Lee Buckley LPN  Outcome: Ongoing  6/25/2020 0625 by Aroldo Mayorga RN  Outcome: Ongoing     Problem: Gas Exchange - Impaired:  Goal: Levels of oxygenation will improve  Description: Levels of oxygenation will improve  6/25/2020 1055 by Lee Buckley LPN  Outcome: Ongoing  6/25/2020 0625 by Aroldo Mayorga RN  Outcome: Ongoing     Problem: Infection, Septic Shock:  Goal: Will show no infection signs and symptoms  Description: Will show no infection signs and symptoms  6/25/2020 1055 by Lee Buckley LPN  Outcome: Ongoing  6/25/2020 0625 by Aroldo Mayorga RN  Outcome: Ongoing

## 2020-06-25 NOTE — PROGRESS NOTES
Frame for Short term goals: 1 week  Short term goal 1: Pt to complete bed mobility mod I  Short term goal 2: Pt to complete all STS transfers to/from bed, commode, and chair mod I  Short term goal 3: Pt to ambulate 48' LRAD with supervision       Electronically signed by:    Heaven Archer, MARIPOSA  6/25/2020, 3:30 PM

## 2020-06-25 NOTE — PROGRESS NOTES
Wt (!) 316 lb 9.6 oz (143.6 kg)   SpO2 98%   BMI 54.34 kg/m²     General appearance:  Mild distress from pain  HEENT:  + conj[pallor  Neck:  supple  Lungs:  + crackles posteriorly  Heart:  irregular  Abdomen: soft  Extremities:  ++ edema of le , abd wall edema       Problem List :         Impression :     1. JUAN J- CKD stage G 3 b  Sec to CRS type 1- more edema - so restart loop   2. ADHF/ fluid overload with underlying ischemic CMP_   3. underlying DM/ASXCVD/ poor functional status / morbid obesity - debility etc   4. Also has anemia partly iron def partly  from CAD/CKD / ch inflammation etc     Recommendation/Plan  :     1. Lasix 40 Iv x 1   2. See  how her kidney handles   3. Low serina  4. Please do daily  accurate wt   5. Watch UOP  6. She does not want mixon out   7. Seems like , It  would be difficult to keep her away from hospital   8.  Follow clinically and bio chemically       Jerald Jones MD

## 2020-06-26 VITALS
SYSTOLIC BLOOD PRESSURE: 134 MMHG | HEIGHT: 64 IN | BODY MASS INDEX: 50.02 KG/M2 | WEIGHT: 293 LBS | TEMPERATURE: 98.1 F | OXYGEN SATURATION: 96 % | DIASTOLIC BLOOD PRESSURE: 66 MMHG | RESPIRATION RATE: 16 BRPM | HEART RATE: 82 BPM

## 2020-06-26 LAB
ALBUMIN SERPL-MCNC: 3.5 GM/DL (ref 3.4–5)
ALP BLD-CCNC: 88 IU/L (ref 40–129)
ALT SERPL-CCNC: 12 U/L (ref 10–40)
ANION GAP SERPL CALCULATED.3IONS-SCNC: 9 MMOL/L (ref 4–16)
AST SERPL-CCNC: 17 IU/L (ref 15–37)
BASOPHILS ABSOLUTE: 0 K/CU MM
BASOPHILS RELATIVE PERCENT: 0.3 % (ref 0–1)
BILIRUB SERPL-MCNC: 0.3 MG/DL (ref 0–1)
BUN BLDV-MCNC: 49 MG/DL (ref 6–23)
CALCIUM SERPL-MCNC: 8.7 MG/DL (ref 8.3–10.6)
CHLORIDE BLD-SCNC: 95 MMOL/L (ref 99–110)
CO2: 32 MMOL/L (ref 21–32)
CREAT SERPL-MCNC: 2.8 MG/DL (ref 0.6–1.1)
DIFFERENTIAL TYPE: ABNORMAL
EOSINOPHILS ABSOLUTE: 0.2 K/CU MM
EOSINOPHILS RELATIVE PERCENT: 2.1 % (ref 0–3)
GFR AFRICAN AMERICAN: 21 ML/MIN/1.73M2
GFR NON-AFRICAN AMERICAN: 17 ML/MIN/1.73M2
GLUCOSE BLD-MCNC: 124 MG/DL (ref 70–99)
GLUCOSE BLD-MCNC: 132 MG/DL (ref 70–99)
GLUCOSE BLD-MCNC: 136 MG/DL (ref 70–99)
HCT VFR BLD CALC: 26.1 % (ref 37–47)
HEMOGLOBIN: 7.5 GM/DL (ref 12.5–16)
IMMATURE NEUTROPHIL %: 0.9 % (ref 0–0.43)
LYMPHOCYTES ABSOLUTE: 1 K/CU MM
LYMPHOCYTES RELATIVE PERCENT: 9.8 % (ref 24–44)
MCH RBC QN AUTO: 25 PG (ref 27–31)
MCHC RBC AUTO-ENTMCNC: 28.7 % (ref 32–36)
MCV RBC AUTO: 87 FL (ref 78–100)
MONOCYTES ABSOLUTE: 0.7 K/CU MM
MONOCYTES RELATIVE PERCENT: 6.7 % (ref 0–4)
NUCLEATED RBC %: 0 %
PDW BLD-RTO: 17.3 % (ref 11.7–14.9)
PLATELET # BLD: 260 K/CU MM (ref 140–440)
PMV BLD AUTO: 10.4 FL (ref 7.5–11.1)
POTASSIUM SERPL-SCNC: 4.6 MMOL/L (ref 3.5–5.1)
RBC # BLD: 3 M/CU MM (ref 4.2–5.4)
SEGMENTED NEUTROPHILS ABSOLUTE COUNT: 8.1 K/CU MM
SEGMENTED NEUTROPHILS RELATIVE PERCENT: 80.2 % (ref 36–66)
SODIUM BLD-SCNC: 136 MMOL/L (ref 135–145)
TOTAL IMMATURE NEUTOROPHIL: 0.09 K/CU MM
TOTAL NUCLEATED RBC: 0 K/CU MM
TOTAL PROTEIN: 5.4 GM/DL (ref 6.4–8.2)
WBC # BLD: 10.1 K/CU MM (ref 4–10.5)

## 2020-06-26 PROCEDURE — 2580000003 HC RX 258: Performed by: STUDENT IN AN ORGANIZED HEALTH CARE EDUCATION/TRAINING PROGRAM

## 2020-06-26 PROCEDURE — 6370000000 HC RX 637 (ALT 250 FOR IP): Performed by: INTERNAL MEDICINE

## 2020-06-26 PROCEDURE — 94761 N-INVAS EAR/PLS OXIMETRY MLT: CPT

## 2020-06-26 PROCEDURE — 80053 COMPREHEN METABOLIC PANEL: CPT

## 2020-06-26 PROCEDURE — 36415 COLL VENOUS BLD VENIPUNCTURE: CPT

## 2020-06-26 PROCEDURE — 82962 GLUCOSE BLOOD TEST: CPT

## 2020-06-26 PROCEDURE — 6360000002 HC RX W HCPCS: Performed by: INTERNAL MEDICINE

## 2020-06-26 PROCEDURE — 85025 COMPLETE CBC W/AUTO DIFF WBC: CPT

## 2020-06-26 PROCEDURE — 2700000000 HC OXYGEN THERAPY PER DAY

## 2020-06-26 RX ORDER — TORSEMIDE 20 MG/1
20 TABLET ORAL 2 TIMES DAILY
Status: DISCONTINUED | OUTPATIENT
Start: 2020-06-26 | End: 2020-06-26 | Stop reason: HOSPADM

## 2020-06-26 RX ORDER — CIPROFLOXACIN 500 MG/1
500 TABLET, FILM COATED ORAL EVERY 12 HOURS SCHEDULED
Qty: 6 TABLET | Refills: 0 | Status: SHIPPED | OUTPATIENT
Start: 2020-06-26 | End: 2020-06-29

## 2020-06-26 RX ADMIN — ASPIRIN 81 MG CHEWABLE TABLET 81 MG: 81 TABLET CHEWABLE at 08:13

## 2020-06-26 RX ADMIN — SODIUM CHLORIDE, PRESERVATIVE FREE 10 ML: 5 INJECTION INTRAVENOUS at 08:14

## 2020-06-26 RX ADMIN — DULOXETINE 60 MG: 30 CAPSULE, DELAYED RELEASE ORAL at 08:13

## 2020-06-26 RX ADMIN — ISOSORBIDE MONONITRATE 60 MG: 60 TABLET, EXTENDED RELEASE ORAL at 08:13

## 2020-06-26 RX ADMIN — CARVEDILOL 6.25 MG: 6.25 TABLET, FILM COATED ORAL at 08:13

## 2020-06-26 RX ADMIN — BUPROPION HYDROCHLORIDE 100 MG: 100 TABLET, FILM COATED, EXTENDED RELEASE ORAL at 08:13

## 2020-06-26 RX ADMIN — CIPROFLOXACIN HYDROCHLORIDE 500 MG: 500 TABLET, FILM COATED ORAL at 08:13

## 2020-06-26 RX ADMIN — PROMETHAZINE HYDROCHLORIDE 12.5 MG: 25 TABLET ORAL at 08:13

## 2020-06-26 RX ADMIN — ONDANSETRON 4 MG: 2 INJECTION INTRAMUSCULAR; INTRAVENOUS at 03:19

## 2020-06-26 RX ADMIN — LACTULOSE 20 G: 10 SOLUTION ORAL at 08:13

## 2020-06-26 RX ADMIN — ENOXAPARIN SODIUM 40 MG: 40 INJECTION SUBCUTANEOUS at 08:13

## 2020-06-26 RX ADMIN — Medication 1000 UNITS: at 08:13

## 2020-06-26 RX ADMIN — HYDROCODONE BITARTRATE AND ACETAMINOPHEN 2 TABLET: 5; 325 TABLET ORAL at 03:19

## 2020-06-26 RX ADMIN — LEVOTHYROXINE SODIUM 50 MCG: 50 TABLET ORAL at 05:50

## 2020-06-26 RX ADMIN — RANOLAZINE 500 MG: 500 TABLET, FILM COATED, EXTENDED RELEASE ORAL at 08:13

## 2020-06-26 RX ADMIN — PANTOPRAZOLE SODIUM 40 MG: 40 TABLET, DELAYED RELEASE ORAL at 05:50

## 2020-06-26 RX ADMIN — LINAGLIPTIN 5 MG: 5 TABLET, FILM COATED ORAL at 08:22

## 2020-06-26 RX ADMIN — CLOPIDOGREL BISULFATE 75 MG: 75 TABLET ORAL at 08:13

## 2020-06-26 ASSESSMENT — PAIN SCALES - GENERAL: PAINLEVEL_OUTOF10: 10

## 2020-06-26 NOTE — PROGRESS NOTES
Progress Note( Dr. Nick Fajardo)    Subjective:   Admit Date: 6/20/2020  PCP: Farida Gomes MD    Admitted For :  Cough shortness of breath fever    Consulted For: Better control of blood glucose    Interval History: Feels much better blood glucose has been better    Denies any chest pains,   Has SOB . On breathing treatment   denies nausea or vomiting. No new bowel or bladder symptoms. Intake/Output Summary (Last 24 hours) at 6/26/2020 0642  Last data filed at 6/26/2020 0425  Gross per 24 hour   Intake 550 ml   Output 1450 ml   Net -900 ml       DATA    CBC:   Recent Labs     06/24/20  0448 06/25/20  0432 06/26/20  0509   WBC 8.8 8.2 10.1   HGB 7.1* 7.1* 7.5*    247 260    CMP:  Recent Labs     06/24/20  0448 06/25/20  0432    140   K 4.4 4.5    100   CO2 30 31   BUN 45* 45*   CREATININE 2.6* 2.7*   CALCIUM 8.5 8.5   PROT 5.2* 5.2*   LABALBU 3.6 3.5   BILITOT 0.3 0.3   ALKPHOS 84 87   AST 14* 14*   ALT 9* 10     Lipids:   Lab Results   Component Value Date    CHOL 117 05/13/2020    HDL 39 05/13/2020    TRIG 158 05/13/2020     Glucose:  Recent Labs     06/25/20  1715 06/25/20  2155 06/26/20  0202   POCGLU 122* 127* 124*     [id number]:  Lab Results   Component Value Date    LABA1C 9.1 06/21/2020     High Sensitivity TSH:   Lab Results   Component Value Date    TSHHS 4.160 06/20/2020     Free T3: No results found for: FT3  Free T4:  Lab Results   Component Value Date    T4FREE 1.11 02/28/2019       Ct Abdomen Pelvis Wo Contrast Additional Contrast? None    Result Date: 6/20/2020  EXAMINATION: CT OF THE ABDOMEN AND PELVIS WITHOUT CONTRAST 6/20/2020 9:49 am TECHNIQUE: CT of the abdomen and pelvis was performed without the administration of intravenous contrast. Multiplanar reformatted images are provided for review. Dose modulation, iterative reconstruction, and/or weight based adjustment of the mA/kV was utilized to reduce the radiation dose to as low as reasonably achievable.  COMPARISON: May 12, 2020 HISTORY: ORDERING SYSTEM PROVIDED HISTORY: abd pain/n/v TECHNOLOGIST PROVIDED HISTORY: Reason for exam:->abd pain/n/v Additional Contrast?->None Reason for Exam: abd pain/n/v FINDINGS: Lower Chest: The pleural based density seen on the prior CT exam have resolved. There is some minimal atelectatic changes seen at the lung bases. Organs: The liver appears grossly normal.  The gallbladder is been removed. The pancreas, adrenal glands and spleen are normal.  There is a small 3 mm nephrolith noted within the left kidney stable from the prior exam there is no evidence for obstruction. GI/Bowel: The large and small bowel of the abdomen is normal. There is no evidence for free air or free fluid noted to be present. Pelvis: A Reyes catheter is in place. There is no free air or free fluid Peritoneum/Retroperitoneum: Atherosclerotic disease seen in the aorta there is no aneurysm there is no pathologically enlarged adenopathy. Bones/Soft Tissues: There are no acute bony abnormalities. There are no acute soft tissue abnormalities. No acute intra-abdominal or pelvic process seen. Ct Head Wo Contrast    Result Date: 6/22/2020  EXAMINATION: CT OF THE HEAD WITHOUT CONTRAST  6/22/2020 10:29 pm TECHNIQUE: CT of the head was performed without the administration of intravenous contrast. Dose modulation, iterative reconstruction, and/or weight based adjustment of the mA/kV was utilized to reduce the radiation dose to as low as reasonably achievable. COMPARISON: 11/23/2019 HISTORY: ORDERING SYSTEM PROVIDED HISTORY: unwitnessed fall TECHNOLOGIST PROVIDED HISTORY: Reason for exam:->unwitnessed fall Has a \"code stroke\" or \"stroke alert\" been called? ->No Reason for Exam: unwitnessed fall Acuity: Acute Type of Exam: Initial FINDINGS: BRAIN/VENTRICLES: There is no acute intracranial hemorrhage, mass effect or midline shift. No abnormal extra-axial fluid collection. No evidence of reason territorial infarct. seen at the lung bases with small trace left-sided effusion. Upper Abdomen: Visualized solid organs of the abdomen are normal. Soft Tissues/Bones: No acute bony or soft tissue abnormalities are identified. The ground-glass nodules previously seen May 13, 2020 have resolved and likely related to prior infection. There is a small trace left-sided pleural effusion. There is no acute cardiopulmonary process noted. Xr Chest Portable    Result Date: 6/21/2020  EXAMINATION: ONE XRAY VIEW OF THE CHEST 6/21/2020 9:30 pm COMPARISON: None. HISTORY: ORDERING SYSTEM PROVIDED HISTORY: veriy left midline placement. Patient with severe pain when flushing/swelling TECHNOLOGIST PROVIDED HISTORY: Reason for exam:->veriy left midline placement. Patient with severe pain when flushing/swelling Reason for Exam: veriy left midline placement. Patient with severe pain when flushing/swelling Acuity: Acute Type of Exam: Ongoing FINDINGS: Cardiomegaly was noted with mild pulmonary vascular redistribution. No hilar mass was identified. No pneumonia or interstitial edema was seen. I do not see any PICC line, subclavian line or Bdocef-O-Ahbe. Moderate degenerative osteo arthritic changes involve the right shoulder with a probable Hill-Sachs deformity. Cardiomegaly with mild pulmonary vascular redistribution. No pneumonia or interstitial edema was identified. No pneumothorax. I do not see any PICC line, subclavian line or Lkvsna-C-Undy. Xr Chest Portable    Result Date: 6/20/2020  EXAMINATION: ONE XRAY VIEW OF THE CHEST 6/20/2020 7:17 am COMPARISON: May 12, 2020 HISTORY: ORDERING SYSTEM PROVIDED HISTORY: dyspnea TECHNOLOGIST PROVIDED HISTORY: Reason for exam:->dyspnea Reason for Exam: dyspnea Acuity: Acute Type of Exam: Initial Additional signs and symptoms:  female that presents with complaint of cough shortness of breath wheezing fevers chills abdominal pain nausea vomiting.  History of COPD she wears oxygen 3 L all the time Correction bolus Humalog/ Basal Lantus Insulin regime   4. Monitor Blood glucose frequently   5. Modified  the dose of Insulin/ other medicines as needed   6. Will follow     .      Dayana Gonzalez MD

## 2020-06-26 NOTE — PROGRESS NOTES
Daily Progress Note    Patient is awake alert doing ok  Going to [facility]CF  She may benefit with gastric bypass surgery  She needs to loose weight and salt and fluid restriction  Renal adjusted her diuretic     Pt. Awake, alert and feeling ok  HR stable, NSR, BP stable  No CP, SOB stable today     Acute on Chronic HFpEF    Last check 40-45% range    BNP elevated    Improved with diuresis    Diuretics per renal now    Cont. Med. Tx.      CAD s/p PCI    Las in     No sig. CP for now    Trop elevated but chronic and at baseline    On BB, statin, DAPT    Cont. Med. Tx.      UTI    ON ABx    Per primary     JUAN J on CKD    Cr up to 2.8 today    Renal following and assisting with diuretics    Appreciate input     Stable from cardiac standpoint-ok to D/C today if ok with primary  F/u at office in 2 weeks     Echo-  Summary   This is a limited echocardiogram.   Left ventricular systolic function is normal.   Ejection fraction is visually estimated at 40-45%.   Mild left ventricular hypertrophy.   Lateral and inferior wall appear hypokinetic.   No evidence of any pericardial effusion.     [facility]-  IMPRESSION:  1.  Successful angioplasty of the mid LAD, lesion decreased from 80% to  0% with a drug-eluting stent Xience 2.5 x 18 mm stent. 2.  Successful angioplasty of the OM branch.  Lesion decreased from 90%  to 0% with a drug-eluting stent Xience 2.25 x 33 mm stent.   3.  Right coronary artery is mid 100% occluded, did not see any  collaterals filling from the left system right coronary artery.  The  patient tolerated the procedure well.  No complications noted.     Due to her elevated EDP and also elevated creatinine, right coronary  artery was not intervened at this time.     PAST MEDICAL HISTORY:  Heart failure with reduced ejection fraction,  CAD, CKD, COPD, diabetes, hypertension, anxiety, obesity, and STEMI.     PAST SURGICAL HISTORY:  Includes angioplasty, ,  cholecystectomy, and tonsillectomy.     FAMILY Low    Sepsis ([facility] 06/20/2020    CHF (congestive heart failure), NYHA class I, acute, systolic ([facility] [address] 69/79/4853    Heart failure ([address] 05/01/2020    Hyperkalemia 02/23/2020    Uncontrolled diabetes mellitus with chronic kidney disease ([address] 02/23/2020    Acute on chronic diastolic (congestive) heart failure ([address] 02/23/2020    COPD with acute exacerbation ([address] 02/23/2020    Acute respiratory distress 02/16/2020    STEMI (ST elevation myocardial infarction) ([address] 11/23/2019    Cellulitis of abdominal wall 07/13/2019    Acute kidney injury ([address] 04/29/2019    DM (diabetes mellitus) ([address] 04/29/2019    Fluid overload 04/29/2019    Cor pulmonale (chronic) ([address] 04/29/2019    Anasarca 04/23/2019    UTI (urinary tract infection), bacterial 03/15/2019    Sinus tachycardia 03/15/2019    Uncontrolled type 2 diabetes mellitus with hyperglycemia ([facility] [address] 03/15/2019    Class 3 severe obesity due to excess calories with body mass index (BMI) of 45.0 to 49.9 in adult ([address] 03/15/2019    Pleural effusion on left 02/28/2019    Hyperglycemia 11/17/2018    Acute respiratory failure ([address] 10/02/2018    Gait disturbance 09/21/2018    Debility 09/19/2018    Nausea & vomiting 04/05/2018    Fever 04/05/2018    Generalized anxiety disorder 01/08/2018    DM (diabetes mellitus), secondary uncontrolled ([address] 01/04/2018    Syncope 08/26/2016    Acute pain of right shoulder 08/26/2016    Hypotension 08/26/2016    Diabetes type 2, uncontrolled ([address] 07/20/2016    Morbid obesity with BMI of 50.0-59.9, adult ([address] 07/20/2016    Essential hypertension     CAD (coronary artery disease)     Chronic diastolic heart failure (HCC)     CKD (chronic kidney disease) stage 3, GFR 30-59 ml/min ([address]        Electronically signed by Noam Enciso PA-C on 6/26/2020 at 10:14 AM

## 2020-06-26 NOTE — DISCHARGE SUMMARY
(TRADJENTA) 5 MG tablet  Take 1 tablet by mouth daily             nystatin (MYCOSTATIN) 995926 UNIT/GM powder  Apply topically 2 times daily as needed             ondansetron (ZOFRAN ODT) 4 MG disintegrating tablet  Take 1 tablet by mouth every 8 hours as needed for Nausea             pantoprazole (PROTONIX) 40 MG tablet  Take 1 tablet by mouth every morning (before breakfast)             ranolazine (RANEXA) 500 MG extended release tablet  Take 1 tablet by mouth 2 times daily             tiotropium (SPIRIVA RESPIMAT) 2.5 MCG/ACT AERS inhaler  Inhale 2 puffs into the lungs daily             torsemide (DEMADEX) 20 MG tablet  Take 1 tablet by mouth 2 times daily             vitamin D 1000 units CAPS  Take 3 capsules by mouth daily                 Objective Findings at Discharge:   BP (!) 142/63   Pulse 84   Temp 97.5 °F (36.4 °C) (Oral)   Resp 16   Ht 5' 4\" (1.626 m)   Wt (!) 318 lb (144.2 kg)   SpO2 97%   BMI 54.58 kg/m²            PHYSICAL EXAM   GEN Awake female, sitting upright in bed in no apparent distress. Appears given age. EYES Pupils are equally round. No scleral erythema, discharge, or conjunctivitis. HENT Mucous membranes are moist. Oral pharynx without exudates, no evidence of thrush. NECK Supple, no apparent thyromegaly or masses. RESP Clear to auscultation, no wheezes, rales or rhonchi. Symmetric chest movement while on room air. CARDIO/VASC S1/S2 auscultated. Regular rate without appreciable murmurs, rubs, or gallops. No JVD or carotid bruits. Peripheral pulses equal bilaterally and palpable. No peripheral edema. GI Abdomen is soft without significant tenderness, masses, or guarding. Bowel sounds are normoactive. Rectal exam deferred.  No costovertebral angle tenderness. Normal appearing external genitalia. Reyes catheter is not present. HEME/LYMPH No palpable cervical lymphadenopathy and no hepatosplenomegaly. No petechiae or ecchymoses. MSK No gross joint deformities.   SKIN Normal

## 2020-06-26 NOTE — CARE COORDINATION
Pt on discharge, now refusing swing bed again plans home with [facility]. Faxed Maureen Wayne info to [phone] and TC to office, spoke with Veronika Escalante, advised of pt's discharge and info faxed.

## 2020-06-26 NOTE — PROGRESS NOTES
Progress Note( Dr. Perez Arrow)    Subjective:   Admit Date: 6/20/2020  PCP: Bautista Greenwood MD    Admitted For :  Cough shortness of breath fever    Consulted For: Better control of blood glucose    Interval History: Feels much better blood glucose has been better    Denies any chest pains,   Has SOB . On breathing treatment   denies nausea or vomiting. No new bowel or bladder symptoms. Intake/Output Summary (Last 24 hours) at 6/26/2020 0642  Last data filed at 6/26/2020 0425  Gross per 24 hour   Intake 550 ml   Output 1450 ml   Net -900 ml       DATA    CBC:   Recent Labs     06/24/20  0448 06/25/20  0432 06/26/20  0509   WBC 8.8 8.2 10.1   HGB 7.1* 7.1* 7.5*    247 260    CMP:  Recent Labs     06/24/20  0448 06/25/20  0432    140   K 4.4 4.5    100   CO2 30 31   BUN 45* 45*   CREATININE 2.6* 2.7*   CALCIUM 8.5 8.5   PROT 5.2* 5.2*   LABALBU 3.6 3.5   BILITOT 0.3 0.3   ALKPHOS 84 87   AST 14* 14*   ALT 9* 10     Lipids:   Lab Results   Component Value Date    CHOL 117 05/13/2020    HDL 39 05/13/2020    TRIG 158 05/13/2020     Glucose:  Recent Labs     06/25/20  1715 06/25/20  2155 06/26/20  0202   POCGLU 122* 127* 124*     [id number]:  Lab Results   Component Value Date    LABA1C 9.1 06/21/2020     High Sensitivity TSH:   Lab Results   Component Value Date    TSHHS 4.160 06/20/2020     Free T3: No results found for: FT3  Free T4:  Lab Results   Component Value Date    T4FREE 1.11 02/28/2019       Ct Abdomen Pelvis Wo Contrast Additional Contrast? None    Result Date: 6/20/2020  EXAMINATION: CT OF THE ABDOMEN AND PELVIS WITHOUT CONTRAST 6/20/2020 9:49 am TECHNIQUE: CT of the abdomen and pelvis was performed without the administration of intravenous contrast. Multiplanar reformatted images are provided for review. Dose modulation, iterative reconstruction, and/or weight based adjustment of the mA/kV was utilized to reduce the radiation dose to as low as reasonably achievable.  COMPARISON: she is morbidly obese also states she has a history of paracentesis and peripheral edema FINDINGS: There is cephalization the pulmonary vascularity with a small left-sided effusion or no overlying atelectatic changes. The heart appears enlarged. There are no acute bony abnormalities. Mild congestive changes with small left-sided effusion and overlying atelectatic changes. Scheduled Medicines   Medications:    insulin glargine  20 Units Subcutaneous Nightly    ciprofloxacin  500 mg Oral 2 times per day    enoxaparin  40 mg Subcutaneous Daily    lactulose  20 g Oral TID    insulin lispro  0-12 Units Subcutaneous 2 times per day    linagliptin  5 mg Oral Daily    aspirin  81 mg Oral Daily    atorvastatin  80 mg Oral Nightly    budesonide-formoterol  2 puff Inhalation BID    carvedilol  6.25 mg Oral BID WC    buPROPion  100 mg Oral BID    clopidogrel  75 mg Oral Daily    DULoxetine  60 mg Oral Daily    isosorbide mononitrate  60 mg Oral Daily    levothyroxine  50 mcg Oral Daily    pantoprazole  40 mg Oral QAM AC    ranolazine  500 mg Oral BID    tiotropium  2 puff Inhalation Daily    vitamin D  1,000 Units Oral Daily    sodium chloride flush  10 mL Intravenous 2 times per day    insulin lispro  0-12 Units Subcutaneous TID WC    sodium chloride flush  10 mL Intravenous 2 times per day      Infusions:    dextrose           Objective:   Vitals: BP (!) 142/63   Pulse 84   Temp 97.5 °F (36.4 °C) (Oral)   Resp 16   Ht 5' 4\" (1.626 m)   Wt (!) 318 lb (144.2 kg)   SpO2 97%   BMI 54.58 kg/m²   General appearance: alert and cooperative with exam  Neck: no JVD or bruit  Thyroid : Normal lobes   Lungs: Has Vesicular Breath sounds some wheezing and rales  Heart:  regular rate and rhythm  Abdomen: soft, non-tender; bowel sounds normal; no masses,  no organomegaly  Musculoskeletal: Normal  Extremities: extremities normal, , has edema  Neurologic:  Awake, alert, oriented to name, place and time. Cranial nerves II-XII are grossly intact. Motor is  intact. Sensory neuropathy. ,  and gait is abnormal.    Assessment:     Patient Active Problem List:     CKD (chronic kidney disease) stage 3, GFR 30-59 ml/min ([facility])     CAD (coronary artery disease)     Chronic diastolic heart failure ([facility])     Diabetes type 2, uncontrolled ([address]     Morbid obesity with BMI of 50.0-59.9, adult ([facility])     Elevated lactic acid level, resolved     Musculoskeletal chest pain     Essential hypertension     Diabetes mellitus with hyperglycemia ([facility])     Severe dehydration secondary to significant hyperglycemia     Sepsis secondary to UTI, POA     Generalized weakness     Sepsis associated hypotension, POA     E. coli UTI (urinary tract infection)     Syncope     Acute pain of right shoulder     Hypotension     DM (diabetes mellitus), secondary uncontrolled ([facility])     Generalized anxiety disorder     Nausea & vomiting     Fever     Debility     Gait disturbance     Acute respiratory failure ([facility])     Hyperglycemia     Pleural effusion on left     UTI (urinary tract infection), bacterial     Sinus tachycardia     Uncontrolled type 2 diabetes mellitus with hyperglycemia ([facility])     Class 3 severe obesity due to excess calories with body mass index (BMI) of 45.0 to 49.9 in adult ([address]     Anasarca     Acute kidney injury ([address]     DM (diabetes mellitus) ([facility])     Fluid overload     Cor pulmonale (chronic) ([facility])     Cellulitis of abdominal wall     STEMI (ST elevation myocardial infarction) ([address]     Acute respiratory distress     Hyperkalemia     Uncontrolled diabetes mellitus with chronic kidney disease ([facility])     Acute on chronic diastolic (congestive) heart failure ([facility])     COPD with acute exacerbation ([facility])     Heart failure ([facility])     CHF (congestive heart failure), NYHA class I, acute, systolic ([address]     Sepsis ([address]      Plan:     1. Reviewed POC blood glucose . Labs and X ray results   2. Reviewed Current Medicines   3.  On meal/ Correction bolus Humalog/ Basal Lantus Insulin regime   4. Monitor Blood glucose frequently   5. Modified  the dose of Insulin/ other medicines as needed   6. Will follow     .      Hipolito Escoto MD

## 2020-06-27 ENCOUNTER — CARE COORDINATION (OUTPATIENT)
Dept: CASE MANAGEMENT | Age: 60
End: 2020-06-27

## 2020-06-29 ENCOUNTER — CARE COORDINATION (OUTPATIENT)
Dept: CASE MANAGEMENT | Age: 60
End: 2020-06-29

## 2020-07-01 LAB
EKG ATRIAL RATE: 103 BPM
EKG DIAGNOSIS: NORMAL
EKG P AXIS: 51 DEGREES
EKG P-R INTERVAL: 178 MS
EKG Q-T INTERVAL: 354 MS
EKG QRS DURATION: 110 MS
EKG QTC CALCULATION (BAZETT): 463 MS
EKG R AXIS: 48 DEGREES
EKG T AXIS: 18 DEGREES
EKG VENTRICULAR RATE: 103 BPM

## 2020-09-30 ENCOUNTER — HOSPITAL ENCOUNTER (INPATIENT)
Age: 60
LOS: 3 days | Discharge: HOME | DRG: 683 | End: 2020-10-03
Attending: EMERGENCY MEDICINE | Admitting: HOSPITALIST
Payer: MEDICARE

## 2020-09-30 ENCOUNTER — APPOINTMENT (OUTPATIENT)
Dept: GENERAL RADIOLOGY | Age: 60
DRG: 683 | End: 2020-09-30
Payer: MEDICARE

## 2020-09-30 DIAGNOSIS — I50.9 CONGESTIVE HEART FAILURE, UNSPECIFIED HF CHRONICITY, UNSPECIFIED HEART FAILURE TYPE (HCC): ICD-10-CM

## 2020-09-30 DIAGNOSIS — J96.12 CHRONIC RESPIRATORY FAILURE WITH HYPERCAPNIA (HCC): ICD-10-CM

## 2020-09-30 DIAGNOSIS — N18.32 STAGE 3B CHRONIC KIDNEY DISEASE (HCC): Chronic | ICD-10-CM

## 2020-09-30 DIAGNOSIS — N18.9 CHRONIC KIDNEY DISEASE, UNSPECIFIED CKD STAGE: ICD-10-CM

## 2020-09-30 DIAGNOSIS — F41.1 ANXIETY STATE: ICD-10-CM

## 2020-09-30 DIAGNOSIS — I95.9 HYPOTENSION, UNSPECIFIED HYPOTENSION TYPE: Primary | ICD-10-CM

## 2020-09-30 LAB
ALBUMIN SERPL-MCNC: 3.4 GM/DL (ref 3.4–5)
ALP BLD-CCNC: 83 IU/L (ref 40–128)
ALT SERPL-CCNC: 16 U/L (ref 10–40)
ANION GAP SERPL CALCULATED.3IONS-SCNC: 12 MMOL/L (ref 4–16)
APTT: 31 SECONDS (ref 25.1–37.1)
APTT: 31.4 SECONDS (ref 25.1–37.1)
AST SERPL-CCNC: 16 IU/L (ref 15–37)
BACTERIA: ABNORMAL /HPF
BASE EXCESS MIXED: 4 (ref 0–2.3)
BASOPHILS ABSOLUTE: 0 K/CU MM
BASOPHILS RELATIVE PERCENT: 0.4 % (ref 0–1)
BILIRUB SERPL-MCNC: 0.3 MG/DL (ref 0–1)
BILIRUBIN URINE: NEGATIVE MG/DL
BLOOD, URINE: NEGATIVE
BUN BLDV-MCNC: 84 MG/DL (ref 6–23)
CALCIUM SERPL-MCNC: 8.6 MG/DL (ref 8.3–10.6)
CHLORIDE BLD-SCNC: 92 MMOL/L (ref 99–110)
CLARITY: CLEAR
CO2: 27 MMOL/L (ref 21–32)
COLOR: YELLOW
COMMENT: ABNORMAL
CREAT SERPL-MCNC: 3.1 MG/DL (ref 0.6–1.1)
D DIMER: 231 NG/ML(DDU)
DIFFERENTIAL TYPE: ABNORMAL
EOSINOPHILS ABSOLUTE: 0.3 K/CU MM
EOSINOPHILS RELATIVE PERCENT: 2.6 % (ref 0–3)
FIBRINOGEN LEVEL: 342 MG/DL (ref 196.9–442.1)
GFR AFRICAN AMERICAN: 19 ML/MIN/1.73M2
GFR NON-AFRICAN AMERICAN: 15 ML/MIN/1.73M2
GLUCOSE BLD-MCNC: 155 MG/DL (ref 70–99)
GLUCOSE BLD-MCNC: 330 MG/DL (ref 70–99)
GLUCOSE, URINE: NEGATIVE MG/DL
HCO3 VENOUS: 31.9 MMOL/L (ref 19–25)
HCT VFR BLD CALC: 30.3 % (ref 37–47)
HEMOGLOBIN: 9.6 GM/DL (ref 12.5–16)
HYALINE CASTS: >20 /LPF
IMMATURE NEUTROPHIL %: 1.1 % (ref 0–0.43)
INR BLD: 0.93 INDEX
INR BLD: 0.98 INDEX
KETONES, URINE: NEGATIVE MG/DL
LACTATE: 1.3 MMOL/L (ref 0.4–2)
LACTATE: 2.1 MMOL/L (ref 0.4–2)
LEUKOCYTE ESTERASE, URINE: ABNORMAL
LYMPHOCYTES ABSOLUTE: 1.9 K/CU MM
LYMPHOCYTES RELATIVE PERCENT: 19.9 % (ref 24–44)
MCH RBC QN AUTO: 28 PG (ref 27–31)
MCHC RBC AUTO-ENTMCNC: 31.7 % (ref 32–36)
MCV RBC AUTO: 88.3 FL (ref 78–100)
MONOCYTES ABSOLUTE: 0.7 K/CU MM
MONOCYTES RELATIVE PERCENT: 7.1 % (ref 0–4)
MUCUS: ABNORMAL HPF
NITRITE URINE, QUANTITATIVE: NEGATIVE
NUCLEATED RBC %: 0 %
O2 SAT, VEN: 86.2 % (ref 50–70)
PCO2, VEN: 62 MMHG (ref 38–52)
PDW BLD-RTO: 17.6 % (ref 11.7–14.9)
PH VENOUS: 7.32 (ref 7.32–7.42)
PH, URINE: 5 (ref 5–8)
PLATELET # BLD: 230 K/CU MM (ref 140–440)
PMV BLD AUTO: 11.2 FL (ref 7.5–11.1)
PO2, VEN: 60 MMHG (ref 28–48)
POTASSIUM SERPL-SCNC: 4.3 MMOL/L (ref 3.5–5.1)
PRO-BNP: 2669 PG/ML
PROTEIN UA: 30 MG/DL
PROTHROMBIN TIME: 11.2 SECONDS (ref 11.7–14.5)
PROTHROMBIN TIME: 11.8 SECONDS (ref 11.7–14.5)
RBC # BLD: 3.43 M/CU MM (ref 4.2–5.4)
RBC URINE: 2 /HPF (ref 0–6)
SEGMENTED NEUTROPHILS ABSOLUTE COUNT: 6.7 K/CU MM
SEGMENTED NEUTROPHILS RELATIVE PERCENT: 68.9 % (ref 36–66)
SODIUM BLD-SCNC: 131 MMOL/L (ref 135–145)
SPECIFIC GRAVITY UA: 1.01 (ref 1–1.03)
SQUAMOUS EPITHELIAL: 2 /HPF
TOTAL IMMATURE NEUTOROPHIL: 0.11 K/CU MM
TOTAL NUCLEATED RBC: 0 K/CU MM
TOTAL PROTEIN: 5.6 GM/DL (ref 6.4–8.2)
TRICHOMONAS: ABNORMAL /HPF
TROPONIN T: 0.12 NG/ML
UROBILINOGEN, URINE: NORMAL MG/DL (ref 0.2–1)
WBC # BLD: 9.7 K/CU MM (ref 4–10.5)
WBC UA: 2 /HPF (ref 0–5)

## 2020-09-30 PROCEDURE — 2580000003 HC RX 258: Performed by: INTERNAL MEDICINE

## 2020-09-30 PROCEDURE — 96372 THER/PROPH/DIAG INJ SC/IM: CPT

## 2020-09-30 PROCEDURE — 84484 ASSAY OF TROPONIN QUANT: CPT

## 2020-09-30 PROCEDURE — 85610 PROTHROMBIN TIME: CPT

## 2020-09-30 PROCEDURE — 93010 ELECTROCARDIOGRAM REPORT: CPT | Performed by: INTERNAL MEDICINE

## 2020-09-30 PROCEDURE — 86901 BLOOD TYPING SEROLOGIC RH(D): CPT

## 2020-09-30 PROCEDURE — 82962 GLUCOSE BLOOD TEST: CPT

## 2020-09-30 PROCEDURE — 87040 BLOOD CULTURE FOR BACTERIA: CPT

## 2020-09-30 PROCEDURE — 2580000003 HC RX 258: Performed by: NURSE PRACTITIONER

## 2020-09-30 PROCEDURE — 36415 COLL VENOUS BLD VENIPUNCTURE: CPT

## 2020-09-30 PROCEDURE — 80053 COMPREHEN METABOLIC PANEL: CPT

## 2020-09-30 PROCEDURE — 2060000000 HC ICU INTERMEDIATE R&B

## 2020-09-30 PROCEDURE — 6370000000 HC RX 637 (ALT 250 FOR IP): Performed by: PHYSICIAN ASSISTANT

## 2020-09-30 PROCEDURE — U0002 COVID-19 LAB TEST NON-CDC: HCPCS

## 2020-09-30 PROCEDURE — 86850 RBC ANTIBODY SCREEN: CPT

## 2020-09-30 PROCEDURE — 4500000027

## 2020-09-30 PROCEDURE — 6370000000 HC RX 637 (ALT 250 FOR IP): Performed by: NURSE PRACTITIONER

## 2020-09-30 PROCEDURE — 96361 HYDRATE IV INFUSION ADD-ON: CPT

## 2020-09-30 PROCEDURE — 85379 FIBRIN DEGRADATION QUANT: CPT

## 2020-09-30 PROCEDURE — 86900 BLOOD TYPING SEROLOGIC ABO: CPT

## 2020-09-30 PROCEDURE — 71045 X-RAY EXAM CHEST 1 VIEW: CPT

## 2020-09-30 PROCEDURE — 93005 ELECTROCARDIOGRAM TRACING: CPT | Performed by: PHYSICIAN ASSISTANT

## 2020-09-30 PROCEDURE — 85730 THROMBOPLASTIN TIME PARTIAL: CPT

## 2020-09-30 PROCEDURE — 99291 CRITICAL CARE FIRST HOUR: CPT

## 2020-09-30 PROCEDURE — 85384 FIBRINOGEN ACTIVITY: CPT

## 2020-09-30 PROCEDURE — 85025 COMPLETE CBC W/AUTO DIFF WBC: CPT

## 2020-09-30 PROCEDURE — 83880 ASSAY OF NATRIURETIC PEPTIDE: CPT

## 2020-09-30 PROCEDURE — 86141 C-REACTIVE PROTEIN HS: CPT

## 2020-09-30 PROCEDURE — 83615 LACTATE (LD) (LDH) ENZYME: CPT

## 2020-09-30 PROCEDURE — 81001 URINALYSIS AUTO W/SCOPE: CPT

## 2020-09-30 PROCEDURE — 6360000002 HC RX W HCPCS: Performed by: NURSE PRACTITIONER

## 2020-09-30 PROCEDURE — 82728 ASSAY OF FERRITIN: CPT

## 2020-09-30 PROCEDURE — 94640 AIRWAY INHALATION TREATMENT: CPT

## 2020-09-30 PROCEDURE — 94761 N-INVAS EAR/PLS OXIMETRY MLT: CPT

## 2020-09-30 PROCEDURE — 2580000003 HC RX 258: Performed by: PHYSICIAN ASSISTANT

## 2020-09-30 PROCEDURE — 87449 NOS EACH ORGANISM AG IA: CPT

## 2020-09-30 PROCEDURE — 87899 AGENT NOS ASSAY W/OPTIC: CPT

## 2020-09-30 PROCEDURE — 83605 ASSAY OF LACTIC ACID: CPT

## 2020-09-30 PROCEDURE — 82805 BLOOD GASES W/O2 SATURATION: CPT

## 2020-09-30 RX ORDER — PROMETHAZINE HYDROCHLORIDE 25 MG/1
12.5 TABLET ORAL EVERY 6 HOURS PRN
Status: DISCONTINUED | OUTPATIENT
Start: 2020-09-30 | End: 2020-10-03 | Stop reason: HOSPADM

## 2020-09-30 RX ORDER — ATORVASTATIN CALCIUM 40 MG/1
80 TABLET, FILM COATED ORAL NIGHTLY
Status: DISCONTINUED | OUTPATIENT
Start: 2020-09-30 | End: 2020-10-01

## 2020-09-30 RX ORDER — 0.9 % SODIUM CHLORIDE 0.9 %
250 INTRAVENOUS SOLUTION INTRAVENOUS ONCE
Status: COMPLETED | OUTPATIENT
Start: 2020-09-30 | End: 2020-09-30

## 2020-09-30 RX ORDER — MIDODRINE HYDROCHLORIDE 5 MG/1
10 TABLET ORAL ONCE
Status: COMPLETED | OUTPATIENT
Start: 2020-09-30 | End: 2020-09-30

## 2020-09-30 RX ORDER — IPRATROPIUM BROMIDE AND ALBUTEROL SULFATE 2.5; .5 MG/3ML; MG/3ML
1 SOLUTION RESPIRATORY (INHALATION) EVERY 4 HOURS
Status: DISCONTINUED | OUTPATIENT
Start: 2020-09-30 | End: 2020-09-30

## 2020-09-30 RX ORDER — LORAZEPAM 1 MG/1
1 TABLET ORAL 2 TIMES DAILY
Status: DISCONTINUED | OUTPATIENT
Start: 2020-09-30 | End: 2020-10-03 | Stop reason: HOSPADM

## 2020-09-30 RX ORDER — 0.9 % SODIUM CHLORIDE 0.9 %
1000 INTRAVENOUS SOLUTION INTRAVENOUS ONCE
Status: DISCONTINUED | OUTPATIENT
Start: 2020-09-30 | End: 2020-09-30

## 2020-09-30 RX ORDER — BUPROPION HYDROCHLORIDE 100 MG/1
100 TABLET, EXTENDED RELEASE ORAL 2 TIMES DAILY
Status: DISCONTINUED | OUTPATIENT
Start: 2020-09-30 | End: 2020-10-03 | Stop reason: HOSPADM

## 2020-09-30 RX ORDER — DEXAMETHASONE 4 MG/1
6 TABLET ORAL DAILY
Status: DISCONTINUED | OUTPATIENT
Start: 2020-09-30 | End: 2020-10-02

## 2020-09-30 RX ORDER — ASPIRIN 81 MG/1
81 TABLET, CHEWABLE ORAL DAILY
Status: DISCONTINUED | OUTPATIENT
Start: 2020-09-30 | End: 2020-10-03 | Stop reason: HOSPADM

## 2020-09-30 RX ORDER — INSULIN GLARGINE 100 [IU]/ML
55 INJECTION, SOLUTION SUBCUTANEOUS EVERY MORNING
Status: DISCONTINUED | OUTPATIENT
Start: 2020-10-01 | End: 2020-10-01

## 2020-09-30 RX ORDER — 0.9 % SODIUM CHLORIDE 0.9 %
1000 INTRAVENOUS SOLUTION INTRAVENOUS ONCE
Status: COMPLETED | OUTPATIENT
Start: 2020-09-30 | End: 2020-09-30

## 2020-09-30 RX ORDER — DULOXETIN HYDROCHLORIDE 30 MG/1
60 CAPSULE, DELAYED RELEASE ORAL DAILY
Status: DISCONTINUED | OUTPATIENT
Start: 2020-09-30 | End: 2020-10-03 | Stop reason: HOSPADM

## 2020-09-30 RX ORDER — ACETAMINOPHEN 325 MG/1
650 TABLET ORAL EVERY 6 HOURS PRN
Status: DISCONTINUED | OUTPATIENT
Start: 2020-09-30 | End: 2020-10-03 | Stop reason: HOSPADM

## 2020-09-30 RX ORDER — DEXTROSE MONOHYDRATE 50 MG/ML
100 INJECTION, SOLUTION INTRAVENOUS PRN
Status: DISCONTINUED | OUTPATIENT
Start: 2020-09-30 | End: 2020-10-03 | Stop reason: HOSPADM

## 2020-09-30 RX ORDER — SODIUM CHLORIDE 0.9 % (FLUSH) 0.9 %
10 SYRINGE (ML) INJECTION PRN
Status: DISCONTINUED | OUTPATIENT
Start: 2020-09-30 | End: 2020-10-03 | Stop reason: HOSPADM

## 2020-09-30 RX ORDER — NICOTINE POLACRILEX 4 MG
15 LOZENGE BUCCAL PRN
Status: DISCONTINUED | OUTPATIENT
Start: 2020-09-30 | End: 2020-10-03 | Stop reason: HOSPADM

## 2020-09-30 RX ORDER — ONDANSETRON 2 MG/ML
4 INJECTION INTRAMUSCULAR; INTRAVENOUS EVERY 6 HOURS PRN
Status: DISCONTINUED | OUTPATIENT
Start: 2020-09-30 | End: 2020-10-03 | Stop reason: HOSPADM

## 2020-09-30 RX ORDER — ACETAMINOPHEN 650 MG/1
650 SUPPOSITORY RECTAL EVERY 6 HOURS PRN
Status: DISCONTINUED | OUTPATIENT
Start: 2020-09-30 | End: 2020-10-03 | Stop reason: HOSPADM

## 2020-09-30 RX ORDER — SODIUM CHLORIDE 9 MG/ML
INJECTION, SOLUTION INTRAVENOUS CONTINUOUS
Status: DISCONTINUED | OUTPATIENT
Start: 2020-09-30 | End: 2020-10-01

## 2020-09-30 RX ORDER — LEVOTHYROXINE SODIUM 0.05 MG/1
50 TABLET ORAL DAILY
Status: DISCONTINUED | OUTPATIENT
Start: 2020-10-01 | End: 2020-10-03 | Stop reason: HOSPADM

## 2020-09-30 RX ORDER — ALBUTEROL SULFATE 90 UG/1
2 AEROSOL, METERED RESPIRATORY (INHALATION)
Status: DISCONTINUED | OUTPATIENT
Start: 2020-10-01 | End: 2020-10-01

## 2020-09-30 RX ORDER — DEXTROSE MONOHYDRATE 25 G/50ML
12.5 INJECTION, SOLUTION INTRAVENOUS PRN
Status: DISCONTINUED | OUTPATIENT
Start: 2020-09-30 | End: 2020-10-03 | Stop reason: HOSPADM

## 2020-09-30 RX ORDER — ACETAMINOPHEN 650 MG/1
650 SUPPOSITORY RECTAL EVERY 6 HOURS PRN
Status: DISCONTINUED | OUTPATIENT
Start: 2020-09-30 | End: 2020-09-30 | Stop reason: SDUPTHER

## 2020-09-30 RX ORDER — HEPARIN SODIUM 5000 [USP'U]/ML
5000 INJECTION, SOLUTION INTRAVENOUS; SUBCUTANEOUS EVERY 8 HOURS SCHEDULED
Status: DISCONTINUED | OUTPATIENT
Start: 2020-09-30 | End: 2020-10-03 | Stop reason: HOSPADM

## 2020-09-30 RX ORDER — CLOPIDOGREL BISULFATE 75 MG/1
75 TABLET ORAL DAILY
Status: DISCONTINUED | OUTPATIENT
Start: 2020-09-30 | End: 2020-10-03 | Stop reason: HOSPADM

## 2020-09-30 RX ORDER — PANTOPRAZOLE SODIUM 40 MG/1
40 TABLET, DELAYED RELEASE ORAL
Status: DISCONTINUED | OUTPATIENT
Start: 2020-10-01 | End: 2020-10-03 | Stop reason: HOSPADM

## 2020-09-30 RX ORDER — SODIUM CHLORIDE 0.9 % (FLUSH) 0.9 %
10 SYRINGE (ML) INJECTION EVERY 12 HOURS SCHEDULED
Status: DISCONTINUED | OUTPATIENT
Start: 2020-09-30 | End: 2020-10-03 | Stop reason: HOSPADM

## 2020-09-30 RX ORDER — ACETAMINOPHEN 325 MG/1
650 TABLET ORAL EVERY 6 HOURS PRN
Status: DISCONTINUED | OUTPATIENT
Start: 2020-09-30 | End: 2020-09-30 | Stop reason: SDUPTHER

## 2020-09-30 RX ADMIN — SODIUM CHLORIDE 250 ML: 9 INJECTION, SOLUTION INTRAVENOUS at 14:41

## 2020-09-30 RX ADMIN — INSULIN LISPRO 1 UNITS: 100 INJECTION, SOLUTION INTRAVENOUS; SUBCUTANEOUS at 21:15

## 2020-09-30 RX ADMIN — SODIUM CHLORIDE, PRESERVATIVE FREE 10 ML: 5 INJECTION INTRAVENOUS at 21:12

## 2020-09-30 RX ADMIN — ALBUTEROL SULFATE 2 PUFF: 90 AEROSOL, METERED RESPIRATORY (INHALATION) at 23:51

## 2020-09-30 RX ADMIN — SODIUM CHLORIDE: 9 INJECTION, SOLUTION INTRAVENOUS at 18:08

## 2020-09-30 RX ADMIN — DEXAMETHASONE 6 MG: 4 TABLET ORAL at 21:11

## 2020-09-30 RX ADMIN — HEPARIN SODIUM 5000 UNITS: 5000 INJECTION, SOLUTION INTRAVENOUS; SUBCUTANEOUS at 21:12

## 2020-09-30 RX ADMIN — ASPIRIN 81 MG CHEWABLE TABLET 81 MG: 81 TABLET CHEWABLE at 21:11

## 2020-09-30 RX ADMIN — DULOXETINE HYDROCHLORIDE 60 MG: 30 CAPSULE, DELAYED RELEASE ORAL at 21:11

## 2020-09-30 RX ADMIN — CLOPIDOGREL BISULFATE 75 MG: 75 TABLET ORAL at 21:12

## 2020-09-30 RX ADMIN — MIDODRINE HYDROCHLORIDE 10 MG: 5 TABLET ORAL at 21:12

## 2020-09-30 RX ADMIN — SODIUM CHLORIDE 1000 ML: 9 INJECTION, SOLUTION INTRAVENOUS at 15:49

## 2020-09-30 RX ADMIN — IPRATROPIUM BROMIDE 2 PUFF: 17 AEROSOL, METERED RESPIRATORY (INHALATION) at 23:51

## 2020-09-30 RX ADMIN — ATORVASTATIN CALCIUM 80 MG: 40 TABLET, FILM COATED ORAL at 21:11

## 2020-09-30 RX ADMIN — BUPROPION HYDROCHLORIDE 100 MG: 100 TABLET, FILM COATED, EXTENDED RELEASE ORAL at 21:11

## 2020-09-30 RX ADMIN — LORAZEPAM 1 MG: 1 TABLET ORAL at 21:11

## 2020-09-30 ASSESSMENT — PAIN DESCRIPTION - ONSET: ONSET: ON-GOING

## 2020-09-30 ASSESSMENT — PAIN SCALES - GENERAL
PAINLEVEL_OUTOF10: 7
PAINLEVEL_OUTOF10: 0

## 2020-09-30 ASSESSMENT — PAIN DESCRIPTION - PAIN TYPE: TYPE: CHRONIC PAIN

## 2020-09-30 ASSESSMENT — PAIN DESCRIPTION - DESCRIPTORS: DESCRIPTORS: ACHING

## 2020-09-30 ASSESSMENT — PAIN DESCRIPTION - FREQUENCY: FREQUENCY: CONTINUOUS

## 2020-09-30 ASSESSMENT — PAIN DESCRIPTION - PROGRESSION: CLINICAL_PROGRESSION: GRADUALLY WORSENING

## 2020-09-30 ASSESSMENT — PAIN DESCRIPTION - LOCATION: LOCATION: BACK;NECK

## 2020-09-30 NOTE — ED PROVIDER NOTES
EKG Interpretation    EKG performed on 9/30/2020 at 1406  I did not evaluate this patient. Interpreted by emergency department physician    Rhythm: normal sinus   Rate: normal  Axis: normal  Ectopy: none  Conduction: normal  ST Segments: nonspecific changes  T Waves: no acute change  Q Waves: Nonspecific    Clinical Impression: Sinus rhythm.   Age-indeterminate inferior infarct    [address] [employer] [facility],   09/30/20 8474

## 2020-09-30 NOTE — ED NOTES
1537 paged Dr Meghana Mcfarland  09/30/20 1537  1551 repaged Dr Meghana Mcfarland  09/30/20 2312  1618 Dr Dario Weir returned call      Clifton Osborne  09/30/20 5910

## 2020-09-30 NOTE — ED NOTES
Troponin 0.123 and lactic 2.1. SIMONA Núñez notified at this time.      Cecy Bermudez RN  09/30/20 5859

## 2020-09-30 NOTE — ED PROVIDER NOTES
ATTENDING NOTE:    I discussed this patient's history and physical findings and reviewed the PA's findings with them, as well as performed an independent assessment and coordinated care with them. My additional findings are:    HISTORY OF PRESENT ILLNESS:  Chief Complaint   Patient presents with    Anxiety    Hypotension   . Luis Felipe Ramirez is a 61 y.o. female who presents with hypertension and feelings of anxiety. PHYSICAL EXAM:  VITAL SIGNS:   ED Triage Vitals [09/30/20 1239]   Enc Vitals Group      BP (!) 109/58      Pulse 88      Resp 20      Temp 98.1 °F (36.7 °C)      Temp Source Oral      SpO2 100 %      Weight 277 lb (125.6 kg)      Height 5' 4\" (1.626 m)      Head Circumference       Peak Flow       Pain Score       Pain Loc       Pain Edu? Excl. in [address]? Vitals during ED course were reviewed and are as charted. Key Physical Exam Findings:    Constitutional: Minimal distress, Non-toxic appearance    Eyes:  Conjunctiva normal, No discharge    HENT: Normocephalic, Atraumatic, Bilateral external ears normal, posterior oropharynx is nonerythematous and without exudate, uvula is midline, no trismus, no \"hot potato voice\" or dysphonia, oropharynx moist    Neck: Supple, no stridor, no grossly visible or palpable masses    Cardiovascular: Regular rate and rhythm, No murmurs, No rubs, No gallops    Pulmonary/Chest:  Normal breath sounds, No respiratory distress or accessory muscle use, No wheezing, crackles or rhonchi. Abdomen:  Soft, nondistended and nonrigid, No tenderness or peritoneal signs, No masses, normal bowel sounds    Back:  No midline point tenderness, No paraspinous muscle tenderness.   No CVA tenderness    Extremities:  No gross deformities, no edema, no tenderness    Neurologic:  Normal motor function, Normal sensory function, No focal deficits    Skin:  Warm, Dry, No erythema, No rash, No cyanosis, No mottling    Lymphatic:  No lymphadenopathy in the following location(s): cervical    Psychiatric:  Alert and oriented x3, Affect normal      RADIOLOGY/PROCEDURES/LABS/MEDICATIONS ADMINISTERED:    I have reviewed and interpreted all of the currently available lab results from this visit (if applicable):  Results for orders placed or performed during the hospital encounter of 09/30/20   CBC Auto Differential   Result Value Ref Range    WBC 9.7 4.0 - 10.5 K/CU MM    RBC 3.43 (L) 4.2 - 5.4 M/CU MM    Hemoglobin 9.6 (L) 12.5 - 16.0 GM/DL    Hematocrit 30.3 (L) 37 - 47 %    MCV 88.3 78 - 100 FL    MCH 28.0 27 - 31 PG    MCHC 31.7 (L) 32.0 - 36.0 %    RDW 17.6 (H) 11.7 - 14.9 %    Platelets 336 712 - 913 K/CU MM    MPV 11.2 (H) 7.5 - 11.1 FL    Differential Type AUTOMATED DIFFERENTIAL     Segs Relative 68.9 (H) 36 - 66 %    Lymphocytes % 19.9 (L) 24 - 44 %    Monocytes % 7.1 (H) 0 - 4 %    Eosinophils % 2.6 0 - 3 %    Basophils % 0.4 0 - 1 %    Segs Absolute 6.7 K/CU MM    Lymphocytes Absolute 1.9 K/CU MM    Monocytes Absolute 0.7 K/CU MM    Eosinophils Absolute 0.3 K/CU MM    Basophils Absolute 0.0 K/CU MM    Nucleated RBC % 0.0 %    Total Nucleated RBC 0.0 K/CU MM    Total Immature Neutrophil 0.11 K/CU MM    Immature Neutrophil % 1.1 (H) 0 - 0.43 %   CMP   Result Value Ref Range    Sodium 131 (L) 135 - 145 MMOL/L    Potassium 4.3 3.5 - 5.1 MMOL/L    Chloride 92 (L) 99 - 110 mMol/L    CO2 27 21 - 32 MMOL/L    BUN 84 (H) 6 - 23 MG/DL    CREATININE 3.1 (H) 0.6 - 1.1 MG/DL    Glucose 330 (H) 70 - 99 MG/DL    Calcium 8.6 8.3 - 10.6 MG/DL    Alb 3.4 3.4 - 5.0 GM/DL    Total Protein 5.6 (L) 6.4 - 8.2 GM/DL    Total Bilirubin 0.3 0.0 - 1.0 MG/DL    ALT 16 10 - 40 U/L    AST 16 15 - 37 IU/L    Alkaline Phosphatase 83 40 - 128 IU/L    GFR Non-African American 15 (L) >60 mL/min/1.73m2    GFR  19 (L) >60 mL/min/1.73m2    Anion Gap 12 4 - 16   Lactic Acid, Plasma   Result Value Ref Range    Lactate 2.1 (HH) 0.4 - 2.0 mMOL/L   Troponin   Result Value Ref Range    Troponin T 0.123 (HH) <0.01 NG/ML   Brain Natriuretic Peptide   Result Value Ref Range    Pro-BNP 2,669 (H) <300 PG/ML   Blood Gas, Venous   Result Value Ref Range    pH, Woody 7.32 7.32 - 7.42    pCO2, Woody 62 (H) 38 - 52 mmHG    pO2, Woody 60 (H) 28 - 48 mmHG    Base Exc, Mixed 4 (H) 0 - 2.3    HCO3, Venous 31.9 (H) 19 - 25 MMOL/L    O2 Sat, Woody 86.2 (H) 50 - 70 %    Comment VBG    Urinalysis   Result Value Ref Range    Color, UA YELLOW YELLOW    Clarity, UA CLEAR CLEAR    Glucose, Urine NEGATIVE NEGATIVE MG/DL    Bilirubin Urine NEGATIVE NEGATIVE MG/DL    Ketones, Urine NEGATIVE NEGATIVE MG/DL    Specific Gravity, UA 1.014 1.001 - 1.035    Blood, Urine NEGATIVE NEGATIVE    pH, Urine 5.0 5.0 - 8.0    Protein, UA 30 (A) NEGATIVE MG/DL    Urobilinogen, Urine NORMAL 0.2 - 1.0 MG/DL    Nitrite Urine, Quantitative NEGATIVE NEGATIVE    Leukocyte Esterase, Urine TRACE (A) NEGATIVE    RBC, UA 2 0 - 6 /HPF    WBC, UA 2 0 - 5 /HPF    Bacteria, UA MANY (A) NEGATIVE /HPF    Squam Epithel, UA 2 /HPF    Mucus, UA RARE (A) NEGATIVE HPF    Trichomonas, UA NONE SEEN NONE SEEN /HPF    Hyaline Casts, UA >20 /LPF   Lactic Acid, Plasma   Result Value Ref Range    Lactate 1.3 0.4 - 2.0 mMOL/L   Protime/INR & PTT   Result Value Ref Range    Protime 11.8 11.7 - 14.5 SECONDS    INR 0.98 INDEX    aPTT 31.4 25.1 - 37.1 SECONDS   Protime-INR   Result Value Ref Range    Protime 11.2 (L) 11.7 - 14.5 SECONDS    INR 0.93 INDEX   APTT   Result Value Ref Range    aPTT 31.0 25.1 - 37.1 SECONDS   Fibrinogen   Result Value Ref Range    Fibrinogen 342 196.9 - 442.1 MG/DL   Lactate Dehydrogenase   Result Value Ref Range     120 - 246 IU/L   D-Dimer, Quantitative   Result Value Ref Range    D-Dimer, Quant 231 (H) <230 NG/mL(DDU)   POCT Glucose   Result Value Ref Range    POC Glucose 155 (H) 70 - 99 MG/DL   EKG 12 Lead   Result Value Ref Range    Ventricular Rate 71 BPM    Atrial Rate 71 BPM    P-R Interval 194 ms    QRS Duration 118 ms    Q-T Interval 452 ms    QTc Calculation (Bazett) 491 ms    P Axis 63 degrees    R Axis 18 degrees    T Axis 86 degrees    Diagnosis       Normal sinus rhythm  Inferior infarct (cited on or before 07-JAN-2020)  Abnormal ECG  When compared with ECG of 20-JUN-2020 08:11,  T wave inversion no longer evident in Inferior leads  Nonspecific T wave abnormality now evident in Lateral leads  Confirmed by Tiff Henson MD, Russell Martinez ([id number]) on 9/30/2020 5:06:38 PM     TYPE AND SCREEN   Result Value Ref Range    ABO/Rh O POSITIVE     Antibody Screen NEGATIVE           ABNORMAL LABS:  Labs Reviewed   CBC WITH AUTO DIFFERENTIAL - Abnormal; Notable for the following components:       Result Value    RBC 3.43 (*)     Hemoglobin 9.6 (*)     Hematocrit 30.3 (*)     MCHC 31.7 (*)     RDW 17.6 (*)     MPV 11.2 (*)     Segs Relative 68.9 (*)     Lymphocytes % 19.9 (*)     Monocytes % 7.1 (*)     Immature Neutrophil % 1.1 (*)     All other components within normal limits   COMPREHENSIVE METABOLIC PANEL - Abnormal; Notable for the following components:    Sodium 131 (*)     Chloride 92 (*)     BUN 84 (*)     CREATININE 3.1 (*)     Glucose 330 (*)     Total Protein 5.6 (*)     GFR Non- 15 (*)     GFR  19 (*)     All other components within normal limits   LACTIC ACID, PLASMA - Abnormal; Notable for the following components:    Lactate 2.1 (*)     All other components within normal limits   TROPONIN - Abnormal; Notable for the following components:    Troponin T 0.123 (*)     All other components within normal limits   BRAIN NATRIURETIC PEPTIDE - Abnormal; Notable for the following components:    Pro-BNP 2,669 (*)     All other components within normal limits   BLOOD GAS, VENOUS - Abnormal; Notable for the following components:    pCO2, Woody 62 (*)     pO2, Woody 60 (*)     Base Exc, Mixed 4 (*)     HCO3, Venous 31.9 (*)     O2 Sat, Woody 86.2 (*)     All other components within normal limits   URINALYSIS - Abnormal; Notable for the following components: Protein, UA 30 (*)     Leukocyte Esterase, Urine TRACE (*)     Bacteria, UA MANY (*)     Mucus, UA RARE (*)     All other components within normal limits   PROTIME-INR - Abnormal; Notable for the following components:    Protime 11.2 (*)     All other components within normal limits   D-DIMER, QUANTITATIVE - Abnormal; Notable for the following components:    D-Dimer, Quant 231 (*)     All other components within normal limits   POCT GLUCOSE - Abnormal; Notable for the following components:    POC Glucose 155 (*)     All other components within normal limits   CULTURE, BLOOD 1   CULTURE, BLOOD 2   RESP DISEASE PANEL PCR   CULTURE, RESPIRATORY   STREP PNEUMONIAE ANTIGEN   LEGIONELLA ANTIGEN, URINE   LACTIC ACID, PLASMA   PROTIME/INR & PTT   APTT   FIBRINOGEN   LACTATE DEHYDROGENASE   COVID-19 AMBULATORY   PROTIME-INR   APTT   FIBRINOGEN   FERRITIN   D-DIMER, QUANTITATIVE   PROCALCITONIN   C-REACTIVE PROTEIN   LACTIC ACID, PLASMA   MAGNESIUM   CBC WITH AUTO DIFFERENTIAL   COMPREHENSIVE METABOLIC PANEL W/ REFLEX TO MG FOR LOW K   POCT GLUCOSE   POCT GLUCOSE   POCT GLUCOSE   TYPE AND SCREEN         IMAGING STUDIES ORDERED:  XR CHEST PORTABLE  IP CONSULT TO NEPHROLOGY  IP CONSULT TO CARDIOLOGY  IP CONSULT TO IV TEAM  IP CONSULT TO HOSPITALIST  INSERT COLES CATHETER  CATHETER REMOVAL  ELEVATE HEELS OFF OF BED  TURN PATIENT  NURSING COMMUNICATION  HEAD OF BED 60 DEGREES OR LESS  NURSING COMMUNICATION  VITAL SIGNS  NOTIFY PHYSICIAN (SPECIFY)  INTAKE AND OUTPUT  TELEMETRY MONITORING  TELEMETRY MONITORING  DAILY WEIGHTS  INTAKE AND OUTPUT  INTAKE AND OUTPUT  INTAKE AND OUTPUT  PATIENT STATUS (FROM ED OR OR/PROCEDURAL)  DROPLET PLUS ISOLATION  FULL CODE  REASON FOR NO MECHANICAL VTE PROPHYLAXIS  DIET CARB CONTROL  UP WITH ASSISTANCE  HYPOGLYCEMIA TREATMENT: BG LESS THAN 50 MG/DL AND PATIENT ALERT  HYPOGLYCEMIA TREATMENT: BG LESS THAN 70 MG/DL AND PATIENT ALERT  HYPOGLYCEMIA TREATMENT: BG LESS THAN 70 MG/DL AND PATIENT NOT ALERT    I have personally viewed the imaging studies. The radiologist interpretation is:   XR CHEST PORTABLE   Final Result   No cardiomegaly, pneumonia or interstitial edema. No significant change from 06/21/2020.                MEDICATIONS ADMINISTERED:  Medications   0.9 % sodium chloride infusion ( Intravenous New Bag 9/30/20 1808)   sodium chloride flush 0.9 % injection 10 mL (10 mLs Intravenous Given 9/30/20 2112)   sodium chloride flush 0.9 % injection 10 mL (has no administration in time range)   promethazine (PHENERGAN) tablet 12.5 mg (has no administration in time range)     Or   ondansetron (ZOFRAN) injection 4 mg (has no administration in time range)   heparin (porcine) injection 5,000 Units (5,000 Units Subcutaneous Given 9/30/20 2112)   aspirin chewable tablet 81 mg (81 mg Oral Given 9/30/20 2111)   atorvastatin (LIPITOR) tablet 80 mg (80 mg Oral Given 9/30/20 2111)   buPROPion [facility] SR) extended release tablet 100 mg (100 mg Oral Given 9/30/20 2111)   clopidogrel (PLAVIX) tablet 75 mg (75 mg Oral Given 9/30/20 2112)   DULoxetine (CYMBALTA) extended release capsule 60 mg (60 mg Oral Given 9/30/20 2111)   insulin glargine (LANTUS) injection vial 55 Units (has no administration in time range)   levothyroxine (SYNTHROID) tablet 50 mcg (has no administration in time range)   LORazepam (ATIVAN) tablet 1 mg (1 mg Oral Given 9/30/20 2111)   pantoprazole (PROTONIX) tablet 40 mg (has no administration in time range)   tiotropium (SPIRIVA RESPIMAT) 2.5 MCG/ACT inhaler 2 puff (has no administration in time range)   acetaminophen (TYLENOL) tablet 650 mg (has no administration in time range)     Or   acetaminophen (TYLENOL) suppository 650 mg (has no administration in time range)   dexamethasone (DECADRON) tablet 6 mg (6 mg Oral Given 9/30/20 2111)   glucose (GLUTOSE) 40 % oral gel 15 g (has no administration in time range)   dextrose 50 % IV solution (has no administration in time range)   glucagon (rDNA) injection 1 mg (has no administration in time range)   dextrose 5 % solution (has no administration in time range)   insulin lispro (HUMALOG) injection vial 0-12 Units (has no administration in time range)   insulin lispro (HUMALOG) injection vial 0-6 Units (1 Units Subcutaneous Given 9/30/20 2115)   albuterol sulfate  (90 Base) MCG/ACT inhaler 2 puff (2 puffs Inhalation Given 9/30/20 2351)     And   ipratropium (ATROVENT HFA) 17 MCG/ACT inhaler 2 puff (2 puffs Inhalation Given 9/30/20 2351)   0.9 % sodium chloride bolus (0 mLs Intravenous Stopped 9/30/20 1550)   0.9 % sodium chloride bolus (0 mLs Intravenous Stopped 9/30/20 1654)   midodrine (PROAMATINE) tablet 10 mg (10 mg Oral Given 9/30/20 2112)         PLAN/ED COURSE:  Last Vitals: BP (!) 103/44   Pulse 82   Temp 98.7 °F (37.1 °C) (Oral)   Resp 14   Ht 5' 4\" (1.626 m)   Wt 282 lb 13.6 oz (128.3 kg)   SpO2 98%   BMI 48.55 kg/m²       29-year-old female with hypertension of unclear etiology. May be medication related. No clinical evidence to suggest hypovolemia or septic shock. I did perform a bedside ultrasound of the heart and there was no evidence to suggest pericardial effusion. Additional workup and treatment in the ED as documented above and in the physician assistant's note. Pt will be admitted for further evaluation and treatment. Plan discussed with pt and/or family who expressed understanding and agreement with plan. Admitted in stable condition.     My critical care involvement with this patient (excluding any separately billable procedures) took 36 minutes and included the following elements:    [x] Response to abnormal vitals  [x] Review and response to abnormal lab tests  [x] Review of old records/labs  [x] Discussion with family/EMS/other pre-hospital personnel  [x] Discussion with consultants/hospitalist  [x] Rechecking patient/patient response to interventions  [x] Rapid intervention to prevent deterioration  [x] Discussing with patient/family treatment options  [x] Overseeing final disposition      Clinical Impression:  1. Hypotension, unspecified hypotension type    2. Chronic kidney disease, unspecified CKD stage    3. Chronic respiratory failure with hypercapnia (HCC)    4. Congestive heart failure, unspecified HF chronicity, unspecified heart failure type ([facility]    5. Anxiety state        Disposition referral (if applicable):  No follow-up provider specified.     Disposition medications (if applicable):  Current Discharge Medication List          ED Provider Disposition Time  DISPOSITION Admitted 09/30/2020 05:46:49 PM            Electronically signed by Atilio Molina MD on 10/1/2020 at 1:21 AM        Atilio Molina MD  10/01/20 0124

## 2020-09-30 NOTE — H&P
History and Physical      Name:  Ivan Diaz /Age/Sex: 1960  (61 y.o. female)   MRN & CSN:  [id number] & [id number] Admission Date/Time: 2020 12:47 PM   Location:  ED[facility]/ED-29 PCP: Francois Littlejohn MD       Hospital Day: 1        Admitting Physician: Dr. Flynn Parmar and Plan:   Ivan Diaz is a 61 y.o. female who presents with Anxiety and Hypotension     Hypotension- possibley polypharmacy component/dehydration   Admit to Med/surg   Monitor trends   Holding PO antihypertensive regimen resume as tolerated   Cardiology consulted in ED- follows with Dr Patti Galo   Gentle hydration- hold diuretics     Echo pending      HFrEF/ CAD. Does not appears acutely decompensated. BNP 2669. CXR: non acute. Last TTE EF 40-45%, mild LVH, (2020). Chronically elevated troponin (0.123) above baseline   Telemetry monitoring troponin trend   Daily weights/strict I&Os/Low sodium diet- 1800 ml fluid restriction   CAD - continue DAPT, Lipitor   Monitor for worsening clinical symptoms of hypervolemia with IVF     Chronic respiratory failure 2/2 COPD. Continue supplemental oxygen     COVID r/o              Pending cultures- sputum/ viral /blood              Bronchodilators   Pulmonary hygiene   Pending screening labs    Acute on Chronic Kidney disease, stage 4 sCr currently 3.1 with recent baseline 2.6-2.8- worsening trends   I&Os, weights/ IVF hydration   Recheck SCr in AM   Avoid nephrotoxic agents and renally dose medications   Nephrology consulted in ED     DMII with chronic complications and with long term use of insulin. Last A1C 9.1 (2020)              Continue glargine. Monitor FSBS and cover with medium dose SSI   Hold PO medications while inpatient     Class 3 obesity due to excess calories  - current BMI 47.55          Health maintenance: exerise and diet      Hypothyroid- continue synthroid.  Last TSH 4.160 (2020)     Anxiety -continue Ativan/Cymbalta/Wellbutrin       Patient case apparent thyromegaly or masses. RESP -significantly diminished (exam limited due to body habitus) no wheezes, rales or rhonchi. Symmetric chest movement while on supplemental oxygen. C/V -S1/S2 auscultated. RRR without appreciable M/R/G. No JVD or carotid bruits. Peripheral pulses equal bilaterally and palpable. Cap refill <3 sec. +2 peripheral edema. GI -central obesity, abdomen is soft non distended and without significant TTP. + BS. No masses or guarding. Rectal exam deferred. No HSM   -No CVA/ flank tenderness. Reyes catheter is present. LYMPH-No palpable cervical lymphadenopathy and no hepatosplenomegaly. No petechiae or ecchymoses. MS -No gross joint deformities. SKIN -Normal coloration, warm, dry. NEURO-Cranial nerves appear grossly intact, normal speech, no lateralizing weakness. PSYC-Awake, alert, oriented x 4- person, place, time, situation,  Appropriate affect. Past Medical History:      Past Medical History:   Diagnosis Date    Acute on chronic systolic CHF (congestive heart failure) ([facility] 2020    CAD (coronary artery disease)     CKD (chronic kidney disease) stage 3, GFR 30-59 ml/min ([facility])     COPD with acute exacerbation ([facility] 2020    Diabetes type 2, uncontrolled ([facility]     Diastolic heart failure ([facility]     Essential hypertension     Financial problems 3/22/2013    Generalized anxiety disorder 2018    Herpes genitalia     Obesity, morbid (more than 100 lbs over ideal weight or BMI > 40) ([facility] [address] [facility] 2013    STEMI (ST elevation myocardial infarction) [facility])        Past Surgical  History:    has a past surgical history that includes Cholecystectomy;  section; Tonsillectomy; other surgical history (Right, [id number]); Cardiac surgery; and Coronary angioplasty with stent.     Social History:    FAM HX: Reviewed  family history includes Arthritis in her father and mother; Cancer in her maternal grandmother; Diabetes in her maternal aunt, maternal grandfather, maternal grandmother, maternal uncle, and mother; Heart Disease in her father and mother; High Blood Pressure in her father and mother; Stroke in her father; Substance Abuse in her brother and father. Soc HX:   Social History     Socioeconomic History    Marital status: Single     Spouse name: None    Number of children: None    Years of education: None    Highest education level: None   Occupational History    None   Social Needs    Financial resource strain: None    Food insecurity     Worry: None     Inability: None    Transportation needs     Medical: None     Non-medical: None   Tobacco Use    Smoking status: Current Every Day Smoker     Packs/day: 1.00     Years: 34.00     Pack years: 34.00     Types: Cigarettes    Smokeless tobacco: Never Used   Substance and Sexual Activity    Alcohol use: No     Alcohol/week: 0.0 standard drinks    Drug use: No    Sexual activity: None   Lifestyle    Physical activity     Days per week: None     Minutes per session: None    Stress: None   Relationships    Social connections     Talks on phone: None     Gets together: None     Attends Hinduism service: None     Active member of club or organization: None     Attends meetings of clubs or organizations: None     Relationship status: None    Intimate partner violence     Fear of current or ex partner: None     Emotionally abused: None     Physically abused: None     Forced sexual activity: None   Other Topics Concern    None   Social History Narrative    None     TOBACCO:   reports that she has been smoking cigarettes. She has a 34.00 pack-year smoking history. She has never used smokeless tobacco.  ETOH:   reports no history of alcohol use. Drugs:  reports no history of drug use. Allergies: Allergies   Allergen Reactions    Augmentin [Amoxicillin-Pot Clavulanate] Diarrhea       Home Medications:     Prior to Admission medications    Medication Sig Start Date End Date Taking?  Authorizing Provider insulin lispro (HUMALOG) 100 UNIT/ML injection vial Inject 25 Units into the skin 3 times daily (before meals)   Yes Historical Provider, MD   LORazepam (ATIVAN) 1 MG tablet Take 1 mg by mouth 2 times daily. Yes Historical Provider, MD   torsemide (DEMADEX) 20 MG tablet Take 2 tablets by mouth 2 times daily  Patient taking differently: Take 20 mg by mouth 2 times daily  7/20/20  Yes Ana Hernandez MD   losartan (COZAAR) 100 MG tablet Take 1 tablet by mouth every evening 7/20/20  Yes Ana Hernandez MD   metOLazone (ZAROXOLYN) 5 MG tablet Take 1 tablet by mouth daily 7/20/20  Yes Ana Hernandez MD   carvedilol (COREG) 6.25 MG tablet Take 1 tablet by mouth 2 times daily (with meals) 5/6/20  Yes Naima Cardozo MD   tiotropium (SPIRIVA RESPIMAT) 2.5 MCG/ACT AERS inhaler Inhale 2 puffs into the lungs daily 5/6/20  Yes Naima Cardozo MD   ondansetron (ZOFRAN ODT) 4 MG disintegrating tablet Take 1 tablet by mouth every 8 hours as needed for Nausea 3/31/20  Yes Afshin Stewart MD   clopidogrel (PLAVIX) 75 MG tablet Take 1 tablet by mouth daily 2/27/20  Yes Anastacio Slaughter MD   lactulose (CHRONULAC) 10 GM/15ML solution Take 15 mLs by mouth 2 times daily 1/11/20  Yes Eladio Sloan MD   HYDROcodone-acetaminophen (NORCO) 5-325 MG per tablet Take 1 tablet by mouth every 4 hours as needed for Pain.     Yes Historical Provider, MD   atorvastatin (LIPITOR) 80 MG tablet Take 1 tablet by mouth nightly 12/4/19  Yes Brina Lucero DO   isosorbide mononitrate (IMDUR) 60 MG extended release tablet Take 1 tablet by mouth daily 12/3/19  Yes Ester Bahena MD   vitamin D 1000 units CAPS Take 3 capsules by mouth daily 7/18/19  Yes Anastacio Slaughter MD   ranolazine (RANEXA) 500 MG extended release tablet Take 1 tablet by mouth 2 times daily 3/4/19  Yes Tiesha Deluca MD   levothyroxine (SYNTHROID) 50 MCG tablet Take 50 mcg by mouth daily 2/26/19  Yes Historical Provider, MD   insulin glargine (LANTUS SOLOSTAR) 100 UNIT/ML injection pen Inject 55 Units into the skin nightly 11/21/18  Yes Stacia Nguyen MD   linagliptin (TRADJENTA) 5 MG tablet Take 1 tablet by mouth daily 11/22/18  Yes Stacia Nguyen MD   buPROPion [facility] SR) 100 MG extended release tablet Take 100 mg by mouth 2 times daily   Yes Historical Provider, MD   aspirin 81 MG chewable tablet Take 1 tablet by mouth daily 1/8/18  Yes González Hanna MD   DULoxetine (CYMBALTA) 60 MG extended release capsule Take 1 capsule by mouth daily 1/8/18  Yes González Hanna MD   docusate (COLACE, DULCOLAX) 100 MG CAPS Take 100 mg by mouth 2 times daily 1/8/18  Yes González Hanna MD   pantoprazole (PROTONIX) 40 MG tablet Take 1 tablet by mouth every morning (before breakfast) 1/8/18  Yes González Hanna MD   ipratropium-albuterol (DUONEB) 0.5-2.5 (3) MG/3ML SOLN nebulizer solution Inhale 3 mLs into the lungs every 4 hours 5/6/20   Bigg Hebert MD   insulin lispro (HUMALOG) 100 UNIT/ML injection vial Check blood sugar three times daily before meals and at bedtime  For blood sugar less than 150= no insulin, 151-200=2, 201-250=4, 251-300=6, 301-350=6, 351-400=8, >400=10 units and call MD 12/2/19   Elizabeth Polanco MD   nystatin (MYCOSTATIN) 044873 UNIT/GM powder Apply topically 2 times daily as needed 4/19/19   Historical Provider, MD Racheal Cueva ELLIPTA 100-25 MCG/INH AEPB inhaler Inhale 1 puff into the lungs daily 2/26/19   Historical Provider, MD   albuterol sulfate HFA (VENTOLIN HFA) 108 (90 Base) MCG/ACT inhaler Inhale 2 puffs into the lungs every 4 hours as needed for Wheezing 1/8/18   González Hanna MD         Medications:   Medications:    Infusions:    sodium chloride       PRN Meds:      Data:     Laboratory this visit:  Reviewed  Recent Labs     09/30/20  1337   WBC 9.7   HGB 9.6*   HCT 30.3*         Recent Labs     09/30/20  1337   *   K 4.3   CL 92*   CO2 27   BUN 84*   CREATININE 3.1*     Recent Labs     09/30/20  1337   AST 16   ALT 16   BILITOT 0.3 ALKPHOS 83     Recent Labs     09/30/20  1340   INR 0.98         Radiology this visit:  Reviewed. Xr Chest Portable    Result Date: 9/30/2020  EXAMINATION: ONE XRAY VIEW OF THE CHEST 9/30/2020 2:05 pm COMPARISON: 06/21/2020 HISTORY: ORDERING SYSTEM PROVIDED HISTORY: SOB, cough TECHNOLOGIST PROVIDED HISTORY: Reason for exam:->SOB, cough Reason for Exam: SOB, cough Acuity: Acute Type of Exam: Initial Additional signs and symptoms: na Relevant Medical/Surgical History: diabetes, copd FINDINGS: No cardiomegaly, pneumonia, interstitial edema or pleural effusions were noted. No hilar mass was identified. Moderate degenerative osteo arthritic changes involve the right glenohumeral joint. No significant change has occurred from 06/21/2020. No cardiomegaly, pneumonia or interstitial edema. No significant change from 06/21/2020.        EKG this visit:  Reviewed         Electronically signed by THAO Zavala CNP on 9/30/2020 at 5:47 PM

## 2020-09-30 NOTE — ED TRIAGE NOTES
Pt to ED with complaints of anxiety attacks off and on x two weeks. Pt also states her at home BP machine has been reading low.

## 2020-09-30 NOTE — PROGRESS NOTES
(LIPITOR) 80 MG tablet Take 1 tablet by mouth nightly 12/4/19  Yes Brina Thompson Eye, DO   isosorbide mononitrate (IMDUR) 60 MG extended release tablet Take 1 tablet by mouth daily 12/3/19  Yes Megan Barth MD   vitamin D 1000 units CAPS Take 3 capsules by mouth daily 7/18/19  Yes Rebeca Malik MD   ranolazine (RANEXA) 500 MG extended release tablet Take 1 tablet by mouth 2 times daily 3/4/19  Yes Susi Hernandez MD   levothyroxine (SYNTHROID) 50 MCG tablet Take 50 mcg by mouth daily 2/26/19  Yes Historical Provider, MD   insulin glargine (LANTUS SOLOSTAR) 100 UNIT/ML injection pen Inject 55 Units into the skin nightly 11/21/18  Yes Susi Hernandez MD   linagliptin (TRADJENTA) 5 MG tablet Take 1 tablet by mouth daily 11/22/18  Yes Susi Hernandez MD   buPROPion [facility]) 100 MG extended release tablet Take 100 mg by mouth 2 times daily   Yes Historical Provider, MD   aspirin 81 MG chewable tablet Take 1 tablet by mouth daily 1/8/18  Yes Connie Cheadle, MD   DULoxetine (CYMBALTA) 60 MG extended release capsule Take 1 capsule by mouth daily 1/8/18  Yes Connie Cheadle, MD   docusate (COLACE, DULCOLAX) 100 MG CAPS Take 100 mg by mouth 2 times daily 1/8/18  Yes Connie Cheadle, MD   pantoprazole (PROTONIX) 40 MG tablet Take 1 tablet by mouth every morning (before breakfast) 1/8/18  Yes Connie Cheadle, MD   ipratropium-albuterol (DUONEB) 0.5-2.5 (3) MG/3ML SOLN nebulizer solution Inhale 3 mLs into the lungs every 4 hours 5/6/20   Chantel Mobley MD   insulin lispro (HUMALOG) 100 UNIT/ML injection vial Check blood sugar three times daily before meals and at bedtime  For blood sugar less than 150= no insulin, 151-200=2, 201-250=4, 251-300=6, 301-350=6, 351-400=8, >400=10 units and call MD 12/2/19   Megan Barth MD   nystatin (MYCOSTATIN) 660828 UNIT/GM powder Apply topically 2 times daily as needed 4/19/19   Historical Provider, MD   albuterol sulfate HFA (VENTOLIN HFA) 108 (90 Base) MCG/ACT inhaler Inhale 2 puffs into the lungs every 4 hours as needed for Wheezing 1/8/18   González Hanna MD     Medications added or changed (ex. new medication, dosage change, interval change, formulation change):  Humalog 25 units with meals (added)  Torsemide dosage change from 40 mg to 20 mg BID  Lorazepam frequency change from TID to BID  Norco dosage change from 2 tabs to 1 tab q 4 hours    Medications removed from list (include reason, ex. noncompliance, medication cost, therapy complete etc.):   Elizabeth Romero flagged for removal not on med list from pharmacy and last fill 02/2020    Comments:  Unable to assess patient d/t covid testing. Insurance claims and med list sent from retail pharmacy as patient receives her medications per pre-packing system. Current list is up to date. Unknown when last doses taken.     To my knowledge the above medication history is accurate as of 9/30/2020 4:11 PM.   Lisa Clements CPhT   9/30/2020 4:11 PM

## 2020-09-30 NOTE — ED NOTES
David Protime INR/PTT, lactic acid, red top tube from patient's left hand.       Luis Rosario  09/30/20 [id number]

## 2020-09-30 NOTE — ED PROVIDER NOTES
Patient Identification  Israel Adams is a 61 y.o. female    Chief Complaint  Anxiety and Hypotension      HPI  (History provided by patient)  This is a 61 y.o. female who was brought in by self for chief complaint of anxiety, hypotension. Patient states that for the last several weeks she has noticed that she has been more tired than usual, had a headache, has been checking blood pressure and it is been low with systolic in the 14L. States that she was admitted back in June and her blood pressure medications were changed. No recent changes. Also notes that she has had an intermittent cough of unknown duration and shortness of breath. Has a known history of COPD, on home oxygen, still smoking. Also reports feeling generally anxious. Very vague with remainder of history. REVIEW OF SYSTEMS    Constitutional:  Denies fever. + chills  HENT:  Denies sore throat or ear pain   Eyes: Denies vision changes, eye pain  Cardiovascular:  Denies chest pain, syncope  Respiratory:  + shortness of breath, cough   GI:  Denies abdominal pain, vomiting.  + nausea  :  Denies dysuria, discharge  Musculoskeletal:  Denies back pain, joint pain  Skin:  Denies rash, pruritis  Neurologic:  Denies focal weakness, or sensory changes. + VILLAR    See HPI and nursing notes for additional information     I have reviewed the following nursing documentation:  Allergies:    Allergies   Allergen Reactions    Augmentin [Amoxicillin-Pot Clavulanate] Diarrhea       Past medical history:  has a past medical history of Acute on chronic systolic CHF (congestive heart failure) ([address] (2/22/2020), CAD (coronary artery disease), CKD (chronic kidney disease) stage 3, GFR 30-59 ml/min ([address] COPD with acute exacerbation ([address] (2/23/2020), Diabetes type 2, uncontrolled ([address] Diastolic heart failure ([address] Essential hypertension, Financial problems (3/22/2013), Generalized anxiety disorder (1/8/2018), Herpes genitalia, Obesity, morbid (more than 100 lbs over ideal weight or BMI > 40) ([facility] (2013), and STEMI (ST elevation myocardial infarction) [facility] Past surgical history:  has a past surgical history that includes Cholecystectomy;  section; Tonsillectomy; other surgical history (Right, [id number]); Cardiac surgery; and Coronary angioplasty with stent. Home medications:   Prior to Admission medications    Medication Sig Start Date End Date Taking? Authorizing Provider   insulin lispro (HUMALOG) 100 UNIT/ML injection vial Inject 25 Units into the skin 3 times daily (before meals)   Yes Historical Provider, MD   LORazepam (ATIVAN) 1 MG tablet Take 1 mg by mouth 2 times daily. Yes Historical Provider, MD   torsemide (DEMADEX) 20 MG tablet Take 2 tablets by mouth 2 times daily  Patient taking differently: Take 20 mg by mouth 2 times daily  20  Yes Miki Vaughan MD   losartan (COZAAR) 100 MG tablet Take 1 tablet by mouth every evening 20  Yes Miki Vaughan MD   metOLazone (ZAROXOLYN) 5 MG tablet Take 1 tablet by mouth daily 20  Yes Miki Vaughan MD   carvedilol (COREG) 6.25 MG tablet Take 1 tablet by mouth 2 times daily (with meals) 20  Yes Cher Kirby MD   tiotropium (SPIRIVA RESPIMAT) 2.5 MCG/ACT AERS inhaler Inhale 2 puffs into the lungs daily 20  Yes Cher Kirby MD   ondansetron (ZOFRAN ODT) 4 MG disintegrating tablet Take 1 tablet by mouth every 8 hours as needed for Nausea 3/31/20  Yes Vivian Vinson MD   clopidogrel (PLAVIX) 75 MG tablet Take 1 tablet by mouth daily 20  Yes Paula Zaman MD   lactulose (CHRONULAC) 10 GM/15ML solution Take 15 mLs by mouth 2 times daily 20  Yes Silvano Curtis MD   HYDROcodone-acetaminophen (NORCO) 5-325 MG per tablet Take 1 tablet by mouth every 4 hours as needed for Pain.     Yes Historical Provider, MD   atorvastatin (LIPITOR) 80 MG tablet Take 1 tablet by mouth nightly 19  Yes Brina Freedman DO   isosorbide mononitrate (IMDUR) 60 MG extended release tablet Take 1 tablet by mouth daily 12/3/19  Yes Michelle Frost MD   vitamin D 1000 units CAPS Take 3 capsules by mouth daily 7/18/19  Yes Alex Parsons MD   ranolazine (RANEXA) 500 MG extended release tablet Take 1 tablet by mouth 2 times daily 3/4/19  Yes Paige Zhao MD   levothyroxine (SYNTHROID) 50 MCG tablet Take 50 mcg by mouth daily 2/26/19  Yes Historical Provider, MD   insulin glargine (LANTUS SOLOSTAR) 100 UNIT/ML injection pen Inject 55 Units into the skin nightly 11/21/18  Yes Paige Zhao MD   linagliptin (TRADJENTA) 5 MG tablet Take 1 tablet by mouth daily 11/22/18  Yes Paige Zhao MD   [facility]) 100 MG extended release tablet Take 100 mg by mouth 2 times daily   Yes Historical Provider, MD   aspirin 81 MG chewable tablet Take 1 tablet by mouth daily 1/8/18  Yes Katelynn Nazario MD   DULoxetine (CYMBALTA) 60 MG extended release capsule Take 1 capsule by mouth daily 1/8/18  Yes Katelynn Nazario MD   docusate (COLACE, DULCOLAX) 100 MG CAPS Take 100 mg by mouth 2 times daily 1/8/18  Yes Katelynn Nazario MD   pantoprazole (PROTONIX) 40 MG tablet Take 1 tablet by mouth every morning (before breakfast) 1/8/18  Yes Katelynn Nazario MD   ipratropium-albuterol (DUONEB) 0.5-2.5 (3) MG/3ML SOLN nebulizer solution Inhale 3 mLs into the lungs every 4 hours 5/6/20   Erasmo Arzate MD   insulin lispro (HUMALOG) 100 UNIT/ML injection vial Check blood sugar three times daily before meals and at bedtime  For blood sugar less than 150= no insulin, 151-200=2, 201-250=4, 251-300=6, 301-350=6, 351-400=8, >400=10 units and call MD 12/2/19   Michelle Frost MD   nystatin (MYCOSTATIN) 767896 UNIT/GM powder Apply topically 2 times daily as needed 4/19/19   Historical Provider, MD TONYA HUERTA 100-25 MCG/INH AEPB inhaler Inhale 1 puff into the lungs daily 2/26/19   Historical Provider, MD   albuterol sulfate HFA (VENTOLIN HFA) 108 (90 Base) MCG/ACT inhaler Inhale 2 puffs into the lungs every 4 hours as needed for Wheezing 1/8/18   Ellen Rodriges MD       Social history:  reports that she has been smoking cigarettes. She has a 34.00 pack-year smoking history. She has never used smokeless tobacco. She reports that she does not drink alcohol or use drugs. Family history:    Family History   Problem Relation Age of Onset    Arthritis Mother     Diabetes Mother     Heart Disease Mother     High Blood Pressure Mother     Arthritis Father     Heart Disease Father     High Blood Pressure Father     Stroke Father     Substance Abuse Father     Substance Abuse Brother     Diabetes Maternal Aunt     Diabetes Maternal Uncle     Cancer Maternal Grandmother     Diabetes Maternal Grandmother     Diabetes Maternal Grandfather          Exam  BP (!) 105/56   Pulse 73   Temp 98.1 °F (36.7 °C) (Oral)   Resp 12   Ht 5' 4\" (1.626 m)   Wt 277 lb (125.6 kg)   SpO2 99%   BMI 47.55 kg/m²   Nursing note and vitals reviewed. Constitutional: Well developed, well nourished. No acute distress. HENT:      Head: Normocephalic and atraumatic. Ears: External ears normal.      Nose: Nose normal.     Mouth: Membrane mucosa moist and pink. No posterior oropharynx erythema or tonsillar edema  Eyes: Anicteric sclera. No discharge, PERRL  Neck: Supple. Trachea midline. Cardiovascular: RRR, no murmurs, rubs, or gallops, radial pulses 2+ bilaterally. Pulmonary/Chest: Effort normal. No respiratory distress. CTAB. No stridor. No wheezes. No rales. Abdominal: Soft. Nontender to palpation. No distension. No guarding, rebound tenderness, or evidence of ascites. : No CVA tenderness. Musculoskeletal: Moves all extremities. No gross deformity. Neurological: Somnulent but arouseable and oriented to person, place, and time. Normal muscle tone. Skin: Warm and dry. No rash. Psychiatric: Somnulent. Vague history. Procedures      EKG   Please see Dr. Lindsey Peoples' note for EKG read.       Radiographs (if obtained):  [] The following radiograph was interpreted by myself in the absence of a radiologist:   [x] Radiologist's Report Reviewed:  XR CHEST PORTABLE   Final Result   No cardiomegaly, pneumonia or interstitial edema. No significant change from 06/21/2020.                 Labs  Results for orders placed or performed during the hospital encounter of 09/30/20   CBC Auto Differential   Result Value Ref Range    WBC 9.7 4.0 - 10.5 K/CU MM    RBC 3.43 (L) 4.2 - 5.4 M/CU MM    Hemoglobin 9.6 (L) 12.5 - 16.0 GM/DL    Hematocrit 30.3 (L) 37 - 47 %    MCV 88.3 78 - 100 FL    MCH 28.0 27 - 31 PG    MCHC 31.7 (L) 32.0 - 36.0 %    RDW 17.6 (H) 11.7 - 14.9 %    Platelets 759 900 - 163 K/CU MM    MPV 11.2 (H) 7.5 - 11.1 FL    Differential Type AUTOMATED DIFFERENTIAL     Segs Relative 68.9 (H) 36 - 66 %    Lymphocytes % 19.9 (L) 24 - 44 %    Monocytes % 7.1 (H) 0 - 4 %    Eosinophils % 2.6 0 - 3 %    Basophils % 0.4 0 - 1 %    Segs Absolute 6.7 K/CU MM    Lymphocytes Absolute 1.9 K/CU MM    Monocytes Absolute 0.7 K/CU MM    Eosinophils Absolute 0.3 K/CU MM    Basophils Absolute 0.0 K/CU MM    Nucleated RBC % 0.0 %    Total Nucleated RBC 0.0 K/CU MM    Total Immature Neutrophil 0.11 K/CU MM    Immature Neutrophil % 1.1 (H) 0 - 0.43 %   CMP   Result Value Ref Range    Sodium 131 (L) 135 - 145 MMOL/L    Potassium 4.3 3.5 - 5.1 MMOL/L    Chloride 92 (L) 99 - 110 mMol/L    CO2 27 21 - 32 MMOL/L    BUN 84 (H) 6 - 23 MG/DL    CREATININE 3.1 (H) 0.6 - 1.1 MG/DL    Glucose 330 (H) 70 - 99 MG/DL    Calcium 8.6 8.3 - 10.6 MG/DL    Alb 3.4 3.4 - 5.0 GM/DL    Total Protein 5.6 (L) 6.4 - 8.2 GM/DL    Total Bilirubin 0.3 0.0 - 1.0 MG/DL    ALT 16 10 - 40 U/L    AST 16 15 - 37 IU/L    Alkaline Phosphatase 83 40 - 128 IU/L    GFR Non-African American 15 (L) >60 mL/min/1.73m2    GFR  19 (L) >60 mL/min/1.73m2    Anion Gap 12 4 - 16   Lactic Acid, Plasma   Result Value Ref Range    Lactate 2.1 (HH) 0.4 - 2.0 mMOL/L Troponin   Result Value Ref Range    Troponin T 0.123 (HH) <0.01 NG/ML   Brain Natriuretic Peptide   Result Value Ref Range    Pro-BNP 2,669 (H) <300 PG/ML   Blood Gas, Venous   Result Value Ref Range    pH, Woody 7.32 7.32 - 7.42    pCO2, Woody 62 (H) 38 - 52 mmHG    pO2, Woody 60 (H) 28 - 48 mmHG    Base Exc, Mixed 4 (H) 0 - 2.3    HCO3, Venous 31.9 (H) 19 - 25 MMOL/L    O2 Sat, Woody 86.2 (H) 50 - 70 %    Comment VBG    Protime/INR & PTT   Result Value Ref Range    Protime 11.8 11.7 - 14.5 SECONDS    INR 0.98 INDEX   EKG 12 Lead   Result Value Ref Range    Ventricular Rate 71 BPM    Atrial Rate 71 BPM    P-R Interval 194 ms    QRS Duration 118 ms    Q-T Interval 452 ms    QTc Calculation (Bazett) 491 ms    P Axis 63 degrees    R Axis 18 degrees    T Axis 86 degrees    Diagnosis       Normal sinus rhythm  Inferior infarct (cited on or before 07-JAN-2020)  Abnormal ECG  When compared with ECG of 20-JUN-2020 08:11,  T wave inversion no longer evident in Inferior leads  Nonspecific T wave abnormality now evident in Lateral leads  Confirmed by [facility] Daja BOUDREAUX ([id number]) on 9/30/2020 5:06:38 PM           MDM  Patient presents for multiple complaints, generally feels tired, feels anxious, blood pressure has been low. In ED blood pressure was initially within normal limits, however during ED stay she did develop significant hypotension. Placed in Trendelenburg, given IV fluid bolus, blood pressure is much improved. Laboratory work-up does reveal worsening of CKD, higher troponin from baseline, lower BNP than normal, mild lactic acidosis, mixed venous blood gas. CXR clear. Bedside ultrasound performed by Dr. Candance Aase does show decreased ejection fraction visually, no pericardial effusion. Discussed patient with Dr. Hebert Cochran with nephrology and with Dr. Timmy Palomo with cardiology. Dr. Timmy Palomo saw patient down in ED. Plan is to admit for evaluation of source of patient's symptoms.   Unclear if this is a cardiac event resulting in decompensated congestive heart failure versus dehydration versus medications side effect. Consult placed to Purnima De Dios CNP who agreed to admit. This patient was also seen and evaluated by Dr. Tamara Maravilla. Final Impression  1. Hypotension, unspecified hypotension type    2. Chronic kidney disease, unspecified CKD stage    3. Chronic respiratory failure with hypercapnia (HCC)    4. Congestive heart failure, unspecified HF chronicity, unspecified heart failure type ([facility]    5. Anxiety state        Blood pressure (!) 105/56, pulse 73, temperature 98.1 °F (36.7 °C), temperature source Oral, resp. rate 12, height 5' 4\" (1.626 m), weight 277 lb (125.6 kg), SpO2 99 %, not currently breastfeeding. Disposition:  Admit to telemetry in stable condition. Patient was given scripts for the following medications. I counseled patient how to take these medications. New Prescriptions    No medications on file       This chart was generated using the [address] [place] [address] dictation system. I created this record but it may contain dictation errors given the limitations of this technology.        Valentino Miguel PA-C  09/30/20 2035

## 2020-09-30 NOTE — ED NOTES
Pt presents to ED with c/o generalized fatigue, body aches, HA, nonproductive cough, lack of appetite and intermittent nausea ongoing for 2 weeks. Denies known fevers. Denies emesis or diarrhea. Denies CP, reports intermittent SOB. Reports her BP have been low at home. Pt drowsy in room but does easily arouse to verbal stimuli. Pt GCS 15 when awake. Pt on 3L NC and is on this chronically, tolerating well. Pt speaking clear complete sentences. Pt denies further needs or complaints at this time. CCM and CPOX in place. Call light in reach. Will cont. To monitor.      Chay Holley RN  09/30/20 [id number]

## 2020-09-30 NOTE — ED NOTES
Nurse (AK) mikel blood gas (venous) from patient. This phlebotomist contacted respiratory for the nurse.       Kailee Gallego  09/30/20 [id number]

## 2020-09-30 NOTE — PROGRESS NOTES
Pt arrived from ED to room 2009. Pt A&O x4. Skin assessment completed with Roxanna Doan RN. Redness noted to coccyx. Pt on 3L nasal cannula oxygen, which is what pt wears at home.     Electronically signed by Seven Lofton RN on 9/30/2020 at 6:56 PM

## 2020-09-30 NOTE — CONSULTS
Per Dr. Kareem Way, if patient requires central IV access, she is not a candidate for PICC line r/t renal status CKD 3, Creat 3.1. Leonardo JARVIS notified via phone.

## 2020-09-30 NOTE — ED NOTES
[id number] paged hospitalist      Nicole Wallace  09/30/20 1658  46 Jaye Smith mid level with [employer] returned call.       Nicole Wallace  09/30/20 [id number]

## 2020-10-01 LAB
ABO/RH: NORMAL
ADENOVIRUS DETECTION BY PCR: NOT DETECTED
ALBUMIN SERPL-MCNC: 3.7 GM/DL (ref 3.4–5)
ALP BLD-CCNC: 82 IU/L (ref 40–129)
ALT SERPL-CCNC: 17 U/L (ref 10–40)
ANION GAP SERPL CALCULATED.3IONS-SCNC: 12 MMOL/L (ref 4–16)
ANTIBODY SCREEN: NEGATIVE
APTT: 23.5 SECONDS (ref 25.1–37.1)
AST SERPL-CCNC: 17 IU/L (ref 15–37)
BASOPHILS ABSOLUTE: 0 K/CU MM
BASOPHILS RELATIVE PERCENT: 0.1 % (ref 0–1)
BILIRUB SERPL-MCNC: 0.3 MG/DL (ref 0–1)
BORDETELLA PARAPERTUSSIS BY PCR: NOT DETECTED
BORDETELLA PERTUSSIS PCR: NOT DETECTED
BUN BLDV-MCNC: 81 MG/DL (ref 6–23)
CALCIUM SERPL-MCNC: 8.6 MG/DL (ref 8.3–10.6)
CHLAMYDOPHILA PNEUMONIA PCR: NOT DETECTED
CHLORIDE BLD-SCNC: 97 MMOL/L (ref 99–110)
CO2: 27 MMOL/L (ref 21–32)
CORONAVIRUS 229E PCR: NOT DETECTED
CORONAVIRUS HKU1 PCR: NOT DETECTED
CORONAVIRUS NL63 PCR: NOT DETECTED
CORONAVIRUS OC43 PCR: NOT DETECTED
CREAT SERPL-MCNC: 2.8 MG/DL (ref 0.6–1.1)
D DIMER: 279 NG/ML(DDU)
DIFFERENTIAL TYPE: ABNORMAL
EOSINOPHILS ABSOLUTE: 0 K/CU MM
EOSINOPHILS RELATIVE PERCENT: 0.2 % (ref 0–3)
FERRITIN: 132 NG/ML (ref 15–150)
FIBRINOGEN LEVEL: 413 MG/DL (ref 196.9–442.1)
GFR AFRICAN AMERICAN: 21 ML/MIN/1.73M2
GFR NON-AFRICAN AMERICAN: 17 ML/MIN/1.73M2
GLUCOSE BLD-MCNC: 253 MG/DL (ref 70–99)
GLUCOSE BLD-MCNC: 299 MG/DL (ref 70–99)
GLUCOSE BLD-MCNC: 424 MG/DL (ref 70–99)
GLUCOSE BLD-MCNC: 442 MG/DL (ref 70–99)
GLUCOSE BLD-MCNC: 448 MG/DL (ref 70–99)
GLUCOSE BLD-MCNC: 457 MG/DL (ref 70–99)
HCT VFR BLD CALC: 32.7 % (ref 37–47)
HEMOGLOBIN: 10.4 GM/DL (ref 12.5–16)
HIGH SENSITIVE C-REACTIVE PROTEIN: 3.6 MG/L
HUMAN METAPNEUMOVIRUS PCR: NOT DETECTED
IMMATURE NEUTROPHIL %: 1.3 % (ref 0–0.43)
INFLUENZA A BY PCR: NOT DETECTED
INFLUENZA A H1 (2009) PCR: NOT DETECTED
INFLUENZA A H1 PANDEMIC PCR: NOT DETECTED
INFLUENZA A H3 PCR: NOT DETECTED
INFLUENZA B BY PCR: NOT DETECTED
INR BLD: 0.89 INDEX
LACTATE DEHYDROGENASE: 179 IU/L (ref 120–246)
LACTATE: 0.7 MMOL/L (ref 0.4–2)
LEGIONELLA URINARY AG: NEGATIVE
LYMPHOCYTES ABSOLUTE: 0.8 K/CU MM
LYMPHOCYTES RELATIVE PERCENT: 4.9 % (ref 24–44)
MAGNESIUM: 1.6 MG/DL (ref 1.8–2.4)
MCH RBC QN AUTO: 27.2 PG (ref 27–31)
MCHC RBC AUTO-ENTMCNC: 31.8 % (ref 32–36)
MCV RBC AUTO: 85.6 FL (ref 78–100)
MONOCYTES ABSOLUTE: 0.4 K/CU MM
MONOCYTES RELATIVE PERCENT: 2.3 % (ref 0–4)
MYCOPLASMA PNEUMONIAE PCR: NOT DETECTED
NUCLEATED RBC %: 0 %
PARAINFLUENZA 1 PCR: NOT DETECTED
PARAINFLUENZA 2 PCR: NOT DETECTED
PARAINFLUENZA 3 PCR: NOT DETECTED
PARAINFLUENZA 4 PCR: NOT DETECTED
PDW BLD-RTO: 17.7 % (ref 11.7–14.9)
PLATELET # BLD: 235 K/CU MM (ref 140–440)
PMV BLD AUTO: 10 FL (ref 7.5–11.1)
POTASSIUM SERPL-SCNC: 4.6 MMOL/L (ref 3.5–5.1)
PROCALCITONIN: 0.1
PROTHROMBIN TIME: 10.8 SECONDS (ref 11.7–14.5)
RBC # BLD: 3.82 M/CU MM (ref 4.2–5.4)
RHINOVIRUS ENTEROVIRUS PCR: NOT DETECTED
RSV PCR: NOT DETECTED
SARS-COV-2: NOT DETECTED
SEGMENTED NEUTROPHILS ABSOLUTE COUNT: 14.2 K/CU MM
SEGMENTED NEUTROPHILS RELATIVE PERCENT: 91.2 % (ref 36–66)
SODIUM BLD-SCNC: 136 MMOL/L (ref 135–145)
SOURCE: NORMAL
STREP PNEUMONIAE ANTIGEN: NORMAL
TOTAL IMMATURE NEUTOROPHIL: 0.2 K/CU MM
TOTAL NUCLEATED RBC: 0 K/CU MM
TOTAL PROTEIN: 5.1 GM/DL (ref 6.4–8.2)
TROPONIN T: 0.06 NG/ML
TROPONIN T: 0.08 NG/ML
TSH HIGH SENSITIVITY: 1.21 UIU/ML (ref 0.27–4.2)
WBC # BLD: 15.6 K/CU MM (ref 4–10.5)

## 2020-10-01 PROCEDURE — 2700000000 HC OXYGEN THERAPY PER DAY

## 2020-10-01 PROCEDURE — 94640 AIRWAY INHALATION TREATMENT: CPT

## 2020-10-01 PROCEDURE — 83735 ASSAY OF MAGNESIUM: CPT

## 2020-10-01 PROCEDURE — 6370000000 HC RX 637 (ALT 250 FOR IP): Performed by: NURSE PRACTITIONER

## 2020-10-01 PROCEDURE — 93308 TTE F-UP OR LMTD: CPT

## 2020-10-01 PROCEDURE — 85730 THROMBOPLASTIN TIME PARTIAL: CPT

## 2020-10-01 PROCEDURE — 6370000000 HC RX 637 (ALT 250 FOR IP): Performed by: HOSPITALIST

## 2020-10-01 PROCEDURE — 1200000000 HC SEMI PRIVATE

## 2020-10-01 PROCEDURE — 87581 M.PNEUMON DNA AMP PROBE: CPT

## 2020-10-01 PROCEDURE — 96366 THER/PROPH/DIAG IV INF ADDON: CPT

## 2020-10-01 PROCEDURE — 87633 RESP VIRUS 12-25 TARGETS: CPT

## 2020-10-01 PROCEDURE — 6360000002 HC RX W HCPCS: Performed by: NURSE PRACTITIONER

## 2020-10-01 PROCEDURE — 85610 PROTHROMBIN TIME: CPT

## 2020-10-01 PROCEDURE — 85384 FIBRINOGEN ACTIVITY: CPT

## 2020-10-01 PROCEDURE — 6360000002 HC RX W HCPCS: Performed by: INTERNAL MEDICINE

## 2020-10-01 PROCEDURE — 94761 N-INVAS EAR/PLS OXIMETRY MLT: CPT

## 2020-10-01 PROCEDURE — 87798 DETECT AGENT NOS DNA AMP: CPT

## 2020-10-01 PROCEDURE — 96372 THER/PROPH/DIAG INJ SC/IM: CPT

## 2020-10-01 PROCEDURE — 6370000000 HC RX 637 (ALT 250 FOR IP): Performed by: INTERNAL MEDICINE

## 2020-10-01 PROCEDURE — 87486 CHLMYD PNEUM DNA AMP PROBE: CPT

## 2020-10-01 PROCEDURE — 85379 FIBRIN DEGRADATION QUANT: CPT

## 2020-10-01 PROCEDURE — 82962 GLUCOSE BLOOD TEST: CPT

## 2020-10-01 PROCEDURE — 2580000003 HC RX 258: Performed by: NURSE PRACTITIONER

## 2020-10-01 PROCEDURE — 84443 ASSAY THYROID STIM HORMONE: CPT

## 2020-10-01 PROCEDURE — 85025 COMPLETE CBC W/AUTO DIFF WBC: CPT

## 2020-10-01 PROCEDURE — 96365 THER/PROPH/DIAG IV INF INIT: CPT

## 2020-10-01 PROCEDURE — 84145 PROCALCITONIN (PCT): CPT

## 2020-10-01 PROCEDURE — 80053 COMPREHEN METABOLIC PANEL: CPT

## 2020-10-01 PROCEDURE — 84484 ASSAY OF TROPONIN QUANT: CPT

## 2020-10-01 PROCEDURE — 83605 ASSAY OF LACTIC ACID: CPT

## 2020-10-01 RX ORDER — ALOGLIPTIN 6.25 MG/1
6.25 TABLET, FILM COATED ORAL DAILY
Status: DISCONTINUED | OUTPATIENT
Start: 2020-10-01 | End: 2020-10-03 | Stop reason: HOSPADM

## 2020-10-01 RX ORDER — FLUTICASONE FUROATE AND VILANTEROL 100; 25 UG/1; UG/1
1 POWDER RESPIRATORY (INHALATION) DAILY
Status: DISCONTINUED | OUTPATIENT
Start: 2020-10-01 | End: 2020-10-01

## 2020-10-01 RX ORDER — ATORVASTATIN CALCIUM 20 MG/1
20 TABLET, FILM COATED ORAL NIGHTLY
Status: DISCONTINUED | OUTPATIENT
Start: 2020-10-01 | End: 2020-10-03 | Stop reason: HOSPADM

## 2020-10-01 RX ORDER — ALBUTEROL SULFATE 90 UG/1
2 AEROSOL, METERED RESPIRATORY (INHALATION) EVERY 4 HOURS PRN
Status: DISCONTINUED | OUTPATIENT
Start: 2020-10-01 | End: 2020-10-03 | Stop reason: HOSPADM

## 2020-10-01 RX ORDER — METOPROLOL SUCCINATE 25 MG/1
25 TABLET, EXTENDED RELEASE ORAL DAILY
Status: DISCONTINUED | OUTPATIENT
Start: 2020-10-01 | End: 2020-10-03 | Stop reason: HOSPADM

## 2020-10-01 RX ORDER — HYDROCODONE BITARTRATE AND ACETAMINOPHEN 5; 325 MG/1; MG/1
1 TABLET ORAL EVERY 4 HOURS PRN
Status: DISCONTINUED | OUTPATIENT
Start: 2020-10-01 | End: 2020-10-03 | Stop reason: HOSPADM

## 2020-10-01 RX ORDER — BUDESONIDE AND FORMOTEROL FUMARATE DIHYDRATE 80; 4.5 UG/1; UG/1
2 AEROSOL RESPIRATORY (INHALATION) 2 TIMES DAILY
Status: DISCONTINUED | OUTPATIENT
Start: 2020-10-01 | End: 2020-10-03 | Stop reason: HOSPADM

## 2020-10-01 RX ORDER — INSULIN GLARGINE 100 [IU]/ML
50 INJECTION, SOLUTION SUBCUTANEOUS NIGHTLY
Status: DISCONTINUED | OUTPATIENT
Start: 2020-10-01 | End: 2020-10-02

## 2020-10-01 RX ORDER — ALBUTEROL SULFATE 90 UG/1
2 AEROSOL, METERED RESPIRATORY (INHALATION)
Status: DISCONTINUED | OUTPATIENT
Start: 2020-10-01 | End: 2020-10-03 | Stop reason: HOSPADM

## 2020-10-01 RX ORDER — MAGNESIUM SULFATE IN WATER 40 MG/ML
2 INJECTION, SOLUTION INTRAVENOUS ONCE
Status: COMPLETED | OUTPATIENT
Start: 2020-10-01 | End: 2020-10-01

## 2020-10-01 RX ADMIN — HEPARIN SODIUM 5000 UNITS: 5000 INJECTION, SOLUTION INTRAVENOUS; SUBCUTANEOUS at 05:51

## 2020-10-01 RX ADMIN — IPRATROPIUM BROMIDE 2 PUFF: 17 AEROSOL, METERED RESPIRATORY (INHALATION) at 07:21

## 2020-10-01 RX ADMIN — LEVOTHYROXINE SODIUM 50 MCG: 50 TABLET ORAL at 05:51

## 2020-10-01 RX ADMIN — SODIUM CHLORIDE, PRESERVATIVE FREE 10 ML: 5 INJECTION INTRAVENOUS at 22:17

## 2020-10-01 RX ADMIN — ALBUTEROL SULFATE 2 PUFF: 90 AEROSOL, METERED RESPIRATORY (INHALATION) at 07:22

## 2020-10-01 RX ADMIN — BUPROPION HYDROCHLORIDE 100 MG: 100 TABLET, FILM COATED, EXTENDED RELEASE ORAL at 09:48

## 2020-10-01 RX ADMIN — MAGNESIUM SULFATE IN WATER 2 G: 40 INJECTION, SOLUTION INTRAVENOUS at 09:47

## 2020-10-01 RX ADMIN — METOPROLOL SUCCINATE 25 MG: 25 TABLET, EXTENDED RELEASE ORAL at 13:45

## 2020-10-01 RX ADMIN — INSULIN GLARGINE 55 UNITS: 100 INJECTION, SOLUTION SUBCUTANEOUS at 09:47

## 2020-10-01 RX ADMIN — ALOGLIPTIN 6.25 MG: 6.25 TABLET, FILM COATED ORAL at 22:11

## 2020-10-01 RX ADMIN — LORAZEPAM 1 MG: 1 TABLET ORAL at 22:11

## 2020-10-01 RX ADMIN — HEPARIN SODIUM 5000 UNITS: 5000 INJECTION, SOLUTION INTRAVENOUS; SUBCUTANEOUS at 22:11

## 2020-10-01 RX ADMIN — PANTOPRAZOLE SODIUM 40 MG: 40 TABLET, DELAYED RELEASE ORAL at 05:51

## 2020-10-01 RX ADMIN — TIOTROPIUM BROMIDE INHALATION SPRAY 2 PUFF: 3.12 SPRAY, METERED RESPIRATORY (INHALATION) at 07:23

## 2020-10-01 RX ADMIN — HEPARIN SODIUM 5000 UNITS: 5000 INJECTION, SOLUTION INTRAVENOUS; SUBCUTANEOUS at 13:45

## 2020-10-01 RX ADMIN — DULOXETINE HYDROCHLORIDE 60 MG: 30 CAPSULE, DELAYED RELEASE ORAL at 22:11

## 2020-10-01 RX ADMIN — BUPROPION HYDROCHLORIDE 100 MG: 100 TABLET, FILM COATED, EXTENDED RELEASE ORAL at 22:11

## 2020-10-01 RX ADMIN — INSULIN GLARGINE 50 UNITS: 100 INJECTION, SOLUTION SUBCUTANEOUS at 22:36

## 2020-10-01 RX ADMIN — ALBUTEROL SULFATE 2 PUFF: 90 AEROSOL, METERED RESPIRATORY (INHALATION) at 19:41

## 2020-10-01 RX ADMIN — IPRATROPIUM BROMIDE 2 PUFF: 17 AEROSOL, METERED RESPIRATORY (INHALATION) at 11:15

## 2020-10-01 RX ADMIN — SODIUM CHLORIDE, PRESERVATIVE FREE 10 ML: 5 INJECTION INTRAVENOUS at 09:48

## 2020-10-01 RX ADMIN — ASPIRIN 81 MG CHEWABLE TABLET 81 MG: 81 TABLET CHEWABLE at 09:48

## 2020-10-01 RX ADMIN — ATORVASTATIN CALCIUM 20 MG: 20 TABLET, FILM COATED ORAL at 22:11

## 2020-10-01 RX ADMIN — ALBUTEROL SULFATE 2 PUFF: 90 AEROSOL, METERED RESPIRATORY (INHALATION) at 11:15

## 2020-10-01 RX ADMIN — INSULIN LISPRO 12 UNITS: 100 INJECTION, SOLUTION INTRAVENOUS; SUBCUTANEOUS at 17:08

## 2020-10-01 RX ADMIN — INSULIN LISPRO 12 UNITS: 100 INJECTION, SOLUTION INTRAVENOUS; SUBCUTANEOUS at 13:44

## 2020-10-01 RX ADMIN — HYDROCODONE BITARTRATE AND ACETAMINOPHEN 1 TABLET: 5; 325 TABLET ORAL at 22:11

## 2020-10-01 RX ADMIN — BUDESONIDE AND FORMOTEROL FUMARATE DIHYDRATE 2 PUFF: 80; 4.5 AEROSOL RESPIRATORY (INHALATION) at 19:42

## 2020-10-01 RX ADMIN — DEXAMETHASONE 6 MG: 4 TABLET ORAL at 09:48

## 2020-10-01 RX ADMIN — INSULIN LISPRO 6 UNITS: 100 INJECTION, SOLUTION INTRAVENOUS; SUBCUTANEOUS at 09:46

## 2020-10-01 RX ADMIN — CLOPIDOGREL BISULFATE 75 MG: 75 TABLET ORAL at 09:48

## 2020-10-01 RX ADMIN — LORAZEPAM 1 MG: 1 TABLET ORAL at 09:48

## 2020-10-01 RX ADMIN — Medication 2 PUFF: at 19:42

## 2020-10-01 ASSESSMENT — PAIN SCALES - GENERAL
PAINLEVEL_OUTOF10: 10
PAINLEVEL_OUTOF10: 0
PAINLEVEL_OUTOF10: 10
PAINLEVEL_OUTOF10: 7

## 2020-10-01 ASSESSMENT — PAIN DESCRIPTION - LOCATION
LOCATION: BACK
LOCATION: BACK;SHOULDER

## 2020-10-01 ASSESSMENT — PAIN DESCRIPTION - PAIN TYPE
TYPE: CHRONIC PAIN
TYPE: CHRONIC PAIN

## 2020-10-01 NOTE — CARE COORDINATION
Phoned patient on room phone on COVID unit to discuss discharge planning. No answer. Per medical record review patient is from home with her significant other and has the following DME: walker , w/c and shower chair. Patient has home oxygen and supplier is [employer]. Patient has PCP and insurance to assist with RX coverage. Case Management to follow to assist with any other potential discharge needs.

## 2020-10-01 NOTE — CONSULTS
lactate were slightly  high. She was subsequently admitted to [facility] to rule out  SARS-CoV-2. I am of course very familiar with her. My last encounter with her was  on 08/25/2020 when I saw her as an emergent visit as her creatinine  increased to 3.8. Dr. Luis Antonio Holloway who is her primary care did call me and when  I saw her in my office, her blood pressure indeed was low 105/65ish and  I opted to hold the losartan therapy and have her come back in two  weeks. Although initially I thought we would hold the diuretics, but  she was very hesitant to do so as she frequently had decompensated heart  failure and I understood and we made a decision to continue that just to  hold the losartan and see me in two weeks. She probably could not make  it happen to come back to see me. In general, her creatinine runs around 1.2 to 1.5 range for the last  couple of years or so, but unfortunately slowly increased with several  acute kidney injuries by creatinine criteria, most of them from  cardiorenal syndrome type 1 and the creatinine recently was running  about 2ish, although in my last visit it went up to 3.8. It is  noteworthy to mention that her creatinine will fluctuate depending on  her diuretics, fluid status, etc. which does not necessarily reflect her  structural kidney dimension. PAST MEDICAL HISTORY:  1.  Probable CKD stage IVA3. She did have more than 300 mg per day of  proteinuria. 2.  Atherosclerotic cardiovascular disease. She had multivessel  coronary artery disease and cardiomyopathy systolic of course, but she  was not a candidate for CABG. She did have some percutaneous  intervention by Dr. Rico Sun. 3.  Cardiomyopathy with EF probably about 35% or so. She has had  several acute decompensated heart failure, most recent one was in  06/2020.  4.  Cardiorenal syndrome type 2 frequently transforming to type 1.  5.  Diabetes mellitus type 2 diagnosed when she was 30 with proteinuria  of course. Relatively controlled lately though. 6.  Hyperlipidemia. 7.  Probable COPD with probable cor pulmonale. 8.  Osteoarthritis. 9.  Anxiety disorder. PAST SURGICAL HISTORY:  1. PTCA with stent of several coronary arteries in the distant past.  2.  She also had an I and D of the wound of her toe. 3.  Gallbladder surgery. 4.  Cholecystectomy. 5.  . FAMILY MEDICAL HISTORY:  Diabetes and coronary artery disease runs in  the family, but nobody is on renal replacement therapy or had  transplantation. OB/GYN HISTORY:   1, para 1. She had history of eclampsia  apparently, or at least preeclampsia. Did not have gestational  diabetes. Apparently she became diabetic after her last pregnancy. HABITS:  She quit smoking in  and has roughly 30 pack-year history. No history of illicit drug abuse or alcohol abuse. ALLERGIES:  She is listed allergic to AUGMENTIN which actually caused  diarrhea so that is an adverse reaction rather than an allergic  reaction. REVIEW OF SYSTEMS:  Lightheadedness, fatigue, tiredness, as well as  shortness of breath, anxiety, feeling of \"panic. \"  Rest of the review of  systems is negative or as in previous paragraph except she has some  intermittent chest pain, but no urinary symptoms. No fever, chills, or  rigor. No arthralgia or myalgia. PHYSICAL EXAMINATION:  VITAL SIGNS:  At the time of examination in the COVID floor, her blood  pressure actually did come up and when I saw her it was 120-130s/40 to  50s. Apparently she had lower diastolic pressure, but this is an  automatic blood pressure. She was in normal saline which I  discontinued. Her pulse was in 80s, respiratory rate 13, and saturating  about 97%. GENERAL:  The patient is without any acute distress. She is alert,  awake, and oriented. She did recognize me. Her head of the bed  elevated about 45 degrees or so. No apparent respiratory distress.   HEENT:  Head and Neck:  Normocephalic and atraumatic. Eyes: At least  2+ conjunctival pallor. Ears, Mouth, and Throat:  Normal oral mucosa. CARDIOVASCULAR:  Seems regular rate and rhythm. RESPIRATORY:  I did not hear any crackles, although the physical exam is  a little bit limited. She did have some coarse breath sounds. ABDOMEN:  Soft. EXTREMITIES:  Very trace edema. She thinks she is developing some  edema, which makes sense because we are holding the diuretics and she  was given normal saline. LABORATORY VALUES AND ANCILLARY SERVICES:  Her respiratory disease panel  was negative. Rest of the lab as I mentioned earlier. CURRENT MEDICATIONS:  She is on bronchodilator. She was given so far  about 1.5 liters of crystalloid, mainly normal saline solution. She is  on aspirin 81 mg daily, Lipitor 80 mg at bedtime, Wellbutrin 100 mg  twice a day, Plavix 75 mg daily, dexamethasone 6 mg daily, Cymbalta 60  mg daily, subcutaneous heparin, Lantus as well as short acting insulin. She is on also lorazepam 1 mg twice a day, midodrine was given just one  dose, Protonix. IMPRESSION:  A 25-year-old female with acute kidney injury. 1.  Acute kidney injury on top of CKD stage IVA3. Urine has only  hyaline casts, but no other active sediment. So likely renal  hypoperfusion probably from hypotension, but rule out other process  although less likely except she has Reyes with more than 1500 urine  output overnight which raises the suspicion of whether she had acute  bladder obstruction. We will make sure she does not have any occult  infection since she is immunocompromized. 2.  Hypertension. When I saw her in 08/2020, her blood pressure was  rather low and the instruction was to hold the ARB, so medication  induced is a possibility along with trans and intravascular volume  depletion as she was on diuretics and perhaps some occult infection  since she has slight leukocytosis.   3.  Underlying atherosclerotic cardiovascular disease, diabetes, anemia  to name a few. PLAN:  I will go ahead and stop IV fluids since the blood pressure is  okay and she had almost 1.5 liters. Otherwise she could go in other  direction. Also we will rule out any potential _____ infection. She  probably will need diuretics soon. We will put her on low salt diet,  good blood sugar control, daily accurate weight. Labs in the morning. She is in the [facility] floor, but I suspect she will be negative for  SARS-CoV-2. Further plan will be dictated by her clinical course.         Susanna White MD    D: 10/01/2020 7:18:27       T: 10/01/2020 9:10:07     [email or web address]  Job#: [id number]     Doc#: [id number]    CC:

## 2020-10-01 NOTE — PROGRESS NOTES
Daily Progress Note    More awake alert  Feeling better  Heart rate is stable and BP improved  Cr stable  Watch for volume overload issues  Echo pending from today  Hx of CAD and PCI medical treatment for now  Elevated trop secondary to renal issues no ACS  Add betablocker based on BP and heart rate    Cath --DICTATED -[id number]--0/2020  LEFT MAIIN PATENT  LAD MID 80% TO 0% MEGAN XIENCE STENT 2.5X18MM STENT  LCX MILD DX  OM 90% TO 0% MEGAN XIENCE 2.25X33 MM STENT  LVEDP 35  RCA %  ASA AND BRIANTA AND HEPARIN  RIGHT BRACHIAL  NO COMPLICATIONS     Pt. Awake, alert and feeling ok  HR stable, NSR in the 80s, BP stable  No CP, SOB stable    JUAN J on CKD    Likely HOTN related    Holding diuretics and BP meds for now    Given IVF    BP better now    Per renal    Chronic HFpEF    EF 40-45% on last check    Recheck echo today    No acute exacerbation    Cont. Med. Tx. As able    Elevated trop    Hx of CAD s/p PCI     Keep DAPT and statin for now    Trop chronically elevated- no ACS- likely d/t renal issues    Covid pending  Will cont. To follow      PAST MEDICAL HISTORY:  She has a history of coronary artery disease  status post angioplasty done in 2020, history of hypertension,  hyperlipidemia, history of renal insufficiency present, EF is around 40%  range present, diabetes, anxiety, and obesity present.     PAST SURGICAL HISTORY:  Angioplasty done of the LAD and circ, ,  gallbladder surgery, and tonsillectomy.     SOCIAL HISTORY:  She is a former smoker. She does not drink alcohol.     ALLERGIES:  AUGMENTIN.     MEDICATIONS AT HOME:  She is on insulin, Demadex. Cozaar, I am not sure  if she is taking that, that is on hold. Zaroxolyn, Coreg, Plavix,  lactulose, Lipitor. I am hoping she is on aspirin and Plavix. She is  on p.r.n. statins.     Objective:   BP (!) 137/49   Pulse 87   Temp 98.6 °F (37 °C) (Oral)   Resp 13   Ht 5' 4\" (1.626 m)   Wt 282 lb 13.6 oz (128.3 kg)   SpO2 97%   BMI 48.55 kg/m²       Intake/Output Summary (Last 24 hours) at 10/1/2020 1059  Last data filed at 10/1/2020 0600  Gross per 24 hour   Intake 816.8 ml   Output 1575 ml   Net -758.2 ml       Medications:   Scheduled Meds:   magnesium sulfate  2 g Intravenous Once    sodium chloride flush  10 mL Intravenous 2 times per day    heparin (porcine)  5,000 Units Subcutaneous 3 times per day    aspirin  81 mg Oral Daily    atorvastatin  80 mg Oral Nightly    buPROPion  100 mg Oral BID    clopidogrel  75 mg Oral Daily    DULoxetine  60 mg Oral Daily    insulin glargine  55 Units Subcutaneous QAM    levothyroxine  50 mcg Oral Daily    LORazepam  1 mg Oral BID    pantoprazole  40 mg Oral QAM AC    tiotropium  2 puff Inhalation Daily    dexamethasone  6 mg Oral Daily    insulin lispro  0-12 Units Subcutaneous TID WC    insulin lispro  0-6 Units Subcutaneous Nightly    albuterol sulfate HFA  2 puff Inhalation 6 times per day    And    ipratropium  2 puff Inhalation 6 times per day      Infusions:   dextrose        PRN Meds:  sodium chloride flush, promethazine **OR** ondansetron, acetaminophen **OR** acetaminophen, glucose, dextrose, glucagon (rDNA), dextrose       Physical Exam:  Vitals:    10/01/20 0700   BP: (!) 137/49   Pulse: 87   Resp: 13   Temp:    SpO2: 97%        General: AAO, NAD  Chest: Nontender  Cardiac: First and Second Heart Sounds are Normal, No Murmurs or Gallops noted  Lungs:Clear to auscultation and percussion. Abdomen: Soft, NT, ND, +BS  Extremities: No clubbing, no edema  Vascular:  Equal 2+ peripheral pulses.         Lab Data:  CBC:   Recent Labs     09/30/20  1337 10/01/20  0415   WBC 9.7 15.6*   HGB 9.6* 10.4*   HCT 30.3* 32.7*   MCV 88.3 85.6    235     BMP:   Recent Labs     09/30/20  1337 10/01/20  0415   * 136   K 4.3 4.6   CL 92* 97*   CO2 27 27   BUN 84* 81*   CREATININE 3.1* 2.8*     LIVER PROFILE:   Recent Labs     09/30/20  1337 10/01/20  0415   AST 16 17   ALT 16 17   BILITOT 0.3 0.3   ALKPHOS 83 82     PT/INR:   Recent Labs     09/30/20  1340 09/30/20  2100 10/01/20  0415   PROTIME 11.8 11.2* 10.8*   INR 0.98 0.93 0.89     APTT:   Recent Labs     09/30/20  1340 09/30/20  2100 10/01/20  0415   APTT 31.4 31.0 23.5*     BNP:  No results for input(s): BNP in the last 72 hours.       Assessment:  Patient Active Problem List    Diagnosis Date Noted    E. coli UTI (urinary tract infection) 07/22/2016     Priority: High    Sepsis associated hypotension, POA 07/21/2016     Priority: High    Sepsis secondary to UTI, POA 07/20/2016     Priority: High    Musculoskeletal chest pain 07/20/2016     Priority: Medium    Diabetes mellitus with hyperglycemia ([facility] 07/20/2016     Priority: Medium    Severe dehydration secondary to significant hyperglycemia 07/20/2016     Priority: Medium    Generalized weakness 07/20/2016     Priority: Medium    Elevated lactic acid level, resolved 07/20/2016     Priority: Low    Sepsis ([address] 06/20/2020    CHF (congestive heart failure), NYHA class I, acute, systolic ([address] 21/40/7480    Heart failure ([address] 05/01/2020    Hyperkalemia 02/23/2020    Uncontrolled diabetes mellitus with chronic kidney disease ([address] 02/23/2020    Acute on chronic diastolic (congestive) heart failure ([address] 02/23/2020    COPD with acute exacerbation ([address] 02/23/2020    Acute respiratory distress 02/16/2020    STEMI (ST elevation myocardial infarction) ([address] 11/23/2019    Cellulitis of abdominal wall 07/13/2019    Acute kidney injury ([address] 04/29/2019    DM (diabetes mellitus) ([address] 04/29/2019    Fluid overload 04/29/2019    Cor pulmonale (chronic) ([address] 04/29/2019    Anasarca 04/23/2019    UTI (urinary tract infection), bacterial 03/15/2019    Sinus tachycardia 03/15/2019    Uncontrolled type 2 diabetes mellitus with hyperglycemia ([address] 03/15/2019    Class 3 severe obesity due to excess calories with body mass index (BMI) of 45.0 to 49.9 in adult ([facility] 03/15/2019    Pleural effusion on left 02/28/2019    Hyperglycemia 11/17/2018    Acute respiratory failure ([address] 10/02/2018    Gait disturbance 09/21/2018    Debility 09/19/2018    Nausea & vomiting 04/05/2018    Fever 04/05/2018    Generalized anxiety disorder 01/08/2018    DM (diabetes mellitus), secondary uncontrolled ([address] 01/04/2018    Syncope 08/26/2016    Acute pain of right shoulder 08/26/2016    Hypotension 08/26/2016    Diabetes type 2, uncontrolled ([address] 07/20/2016    Morbid obesity with BMI of 50.0-59.9, adult ([address] 07/20/2016    Essential hypertension     CAD (coronary artery disease)     Chronic diastolic heart failure (HCC)     CKD (chronic kidney disease) stage 3, GFR 30-59 ml/min ([facility] [address]        Electronically signed by Hilary Andrade PA-C on 10/1/2020 at 10:59 AM

## 2020-10-01 NOTE — CONSULTS
Per the physical rehabilitation triage process, the PT referral will be held at this time. Recommend mobility per nursing staff. Intend to review chart periodically.   Kyra Portillo, PT,   10/1/2020, 9:22 AM

## 2020-10-01 NOTE — CONSULTS
[address], [address] [facility]                                  CONSULTATION    PATIENT NAME: LIONEL CLEVELAND                      :        1960  MED REC NO:   [id number]                          ROOM:         ACCOUNT NO:   [de-identified]                           ADMIT DATE: 2020  PROVIDER:     Lemont Cheadle, MD    CONSULT DATE:  2020    INDICATION:  Hypotension, elevated troponin. HISTORY OF PRESENT ILLNESS:  This is a 72-year-old female patient who  comes in to the hospital with having generalized weakness present. She  was found to be hypotensive when she came in. She did receive some  fluids and she is getting better. Her medications were adjusted not  long ago by Dr. Kadeem Ruiz. The patient has just generalized weakness  present. No fever. No chills. No cough or sputum production. No  other  or GI complaints. The patient was seen by Dr. Kadeem Ruiz not long  ago. I think the patient was in fluid overload. Edema was better. Creatinine was elevated at 3.7. Blood pressure was also slightly low. The patient, I think, adjusted her medications. I think they held her  losartan and she was kept on diuretics. The patient was kept on loop  and thiazide diuretics. History of coronary artery disease present. The patient had a heart  catheterization done back in early this year in 2020. Left main was  patent, LAD was mid 80% stenosis which was stented, and circ had mild  disease noted, OM was 90% stenosis which was stented. Her RCA was mid  100% occluded. I think that she had a collateral circulation to that  present. PAST MEDICAL HISTORY:  She has a history of coronary artery disease  status post angioplasty done in 2020, history of hypertension,  hyperlipidemia, history of renal insufficiency present, EF is around 40%  range present, diabetes, anxiety, and obesity present.     PAST SURGICAL HISTORY:  Angioplasty done of the LAD and circ, ,  gallbladder surgery, and tonsillectomy. SOCIAL HISTORY:  She is a former smoker. She does not drink alcohol. ALLERGIES:  AUGMENTIN. MEDICATIONS AT HOME:  She is on insulin, Demadex. Cozaar, I am not sure  if she is taking that, that is on hold. Zaroxolyn, Coreg, Plavix,  lactulose, Lipitor. I am hoping she is on aspirin and Plavix. She is  on p.r.n. statins. PHYSICAL EXAMINATION:  GENERAL:  The patient is awake, alert, and answering questions, not in  acute distress. VITAL SIGNS:  Temperature is afebrile, pulse is 60, and blood pressure  105/60. HEENT:  Head is normocephalic and atraumatic. Pupils are equal and  reactive to light. CHEST:  Equal expansion. LUNGS:  Clear to auscultation. No wheezing or rhonchi. Decreased  breath sounds. HEART:  Regular rate and rhythm. ABDOMEN:  Soft and nontender. Bowel sounds are present. No  hepatosplenomegaly or guarding appreciated. EXTREMITIES:  No cyanosis or clubbing noted. LABORATORY DATA:  BUN is 84, creatinine is 3.1. BNP is 2600. Troponin  is elevated at 0.123. LFTs are normal.  CBC is within normal range. EKG shows sinus rhythm. Chest x-ray shows no acute present. IMPRESSION:  This is a 80-year-old female patient who comes in with  having shortness of breath and weakness present. She has renal  insufficiency present. Her creatinine is baseline, but her BUN is also  elevated. At this time from a cardiac stand, her troponin is slightly  elevated, but I do not think it is acute ACS. I think I believe it is  all secondary to renal disease. Her BUN is elevated from 49 to 84. Continue hydration. We will obtain echo and we will make further  recommendations based on the hospital course. We will continue aspirin  and Plavix. Last echo was done in 2020. EF was around 40% range.         Andrews Sorto MD    D: 2020 17:04:10       T: 2020 19:57:41 [email or web address]  Job#: [id number]     Doc#: [id number]    CC:

## 2020-10-01 NOTE — PROGRESS NOTES
Patient requesting home [place] be restarted. Spoke with Dr. Amanda Broussard and Dr. Berenice Whitely about the patient's BP. They were okay with restarting if okay with Dr. Melody Jean Baptiste. Dr. Melody Jean Baptiste updated, he will order the [facility]. Will continue to monitor.

## 2020-10-01 NOTE — CONSULTS
Pt seen ,examined,interviewed and chart reviewed. Please see the dictated consult for details     Imp :   1. JUAN J- CKD stage 4 A3- urine has hyalin casts but no active  sediment so likey renal hypoperfusion - from low BP_  Will r/o other process- although less likely except she  Has  mixon with high UO_P_ so  Ac bladder obstruction could have been a potential cause   2. Hypotension- I saw her in 8/25/20 and BP was 105/60 and advised her to stop arb- she may have  not dine that - also she is on diuretics  for recurrent ADHF so transient IVV is a good possibility no overt foci of infection - CXR ok and unlikely pericardial effusion  3. Underlying ASCVD/ DM/ CRS / anemia etc     Plan:  1. Stop IVF  2. Will r/o other potential cause jo infection   3. Her BP much better   4. Also she will need diuretics soon  5. Low salt diet now- good BS control  6. daily accurate  wt   7. ;abs in am   8.  I suspect she will be neg  For  SARS- COV- 2       Thanks for the consult    #[id number]

## 2020-10-01 NOTE — PROGRESS NOTES
[address] [place] [address]  HOSPITALIST PROGRESS NOTE                       Name:  Shelton Lyon /Age/Sex: 1960  (61 y.o. female)   MRN & CSN:  [id number] & [id number] Admission Date/Time: 2020 12:47 PM   Location:  -A Attending:  Jose Novak MD                                                  HPI  Shelton Lyon is a 61 y.o. female who presents with hypotension    SUBJECTIVE  Awake, reports feeling tired. Had some shortness of breath but no chest pain. No fever/chills. BP improved since admission    10 point review of systems reviewed and negative unless noted above. ALLERGIES:   Allergies   Allergen Reactions    Augmentin [Amoxicillin-Pot Clavulanate] Diarrhea       PCP: Rossy Nieto MD    PAST MEDICAL HISTORY, SURGICAL HISTORY, SOCIAL HISTORY and  HOME MEDICATIONS all reviewed. OBJECTIVE  Vitals:    10/01/20 0400 10/01/20 0500 10/01/20 0600 10/01/20 0700   BP: (!) 121/48 (!) 111/40 (!) 124/43 (!) 137/49   Pulse: 86 82 87 87   Resp: 13 14 14 13   Temp: 98.6 °F (37 °C)      TempSrc: Oral      SpO2: 98% 97% 96% 97%   Weight:       Height:           PHYSICAL EXAM   GEN Awake female, sitting upright in bed in no apparent distress. EYES Pupils are equally round. No scleral erythema, discharge, or conjunctivitis. HENT Mucous membranes are moist. Oral pharynx without exudates, no evidence of thrush. NECK Supple, no apparent thyromegaly or masses. RESP Unlabored respiration, bilateral rhonchi  CARDIO/VASC S1/S2 auscultated. Regular rate without appreciable murmurs, rubs, or gallops. No JVD or carotid bruits. Peripheral pulses equal bilaterally and palpable. GI Abdomen is soft without significant tenderness, masses, or guarding. Bowel sounds are normoactive. Rectal exam deferred.  No costovertebral angle tenderness. Normal appearing external genitalia. HEME/LYMPH No palpable cervical lymphadenopathy and no hepatosplenomegaly. No petechiae or ecchymoses.   MSK Spontaneous movement of all extremities. No gross joint deformities. SKIN Normal coloration, warm, dry. NEURO Cranial nerves appear grossly intact, normal speech, no lateralizing weakness. .    INTAKE: In: 816.8 [P.O.:240;  I.V.:576.8]  Out: 1575   OUTPUT: In: 816.8   Out: 1575 [Urine:1575]    LABS  Recent Labs     09/30/20  1337 10/01/20  0415   WBC 9.7 15.6*   HGB 9.6* 10.4*   HCT 30.3* 32.7*    235      Recent Labs     09/30/20  1337 10/01/20  0415   * 136   K 4.3 4.6   CL 92* 97*   CO2 27 27   BUN 84* 81*   CREATININE 3.1* 2.8*     Recent Labs     09/30/20  1337 10/01/20  0415   AST 16 17   ALT 16 17   BILITOT 0.3 0.3   ALKPHOS 83 82     Recent Labs     09/30/20  1340 09/30/20  2100 10/01/20  0415   INR 0.98 0.93 0.89     Recent Labs     09/30/20  1423   TROPONINT 0.123*          Abnormal labs for today noted      Imaging:     ECHO:    Microbiology:  Blood culture:    Urine culture:    Sputum culture:    Procedures done this admission:    MEDS  Scheduled Meds:   magnesium sulfate  2 g Intravenous Once    sodium chloride flush  10 mL Intravenous 2 times per day    heparin (porcine)  5,000 Units Subcutaneous 3 times per day    aspirin  81 mg Oral Daily    atorvastatin  80 mg Oral Nightly    buPROPion  100 mg Oral BID    clopidogrel  75 mg Oral Daily    DULoxetine  60 mg Oral Daily    insulin glargine  55 Units Subcutaneous QAM    levothyroxine  50 mcg Oral Daily    LORazepam  1 mg Oral BID    pantoprazole  40 mg Oral QAM AC    tiotropium  2 puff Inhalation Daily    dexamethasone  6 mg Oral Daily    insulin lispro  0-12 Units Subcutaneous TID WC    insulin lispro  0-6 Units Subcutaneous Nightly    albuterol sulfate HFA  2 puff Inhalation 6 times per day    And    ipratropium  2 puff Inhalation 6 times per day     Continuous Infusions:   dextrose       PRN Meds:sodium chloride flush, promethazine **OR** ondansetron, acetaminophen **OR** acetaminophen, glucose, dextrose, glucagon (rDNA), dextrose        ASSESSMENT and PLAN  Hospital Day: 2    1-Hypotension- likely due to hypovolemia vs medications related- need to rule out sepsis- hence blood culture pending. Improved with IVF  2-Elevated troponin- in the setting of CKD- seen by cardio, limited echo pending.  Trend trop  3-JUAN J on CKD stage 4- improved- diuretics on hold  4-Chronic hypoxic respiratory failure- at baseline home oxygen, but reports shortness of breath- CXR non-acute, schedule inhalers- if not improving will do CT  5-Generalized weakness- PT/OT    Other issues  -Chronic systolic HF  -CAD  -CKD stage 4  -DM  -Hypothyroidism  -Morbid obesity with BMI >40-lifestyle modification  -anxiety    If covid negative, transfer out of unit               Disp:     Diet DIET CARB CONTROL;   DVT Prophylaxis [] Lovenox, []  Heparin, [] SCDs, [] Ambulation   GI Prophylaxis [] PPI,  [] H2 Blocker,  [] Carafate,  [] Diet/Tube Feeds   Code Status Full Code   Disposition Patient requires continued admission due to hypotension   CMS Level of Risk [] Low, [x] Moderate,[]  High  Patient's risk as above due to hypotension     FALGUNI GEORGE MD 10/1/2020 8:59 AM

## 2020-10-01 NOTE — PROGRESS NOTES
Report called to Asim Rivera on 3 E. Patient eating dinner. Will transport when patient is finished.

## 2020-10-02 LAB
ALBUMIN SERPL-MCNC: 3.9 GM/DL (ref 3.4–5)
ANION GAP SERPL CALCULATED.3IONS-SCNC: 13 MMOL/L (ref 4–16)
BUN BLDV-MCNC: 68 MG/DL (ref 6–23)
CALCIUM SERPL-MCNC: 9 MG/DL (ref 8.3–10.6)
CHLORIDE BLD-SCNC: 94 MMOL/L (ref 99–110)
CO2: 27 MMOL/L (ref 21–32)
CREAT SERPL-MCNC: 2.2 MG/DL (ref 0.6–1.1)
ESTIMATED AVERAGE GLUCOSE: 269 MG/DL
GFR AFRICAN AMERICAN: 28 ML/MIN/1.73M2
GFR NON-AFRICAN AMERICAN: 23 ML/MIN/1.73M2
GLUCOSE BLD-MCNC: 253 MG/DL (ref 70–99)
GLUCOSE BLD-MCNC: 281 MG/DL (ref 70–99)
GLUCOSE BLD-MCNC: 293 MG/DL (ref 70–99)
GLUCOSE BLD-MCNC: 319 MG/DL (ref 70–99)
GLUCOSE BLD-MCNC: 327 MG/DL (ref 70–99)
GLUCOSE BLD-MCNC: 338 MG/DL (ref 70–99)
HBA1C MFR BLD: 11 % (ref 4.2–6.3)
HCT VFR BLD CALC: 33.4 % (ref 37–47)
HEMOGLOBIN: 10.8 GM/DL (ref 12.5–16)
MCH RBC QN AUTO: 27.6 PG (ref 27–31)
MCHC RBC AUTO-ENTMCNC: 32.3 % (ref 32–36)
MCV RBC AUTO: 85.4 FL (ref 78–100)
PDW BLD-RTO: 17.8 % (ref 11.7–14.9)
PHOSPHORUS: 2.4 MG/DL (ref 2.5–4.9)
PLATELET # BLD: 269 K/CU MM (ref 140–440)
PMV BLD AUTO: 9.9 FL (ref 7.5–11.1)
POTASSIUM SERPL-SCNC: 4.2 MMOL/L (ref 3.5–5.1)
RBC # BLD: 3.91 M/CU MM (ref 4.2–5.4)
SODIUM BLD-SCNC: 134 MMOL/L (ref 135–145)
WBC # BLD: 18.5 K/CU MM (ref 4–10.5)

## 2020-10-02 PROCEDURE — 6370000000 HC RX 637 (ALT 250 FOR IP): Performed by: INTERNAL MEDICINE

## 2020-10-02 PROCEDURE — 96372 THER/PROPH/DIAG INJ SC/IM: CPT

## 2020-10-02 PROCEDURE — 83036 HEMOGLOBIN GLYCOSYLATED A1C: CPT

## 2020-10-02 PROCEDURE — 2700000000 HC OXYGEN THERAPY PER DAY

## 2020-10-02 PROCEDURE — 2580000003 HC RX 258: Performed by: NURSE PRACTITIONER

## 2020-10-02 PROCEDURE — 1200000000 HC SEMI PRIVATE

## 2020-10-02 PROCEDURE — 82962 GLUCOSE BLOOD TEST: CPT

## 2020-10-02 PROCEDURE — 6370000000 HC RX 637 (ALT 250 FOR IP): Performed by: HOSPITALIST

## 2020-10-02 PROCEDURE — 6370000000 HC RX 637 (ALT 250 FOR IP): Performed by: NURSE PRACTITIONER

## 2020-10-02 PROCEDURE — 85027 COMPLETE CBC AUTOMATED: CPT

## 2020-10-02 PROCEDURE — 80053 COMPREHEN METABOLIC PANEL: CPT

## 2020-10-02 PROCEDURE — 80069 RENAL FUNCTION PANEL: CPT

## 2020-10-02 PROCEDURE — 94761 N-INVAS EAR/PLS OXIMETRY MLT: CPT

## 2020-10-02 PROCEDURE — 94640 AIRWAY INHALATION TREATMENT: CPT

## 2020-10-02 PROCEDURE — 36415 COLL VENOUS BLD VENIPUNCTURE: CPT

## 2020-10-02 PROCEDURE — 83735 ASSAY OF MAGNESIUM: CPT

## 2020-10-02 PROCEDURE — 6360000002 HC RX W HCPCS: Performed by: NURSE PRACTITIONER

## 2020-10-02 PROCEDURE — 97165 OT EVAL LOW COMPLEX 30 MIN: CPT

## 2020-10-02 RX ORDER — RANOLAZINE 500 MG/1
500 TABLET, EXTENDED RELEASE ORAL 2 TIMES DAILY
Status: DISCONTINUED | OUTPATIENT
Start: 2020-10-02 | End: 2020-10-03 | Stop reason: HOSPADM

## 2020-10-02 RX ORDER — GLIPIZIDE 5 MG/1
10 TABLET ORAL
Status: DISCONTINUED | OUTPATIENT
Start: 2020-10-02 | End: 2020-10-03 | Stop reason: HOSPADM

## 2020-10-02 RX ORDER — TORSEMIDE 20 MG/1
20 TABLET ORAL DAILY
Status: DISCONTINUED | OUTPATIENT
Start: 2020-10-02 | End: 2020-10-03 | Stop reason: HOSPADM

## 2020-10-02 RX ORDER — INSULIN GLARGINE 100 [IU]/ML
60 INJECTION, SOLUTION SUBCUTANEOUS NIGHTLY
Status: DISCONTINUED | OUTPATIENT
Start: 2020-10-02 | End: 2020-10-03

## 2020-10-02 RX ADMIN — ALBUTEROL SULFATE 2 PUFF: 90 AEROSOL, METERED RESPIRATORY (INHALATION) at 08:02

## 2020-10-02 RX ADMIN — Medication 2 PUFF: at 21:05

## 2020-10-02 RX ADMIN — LORAZEPAM 1 MG: 1 TABLET ORAL at 09:01

## 2020-10-02 RX ADMIN — HEPARIN SODIUM 5000 UNITS: 5000 INJECTION, SOLUTION INTRAVENOUS; SUBCUTANEOUS at 20:46

## 2020-10-02 RX ADMIN — SODIUM CHLORIDE, PRESERVATIVE FREE 10 ML: 5 INJECTION INTRAVENOUS at 09:00

## 2020-10-02 RX ADMIN — DEXAMETHASONE 6 MG: 4 TABLET ORAL at 09:00

## 2020-10-02 RX ADMIN — HYDROCODONE BITARTRATE AND ACETAMINOPHEN 1 TABLET: 5; 325 TABLET ORAL at 03:38

## 2020-10-02 RX ADMIN — ASPIRIN 81 MG CHEWABLE TABLET 81 MG: 81 TABLET CHEWABLE at 09:00

## 2020-10-02 RX ADMIN — HEPARIN SODIUM 5000 UNITS: 5000 INJECTION, SOLUTION INTRAVENOUS; SUBCUTANEOUS at 12:51

## 2020-10-02 RX ADMIN — ALBUTEROL SULFATE 2 PUFF: 90 AEROSOL, METERED RESPIRATORY (INHALATION) at 11:33

## 2020-10-02 RX ADMIN — METOPROLOL SUCCINATE 25 MG: 25 TABLET, EXTENDED RELEASE ORAL at 09:01

## 2020-10-02 RX ADMIN — TORSEMIDE 20 MG: 20 TABLET ORAL at 12:57

## 2020-10-02 RX ADMIN — ALBUTEROL SULFATE 2 PUFF: 90 AEROSOL, METERED RESPIRATORY (INHALATION) at 21:05

## 2020-10-02 RX ADMIN — HYDROCODONE BITARTRATE AND ACETAMINOPHEN 1 TABLET: 5; 325 TABLET ORAL at 09:13

## 2020-10-02 RX ADMIN — RANOLAZINE 500 MG: 500 TABLET, FILM COATED, EXTENDED RELEASE ORAL at 09:02

## 2020-10-02 RX ADMIN — INSULIN GLARGINE 60 UNITS: 100 INJECTION, SOLUTION SUBCUTANEOUS at 20:47

## 2020-10-02 RX ADMIN — GLIPIZIDE 10 MG: 5 TABLET ORAL at 17:04

## 2020-10-02 RX ADMIN — PANTOPRAZOLE SODIUM 40 MG: 40 TABLET, DELAYED RELEASE ORAL at 06:23

## 2020-10-02 RX ADMIN — HYDROCODONE BITARTRATE AND ACETAMINOPHEN 1 TABLET: 5; 325 TABLET ORAL at 17:04

## 2020-10-02 RX ADMIN — BUPROPION HYDROCHLORIDE 100 MG: 100 TABLET, FILM COATED, EXTENDED RELEASE ORAL at 09:00

## 2020-10-02 RX ADMIN — ATORVASTATIN CALCIUM 20 MG: 20 TABLET, FILM COATED ORAL at 20:52

## 2020-10-02 RX ADMIN — DULOXETINE HYDROCHLORIDE 60 MG: 30 CAPSULE, DELAYED RELEASE ORAL at 20:52

## 2020-10-02 RX ADMIN — TIOTROPIUM BROMIDE INHALATION SPRAY 2 PUFF: 3.12 SPRAY, METERED RESPIRATORY (INHALATION) at 07:58

## 2020-10-02 RX ADMIN — HYDROCODONE BITARTRATE AND ACETAMINOPHEN 1 TABLET: 5; 325 TABLET ORAL at 21:27

## 2020-10-02 RX ADMIN — CLOPIDOGREL BISULFATE 75 MG: 75 TABLET ORAL at 09:00

## 2020-10-02 RX ADMIN — RANOLAZINE 500 MG: 500 TABLET, FILM COATED, EXTENDED RELEASE ORAL at 20:52

## 2020-10-02 RX ADMIN — BUDESONIDE AND FORMOTEROL FUMARATE DIHYDRATE 2 PUFF: 80; 4.5 AEROSOL RESPIRATORY (INHALATION) at 21:05

## 2020-10-02 RX ADMIN — SODIUM CHLORIDE, PRESERVATIVE FREE 10 ML: 5 INJECTION INTRAVENOUS at 20:53

## 2020-10-02 RX ADMIN — Medication 2 PUFF: at 15:27

## 2020-10-02 RX ADMIN — BUDESONIDE AND FORMOTEROL FUMARATE DIHYDRATE 2 PUFF: 80; 4.5 AEROSOL RESPIRATORY (INHALATION) at 07:57

## 2020-10-02 RX ADMIN — BUPROPION HYDROCHLORIDE 100 MG: 100 TABLET, FILM COATED, EXTENDED RELEASE ORAL at 20:52

## 2020-10-02 RX ADMIN — ALBUTEROL SULFATE 2 PUFF: 90 AEROSOL, METERED RESPIRATORY (INHALATION) at 15:27

## 2020-10-02 RX ADMIN — Medication 2 PUFF: at 11:33

## 2020-10-02 RX ADMIN — ALOGLIPTIN 6.25 MG: 6.25 TABLET, FILM COATED ORAL at 09:00

## 2020-10-02 RX ADMIN — Medication 2 PUFF: at 07:58

## 2020-10-02 RX ADMIN — LEVOTHYROXINE SODIUM 50 MCG: 50 TABLET ORAL at 06:23

## 2020-10-02 RX ADMIN — LORAZEPAM 1 MG: 1 TABLET ORAL at 20:52

## 2020-10-02 ASSESSMENT — PAIN SCALES - GENERAL
PAINLEVEL_OUTOF10: 10
PAINLEVEL_OUTOF10: 10
PAINLEVEL_OUTOF10: 2

## 2020-10-02 ASSESSMENT — PAIN DESCRIPTION - LOCATION
LOCATION: BACK
LOCATION: BACK

## 2020-10-02 ASSESSMENT — PAIN DESCRIPTION - PAIN TYPE
TYPE: CHRONIC PAIN
TYPE: CHRONIC PAIN

## 2020-10-02 NOTE — PROGRESS NOTES
Nephrology Progress Note  10/2/2020 6:06 PM        Subjective:   Admit Date: 9/30/2020  PCP: Rossy Nieto MD    Interval History: pt seen earlier today    Diet: better    ROS:  No overt sob, BP better , some wt and wt gain , good UOP - has mixon     Data:     Current meds:    ranolazine  500 mg Oral BID    torsemide  20 mg Oral Daily    insulin glargine  60 Units Subcutaneous Nightly    glipiZIDE  10 mg Oral BID AC    insulin lispro  0-24 Units Subcutaneous TID WC    insulin lispro  0-24 Units Subcutaneous 2 times per day    atorvastatin  20 mg Oral Nightly    metoprolol succinate  25 mg Oral Daily    alogliptin  6.25 mg Oral Daily    budesonide-formoterol  2 puff Inhalation BID    albuterol sulfate HFA  2 puff Inhalation Q4H WA    And    ipratropium  2 puff Inhalation Q4H WA    insulin lispro  20 Units Subcutaneous TID WC    sodium chloride flush  10 mL Intravenous 2 times per day    heparin (porcine)  5,000 Units Subcutaneous 3 times per day    aspirin  81 mg Oral Daily    buPROPion  100 mg Oral BID    clopidogrel  75 mg Oral Daily    DULoxetine  60 mg Oral Daily    levothyroxine  50 mcg Oral Daily    LORazepam  1 mg Oral BID    pantoprazole  40 mg Oral QAM AC    tiotropium  2 puff Inhalation Daily      dextrose           I/O last 3 completed shifts:   In: 10 [I.V.:10]  Out: 1750 [SQODK:9578]    CBC:   Recent Labs     09/30/20  1337 10/01/20  0415 10/02/20  0938   WBC 9.7 15.6* 18.5*   HGB 9.6* 10.4* 10.8*    235 269          Recent Labs     09/30/20  1337 10/01/20  0415 10/02/20  0938   * 136 134*   K 4.3 4.6 4.2   CL 92* 97* 94*   CO2 27 27 27   BUN 84* 81* 68*   CREATININE 3.1* 2.8* 2.2*   GLUCOSE 330* 253* 281*       Lab Results   Component Value Date    CALCIUM 9.0 10/02/2020    PHOS 2.4 (L) 10/02/2020       Objective:     Vitals: /60   Pulse 75   Temp 98.5 °F (36.9 °C) (Oral)   Resp 15   Ht 5' 4\" (1.626 m)   Wt 276 lb 1.6 oz (125.2 kg)   SpO2 97%   BMI 47.39 kg/m²     General appearance:  No ac distress  HEENT:  With o2 per NC- + conj pallor  Neck:  supple  Lungs:  No crackles  Heart:  Seems RRR  Abdomen: soft, obese  Extremities:  + mild edema       Problem List :         Impression :     1. JUAN J- CKd stage 4 A3- recovering mainly from CRY type 1 - low BP etc   2. Started retaining salt and water  3. CMP/ DM/ASCVD  4. probable cor pulmonale     Recommendation/Plan  :     1. Restart torsemide 20/d   2. Low salt  3. ood BS control  4. D/C mixon tomorrow am   5. Hopefully d/cancer tomorrow home  6. She is off course  neg for SARS- CoV- 2   7.  Follow clinically  8. daily accurate wt       Taylor Blas MD

## 2020-10-02 NOTE — PROGRESS NOTES
Progress Note( Dr. Peter Singh)  10/2/2020  Subjective:   Admit Date: 9/30/2020  PCP: Lissa Nunes MD    Admitted For :Hypotension and panic attack    Consulted For: Better control of blood glucose    Interval History: She has been running a bit higher blood glucose otherwise feels okay    Denies any chest pains,   Mild SOB . Denies nausea or vomiting. No new bowel or bladder symptoms. Intake/Output Summary (Last 24 hours) at 10/2/2020 1628  Last data filed at 10/2/2020 0900  Gross per 24 hour   Intake 10 ml   Output 550 ml   Net -540 ml       DATA    CBC:   Recent Labs     09/30/20  1337 10/01/20  0415 10/02/20  0938   WBC 9.7 15.6* 18.5*   HGB 9.6* 10.4* 10.8*    235 269    CMP:  Recent Labs     09/30/20  1337 10/01/20  0415 10/02/20  0938   * 136 134*   K 4.3 4.6 4.2   CL 92* 97* 94*   CO2 27 27 27   BUN 84* 81* 68*   CREATININE 3.1* 2.8* 2.2*   CALCIUM 8.6 8.6 9.0   PROT 5.6* 5.1*  --    LABALBU 3.4 3.7 3.9   BILITOT 0.3 0.3  --    ALKPHOS 83 82  --    AST 16 17  --    ALT 16 17  --      Lipids:   Lab Results   Component Value Date    CHOL 117 05/13/2020    HDL 39 05/13/2020    TRIG 158 05/13/2020     Glucose:  Recent Labs     10/02/20  0816 10/02/20  1142 10/02/20  1614   POCGLU 253* 293* 319*     [id number]:  Lab Results   Component Value Date    LABA1C 9.1 06/21/2020     High Sensitivity TSH:   Lab Results   Component Value Date    TSHHS 1.210 10/01/2020     Free T3: No results found for: FT3  Free T4:  Lab Results   Component Value Date    T4FREE 1.11 02/28/2019       Xr Chest Portable    Result Date: 9/30/2020  EXAMINATION: ONE XRAY VIEW OF THE CHEST 9/30/2020 2:05 pm COMPARISON: 06/21/2020 HISTORY: ORDERING SYSTEM PROVIDED HISTORY: SOB, cough        No cardiomegaly, pneumonia or interstitial edema. No significant change from 06/21/2020.        Scheduled Medicines   Medications:    ranolazine  500 mg Oral BID    torsemide  20 mg Oral Daily    insulin glargine  60 Units Subcutaneous Nightly    glipiZIDE  10 mg Oral BID AC    insulin lispro  0-24 Units Subcutaneous TID WC    insulin lispro  0-24 Units Subcutaneous 2 times per day    atorvastatin  20 mg Oral Nightly    metoprolol succinate  25 mg Oral Daily    alogliptin  6.25 mg Oral Daily    budesonide-formoterol  2 puff Inhalation BID    albuterol sulfate HFA  2 puff Inhalation Q4H WA    And    ipratropium  2 puff Inhalation Q4H WA    insulin lispro  20 Units Subcutaneous TID     sodium chloride flush  10 mL Intravenous 2 times per day    heparin (porcine)  5,000 Units Subcutaneous 3 times per day    aspirin  81 mg Oral Daily    buPROPion  100 mg Oral BID    clopidogrel  75 mg Oral Daily    DULoxetine  60 mg Oral Daily    levothyroxine  50 mcg Oral Daily    LORazepam  1 mg Oral BID    pantoprazole  40 mg Oral QAM AC    tiotropium  2 puff Inhalation Daily      Infusions:    dextrose           Objective:   Vitals: BP (!) 137/48   Pulse 91   Temp 98.1 °F (36.7 °C) (Oral)   Resp 16   Ht 5' 4\" (1.626 m)   Wt 276 lb 1.6 oz (125.2 kg)   SpO2 96%   BMI 47.39 kg/m²   General appearance: alert and cooperative with exam  Neck: no JVD or bruit  Thyroid : Normal lobes   Lungs: Has Vesicular Breath sounds   Heart:  regular rate and rhythm  Abdomen: soft, non-tender; bowel sounds normal; no masses,  no organomegaly  Musculoskeletal: Normal  Extremities: extremities normal, , no edema  Neurologic:  Awake, alert, oriented to name, place and time. Cranial nerves II-XII are grossly intact. Motor is  intact.   Sensory neuropathy         Patient Active Problem List   Diagnosis    CKD (chronic kidney disease) stage 3, GFR 30-59 ml/min ([facility])       CAD (coronary artery disease)     Chronic diastolic heart failure (HCC)     Diabetes type 2, uncontrolled ([facility] [address]     Morbid obesity with BMI of 50.0-59.9, adult ([facility])     Elevated lactic acid level, resolved     Musculoskeletal chest pain     Essential hypertension     Diabetes mellitus with hyperglycemia (HCC)     Severe dehydration secondary to significant hyperglycemia     Sepsis secondary to UTI, POA     Generalized weakness     Sepsis associated hypotension, POA     E. coli UTI (urinary tract infection)     Syncope     Acute pain of right shoulder     Hypotension     DM (diabetes mellitus), secondary uncontrolled (HCC)     Generalized anxiety disorder     Nausea & vomiting     Fever     Debility     Gait disturbance     Acute respiratory failure (HCC)     Hyperglycemia     Pleural effusion on left     UTI (urinary tract infection), bacterial     Sinus tachycardia     Uncontrolled type 2 diabetes mellitus with hyperglycemia (HCC)     Class 3 severe obesity due to excess calories with body mass index (BMI) of 45.0 to 49.9 in adult [facility])     [facility]     Acute kidney injury ([facility] [address]     DM (diabetes mellitus) (HCC)     Fluid overload     Cor pulmonale (chronic) (HCC)     Cellulitis of abdominal wall     STEMI (ST elevation myocardial infarction) ([facility] [address]     Acute respiratory distress     Hyperkalemia     Uncontrolled diabetes mellitus with chronic kidney disease (HCC)     Acute on chronic diastolic (congestive) heart failure (HCC)     COPD with acute exacerbation (HCC)     Heart failure (HCC)     CHF (congestive heart failure), NYHA class I, acute, systolic ([facility] [address]     Sepsis ([facility] [address]      Plan:     1. Reviewed POC blood glucose . Labs and X ray results   2. Reviewed Current Medicines   3. On meal/ Correction bolus Humalog/ Basal Lantus Insulin regime / and Oral Hypoglycemic drugs   4. Monitor Blood glucose frequently   5. Modified  the dose of Insulin/ other medicines as needed   6. Will follow     .      Aleksandar Mitchell MD

## 2020-10-02 NOTE — PROGRESS NOTES
Occupational Therapy  [facility] OCCUPATIONAL THERAPY EVALUATION    History  [place]:  The primary encounter diagnosis was Hypotension, unspecified hypotension type. Diagnoses of Chronic kidney disease, unspecified CKD stage, Chronic respiratory failure with hypercapnia ([facility] [address] Congestive heart failure, unspecified HF chronicity, unspecified heart failure type ([facility] [address] and Anxiety state were also pertinent to this visit. Restrictions:                           Communication with other providers: PT, nurse    Subjective:  Patient states:  \"I feel better\"  Pain:  No c/o  Patient goal:  Home    Occupational profile (relevant social history and personal factors):       Social/Functional History  Lives With: Spouse  ADL Assistance: Needs assistance(spouse assists with bathing and dressing due to chronic difficulty related to COPD and obesity/impaired reaching (especially distal components) ; she toilets self Reginald)  Ambulation Assistance: Independent  Transfer Assistance: Independent    Examination of body systems (includes body structures/functions, activity/participation limitations):  · Orientation: WFL   · Cognition:  WFL   · Observation:  Received pt sitting EOB, alert and cooperative. · Vision:  [place]   · Hearing:  WFL   · ROM:  WFL BUE  · Strength: WFL BUE  · Sensation: not tested    ADLs  Feeding: INDEP    Grooming: REGINALD    Dressing: UB SBA in seated LB ModA (needs assistance at baseline with distal components due to impaired reach)    Bathing: UB SBA in seated LB SBA    Toileting: REGINALD    *Some ADL determined per observation of actual ADL performance, functional mobility, balance, activity tolerance, and cognition.      AM-PAC 6 click short form for inpatient daily activity:  Raw Score: 18  24/24 = unimpaired  23/24 = 1-20% impaired   20/24-22/24 = 21-40% impaired  15/24-19/24 = 41-59% impaired   10/24-14/24 = 60%-79% impaired  7/24-9/24 = 80%-99% impaired  6/24 = 100% impaired    Functional Mobility  Bed mobility: Supine to sit: REGINALD    Sitting balance: good    Transfers: INDEP    Standing balance: good    Ambulation:  REGINALD arrington RW 20'    Activity tolerance  WFL, near baseline. Assessment:  Assessment  Prognosis: Good  Decision Making: Low Complexity  REQUIRES OT FOLLOW UP: No  Discharge Recommendations: Home with assist PRN(her S.O./ assists with ADL as needed prior to this admission (pt has limited reach for LB ADLs and some baseline endurnace impairments ); TODAY SHE PRESENTS AT HER BASELINE AND IS SAFE TO D/C HOME . NO THERAPY NEEDS)      Goals:  By d/c or goals met:     N/A    Plan:  Plan  Times per week: N/A      Recommendation for activity with nursing staff:  REGINALD IVORY    Treatment today:    None, eval only. Education: Role of OT, OT POC, d/c needs, home safety    Safety: Left in chair with all needs in reach. Gait belt used for transfer and mobility. Time in:  0845  Time out:  0900  Timed treatment minutes:  0  Total treatment time:  15    Electronically signed by:    [id number]ley, OTR/L, [facility]   [id number]   10:59 AM, 10/2/2020

## 2020-10-02 NOTE — PROGRESS NOTES
Daily Progress Note  Feeling better  Heart rate  And BP  Stable  diuretuics per renal   Hx of CAd and PCI keep on antiplatelets   Need life style changes  Need fluid intake control      Pt. Awake, alert and feeling ok  HR stable, NSR in the 90s, BP stable  No CP, SOB stable     JUAN J on CKD    Likely HOTN related    Holding diuretics -slowly resuming BP meds-need to be slow    Given IVF    BP better now    Per renal     Chronic HFpEF    EF 40-45% stable from last check    No acute exacerbation    Cont. Med. Tx. As able     Elevated trop    Hx of CAD s/p PCI     Keep DAPT and statin for now    Trop chronically elevated- no ACS- likely d/t renal issues     Covid neg. Will cont. To follow-OMT and increase activity as able     Echo-10/1/20  Summary   This is a limited echocardiogram.   Left ventricular systolic function is abnormal.   Ejection fraction is visually estimated at 40%. Lateral and inferior hypokinesis. No evidence of any pericardial effusion. Technically difficult study, due to body habitus.     PAST MEDICAL HISTORY: Zaria Fontenot has a history of coronary artery disease  status post angioplasty done in 2020, history of hypertension,  hyperlipidemia, history of renal insufficiency present, EF is around 40%  range present, diabetes, anxiety, and obesity present.     PAST SURGICAL HISTORY:  Angioplasty done of the LAD and circ, ,  gallbladder surgery, and tonsillectomy.     SOCIAL HISTORY: Zaria Fontenot is a former smoker.  She does not drink alcohol.     ALLERGIES:  AUGMENTIN.     MEDICATIONS AT HOME:  She is on insulin, Demadex.  Cozaar, I am not sure  if she is taking that, that is on hold.  Zaroxolyn, Coreg, Plavix,  lactulose, Lipitor.  I am hoping she is on aspirin and Plavix.  She is  on p.r.n. statins.       Objective:   BP (!) 137/48   Pulse 91   Temp 98.1 °F (36.7 °C) (Oral)   Resp 12   Ht 5' 4\" (1.626 m)   Wt 276 lb 1.6 oz (125.2 kg)   SpO2 96%   BMI 47.39 kg/m²       Intake/Output Summary (Last 24 hours) at 10/2/2020 8123  Last data filed at 10/2/2020 0900  Gross per 24 hour   Intake 10 ml   Output 1750 ml   Net -1740 ml       Medications:   Scheduled Meds:   ranolazine  500 mg Oral BID    atorvastatin  20 mg Oral Nightly    metoprolol succinate  25 mg Oral Daily    alogliptin  6.25 mg Oral Daily    budesonide-formoterol  2 puff Inhalation BID    albuterol sulfate HFA  2 puff Inhalation Q4H WA    And    ipratropium  2 puff Inhalation Q4H WA    insulin glargine  50 Units Subcutaneous Nightly    insulin lispro  20 Units Subcutaneous TID WC    insulin lispro  0-18 Units Subcutaneous TID WC    insulin lispro  0-18 Units Subcutaneous 2 times per day    sodium chloride flush  10 mL Intravenous 2 times per day    heparin (porcine)  5,000 Units Subcutaneous 3 times per day    aspirin  81 mg Oral Daily    buPROPion  100 mg Oral BID    clopidogrel  75 mg Oral Daily    DULoxetine  60 mg Oral Daily    levothyroxine  50 mcg Oral Daily    LORazepam  1 mg Oral BID    pantoprazole  40 mg Oral QAM AC    tiotropium  2 puff Inhalation Daily    dexamethasone  6 mg Oral Daily      Infusions:   dextrose        PRN Meds:  albuterol sulfate HFA, HYDROcodone-acetaminophen, sodium chloride flush, promethazine **OR** ondansetron, acetaminophen **OR** acetaminophen, glucose, dextrose, glucagon (rDNA), dextrose       Physical Exam:  Vitals:    10/02/20 0845   BP: (!) 137/48   Pulse: 91   Resp: 12   Temp: 98.1 °F (36.7 °C)   SpO2: 96%        General: AAO, NAD  Chest: Nontender  Cardiac: First and Second Heart Sounds are Normal, No Murmurs or Gallops noted  Lungs:Clear to auscultation and percussion. Abdomen: Soft, NT, ND, +BS  Extremities: No clubbing, no edema  Vascular:  Equal 2+ peripheral pulses.         Lab Data:  CBC:   Recent Labs     09/30/20  1337 10/01/20  0415   WBC 9.7 15.6*   HGB 9.6* 10.4*   HCT 30.3* 32.7*   MCV 88.3 85.6    235     BMP:   Recent Labs     09/30/20  1337 10/01/20  0415   * 136   K 4.3 4.6   CL 92* 97*   CO2 27 27   BUN 84* 81*   CREATININE 3.1* 2.8*     LIVER PROFILE:   Recent Labs     09/30/20  1337 10/01/20  0415   AST 16 17   ALT 16 17   BILITOT 0.3 0.3   ALKPHOS 83 82     PT/INR:   Recent Labs     09/30/20  1340 09/30/20  2100 10/01/20  0415   PROTIME 11.8 11.2* 10.8*   INR 0.98 0.93 0.89     APTT:   Recent Labs     09/30/20  1340 09/30/20  2100 10/01/20  0415   APTT 31.4 31.0 23.5*     BNP:  No results for input(s): BNP in the last 72 hours.       Assessment:  Patient Active Problem List    Diagnosis Date Noted    E. coli UTI (urinary tract infection) 07/22/2016     Priority: High    Sepsis associated hypotension, POA 07/21/2016     Priority: High    Sepsis secondary to UTI, POA 07/20/2016     Priority: High    Musculoskeletal chest pain 07/20/2016     Priority: Medium    Diabetes mellitus with hyperglycemia ([facility] [address] 07/20/2016     Priority: Medium    Severe dehydration secondary to significant hyperglycemia 07/20/2016     Priority: Medium    Generalized weakness 07/20/2016     Priority: Medium    Elevated lactic acid level, resolved 07/20/2016     Priority: Low    Sepsis ([address] 06/20/2020    CHF (congestive heart failure), NYHA class I, acute, systolic ([facility] [address] 60/46/1135    Heart failure ([facility] [address] 05/01/2020    Hyperkalemia 02/23/2020    Uncontrolled diabetes mellitus with chronic kidney disease ([address] 02/23/2020    Acute on chronic diastolic (congestive) heart failure ([address] 02/23/2020    COPD with acute exacerbation ([address] 02/23/2020    Acute respiratory distress 02/16/2020    STEMI (ST elevation myocardial infarction) ([address] 11/23/2019    Cellulitis of abdominal wall 07/13/2019    Acute kidney injury ([address] 04/29/2019    DM (diabetes mellitus) ([facility] [address] 04/29/2019    Fluid overload 04/29/2019    Cor pulmonale (chronic) ([facility] [address] 04/29/2019    Anasarca 04/23/2019    UTI (urinary tract infection), bacterial 03/15/2019    Sinus tachycardia 03/15/2019  Uncontrolled type 2 diabetes mellitus with hyperglycemia ([facility] [address] 03/15/2019    Class 3 severe obesity due to excess calories with body mass index (BMI) of 45.0 to 49.9 in adult [facility]) 03/15/2019    Pleural effusion on left 02/28/2019    Hyperglycemia 11/17/2018    Acute respiratory failure ([address] 10/02/2018    Gait disturbance 09/21/2018    Debility 09/19/2018    Nausea & vomiting 04/05/2018    Fever 04/05/2018    Generalized anxiety disorder 01/08/2018    DM (diabetes mellitus), secondary uncontrolled ([address] 01/04/2018    Syncope 08/26/2016    Acute pain of right shoulder 08/26/2016    Hypotension 08/26/2016    Diabetes type 2, uncontrolled ([address] 07/20/2016    Morbid obesity with BMI of 50.0-59.9, adult ([address] 07/20/2016    Essential hypertension     CAD (coronary artery disease)     Chronic diastolic heart failure (HCC)     CKD (chronic kidney disease) stage 3, GFR 30-59 ml/min ([address]        Electronically signed by Nunu Sequeira PA-C on 10/2/2020 at 9:23 AM

## 2020-10-02 NOTE — CONSULTS
Endocrinology   Consult Note    Dear Doctor Allyssa Westbrook for the Consult     Pt. Was Admitted for : Hypotension and panic attack      Reason for Consult: Letter control of blood glucose      Patient was initially admitted to [facility] t unit and now transferred to medical floor as he was seen was negative for COVID    History Obtained From:  Patient/ EMR       HISTORY OF PRESENT ILLNESS: Patient is 61-year-old female history of CAD, stent, CKD, COPD, diabetes mellitus, peripheral neuropathy, hypertension, GERD and genital herpes was admitted to hospital with history of severe hypotension also but apparently preoperative panic attacks. She has been running very high blood glucose. I was  consulted for better control of blood glucose. ROS:   Pt's ROS done in detail. Abnormal ROS are noted in       Medical and Surgical History Section below:      Other Medical History:        Diagnosis Date    Acute on chronic systolic CHF (congestive heart failure) ([address] 2020    CAD (coronary artery disease)     CKD (chronic kidney disease) stage 3, GFR 30-59 ml/min ([facility])     COPD with acute exacerbation ([address] 2020    Diabetes type 2, uncontrolled ([address]     Diastolic heart failure ([address]     Essential hypertension     Financial problems 3/22/2013    Generalized anxiety disorder 2018    Herpes genitalia     Obesity, morbid (more than 100 lbs over ideal weight or BMI > 40) ([address] 2013    STEMI (ST elevation myocardial infarction) [facility])      Surgical History:        Procedure Laterality Date    CARDIAC SURGERY       SECTION      CHOLECYSTECTOMY      CORONARY ANGIOPLASTY WITH STENT PLACEMENT      OTHER SURGICAL HISTORY Right [id number]    I and D rt big toe    TONSILLECTOMY         Allergies:  Augmentin [amoxicillin-pot clavulanate]    Family History:       Problem Relation Age of Onset    Arthritis Mother     Diabetes Mother     Heart Disease Mother     High Blood Pressure Mother  Arthritis Father     Heart Disease Father     High Blood Pressure Father     Stroke Father     Substance Abuse Father     Substance Abuse Brother     Diabetes Maternal Aunt     Diabetes Maternal Uncle     Cancer Maternal Grandmother     Diabetes Maternal Grandmother     Diabetes Maternal Grandfather      REVIEW OF SYSTEMS:  Review of System Done as noted above     PHYSICAL EXAM:      Vitals:    /64   Pulse 85   Temp 98.6 °F (37 °C) (Oral)   Resp 20   Ht 5' 4\" (1.626 m)   Wt 282 lb 13.6 oz (128.3 kg)   SpO2 98%   BMI 48.55 kg/m²     CONSTITUTIONAL:  awake, alert, cooperative, appears stated age   EYES:  vision intact Fundoscopic Exam not performed   ENT:Normal  NECK:  Supple, No JVD. Thyroid Exam:Normal   LUNGS:  Has Vesicular Breath Sounds,   CARDIOVASCULAR:  Normal apical impulse, regular rate and rhythm, normal S1 and S2, no S3 or S4, and has no  murmur   ABDOMEN:  No scars, normal bowel sounds, soft, non-distended, non-tender, no masses palpated, no hepatolienomegaly  Musculoskeletal: Normal  Extremities: Normal, peripheral pulses normal, , has no edema   NEUROLOGIC:  Awake, alert, oriented to name, place and time. Cranial nerves II-XII are grossly intact. Motor is  intact. Sensory is intact.  ,  and gait is normal.    DATA:    CBC:   Recent Labs     09/30/20  1337 10/01/20  0415   WBC 9.7 15.6*   HGB 9.6* 10.4*    235    CMP:  Recent Labs     09/30/20  1337 10/01/20  0415   * 136   K 4.3 4.6   CL 92* 97*   CO2 27 27   BUN 84* 81*   CREATININE 3.1* 2.8*   CALCIUM 8.6 8.6   PROT 5.6* 5.1*   LABALBU 3.4 3.7   BILITOT 0.3 0.3   ALKPHOS 83 82   AST 16 17   ALT 16 17     Lipids:   Lab Results   Component Value Date    CHOL 117 05/13/2020    HDL 39 05/13/2020    TRIG 158 05/13/2020     Glucose:   Recent Labs     10/01/20  1231 10/01/20  1328 10/01/20  1706   POCGLU 457* 448* 424*     Hemoglobin A1C:   Lab Results   Component Value Date    LABA1C 9.1 06/21/2020     Free T4: Lab Results   Component Value Date    T4FREE 1.11 02/28/2019     Free T3: No results found for: FT3  TSH High Sensitivity:   Lab Results   Component Value Date    TSHHS 1.210 10/01/2020       Xr Chest Portable    Result Date: 9/30/2020  EXAMINATION: ONE XRAY VIEW OF THE CHEST 9/30/2020 2:05 pm COMPARISON: 06/21/2020 HISTORY: ORDERING SYSTEM PROVIDED HISTORY: SOB, cough TECHNOLOGIST PROVIDED HISTORY: Reason for exam:->SOB, cough Reason for Exam: SOB, cough Acuity: Acute Type of Exam: Initial Additional signs and symptoms: na Relevant Medical/Surgical History: diabetes, copd FINDINGS: No cardiomegaly, pneumonia, interstitial edema or pleural effusions were noted. No hilar mass was identified. Moderate degenerative osteo arthritic changes involve the right glenohumeral joint. No significant change has occurred from 06/21/2020. No cardiomegaly, pneumonia or interstitial edema. No significant change from 06/21/2020.        Scheduled Medicines   Medications:    atorvastatin  20 mg Oral Nightly    metoprolol succinate  25 mg Oral Daily    alogliptin  6.25 mg Oral Daily    budesonide-formoterol  2 puff Inhalation BID    albuterol sulfate HFA  2 puff Inhalation Q4H WA    And    ipratropium  2 puff Inhalation Q4H WA    insulin glargine  50 Units Subcutaneous Nightly    [START ON 10/2/2020] insulin lispro  20 Units Subcutaneous TID WC    [START ON 10/2/2020] insulin lispro  0-18 Units Subcutaneous TID WC    insulin lispro  0-18 Units Subcutaneous 2 times per day    [START ON 10/2/2020] insulin lispro  10 Units Subcutaneous TID     sodium chloride flush  10 mL Intravenous 2 times per day    heparin (porcine)  5,000 Units Subcutaneous 3 times per day    aspirin  81 mg Oral Daily    buPROPion  100 mg Oral BID    clopidogrel  75 mg Oral Daily    DULoxetine  60 mg Oral Daily    levothyroxine  50 mcg Oral Daily    LORazepam  1 mg Oral BID    pantoprazole  40 mg Oral QAM AC    tiotropium  2 puff Inhalation Daily    dexamethasone  6 mg Oral Daily      Infusions:    dextrose           IMPRESSION    Patient Active Problem List   Diagnosis    CKD (chronic kidney disease) stage 3, GFR 30-59 ml/min ([facility])    CAD (coronary artery disease)    Chronic diastolic heart failure ([facility]    Diabetes type 2, uncontrolled ([facility]    Morbid obesity with BMI of 50.0-59.9, adult ([facility]    Elevated lactic acid level, resolved    Musculoskeletal chest pain    Essential hypertension    Diabetes mellitus with hyperglycemia ([facility])    Severe dehydration secondary to significant hyperglycemia    Sepsis secondary to UTI, POA    Generalized weakness    Sepsis associated hypotension, POA    E. coli UTI (urinary tract infection)    Syncope    Acute pain of right shoulder    Hypotension    DM (diabetes mellitus), secondary uncontrolled ([facility])    Generalized anxiety disorder    Nausea & vomiting    Fever    Debility    Gait disturbance    Acute respiratory failure ([facility])    Hyperglycemia    Pleural effusion on left    UTI (urinary tract infection), bacterial    Sinus tachycardia    Uncontrolled type 2 diabetes mellitus with hyperglycemia ([facility])    Class 3 severe obesity due to excess calories with body mass index (BMI) of 45.0 to 49.9 in adult ([facility]    Anasarca    Acute kidney injury ([facility]    DM (diabetes mellitus) (HCC)    Fluid overload    Cor pulmonale (chronic) ([facility])    Cellulitis of abdominal wall    STEMI (ST elevation myocardial infarction) ([facility])    Acute respiratory distress    Hyperkalemia    Uncontrolled diabetes mellitus with chronic kidney disease (HCC)    Acute on chronic diastolic (congestive) heart failure (HCC)    COPD with acute exacerbation (HCC)    Heart failure ([facility])    CHF (congestive heart failure), NYHA class I, acute, systolic ([facility])    Sepsis ([facility]         RECOMMENDATIONS:      1. Reviewed POC blood glucose . Labs and X ray results   2.  Reviewed Home and Current Medicines 3. Will Start On meal/ Correction bolus Humalog/ Lantus Insulin regime   4. Monitor Blood glucose frequently   5. Modify  the dose of Insulin  as needed        Will follow with you  Again thank you for sharing pt's care with me.      Truly yours,       Rebeca Ratliff MD

## 2020-10-02 NOTE — PROGRESS NOTES
[address] [place] [address]  HOSPITALIST PROGRESS NOTE                       Name:  Constance Duverney /Age/Sex: 1960  (61 y.o. female)   MRN & CSN:  [id number] & [id number] Admission Date/Time: 2020 12:47 PM   Location:  [facility]/3018-A Attending:  Jeramy Adams MD                                                  HPI  Constance Duverney is a 61 y.o. female who presents with hypotension    SUBJECTIVE  Awake, no shortness of breath. Feels better    10 point review of systems reviewed and negative unless noted above. ALLERGIES:   Allergies   Allergen Reactions    Augmentin [Amoxicillin-Pot Clavulanate] Diarrhea       PCP: Savannah Cerda MD    PAST MEDICAL HISTORY, SURGICAL HISTORY, SOCIAL HISTORY and  HOME MEDICATIONS all reviewed. OBJECTIVE  Vitals:    10/02/20 0759 10/02/20 0800 10/02/20 0802 10/02/20 0845   BP:    (!) 137/48   Pulse:    91   Resp: 14 13 18 12   Temp:    98.1 °F (36.7 °C)   TempSrc:    Oral   SpO2:    96%   Weight:       Height:           PHYSICAL EXAM   GEN Awake female, sitting upright in bed in no apparent distress. EYES Pupils are equally round. No scleral erythema, discharge, or conjunctivitis. HENT Mucous membranes are moist. Oral pharynx without exudates, no evidence of thrush. NECK Supple, no apparent thyromegaly or masses. RESP Unlabored respiration, bilateral rhonchi  CARDIO/VASC S1/S2 auscultated. Regular rate without appreciable murmurs, rubs, or gallops. No JVD or carotid bruits. Peripheral pulses equal bilaterally and palpable. GI Abdomen is soft without significant tenderness, masses, or guarding. Bowel sounds are normoactive. Rectal exam deferred.  No costovertebral angle tenderness. Normal appearing external genitalia. HEME/LYMPH No palpable cervical lymphadenopathy and no hepatosplenomegaly. No petechiae or ecchymoses. MSK Spontaneous movement of all extremities. No gross joint deformities. SKIN Normal coloration, warm, dry.   NEURO Cranial nerves appear grossly intact, normal speech, no lateralizing weakness.   .    INTAKE: In: 10 [I.V.:10]  Out: 1750   OUTPUT: In: 10   Out: 1750 [Urine:1750]    LABS  Recent Labs     09/30/20  1337 10/01/20  0415   WBC 9.7 15.6*   HGB 9.6* 10.4*   HCT 30.3* 32.7*    235      Recent Labs     09/30/20  1337 10/01/20  0415   * 136   K 4.3 4.6   CL 92* 97*   CO2 27 27   BUN 84* 81*   CREATININE 3.1* 2.8*     Recent Labs     09/30/20  1337 10/01/20  0415   AST 16 17   ALT 16 17   BILITOT 0.3 0.3   ALKPHOS 83 82     Recent Labs     09/30/20  1340 09/30/20  2100 10/01/20  0415   INR 0.98 0.93 0.89     Recent Labs     09/30/20  1423 10/01/20  1030 10/01/20  1720   TROPONINT 0.123* 0.077* 0.062*          Abnormal labs for today noted      Imaging:     ECHO:    Microbiology:  Blood culture:    Urine culture:    Sputum culture:    Procedures done this admission:    MEDS  Scheduled Meds:   ranolazine  500 mg Oral BID    atorvastatin  20 mg Oral Nightly    metoprolol succinate  25 mg Oral Daily    alogliptin  6.25 mg Oral Daily    budesonide-formoterol  2 puff Inhalation BID    albuterol sulfate HFA  2 puff Inhalation Q4H WA    And    ipratropium  2 puff Inhalation Q4H WA    insulin glargine  50 Units Subcutaneous Nightly    insulin lispro  20 Units Subcutaneous TID     insulin lispro  0-18 Units Subcutaneous TID     insulin lispro  0-18 Units Subcutaneous 2 times per day    sodium chloride flush  10 mL Intravenous 2 times per day    heparin (porcine)  5,000 Units Subcutaneous 3 times per day    aspirin  81 mg Oral Daily    buPROPion  100 mg Oral BID    clopidogrel  75 mg Oral Daily    DULoxetine  60 mg Oral Daily    levothyroxine  50 mcg Oral Daily    LORazepam  1 mg Oral BID    pantoprazole  40 mg Oral QAM AC    tiotropium  2 puff Inhalation Daily    dexamethasone  6 mg Oral Daily     Continuous Infusions:   dextrose       PRN Meds:albuterol sulfate HFA, HYDROcodone-acetaminophen, sodium chloride flush, promethazine **OR** ondansetron, acetaminophen **OR** acetaminophen, glucose, dextrose, glucagon (rDNA), dextrose        ASSESSMENT and PLAN  Hospital Day: 3    1-Hypotension- likely due to hypovolemia vs medications related-sepsis ruled out. Improved with IVF  -discussed with nephro- restarting diuretics, assess for tolerance. 2-Elevated troponin- in the setting of CKD- seen by cardio, limited echo pending. Trend trop  3-JUAN J on CKD stage 4- improved- diuretics on hold  4-Chronic hypoxic respiratory failure- at baseline home oxygen, but reports shortness of breath- CXR non-acute, schedule inhalers- at baseline. 5-Generalized weakness- improved.   6-Hyperglycemia with underlying DM- endo adjusting insulin regimen for better control    Other issues  -Chronic systolic HF  -CAD  -CKD stage 4  -DM  -Hypothyroidism  -Morbid obesity with BMI >40-lifestyle modification  -anxiety    Will discharge in am if she tolerates BP medications and diuretics as adjusted by nephro               Disp:     Diet DIET CARB CONTROL;   DVT Prophylaxis [] Lovenox, []  Heparin, [] SCDs, [] Ambulation   GI Prophylaxis [] PPI,  [] H2 Blocker,  [] Carafate,  [] Diet/Tube Feeds   Code Status Full Code   Disposition Patient requires continued admission due to hypotension   CMS Level of Risk [] Low, [x] Moderate,[]  High  Patient's risk as above due to hypotension     FALGUNI GEORGE MD 10/2/2020 9:40 AM

## 2020-10-03 VITALS
TEMPERATURE: 98 F | HEART RATE: 92 BPM | HEIGHT: 64 IN | RESPIRATION RATE: 14 BRPM | DIASTOLIC BLOOD PRESSURE: 61 MMHG | WEIGHT: 274.5 LBS | OXYGEN SATURATION: 95 % | BODY MASS INDEX: 46.86 KG/M2 | SYSTOLIC BLOOD PRESSURE: 146 MMHG

## 2020-10-03 LAB
ALBUMIN SERPL-MCNC: 4.2 GM/DL (ref 3.4–5)
ANION GAP SERPL CALCULATED.3IONS-SCNC: 14 MMOL/L (ref 4–16)
BUN BLDV-MCNC: 76 MG/DL (ref 6–23)
CALCIUM SERPL-MCNC: 9.2 MG/DL (ref 8.3–10.6)
CHLORIDE BLD-SCNC: 99 MMOL/L (ref 99–110)
CO2: 26 MMOL/L (ref 21–32)
CREAT SERPL-MCNC: 2.4 MG/DL (ref 0.6–1.1)
GFR AFRICAN AMERICAN: 25 ML/MIN/1.73M2
GFR NON-AFRICAN AMERICAN: 21 ML/MIN/1.73M2
GLUCOSE BLD-MCNC: 127 MG/DL (ref 70–99)
GLUCOSE BLD-MCNC: 148 MG/DL (ref 70–99)
GLUCOSE BLD-MCNC: 152 MG/DL (ref 70–99)
GLUCOSE BLD-MCNC: 91 MG/DL (ref 70–99)
PHOSPHORUS: 3.8 MG/DL (ref 2.5–4.9)
POTASSIUM SERPL-SCNC: 4.4 MMOL/L (ref 3.5–5.1)
SODIUM BLD-SCNC: 139 MMOL/L (ref 135–145)

## 2020-10-03 PROCEDURE — 80069 RENAL FUNCTION PANEL: CPT

## 2020-10-03 PROCEDURE — 6370000000 HC RX 637 (ALT 250 FOR IP): Performed by: INTERNAL MEDICINE

## 2020-10-03 PROCEDURE — 2580000003 HC RX 258: Performed by: NURSE PRACTITIONER

## 2020-10-03 PROCEDURE — 6370000000 HC RX 637 (ALT 250 FOR IP): Performed by: HOSPITALIST

## 2020-10-03 PROCEDURE — 94761 N-INVAS EAR/PLS OXIMETRY MLT: CPT

## 2020-10-03 PROCEDURE — 6370000000 HC RX 637 (ALT 250 FOR IP): Performed by: NURSE PRACTITIONER

## 2020-10-03 PROCEDURE — 82962 GLUCOSE BLOOD TEST: CPT

## 2020-10-03 PROCEDURE — 96372 THER/PROPH/DIAG INJ SC/IM: CPT

## 2020-10-03 PROCEDURE — 6360000002 HC RX W HCPCS: Performed by: NURSE PRACTITIONER

## 2020-10-03 PROCEDURE — 36415 COLL VENOUS BLD VENIPUNCTURE: CPT

## 2020-10-03 PROCEDURE — 94640 AIRWAY INHALATION TREATMENT: CPT

## 2020-10-03 PROCEDURE — 2700000000 HC OXYGEN THERAPY PER DAY

## 2020-10-03 RX ORDER — INSULIN GLARGINE 100 [IU]/ML
45 INJECTION, SOLUTION SUBCUTANEOUS NIGHTLY
Qty: 5 PEN | Refills: 1 | Status: ON HOLD | OUTPATIENT
Start: 2020-10-03 | End: 2020-10-09 | Stop reason: SDUPTHER

## 2020-10-03 RX ORDER — INSULIN GLARGINE 100 [IU]/ML
45 INJECTION, SOLUTION SUBCUTANEOUS NIGHTLY
Status: DISCONTINUED | OUTPATIENT
Start: 2020-10-03 | End: 2020-10-03 | Stop reason: HOSPADM

## 2020-10-03 RX ORDER — TORSEMIDE 20 MG/1
20 TABLET ORAL 2 TIMES DAILY
Qty: 60 TABLET | Refills: 0 | Status: ON HOLD | OUTPATIENT
Start: 2020-10-03 | End: 2020-10-09 | Stop reason: HOSPADM

## 2020-10-03 RX ORDER — METOPROLOL SUCCINATE 25 MG/1
25 TABLET, EXTENDED RELEASE ORAL DAILY
Qty: 30 TABLET | Refills: 3 | Status: ON HOLD | OUTPATIENT
Start: 2020-10-04 | End: 2020-10-09 | Stop reason: SDUPTHER

## 2020-10-03 RX ADMIN — LORAZEPAM 1 MG: 1 TABLET ORAL at 09:39

## 2020-10-03 RX ADMIN — ALOGLIPTIN 6.25 MG: 6.25 TABLET, FILM COATED ORAL at 09:39

## 2020-10-03 RX ADMIN — ASPIRIN 81 MG CHEWABLE TABLET 81 MG: 81 TABLET CHEWABLE at 09:39

## 2020-10-03 RX ADMIN — HYDROCODONE BITARTRATE AND ACETAMINOPHEN 1 TABLET: 5; 325 TABLET ORAL at 03:14

## 2020-10-03 RX ADMIN — PANTOPRAZOLE SODIUM 40 MG: 40 TABLET, DELAYED RELEASE ORAL at 06:36

## 2020-10-03 RX ADMIN — METOPROLOL SUCCINATE 25 MG: 25 TABLET, EXTENDED RELEASE ORAL at 09:39

## 2020-10-03 RX ADMIN — ALBUTEROL SULFATE 2 PUFF: 90 AEROSOL, METERED RESPIRATORY (INHALATION) at 11:09

## 2020-10-03 RX ADMIN — HEPARIN SODIUM 5000 UNITS: 5000 INJECTION, SOLUTION INTRAVENOUS; SUBCUTANEOUS at 06:36

## 2020-10-03 RX ADMIN — LEVOTHYROXINE SODIUM 50 MCG: 50 TABLET ORAL at 06:36

## 2020-10-03 RX ADMIN — Medication 2 PUFF: at 11:09

## 2020-10-03 RX ADMIN — CLOPIDOGREL BISULFATE 75 MG: 75 TABLET ORAL at 09:39

## 2020-10-03 RX ADMIN — TORSEMIDE 20 MG: 20 TABLET ORAL at 09:39

## 2020-10-03 RX ADMIN — SODIUM CHLORIDE, PRESERVATIVE FREE 10 ML: 5 INJECTION INTRAVENOUS at 09:40

## 2020-10-03 RX ADMIN — BUPROPION HYDROCHLORIDE 100 MG: 100 TABLET, FILM COATED, EXTENDED RELEASE ORAL at 09:39

## 2020-10-03 RX ADMIN — HYDROCODONE BITARTRATE AND ACETAMINOPHEN 1 TABLET: 5; 325 TABLET ORAL at 09:39

## 2020-10-03 RX ADMIN — RANOLAZINE 500 MG: 500 TABLET, FILM COATED, EXTENDED RELEASE ORAL at 09:39

## 2020-10-03 ASSESSMENT — PAIN DESCRIPTION - PAIN TYPE: TYPE: CHRONIC PAIN

## 2020-10-03 ASSESSMENT — PAIN SCALES - GENERAL
PAINLEVEL_OUTOF10: 10
PAINLEVEL_OUTOF10: 0

## 2020-10-03 ASSESSMENT — PAIN DESCRIPTION - LOCATION: LOCATION: BACK

## 2020-10-03 NOTE — PROGRESS NOTES
Daily Progress Note     awake alert  Feeling better  Going home today  Need fluid restriction and salt restriction  Heart rate and BP stable  Diuretics per renal   Hx of CAD and PCI keep on antiplatelets  F/u in office in two weeks    Echo-10/1/20  Summary   This is a limited echocardiogram.   Left ventricular systolic function is abnormal.   Ejection fraction is visually estimated at 40%.   Lateral and inferior hypokinesis.   No evidence of any pericardial effusion.   Technically difficult study, due to body habitus.     PAST MEDICAL HISTORY: Zaria Fontenot has a history of coronary artery disease  status post angioplasty done in 2020, history of hypertension,  hyperlipidemia, history of renal insufficiency present, EF is around 40%  range present, diabetes, anxiety, and obesity present.     PAST SURGICAL HISTORY:  Angioplasty done of the LAD and circ, ,  gallbladder surgery, and tonsillectomy.     SOCIAL HISTORY: Zaria Fontenot is a former smoker.  She does not drink alcohol.     ALLERGIES:  AUGMENTIN.     MEDICATIONS AT HOME:  She is on insulin, Demadex.  Cozaar, I am not sure  if she is taking that, that is on hold.  Zaroxolyn, Coreg, Plavix,  lactulose, Lipitor.  I am hoping she is on aspirin and Plavix.  She is  on p.r.n. statins.     Objective:   BP (!) 146/61   Pulse 92   Temp 98 °F (36.7 °C) (Oral)   Resp 14   Ht 5' 4\" (1.626 m)   Wt 274 lb 8 oz (124.5 kg)   SpO2 95%   BMI 47.12 kg/m²   No intake or output data in the 24 hours ending 10/03/20 1214    Medications:   Scheduled Meds:   insulin glargine  45 Units Subcutaneous Nightly    insulin lispro  10 Units Subcutaneous TID WC    ranolazine  500 mg Oral BID    torsemide  20 mg Oral Daily    glipiZIDE  10 mg Oral BID AC    insulin lispro  0-24 Units Subcutaneous TID WC    insulin lispro  0-24 Units Subcutaneous 2 times per day    atorvastatin  20 mg Oral Nightly    metoprolol succinate  25 mg Oral Daily    alogliptin  6.25 mg Oral Daily    budesonide-formoterol  2 puff Inhalation BID    albuterol sulfate HFA  2 puff Inhalation Q4H WA    And    ipratropium  2 puff Inhalation Q4H WA    sodium chloride flush  10 mL Intravenous 2 times per day    heparin (porcine)  5,000 Units Subcutaneous 3 times per day    aspirin  81 mg Oral Daily    buPROPion  100 mg Oral BID    clopidogrel  75 mg Oral Daily    DULoxetine  60 mg Oral Daily    levothyroxine  50 mcg Oral Daily    LORazepam  1 mg Oral BID    pantoprazole  40 mg Oral QAM AC    tiotropium  2 puff Inhalation Daily      Infusions:   dextrose        PRN Meds:  albuterol sulfate HFA, HYDROcodone-acetaminophen, sodium chloride flush, promethazine **OR** ondansetron, acetaminophen **OR** acetaminophen, glucose, dextrose, glucagon (rDNA), dextrose       Physical Exam:  Vitals:    10/03/20 1111   BP:    Pulse:    Resp: 14   Temp:    SpO2:         General: awake alert  Chest: Nontender  Cardiac: sinus   Lungs:Clear to auscultation and percussion. Abdomen: Soft, NT, ND, +BS  Extremities: 2+ edema   Vascular:  Equal 2+ peripheral pulses. Lab Data:  CBC:   Recent Labs     09/30/20  1337 10/01/20  0415 10/02/20  0938   WBC 9.7 15.6* 18.5*   HGB 9.6* 10.4* 10.8*   HCT 30.3* 32.7* 33.4*   MCV 88.3 85.6 85.4    235 269     BMP:   Recent Labs     10/01/20  0415 10/02/20  0938 10/03/20  0301    134* 139   K 4.6 4.2 4.4   CL 97* 94* 99   CO2 27 27 26   PHOS  --  2.4* 3.8   BUN 81* 68* 76*   CREATININE 2.8* 2.2* 2.4*     LIVER PROFILE:   Recent Labs     09/30/20  1337 10/01/20  0415   AST 16 17   ALT 16 17   BILITOT 0.3 0.3   ALKPHOS 83 82     PT/INR:   Recent Labs     09/30/20  1340 09/30/20  2100 10/01/20  0415   PROTIME 11.8 11.2* 10.8*   INR 0.98 0.93 0.89     APTT:   Recent Labs     09/30/20  1340 09/30/20  2100 10/01/20  0415   APTT 31.4 31.0 23.5*     BNP:  No results for input(s): BNP in the last 72 hours.       Assessment:  Patient Active Problem List    Diagnosis Date Noted    E. coli UTI (urinary tract infection) 07/22/2016     Priority: High    Sepsis associated hypotension, POA 07/21/2016     Priority: High    Sepsis secondary to UTI, POA 07/20/2016     Priority: High    Musculoskeletal chest pain 07/20/2016     Priority: Medium    Diabetes mellitus with hyperglycemia ([facility] 07/20/2016     Priority: Medium    Severe dehydration secondary to significant hyperglycemia 07/20/2016     Priority: Medium    Generalized weakness 07/20/2016     Priority: Medium    Nausea & vomiting 04/05/2018     Priority: Low    Fever 04/05/2018     Priority: Low    Generalized anxiety disorder 01/08/2018     Priority: Low    DM (diabetes mellitus), secondary uncontrolled ([address] 01/04/2018     Priority: Low    Syncope 08/26/2016     Priority: Low    Acute pain of right shoulder 08/26/2016     Priority: Low    Hypotension 08/26/2016     Priority: Low    Diabetes type 2, uncontrolled ([address] 07/20/2016     Priority: Low    Morbid obesity with BMI of 50.0-59.9, adult ([facility] 07/20/2016     Priority: Low    Elevated lactic acid level, resolved 07/20/2016     Priority: Low    Essential hypertension      Priority: Low    CAD (coronary artery disease)      Priority: Low    Chronic diastolic heart failure (HCC)      Priority: Low    CKD (chronic kidney disease) stage 3, GFR 30-59 ml/min ([facility]      Priority: Low    Sepsis ([facility] 06/20/2020    CHF (congestive heart failure), NYHA class I, acute, systolic ([address] 23/12/3928    Heart failure ([facility] 05/01/2020    Hyperkalemia 02/23/2020    Uncontrolled diabetes mellitus with chronic kidney disease ([address] 02/23/2020    Acute on chronic diastolic (congestive) heart failure ([address] 02/23/2020    COPD with acute exacerbation ([address] 02/23/2020    Acute respiratory distress 02/16/2020    STEMI (ST elevation myocardial infarction) ([address] 11/23/2019    Cellulitis of abdominal wall 07/13/2019    Acute kidney injury ([address] 04/29/2019    DM (diabetes mellitus) ([address] 04/29/2019    Fluid overload 04/29/2019    Cor pulmonale (chronic) ([facility] 04/29/2019    Anasarca 04/23/2019    UTI (urinary tract infection), bacterial 03/15/2019    Sinus tachycardia 03/15/2019    Uncontrolled type 2 diabetes mellitus with hyperglycemia ([facility] 03/15/2019    Class 3 severe obesity due to excess calories with body mass index (BMI) of 45.0 to 49.9 in adult [facility]) 03/15/2019    Pleural effusion on left 02/28/2019    Hyperglycemia 11/17/2018    Acute respiratory failure ([facility] 10/02/2018    Gait disturbance 09/21/2018    Debility 09/19/2018       Izzy Singh MD 10/3/2020 12:14 PM

## 2020-10-03 NOTE — PLAN OF CARE
Problem: Pain:  Goal: Pain level will decrease  Description: Pain level will decrease  Outcome: Ongoing  Goal: Control of acute pain  Description: Control of acute pain  Outcome: Ongoing  Goal: Control of chronic pain  Description: Control of chronic pain  Outcome: Ongoing     Problem: Skin Integrity:  Goal: Will show no infection signs and symptoms  Description: Will show no infection signs and symptoms  Outcome: Ongoing  Goal: Absence of new skin breakdown  Description: Absence of new skin breakdown  Outcome: Ongoing     Problem: Airway Clearance - Ineffective  Goal: Achieve or maintain patent airway  Outcome: Ongoing     Problem: Gas Exchange - Impaired  Goal: Absence of hypoxia  Outcome: Ongoing  Goal: Promote optimal lung function  Outcome: Ongoing     Problem: Breathing Pattern - Ineffective  Goal: Ability to achieve and maintain a regular respiratory rate  Outcome: Ongoing     Problem:  Body Temperature -  Risk of, Imbalanced  Goal: Ability to maintain a body temperature within defined limits  Outcome: Ongoing  Goal: Will regain or maintain usual level of consciousness  Outcome: Ongoing  Goal: Complications related to the disease process, condition or treatment will be avoided or minimized  Outcome: Ongoing     Problem: Isolation Precautions - Risk of Spread of Infection  Goal: Prevent transmission of infection  Outcome: Ongoing     Problem: Nutrition Deficits  Goal: Optimize nutrtional status  Outcome: Ongoing     Problem: Risk for Fluid Volume Deficit  Goal: Maintain normal heart rhythm  Outcome: Ongoing  Goal: Maintain absence of muscle cramping  Outcome: Ongoing  Goal: Maintain normal serum potassium, sodium, calcium, phosphorus, and pH  Outcome: Ongoing     Problem: Loneliness or Risk for Loneliness  Goal: Demonstrate positive use of time alone when socialization is not possible  Outcome: Ongoing     Problem: Fatigue  Goal: Verbalize increase energy and improved vitality  Outcome: Ongoing     Problem: Patient Education: Go to Patient Education Activity  Goal: Patient/Family Education  Outcome: Ongoing     Problem: Falls - Risk of:  Goal: Will remain free from falls  Description: Will remain free from falls  Outcome: Ongoing  Goal: Absence of physical injury  Description: Absence of physical injury  Outcome: Ongoing

## 2020-10-03 NOTE — PROGRESS NOTES
Nephrology Progress Note  10/3/2020 8:32 AM        Subjective:   Admit Date: 9/30/2020  PCP: Chilango Ambrocio MD    Interval History:  no major event     Diet: reasonable     ROS:  No ac distress - no overt sob- she feels - retaining  \" fluid [\"     Data:     Current meds:    ranolazine  500 mg Oral BID    torsemide  20 mg Oral Daily    insulin glargine  60 Units Subcutaneous Nightly    glipiZIDE  10 mg Oral BID AC    insulin lispro  0-24 Units Subcutaneous TID WC    insulin lispro  0-24 Units Subcutaneous 2 times per day    atorvastatin  20 mg Oral Nightly    metoprolol succinate  25 mg Oral Daily    alogliptin  6.25 mg Oral Daily    budesonide-formoterol  2 puff Inhalation BID    albuterol sulfate HFA  2 puff Inhalation Q4H WA    And    ipratropium  2 puff Inhalation Q4H WA    insulin lispro  20 Units Subcutaneous TID WC    sodium chloride flush  10 mL Intravenous 2 times per day    heparin (porcine)  5,000 Units Subcutaneous 3 times per day    aspirin  81 mg Oral Daily    buPROPion  100 mg Oral BID    clopidogrel  75 mg Oral Daily    DULoxetine  60 mg Oral Daily    levothyroxine  50 mcg Oral Daily    LORazepam  1 mg Oral BID    pantoprazole  40 mg Oral QAM AC    tiotropium  2 puff Inhalation Daily      dextrose           I/O last 3 completed shifts:   In: 10 [I.V.:10]  Out: -     CBC:   Recent Labs     09/30/20  1337 10/01/20  0415 10/02/20  0938   WBC 9.7 15.6* 18.5*   HGB 9.6* 10.4* 10.8*    235 269          Recent Labs     10/01/20  0415 10/02/20  0938 10/03/20  0301    134* 139   K 4.6 4.2 4.4   CL 97* 94* 99   CO2 27 27 26   BUN 81* 68* 76*   CREATININE 2.8* 2.2* 2.4*   GLUCOSE 253* 281* 127*       Lab Results   Component Value Date    CALCIUM 9.2 10/03/2020    PHOS 3.8 10/03/2020       Objective:     Vitals: BP (!) 129/49   Pulse 76   Temp 98.4 °F (36.9 °C) (Oral)   Resp 18   Ht 5' 4\" (1.626 m)   Wt 274 lb 8 oz (124.5 kg)   SpO2 98%   BMI 47.12 kg/m²     General appearance:  No ac distress  HEENT:  + conj pallor  Neck:  supple  Lungs:  Coarse BS  Heart:  Seems RRR  Abdomen: soft , obese  Extremities:  Trace LE edema       Problem List :         Impression :     1. JUAN J- CKD stage 4 A3- cr will fluctuates from loop- acceptable - also some BUN could have been from steroid   2. Her BP better off arb   3. CMP/ASCVD/ DM acceptable all things considered  4. Recommendation/Plan  :     1. Ok to d/C   2. Low salt\  3. Daily wt  4. She may increase torsemide 20 BID if edema worsens- or wt gain  5. She would definitely benefit from SGLT 2 inh and GLP agonist if her insurance pay for it ( recent data suggest works for all stages of CKD )   6. CMP , mg , phos in 1 wk  7. F/U with me in 2  wks   8. Good glycemic control  9.  Good diet and lifestyle- low salt- plant based diet etc       Rizwana Chan MD

## 2020-10-03 NOTE — DISCHARGE SUMMARY
AUTOMATED DIFFERENTIAL 10/01/2020     CMP:    Lab Results   Component Value Date     10/03/2020    K 4.4 10/03/2020    CL 99 10/03/2020    CO2 26 10/03/2020    BUN 76 10/03/2020    CREATININE 2.4 10/03/2020    GFRAA 25 10/03/2020    LABGLOM 21 10/03/2020    GLUCOSE 127 10/03/2020    PROT 5.1 10/01/2020    PROT 6.5 10/18/2011    LABALBU 4.2 10/03/2020    CALCIUM 9.2 10/03/2020    BILITOT 0.3 10/01/2020    ALKPHOS 82 10/01/2020    AST 17 10/01/2020    ALT 17 10/01/2020     Xr Chest Portable    Result Date: 9/30/2020  EXAMINATION: ONE XRAY VIEW OF THE CHEST 9/30/2020 2:05 pm COMPARISON: 06/21/2020 HISTORY: ORDERING SYSTEM PROVIDED HISTORY: SOB, cough TECHNOLOGIST PROVIDED HISTORY: Reason for exam:->SOB, cough Reason for Exam: SOB, cough Acuity: Acute Type of Exam: Initial Additional signs and symptoms: na Relevant Medical/Surgical History: diabetes, copd FINDINGS: No cardiomegaly, pneumonia, interstitial edema or pleural effusions were noted. No hilar mass was identified. Moderate degenerative osteo arthritic changes involve the right glenohumeral joint. No significant change has occurred from 06/21/2020. No cardiomegaly, pneumonia or interstitial edema. No significant change from 06/21/2020. Discharge Exam:  GEN    Awake female, sitting upright in bed in no apparent distress. EYES   Pupils are equally round. No scleral erythema, discharge, or conjunctivitis. HENT  Mucous membranes are moist. Oral pharynx without exudates, no evidence of thrush. NECK  Supple, no apparent thyromegaly or masses. RESP  Unlabored respiration, bilateral rhonchi  CARDIO/VASC           S1/S2 auscultated. Regular rate without appreciable murmurs, rubs, or gallops. No JVD or carotid bruits. Peripheral pulses equal bilaterally and palpable. GI        Abdomen is soft without significant tenderness, masses, or guarding. Bowel sounds are normoactive. Rectal exam deferred.        No costovertebral angle tenderness.  Normal appearing external genitalia. HEME/LYMPH            No palpable cervical lymphadenopathy and no hepatosplenomegaly. No petechiae or ecchymoses. MSK    Spontaneous movement of all extremities. No gross joint deformities. SKIN    Normal coloration, warm, dry. NEURO           Cranial nerves appear grossly intact, normal speech, no lateralizing weakness. Disposition: home    Patient Instructions:     Discharge Medications:   Rey Jose   Home Medication Instructions [id number]    Printed on:10/03/20 0676   Medication Information                      albuterol sulfate HFA (VENTOLIN HFA) 108 (90 Base) MCG/ACT inhaler  Inhale 2 puffs into the lungs every 4 hours as needed for Wheezing             aspirin 81 MG chewable tablet  Take 1 tablet by mouth daily             atorvastatin (LIPITOR) 80 MG tablet  Take 1 tablet by mouth nightly             BREO ELLIPTA 100-25 MCG/INH AEPB inhaler  Inhale 1 puff into the lungs daily             buPROPion (WELLBUTRIN SR) 100 MG extended release tablet  Take 100 mg by mouth 2 times daily             clopidogrel (PLAVIX) 75 MG tablet  Take 1 tablet by mouth daily             docusate (COLACE, DULCOLAX) 100 MG CAPS  Take 100 mg by mouth 2 times daily             DULoxetine (CYMBALTA) 60 MG extended release capsule  Take 1 capsule by mouth daily             HYDROcodone-acetaminophen (NORCO) 5-325 MG per tablet  Take 1 tablet by mouth every 4 hours as needed for Pain.               insulin glargine (LANTUS SOLOSTAR) 100 UNIT/ML injection pen  Inject 45 Units into the skin nightly             insulin lispro (HUMALOG) 100 UNIT/ML injection vial  Check blood sugar three times daily before meals and at bedtime  For blood sugar less than 150= no insulin, 151-200=2, 201-250=4, 251-300=6, 301-350=6, 351-400=8, >400=10 units and call MD             insulin lispro (HUMALOG) 100 UNIT/ML injection vial  Inject 25 Units into the skin 3 times daily (before meals) ipratropium-albuterol (DUONEB) 0.5-2.5 (3) MG/3ML SOLN nebulizer solution  Inhale 3 mLs into the lungs every 4 hours             isosorbide mononitrate (IMDUR) 60 MG extended release tablet  Take 1 tablet by mouth daily             lactulose (CHRONULAC) 10 GM/15ML solution  Take 15 mLs by mouth 2 times daily             levothyroxine (SYNTHROID) 50 MCG tablet  Take 50 mcg by mouth daily             linagliptin (TRADJENTA) 5 MG tablet  Take 1 tablet by mouth daily             LORazepam (ATIVAN) 1 MG tablet  Take 1 mg by mouth 2 times daily.               metoprolol succinate (TOPROL XL) 25 MG extended release tablet  Take 1 tablet by mouth daily             nystatin (MYCOSTATIN) 815398 UNIT/GM powder  Apply topically 2 times daily as needed             ondansetron (ZOFRAN ODT) 4 MG disintegrating tablet  Take 1 tablet by mouth every 8 hours as needed for Nausea             pantoprazole (PROTONIX) 40 MG tablet  Take 1 tablet by mouth every morning (before breakfast)             ranolazine (RANEXA) 500 MG extended release tablet  Take 1 tablet by mouth 2 times daily             tiotropium (SPIRIVA RESPIMAT) 2.5 MCG/ACT AERS inhaler  Inhale 2 puffs into the lungs daily             torsemide (DEMADEX) 20 MG tablet  Take 1 tablet by mouth 2 times daily             vitamin D 1000 units CAPS  Take 3 capsules by mouth daily                 Activity: activity as tolerated    Diet: diabetic diet    Wound Care: none needed    Follow-up:  PCP 1-2 weeks  Nephrology 2 weeks  Cardiology 2 weeks    Time Spent Doing discharge 34 min  Electronically signed by Hakeem Lim MD  on 10/3/20 at 2:20 PM EDT

## 2020-10-04 NOTE — PROGRESS NOTES
Progress Note( Dr. Benito Jackson)  10/3/2020  Subjective:   Admit Date: 9/30/2020  PCP: Shante Jimenez MD    Admitted For :Hypotension and panic attack    Consulted For: Better control of blood glucose    Interval History: She blood glucose are much better     denies any chest pains,   Mild SOB . Denies nausea or vomiting. No new bowel or bladder symptoms. No intake or output data in the 24 hours ending 10/03/20 2050    DATA    CBC:   Recent Labs     10/01/20  0415 10/02/20  0938   WBC 15.6* 18.5*   HGB 10.4* 10.8*    269    CMP:  Recent Labs     10/01/20  0415 10/02/20  0938 10/03/20  0301    134* 139   K 4.6 4.2 4.4   CL 97* 94* 99   CO2 27 27 26   BUN 81* 68* 76*   CREATININE 2.8* 2.2* 2.4*   CALCIUM 8.6 9.0 9.2   PROT 5.1*  --   --    LABALBU 3.7 3.9 4.2   BILITOT 0.3  --   --    ALKPHOS 82  --   --    AST 17  --   --    ALT 17  --   --      Lipids:   Lab Results   Component Value Date    CHOL 117 05/13/2020    HDL 39 05/13/2020    TRIG 158 05/13/2020     Glucose:  Recent Labs     10/03/20  0224 10/03/20  0634 10/03/20  1130   POCGLU 152* 91 148*     [id number]:  Lab Results   Component Value Date    LABA1C 11.0 10/02/2020     High Sensitivity TSH:   Lab Results   Component Value Date    TSHHS 1.210 10/01/2020     Free T3: No results found for: FT3  Free T4:  Lab Results   Component Value Date    T4FREE 1.11 02/28/2019       Xr Chest Portable    Result Date: 9/30/2020  EXAMINATION: ONE XRAY VIEW OF THE CHEST 9/30/2020 2:05 pm COMPARISON: 06/21/2020 HISTORY: ORDERING SYSTEM PROVIDED HISTORY: SOB, cough        No cardiomegaly, pneumonia or interstitial edema. No significant change from 06/21/2020.        Scheduled Medicines   Medications:      Infusions:         Objective:   Vitals: BP (!) 146/61   Pulse 92   Temp 98 °F (36.7 °C) (Oral)   Resp 14   Ht 5' 4\" (1.626 m)   Wt 274 lb 8 oz (124.5 kg)   SpO2 95%   BMI 47.12 kg/m²   General appearance: alert and cooperative with exam  Neck: no heart failure (HCC)     COPD with acute exacerbation (HCC)     Heart failure (HCC)     CHF (congestive heart failure), NYHA class I, acute, systolic ([facility] [address]     Sepsis ([facility] [address]      Plan:     1. Reviewed POC blood glucose . Labs and X ray results   2. Reviewed Current Medicines   3. On meal/ Correction bolus Humalog/ Basal Lantus Insulin regime / and Oral Hypoglycemic drugs   4. Monitor Blood glucose frequently   5. Modified  the dose of Insulin/ other medicines as needed   6. Will follow     .      Rizwan Murphy MD

## 2020-10-05 LAB
CULTURE: NORMAL
CULTURE: NORMAL
EKG ATRIAL RATE: 71 BPM
EKG DIAGNOSIS: NORMAL
EKG P AXIS: 63 DEGREES
EKG P-R INTERVAL: 194 MS
EKG Q-T INTERVAL: 452 MS
EKG QRS DURATION: 118 MS
EKG QTC CALCULATION (BAZETT): 491 MS
EKG R AXIS: 18 DEGREES
EKG T AXIS: 86 DEGREES
EKG VENTRICULAR RATE: 71 BPM
Lab: NORMAL
Lab: NORMAL
SPECIMEN: NORMAL
SPECIMEN: NORMAL

## 2020-10-06 ENCOUNTER — APPOINTMENT (OUTPATIENT)
Dept: GENERAL RADIOLOGY | Age: 60
End: 2020-10-06
Payer: MEDICARE

## 2020-10-06 ENCOUNTER — APPOINTMENT (OUTPATIENT)
Dept: CT IMAGING | Age: 60
End: 2020-10-06
Payer: MEDICARE

## 2020-10-06 ENCOUNTER — HOSPITAL ENCOUNTER (OUTPATIENT)
Age: 60
Setting detail: OBSERVATION
Discharge: HOME HEALTH | End: 2020-10-09
Attending: EMERGENCY MEDICINE | Admitting: EMERGENCY MEDICINE
Payer: MEDICARE

## 2020-10-06 PROBLEM — T68.XXXA HYPOTHERMIA: Status: ACTIVE | Noted: 2020-10-06

## 2020-10-06 PROBLEM — E16.2 HYPOGLYCEMIA: Status: ACTIVE | Noted: 2020-10-06

## 2020-10-06 LAB
ALBUMIN SERPL-MCNC: 3.7 GM/DL (ref 3.4–5)
ALP BLD-CCNC: 106 IU/L (ref 40–129)
ALT SERPL-CCNC: 26 U/L (ref 10–40)
ANION GAP SERPL CALCULATED.3IONS-SCNC: 15 MMOL/L (ref 4–16)
AST SERPL-CCNC: 25 IU/L (ref 15–37)
BACTERIA: ABNORMAL /HPF
BASOPHILS ABSOLUTE: 0 K/CU MM
BASOPHILS RELATIVE PERCENT: 0.3 % (ref 0–1)
BILIRUB SERPL-MCNC: 0.5 MG/DL (ref 0–1)
BILIRUBIN URINE: NEGATIVE MG/DL
BLOOD, URINE: ABNORMAL
BUN BLDV-MCNC: 82 MG/DL (ref 6–23)
CALCIUM SERPL-MCNC: 8.9 MG/DL (ref 8.3–10.6)
CHLORIDE BLD-SCNC: 93 MMOL/L (ref 99–110)
CLARITY: CLEAR
CO2: 24 MMOL/L (ref 21–32)
COLOR: YELLOW
CREAT SERPL-MCNC: 3 MG/DL (ref 0.6–1.1)
DIFFERENTIAL TYPE: ABNORMAL
EOSINOPHILS ABSOLUTE: 0.2 K/CU MM
EOSINOPHILS RELATIVE PERCENT: 1.3 % (ref 0–3)
ESTIMATED AVERAGE GLUCOSE: 255 MG/DL
GFR AFRICAN AMERICAN: 19 ML/MIN/1.73M2
GFR NON-AFRICAN AMERICAN: 16 ML/MIN/1.73M2
GLUCOSE BLD-MCNC: 153 MG/DL (ref 70–99)
GLUCOSE BLD-MCNC: 165 MG/DL (ref 70–99)
GLUCOSE BLD-MCNC: 175 MG/DL
GLUCOSE BLD-MCNC: 175 MG/DL (ref 70–99)
GLUCOSE BLD-MCNC: 204 MG/DL (ref 70–99)
GLUCOSE BLD-MCNC: 215 MG/DL (ref 70–99)
GLUCOSE BLD-MCNC: 224 MG/DL
GLUCOSE BLD-MCNC: 224 MG/DL (ref 70–99)
GLUCOSE, URINE: NEGATIVE MG/DL
HBA1C MFR BLD: 10.5 % (ref 4.2–6.3)
HCT VFR BLD CALC: 37.4 % (ref 37–47)
HEMOGLOBIN: 11.6 GM/DL (ref 12.5–16)
HYALINE CASTS: 2 /LPF
IMMATURE NEUTROPHIL %: 1.4 % (ref 0–0.43)
KETONES, URINE: NEGATIVE MG/DL
LACTATE: 1.2 MMOL/L (ref 0.4–2)
LEUKOCYTE ESTERASE, URINE: NEGATIVE
LYMPHOCYTES ABSOLUTE: 1.4 K/CU MM
LYMPHOCYTES RELATIVE PERCENT: 10.6 % (ref 24–44)
MCH RBC QN AUTO: 27.5 PG (ref 27–31)
MCHC RBC AUTO-ENTMCNC: 31 % (ref 32–36)
MCV RBC AUTO: 88.6 FL (ref 78–100)
MONOCYTES ABSOLUTE: 0.7 K/CU MM
MONOCYTES RELATIVE PERCENT: 5.5 % (ref 0–4)
MUCUS: ABNORMAL HPF
NITRITE URINE, QUANTITATIVE: NEGATIVE
NUCLEATED RBC %: 0 %
PDW BLD-RTO: 18.6 % (ref 11.7–14.9)
PH, URINE: 5 (ref 5–8)
PLATELET # BLD: 258 K/CU MM (ref 140–440)
PMV BLD AUTO: 10.9 FL (ref 7.5–11.1)
POTASSIUM SERPL-SCNC: 3.6 MMOL/L (ref 3.5–5.1)
PRO-BNP: 2939 PG/ML
PROTEIN UA: NEGATIVE MG/DL
RBC # BLD: 4.22 M/CU MM (ref 4.2–5.4)
RBC URINE: 4 /HPF (ref 0–6)
SEGMENTED NEUTROPHILS ABSOLUTE COUNT: 11 K/CU MM
SEGMENTED NEUTROPHILS RELATIVE PERCENT: 80.9 % (ref 36–66)
SODIUM BLD-SCNC: 132 MMOL/L (ref 135–145)
SPECIFIC GRAVITY UA: 1.01 (ref 1–1.03)
SQUAMOUS EPITHELIAL: 1 /HPF
TOTAL IMMATURE NEUTOROPHIL: 0.19 K/CU MM
TOTAL NUCLEATED RBC: 0 K/CU MM
TOTAL PROTEIN: 6 GM/DL (ref 6.4–8.2)
TRICHOMONAS: ABNORMAL /HPF
TSH HIGH SENSITIVITY: 3.22 UIU/ML (ref 0.27–4.2)
UROBILINOGEN, URINE: NORMAL MG/DL (ref 0.2–1)
WBC # BLD: 13.6 K/CU MM (ref 4–10.5)
WBC UA: <1 /HPF (ref 0–5)

## 2020-10-06 PROCEDURE — 71045 X-RAY EXAM CHEST 1 VIEW: CPT

## 2020-10-06 PROCEDURE — 6370000000 HC RX 637 (ALT 250 FOR IP): Performed by: INTERNAL MEDICINE

## 2020-10-06 PROCEDURE — G0378 HOSPITAL OBSERVATION PER HR: HCPCS

## 2020-10-06 PROCEDURE — 83880 ASSAY OF NATRIURETIC PEPTIDE: CPT

## 2020-10-06 PROCEDURE — 96372 THER/PROPH/DIAG INJ SC/IM: CPT

## 2020-10-06 PROCEDURE — 87077 CULTURE AEROBIC IDENTIFY: CPT

## 2020-10-06 PROCEDURE — 94761 N-INVAS EAR/PLS OXIMETRY MLT: CPT

## 2020-10-06 PROCEDURE — 84443 ASSAY THYROID STIM HORMONE: CPT

## 2020-10-06 PROCEDURE — 2700000000 HC OXYGEN THERAPY PER DAY

## 2020-10-06 PROCEDURE — 87086 URINE CULTURE/COLONY COUNT: CPT

## 2020-10-06 PROCEDURE — 74176 CT ABD & PELVIS W/O CONTRAST: CPT

## 2020-10-06 PROCEDURE — 70450 CT HEAD/BRAIN W/O DYE: CPT

## 2020-10-06 PROCEDURE — 6360000002 HC RX W HCPCS: Performed by: INTERNAL MEDICINE

## 2020-10-06 PROCEDURE — 85025 COMPLETE CBC W/AUTO DIFF WBC: CPT

## 2020-10-06 PROCEDURE — 93010 ELECTROCARDIOGRAM REPORT: CPT | Performed by: INTERNAL MEDICINE

## 2020-10-06 PROCEDURE — 83605 ASSAY OF LACTIC ACID: CPT

## 2020-10-06 PROCEDURE — 99285 EMERGENCY DEPT VISIT HI MDM: CPT

## 2020-10-06 PROCEDURE — 93005 ELECTROCARDIOGRAM TRACING: CPT | Performed by: PHYSICIAN ASSISTANT

## 2020-10-06 PROCEDURE — 81001 URINALYSIS AUTO W/SCOPE: CPT

## 2020-10-06 PROCEDURE — 87186 SC STD MICRODIL/AGAR DIL: CPT

## 2020-10-06 PROCEDURE — 83036 HEMOGLOBIN GLYCOSYLATED A1C: CPT

## 2020-10-06 PROCEDURE — 82962 GLUCOSE BLOOD TEST: CPT

## 2020-10-06 PROCEDURE — 71250 CT THORAX DX C-: CPT

## 2020-10-06 PROCEDURE — 51798 US URINE CAPACITY MEASURE: CPT

## 2020-10-06 PROCEDURE — 80053 COMPREHEN METABOLIC PANEL: CPT

## 2020-10-06 PROCEDURE — 2580000003 HC RX 258: Performed by: INTERNAL MEDICINE

## 2020-10-06 PROCEDURE — 94640 AIRWAY INHALATION TREATMENT: CPT

## 2020-10-06 PROCEDURE — 87088 URINE BACTERIA CULTURE: CPT

## 2020-10-06 RX ORDER — SODIUM CHLORIDE 0.9 % (FLUSH) 0.9 %
10 SYRINGE (ML) INJECTION PRN
Status: DISCONTINUED | OUTPATIENT
Start: 2020-10-06 | End: 2020-10-09 | Stop reason: HOSPADM

## 2020-10-06 RX ORDER — DEXTROSE MONOHYDRATE 25 G/50ML
12.5 INJECTION, SOLUTION INTRAVENOUS PRN
Status: DISCONTINUED | OUTPATIENT
Start: 2020-10-06 | End: 2020-10-09 | Stop reason: HOSPADM

## 2020-10-06 RX ORDER — DOCUSATE SODIUM 100 MG/1
100 CAPSULE, LIQUID FILLED ORAL 2 TIMES DAILY
Status: DISCONTINUED | OUTPATIENT
Start: 2020-10-06 | End: 2020-10-09 | Stop reason: HOSPADM

## 2020-10-06 RX ORDER — DULOXETIN HYDROCHLORIDE 30 MG/1
60 CAPSULE, DELAYED RELEASE ORAL DAILY
Status: DISCONTINUED | OUTPATIENT
Start: 2020-10-06 | End: 2020-10-09 | Stop reason: HOSPADM

## 2020-10-06 RX ORDER — PROMETHAZINE HYDROCHLORIDE 25 MG/1
12.5 TABLET ORAL EVERY 6 HOURS PRN
Status: DISCONTINUED | OUTPATIENT
Start: 2020-10-06 | End: 2020-10-09 | Stop reason: HOSPADM

## 2020-10-06 RX ORDER — POTASSIUM CHLORIDE 20 MEQ/1
40 TABLET, EXTENDED RELEASE ORAL PRN
Status: DISCONTINUED | OUTPATIENT
Start: 2020-10-06 | End: 2020-10-09 | Stop reason: HOSPADM

## 2020-10-06 RX ORDER — SODIUM CHLORIDE 0.9 % (FLUSH) 0.9 %
10 SYRINGE (ML) INJECTION EVERY 12 HOURS SCHEDULED
Status: DISCONTINUED | OUTPATIENT
Start: 2020-10-06 | End: 2020-10-09 | Stop reason: HOSPADM

## 2020-10-06 RX ORDER — HYDROCODONE BITARTRATE AND ACETAMINOPHEN 5; 325 MG/1; MG/1
1 TABLET ORAL EVERY 4 HOURS PRN
Status: DISCONTINUED | OUTPATIENT
Start: 2020-10-06 | End: 2020-10-09 | Stop reason: HOSPADM

## 2020-10-06 RX ORDER — LEVOTHYROXINE SODIUM 0.05 MG/1
50 TABLET ORAL DAILY
Status: DISCONTINUED | OUTPATIENT
Start: 2020-10-06 | End: 2020-10-09 | Stop reason: HOSPADM

## 2020-10-06 RX ORDER — ACETAMINOPHEN 650 MG/1
650 SUPPOSITORY RECTAL EVERY 6 HOURS PRN
Status: DISCONTINUED | OUTPATIENT
Start: 2020-10-06 | End: 2020-10-09 | Stop reason: HOSPADM

## 2020-10-06 RX ORDER — PANTOPRAZOLE SODIUM 40 MG/1
40 TABLET, DELAYED RELEASE ORAL
Status: DISCONTINUED | OUTPATIENT
Start: 2020-10-07 | End: 2020-10-09 | Stop reason: HOSPADM

## 2020-10-06 RX ORDER — NICOTINE POLACRILEX 4 MG
15 LOZENGE BUCCAL PRN
Status: DISCONTINUED | OUTPATIENT
Start: 2020-10-06 | End: 2020-10-09 | Stop reason: HOSPADM

## 2020-10-06 RX ORDER — LORAZEPAM 1 MG/1
1 TABLET ORAL 2 TIMES DAILY
Status: DISCONTINUED | OUTPATIENT
Start: 2020-10-06 | End: 2020-10-09 | Stop reason: HOSPADM

## 2020-10-06 RX ORDER — ACETAMINOPHEN 500 MG
500 TABLET ORAL ONCE
Status: DISCONTINUED | OUTPATIENT
Start: 2020-10-06 | End: 2020-10-09 | Stop reason: HOSPADM

## 2020-10-06 RX ORDER — SODIUM CHLORIDE 9 MG/ML
INJECTION, SOLUTION INTRAVENOUS CONTINUOUS
Status: DISCONTINUED | OUTPATIENT
Start: 2020-10-06 | End: 2020-10-06

## 2020-10-06 RX ORDER — ACETAMINOPHEN 325 MG/1
650 TABLET ORAL EVERY 6 HOURS PRN
Status: DISCONTINUED | OUTPATIENT
Start: 2020-10-06 | End: 2020-10-09 | Stop reason: HOSPADM

## 2020-10-06 RX ORDER — POTASSIUM CHLORIDE 7.45 MG/ML
10 INJECTION INTRAVENOUS PRN
Status: DISCONTINUED | OUTPATIENT
Start: 2020-10-06 | End: 2020-10-09 | Stop reason: HOSPADM

## 2020-10-06 RX ORDER — POLYETHYLENE GLYCOL 3350 17 G/17G
17 POWDER, FOR SOLUTION ORAL DAILY PRN
Status: DISCONTINUED | OUTPATIENT
Start: 2020-10-06 | End: 2020-10-09 | Stop reason: HOSPADM

## 2020-10-06 RX ORDER — IPRATROPIUM BROMIDE AND ALBUTEROL SULFATE 2.5; .5 MG/3ML; MG/3ML
1 SOLUTION RESPIRATORY (INHALATION) EVERY 4 HOURS
Status: DISCONTINUED | OUTPATIENT
Start: 2020-10-06 | End: 2020-10-09 | Stop reason: HOSPADM

## 2020-10-06 RX ORDER — DEXTROSE MONOHYDRATE 50 MG/ML
100 INJECTION, SOLUTION INTRAVENOUS PRN
Status: DISCONTINUED | OUTPATIENT
Start: 2020-10-06 | End: 2020-10-09 | Stop reason: HOSPADM

## 2020-10-06 RX ORDER — METOPROLOL SUCCINATE 25 MG/1
25 TABLET, EXTENDED RELEASE ORAL DAILY
Status: DISCONTINUED | OUTPATIENT
Start: 2020-10-06 | End: 2020-10-09 | Stop reason: HOSPADM

## 2020-10-06 RX ORDER — CLOPIDOGREL BISULFATE 75 MG/1
75 TABLET ORAL DAILY
Status: DISCONTINUED | OUTPATIENT
Start: 2020-10-06 | End: 2020-10-09 | Stop reason: HOSPADM

## 2020-10-06 RX ORDER — LACTULOSE 10 G/15ML
10 SOLUTION ORAL 2 TIMES DAILY
Status: DISCONTINUED | OUTPATIENT
Start: 2020-10-06 | End: 2020-10-09 | Stop reason: HOSPADM

## 2020-10-06 RX ORDER — ONDANSETRON 2 MG/ML
4 INJECTION INTRAMUSCULAR; INTRAVENOUS EVERY 6 HOURS PRN
Status: DISCONTINUED | OUTPATIENT
Start: 2020-10-06 | End: 2020-10-09 | Stop reason: HOSPADM

## 2020-10-06 RX ORDER — BUPROPION HYDROCHLORIDE 100 MG/1
100 TABLET, EXTENDED RELEASE ORAL 2 TIMES DAILY
Status: DISCONTINUED | OUTPATIENT
Start: 2020-10-06 | End: 2020-10-09 | Stop reason: HOSPADM

## 2020-10-06 RX ORDER — BUDESONIDE AND FORMOTEROL FUMARATE DIHYDRATE 80; 4.5 UG/1; UG/1
2 AEROSOL RESPIRATORY (INHALATION) 2 TIMES DAILY
Status: DISCONTINUED | OUTPATIENT
Start: 2020-10-06 | End: 2020-10-09 | Stop reason: HOSPADM

## 2020-10-06 RX ORDER — ATORVASTATIN CALCIUM 80 MG/1
80 TABLET, FILM COATED ORAL NIGHTLY
Status: DISCONTINUED | OUTPATIENT
Start: 2020-10-06 | End: 2020-10-09 | Stop reason: HOSPADM

## 2020-10-06 RX ORDER — INSULIN GLARGINE 100 [IU]/ML
20 INJECTION, SOLUTION SUBCUTANEOUS NIGHTLY
Status: DISCONTINUED | OUTPATIENT
Start: 2020-10-06 | End: 2020-10-09 | Stop reason: HOSPADM

## 2020-10-06 RX ORDER — ASPIRIN 81 MG/1
81 TABLET, CHEWABLE ORAL DAILY
Status: DISCONTINUED | OUTPATIENT
Start: 2020-10-06 | End: 2020-10-09 | Stop reason: HOSPADM

## 2020-10-06 RX ORDER — ISOSORBIDE MONONITRATE 60 MG/1
60 TABLET, EXTENDED RELEASE ORAL DAILY
Status: DISCONTINUED | OUTPATIENT
Start: 2020-10-06 | End: 2020-10-07 | Stop reason: ALTCHOICE

## 2020-10-06 RX ORDER — ALBUTEROL SULFATE 90 UG/1
2 AEROSOL, METERED RESPIRATORY (INHALATION) EVERY 4 HOURS PRN
Status: DISCONTINUED | OUTPATIENT
Start: 2020-10-06 | End: 2020-10-09 | Stop reason: HOSPADM

## 2020-10-06 RX ORDER — RANOLAZINE 500 MG/1
500 TABLET, EXTENDED RELEASE ORAL 2 TIMES DAILY
Status: DISCONTINUED | OUTPATIENT
Start: 2020-10-06 | End: 2020-10-09 | Stop reason: HOSPADM

## 2020-10-06 RX ADMIN — ISOSORBIDE MONONITRATE 60 MG: 60 TABLET, EXTENDED RELEASE ORAL at 16:17

## 2020-10-06 RX ADMIN — BUPROPION HYDROCHLORIDE 100 MG: 100 TABLET, FILM COATED, EXTENDED RELEASE ORAL at 16:18

## 2020-10-06 RX ADMIN — INSULIN GLARGINE 20 UNITS: 100 INJECTION, SOLUTION SUBCUTANEOUS at 23:43

## 2020-10-06 RX ADMIN — RANOLAZINE 500 MG: 500 TABLET, EXTENDED RELEASE ORAL at 16:17

## 2020-10-06 RX ADMIN — IPRATROPIUM BROMIDE AND ALBUTEROL SULFATE 3 ML: .5; 3 SOLUTION RESPIRATORY (INHALATION) at 15:12

## 2020-10-06 RX ADMIN — BUDESONIDE AND FORMOTEROL FUMARATE DIHYDRATE 2 PUFF: 80; 4.5 AEROSOL RESPIRATORY (INHALATION) at 22:00

## 2020-10-06 RX ADMIN — CLOPIDOGREL BISULFATE 75 MG: 75 TABLET ORAL at 16:18

## 2020-10-06 RX ADMIN — LORAZEPAM 1 MG: 1 TABLET ORAL at 20:49

## 2020-10-06 RX ADMIN — LEVOTHYROXINE SODIUM 50 MCG: 50 TABLET ORAL at 16:18

## 2020-10-06 RX ADMIN — LORAZEPAM 1 MG: 1 TABLET ORAL at 16:17

## 2020-10-06 RX ADMIN — ASPIRIN 81 MG CHEWABLE TABLET 81 MG: 81 TABLET CHEWABLE at 16:18

## 2020-10-06 RX ADMIN — RANOLAZINE 500 MG: 500 TABLET, EXTENDED RELEASE ORAL at 20:50

## 2020-10-06 RX ADMIN — Medication 3000 UNITS: at 16:18

## 2020-10-06 RX ADMIN — BUPROPION HYDROCHLORIDE 100 MG: 100 TABLET, FILM COATED, EXTENDED RELEASE ORAL at 20:50

## 2020-10-06 RX ADMIN — BUDESONIDE AND FORMOTEROL FUMARATE DIHYDRATE 2 PUFF: 80; 4.5 AEROSOL RESPIRATORY (INHALATION) at 11:55

## 2020-10-06 RX ADMIN — DOCUSATE SODIUM 100 MG: 100 CAPSULE, LIQUID FILLED ORAL at 16:18

## 2020-10-06 RX ADMIN — IPRATROPIUM BROMIDE AND ALBUTEROL SULFATE 3 ML: .5; 3 SOLUTION RESPIRATORY (INHALATION) at 11:43

## 2020-10-06 RX ADMIN — IPRATROPIUM BROMIDE AND ALBUTEROL SULFATE 3 ML: .5; 3 SOLUTION RESPIRATORY (INHALATION) at 21:59

## 2020-10-06 RX ADMIN — SODIUM CHLORIDE, PRESERVATIVE FREE 10 ML: 5 INJECTION INTRAVENOUS at 16:21

## 2020-10-06 RX ADMIN — DULOXETINE HYDROCHLORIDE 60 MG: 30 CAPSULE, DELAYED RELEASE ORAL at 16:18

## 2020-10-06 RX ADMIN — DOCUSATE SODIUM 100 MG: 100 CAPSULE, LIQUID FILLED ORAL at 20:50

## 2020-10-06 RX ADMIN — ENOXAPARIN SODIUM 40 MG: 40 INJECTION SUBCUTANEOUS at 16:20

## 2020-10-06 RX ADMIN — INSULIN LISPRO 6 UNITS: 100 INJECTION, SOLUTION INTRAVENOUS; SUBCUTANEOUS at 14:16

## 2020-10-06 RX ADMIN — HYDROCODONE BITARTRATE AND ACETAMINOPHEN 1 TABLET: 5; 325 TABLET ORAL at 20:50

## 2020-10-06 RX ADMIN — ATORVASTATIN CALCIUM 80 MG: 80 TABLET, FILM COATED ORAL at 20:50

## 2020-10-06 RX ADMIN — INSULIN LISPRO 3 UNITS: 100 INJECTION, SOLUTION INTRAVENOUS; SUBCUTANEOUS at 18:44

## 2020-10-06 RX ADMIN — TIOTROPIUM BROMIDE INHALATION SPRAY 2 PUFF: 3.12 SPRAY, METERED RESPIRATORY (INHALATION) at 11:55

## 2020-10-06 ASSESSMENT — PAIN DESCRIPTION - ONSET: ONSET: ON-GOING

## 2020-10-06 ASSESSMENT — PAIN SCALES - GENERAL
PAINLEVEL_OUTOF10: 0
PAINLEVEL_OUTOF10: 7
PAINLEVEL_OUTOF10: 6
PAINLEVEL_OUTOF10: 0

## 2020-10-06 ASSESSMENT — PAIN DESCRIPTION - PAIN TYPE
TYPE: ACUTE PAIN
TYPE: ACUTE PAIN

## 2020-10-06 ASSESSMENT — PAIN DESCRIPTION - FREQUENCY: FREQUENCY: CONTINUOUS

## 2020-10-06 ASSESSMENT — PAIN DESCRIPTION - LOCATION
LOCATION: BACK
LOCATION: BACK

## 2020-10-06 ASSESSMENT — PAIN DESCRIPTION - ORIENTATION
ORIENTATION: LEFT;RIGHT;LOWER
ORIENTATION: RIGHT;LEFT;LOWER

## 2020-10-06 ASSESSMENT — PAIN DESCRIPTION - PROGRESSION: CLINICAL_PROGRESSION: GRADUALLY WORSENING

## 2020-10-06 ASSESSMENT — PAIN DESCRIPTION - DESCRIPTORS: DESCRIPTORS: ACHING;SHOOTING

## 2020-10-06 NOTE — CONSULTS
Raghu Sanchez [address], [address]   Consult Note  [facility] 1 2 3 4 5    Date: 10/6/2020   Patient: Dejuan Benites   : 1960   DOA: 10/6/2020   MRN: [id number]   ROOM#: [id number]     Reason for Consult: Catheter placement  Requesting Physician:  Dr. Ubaldo Britton  Collaborating Urologist on Call at time of admission: Dr. Polina Horne:  hypoglycemia    History Obtained From:  patient, electronic medical record    HISTORY OF PRESENT ILLNESS:                The patient is a 61 y.o. female with significant past medical history of CAD, CHF, CKD, DM, COPD, and HTN who presented with hypoglycemia and hypothermia. We were consulted because pt developed urinary retention and PVR revealed 921cc. Procedure: Pt dressed and draped in sterile fashion. Once urethra was visualized, I placed 12fr mixon catheter with no resistance into pt's bladder with immediate return of clear yellow urine. I then inflated mixon balloon with 10cc sterile saline. 1200cc clear yellow urine drained from bladder prior to my leaving the room. Pt tolerated well and felt much relief after mixon placement. ED Provider's HPI: Dejuan Benites is a 61 y.o. female who presents via EMS for hypoglycemia. They reports that patient's  called 911 because patient began acting progressively more sluggish and he was having difficulty waking patient up. When EMS got there her blood glucose was in the 50s. They gave her 400 mL of D10 and patient became alert and oriented and then began to complain of a headache. Patient reports to me that she has had a headache for approximately the last 24 hours. Headache was gradual in onset. Headache is not worst of life. Headache similar to prior headaches change in nature or severity. Headache pain described as sharp. Pain does not radiate. Pain is generalized throughout all of head. Patient reports associated nausea with her headache and one episode of loose stool en route to the ED. Patient reports that she did take Norco for her headache early in the course of it with some improvement. Patient is not sure she checked her blood sugar yesterday. She reports that she did give herself her long-acting insulin before going to bed last night. Patient denies chest pain, abdominal pain, recent fall or injury, fever, chills, diarrhea, melena, hematochezia, hematemesis, emesis, constipation, urinary symptoms.     Past Medical History:        Diagnosis Date    Acute on chronic systolic CHF (congestive heart failure) ([facility] 2020    CAD (coronary artery disease)     CKD (chronic kidney disease) stage 3, GFR 30-59 ml/min     [facility]    Diabetes type 2, uncontrolled ([facility]     Diastolic heart failure ([facility]     Essential hypertension     Financial problems 3/22/2013    Generalized anxiety disorder 2018    Herpes genitalia     Obesity, morbid (more than 100 lbs over ideal weight or BMI > 40) ([facility] 2013    STEMI (ST elevation myocardial infarction) ([facility]      Past Surgical History:        Procedure Laterality Date    CARDIAC SURGERY       SECTION      CHOLECYSTECTOMY      CORONARY ANGIOPLASTY WITH STENT PLACEMENT      OTHER SURGICAL HISTORY Right [id number]    I and D rt big toe    TONSILLECTOMY       Current Medications:   Current Facility-Administered Medications: acetaminophen (TYLENOL) tablet 500 mg, 500 mg, Oral, Once  albuterol sulfate  (90 Base) MCG/ACT inhaler 2 puff, 2 puff, Inhalation, Q4H PRN  aspirin chewable tablet 81 mg, 81 mg, Oral, Daily  atorvastatin (LIPITOR) tablet 80 mg, 80 mg, Oral, Nightly  budesonide-formoterol (SYMBICORT) 80-4.5 MCG/ACT inhaler 2 puff, 2 puff, Inhalation, BID  buPROPion (WELLBUTRIN SR) extended release tablet 100 mg, 100 mg, Oral, BID  clopidogrel (PLAVIX) tablet 75 mg, 75 mg, Oral, Daily  docusate sodium (COLACE) capsule 100 mg, 100 mg, Oral, BID  DULoxetine (CYMBALTA) extended release capsule 60 mg, 60 mg, Oral, Daily  HYDROcodone-acetaminophen (NORCO) 5-325 MG per tablet 1 tablet, 1 tablet, Oral, Q4H PRN  ipratropium-albuterol (DUONEB) nebulizer solution 3 mL, 1 vial, Inhalation, Q4H  isosorbide mononitrate (IMDUR) extended release tablet 60 mg, 60 mg, Oral, Daily  lactulose (CHRONULAC) 10 GM/15ML solution 10 g, 10 g, Oral, BID  levothyroxine (SYNTHROID) tablet 50 mcg, 50 mcg, Oral, Daily  LORazepam (ATIVAN) tablet 1 mg, 1 mg, Oral, BID  metoprolol succinate (TOPROL XL) extended release tablet 25 mg, 25 mg, Oral, Daily  [START ON 10/7/2020] pantoprazole (PROTONIX) tablet 40 mg, 40 mg, Oral, QAM AC  ranolazine (RANEXA) extended release tablet 500 mg, 500 mg, Oral, BID  tiotropium (SPIRIVA RESPIMAT) 2.5 MCG/ACT inhaler 2 puff, 2 puff, Inhalation, Daily  vitamin D CAPS 3,000 Units, 3,000 Units, Oral, Daily  0.9 % sodium chloride infusion, , Intravenous, Continuous  sodium chloride flush 0.9 % injection 10 mL, 10 mL, Intravenous, 2 times per day  sodium chloride flush 0.9 % injection 10 mL, 10 mL, Intravenous, PRN  acetaminophen (TYLENOL) tablet 650 mg, 650 mg, Oral, Q6H PRN **OR** acetaminophen (TYLENOL) suppository 650 mg, 650 mg, Rectal, Q6H PRN  polyethylene glycol (GLYCOLAX) packet 17 g, 17 g, Oral, Daily PRN  promethazine (PHENERGAN) tablet 12.5 mg, 12.5 mg, Oral, Q6H PRN **OR** ondansetron (ZOFRAN) injection 4 mg, 4 mg, Intravenous, Q6H PRN  enoxaparin (LOVENOX) injection 40 mg, 40 mg, Subcutaneous, Daily  potassium chloride (KLOR-CON M) extended release tablet 40 mEq, 40 mEq, Oral, PRN **OR** potassium bicarb-citric acid (EFFER-K) effervescent tablet 40 mEq, 40 mEq, Oral, PRN **OR** potassium chloride 10 mEq/100 mL IVPB (Peripheral Line), 10 mEq, Intravenous, PRN  glucose (GLUTOSE) 40 % oral gel 15 g, 15 g, Oral, PRN  dextrose 50 % IV solution, 12.5 g, Intravenous, PRN  glucagon (rDNA) injection 1 mg, 1 mg, Intramuscular, PRN  dextrose 5 % solution, 100 mL/hr, Intravenous, PRN  insulin lispro (HUMALOG) injection vial 0-18 Units, 0-18 Units, Subcutaneous, TID WC  insulin lispro (HUMALOG) injection vial 0-9 Units, 0-9 Units, Subcutaneous, Nightly    Allergies:  Augmentin [amoxicillin-pot clavulanate]    Social History:   TOBACCO:   reports that she has been smoking cigarettes. She has a 34.00 pack-year smoking history. She has never used smokeless tobacco.  ETOH:   reports no history of alcohol use. DRUGS:   reports no history of drug use. Family History:       Problem Relation Age of Onset    Arthritis Mother     Diabetes Mother     Heart Disease Mother     High Blood Pressure Mother     Arthritis Father     Heart Disease Father     High Blood Pressure Father     Stroke Father     Substance Abuse Father     Substance Abuse Brother     Diabetes Maternal Aunt     Diabetes Maternal Uncle     Cancer Maternal Grandmother     Diabetes Maternal Grandmother     Diabetes Maternal Grandfather        REVIEW OF SYSTEMS:     CONSTITUTIONAL:  positive for fatigue  RESPIRATORY:  negative  CARDIOVASCULAR:  negative  GASTROINTESTINAL:  positive for abdominal pain  GENITOURINARY:  positive for hesitancy and decreased stream    PHYSICAL EXAM:      VITALS:  BP (!) 136/51   Pulse 74   Temp 98.3 °F (36.8 °C) (Oral)   Resp 18   Ht 5' 4\" (1.626 m)   SpO2 97%   BMI 47.12 kg/m²      TEMPERATURE:  Current - Temp: 98.3 °F (36.8 °C);  Max - Temp  Av.8 °F (35.4 °C)  Min: 94.2 °F (34.6 °C)  Max: 98.3 °F (36.8 °C)  24HR BLOOD PRESSURE RANGE:  Systolic (13EOQ), HFP:798 , Min:95 , OQ   ; Diastolic (10IUN), HHM:99, Min:51, Max:69      General appearance: alert, appears stated age, cooperative, mild distress and morbidly obese  Head: Normocephalic, without obvious abnormality, atraumatic  Back: No CVA tenderness  Abdomen: Soft, non-distended, TTP in suprapubic region    DATA:    WBC:    Lab Results   Component Value Date    WBC 13.6 10/06/2020     Hemoglobin/Hematocrit:    Lab Results   Component as low as reasonably achievable. COMPARISON: Prior studies most recent 6/22/2020 HISTORY: ORDERING SYSTEM PROVIDED HISTORY: headache TECHNOLOGIST PROVIDED HISTORY: Has a \"code stroke\" or \"stroke alert\" been called? ->No Reason for exam:->headache Reason for Exam: ams/low blood sugar. limited exam pt would not hold her head straight FINDINGS: BRAIN/VENTRICLES: Patient's head is tilted toward the right in the CT gantry. Mild motion/streak artifact is present on the examination. The ventricular system is unchanged in appearance. No evidence of mass effect or midline shift. Prominence of sulci overlying convexities of cerebral hemispheres and cerebellum again identified consistent with atrophy. Foci of abnormal low-attenuation within periventricular/subcortical white matter again identified which are likely on the basis of changes of mild ischemic leukoencephalopathy. Focal areas of remote infarction again identified in the left central sylvian/basal ganglia region and inferior left cerebellar hemisphere. Small focal calcification again identified within the rightward aspect of the brainstem/corey (image 18), unchanged. No definite new area of abnormal attenuation of brain parenchyma is identified. No abnormal extra-axial fluid collections are identified. There is atherosclerotic calcification of distal internal carotid and vertebral arteries. ORBITS: The visualized portion of the orbits demonstrate no acute abnormality. SINUSES: The visualized paranasal sinuses and mastoid air cells demonstrate no acute abnormality. SOFT TISSUES/SKULL:  No acute abnormality of the visualized skull or soft tissues. No evidence of acute intracranial abnormality.      Ct Chest Wo Contrast    Result Date: 10/6/2020  EXAMINATION: CT OF THE ABDOMEN AND PELVIS WITHOUT CONTRAST; CT OF THE CHEST WITHOUT CONTRAST 10/6/2020 10:32 am TECHNIQUE: CT of the abdomen and pelvis was performed without the administration of intravenous contrast. Multiplanar reformatted images are provided for review. Dose modulation, iterative reconstruction, and/or weight based adjustment of the mA/kV was utilized to reduce the radiation dose to as low as reasonably achievable.; CT of the chest was performed without the administration of intravenous contrast. Multiplanar reformatted images are provided for review. Dose modulation, iterative reconstruction, and/or weight based adjustment of the mA/kV was utilized to reduce the radiation dose to as low as reasonably achievable. COMPARISON: 06/20/2020, 11/24/2019 HISTORY: ORDERING SYSTEM PROVIDED HISTORY: hypothermia ? unclear source of infection, AMS TECHNOLOGIST PROVIDED HISTORY: Reason for exam:->hypothermia ? unclear source of infection, AMS Additional Contrast?->None Reason for Exam: hypothermia ? unclear source of infection, AMS Acuity: Acute Type of Exam: Initial Additional signs and symptoms: none Relevant Medical/Surgical History: poor historian; ORDERING SYSTEM PROVIDED HISTORY: hypothermia ? unclear source of infection, AMS TECHNOLOGIST PROVIDED HISTORY: Reason for exam:->hypothermia ? unclear source of infection, AMS Reason for Exam: hypothermia ? unclear source of infection, AMS Acuity: Acute Type of Exam: Initial Additional signs and symptoms: none Relevant Medical/Surgical History: poor historian FINDINGS: Chest: Mediastinum: Heart is enlarged. There is no pericardial effusion. Severe coronary artery calcifications are present. The thoracic aorta is atherosclerotic but nonaneurysmal.  No mediastinal or hilar lymphadenopathy is demonstrated. Lungs/pleura: No focal airspace consolidation, pleural effusion or pneumothorax is demonstrated. There are a few punctate bilateral pulmonary nodules which are most likely benign and require no further routine imaging follow-up given the small size. No suspicious or concerning pulmonary nodules are seen.  Soft Tissues/Bones: No suspicious osteolytic or osteoblastic lesions are demonstrated. There are old fractures of the posterior left 10th and 11th ribs. Abdomen/Pelvis: Organs: Status post cholecystectomy. The unenhanced liver, spleen, pancreas and adrenal glands are unremarkable. There are tiny bilateral renal calculi, favored to be vascular. A 5 mm cortical calcification in the lower pole of the right kidney is stable. No hydronephrosis. GI/Bowel: No bowel obstruction. No evidence of appendicitis. Pelvis: The urinary bladder is distended. No pelvic mass or fluid collection. Peritoneum/Retroperitoneum: The abdominal aorta is atherosclerotic but nonaneurysmal.  No retroperitoneal lymphadenopathy. Bones/Soft Tissues: No suspicious osteolytic or osteoblastic lesions are demonstrated. 1. No acute abnormality in the chest, abdomen or pelvis. 2. Severe coronary artery disease. Xr Chest Portable    Result Date: 10/6/2020  EXAMINATION: ONE XRAY VIEW OF THE CHEST 10/6/2020 7:51 am COMPARISON: 09/30/2020 HISTORY: ORDERING SYSTEM PROVIDED HISTORY: SOB TECHNOLOGIST PROVIDED HISTORY: Reason for exam:->SOB Reason for Exam: SOB Acuity: Acute Type of Exam: Initial Additional signs and symptoms: female who presents via EMS for hypoglycemia. They reports that patient's  called 911 because patient began acting progressively more sluggish and he was having difficulty waking patient up. When EMS got there her blood glucose was in the 50s. They gave her 400 mL of D10 and patient became alert and oriented and then began to complain of a headache. Patient reports to me that she has had a headache for approximately the last 24 hours. Headache was gradual in onset. FINDINGS: No focal airspace consolidation, pleural effusion or pneumothorax is demonstrated. There is stable elevation of the right hemidiaphragm. The heart is enlarged. No acute osseous abnormality is demonstrated. There are advanced degenerative changes of the right glenohumeral joint.      No acute cardiopulmonary disease     Xr Chest Portable    Result Date: 9/30/2020  EXAMINATION: ONE XRAY VIEW OF THE CHEST 9/30/2020 2:05 pm COMPARISON: 06/21/2020 HISTORY: ORDERING SYSTEM PROVIDED HISTORY: SOB, cough TECHNOLOGIST PROVIDED HISTORY: Reason for exam:->SOB, cough Reason for Exam: SOB, cough Acuity: Acute Type of Exam: Initial Additional signs and symptoms: na Relevant Medical/Surgical History: diabetes, copd FINDINGS: No cardiomegaly, pneumonia, interstitial edema or pleural effusions were noted. No hilar mass was identified. Moderate degenerative osteo arthritic changes involve the right glenohumeral joint. No significant change has occurred from 06/21/2020. No cardiomegaly, pneumonia or interstitial edema. No significant change from 06/21/2020. Assessment & Plan:      Diane Blancas is a 61y.o. year old female admitted 10/6/2020 for hypothermia. 1) Acute Urinary Retention: 12fr Mixon catheter placed 10/6/20 with 1200cc clear yellow urine return   CT a/p 10/6/20: Reviewed. The urinary bladder is distended. No pelvic mass or fluid collection. No hydronephrosis. Cr 3.0   Urine culture pending   Pt likely has neurogenic bladder from long-standing poorly controlled DM. Pt had similar episode of AUR 12/2019 requiring mixon placement. She would benefit from Urodynamics testing and cystoscopy in the near future for further evaluation. Recommend mixon removal and voiding trial prior to discharge. Pt stable from a  standpoint. Will sign off, please call with any questions. Pt to follow up in our office in 1 month for discussion of urinary retention. Patient seen and examined, chart reviewed.      Electronically signed by Carlos Balbuena PA-C on 10/6/2020 at 2:39 PM

## 2020-10-06 NOTE — ED NOTES
Pt's rectal temp 94.2, Dr Coty Dudley notified and bear hugger applied to pt.       Bere Guardado, RN  10/06/20 1750

## 2020-10-06 NOTE — CONSULTS
Pt seen ,examined,interviewed and chart reviewed. Please see the dictated consult for details     Imp :   1. JUAN J- CKD stage 4 A3- likely from ac bladder obstruction also with loop her cr always f;lactites- her urine is B;and so do not suspect additional intronic renal dz - also BP acceptable and nl mentation - so unlikely hypovolemia   2. Ac bladder obstruction likely has neurogenic Bladder from DM   3. Hypoglycemia with underlying DM  4. Severe ASCVd not a surgical candidate for CAD  5. CMP with last LVEF 40 %  and high risk for ADHF  6. Plan:  1. She will get mixon by Urologist   2. Stop IVF  3. Daily wt  4. Low salt  5. Watch for edema and fluid retention as she is off loop for now   6. Also keep normoglycemia  7.  Follow clinically       Thanks for the consult    #[id number]

## 2020-10-06 NOTE — ED NOTES
[id number] paged hospitalist     Christina Irby  10/06/20 [phone] hospitalist returned call      Christina Irby  10/06/20 [id number]

## 2020-10-06 NOTE — ED NOTES
Report given to Truman Nguyen RN. Pt. Going to CT and then to [id number].      Nadja Hawthorne RN  10/06/20 [id number]

## 2020-10-06 NOTE — ED TRIAGE NOTES
Pt presents to ED via EMS for hypoglycemia. Pts home meter read that her glucose was low. Medics report BG of 56, they gave three rounds of oral glucose and then gave IV dextrose. Current BG is 180. Pt is complaining of headache. Pt is alert and oriented.

## 2020-10-06 NOTE — ED NOTES
Unable to obtain oral or axillary temp. Unable to roll patient at this time for rectal temp.      Dianne Allen RN  10/06/20 0588

## 2020-10-06 NOTE — H&P
[facility]    History and Physical      Name:  Andrews Winn /Age/Sex: 1960  (61 y.o. female)   MRN & CSN:  [id number] & [id number] Admission Date/Time: 10/6/2020  5:00 AM   Location:  [facility]- PCP: Azul Read MD       Hospital Day: 1    Assessment and Plan:   Andrews Winn is a 61 y.o.  female with past medical history of DM type II, CKD, hypertension, HFrEF, CAD, COPD, chronic respiratory failure who presents with hypoglycemia, mental status change    · Hypoglycemia in DM type II  -unclear etiology, no sign of infection, hold Lantus, maintain insulin sliding scale, consult endocrinology    · Transient Hypothermia -hypothermia has resolved, no source of infection identified, CT chest, abdomen and pelvis unremarkable, white count has decreased from previous admission    · Acute urinary retention -bladder scan with 900 mL of urine, insert Reyes, urology consulted    · Transient acute encephalopathy -likely due to hypoglycemia -resolved -CT brain unremarkable    · Acute on chronic CKD stage III-IV -serum creatinine 2.4 on discharge, increased to 3.0 today, IV fluids for 24 hours, consult nephrology    Other comorbid conditions addressed include:    DM type II  HFrEF -not in exacerbation  Chronic respiratory failure on home oxygen  COPD with no exacerbation  CAD  Hypertension  Morbid obesity -BMI 47    Diet No diet orders on file   DVT Prophylaxis [] Lovenox, []  Heparin, [] SCDs, [] No VTE prophylaxis, patient ambulating   GI Prophylaxis [] PPI, [] H2 Blocker, [] No GI prophylaxis, patient is receiving diet/Tube Feeds   Code Status Prior   Disposition Patient requires continued admission due to hypoglycemia   MDM [] Low, [] Moderate,[x]  High  Patient's risk as above due to      History of Present Illness:     Chief Complaint: Hypoglycemia, mental status change    Andrews Winn is a 61 y.o.  female  with past medical history of DM type II, CKD, hypertension, HFrEF, CAD, COPD, chronic Date    Acute on chronic systolic [facility]congestive [facility] failure) ([facility] 2020    CAD (coronary artery disease)     CKD (chronic kidney disease) stage 3, GFR 30-59 ml/min     COPD with acute exacerbation ([facility] 2020    Diabetes type 2, uncontrolled ([facility]     Diastolic heart failure ([facility]     Essential hypertension     Financial problems 3/22/2013    Generalized anxiety disorder 2018    Herpes genitalia     Obesity, morbid (more than 100 lbs over ideal weight or BMI > 40) ([facility] 2013    STEMI (ST elevation myocardial infarction) ([facility]      PSHX:  has a past surgical history that includes Cholecystectomy;  section; Tonsillectomy; other surgical history (Right, [id number]); Cardiac surgery; and Coronary angioplasty with stent. Allergies: Allergies   Allergen Reactions    Augmentin [Amoxicillin-Pot Clavulanate] Diarrhea       FAM HX: family history includes Arthritis in her father and mother; Cancer in her maternal grandmother; Diabetes in her maternal aunt, maternal grandfather, maternal grandmother, maternal uncle, and mother; Heart Disease in her father and mother; High Blood Pressure in her father and mother; Stroke in her father; Substance Abuse in her brother and father.   Soc HX:   Social History     Socioeconomic History    Marital status: Single     Spouse name: None    Number of children: None    Years of education: None    Highest education level: None   Occupational History    None   Social Needs    Financial resource strain: None    Food insecurity     Worry: None     Inability: None    Transportation needs     Medical: None     Non-medical: None   Tobacco Use    Smoking status: Current Every Day Smoker     Packs/day: 1.00     Years: 34.00     Pack years: 34.00     Types: Cigarettes    Smokeless tobacco: Never Used   Substance and Sexual Activity    Alcohol use: No     Alcohol/week: 0.0 standard drinks    Drug use: No    Sexual activity: None   Lifestyle GM/15ML solution Take 15 mLs by mouth 2 times daily 1/11/20   Silvano Curtis MD   HYDROcodone-acetaminophen (NORCO) 5-325 MG per tablet Take 1 tablet by mouth every 4 hours as needed for Pain.      Historical Provider, MD   atorvastatin (LIPITOR) 80 MG tablet Take 1 tablet by mouth nightly 12/4/19   Laveta Sicard, DO   insulin lispro (HUMALOG) 100 UNIT/ML injection vial Check blood sugar three times daily before meals and at bedtime  For blood sugar less than 150= no insulin, 151-200=2, 201-250=4, 251-300=6, 301-350=6, 351-400=8, >400=10 units and call MD 12/2/19   Rajesh Jones MD   isosorbide mononitrate (IMDUR) 60 MG extended release tablet Take 1 tablet by mouth daily 12/3/19   Rajesh Jones MD   vitamin D 1000 units CAPS Take 3 capsules by mouth daily 7/18/19   Paula Zaman MD   nystatin (MYCOSTATIN) 141263 UNIT/GM powder Apply topically 2 times daily as needed 4/19/19   Historical Provider, MD   ranolazine (RANEXA) 500 MG extended release tablet Take 1 tablet by mouth 2 times daily 3/4/19   Berhane Franklin MD   BREO ELLIPTA 100-25 MCG/INH AEPB inhaler Inhale 1 puff into the lungs daily 2/26/19   Historical Provider, MD   levothyroxine (SYNTHROID) 50 MCG tablet Take 50 mcg by mouth daily 2/26/19   Historical Provider, MD   linagliptin (TRADJENTA) 5 MG tablet Take 1 tablet by mouth daily 11/22/18   Berhane Franklin MD   buPROPion [facility]) 100 MG extended release tablet Take 100 mg by mouth 2 times daily    Historical Provider, MD   aspirin 81 MG chewable tablet Take 1 tablet by mouth daily 1/8/18   Samm Adams MD   DULoxetine (CYMBALTA) 60 MG extended release capsule Take 1 capsule by mouth daily 1/8/18   Samm Adams MD   docusate (COLACE, DULCOLAX) 100 MG CAPS Take 100 mg by mouth 2 times daily 1/8/18   Samm Adams MD   pantoprazole (PROTONIX) 40 MG tablet Take 1 tablet by mouth every morning (before breakfast) 1/8/18   Samm Adams MD   albuterol sulfate HFA (VENTOLIN HFA) 108 (90 Base) MCG/ACT inhaler Inhale 2 puffs into the lungs every 4 hours as needed for Wheezing 1/8/18   Guerita Willett MD       PHYSICIAN CERTIFICATION    I certify that Abran Alexandra is expected to be hospitalized for less than 2 midnights based on the above assessment and plan:     Current diagnosis and plan of management discussed with the patient at the time of admission in lay language     Code status was discussed with patient  - Full code     Pain Assessment -Percocet as needed for pain    Electronically signed by Dolores Cortes MD on 10/6/2020 at 9:32 AM

## 2020-10-06 NOTE — ED PROVIDER NOTES
[place]      PCP: Gennaro Greenwood MD    CHIEF COMPLAINT    Chief Complaint   Patient presents with    Hypoglycemia     BG 50s initially       Of note, this patient was also evaluated by the attending physician, Dr. Karli Keller. HPI    Cecilio Valle is a 61 y.o. female who presents via EMS for hypoglycemia. They reports that patient's  called 911 because patient began acting progressively more sluggish and he was having difficulty waking patient up. When EMS got there her blood glucose was in the 50s. They gave her 400 mL of D10 and patient became alert and oriented and then began to complain of a headache. Patient reports to me that she has had a headache for approximately the last 24 hours. Headache was gradual in onset. Headache is not worst of life. Headache similar to prior headaches change in nature or severity. Headache pain described as sharp. Pain does not radiate. Pain is generalized throughout all of head. Patient reports associated nausea with her headache and one episode of loose stool en route to the ED. Patient reports that she did take Norco for her headache early in the course of it with some improvement. Patient is not sure she checked her blood sugar yesterday. She reports that she did give herself her long-acting insulin before going to bed last night. Patient denies chest pain, abdominal pain, recent fall or injury, fever, chills, diarrhea, melena, hematochezia, hematemesis, emesis, constipation, urinary symptoms. REVIEW OF SYSTEMS    Constitutional:  Denies fever, chills  HENT:  Denies sore throat or ear pain   Cardiovascular:  Denies chest pain, palpitations   Respiratory:  Denies cough or shortness of breath    GI:  See HPI  :  Denies any urinary symptoms or vaginal symptoms.    Musculoskeletal:  Denies back pain  Skin:  Denies rash  Neurologic:  + headache; Denies loss of consciousness, vision change, hearing change, speech changes, gait changes, numbness, weakness, tingling, confusion, memory loss  Endocrine:  Denies polyuria or polydypsia   Lymphatic:  Denies swollen glands     All other review of systems are negative  See HPI and nursing notes for additional information     PAST MEDICAL AND SURGICAL HISTORY    Past Medical History:   Diagnosis Date    Acute on chronic systolic CHF (congestive heart failure) ([facility] 2020    CAD (coronary artery disease)     CKD (chronic kidney disease) stage 3, GFR 30-59 ml/min     [facility]    Diabetes type 2, uncontrolled ([facility]     Diastolic heart failure ([facility]     Essential hypertension     Financial problems 3/22/2013    Generalized anxiety disorder 2018    Herpes genitalia     Obesity, morbid (more than 100 lbs over ideal weight or BMI > 40) ([facility] 2013    STEMI (ST elevation myocardial infarction) ([facility]      Past Surgical History:   Procedure Laterality Date    CARDIAC SURGERY       SECTION      CHOLECYSTECTOMY      CORONARY ANGIOPLASTY WITH STENT PLACEMENT      OTHER SURGICAL HISTORY Right [id number]    I and D rt big toe    TONSILLECTOMY         CURRENT MEDICATIONS    Current Outpatient Rx   Medication Sig Dispense Refill    insulin glargine (LANTUS SOLOSTAR) 100 UNIT/ML injection pen Inject 45 Units into the skin nightly 5 pen 1    metoprolol succinate (TOPROL XL) 25 MG extended release tablet Take 1 tablet by mouth daily 30 tablet 3    torsemide (DEMADEX) 20 MG tablet Take 1 tablet by mouth 2 times daily 60 tablet 0    insulin lispro (HUMALOG) 100 UNIT/ML injection vial Inject 25 Units into the skin 3 times daily (before meals)      LORazepam (ATIVAN) 1 MG tablet Take 1 mg by mouth 2 times daily.        ipratropium-albuterol (DUONEB) 0.5-2.5 (3) MG/3ML SOLN nebulizer solution Inhale 3 mLs into the lungs every 4 hours 360 mL 1    tiotropium (SPIRIVA RESPIMAT) 2.5 MCG/ACT AERS inhaler Inhale 2 puffs into the lungs daily 2 Inhaler 3    ondansetron (ZOFRAN ODT) 4 MG disintegrating tablet Take 1 tablet by mouth every 8 hours as needed for Nausea 15 tablet 0    clopidogrel (PLAVIX) 75 MG tablet Take 1 tablet by mouth daily 30 tablet 3    lactulose (CHRONULAC) 10 GM/15ML solution Take 15 mLs by mouth 2 times daily 2 Bottle 1    HYDROcodone-acetaminophen (NORCO) 5-325 MG per tablet Take 1 tablet by mouth every 4 hours as needed for Pain.        atorvastatin (LIPITOR) 80 MG tablet Take 1 tablet by mouth nightly 30 tablet 0    insulin lispro (HUMALOG) 100 UNIT/ML injection vial Check blood sugar three times daily before meals and at bedtime  For blood sugar less than 150= no insulin, 151-200=2, 201-250=4, 251-300=6, 301-350=6, 351-400=8, >400=10 units and call MD 1 vial 3    isosorbide mononitrate (IMDUR) 60 MG extended release tablet Take 1 tablet by mouth daily 30 tablet 3    vitamin D 1000 units CAPS Take 3 capsules by mouth daily 30 capsule 0    nystatin (MYCOSTATIN) 623559 UNIT/GM powder Apply topically 2 times daily as needed  1    ranolazine (RANEXA) 500 MG extended release tablet Take 1 tablet by mouth 2 times daily 60 tablet 3    BREO ELLIPTA 100-25 MCG/INH AEPB inhaler Inhale 1 puff into the lungs daily  0    levothyroxine (SYNTHROID) 50 MCG tablet Take 50 mcg by mouth daily  3    linagliptin (TRADJENTA) 5 MG tablet Take 1 tablet by mouth daily 30 tablet 3    buPROPion (WELLBUTRIN SR) 100 MG extended release tablet Take 100 mg by mouth 2 times daily      aspirin 81 MG chewable tablet Take 1 tablet by mouth daily 30 tablet 0    DULoxetine (CYMBALTA) 60 MG extended release capsule Take 1 capsule by mouth daily 30 capsule 3    docusate (COLACE, DULCOLAX) 100 MG CAPS Take 100 mg by mouth 2 times daily 60 capsule 1    pantoprazole (PROTONIX) 40 MG tablet Take 1 tablet by mouth every morning (before breakfast) 30 tablet 3    albuterol sulfate HFA (VENTOLIN HFA) 108 (90 Base) MCG/ACT inhaler Inhale 2 puffs into the lungs every 4 hours as needed for Wheezing 1 Inhaler 3       ALLERGIES    Allergies   Allergen Reactions    Augmentin [Amoxicillin-Pot Clavulanate] Diarrhea       SOCIAL AND FAMILY HISTORY    Social History     Socioeconomic History    Marital status: Single     Spouse name: None    Number of children: None    Years of education: None    Highest education level: None   Occupational History    None   Social Needs    Financial resource strain: None    Food insecurity     Worry: None     Inability: None    Transportation needs     Medical: None     Non-medical: None   Tobacco Use    Smoking status: Current Every Day Smoker     Packs/day: 1.00     Years: 34.00     Pack years: 34.00     Types: Cigarettes    Smokeless tobacco: Never Used   Substance and Sexual Activity    Alcohol use: No     Alcohol/week: 0.0 standard drinks    Drug use: No    Sexual activity: None   Lifestyle    Physical activity     Days per week: None     Minutes per session: None    Stress: None   Relationships    Social connections     Talks on phone: None     Gets together: None     Attends Jew service: None     Active member of club or organization: None     Attends meetings of clubs or organizations: None     Relationship status: None    Intimate partner violence     Fear of current or ex partner: None     Emotionally abused: None     Physically abused: None     Forced sexual activity: None   Other Topics Concern    None   Social History Narrative    None     Family History   Problem Relation Age of Onset    Arthritis Mother     Diabetes Mother     Heart Disease Mother     High Blood Pressure Mother     Arthritis Father     Heart Disease Father     High Blood Pressure Father     Stroke Father     Substance Abuse Father     Substance Abuse Brother     Diabetes Maternal Aunt     Diabetes Maternal Uncle     Cancer Maternal Grandmother     Diabetes Maternal Grandmother     Diabetes Maternal Grandfather          PHYSICAL EXAM VITAL SIGNS: /69   Pulse 83   Resp 20   SpO2 97%    Constitutional:  Well developed, Well nourished. No distress. Patient appears older than her stated age and appears drowsy. HENT:  Normocephalic, Atraumatic, PERRL. EOMI. Sclera clear. Conjunctiva normal, No discharge. Neck/Lymphatics: supple, no JVD, no swollen nodes  Cardiovascular: Regular rate and rhythm, no murmurs/rubs/gallops. Respiratory:  Nonlabored breathing. Normal breath sounds, No wheezing  Abdomen: Bowel sounds normal, Soft, No tenderness, no masses. Musculoskeletal:    There is no edema, asymmetry, or calf / thigh tenderness bilaterally. No cyanosis. No cool or pale-appearing limb. Distal cap refill and pulses intact bilateral upper and lower extremities  Bilateral upper and lower extremity ROM intact without pain or obvious deficit  Integument:  Warm, Dry  Neurologic: Alert & oriented to person, place, situation, No focal deficits noted. Cranial nerves II through XII grossly intact. Normal gross motor coordination & motor strength bilateral upper and lower extremities  Sensation intact. DTRs 2+ and equal bilaterally in upper and lower extremities. GCS 15.  Psychiatric:  Affect normal, Mood appears drowsy. Labs:  Results for orders placed or performed during the hospital encounter of 10/06/20   POCT Glucose   Result Value Ref Range    Glucose 175 mg/dL   POCT Glucose   Result Value Ref Range    POC Glucose 175 (H) 70 - 99 MG/DL           EKG      EKG Interpretation  Please see ED physician's note for EKG interpretation - Dr. Haroon Taylor Additional Contrast? None   Final Result   1. No acute abnormality in the chest, abdomen or pelvis. 2. Severe coronary artery disease. CT CHEST WO CONTRAST   Final Result   1. No acute abnormality in the chest, abdomen or pelvis. 2. Severe coronary artery disease.          XR CHEST PORTABLE   Final Result   No acute cardiopulmonary disease         CT HEAD WO CONTRAST   Preliminary Result   No evidence of acute intracranial abnormality. ED COURSE & MEDICAL DECISION MAKING       Vital signs and nursing notes reviewed during ED course. Patient care and presentation staffed and seen in conjunction with supervising physician, Dr. Cassie Bryan. Patient presents as above which prompted work up today. Point-of-care glucose in the ED is 175. While in the ED today- labs, EKG, and CT head were obtained. For EKG interpretation-see ED physician's note. Labs reveal mild leukocytosis of 13.6 with left shift, Cr of 3.0 which is worse compared to Cr of 2.4 on 10/3/2020, elevated BNP of 2,939. Initial vital signs reveal patient to be hypothermic and therefore patient placed on Monique hugger. Chest x-ray as well as CTs of the chest abdomen pelvis obtained without emergent findings. CT head reveals no acute intracranial abnormality. Patient remained hypothermic despite bear hugger and unknown etiology of hypothermia. Supervising physician discussed patient's case with hospitalist and hospitalist agrees to admit the patient at this time for further evaluation and care. All pertinent Lab data and radiographic results reviewed with patient at bedside. The patient and/or the family were informed of the results of any tests/labs/imaging, the treatment plan, and time was allotted to answer questions. Diagnosis, disposition, and treatment plan reviewed in detail with patient. Patient understands and agrees to admission at this time. Clinical  IMPRESSION    1. Hypothermia, initial encounter    2. Hypoglycemia              Comment: Please note this report has been produced using speech recognition software and may contain errors related to that system including errors in grammar, punctuation, and spelling, as well as words and phrases that may be inappropriate.  If there are any questions or concerns please feel free to contact the dictating provider for clarification.           Dony Schuler PA-C  10/06/20 7654

## 2020-10-06 NOTE — ED PROVIDER NOTES
Emergency [address] EMERGENCY DEPARTMENT    Patient: Ivan Diaz  MRN: [id number]  : 1960  Date of Evaluation: 10/6/2020  ED Supervising Physician: Berenice Ray MD    I independently examined and evaluated Zenia Mosley. In brief, Ivan Diaz is a 61 y.o. female that presents to the emergency department with AMS. Pt has been difficult to rouse all night according to  who is not here. EMS found her hypoglycemic. Improved with dextrose. Focused exam: Patient is somnolent, moving all 4 extremities, no acute distress. Answers my questions. Brief ED course/MDM: Patient presents with hypoglycemia, altered mental status. Vital signs were stable at presentation, however rectal temperature shows hypothermia. Patient has had transient hypotension in the past.  She is still somnolent on my reevaluation, we will get a repeat point-of-care blood glucose and continue warming. Clinically her glucose is still stable, patient remains hypothermic despite Monique hugger. Unclear cause for this with normal TSH, labs that are consistent with her prior labs. We will bit the patient to medicine for further evaluation and management. All diagnostic, treatment, and disposition decisions were made by myself in conjunction with the JUICE. For all further details of the patient's emergency department visit, please see their documentation.     (Please note that portions of this note may have been completed with a voice recognition program. Efforts were made to edit the dictations but occasionally words are mis-transcribed.)    Berenice Ray MD  [facility]       Berencie Ray MD  10/06/20 0350

## 2020-10-06 NOTE — ED NOTES
Bed: 02TR-02  Expected date:   Expected time:   Means of arrival:   Comments:  EMS     Wilian Murphy RN  10/06/20 4961

## 2020-10-06 NOTE — PROGRESS NOTES
Pt arrive to room 3027 from ED. Pt alert and oriented x4. Pt in ana bed. Skin assessment completed. Skin is clean, dry, and intact. No open areas. Placed purewick at this time. Pt able to turn self. Oriented to room. Call light in reach. Nothing needed at this time.

## 2020-10-06 NOTE — ED NOTES
Patient incontinent of moderate brown loose stool   Linens changed and david care given. Patient placed on a bariatric bed. Tolerated well.      Claudene Son, RN  10/06/20 4791

## 2020-10-07 LAB
ALBUMIN SERPL-MCNC: 3.3 GM/DL (ref 3.4–5)
ALP BLD-CCNC: 82 IU/L (ref 40–128)
ALT SERPL-CCNC: 19 U/L (ref 10–40)
ANION GAP SERPL CALCULATED.3IONS-SCNC: 11 MMOL/L (ref 4–16)
AST SERPL-CCNC: 13 IU/L (ref 15–37)
BASOPHILS ABSOLUTE: 0 K/CU MM
BASOPHILS RELATIVE PERCENT: 0.2 % (ref 0–1)
BILIRUB SERPL-MCNC: 0.5 MG/DL (ref 0–1)
BUN BLDV-MCNC: 78 MG/DL (ref 6–23)
CALCIUM SERPL-MCNC: 8.2 MG/DL (ref 8.3–10.6)
CHLORIDE BLD-SCNC: 98 MMOL/L (ref 99–110)
CO2: 27 MMOL/L (ref 21–32)
CREAT SERPL-MCNC: 2.9 MG/DL (ref 0.6–1.1)
DIFFERENTIAL TYPE: ABNORMAL
EOSINOPHILS ABSOLUTE: 0.2 K/CU MM
EOSINOPHILS RELATIVE PERCENT: 1.2 % (ref 0–3)
GFR AFRICAN AMERICAN: 20 ML/MIN/1.73M2
GFR NON-AFRICAN AMERICAN: 17 ML/MIN/1.73M2
GLUCOSE BLD-MCNC: 127 MG/DL (ref 70–99)
GLUCOSE BLD-MCNC: 130 MG/DL (ref 70–99)
GLUCOSE BLD-MCNC: 131 MG/DL (ref 70–99)
GLUCOSE BLD-MCNC: 184 MG/DL (ref 70–99)
GLUCOSE BLD-MCNC: 204 MG/DL (ref 70–99)
GLUCOSE BLD-MCNC: 233 MG/DL (ref 70–99)
HCT VFR BLD CALC: 29.8 % (ref 37–47)
HEMOGLOBIN: 9.3 GM/DL (ref 12.5–16)
IMMATURE NEUTROPHIL %: 0.8 % (ref 0–0.43)
LYMPHOCYTES ABSOLUTE: 1.8 K/CU MM
LYMPHOCYTES RELATIVE PERCENT: 13.9 % (ref 24–44)
MCH RBC QN AUTO: 27.5 PG (ref 27–31)
MCHC RBC AUTO-ENTMCNC: 31.2 % (ref 32–36)
MCV RBC AUTO: 88.2 FL (ref 78–100)
MONOCYTES ABSOLUTE: 0.7 K/CU MM
MONOCYTES RELATIVE PERCENT: 5.8 % (ref 0–4)
NUCLEATED RBC %: 0 %
PDW BLD-RTO: 18.1 % (ref 11.7–14.9)
PLATELET # BLD: 211 K/CU MM (ref 140–440)
PMV BLD AUTO: 10.6 FL (ref 7.5–11.1)
POTASSIUM SERPL-SCNC: 3.6 MMOL/L (ref 3.5–5.1)
RBC # BLD: 3.38 M/CU MM (ref 4.2–5.4)
SEGMENTED NEUTROPHILS ABSOLUTE COUNT: 9.9 K/CU MM
SEGMENTED NEUTROPHILS RELATIVE PERCENT: 78.1 % (ref 36–66)
SODIUM BLD-SCNC: 136 MMOL/L (ref 135–145)
TOTAL IMMATURE NEUTOROPHIL: 0.1 K/CU MM
TOTAL NUCLEATED RBC: 0 K/CU MM
TOTAL PROTEIN: 4.8 GM/DL (ref 6.4–8.2)
WBC # BLD: 12.7 K/CU MM (ref 4–10.5)

## 2020-10-07 PROCEDURE — 6360000002 HC RX W HCPCS: Performed by: INTERNAL MEDICINE

## 2020-10-07 PROCEDURE — 2700000000 HC OXYGEN THERAPY PER DAY

## 2020-10-07 PROCEDURE — 96372 THER/PROPH/DIAG INJ SC/IM: CPT

## 2020-10-07 PROCEDURE — 2580000003 HC RX 258: Performed by: INTERNAL MEDICINE

## 2020-10-07 PROCEDURE — 6370000000 HC RX 637 (ALT 250 FOR IP): Performed by: INTERNAL MEDICINE

## 2020-10-07 PROCEDURE — 96366 THER/PROPH/DIAG IV INF ADDON: CPT

## 2020-10-07 PROCEDURE — 94640 AIRWAY INHALATION TREATMENT: CPT

## 2020-10-07 PROCEDURE — 85025 COMPLETE CBC W/AUTO DIFF WBC: CPT

## 2020-10-07 PROCEDURE — 82962 GLUCOSE BLOOD TEST: CPT

## 2020-10-07 PROCEDURE — 80053 COMPREHEN METABOLIC PANEL: CPT

## 2020-10-07 PROCEDURE — G0378 HOSPITAL OBSERVATION PER HR: HCPCS

## 2020-10-07 PROCEDURE — 96365 THER/PROPH/DIAG IV INF INIT: CPT

## 2020-10-07 PROCEDURE — 36415 COLL VENOUS BLD VENIPUNCTURE: CPT

## 2020-10-07 PROCEDURE — 94761 N-INVAS EAR/PLS OXIMETRY MLT: CPT

## 2020-10-07 RX ORDER — CIPROFLOXACIN 2 MG/ML
400 INJECTION, SOLUTION INTRAVENOUS EVERY 12 HOURS
Status: DISCONTINUED | OUTPATIENT
Start: 2020-10-07 | End: 2020-10-07 | Stop reason: ALTCHOICE

## 2020-10-07 RX ORDER — ISOSORBIDE MONONITRATE 30 MG/1
30 TABLET, EXTENDED RELEASE ORAL DAILY
Status: DISCONTINUED | OUTPATIENT
Start: 2020-10-07 | End: 2020-10-08

## 2020-10-07 RX ADMIN — SODIUM CHLORIDE, PRESERVATIVE FREE 10 ML: 5 INJECTION INTRAVENOUS at 20:54

## 2020-10-07 RX ADMIN — HYDROCODONE BITARTRATE AND ACETAMINOPHEN 1 TABLET: 5; 325 TABLET ORAL at 20:54

## 2020-10-07 RX ADMIN — ASPIRIN 81 MG CHEWABLE TABLET 81 MG: 81 TABLET CHEWABLE at 10:28

## 2020-10-07 RX ADMIN — ENOXAPARIN SODIUM 40 MG: 40 INJECTION SUBCUTANEOUS at 10:32

## 2020-10-07 RX ADMIN — RANOLAZINE 500 MG: 500 TABLET, EXTENDED RELEASE ORAL at 20:54

## 2020-10-07 RX ADMIN — IPRATROPIUM BROMIDE AND ALBUTEROL SULFATE 3 ML: .5; 3 SOLUTION RESPIRATORY (INHALATION) at 11:46

## 2020-10-07 RX ADMIN — CEFAZOLIN SODIUM 1 G: 1 INJECTION, POWDER, FOR SOLUTION INTRAMUSCULAR; INTRAVENOUS at 17:55

## 2020-10-07 RX ADMIN — DULOXETINE HYDROCHLORIDE 60 MG: 30 CAPSULE, DELAYED RELEASE ORAL at 10:27

## 2020-10-07 RX ADMIN — INSULIN LISPRO 6 UNITS: 100 INJECTION, SOLUTION INTRAVENOUS; SUBCUTANEOUS at 13:15

## 2020-10-07 RX ADMIN — HYDROCODONE BITARTRATE AND ACETAMINOPHEN 1 TABLET: 5; 325 TABLET ORAL at 15:58

## 2020-10-07 RX ADMIN — LORAZEPAM 1 MG: 1 TABLET ORAL at 10:31

## 2020-10-07 RX ADMIN — SODIUM CHLORIDE, PRESERVATIVE FREE 10 ML: 5 INJECTION INTRAVENOUS at 10:27

## 2020-10-07 RX ADMIN — RANOLAZINE 500 MG: 500 TABLET, EXTENDED RELEASE ORAL at 10:28

## 2020-10-07 RX ADMIN — IPRATROPIUM BROMIDE AND ALBUTEROL SULFATE 3 ML: .5; 3 SOLUTION RESPIRATORY (INHALATION) at 16:22

## 2020-10-07 RX ADMIN — INSULIN LISPRO 3 UNITS: 100 INJECTION, SOLUTION INTRAVENOUS; SUBCUTANEOUS at 17:54

## 2020-10-07 RX ADMIN — BUDESONIDE AND FORMOTEROL FUMARATE DIHYDRATE 2 PUFF: 80; 4.5 AEROSOL RESPIRATORY (INHALATION) at 19:42

## 2020-10-07 RX ADMIN — BUPROPION HYDROCHLORIDE 100 MG: 100 TABLET, FILM COATED, EXTENDED RELEASE ORAL at 10:30

## 2020-10-07 RX ADMIN — DOCUSATE SODIUM 100 MG: 100 CAPSULE, LIQUID FILLED ORAL at 10:31

## 2020-10-07 RX ADMIN — ATORVASTATIN CALCIUM 80 MG: 80 TABLET, FILM COATED ORAL at 20:54

## 2020-10-07 RX ADMIN — HYDROCODONE BITARTRATE AND ACETAMINOPHEN 1 TABLET: 5; 325 TABLET ORAL at 10:54

## 2020-10-07 RX ADMIN — ISOSORBIDE MONONITRATE 30 MG: 30 TABLET, EXTENDED RELEASE ORAL at 15:55

## 2020-10-07 RX ADMIN — CLOPIDOGREL BISULFATE 75 MG: 75 TABLET ORAL at 10:31

## 2020-10-07 RX ADMIN — PANTOPRAZOLE SODIUM 40 MG: 40 TABLET, DELAYED RELEASE ORAL at 06:12

## 2020-10-07 RX ADMIN — LORAZEPAM 1 MG: 1 TABLET ORAL at 20:54

## 2020-10-07 RX ADMIN — DOCUSATE SODIUM 100 MG: 100 CAPSULE, LIQUID FILLED ORAL at 20:54

## 2020-10-07 RX ADMIN — LEVOTHYROXINE SODIUM 50 MCG: 50 TABLET ORAL at 10:30

## 2020-10-07 RX ADMIN — IPRATROPIUM BROMIDE AND ALBUTEROL SULFATE 3 ML: .5; 3 SOLUTION RESPIRATORY (INHALATION) at 19:42

## 2020-10-07 RX ADMIN — Medication 3000 UNITS: at 10:44

## 2020-10-07 RX ADMIN — HYDROCODONE BITARTRATE AND ACETAMINOPHEN 1 TABLET: 5; 325 TABLET ORAL at 03:43

## 2020-10-07 RX ADMIN — BUPROPION HYDROCHLORIDE 100 MG: 100 TABLET, FILM COATED, EXTENDED RELEASE ORAL at 20:54

## 2020-10-07 RX ADMIN — INSULIN GLARGINE 20 UNITS: 100 INJECTION, SOLUTION SUBCUTANEOUS at 20:55

## 2020-10-07 ASSESSMENT — PAIN - FUNCTIONAL ASSESSMENT
PAIN_FUNCTIONAL_ASSESSMENT: ACTIVITIES ARE NOT PREVENTED

## 2020-10-07 ASSESSMENT — PAIN DESCRIPTION - PAIN TYPE
TYPE: ACUTE PAIN

## 2020-10-07 ASSESSMENT — PAIN DESCRIPTION - PROGRESSION
CLINICAL_PROGRESSION: NOT CHANGED

## 2020-10-07 ASSESSMENT — PAIN DESCRIPTION - FREQUENCY
FREQUENCY: CONTINUOUS

## 2020-10-07 ASSESSMENT — PAIN DESCRIPTION - LOCATION
LOCATION: BACK

## 2020-10-07 ASSESSMENT — PAIN DESCRIPTION - ONSET
ONSET: ON-GOING

## 2020-10-07 ASSESSMENT — PAIN SCALES - GENERAL
PAINLEVEL_OUTOF10: 3
PAINLEVEL_OUTOF10: 6
PAINLEVEL_OUTOF10: 9
PAINLEVEL_OUTOF10: 7
PAINLEVEL_OUTOF10: 7

## 2020-10-07 ASSESSMENT — PAIN DESCRIPTION - DESCRIPTORS
DESCRIPTORS: ACHING;CONSTANT
DESCRIPTORS: ACHING
DESCRIPTORS: ACHING;CONSTANT

## 2020-10-07 ASSESSMENT — PAIN DESCRIPTION - ORIENTATION: ORIENTATION: LOWER

## 2020-10-07 NOTE — PROGRESS NOTES
Hospitalist Progress Note      Name:  Leonardo Ospina /Age/Sex: 1960  (61 y.o. female)   MRN & CSN:  [id number] & [id number] Admission Date/Time: 10/6/2020  5:00 AM   Location:  [facility] PCP: Erica Daniel MD         Hospital Day: 2    Assessment and Plan:     Lenoardo Ospina is a 61 y.o.  female with past medical history of DM type II, CKD, hypertension, HFrEF, CAD, COPD, chronic respiratory failure who presents with hypoglycemia, mental status change     · Acute toxic metabolic encephalopathy , possibly due to hypoglycemia/ infection. hypoglycemia in DM type II  , insulin sliding scale, endocrinology, insulin being adjusted     · Probable sepsis due to UTI, UA with bacteriuria, urine cultures with E. Coli, sensitivities pending, sensitive to Cipro, but QTC prolonged, switched to cefazolin, based on previous urine culture and sensitivity, adjust as needed     · Acute urinary retention -bladder scan with 900 mL of urine, insert Reyes, Urology was consulted, plans for outpatient urodynamic study.     · Acute on chronic CKD stage III-IV -on IV fluids. Nephrology was consulted    · Hypertension, currently hypotensive, BP numbers fluctuating low, on Imdur 60 mg daily on hold, reduced to 30 mg daily, resume as appropriate if tolerated , continue metoprolo     Other comorbid conditions addressed include:     DM type II  HFrEF -not in exacerbation  Chronic respiratory failure on home oxygen  COPD with no exacerbation  CAD  Hypertension  Morbid obesity -BMI 47, encourage weight loss  Diet DIET CARB CONTROL;   DVT Prophylaxis [] Lovenox, []  Heparin, [] SCDs, []No VTE prophylaxis, patient ambulating   GI Prophylaxis [] PPI, [] H2 Blocker, [] No GI prophylaxis, patient is receiving diet/Tube Feeds   Code Status Full Code   Disposition Patient requires continued admission due to    MDM [] Low, [] Moderate,[]  High  Patient's risk as above due to      History of Present Illness:     Pt S&E.   No complaints today, denies any dizziness, feeling much better    10-14 point ROS reviewed negative, unless as noted above    Objective: Intake/Output Summary (Last 24 hours) at 10/7/2020 1503  Last data filed at 10/7/2020 0939  Gross per 24 hour   Intake 240 ml   Output 2100 ml   Net -1860 ml      Vitals:   Vitals:    10/07/20 1146   BP:    Pulse:    Resp:    Temp:    SpO2: 97%     Physical Exam:    GEN Awake female, sitting upright in bed in no apparent distress. Appears given age. EYES Pupils are equally round. No scleral erythema, discharge, or conjunctivitis. HENT Mucous membranes are moist.   NECK No apparent thyromegaly or masses. RESP Clear to auscultation, no wheezes, rales or rhonchi. Symmetric chest movement while on room air. CARDIO/VASC S1/S2 auscultated. Regular rate without appreciable murmurs, rubs, or gallops. Peripheral pulses equal bilaterally and palpable. No peripheral edema. GI Abdomen is soft without significant tenderness, masses, or guarding. Bowel sounds are normoactive. Rectal exam deferred.  Reyes catheter is  present. HEME/LYMPH No petechiae or ecchymoses. MSK No gross joint deformities. Spontaneous movement of all extremities  SKIN Normal coloration, warm, dry. NEURO Cranial nerves appear grossly intact, normal speech, no lateralizing weakness. PSYCH Awake, alert, oriented x 4. Affect appropriate.     Medications:   Medications:    [START ON 10/8/2020] influenza virus vaccine  0.5 mL Intramuscular Once    acetaminophen  500 mg Oral Once    aspirin  81 mg Oral Daily    atorvastatin  80 mg Oral Nightly    budesonide-formoterol  2 puff Inhalation BID    buPROPion  100 mg Oral BID    clopidogrel  75 mg Oral Daily    docusate sodium  100 mg Oral BID    DULoxetine  60 mg Oral Daily    ipratropium-albuterol  1 vial Inhalation Q4H    isosorbide mononitrate  60 mg Oral Daily    lactulose  10 g Oral BID    levothyroxine  50 mcg Oral Daily    LORazepam  1 mg Oral BID    metoprolol succinate  25 mg Oral Daily    pantoprazole  40 mg Oral QAM AC    ranolazine  500 mg Oral BID    tiotropium  2 puff Inhalation Daily    vitamin D  3,000 Units Oral Daily    sodium chloride flush  10 mL Intravenous 2 times per day    enoxaparin  40 mg Subcutaneous Daily    insulin lispro  0-18 Units Subcutaneous TID WC    insulin glargine  20 Units Subcutaneous Nightly    insulin lispro  0-9 Units Subcutaneous 2 times per day    insulin lispro  10 Units Subcutaneous TID WC      Infusions:    dextrose       PRN Meds: albuterol sulfate HFA, 2 puff, Q4H PRN  HYDROcodone-acetaminophen, 1 tablet, Q4H PRN  sodium chloride flush, 10 mL, PRN  acetaminophen, 650 mg, Q6H PRN    Or  acetaminophen, 650 mg, Q6H PRN  polyethylene glycol, 17 g, Daily PRN  promethazine, 12.5 mg, Q6H PRN    Or  ondansetron, 4 mg, Q6H PRN  potassium chloride, 40 mEq, PRN    Or  potassium alternative oral replacement, 40 mEq, PRN    Or  potassium chloride, 10 mEq, PRN  glucose, 15 g, PRN  dextrose, 12.5 g, PRN  glucagon (rDNA), 1 mg, PRN  dextrose, 100 mL/hr, PRN        Data    Recent Labs     10/06/20  0540 10/07/20  0330   WBC 13.6* 12.7*   HGB 11.6* 9.3*   HCT 37.4 29.8*    211      Recent Labs     10/06/20  0540 10/07/20  0330   * 136   K 3.6 3.6   CL 93* 98*   CO2 24 27   BUN 82* 78*   CREATININE 3.0* 2.9*     Recent Labs     10/06/20  0540 10/07/20  0330   AST 25 13*   ALT 26 19   BILITOT 0.5 0.5   ALKPHOS 106 82     No results for input(s): INR in the last 72 hours. No results for input(s): CKTOTAL, CKMB, CKMBINDEX, TROPONINI in the last 72 hours.         Electronically signed by Romona Castleman, MD on 10/7/2020 at 3:03 PM

## 2020-10-07 NOTE — CONSULTS
Endocrinology   Consult Note    Dear Doctor Pancho Diaz    Thank You for the Consult     Pt. Was Admitted for : Altered mental state with  hypoglycemia    Reason for Consult: Better control of blood glucose      History Obtained From:  Patient/ EMR       HISTORY OF PRESENT ILLNESS:                The patient is a 61 y.o. female with significant past medical history of CAD, had a stent, CABG, CKD, COPD, diabetes mellitus, congestive heart failure, obesity, discharged home couple days ago only to come back with altered mental state and possibility of hypoglycemia. He has been on special hype protein NutraSweet diet plan and she is taking large amount of insulin that resulted in hypoglycemia altered mental state. Patient also has has CKD that also contributed to her hypoglycemia because of long acting insulins may have been too much for her food intake I was  consulted for better control of blood glucose. ROS:   Pt's ROS done in detail. Abnormal ROS are noted in Medical and Surgical History Section below:      Other Medical History:        Diagnosis Date    Acute on chronic systolic CHF (congestive heart failure) ([address] 2020    CAD (coronary artery disease)     CKD (chronic kidney disease) stage 3, GFR 30-59 ml/min     COPD with acute exacerbation ([address] 2020    Diabetes type 2, uncontrolled ([address]     Diastolic heart failure ([address]     Essential hypertension     Financial problems 3/22/2013    Generalized anxiety disorder 2018    Herpes genitalia     Obesity, morbid (more than 100 lbs over ideal weight or BMI > 40) ([address] 2013    STEMI (ST elevation myocardial infarction) [facility])      Surgical History:        Procedure Laterality Date    CARDIAC SURGERY       SECTION      CHOLECYSTECTOMY      CORONARY ANGIOPLASTY WITH STENT PLACEMENT      OTHER SURGICAL HISTORY Right [id number]    I and D rt big toe    TONSILLECTOMY         Allergies:  Augmentin [amoxicillin-pot clavulanate]    Family History:       Problem Relation Age of Onset    Arthritis Mother     Diabetes Mother     Heart Disease Mother     High Blood Pressure Mother     Arthritis Father     Heart Disease Father     High Blood Pressure Father     Stroke Father     Substance Abuse Father     Substance Abuse Brother     Diabetes Maternal Aunt     Diabetes Maternal Uncle     Cancer Maternal Grandmother     Diabetes Maternal Grandmother     Diabetes Maternal Grandfather      REVIEW OF SYSTEMS:  Review of System Done as noted above     PHYSICAL EXAM:      Vitals:    /81   Pulse 74   Temp 98.6 °F (37 °C) (Oral)   Resp 16   Ht 5' 4\" (1.626 m)   SpO2 99%   BMI 47.12 kg/m²     CONSTITUTIONAL:  awake, alert, cooperative, appears stated age   EYES:  vision intact Fundoscopic Exam not performed   ENT:Normal  NECK:  Supple, No JVD. Thyroid Exam:Normal   LUNGS:  Has Vesicular Breath Sounds,   CARDIOVASCULAR:  Normal apical impulse, regular rate and rhythm, normal S1 and S2, no S3 or S4, and has no  murmur   ABDOMEN:  No scars, normal bowel sounds, soft, non-distended, non-tender, no masses palpated, no hepatolienomegaly  Musculoskeletal: Normal  Extremities: Normal, peripheral pulses normal, , has no edema   NEUROLOGIC:  Awake, alert, oriented to name, place and time. Cranial nerves II-XII are grossly intact. Motor weakness.   Sensory neuropathy t. ,  and gait is abnormal.    DATA:    CBC:   Recent Labs     10/06/20  0540   WBC 13.6*   HGB 11.6*       CMP:  Recent Labs     10/06/20  0540   *   K 3.6   CL 93*   CO2 24   BUN 82*   CREATININE 3.0*   CALCIUM 8.9   PROT 6.0*   LABALBU 3.7   BILITOT 0.5   ALKPHOS 106   AST 25   ALT 26     Lipids:   Lab Results   Component Value Date    CHOL 117 05/13/2020    HDL 39 05/13/2020    TRIG 158 05/13/2020     Glucose:   Recent Labs     10/06/20  1254 10/06/20  1805 10/06/20  2101   POCGLU 204* 165* 215*     Hemoglobin A1C:   Lab Results   Component Value Date    LABA1C 10.5 10/06/2020     Free T4:   Lab Results   Component Value Date    T4FREE 1.11 02/28/2019     Free T3: No results found for: FT3  TSH High Sensitivity:   Lab Results   Component Value Date    TSHHS 3.220 10/06/2020       Ct Abdomen Pelvis Wo Contrast Additional Contrast? None    Result Date: 10/6/2020  EXAMINATION: CT OF THE ABDOMEN AND PELVIS WITHOUT CONTRAST; CT OF THE CHEST WITHOUT CONTRAST 10/6/2020 10:32 am  Peritoneum/Retroperitoneum: The abdominal aorta is atherosclerotic but nonaneurysmal.  No retroperitoneal lymphadenopathy. Bones/Soft Tissues: No suspicious osteolytic or osteoblastic lesions are demonstrated. 1. No acute abnormality in the chest, abdomen or pelvis. 2. Severe coronary artery disease. Ct Head Wo Contrast    Result Date: 10/6/2020  EXAMINATION: CT OF THE HEAD WITHOUT CONTRAST  10/6/2020 5:27 am        No evidence of acute intracranial abnormality. Ct Chest Wo Contrast    Result Date: 10/6/2020  EXAMINATION: CT OF THE ABDOMEN AND PELVIS WITHOUT CONTRAST; CT OF THE CHEST WITHOUT CONTRAST 10/6/2020 10:32 am   meli. 1. No acute abnormality in the chest, abdomen or pelvis. 2. Severe coronary artery disease. Xr Chest Portable    Result Date: 10/6/2020  EXAMINATION: ONE XRAY VIEW OF THE CHEST 10/6/2020 7:51 am  No acute cardiopulmonary disease     Xr Chest Portable    Result Date: 9/30/2020  EXAMINATION: ONE XRAY VIEW OF THE CHEST 9/30/2020 2:05 pm       No cardiomegaly, pneumonia or interstitial edema. No significant change from 06/21/2020.        Scheduled Medicines   Medications:    acetaminophen  500 mg Oral Once    aspirin  81 mg Oral Daily    atorvastatin  80 mg Oral Nightly    budesonide-formoterol  2 puff Inhalation BID    buPROPion  100 mg Oral BID    clopidogrel  75 mg Oral Daily    docusate sodium  100 mg Oral BID    DULoxetine  60 mg Oral Daily    ipratropium-albuterol  1 vial Inhalation Q4H    isosorbide mononitrate  60 mg Oral Daily  lactulose  10 g Oral BID    levothyroxine  50 mcg Oral Daily    LORazepam  1 mg Oral BID    metoprolol succinate  25 mg Oral Daily    [START ON 10/7/2020] pantoprazole  40 mg Oral QAM AC    ranolazine  500 mg Oral BID    tiotropium  2 puff Inhalation Daily    vitamin D  3,000 Units Oral Daily    sodium chloride flush  10 mL Intravenous 2 times per day    enoxaparin  40 mg Subcutaneous Daily    insulin lispro  0-18 Units Subcutaneous TID WC    insulin glargine  20 Units Subcutaneous Nightly    insulin lispro  0-9 Units Subcutaneous 2 times per day    [START ON 10/7/2020] insulin lispro  10 Units Subcutaneous TID       Infusions:    dextrose           IMPRESSION    Patient Active Problem List   Diagnosis    CKD (chronic kidney disease) stage 3, GFR 30-59 ml/min ([facility]    CAD (coronary artery disease)    Chronic diastolic heart failure ([facility])    Diabetes type 2, uncontrolled [facility]    Morbid obesity with BMI of 50.0-59.9, adult ([facility]    Elevated lactic acid level, resolved    Musculoskeletal chest pain    Essential hypertension    Diabetes mellitus with hyperglycemia ([facility]    Severe dehydration secondary to significant hyperglycemia    Sepsis secondary to UTI, POA    Generalized weakness    Sepsis associated hypotension, POA    E. coli UTI (urinary tract infection)    Syncope    Acute pain of right shoulder    Hypotension    DM (diabetes mellitus), secondary uncontrolled ([facility])    Generalized anxiety disorder    Nausea & vomiting    Fever    Debility    Gait disturbance    Acute respiratory failure ([facility]    Hyperglycemia    Pleural effusion on left    UTI (urinary tract infection), bacterial    Sinus tachycardia    Uncontrolled type 2 diabetes mellitus with hyperglycemia ([facility])    Class 3 severe obesity due to excess calories with body mass index (BMI) of 45.0 to 49.9 in adult ([facility]    Anasarca    Acute kidney injury ([facility]    DM (diabetes mellitus) ([facility])    Fluid overload    Cor pulmonale (chronic) (HCC)    Cellulitis of abdominal wall    STEMI (ST elevation myocardial infarction) (HCC)    Acute respiratory distress    Hyperkalemia    Uncontrolled diabetes mellitus with chronic kidney disease (HCC)    Acute on chronic diastolic (congestive) heart failure (HCC)    COPD with acute exacerbation (HCC)    Heart failure (HCC)    CHF (congestive heart failure), NYHA class I, acute, systolic (HCC)    Sepsis ([facility]    Hypoglycemia         RECOMMENDATIONS:      1. Reviewed POC blood glucose . Labs and X ray results   2. Reviewed Home and Current Medicines   3. Will Start On meal/ Correction bolus Humalog/ Lantus Insulin regime  4. Monitor Blood glucose frequently   5. Modify  the dose of Insulin as needed        Will follow with you  Again thank you for sharing pt's care with me.      Truly yours,       Gali Andrade MD

## 2020-10-07 NOTE — PROGRESS NOTES
RENAL DOSE ADJUSTMENT MADE PER P/T PROTOCOL    PREVIOUS ORDER:  Cefazolin 1 g every 8 hr    CrCl cannot be calculated (Unknown ideal weight.).    Estimated Crcl = 18 ml/min    Recent Labs     10/06/20  0540 10/07/20  0330   BUN 82* 78*   CREATININE 3.0* 2.9*    211     NEW RENALLY ADJUSTED ORDER:  Cefazolin 1 g every 12 hr    KELLY Whitten NIRALI [facility]  10/7/2020 3:30 PM  __________________________________________________

## 2020-10-07 NOTE — CONSULTS
Occupational Therapy Attempt Note  Therapy attempted evaluation this date. Pt reports that she is too dizzy and requests therapy return tomorrow to complete evaluation. OT to re-attempt at a later time as available.

## 2020-10-07 NOTE — PROGRESS NOTES
Nephrology Progress Note  10/7/2020 11:07 AM        Subjective:   Admit Date: 10/6/2020  PCP: Remy Millan MD    Interval History: major event     Diet: so    ROS:  No sob , UOP better with mixon     Data:     Current meds:    acetaminophen  500 mg Oral Once    aspirin  81 mg Oral Daily    atorvastatin  80 mg Oral Nightly    budesonide-formoterol  2 puff Inhalation BID    buPROPion  100 mg Oral BID    clopidogrel  75 mg Oral Daily    docusate sodium  100 mg Oral BID    DULoxetine  60 mg Oral Daily    ipratropium-albuterol  1 vial Inhalation Q4H    isosorbide mononitrate  60 mg Oral Daily    lactulose  10 g Oral BID    levothyroxine  50 mcg Oral Daily    LORazepam  1 mg Oral BID    metoprolol succinate  25 mg Oral Daily    pantoprazole  40 mg Oral QAM AC    ranolazine  500 mg Oral BID    tiotropium  2 puff Inhalation Daily    vitamin D  3,000 Units Oral Daily    sodium chloride flush  10 mL Intravenous 2 times per day    enoxaparin  40 mg Subcutaneous Daily    insulin lispro  0-18 Units Subcutaneous TID WC    insulin glargine  20 Units Subcutaneous Nightly    insulin lispro  0-9 Units Subcutaneous 2 times per day    insulin lispro  10 Units Subcutaneous TID WC      dextrose           I/O last 3 completed shifts:   In: 240 [P.O.:240]  Out: 3400 [Urine:3400]    CBC:   Recent Labs     10/06/20  0540 10/07/20  0330   WBC 13.6* 12.7*   HGB 11.6* 9.3*    211          Recent Labs     10/06/20  0540 10/06/20  0829 10/07/20  0330   *  --  136   K 3.6  --  3.6   CL 93*  --  98*   CO2 24  --  27   BUN 82*  --  78*   CREATININE 3.0*  --  2.9*   GLUCOSE 153* 224 131*       Lab Results   Component Value Date    CALCIUM 8.2 (L) 10/07/2020    PHOS 3.8 10/03/2020       Objective:     Vitals: BP (!) 130/58 Comment: manuaL  Pulse 88   Temp 98.3 °F (36.8 °C) (Oral)   Resp 16   Ht 5' 4\" (1.626 m)   SpO2 100%   BMI 47.12 kg/m²     General appearance:  No distress  HEENT:   + conj pallor  Neck: supple  Lungs:  No crackles  Heart:  Seems RRR  Abdomen: soft  Extremities:  No edema   Has mixon       Problem List :         Impression :     1. JUAN J- CKD stage 3 b/ 4 A3- stable - had ac bladder obstruction- with mixon  2. ? Diabetic bladder   3. CAD/CMP has risk  For  ADHF watch   4. DM/ obesity    Recommendation/Plan  :     1. Her BP better  2. Eats some  3. Low salt  4. daily wt'  5. watch Ro  fluid status  6.  Follow clinically and bio chemically       Quynh Rosas MD

## 2020-10-07 NOTE — CONSULTS
[facility]               [address], [address] [facility]                                  CONSULTATION    PATIENT NAME: LIONEL CLEVELAND                      :        1960  MED REC NO:   [id number]                          ROOM:       3027  ACCOUNT NO:   [de-identified]                           ADMIT DATE: 10/06/2020  PROVIDER:     April Harrington MD    CONSULT DATE:  10/06/2020    CONSULT REQUESTED BY:  Kaylynn Katz MD    REASON FOR CONSULT:  Acute kidney injury with underlying CKD stage IV. BRIEF HISTORY:  The patient is a 80-year-old female with multiple  medical conditions, most notably CKD and atherosclerotic cardiovascular  disease, who was just discharged few days ago only to come back via  squad with hypoglycemia. Apparently, the patient was not feeling very  well for a day or two prior to her ER presentation. The day after ER  presentation, the  found her a little confused and apparently  blood sugar was low. Squad was called and she was brought to the  emergency room. The patient does not recall much which I can completely  understand. Obviously, her blood sugar was very low. In the emergency  room, her initial blood sugar was 175. This is an Accu-Chek, which  might have been after giving dextrose. Serum sugar was 153. Again,  this is after all intervention. She was hemodynamically stable,  although her blood pressure is a little bit on the lower side, which is  chronic for her. She also underwent several diagnostic tests, which  include imaging and biochemical.    Imaging study which includes CT of the abdomen and pelvis, chest as well  as chest x-ray was unrevealing. Biochemical testing showed increased  BUN and creatinine of 82 and 3.0 respectively. Bladder scan showed 900  postvoid residual and she is about to get a Reyes catheter. CBC showed  slight low hemoglobin at 11.6 and white count of 13,600.   Her urinalysis  was rather bland and she was subsequently admitted to floor. When I saw her, just about half an hour ago, she was alert and awake. Her blood sugar is okay. Blood pressure is okay and she does not have  any peripheral edema. She is lying flat. No PND or orthopnea. I am of course very familiar with her. Of course, I have been seeing  her for a long time. As I mentioned, I just saw her last time when she  was admitted with low blood pressure as well as shortness of breath,  fatigue, and tiredness. At that time, her creatinine of course went up  and it peaked around 3.1. She was given 1.5 liters of normal saline  that was discontinued and after she started retaining salt and water,  her home diuretics was initiated and she was sent home with torsemide 20  mg q.d. or b.i.d. depending on her fluid intake. The plan was to see me  in a couple of weeks and do some lab next week. Obviously, she never  had a chance. In general, her creatinine for the last couple of years, it was running  about 1.2 to 1.5 range, but unfortunately, she has had several acute  kidney injuries of course by creatinine criteria. She mainly has  cardiorenal syndrome type 2, which frequently transforms into type 1, so  creatinine obviously will fluctuate. She also had proteinuria more than  300 mg per day. PAST MEDICAL HISTORY:  1.  CKD stage IVA3. As I mentioned, she did have some proteinuria. 2.  Atherosclerotic cardiovascular disease. She has multivessel  coronary artery disease with systolic dysfunction, i.e., cardiomyopathy. Her EF was 40%. She has had numerous acute decompensated heart  failures. She had some percutaneous intervention and was evaluated by  the Cardiothoracic Surgery team and deemed not a surgical candidate. 3.  Cardiomyopathy. Last EF was 40% or so.   Like I mentioned, her last  decompensated CHF was in 06/2020.  4.  Cardiorenal syndrome type 2, frequently transforms into type 1.  5. Diabetes mellitus type 2, diagnosed when she was 27 years young and  had some proteinuria, not very well controlled of course. 6.  Hyperlipidemia. 7.  Probable COPD with probable cor pulmonale. 8.  Osteoarthritis. 9.  Anxiety disorder. PAST SURGICAL HISTORY:  1. PTCA with stent of several coronary arteries in the past by Dr. Andra Parrish. 2.  Also had an I and D of wound of her toe. 3.  Gallbladder surgery. 4.  Cholecystectomy. 5.  . FAMILY MEDICAL HISTORY:  Diabetes and coronary artery disease runs in  the family. Nobody is on renal replacement therapy or had  transplantation. OB/GYN HISTORY:   1, para 1. Apparently, she had a history of  preeclampsia. Did not have any gestational diabetes. Apparently, she  became diabetic after her last pregnancy. HABITS:  She quit smoking in . Roughly has 30 pack-year history. No history of alcohol or illicit drug abuse. SOCIAL HISTORY:  Of course, she is . Lives with her  here  in town. Unfortunately, she has had several admissions for the last  couple of years or so. She is of course retired. ALLERGIES:  She is listed allergic to AUGMENTIN. She exceptionally had  adverse reaction. It caused diarrhea rather than an allergic reaction. REVIEW OF SYSTEMS:  Of course, she had hypoglycemia preceded by fatigue  and tiredness. Rest of the review of systems is negative other than  previous paragraph. PHYSICAL EXAMINATION:  VITAL SIGNS:  At the time of examination reveal, she is afebrile. T-max  was 98.3, blood pressure 120s/80s, saturating about 99% on supplemental  oxygen, her heart rate is in 70s, respiratory rate 14. GENERAL:  The patient is without any acute distress. She is lying flat  in bed. HEENT:  Head and Neck:  Normocephalic, atraumatic. Eyes:  1+  conjunctival pallor. Ear, Mouth, and Throat:  Normal oral mucosa. CARDIOVASCULAR:  Seems regular rate and rhythm.   RESPIRATORY:  I did not hear any crackles. ABDOMEN:  Obese and soft. EXTREMITIES:  No edema. LABORATORY VALUES AND ANCILLARY SERVICES:  As mentioned earlier. IMPRESSION:  A 60-year-old white female with acute kidney injury. 1.  Acute kidney injury on top of CKD stage IVA3 likely from acute  bladder obstruction. Also with loop, of course her creatinine will  always fluctuate. Her urine is bland, so I do not suspect any other  additional intrinsic disease. Also her blood pressure is acceptable,  had normal mentation, so unlikely she has hypovolemia. 2.  Acute bladder obstruction, likely has diabetes-induced neurogenic  bladder. 3.  Hypoglycemia with underlying diabetes mellitus, which is not very  well controlled. 4.  Severe atherosclerotic cardiovascular disease as well as  cardiomyopathy and high risk for acute decompensated heart failure. PLAN:  She will get a Reyes by urologist.  I will stop the IV fluids  since she had high risk for acute decompensated heart failure. She  looks normovolemic. She is eating and drinking. We will get daily  weight, low salt, make sure we maintain the normal glycemia. We will  watch for pulmonary edema for now as she is off diuretics. Further plan  will be dictated by her clinical course.         Edmond May MD    D: 10/06/2020 15:58:49       T: 10/06/2020 19:40:02     [email or web address]  Job#: [id number]     Doc#: [id number]    CC:

## 2020-10-08 LAB
ALBUMIN SERPL-MCNC: 3.3 GM/DL (ref 3.4–5)
ALP BLD-CCNC: 101 IU/L (ref 40–129)
ALT SERPL-CCNC: 16 U/L (ref 10–40)
ANION GAP SERPL CALCULATED.3IONS-SCNC: 11 MMOL/L (ref 4–16)
AST SERPL-CCNC: 14 IU/L (ref 15–37)
BASOPHILS ABSOLUTE: 0 K/CU MM
BASOPHILS RELATIVE PERCENT: 0.2 % (ref 0–1)
BILIRUB SERPL-MCNC: 0.3 MG/DL (ref 0–1)
BUN BLDV-MCNC: 71 MG/DL (ref 6–23)
CALCIUM SERPL-MCNC: 8.6 MG/DL (ref 8.3–10.6)
CHLORIDE BLD-SCNC: 102 MMOL/L (ref 99–110)
CO2: 28 MMOL/L (ref 21–32)
CREAT SERPL-MCNC: 2.5 MG/DL (ref 0.6–1.1)
CULTURE: ABNORMAL
CULTURE: ABNORMAL
DIFFERENTIAL TYPE: ABNORMAL
EOSINOPHILS ABSOLUTE: 0.2 K/CU MM
EOSINOPHILS RELATIVE PERCENT: 1.8 % (ref 0–3)
GFR AFRICAN AMERICAN: 24 ML/MIN/1.73M2
GFR NON-AFRICAN AMERICAN: 20 ML/MIN/1.73M2
GLUCOSE BLD-MCNC: 103 MG/DL (ref 70–99)
GLUCOSE BLD-MCNC: 157 MG/DL (ref 70–99)
GLUCOSE BLD-MCNC: 158 MG/DL (ref 70–99)
GLUCOSE BLD-MCNC: 187 MG/DL (ref 70–99)
GLUCOSE BLD-MCNC: 225 MG/DL (ref 70–99)
GLUCOSE BLD-MCNC: 260 MG/DL (ref 70–99)
HCT VFR BLD CALC: 29.6 % (ref 37–47)
HEMOGLOBIN: 9.5 GM/DL (ref 12.5–16)
IMMATURE NEUTROPHIL %: 1.2 % (ref 0–0.43)
LYMPHOCYTES ABSOLUTE: 1.7 K/CU MM
LYMPHOCYTES RELATIVE PERCENT: 18.8 % (ref 24–44)
Lab: ABNORMAL
MCH RBC QN AUTO: 28.5 PG (ref 27–31)
MCHC RBC AUTO-ENTMCNC: 32.1 % (ref 32–36)
MCV RBC AUTO: 88.9 FL (ref 78–100)
MONOCYTES ABSOLUTE: 0.7 K/CU MM
MONOCYTES RELATIVE PERCENT: 8.1 % (ref 0–4)
NUCLEATED RBC %: 0 %
PDW BLD-RTO: 17.8 % (ref 11.7–14.9)
PLATELET # BLD: 210 K/CU MM (ref 140–440)
PMV BLD AUTO: 10.6 FL (ref 7.5–11.1)
POTASSIUM SERPL-SCNC: 4 MMOL/L (ref 3.5–5.1)
RBC # BLD: 3.33 M/CU MM (ref 4.2–5.4)
SEGMENTED NEUTROPHILS ABSOLUTE COUNT: 6.3 K/CU MM
SEGMENTED NEUTROPHILS RELATIVE PERCENT: 69.9 % (ref 36–66)
SODIUM BLD-SCNC: 141 MMOL/L (ref 135–145)
SPECIMEN: ABNORMAL
TOTAL IMMATURE NEUTOROPHIL: 0.11 K/CU MM
TOTAL NUCLEATED RBC: 0 K/CU MM
TOTAL PROTEIN: 4.9 GM/DL (ref 6.4–8.2)
WBC # BLD: 9 K/CU MM (ref 4–10.5)

## 2020-10-08 PROCEDURE — 6370000000 HC RX 637 (ALT 250 FOR IP): Performed by: INTERNAL MEDICINE

## 2020-10-08 PROCEDURE — 36415 COLL VENOUS BLD VENIPUNCTURE: CPT

## 2020-10-08 PROCEDURE — 6360000002 HC RX W HCPCS: Performed by: INTERNAL MEDICINE

## 2020-10-08 PROCEDURE — 96372 THER/PROPH/DIAG INJ SC/IM: CPT

## 2020-10-08 PROCEDURE — 2580000003 HC RX 258: Performed by: INTERNAL MEDICINE

## 2020-10-08 PROCEDURE — G0378 HOSPITAL OBSERVATION PER HR: HCPCS

## 2020-10-08 PROCEDURE — 96366 THER/PROPH/DIAG IV INF ADDON: CPT

## 2020-10-08 PROCEDURE — 2700000000 HC OXYGEN THERAPY PER DAY

## 2020-10-08 PROCEDURE — 82962 GLUCOSE BLOOD TEST: CPT

## 2020-10-08 PROCEDURE — 97530 THERAPEUTIC ACTIVITIES: CPT

## 2020-10-08 PROCEDURE — 94640 AIRWAY INHALATION TREATMENT: CPT

## 2020-10-08 PROCEDURE — 97165 OT EVAL LOW COMPLEX 30 MIN: CPT

## 2020-10-08 PROCEDURE — 94761 N-INVAS EAR/PLS OXIMETRY MLT: CPT

## 2020-10-08 PROCEDURE — 85025 COMPLETE CBC W/AUTO DIFF WBC: CPT

## 2020-10-08 PROCEDURE — 80053 COMPREHEN METABOLIC PANEL: CPT

## 2020-10-08 RX ORDER — SODIUM CHLORIDE 9 MG/ML
INJECTION, SOLUTION INTRAVENOUS CONTINUOUS
Status: DISCONTINUED | OUTPATIENT
Start: 2020-10-08 | End: 2020-10-08

## 2020-10-08 RX ORDER — HYDROCODONE BITARTRATE AND ACETAMINOPHEN 5; 325 MG/1; MG/1
2 TABLET ORAL ONCE
Status: DISCONTINUED | OUTPATIENT
Start: 2020-10-08 | End: 2020-10-09 | Stop reason: HOSPADM

## 2020-10-08 RX ADMIN — SODIUM CHLORIDE, PRESERVATIVE FREE 10 ML: 5 INJECTION INTRAVENOUS at 21:01

## 2020-10-08 RX ADMIN — DOCUSATE SODIUM 100 MG: 100 CAPSULE, LIQUID FILLED ORAL at 10:09

## 2020-10-08 RX ADMIN — CLOPIDOGREL BISULFATE 75 MG: 75 TABLET ORAL at 10:09

## 2020-10-08 RX ADMIN — DOCUSATE SODIUM 100 MG: 100 CAPSULE, LIQUID FILLED ORAL at 21:01

## 2020-10-08 RX ADMIN — IPRATROPIUM BROMIDE AND ALBUTEROL SULFATE 3 ML: .5; 3 SOLUTION RESPIRATORY (INHALATION) at 19:36

## 2020-10-08 RX ADMIN — IPRATROPIUM BROMIDE AND ALBUTEROL SULFATE 3 ML: .5; 3 SOLUTION RESPIRATORY (INHALATION) at 00:22

## 2020-10-08 RX ADMIN — INSULIN LISPRO 3 UNITS: 100 INJECTION, SOLUTION INTRAVENOUS; SUBCUTANEOUS at 10:06

## 2020-10-08 RX ADMIN — HYDROCODONE BITARTRATE AND ACETAMINOPHEN 1 TABLET: 5; 325 TABLET ORAL at 13:09

## 2020-10-08 RX ADMIN — LORAZEPAM 1 MG: 1 TABLET ORAL at 10:09

## 2020-10-08 RX ADMIN — IPRATROPIUM BROMIDE AND ALBUTEROL SULFATE 3 ML: .5; 3 SOLUTION RESPIRATORY (INHALATION) at 08:27

## 2020-10-08 RX ADMIN — LORAZEPAM 1 MG: 1 TABLET ORAL at 21:01

## 2020-10-08 RX ADMIN — Medication 3000 UNITS: at 10:16

## 2020-10-08 RX ADMIN — BUPROPION HYDROCHLORIDE 100 MG: 100 TABLET, FILM COATED, EXTENDED RELEASE ORAL at 10:09

## 2020-10-08 RX ADMIN — INSULIN LISPRO 6 UNITS: 100 INJECTION, SOLUTION INTRAVENOUS; SUBCUTANEOUS at 12:22

## 2020-10-08 RX ADMIN — INSULIN GLARGINE 20 UNITS: 100 INJECTION, SOLUTION SUBCUTANEOUS at 21:02

## 2020-10-08 RX ADMIN — LEVOTHYROXINE SODIUM 50 MCG: 50 TABLET ORAL at 10:15

## 2020-10-08 RX ADMIN — RANOLAZINE 500 MG: 500 TABLET, EXTENDED RELEASE ORAL at 10:09

## 2020-10-08 RX ADMIN — PANTOPRAZOLE SODIUM 40 MG: 40 TABLET, DELAYED RELEASE ORAL at 06:11

## 2020-10-08 RX ADMIN — CEFAZOLIN SODIUM 1 G: 1 INJECTION, POWDER, FOR SOLUTION INTRAMUSCULAR; INTRAVENOUS at 04:25

## 2020-10-08 RX ADMIN — CEFAZOLIN SODIUM 1 G: 1 INJECTION, POWDER, FOR SOLUTION INTRAMUSCULAR; INTRAVENOUS at 16:57

## 2020-10-08 RX ADMIN — ASPIRIN 81 MG CHEWABLE TABLET 81 MG: 81 TABLET CHEWABLE at 10:09

## 2020-10-08 RX ADMIN — HYDROCODONE BITARTRATE AND ACETAMINOPHEN 1 TABLET: 5; 325 TABLET ORAL at 02:45

## 2020-10-08 RX ADMIN — RANOLAZINE 500 MG: 500 TABLET, EXTENDED RELEASE ORAL at 21:01

## 2020-10-08 RX ADMIN — ATORVASTATIN CALCIUM 80 MG: 80 TABLET, FILM COATED ORAL at 21:01

## 2020-10-08 RX ADMIN — HYDROCODONE BITARTRATE AND ACETAMINOPHEN 1 TABLET: 5; 325 TABLET ORAL at 21:01

## 2020-10-08 RX ADMIN — IPRATROPIUM BROMIDE AND ALBUTEROL SULFATE 3 ML: .5; 3 SOLUTION RESPIRATORY (INHALATION) at 04:21

## 2020-10-08 RX ADMIN — DULOXETINE HYDROCHLORIDE 60 MG: 30 CAPSULE, DELAYED RELEASE ORAL at 10:09

## 2020-10-08 RX ADMIN — BUDESONIDE AND FORMOTEROL FUMARATE DIHYDRATE 2 PUFF: 80; 4.5 AEROSOL RESPIRATORY (INHALATION) at 19:37

## 2020-10-08 RX ADMIN — SODIUM CHLORIDE, PRESERVATIVE FREE 10 ML: 5 INJECTION INTRAVENOUS at 10:15

## 2020-10-08 RX ADMIN — BUDESONIDE AND FORMOTEROL FUMARATE DIHYDRATE 2 PUFF: 80; 4.5 AEROSOL RESPIRATORY (INHALATION) at 08:29

## 2020-10-08 RX ADMIN — BUPROPION HYDROCHLORIDE 100 MG: 100 TABLET, FILM COATED, EXTENDED RELEASE ORAL at 21:01

## 2020-10-08 RX ADMIN — ENOXAPARIN SODIUM 40 MG: 40 INJECTION SUBCUTANEOUS at 12:21

## 2020-10-08 ASSESSMENT — PAIN SCALES - GENERAL
PAINLEVEL_OUTOF10: 9

## 2020-10-08 ASSESSMENT — PAIN DESCRIPTION - FREQUENCY
FREQUENCY: CONTINUOUS
FREQUENCY: CONTINUOUS

## 2020-10-08 ASSESSMENT — PAIN DESCRIPTION - LOCATION
LOCATION: BACK
LOCATION: BACK

## 2020-10-08 ASSESSMENT — PAIN - FUNCTIONAL ASSESSMENT: PAIN_FUNCTIONAL_ASSESSMENT: ACTIVITIES ARE NOT PREVENTED

## 2020-10-08 ASSESSMENT — PAIN DESCRIPTION - ONSET
ONSET: ON-GOING
ONSET: ON-GOING

## 2020-10-08 ASSESSMENT — PAIN DESCRIPTION - DESCRIPTORS
DESCRIPTORS: ACHING
DESCRIPTORS: ACHING

## 2020-10-08 ASSESSMENT — PAIN DESCRIPTION - PROGRESSION
CLINICAL_PROGRESSION: NOT CHANGED
CLINICAL_PROGRESSION: NOT CHANGED

## 2020-10-08 ASSESSMENT — PAIN DESCRIPTION - PAIN TYPE
TYPE: ACUTE PAIN
TYPE: ACUTE PAIN

## 2020-10-08 ASSESSMENT — PAIN DESCRIPTION - ORIENTATION
ORIENTATION: LOWER
ORIENTATION: LOWER

## 2020-10-08 NOTE — PROGRESS NOTES
10/08/2020    PHOS 3.8 10/03/2020       Objective:     Vitals: BP (!) 98/46   Pulse 89   Temp 98.6 °F (37 °C) (Oral)   Resp 16   Ht 5' 4\" (1.626 m)   Wt 274 lb 6.4 oz (124.5 kg)   SpO2 99%   BMI 47.10 kg/m²     General appearance:  No ac distress  HEENT:  Mild conj pallor  Neck:  supple  Lungs:  No gross crackles yet  Heart:  Seems RRR  Abdomen: soft  Extremities:  No overt edema   Has mixon       Problem List :         Impression :     1. JUAN J- CKD stage G3 b a3- recovering -  mainly from obstruction- also has CRS type - she is off diureitcs now - very difficult to keep her normovolemia as she gest from pone extreme to another - likely from very minimal   Cardiac  and renal functional reverse - ( my  Guess )   2. Also intermittent bladder obscuring raising suspicion for diabetic bladder dys Fx   3. ASCVd/CMP / etc     Recommendation/Plan  :     1. I have d/w Urology- and decide about mixon  2. Otherwise she can be d/C   3. I would have her do daily weight  4. Payan Curls she start gaining > 2 lbs - start torsemide 20  Bid   5. Low salt  6. Her urine had no wbc -so do not think she  Has  ac cystitis   7. D/c NS as she has  high risk for fluid re tension and pulm edema   8. Fluid recursion to 1.5 L/d   9. Good BS control  10. CMP , mg in 1 wk after /c   11.  F/u with me in 2 wks       Mirian Max MD

## 2020-10-08 NOTE — PROGRESS NOTES
Physical Therapy  Pt refused PT this afternoon stating she did not feel like getting up and already walked with therapy earlier today. Per charting, pt is mobilizing with SBA, near her typical baseline. Will plan to hold PT services at this time and recommend daily nursing mobility to prevent deconditioning. Please notify is PT needs arise.

## 2020-10-08 NOTE — CARE COORDINATION
LSW read OT notes and they are recommending home with [facility] and assistance. Pt refused to work with PT. Pt will go home with HC at discharge. CM will need a inpt HC order at discharge for nursing with PT/OT. If pt is discharged after hours please call [facility] [phone] and inform them of discharge.  Please fax HC order, face sheet, H&P, OT notes and AVS to [phone]

## 2020-10-08 NOTE — PROGRESS NOTES
Hospitalist Progress Note      Name:  Tim Taylor /Age/Sex: 1960  (61 y.o. female)   MRN & CSN:  [id number] & [id number] Admission Date/Time: 10/6/2020  5:00 AM   Location:  [facility] PCP: Joe Conway MD         Hospital Day: 3    Assessment and Plan:     Laurier Boxer Peyatt is a 61 y.o.  female with past medical history of DM type II, CKD, hypertension, HFrEF, CAD, COPD, chronic respiratory failure who presents with hypoglycemia, mental status change     · Acute toxic metabolic encephalopathy , possibly due to hypoglycemia/ infection. hypoglycemia in DM type II  , insulin sliding scale, endocrinology, insulin being adjusted.     · Probable sepsis due to UTI, UA with bacteriuria, urine cultures with E. Coli, sensitive to Cipro, but QTC prolonged, switched to cefazolin.     · Acute urinary retention Urology was consulted, plans for outpatient urodynamic test and cystoscopy as out patient.     · Acute on chronic CKD stage III-IV -on IV fluids. Nephrology was consulted, improving     · Hypertension, currently hypotensive, BP numbers fluctuating low, on Imdur 60 mg daily on hold, resume as appropriate if tolerated , continue metoprolol with holding parameters.   -Reported to be dizzy did not tolerate physical therapy session, check orthostatics, start on IVF, monitor     Other comorbid conditions addressed include:     DM type II  HFrEF -not in exacerbation  Chronic respiratory failure on home oxygen  COPD with no exacerbation  CAD  Hypertension  Morbid obesity -BMI 52, encourage weight loss    Position, home with home health care when medically stable  Diet DIET CARB CONTROL;   DVT Prophylaxis [] Lovenox, []  Heparin, [] SCDs, []No VTE prophylaxis, patient ambulating   GI Prophylaxis [] PPI, [] H2 Blocker, [] No GI prophylaxis, patient is receiving diet/Tube Feeds   Code Status Full Code   Disposition Patient requires continued admission due to    MDM [] Low, [] Moderate,[]  High  Patient's risk as above due to      History of Present Illness:     Pt S&E. Notes much improved, had PT session today, felt dizziness, especially ideation denies any shortness of breath chest pain    10-14 point ROS reviewed negative, unless as noted above    Objective: Intake/Output Summary (Last 24 hours) at 10/8/2020 1254  Last data filed at 10/8/2020 1220  Gross per 24 hour   Intake 960 ml   Output 2200 ml   Net -1240 ml      Vitals:   Vitals:    10/08/20 0830   BP: (!) 98/46   Pulse: 89   Resp: 16   Temp: 98.6 °F (37 °C)   SpO2: 99%     Physical Exam:    GEN Awake female, sitting upright in bed in no apparent distress. Appears given age. EYES Pupils are equally round. No scleral erythema, discharge, or conjunctivitis. HENT Mucous membranes are moist.   NECK No apparent thyromegaly or masses. RESP Clear to auscultation, no wheezes, rales or rhonchi. Symmetric chest movement while on room air. CARDIO/VASC S1/S2 auscultated. Regular rate without appreciable murmurs, rubs, or gallops. Peripheral pulses equal bilaterally and palpable. No peripheral edema. GI Abdomen is soft without significant tenderness, masses, or guarding. Bowel sounds are normoactive. Rectal exam deferred.  Reyes catheter is present. HEME/LYMPH No petechiae or ecchymoses. MSK No gross joint deformities. Spontaneous movement of all extremities  SKIN Normal coloration, warm, dry. NEURO Cranial nerves appear grossly intact, normal speech, no lateralizing weakness. PSYCH Awake, alert, oriented x 4. Affect appropriate.     Medications:   Medications:    influenza virus vaccine  0.5 mL Intramuscular Once    isosorbide mononitrate  30 mg Oral Daily    ceFAZolin (ANCEF) 1 g in dextrose 5 % 50 mL IVPB (mini-bag)  1 g Intravenous Q12H    acetaminophen  500 mg Oral Once    aspirin  81 mg Oral Daily    atorvastatin  80 mg Oral Nightly    budesonide-formoterol  2 puff Inhalation BID    buPROPion  100 mg Oral BID    clopidogrel  75 mg Oral Daily  docusate sodium  100 mg Oral BID    DULoxetine  60 mg Oral Daily    ipratropium-albuterol  1 vial Inhalation Q4H    lactulose  10 g Oral BID    levothyroxine  50 mcg Oral Daily    LORazepam  1 mg Oral BID    metoprolol succinate  25 mg Oral Daily    pantoprazole  40 mg Oral QAM AC    ranolazine  500 mg Oral BID    tiotropium  2 puff Inhalation Daily    vitamin D  3,000 Units Oral Daily    sodium chloride flush  10 mL Intravenous 2 times per day    enoxaparin  40 mg Subcutaneous Daily    insulin lispro  0-18 Units Subcutaneous TID WC    insulin glargine  20 Units Subcutaneous Nightly    insulin lispro  0-9 Units Subcutaneous 2 times per day    insulin lispro  10 Units Subcutaneous TID WC      Infusions:    sodium chloride      dextrose       PRN Meds: albuterol sulfate HFA, 2 puff, Q4H PRN  HYDROcodone-acetaminophen, 1 tablet, Q4H PRN  sodium chloride flush, 10 mL, PRN  acetaminophen, 650 mg, Q6H PRN    Or  acetaminophen, 650 mg, Q6H PRN  polyethylene glycol, 17 g, Daily PRN  promethazine, 12.5 mg, Q6H PRN    Or  ondansetron, 4 mg, Q6H PRN  potassium chloride, 40 mEq, PRN    Or  potassium alternative oral replacement, 40 mEq, PRN    Or  potassium chloride, 10 mEq, PRN  glucose, 15 g, PRN  dextrose, 12.5 g, PRN  glucagon (rDNA), 1 mg, PRN  dextrose, 100 mL/hr, PRN        Data    Recent Labs     10/06/20  0540 10/07/20  0330 10/08/20  0545   WBC 13.6* 12.7* 9.0   HGB 11.6* 9.3* 9.5*   HCT 37.4 29.8* 29.6*    211 210      Recent Labs     10/06/20  0540 10/07/20  0330 10/08/20  0545   * 136 141   K 3.6 3.6 4.0   CL 93* 98* 102   CO2 24 27 28   BUN 82* 78* 71*   CREATININE 3.0* 2.9* 2.5*     Recent Labs     10/06/20  0540 10/07/20  0330 10/08/20  0545   AST 25 13* 14*   ALT 26 19 16   BILITOT 0.5 0.5 0.3   ALKPHOS 106 82 101     No results for input(s): INR in the last 72 hours. No results for input(s): CKTOTAL, CKMB, CKMBINDEX, TROPONINI in the last 72 hours.         Electronically signed by Eliane Maldonado MD on 10/8/2020 at 12:54 PM

## 2020-10-08 NOTE — PROGRESS NOTES
Progress Note( Dr. Yenny Lockwood)  10/7/2020  Subjective:   Admit Date: 10/6/2020  PCP: Gretel Salazar MD    Admitted For : Altered mental state with hypoglycemia    Consulted For: Better control of blood glucose    Interval History: Pains of inability to pass urine seems like she has had able to empty her bladder    Denies any chest pains,   Moderate SOB . Denies nausea or vomiting. No new bowel or bladder symptoms. Intake/Output Summary (Last 24 hours) at 10/7/2020 2208  Last data filed at 10/7/2020 1745  Gross per 24 hour   Intake 600 ml   Output 1700 ml   Net -1100 ml       DATA    CBC:   Recent Labs     10/06/20  0540 10/07/20  0330   WBC 13.6* 12.7*   HGB 11.6* 9.3*    211    CMP:  Recent Labs     10/06/20  0540 10/07/20  0330   * 136   K 3.6 3.6   CL 93* 98*   CO2 24 27   BUN 82* 78*   CREATININE 3.0* 2.9*   CALCIUM 8.9 8.2*   PROT 6.0* 4.8*   LABALBU 3.7 3.3*   BILITOT 0.5 0.5   ALKPHOS 106 82   AST 25 13*   ALT 26 19     Lipids:   Lab Results   Component Value Date    CHOL 117 05/13/2020    HDL 39 05/13/2020    TRIG 158 05/13/2020     Glucose:  Recent Labs     10/07/20  1210 10/07/20  1728 10/07/20  2050   POCGLU 233* 184* 204*     [id number]:  Lab Results   Component Value Date    LABA1C 10.5 10/06/2020     High Sensitivity TSH:   Lab Results   Component Value Date    TSHHS 3.220 10/06/2020     Free T3: No results found for: FT3  Free T4:  Lab Results   Component Value Date    T4FREE 1.11 02/28/2019       Ct Abdomen Pelvis Wo Contrast Additional Contrast? None    Result Date: 10/6/2020  EXAMINATION: CT OF THE ABDOMEN AND PELVIS WITHOUT CONTRAST; CT OF THE CHEST WITHOUT CONTRAST 10/6/2020 10:32 am T      1. No acute abnormality in the chest, abdomen or pelvis. 2. Severe coronary artery disease. Ct Head Wo Contrast    Result Date: 10/6/2020  EXAMINATION: CT OF THE HEAD WITHOUT CONTRAST  10/6/2020 5:27 am        No evidence of acute intracranial abnormality.      Ct Chest Wo non-tender; bowel sounds normal; no masses,  no organomegaly  Musculoskeletal: Normal  Extremities: extremities normal, , no edema  Neurologic:  Awake, alert, oriented to name, place and time. Cranial nerves II-XII are grossly intact. Motor is  intact.   Sensory neuropathy t.,  and gait is abnormal.  And end-stage    Assessment:     Patient Active Problem List:     CKD (chronic kidney disease) stage 3, GFR 30-59 ml/min ([facility])     CAD (coronary artery disease)     Chronic diastolic heart failure ([facility])     Diabetes type 2, uncontrolled ([address]     Morbid obesity with BMI of 50.0-59.9, adult ([facility])     Elevated lactic acid level, resolved     Musculoskeletal chest pain     Essential hypertension     Diabetes mellitus with hyperglycemia ([facility])     Severe dehydration secondary to significant hyperglycemia     Sepsis secondary to UTI, POA     Generalized weakness     Sepsis associated hypotension, POA     E. coli UTI (urinary tract infection)     Syncope     Acute pain of right shoulder     Hypotension     DM (diabetes mellitus), secondary uncontrolled ([facility])     Generalized anxiety disorder     Nausea & vomiting     Fever     Debility     Gait disturbance     Acute respiratory failure ([facility])     Hyperglycemia     Pleural effusion on left     UTI (urinary tract infection), bacterial     Sinus tachycardia     Uncontrolled type 2 diabetes mellitus with hyperglycemia ([facility])     Class 3 severe obesity due to excess calories with body mass index (BMI) of 45.0 to 49.9 in adult ([address]     Anasarca     Acute kidney injury ([address]     DM (diabetes mellitus) ([facility])     Fluid overload     Cor pulmonale (chronic) ([facility])     Cellulitis of abdominal wall     STEMI (ST elevation myocardial infarction) ([address]     Acute respiratory distress     Hyperkalemia     Uncontrolled diabetes mellitus with chronic kidney disease ([facility])     Acute on chronic diastolic (congestive) heart failure ([facility])     COPD with acute exacerbation ([address]     Heart failure ([address] CHF (congestive heart failure), NYHA class I, acute, systolic ([facility])     Sepsis ([facility]     Hypoglycemia      Plan:     1. Reviewed POC blood glucose . Labs and X ray results   2. Reviewed Current Medicines   3. On meal/ Correction bolus Humalog/ Basal Lantus Insulin regime   4. Monitor Blood glucose frequently   5. Modified  the dose of Insulin/ other medicines as needed   6. Stilted urology for bladder dysfunction  7. Will follow     .      Fidelina Call MD

## 2020-10-08 NOTE — PROGRESS NOTES
[facility] [address]   Progress Note  [facility] 0 1 2      Date: 10/8/2020   Patient: Clifford Mosley   : 1960   DOA: 10/6/2020   MRN: [id number]   ROOM#: [id number]     Admit Date: 10/6/2020     Collaborating Urologist on Call at time of admission: Dr. Sherry Ko  CC: hypoglycemia  Reason for Consult: Catheter placement    Subjective:     Pain: none, no nausea and no vomiting,   Bowel Movement/Flatus:   Yes  Voidinfr mixon catheter in place draining clear yellow urine     Pt resting comfortably in bed. Discussed mixon removal either prior to discharge or next week in our office. Pt is more comfortable with mixon removal next week in our office.     Objective:    Vitals:    BP (!) 98/46   Pulse 89   Temp 98.6 °F (37 °C) (Oral)   Resp 16   Ht 5' 4\" (1.626 m)   Wt 274 lb 6.4 oz (124.5 kg)   SpO2 99%   BMI 47.10 kg/m²    Temp  Av.5 °F (36.9 °C)  Min: 98.3 °F (36.8 °C)  Max: 98.7 °F (37.1 °C)       Intake/Output Summary (Last 24 hours) at 10/8/2020 1255  Last data filed at 10/8/2020 1220  Gross per 24 hour   Intake 960 ml   Output 2200 ml   Net -1240 ml       Physical Exam:   General appearance: alert, appears stated age, cooperative, no distress and morbidly obese  Head: Normocephalic, without obvious abnormality, atraumatic  Back: No CVA tenderness  Abdomen: Soft, non-distended, non-tender    Labs:   WBC:    Lab Results   Component Value Date    WBC 9.0 10/08/2020      Hemoglobin/Hematocrit:    Lab Results   Component Value Date    HGB 9.5 10/08/2020    HCT 29.6 10/08/2020      BMP:   Lab Results   Component Value Date     10/08/2020    K 4.0 10/08/2020     10/08/2020    CO2 28 10/08/2020    BUN 71 10/08/2020    LABALBU 3.3 10/08/2020    CREATININE 2.5 10/08/2020    CALCIUM 8.6 10/08/2020    GFRAA 24 10/08/2020    LABGLOM 20 10/08/2020      PT/INR:    Lab Results   Component Value Date    PROTIME 10.8 10/01/2020    INR 0.89 10/01/2020      PTT:    Lab Results Component Value Date    APTT 23.5 10/01/2020        Urine Culture:  Susceptibility     Escherichia coli (2)     Antibiotic  Interpretation  ILSA  Status     ampicillin  Sensitive  4  Final     ceFAZolin  Sensitive  <=4  Final     ceFAZolin    Final      (NOTE)   NOTE: Cefazolin should only be used for uncomplicated UTI        for E.coli or Klebsiella pneumoniae. cefepime  Sensitive  <=0.12  Final     ciprofloxacin  Sensitive  1  Final     ertapenem  Sensitive  <=0.12  Final     gentamicin  Sensitive  <=1  Final     levofloxacin  Sensitive  1  Final     piperacillin-tazobactam  Sensitive  <=4  Final     trimethoprim-sulfamethoxazole  Sensitive  <=20  Final     nitrofurantoin  Sensitive  <=16  Final           Imaging:  Ct Abdomen Pelvis Wo Contrast Additional Contrast? None    Result Date: 10/6/2020  EXAMINATION: CT OF THE ABDOMEN AND PELVIS WITHOUT CONTRAST; CT OF THE CHEST WITHOUT CONTRAST 10/6/2020 10:32 am TECHNIQUE: CT of the abdomen and pelvis was performed without the administration of intravenous contrast. Multiplanar reformatted images are provided for review. Dose modulation, iterative reconstruction, and/or weight based adjustment of the mA/kV was utilized to reduce the radiation dose to as low as reasonably achievable.; CT of the chest was performed without the administration of intravenous contrast. Multiplanar reformatted images are provided for review. Dose modulation, iterative reconstruction, and/or weight based adjustment of the mA/kV was utilized to reduce the radiation dose to as low as reasonably achievable.  COMPARISON: 06/20/2020, 11/24/2019 HISTORY: ORDERING SYSTEM PROVIDED HISTORY: hypothermia ? unclear source of infection, AMS TECHNOLOGIST PROVIDED HISTORY: Reason for exam:->hypothermia ? unclear source of infection, AMS Additional Contrast?->None Reason for Exam: hypothermia ? unclear source of infection, AMS Acuity: Acute Type of Exam: Initial Additional signs and symptoms: none Relevant Medical/Surgical History: poor historian; ORDERING SYSTEM PROVIDED HISTORY: hypothermia ? unclear source of infection, AMS TECHNOLOGIST PROVIDED HISTORY: Reason for exam:->hypothermia ? unclear source of infection, AMS Reason for Exam: hypothermia ? unclear source of infection, AMS Acuity: Acute Type of Exam: Initial Additional signs and symptoms: none Relevant Medical/Surgical History: poor historian FINDINGS: Chest: Mediastinum: The heart is enlarged. There is no pericardial effusion. Severe coronary artery calcifications are present. The thoracic aorta is atherosclerotic but nonaneurysmal.  No mediastinal or hilar lymphadenopathy is demonstrated. Lungs/pleura: No focal airspace consolidation, pleural effusion or pneumothorax is demonstrated. There are a few punctate bilateral pulmonary nodules which are most likely benign and require no further routine imaging follow-up given the small size. No suspicious or concerning pulmonary nodules are seen. Soft Tissues/Bones: No suspicious osteolytic or osteoblastic lesions are demonstrated. There are old fractures of the posterior left 10th and 11th ribs. Abdomen/Pelvis: Organs: Status post cholecystectomy. The unenhanced liver, spleen, pancreas and adrenal glands are unremarkable. There are tiny bilateral renal calculi, favored to be vascular. A 5 mm cortical calcification in the lower pole of the right kidney is stable. No hydronephrosis. GI/Bowel: No bowel obstruction. No evidence of appendicitis. Pelvis: The urinary bladder is distended. No pelvic mass or fluid collection. Peritoneum/Retroperitoneum: The abdominal aorta is atherosclerotic but nonaneurysmal.  No retroperitoneal lymphadenopathy. Bones/Soft Tissues: No suspicious osteolytic or osteoblastic lesions are demonstrated. 1. No acute abnormality in the chest, abdomen or pelvis. 2. Severe coronary artery disease.      Ct Head Wo Contrast    Result Date: 10/6/2020  EXAMINATION: CT OF THE HEAD Chest Wo Contrast    Result Date: 10/6/2020  EXAMINATION: CT OF THE ABDOMEN AND PELVIS WITHOUT CONTRAST; CT OF THE CHEST WITHOUT CONTRAST 10/6/2020 10:32 am TECHNIQUE: CT of the abdomen and pelvis was performed without the administration of intravenous contrast. Multiplanar reformatted images are provided for review. Dose modulation, iterative reconstruction, and/or weight based adjustment of the mA/kV was utilized to reduce the radiation dose to as low as reasonably achievable.; CT of the chest was performed without the administration of intravenous contrast. Multiplanar reformatted images are provided for review. Dose modulation, iterative reconstruction, and/or weight based adjustment of the mA/kV was utilized to reduce the radiation dose to as low as reasonably achievable. COMPARISON: 06/20/2020, 11/24/2019 HISTORY: ORDERING SYSTEM PROVIDED HISTORY: hypothermia ? unclear source of infection, AMS TECHNOLOGIST PROVIDED HISTORY: Reason for exam:->hypothermia ? unclear source of infection, AMS Additional Contrast?->None Reason for Exam: hypothermia ? unclear source of infection, AMS Acuity: Acute Type of Exam: Initial Additional signs and symptoms: none Relevant Medical/Surgical History: poor historian; ORDERING SYSTEM PROVIDED HISTORY: hypothermia ? unclear source of infection, AMS TECHNOLOGIST PROVIDED HISTORY: Reason for exam:->hypothermia ? unclear source of infection, AMS Reason for Exam: hypothermia ? unclear source of infection, AMS Acuity: Acute Type of Exam: Initial Additional signs and symptoms: none Relevant Medical/Surgical History: poor historian FINDINGS: Chest: Mediastinum: The heart is enlarged. There is no pericardial effusion. Severe coronary artery calcifications are present. The thoracic aorta is atherosclerotic but nonaneurysmal.  No mediastinal or hilar lymphadenopathy is demonstrated. Lungs/pleura: No focal airspace consolidation, pleural effusion or pneumothorax is demonstrated.   There are for mixon removal and voiding trial.    Patient seen and examined, chart reviewed.      Electronically signed by Peace Reyes PA-C on 10/8/2020 at 12:55 PM

## 2020-10-08 NOTE — PROGRESS NOTES
Occupational Therapy    [facility] ACUTE CARE OCCUPATIONAL THERAPY EVALUATION  Jerry Mosley, 1960, [id number], 10/8/2020    History  [place]:  The primary encounter diagnosis was Hypothermia, initial encounter. A diagnosis of Hypoglycemia was also pertinent to this visit. Patient  has a past medical history of Acute on chronic systolic CHF (congestive heart failure) ([address] CAD (coronary artery disease), CKD (chronic kidney disease) stage 3, GFR 30-59 ml/min, COPD with acute exacerbation ([address] Diabetes type 2, uncontrolled ([address] Diastolic heart failure ([address] Essential hypertension, Financial problems, Generalized anxiety disorder, Herpes genitalia, Obesity, morbid (more than 100 lbs over ideal weight or BMI > 40) ([address] and STEMI (ST elevation myocardial infarction) ([address] Patient  has a past surgical history that includes Cholecystectomy;  section; Tonsillectomy; other surgical history (Right, [id number]); Cardiac surgery; and Coronary angioplasty with stent. Subjective:  Patient states:  \"I only can walk a little bit anywy\".     Pain:  4/10 pain in back pt states this is chronic  Pain Intervention: Increased movement, repositioned, RN notified  Communication with other providers:  Handoff to RN  Restrictions: General Precautions, Fall Risk    Home Setup/Prior level of function       Social/Functional History  Lives With: Spouse  ADL Assistance: Needs assistance(spouse assists with bathing and dressing due to chronic difficulty related to COPD and obesity/impaired reaching (especially distal components) ; she toilets self Sarthak)  Ambulation Assistance: Independent  Transfer Assistance: Independent  Uses 4WW for household distances and wheelchair for community mobility    Examination of body systems (includes body structures/functions, activity/participation limitations):  · Observation:  Supine in bed upon arrival  · [place]:  "[employer] [place]  · [facility] [place]  · Cardiopulmonary:  2L of 02      Body Systems and functions:  · ROM R/L:  WFL. · Strength R/L:  4+/5,   · Sensation: Numbness/tingling in fingers and toes  · Tone: Normal  · Coordination: WFL  · Perception: WNL    Activities of Daily Living (ADLs):  · Feeding: Independent  · Grooming: SBA   · UB bathing: Supervision  · LB bathing: SBA  · UB dressing: Supervision  · LB dressing: SBA  · Toileting: SBA    Cognitive and Psychosocial Functioning:  · Overall cognitive status: WFL  · Affect: Normal        Mobility:  · Supine to sit:  Supervision  · Transfers: SBA from EOB up to RW  · Sitting balance:  Supervision. · Standing balance:  SBA w/ RW.  · Functional Mobility: SBA w/ RW ~20 feet before fatiguing  · Toilet/Shower Transfers: DNT                  Treatment:  Therapeutic Activity Training:   Therapeutic activity training was instructed today. Cues were given for safety, sequence, UE/LE placement, awareness, and balance. Activities performed today included bed mobility training, sup-sit, sit-stand, functional mobility, stand to sit    Safety: patient left in chair with chair alarm, call light within reach, RN notified, gait belt used. Assessment:  Pt is a 62 yo female admitted from home for hypoglycemia. Pt at baseline is needs assistance for ADLs needs assistance for high level IADLs and is Independent for functional transfers/mobility w/ AD. Pt currently presents w/ minimal deficits in ADL and high level IADL independence, functional activity tolerance, dynamic sitting and standing balance and tolerance and functional transfers, BUE strength. Pt would benefit from continued acute care OT services w/ discharge to home w/ home health OT S1 w/ 24/7 supervision/assist  Complexity: Low  Prognosis: Good, no significant barriers to participation at this time.    Plan  Times per week: 1x+  Times per day: Daily  Current Treatment Recommendations: Strengthening, Endurance Training, Patient/Caregiver Education & Training, Equipment Evaluation,

## 2020-10-09 VITALS
WEIGHT: 274.4 LBS | DIASTOLIC BLOOD PRESSURE: 61 MMHG | BODY MASS INDEX: 46.85 KG/M2 | RESPIRATION RATE: 17 BRPM | HEIGHT: 64 IN | OXYGEN SATURATION: 96 % | TEMPERATURE: 97.7 F | HEART RATE: 87 BPM | SYSTOLIC BLOOD PRESSURE: 105 MMHG

## 2020-10-09 LAB
ANION GAP SERPL CALCULATED.3IONS-SCNC: 16 MMOL/L (ref 4–16)
BASOPHILS ABSOLUTE: 0.1 K/CU MM
BASOPHILS RELATIVE PERCENT: 0.5 % (ref 0–1)
BUN BLDV-MCNC: 64 MG/DL (ref 6–23)
CALCIUM SERPL-MCNC: 8.6 MG/DL (ref 8.3–10.6)
CHLORIDE BLD-SCNC: 101 MMOL/L (ref 99–110)
CO2: 22 MMOL/L (ref 21–32)
CREAT SERPL-MCNC: 2.4 MG/DL (ref 0.6–1.1)
DIFFERENTIAL TYPE: ABNORMAL
EOSINOPHILS ABSOLUTE: 0.2 K/CU MM
EOSINOPHILS RELATIVE PERCENT: 2.1 % (ref 0–3)
GFR AFRICAN AMERICAN: 25 ML/MIN/1.73M2
GFR NON-AFRICAN AMERICAN: 21 ML/MIN/1.73M2
GLUCOSE BLD-MCNC: 138 MG/DL (ref 70–99)
GLUCOSE BLD-MCNC: 150 MG/DL (ref 70–99)
HCT VFR BLD CALC: 33.7 % (ref 37–47)
HEMOGLOBIN: 10.4 GM/DL (ref 12.5–16)
IMMATURE NEUTROPHIL %: 2 % (ref 0–0.43)
LYMPHOCYTES ABSOLUTE: 1.8 K/CU MM
LYMPHOCYTES RELATIVE PERCENT: 16.7 % (ref 24–44)
MCH RBC QN AUTO: 29 PG (ref 27–31)
MCHC RBC AUTO-ENTMCNC: 30.9 % (ref 32–36)
MCV RBC AUTO: 93.9 FL (ref 78–100)
MONOCYTES ABSOLUTE: 0.8 K/CU MM
MONOCYTES RELATIVE PERCENT: 7.9 % (ref 0–4)
NUCLEATED RBC %: 0 %
PDW BLD-RTO: 18.3 % (ref 11.7–14.9)
PLATELET # BLD: 230 K/CU MM (ref 140–440)
PMV BLD AUTO: 10.9 FL (ref 7.5–11.1)
POTASSIUM SERPL-SCNC: 4.1 MMOL/L (ref 3.5–5.1)
RBC # BLD: 3.59 M/CU MM (ref 4.2–5.4)
SEGMENTED NEUTROPHILS ABSOLUTE COUNT: 7.4 K/CU MM
SEGMENTED NEUTROPHILS RELATIVE PERCENT: 70.8 % (ref 36–66)
SODIUM BLD-SCNC: 139 MMOL/L (ref 135–145)
TOTAL IMMATURE NEUTOROPHIL: 0.21 K/CU MM
TOTAL NUCLEATED RBC: 0 K/CU MM
WBC # BLD: 10.5 K/CU MM (ref 4–10.5)

## 2020-10-09 PROCEDURE — 2700000000 HC OXYGEN THERAPY PER DAY

## 2020-10-09 PROCEDURE — G0378 HOSPITAL OBSERVATION PER HR: HCPCS

## 2020-10-09 PROCEDURE — 6370000000 HC RX 637 (ALT 250 FOR IP): Performed by: NURSE PRACTITIONER

## 2020-10-09 PROCEDURE — 36415 COLL VENOUS BLD VENIPUNCTURE: CPT

## 2020-10-09 PROCEDURE — 6370000000 HC RX 637 (ALT 250 FOR IP): Performed by: INTERNAL MEDICINE

## 2020-10-09 PROCEDURE — 2580000003 HC RX 258: Performed by: INTERNAL MEDICINE

## 2020-10-09 PROCEDURE — 82962 GLUCOSE BLOOD TEST: CPT

## 2020-10-09 PROCEDURE — 85025 COMPLETE CBC W/AUTO DIFF WBC: CPT

## 2020-10-09 PROCEDURE — 94761 N-INVAS EAR/PLS OXIMETRY MLT: CPT

## 2020-10-09 PROCEDURE — 96376 TX/PRO/DX INJ SAME DRUG ADON: CPT

## 2020-10-09 PROCEDURE — 80048 BASIC METABOLIC PNL TOTAL CA: CPT

## 2020-10-09 PROCEDURE — 6360000002 HC RX W HCPCS: Performed by: INTERNAL MEDICINE

## 2020-10-09 PROCEDURE — 94640 AIRWAY INHALATION TREATMENT: CPT

## 2020-10-09 RX ORDER — CEFDINIR 300 MG/1
300 CAPSULE ORAL 2 TIMES DAILY
Qty: 10 CAPSULE | Refills: 0 | Status: SHIPPED | OUTPATIENT
Start: 2020-10-09 | End: 2020-10-14

## 2020-10-09 RX ORDER — METOPROLOL SUCCINATE 25 MG/1
12.5 TABLET, EXTENDED RELEASE ORAL DAILY
Qty: 30 TABLET | Refills: 3 | Status: SHIPPED | OUTPATIENT
Start: 2020-10-09

## 2020-10-09 RX ORDER — INSULIN GLARGINE 100 [IU]/ML
20 INJECTION, SOLUTION SUBCUTANEOUS NIGHTLY
Qty: 5 PEN | Refills: 1 | Status: SHIPPED | OUTPATIENT
Start: 2020-10-09

## 2020-10-09 RX ORDER — LOPERAMIDE HYDROCHLORIDE 2 MG/1
2 CAPSULE ORAL 4 TIMES DAILY PRN
Status: DISCONTINUED | OUTPATIENT
Start: 2020-10-09 | End: 2020-10-09 | Stop reason: HOSPADM

## 2020-10-09 RX ADMIN — Medication 3000 UNITS: at 09:04

## 2020-10-09 RX ADMIN — LOPERAMIDE HYDROCHLORIDE 2 MG: 2 CAPSULE ORAL at 06:36

## 2020-10-09 RX ADMIN — SODIUM CHLORIDE, PRESERVATIVE FREE 10 ML: 5 INJECTION INTRAVENOUS at 09:11

## 2020-10-09 RX ADMIN — HYDROCODONE BITARTRATE AND ACETAMINOPHEN 1 TABLET: 5; 325 TABLET ORAL at 04:00

## 2020-10-09 RX ADMIN — BUPROPION HYDROCHLORIDE 100 MG: 100 TABLET, FILM COATED, EXTENDED RELEASE ORAL at 09:04

## 2020-10-09 RX ADMIN — PANTOPRAZOLE SODIUM 40 MG: 40 TABLET, DELAYED RELEASE ORAL at 06:36

## 2020-10-09 RX ADMIN — LACTULOSE 10 G: 10 SOLUTION ORAL at 00:47

## 2020-10-09 RX ADMIN — CEFAZOLIN SODIUM 1 G: 1 INJECTION, POWDER, FOR SOLUTION INTRAMUSCULAR; INTRAVENOUS at 09:04

## 2020-10-09 RX ADMIN — IPRATROPIUM BROMIDE AND ALBUTEROL SULFATE 3 ML: .5; 3 SOLUTION RESPIRATORY (INHALATION) at 09:37

## 2020-10-09 RX ADMIN — HYDROCODONE BITARTRATE AND ACETAMINOPHEN 1 TABLET: 5; 325 TABLET ORAL at 09:13

## 2020-10-09 RX ADMIN — CLOPIDOGREL BISULFATE 75 MG: 75 TABLET ORAL at 09:03

## 2020-10-09 RX ADMIN — LEVOTHYROXINE SODIUM 50 MCG: 50 TABLET ORAL at 09:04

## 2020-10-09 RX ADMIN — LORAZEPAM 1 MG: 1 TABLET ORAL at 09:04

## 2020-10-09 RX ADMIN — TIOTROPIUM BROMIDE INHALATION SPRAY 2 PUFF: 3.12 SPRAY, METERED RESPIRATORY (INHALATION) at 09:37

## 2020-10-09 RX ADMIN — DULOXETINE HYDROCHLORIDE 60 MG: 30 CAPSULE, DELAYED RELEASE ORAL at 09:04

## 2020-10-09 RX ADMIN — BUDESONIDE AND FORMOTEROL FUMARATE DIHYDRATE 2 PUFF: 80; 4.5 AEROSOL RESPIRATORY (INHALATION) at 09:37

## 2020-10-09 RX ADMIN — RANOLAZINE 500 MG: 500 TABLET, EXTENDED RELEASE ORAL at 09:03

## 2020-10-09 RX ADMIN — ASPIRIN 81 MG CHEWABLE TABLET 81 MG: 81 TABLET CHEWABLE at 09:04

## 2020-10-09 ASSESSMENT — PAIN DESCRIPTION - PAIN TYPE: TYPE: ACUTE PAIN

## 2020-10-09 ASSESSMENT — PAIN DESCRIPTION - LOCATION: LOCATION: BACK

## 2020-10-09 ASSESSMENT — PAIN SCALES - GENERAL
PAINLEVEL_OUTOF10: 8
PAINLEVEL_OUTOF10: 9

## 2020-10-09 NOTE — PROGRESS NOTES
Progress Note( Dr. Kenny Quiroz)  10/8/2020  Subjective:   Admit Date: 10/6/2020  PCP: Paige Marquez MD    Admitted For : Altered mental state with hypoglycemia    Consulted For: Better control of blood glucose    Interval History: Pains of inability to pass urine seems like she has had not able to empty her bladder  Passed Reyes catheter. Denies any chest pains,   Moderate SOB . Denies nausea or vomiting. No new bowel or bladder symptoms. Intake/Output Summary (Last 24 hours) at 10/8/2020 2138  Last data filed at 10/8/2020 1848  Gross per 24 hour   Intake 960 ml   Output 2050 ml   Net -1090 ml       DATA    CBC:   Recent Labs     10/06/20  0540 10/07/20  0330 10/08/20  0545   WBC 13.6* 12.7* 9.0   HGB 11.6* 9.3* 9.5*    211 210    CMP:  Recent Labs     10/06/20  0540 10/07/20  0330 10/08/20  0545   * 136 141   K 3.6 3.6 4.0   CL 93* 98* 102   CO2 24 27 28   BUN 82* 78* 71*   CREATININE 3.0* 2.9* 2.5*   CALCIUM 8.9 8.2* 8.6   PROT 6.0* 4.8* 4.9*   LABALBU 3.7 3.3* 3.3*   BILITOT 0.5 0.5 0.3   ALKPHOS 106 82 101   AST 25 13* 14*   ALT 26 19 16     Lipids:   Lab Results   Component Value Date    CHOL 117 05/13/2020    HDL 39 05/13/2020    TRIG 158 05/13/2020     Glucose:  Recent Labs     10/08/20  1132 10/08/20  1651 10/08/20  2100   POCGLU 225* 103* 260*     [id number]:  Lab Results   Component Value Date    LABA1C 10.5 10/06/2020     High Sensitivity TSH:   Lab Results   Component Value Date    TSHHS 3.220 10/06/2020     Free T3: No results found for: FT3  Free T4:  Lab Results   Component Value Date    T4FREE 1.11 02/28/2019       Ct Abdomen Pelvis Wo Contrast Additional Contrast? None    Result Date: 10/6/2020  EXAMINATION: CT OF THE ABDOMEN AND PELVIS WITHOUT CONTRAST; CT OF THE CHEST WITHOUT CONTRAST 10/6/2020 10:32 am T      1. No acute abnormality in the chest, abdomen or pelvis. 2. Severe coronary artery disease.      Ct Head Wo Contrast    Result Date: 10/6/2020  EXAMINATION: CT OF THE HEAD WITHOUT CONTRAST  10/6/2020 5:27 am        No evidence of acute intracranial abnormality. Ct Chest Wo Contrast    Result Date: 10/6/2020  EXAMINATION: CT OF THE ABDOMEN AND PELVIS WITHOUT CONTRAST; CT OF THE CHEST WITHOUT CONTRAST 10/6/2020 10:32 am       1. No acute abnormality in the chest, abdomen or pelvis. 2. Severe coronary artery disease.      Xr Chest Portable    Result Date: 10/6/2020  EXAMINATION: ONE XRAY VIEW OF THE CHEST 10/6/2020 7:51 am     No acute cardiopulmonary disease       Scheduled Medicines   Medications:    HYDROcodone-acetaminophen  2 tablet Oral Once    influenza virus vaccine  0.5 mL Intramuscular Once    ceFAZolin (ANCEF) 1 g in dextrose 5 % 50 mL IVPB (mini-bag)  1 g Intravenous Q12H    acetaminophen  500 mg Oral Once    aspirin  81 mg Oral Daily    atorvastatin  80 mg Oral Nightly    budesonide-formoterol  2 puff Inhalation BID    buPROPion  100 mg Oral BID    clopidogrel  75 mg Oral Daily    docusate sodium  100 mg Oral BID    DULoxetine  60 mg Oral Daily    ipratropium-albuterol  1 vial Inhalation Q4H    lactulose  10 g Oral BID    levothyroxine  50 mcg Oral Daily    LORazepam  1 mg Oral BID    metoprolol succinate  25 mg Oral Daily    pantoprazole  40 mg Oral QAM AC    ranolazine  500 mg Oral BID    tiotropium  2 puff Inhalation Daily    vitamin D  3,000 Units Oral Daily    sodium chloride flush  10 mL Intravenous 2 times per day    enoxaparin  40 mg Subcutaneous Daily    insulin lispro  0-18 Units Subcutaneous TID WC    insulin glargine  20 Units Subcutaneous Nightly    insulin lispro  0-9 Units Subcutaneous 2 times per day    insulin lispro  10 Units Subcutaneous TID WC      Infusions:    dextrose           Objective:   Vitals: /63   Pulse 78   Temp 98.4 °F (36.9 °C) (Oral)   Resp 18   Ht 5' 4\" (1.626 m)   Wt 274 lb 6.4 oz (124.5 kg)   SpO2 97%   BMI 47.10 kg/m²   General appearance: alert and cooperative with exam  Neck: no JVD or bruit  Thyroid : Normal lobes   Lungs: Has Vesicular Breath sounds   Heart:  regular rate and rhythm  Abdomen: soft, non-tender; bowel sounds normal; no masses,  no organomegaly  Musculoskeletal: Normal  Extremities: extremities normal, , no edema  Neurologic:  Awake, alert, oriented to name, place and time. Cranial nerves II-XII are grossly intact. Motor is  intact.   Sensory neuropathy t.,  and gait is abnormal.  And end-stage    Assessment:     Patient Active Problem List:     CKD (chronic kidney disease) stage 3, GFR 30-59 ml/min ([facility])     CAD (coronary artery disease)     Chronic diastolic heart failure ([facility])     Diabetes type 2, uncontrolled ([facility] [address]     Morbid obesity with BMI of 50.0-59.9, adult ([facility])     Elevated lactic acid level, resolved     Musculoskeletal chest pain     Essential hypertension     Diabetes mellitus with hyperglycemia ([facility])     Severe dehydration secondary to significant hyperglycemia     Sepsis secondary to UTI, POA     Generalized weakness     Sepsis associated hypotension, POA     E. coli UTI (urinary tract infection)     Syncope     Acute pain of right shoulder     Hypotension     DM (diabetes mellitus), secondary uncontrolled ([facility])     Generalized anxiety disorder     Nausea & vomiting     Fever     Debility     Gait disturbance     Acute respiratory failure ([facility])     Hyperglycemia     Pleural effusion on left     UTI (urinary tract infection), bacterial     Sinus tachycardia     Uncontrolled type 2 diabetes mellitus with hyperglycemia ([facility])     Class 3 severe obesity due to excess calories with body mass index (BMI) of 45.0 to 49.9 in adult ([facility] [address]     Anasarca     Acute kidney injury ([facility] [address]     DM (diabetes mellitus) ([facility])     Fluid overload     Cor pulmonale (chronic) ([facility])     Cellulitis of abdominal wall     STEMI (ST elevation myocardial infarction) ([address]     Acute respiratory distress     Hyperkalemia     Uncontrolled diabetes mellitus with chronic kidney disease ([address]

## 2020-10-09 NOTE — CARE COORDINATION
LSW received discharge orders for home with [facility]. LSW PS Michell with [facility] and gave referral [facility] will look over pt inf and initiate HC services if they can take pt.

## 2020-10-09 NOTE — PROGRESS NOTES
Pt requesting evening lactulose at this time. Following dose, pt having frequent, large, and loose bowel movements. Pt states she usually takes lactulose at home because she gets constipated from her pain medication. Pt educated on non-pharmaceutical methods to try and have a BM.

## 2020-10-09 NOTE — PROGRESS NOTES
Pt continues to have frequent but smaller bowel movements that are runny and loose. When she has the need to void it is urgent.

## 2020-10-09 NOTE — DISCHARGE SUMMARY
Discharge Summary Note  Patient ID:  Claudette Flannery  [id number]  26 y.o.  1960    Admit date: 10/6/2020    Discharge date and time: 10/9/2020 12:41 PM     Admitting Physician: No admitting provider for patient encounter. Discharge Physician: Fred Hernandez MD    Admission Diagnoses:   Hypoglycemia [E16.2]  Hypothermia, initial encounter [T68. XXXA]  Hypoglycemia [E16.2]    Discharge Diagnoses and hospital course:   Acute metabolic encephalopathy likely from hypoglycemia-resolved  Reduced Lantus dose to 20 units on discharge and to continue sliding scale insulin  Endocrinology recommendations appreciated    JUAN J on CKD stage IV-Resolved  Acute urinary retention  Likely secondary to dehydration from diuretics and acute urinary retention  Currently creatinine back to baseline  Currently creatinine trending down  Continue to hold off diuretics on discharge  Nephrology recommendations appreciated  Urology recommendations appreciated, to continue Reyes on discharge-to follow-up as an outpatient    Hypertension  Her blood pressure continues to be low  Discontinued Imdur and to restart half the dose of metoprolol on discharge    DM type II  HFrEF -not in exacerbation  Chronic respiratory failure on home oxygen  COPD with no exacerbation  CAD  Hypertension  Morbid obesity -BMI 47, encourage weight loss    Admission Condition: Fair    Discharged Condition: stable    Consults: nephrology, urology and Endocrinology    Significant Diagnostic Studies:   CBC with Differential:    Lab Results   Component Value Date    WBC 10.5 10/09/2020    RBC 3.59 10/09/2020    HGB 10.4 10/09/2020    HCT 33.7 10/09/2020     10/09/2020    MCV 93.9 10/09/2020    MCH 29.0 10/09/2020    MCHC 30.9 10/09/2020    RDW 18.3 10/09/2020    SEGSPCT 70.8 10/09/2020    LYMPHOPCT 16.7 10/09/2020    MONOPCT 7.9 10/09/2020    EOSPCT 3.1 10/19/2011    BASOPCT 0.5 10/09/2020    MONOSABS 0.8 10/09/2020    LYMPHSABS 1.8 10/09/2020    EOSABS 0.2 10/09/2020    BASOSABS 0.1 10/09/2020    DIFFTYPE AUTOMATED DIFFERENTIAL 10/09/2020     CMP:    Lab Results   Component Value Date     10/09/2020    K 4.1 10/09/2020     10/09/2020    CO2 22 10/09/2020    BUN 64 10/09/2020    CREATININE 2.4 10/09/2020    GFRAA 25 10/09/2020    LABGLOM 21 10/09/2020    GLUCOSE 150 10/09/2020    PROT 4.9 10/08/2020    PROT 6.5 10/18/2011    LABALBU 3.3 10/08/2020    CALCIUM 8.6 10/09/2020    BILITOT 0.3 10/08/2020    ALKPHOS 101 10/08/2020    AST 14 10/08/2020    ALT 16 10/08/2020     Ct Abdomen Pelvis Wo Contrast Additional Contrast? None    Result Date: 10/6/2020  EXAMINATION: CT OF THE ABDOMEN AND PELVIS WITHOUT CONTRAST; CT OF THE CHEST WITHOUT CONTRAST 10/6/2020 10:32 am TECHNIQUE: CT of the abdomen and pelvis was performed without the administration of intravenous contrast. Multiplanar reformatted images are provided for review. Dose modulation, iterative reconstruction, and/or weight based adjustment of the mA/kV was utilized to reduce the radiation dose to as low as reasonably achievable.; CT of the chest was performed without the administration of intravenous contrast. Multiplanar reformatted images are provided for review. Dose modulation, iterative reconstruction, and/or weight based adjustment of the mA/kV was utilized to reduce the radiation dose to as low as reasonably achievable.  COMPARISON: 06/20/2020, 11/24/2019 HISTORY: ORDERING SYSTEM PROVIDED HISTORY: hypothermia ? unclear source of infection, AMS TECHNOLOGIST PROVIDED HISTORY: Reason for exam:->hypothermia ? unclear source of infection, AMS Additional Contrast?->None Reason for Exam: hypothermia ? unclear source of infection, AMS Acuity: Acute Type of Exam: Initial Additional signs and symptoms: none Relevant Medical/Surgical History: poor historian; ORDERING SYSTEM PROVIDED HISTORY: hypothermia ? unclear source of infection, AMS TECHNOLOGIST PROVIDED HISTORY: Reason for exam:->hypothermia ? unclear source of infection, AMS Reason for Exam: hypothermia ? unclear source of infection, AMS Acuity: Acute Type of Exam: Initial Additional signs and symptoms: none Relevant Medical/Surgical History: poor historian FINDINGS: Chest: Mediastinum: The heart is enlarged. There is no pericardial effusion. Severe coronary artery calcifications are present. The thoracic aorta is atherosclerotic but nonaneurysmal.  No mediastinal or hilar lymphadenopathy is demonstrated. Lungs/pleura: No focal airspace consolidation, pleural effusion or pneumothorax is demonstrated. There are a few punctate bilateral pulmonary nodules which are most likely benign and require no further routine imaging follow-up given the small size. No suspicious or concerning pulmonary nodules are seen. Soft Tissues/Bones: No suspicious osteolytic or osteoblastic lesions are demonstrated. There are old fractures of the posterior left 10th and 11th ribs. Abdomen/Pelvis: Organs: Status post cholecystectomy. The unenhanced liver, spleen, pancreas and adrenal glands are unremarkable. There are tiny bilateral renal calculi, favored to be vascular. A 5 mm cortical calcification in the lower pole of the right kidney is stable. No hydronephrosis. GI/Bowel: No bowel obstruction. No evidence of appendicitis. Pelvis: The urinary bladder is distended. No pelvic mass or fluid collection. Peritoneum/Retroperitoneum: The abdominal aorta is atherosclerotic but nonaneurysmal.  No retroperitoneal lymphadenopathy. Bones/Soft Tissues: No suspicious osteolytic or osteoblastic lesions are demonstrated. 1. No acute abnormality in the chest, abdomen or pelvis. 2. Severe coronary artery disease.      Ct Head Wo Contrast    Result Date: 10/8/2020  EXAMINATION: CT OF THE HEAD WITHOUT CONTRAST  10/6/2020 5:27 am TECHNIQUE: CT of the head was performed without the administration of intravenous contrast. Dose modulation, iterative reconstruction, and/or weight based adjustment of the mA/kV was utilized to reduce the radiation dose to as low as reasonably achievable. COMPARISON: Prior studies most recent 6/22/2020 HISTORY: ORDERING SYSTEM PROVIDED HISTORY: headache TECHNOLOGIST PROVIDED HISTORY: Has a \"code stroke\" or \"stroke alert\" been called? ->No Reason for exam:->headache Reason for Exam: ams/low blood sugar. limited exam pt would not hold her head straight FINDINGS: BRAIN/VENTRICLES: Patient's head is tilted toward the right in the CT gantry. Mild motion/streak artifact is present on the examination. The ventricular system is unchanged in appearance. No evidence of mass effect or midline shift. Prominence of sulci overlying convexities of cerebral hemispheres and cerebellum again identified consistent with atrophy. Foci of abnormal low-attenuation within periventricular/subcortical white matter again identified which are likely on the basis of changes of mild ischemic leukoencephalopathy. Focal areas of remote infarction again identified in the left central sylvian/basal ganglia region and inferior left cerebellar hemisphere. Small focal calcification again identified within the rightward aspect of the brainstem/corey (image 18), unchanged. No definite new area of abnormal attenuation of brain parenchyma is identified. No abnormal extra-axial fluid collections are identified. There is atherosclerotic calcification of distal internal carotid and vertebral arteries. ORBITS: The visualized portion of the orbits demonstrate no acute abnormality. SINUSES: The visualized paranasal sinuses and mastoid air cells demonstrate no acute abnormality. SOFT TISSUES/SKULL:  No acute abnormality of the visualized skull or soft tissues. No evidence of acute intracranial abnormality.      Ct Chest Wo Contrast    Result Date: 10/6/2020  EXAMINATION: CT OF THE ABDOMEN AND PELVIS WITHOUT CONTRAST; CT OF THE CHEST WITHOUT CONTRAST 10/6/2020 10:32 am TECHNIQUE: CT of the abdomen and pelvis was performed without the administration of intravenous contrast. Multiplanar reformatted images are provided for review. Dose modulation, iterative reconstruction, and/or weight based adjustment of the mA/kV was utilized to reduce the radiation dose to as low as reasonably achievable.; CT of the chest was performed without the administration of intravenous contrast. Multiplanar reformatted images are provided for review. Dose modulation, iterative reconstruction, and/or weight based adjustment of the mA/kV was utilized to reduce the radiation dose to as low as reasonably achievable. COMPARISON: 06/20/2020, 11/24/2019 HISTORY: ORDERING SYSTEM PROVIDED HISTORY: hypothermia ? unclear source of infection, AMS TECHNOLOGIST PROVIDED HISTORY: Reason for exam:->hypothermia ? unclear source of infection, AMS Additional Contrast?->None Reason for Exam: hypothermia ? unclear source of infection, AMS Acuity: Acute Type of Exam: Initial Additional signs and symptoms: none Relevant Medical/Surgical History: poor historian; ORDERING SYSTEM PROVIDED HISTORY: hypothermia ? unclear source of infection, AMS TECHNOLOGIST PROVIDED HISTORY: Reason for exam:->hypothermia ? unclear source of infection, AMS Reason for Exam: hypothermia ? unclear source of infection, AMS Acuity: Acute Type of Exam: Initial Additional signs and symptoms: none Relevant Medical/Surgical History: poor historian FINDINGS: Chest: Mediastinum: The heart is enlarged. There is no pericardial effusion. Severe coronary artery calcifications are present. The thoracic aorta is atherosclerotic but nonaneurysmal.  No mediastinal or hilar lymphadenopathy is demonstrated. Lungs/pleura: No focal airspace consolidation, pleural effusion or pneumothorax is demonstrated. There are a few punctate bilateral pulmonary nodules which are most likely benign and require no further routine imaging follow-up given the small size.   No suspicious or concerning pulmonary nodules are seen. Soft Tissues/Bones: No suspicious osteolytic or osteoblastic lesions are demonstrated. There are old fractures of the posterior left 10th and 11th ribs. Abdomen/Pelvis: Organs: Status post cholecystectomy. The unenhanced liver, spleen, pancreas and adrenal glands are unremarkable. There are tiny bilateral renal calculi, favored to be vascular. A 5 mm cortical calcification in the lower pole of the right kidney is stable. No hydronephrosis. GI/Bowel: No bowel obstruction. No evidence of appendicitis. Pelvis: The urinary bladder is distended. No pelvic mass or fluid collection. Peritoneum/Retroperitoneum: The abdominal aorta is atherosclerotic but nonaneurysmal.  No retroperitoneal lymphadenopathy. Bones/Soft Tissues: No suspicious osteolytic or osteoblastic lesions are demonstrated. 1. No acute abnormality in the chest, abdomen or pelvis. 2. Severe coronary artery disease. Xr Chest Portable    Result Date: 10/6/2020  EXAMINATION: ONE XRAY VIEW OF THE CHEST 10/6/2020 7:51 am COMPARISON: 09/30/2020 HISTORY: ORDERING SYSTEM PROVIDED HISTORY: SOB TECHNOLOGIST PROVIDED HISTORY: Reason for exam:->SOB Reason for Exam: SOB Acuity: Acute Type of Exam: Initial Additional signs and symptoms: female who presents via EMS for hypoglycemia. They reports that patient's  called 911 because patient began acting progressively more sluggish and he was having difficulty waking patient up. When EMS got there her blood glucose was in the 50s. They gave her 400 mL of D10 and patient became alert and oriented and then began to complain of a headache. Patient reports to me that she has had a headache for approximately the last 24 hours. Headache was gradual in onset. FINDINGS: No focal airspace consolidation, pleural effusion or pneumothorax is demonstrated. There is stable elevation of the right hemidiaphragm. The heart is enlarged. No acute osseous abnormality is demonstrated.   There are advanced degenerative changes of the right glenohumeral joint. No acute cardiopulmonary disease       Discharge Exam:  GEN    Awake female, sitting upright in bed in no apparent distress. Appears given age. EYES   Pupils are equally round. No scleral erythema, discharge, or conjunctivitis. HENT  Mucous membranes are moist.   NECK  No apparent thyromegaly or masses. RESP  Clear to auscultation, no wheezes, rales or rhonchi. Symmetric chest movement while on room air. CARDIO/VASC           S1/S2 auscultated. Regular rate without appreciable murmurs, rubs, or gallops. Peripheral pulses equal bilaterally and palpable. No peripheral edema. GI        Abdomen is soft without significant tenderness, masses, or guarding. Bowel sounds are normoactive. Rectal exam deferred.        Reyes catheter is present. HEME/LYMPH            No petechiae or ecchymoses. MSK    No gross joint deformities. Spontaneous movement of all extremities  SKIN    Normal coloration, warm, dry. NEURO           Cranial nerves appear grossly intact, normal speech, no lateralizing weakness. PSYCH            Awake, alert, oriented x 4.   Affect appropriate.       Disposition: home    Patient Instructions:     Discharge Medications:   Mercedes Mosley 26 Medication Instructions NZS:[id number]    Printed on:10/09/20 1249   Medication Information                      albuterol sulfate HFA (VENTOLIN HFA) 108 (90 Base) MCG/ACT inhaler  Inhale 2 puffs into the lungs every 4 hours as needed for Wheezing             aspirin 81 MG chewable tablet  Take 1 tablet by mouth daily             atorvastatin (LIPITOR) 80 MG tablet  Take 1 tablet by mouth nightly             BREO ELLIPTA 100-25 MCG/INH AEPB inhaler  Inhale 1 puff into the lungs daily             buPROPion (WELLBUTRIN SR) 100 MG extended release tablet  Take 100 mg by mouth 2 times daily             cefdinir (OMNICEF) 300 MG capsule  Take 1 capsule by mouth 2 times daily for 5 days clopidogrel (PLAVIX) 75 MG tablet  Take 1 tablet by mouth daily             docusate (COLACE, DULCOLAX) 100 MG CAPS  Take 100 mg by mouth 2 times daily             DULoxetine (CYMBALTA) 60 MG extended release capsule  Take 1 capsule by mouth daily             HYDROcodone-acetaminophen (NORCO) 5-325 MG per tablet  Take 1 tablet by mouth every 4 hours as needed for Pain. insulin glargine (LANTUS SOLOSTAR) 100 UNIT/ML injection pen  Inject 20 Units into the skin nightly             insulin lispro (HUMALOG) 100 UNIT/ML injection vial  Check blood sugar three times daily before meals and at bedtime  For blood sugar less than 150= no insulin, 151-200=2, 201-250=4, 251-300=6, 301-350=6, 351-400=8, >400=10 units and call MD             insulin lispro (HUMALOG) 100 UNIT/ML injection vial  Inject 25 Units into the skin 3 times daily (before meals)             ipratropium-albuterol (DUONEB) 0.5-2.5 (3) MG/3ML SOLN nebulizer solution  Inhale 3 mLs into the lungs every 4 hours             lactulose (CHRONULAC) 10 GM/15ML solution  Take 15 mLs by mouth 2 times daily             levothyroxine (SYNTHROID) 50 MCG tablet  Take 50 mcg by mouth daily             linagliptin (TRADJENTA) 5 MG tablet  Take 1 tablet by mouth daily             LORazepam (ATIVAN) 1 MG tablet  Take 1 mg by mouth 2 times daily.               metoprolol succinate (TOPROL XL) 25 MG extended release tablet  Take 0.5 tablets by mouth daily             nystatin (MYCOSTATIN) 678001 UNIT/GM powder  Apply topically 2 times daily as needed             ondansetron (ZOFRAN ODT) 4 MG disintegrating tablet  Take 1 tablet by mouth every 8 hours as needed for Nausea             pantoprazole (PROTONIX) 40 MG tablet  Take 1 tablet by mouth every morning (before breakfast)             ranolazine (RANEXA) 500 MG extended release tablet  Take 1 tablet by mouth 2 times daily             tiotropium (SPIRIVA RESPIMAT) 2.5 MCG/ACT AERS inhaler  Inhale 2 puffs into the lungs daily             vitamin D 1000 units CAPS  Take 3 capsules by mouth daily                 Activity: activity as tolerated    Diet: cardiac diet    Wound Care: none needed    Follow-up:  PCP 1-2 weeks  Nephrology 1-2 weeks  Cardiology 2 weeks    Time Spent Doing discharge 35 min  Electronically signed by Moi Colindres MD  on 10/9/20 at 12:49 PM EDT

## 2020-10-09 NOTE — PROGRESS NOTES
[facility] Liaison spoke with pt and is aware of discharge & will initiate [facility].

## 2020-10-09 NOTE — PROGRESS NOTES
Nephrology Progress Note  10/9/2020 9:24 AM        Subjective:   Admit Date: 10/6/2020  PCP: Jj Marie MD    Interval History: diarrhea     Diet: some    ROS:  No sob , diarrhea , mild abd wall edema - UOP close to 1.5 l/d     Data:     Current meds:    HYDROcodone-acetaminophen  2 tablet Oral Once    influenza virus vaccine  0.5 mL Intramuscular Once    ceFAZolin (ANCEF) 1 g in dextrose 5 % 50 mL IVPB (mini-bag)  1 g Intravenous Q12H    acetaminophen  500 mg Oral Once    aspirin  81 mg Oral Daily    atorvastatin  80 mg Oral Nightly    budesonide-formoterol  2 puff Inhalation BID    buPROPion  100 mg Oral BID    clopidogrel  75 mg Oral Daily    docusate sodium  100 mg Oral BID    DULoxetine  60 mg Oral Daily    ipratropium-albuterol  1 vial Inhalation Q4H    lactulose  10 g Oral BID    levothyroxine  50 mcg Oral Daily    LORazepam  1 mg Oral BID    metoprolol succinate  25 mg Oral Daily    pantoprazole  40 mg Oral QAM AC    ranolazine  500 mg Oral BID    tiotropium  2 puff Inhalation Daily    vitamin D  3,000 Units Oral Daily    sodium chloride flush  10 mL Intravenous 2 times per day    enoxaparin  40 mg Subcutaneous Daily    insulin lispro  0-18 Units Subcutaneous TID WC    insulin glargine  20 Units Subcutaneous Nightly    insulin lispro  0-9 Units Subcutaneous 2 times per day    insulin lispro  10 Units Subcutaneous TID WC      dextrose           I/O last 3 completed shifts:   In: 960 [P.O.:960]  Out: 1450 [Urine:1450]    CBC:   Recent Labs     10/07/20  0330 10/08/20  0545 10/09/20  0522   WBC 12.7* 9.0 10.5   HGB 9.3* 9.5* 10.4*    210 230          Recent Labs     10/07/20  0330 10/08/20  0545 10/09/20  0522    141 139   K 3.6 4.0 4.1   CL 98* 102 101   CO2 27 28 22   BUN 78* 71* 64*   CREATININE 2.9* 2.5* 2.4*   GLUCOSE 131* 157* 150*       Lab Results   Component Value Date    CALCIUM 8.6 10/09/2020    PHOS 3.8 10/03/2020       Objective:     Vitals: /61 Pulse 87   Temp 97.7 °F (36.5 °C) (Oral)   Resp 16   Ht 5' 4\" (1.626 m)   Wt 274 lb 6.4 oz (124.5 kg)   SpO2 98%   BMI 47.10 kg/m²     General appearance:  No ac distress   HEENT:  + conj pallor  Neck:  supple  Lungs:  No crackles yet  Heart:  Seems RRR  Abdomen: soft, mild abd wall edema   Extremities:  Trace LE edema - has mixon       Problem List :         Impression :     1. JUAN J- CKD stage  3 B A3- recovering acceptable UOP  2. Ac bladder obstruction- appreciate urology in put-  She  will go home with mixon and close f/U with urology  3. Diarrhea- could be from lactulose   4. CMP/ASCVd/DM     Recommendation/Plan  :     1. Ok to d/C from renal standpoint   2. Low salt  3. No diureitcs now jo with diarrhea  4. Daily wt at home  5. Restart torsdmei 20/d if wt gain > 2 lbs   6. CMP , mg , phos in 1 wk  7. F/u with me in 2 wks  8. Also call me with wt gain > 2 lbs . Sob .  Edema etc       Noelle Edwards MD

## 2020-10-09 NOTE — PLAN OF CARE
Problem: Pain:  Goal: Pain level will decrease  Description: Pain level will decrease  10/9/2020 1104 by Jacky García  Outcome: Ongoing  10/8/2020 2354 by Mont Duane, RN  Outcome: Ongoing  Goal: Control of acute pain  Description: Control of acute pain  10/9/2020 1104 by Jacky García  Outcome: Ongoing  10/8/2020 2354 by Mont Duane, RN  Outcome: Ongoing  Goal: Control of chronic pain  Description: Control of chronic pain  10/9/2020 1104 by Jacky García  Outcome: Ongoing  10/8/2020 2354 by Mont Duane, RN  Outcome: Ongoing     Problem: Falls - Risk of:  Goal: Will remain free from falls  Description: Will remain free from falls  10/9/2020 1104 by Jacky García  Outcome: Ongoing  10/8/2020 2354 by Mont Duane, RN  Outcome: Ongoing  Goal: Absence of physical injury  Description: Absence of physical injury  10/9/2020 1104 by Jacky García  Outcome: Ongoing  10/8/2020 2354 by Mont Duane, RN  Outcome: Ongoing

## 2020-10-09 NOTE — PROGRESS NOTES
Progress Note( Dr. Nic Horne)  10/9/2020  Subjective:   Admit Date: 10/6/2020  PCP: Savannah Cerda MD    Admitted For : Altered mental state with hypoglycemia    Consulted For: Better control of blood glucose    Interval History: Pains of inability to pass urine seems like she has had not able to empty her bladder  Passed Reyes catheter. Denies any chest pains,   Moderate SOB . Denies nausea or vomiting. No new bowel or bladder symptoms. Intake/Output Summary (Last 24 hours) at 10/9/2020 0725  Last data filed at 10/9/2020 0442  Gross per 24 hour   Intake 960 ml   Output 1450 ml   Net -490 ml       DATA    CBC:   Recent Labs     10/07/20  0330 10/08/20  0545 10/09/20  0522   WBC 12.7* 9.0 10.5   HGB 9.3* 9.5* 10.4*    210 230    CMP:  Recent Labs     10/07/20  0330 10/08/20  0545 10/09/20  0522    141 139   K 3.6 4.0 4.1   CL 98* 102 101   CO2 27 28 22   BUN 78* 71* 64*   CREATININE 2.9* 2.5* 2.4*   CALCIUM 8.2* 8.6 8.6   PROT 4.8* 4.9*  --    LABALBU 3.3* 3.3*  --    BILITOT 0.5 0.3  --    ALKPHOS 82 101  --    AST 13* 14*  --    ALT 19 16  --      Lipids:   Lab Results   Component Value Date    CHOL 117 05/13/2020    HDL 39 05/13/2020    TRIG 158 05/13/2020     Glucose:  Recent Labs     10/08/20  1132 10/08/20  1651 10/08/20  2100   POCGLU 225* 103* 260*     [id number]:  Lab Results   Component Value Date    LABA1C 10.5 10/06/2020     High Sensitivity TSH:   Lab Results   Component Value Date    TSHHS 3.220 10/06/2020     Free T3: No results found for: FT3  Free T4:  Lab Results   Component Value Date    T4FREE 1.11 02/28/2019       Ct Abdomen Pelvis Wo Contrast Additional Contrast? None    Result Date: 10/6/2020  EXAMINATION: CT OF THE ABDOMEN AND PELVIS WITHOUT CONTRAST; CT OF THE CHEST WITHOUT CONTRAST 10/6/2020 10:32 am T      1. No acute abnormality in the chest, abdomen or pelvis. 2. Severe coronary artery disease.      Ct Head Wo Contrast    Result Date: 10/6/2020  EXAMINATION: CT OF THE HEAD WITHOUT CONTRAST  10/6/2020 5:27 am        No evidence of acute intracranial abnormality. Ct Chest Wo Contrast    Result Date: 10/6/2020  EXAMINATION: CT OF THE ABDOMEN AND PELVIS WITHOUT CONTRAST; CT OF THE CHEST WITHOUT CONTRAST 10/6/2020 10:32 am       1. No acute abnormality in the chest, abdomen or pelvis. 2. Severe coronary artery disease.      Xr Chest Portable    Result Date: 10/6/2020  EXAMINATION: ONE XRAY VIEW OF THE CHEST 10/6/2020 7:51 am     No acute cardiopulmonary disease       Scheduled Medicines   Medications:    HYDROcodone-acetaminophen  2 tablet Oral Once    influenza virus vaccine  0.5 mL Intramuscular Once    ceFAZolin (ANCEF) 1 g in dextrose 5 % 50 mL IVPB (mini-bag)  1 g Intravenous Q12H    acetaminophen  500 mg Oral Once    aspirin  81 mg Oral Daily    atorvastatin  80 mg Oral Nightly    budesonide-formoterol  2 puff Inhalation BID    buPROPion  100 mg Oral BID    clopidogrel  75 mg Oral Daily    docusate sodium  100 mg Oral BID    DULoxetine  60 mg Oral Daily    ipratropium-albuterol  1 vial Inhalation Q4H    lactulose  10 g Oral BID    levothyroxine  50 mcg Oral Daily    LORazepam  1 mg Oral BID    metoprolol succinate  25 mg Oral Daily    pantoprazole  40 mg Oral QAM AC    ranolazine  500 mg Oral BID    tiotropium  2 puff Inhalation Daily    vitamin D  3,000 Units Oral Daily    sodium chloride flush  10 mL Intravenous 2 times per day    enoxaparin  40 mg Subcutaneous Daily    insulin lispro  0-18 Units Subcutaneous TID WC    insulin glargine  20 Units Subcutaneous Nightly    insulin lispro  0-9 Units Subcutaneous 2 times per day    insulin lispro  10 Units Subcutaneous TID WC      Infusions:    dextrose           Objective:   Vitals: BP (!) 141/65   Pulse 92   Temp 97.9 °F (36.6 °C) (Oral)   Resp 18   Ht 5' 4\" (1.626 m)   Wt 274 lb 6.4 oz (124.5 kg)   SpO2 98%   BMI 47.10 kg/m²   General appearance: alert and cooperative with exam  Neck: no JVD or bruit  Thyroid : Normal lobes   Lungs: Has Vesicular Breath sounds   Heart:  regular rate and rhythm  Abdomen: soft, non-tender; bowel sounds normal; no masses,  no organomegaly  Musculoskeletal: Normal  Extremities: extremities normal, , no edema  Neurologic:  Awake, alert, oriented to name, place and time. Cranial nerves II-XII are grossly intact. Motor is  intact.   Sensory neuropathy t.,  and gait is abnormal.  And end-stage    Assessment:     Patient Active Problem List:     CKD (chronic kidney disease) stage 3, GFR 30-59 ml/min ([facility])     CAD (coronary artery disease)     Chronic diastolic heart failure ([facility])     Diabetes type 2, uncontrolled ([facility]     Morbid obesity with BMI of 50.0-59.9, adult ([facility])     Elevated lactic acid level, resolved     Musculoskeletal chest pain     Essential hypertension     Diabetes mellitus with hyperglycemia ([facility])     Severe dehydration secondary to significant hyperglycemia     Sepsis secondary to UTI, POA     Generalized weakness     Sepsis associated hypotension, POA     E. coli UTI (urinary tract infection)     Syncope     Acute pain of right shoulder     Hypotension     DM (diabetes mellitus), secondary uncontrolled ([facility])     Generalized anxiety disorder     Nausea & vomiting     Fever     Debility     Gait disturbance     Acute respiratory failure ([facility])     Hyperglycemia     Pleural effusion on left     UTI (urinary tract infection), bacterial     Sinus tachycardia     Uncontrolled type 2 diabetes mellitus with hyperglycemia ([facility])     Class 3 severe obesity due to excess calories with body mass index (BMI) of 45.0 to 49.9 in adult ([facility] [address]     Anasarca     Acute kidney injury ([facility] [address]     DM (diabetes mellitus) ([facility])     Fluid overload     Cor pulmonale (chronic) ([facility])     Cellulitis of abdominal wall     STEMI (ST elevation myocardial infarction) ([facility] [address]     Acute respiratory distress     Hyperkalemia     Uncontrolled diabetes mellitus with chronic kidney disease ([facility] [address]

## 2020-10-13 LAB
EKG ATRIAL RATE: 76 BPM
EKG DIAGNOSIS: NORMAL
EKG P AXIS: 50 DEGREES
EKG P-R INTERVAL: 204 MS
EKG Q-T INTERVAL: 460 MS
EKG QRS DURATION: 120 MS
EKG QTC CALCULATION (BAZETT): 517 MS
EKG R AXIS: 14 DEGREES
EKG T AXIS: 11 DEGREES
EKG VENTRICULAR RATE: 76 BPM

## 2020-10-19 NOTE — PROGRESS NOTES
Per Amador Frost at [facility]. ... pt made a non admit as they tried to see pt several times and was unsuccessful.

## 2020-11-17 ENCOUNTER — HOSPITAL ENCOUNTER (OUTPATIENT)
Age: 60
Discharge: HOME | End: 2020-11-17
Payer: MEDICARE

## 2020-11-17 LAB
ALBUMIN SERPL-MCNC: 4.1 GM/DL (ref 3.4–5)
ALP BLD-CCNC: 174 IU/L (ref 40–128)
ALT SERPL-CCNC: 10 U/L (ref 10–40)
ANION GAP SERPL CALCULATED.3IONS-SCNC: 11 MMOL/L (ref 4–16)
AST SERPL-CCNC: 10 IU/L (ref 15–37)
BILIRUB SERPL-MCNC: 0.4 MG/DL (ref 0–1)
BUN BLDV-MCNC: 61 MG/DL (ref 6–23)
CALCIUM SERPL-MCNC: 8.9 MG/DL (ref 8.3–10.6)
CHLORIDE BLD-SCNC: 88 MMOL/L (ref 99–110)
CO2: 29 MMOL/L (ref 21–32)
CREAT SERPL-MCNC: 2.4 MG/DL (ref 0.6–1.1)
GFR AFRICAN AMERICAN: 25 ML/MIN/1.73M2
GFR NON-AFRICAN AMERICAN: 21 ML/MIN/1.73M2
GLUCOSE BLD-MCNC: 546 MG/DL (ref 70–99)
MAGNESIUM: 2.1 MG/DL (ref 1.8–2.4)
PHOSPHORUS: 4 MG/DL (ref 2.5–4.9)
POTASSIUM SERPL-SCNC: 5.1 MMOL/L (ref 3.5–5.1)
SODIUM BLD-SCNC: 128 MMOL/L (ref 135–145)
TOTAL PROTEIN: 5.8 GM/DL (ref 6.4–8.2)

## 2020-11-17 PROCEDURE — 83735 ASSAY OF MAGNESIUM: CPT

## 2020-11-17 PROCEDURE — 84100 ASSAY OF PHOSPHORUS: CPT

## 2020-11-17 PROCEDURE — 80053 COMPREHEN METABOLIC PANEL: CPT

## 2020-11-17 PROCEDURE — 36415 COLL VENOUS BLD VENIPUNCTURE: CPT

## 2020-11-19 NOTE — DISCHARGE SUMMARY
Discharge Summary    Name:  Summer Trivedi /Age/Sex: 1960  (61 y.o. female)   MRN & CSN:  [id number] & [id number] Admission Date/Time: 2020 11:26 AM   Attending:  Cortes Roa MD Discharging Physician: Cortes Roa MD     Hospital Course:   Summer Trivedi is a 61 y.o.  female who was admitted to the hospital for constipation and acute on chronic shortness of breath secondary to CHF exacerbation. She was treated with IV Lasix along with metolazone. At the time of discharge,  she was discharged home with Lasix 20 twice daily. Most recent EF was 30%, additionally  Lisinopril 2.5 mg was added to the regimen. Additionally, she is chronically on 2 L of oxygen at baseline. With regards to constipation, she was treated with lactulose but she has been refusing to take on a regular basis while in the hospital according to the nurses. She was discharged home with lactulose in addition to MiraLAX and docusate that she was taking on outpatient basis. While in the hospital.  She underwent left heart cath with placement of a MEGAN x2. She was noted to have RCA with 100% occlusion. Plan is to to intervene the RCA on outpatient basis in about 6 weeks. She underwent PT/OT. SNF placement was recommended. Patient adamantly refused to go to SNF. She was discharged home with home health care. Principal admission diagnosis   1. Acute systolic CHF exacerbation-- on 2 L of oxygen continuously at baseline. SOB has  improved. She was treated with 40 IV Lasix 3 times daily along with metolazone 5 mg twice daily. Patient diuresing well. Shortness of breath significantly improved. She was discharged home with Lasix 20 twice daily. Given her systolic heart failure with a EF of 30% as of 2019 she was discharged home with a low-dose of lisinopril. Chlorthalidone was discontinued. 2.  Constipation--- patient has a poor diet. She was not having a regular bowel movement on this admission.   Nurse Plan: We will check his liver enzymes prior to sending in the prescription. Initiate Treatment: Terbinafine qd Detail Level: Zone

## 2023-03-27 NOTE — ED NOTES
CAD with history of CABG and subsequent PCI in 2022  No chest pain    · Continue aspirin and clopidogrel HOSP CALLED BACK AT 4:21 AM     Cyndie Rapp  02/22/20 0421

## 2024-05-07 NOTE — PLAN OF CARE
Problem: Falls - Risk of:  Goal: Will remain free from falls  Will remain free from falls   Outcome: Ongoing    Goal: Absence of physical injury  Absence of physical injury   Outcome: Ongoing
Problem: Falls - Risk of:  Goal: Will remain free from falls  Will remain free from falls   Outcome: Ongoing    Goal: Absence of physical injury  Absence of physical injury   Outcome: Ongoing      Problem: Pain:  Goal: Pain level will decrease  Pain level will decrease   Outcome: Ongoing
Problem: Falls - Risk of:  Goal: Will remain free from falls  Will remain free from falls   Outcome: Ongoing    Goal: Absence of physical injury  Absence of physical injury   Outcome: Ongoing      Problem: Pain:  Goal: Pain level will decrease  Pain level will decrease   Outcome: Ongoing    Goal: Control of acute pain  Control of acute pain   Outcome: Ongoing    Goal: Control of chronic pain  Control of chronic pain   Outcome: Ongoing      Problem: Musculor/Skeletal Functional Status  Goal: Highest potential functional level  Outcome: Ongoing    Goal: Absence of falls  Outcome: Ongoing
Home